# Patient Record
Sex: FEMALE | Race: WHITE | NOT HISPANIC OR LATINO
[De-identification: names, ages, dates, MRNs, and addresses within clinical notes are randomized per-mention and may not be internally consistent; named-entity substitution may affect disease eponyms.]

---

## 2017-01-17 ENCOUNTER — APPOINTMENT (OUTPATIENT)
Dept: HEMATOLOGY ONCOLOGY | Facility: CLINIC | Age: 77
End: 2017-01-17

## 2017-01-17 VITALS
WEIGHT: 146 LBS | DIASTOLIC BLOOD PRESSURE: 76 MMHG | RESPIRATION RATE: 12 BRPM | HEART RATE: 82 BPM | HEIGHT: 59 IN | TEMPERATURE: 98.9 F | SYSTOLIC BLOOD PRESSURE: 201 MMHG | BODY MASS INDEX: 29.43 KG/M2

## 2017-01-17 DIAGNOSIS — Z86.79 PERSONAL HISTORY OF OTHER DISEASES OF THE CIRCULATORY SYSTEM: ICD-10-CM

## 2017-01-17 DIAGNOSIS — Z87.891 PERSONAL HISTORY OF NICOTINE DEPENDENCE: ICD-10-CM

## 2017-01-17 DIAGNOSIS — Z80.0 FAMILY HISTORY OF MALIGNANT NEOPLASM OF DIGESTIVE ORGANS: ICD-10-CM

## 2017-01-17 DIAGNOSIS — I21.29 ST ELEVATION (STEMI) MYOCARDIAL INFARCTION INVOLVING OTHER SITES: ICD-10-CM

## 2017-01-17 DIAGNOSIS — J44.9 CHRONIC OBSTRUCTIVE PULMONARY DISEASE, UNSPECIFIED: ICD-10-CM

## 2017-01-17 DIAGNOSIS — Z82.49 FAMILY HISTORY OF ISCHEMIC HEART DISEASE AND OTHER DISEASES OF THE CIRCULATORY SYSTEM: ICD-10-CM

## 2017-01-17 DIAGNOSIS — Z82.5 FAMILY HISTORY OF ASTHMA AND OTHER CHRONIC LOWER RESPIRATORY DISEASES: ICD-10-CM

## 2017-01-17 DIAGNOSIS — Z87.19 PERSONAL HISTORY OF OTHER DISEASES OF THE DIGESTIVE SYSTEM: ICD-10-CM

## 2017-01-17 DIAGNOSIS — Z86.39 PERSONAL HISTORY OF OTHER ENDOCRINE, NUTRITIONAL AND METABOLIC DISEASE: ICD-10-CM

## 2017-01-17 RX ORDER — DONEPEZIL HYDROCHLORIDE 10 MG/1
10 TABLET, FILM COATED ORAL
Refills: 0 | Status: ACTIVE | COMMUNITY

## 2017-01-17 RX ORDER — ATORVASTATIN CALCIUM 20 MG/1
20 TABLET, FILM COATED ORAL
Refills: 0 | Status: ACTIVE | COMMUNITY

## 2017-01-17 RX ORDER — OMEPRAZOLE 40 MG/1
40 CAPSULE, DELAYED RELEASE ORAL
Refills: 0 | Status: ACTIVE | COMMUNITY

## 2017-01-17 RX ORDER — ISOSORBIDE MONONITRATE 30 MG/1
30 TABLET, EXTENDED RELEASE ORAL
Refills: 0 | Status: ACTIVE | COMMUNITY

## 2017-01-17 RX ORDER — CICLESONIDE 50 UG/1
50 SPRAY NASAL
Refills: 0 | Status: ACTIVE | COMMUNITY

## 2017-01-17 RX ORDER — TIOTROPIUM BROMIDE 18 UG/1
18 CAPSULE ORAL; RESPIRATORY (INHALATION)
Refills: 0 | Status: ACTIVE | COMMUNITY

## 2017-01-17 RX ORDER — LOSARTAN POTASSIUM 100 MG/1
100 TABLET, FILM COATED ORAL
Refills: 0 | Status: ACTIVE | COMMUNITY

## 2017-01-17 RX ORDER — MONTELUKAST SODIUM 10 MG/1
10 TABLET, FILM COATED ORAL
Refills: 0 | Status: ACTIVE | COMMUNITY

## 2017-01-19 LAB
ALBUMIN SERPL-MCNC: 3.8 G/DL
ALBUMIN/GLOB SERPL: 2
ALP SERPL-CCNC: 112 IU/L
ALT SERPL-CCNC: 20 IU/L
ANION GAP SERPL CALC-SCNC: 8 MEQ/L
AST SERPL-CCNC: 22 IU/L
BASOPHILS # BLD: 0.02 TH/MM3
BASOPHILS NFR BLD: 0.4 %
BILIRUB SERPL-MCNC: 0.9 MG/DL
BUN SERPL-MCNC: 22 MG/DL
BUN/CREAT SERPL: 18.6 %
CALCIUM SERPL-MCNC: 9.3 MG/DL
CHLORIDE SERPL-SCNC: 104 MEQ/L
CO2 SERPL-SCNC: 28 MEQ/L
CREAT SERPL-MCNC: 1.18 MG/DL
EOSINOPHIL # BLD: 0.19 TH/MM3
EOSINOPHIL NFR BLD: 3.5 %
ERYTHROCYTE [DISTWIDTH] IN BLOOD BY AUTOMATED COUNT: 13.6 %
GFR SERPL CREATININE-BSD FRML MDRD: 44
GLUCOSE SERPL-MCNC: 197 MG/DL
GRANULOCYTES # BLD: 2.86 TH/MM3
GRANULOCYTES NFR BLD: 53.1 %
HAPTOGLOB SERPL-MCNC: 143 MG/DL
HCT VFR BLD AUTO: 35.3 %
HGB BLD-MCNC: 11.9 G/DL
IGG FLD-MCNC: 571 MG/DL
IMM GRANULOCYTES # BLD: 0.01 TH/MM3
IMM GRANULOCYTES NFR BLD: 0.2 %
LACTATE DEHYDROGENASE (NORTH): 178 IU/L
LYMPHOCYTES # BLD: 1.78 TH/MM3
LYMPHOCYTES NFR BLD: 33.1 %
MCH RBC QN AUTO: 28.1 PG
MCHC RBC AUTO-ENTMCNC: 33.7 G/DL
MCV RBC AUTO: 83.5 FL
MONOCYTES # BLD: 0.52 TH/MM3
MONOCYTES NFR BLD: 9.7 %
PLATELET # BLD: 178 TH/MM3
PMV BLD AUTO: 8.7 FL
POTASSIUM SERPL-SCNC: 4.4 MMOL/L
PROT SERPL-MCNC: 5.7 G/DL
RBC # BLD AUTO: 4.23 MIL/MM3
SODIUM SERPL-SCNC: 140 MEQ/L
WBC # BLD: 5.38 TH/MM3

## 2017-04-24 ENCOUNTER — OUTPATIENT (OUTPATIENT)
Dept: OUTPATIENT SERVICES | Facility: HOSPITAL | Age: 77
LOS: 1 days | Discharge: HOME | End: 2017-04-24

## 2017-04-24 ENCOUNTER — APPOINTMENT (OUTPATIENT)
Dept: HEMATOLOGY ONCOLOGY | Facility: CLINIC | Age: 77
End: 2017-04-24

## 2017-04-24 VITALS
HEIGHT: 59 IN | WEIGHT: 148 LBS | DIASTOLIC BLOOD PRESSURE: 62 MMHG | TEMPERATURE: 97.4 F | HEART RATE: 76 BPM | BODY MASS INDEX: 29.84 KG/M2 | SYSTOLIC BLOOD PRESSURE: 166 MMHG | RESPIRATION RATE: 12 BRPM

## 2017-04-24 DIAGNOSIS — D64.9 ANEMIA, UNSPECIFIED: ICD-10-CM

## 2017-04-24 LAB
BASOPHILS # BLD: 0.02 TH/MM3
BASOPHILS NFR BLD: 0.4 %
EOSINOPHIL # BLD: 0.18 TH/MM3
EOSINOPHIL NFR BLD: 3.2 %
ERYTHROCYTE [DISTWIDTH] IN BLOOD BY AUTOMATED COUNT: 14.7 %
GRANULOCYTES # BLD: 3.07 TH/MM3
GRANULOCYTES NFR BLD: 54.5 %
HCT VFR BLD AUTO: 36.6 %
HGB BLD-MCNC: 12.2 G/DL
IMM GRANULOCYTES # BLD: 0.04 TH/MM3
IMM GRANULOCYTES NFR BLD: 0.7 %
LYMPHOCYTES # BLD: 1.79 TH/MM3
LYMPHOCYTES NFR BLD: 31.9 %
MCH RBC QN AUTO: 27.9 PG
MCHC RBC AUTO-ENTMCNC: 33.3 G/DL
MCV RBC AUTO: 83.8 FL
MONOCYTES # BLD: 0.52 TH/MM3
MONOCYTES NFR BLD: 9.3 %
PLATELET # BLD: 162 TH/MM3
PMV BLD AUTO: 9 FL
RBC # BLD AUTO: 4.37 MIL/MM3
WBC # BLD: 5.62 TH/MM3

## 2017-06-28 DIAGNOSIS — C85.90 NON-HODGKIN LYMPHOMA, UNSPECIFIED, UNSPECIFIED SITE: ICD-10-CM

## 2017-06-28 DIAGNOSIS — D59.1 OTHER AUTOIMMUNE HEMOLYTIC ANEMIAS: ICD-10-CM

## 2017-07-05 ENCOUNTER — OUTPATIENT (OUTPATIENT)
Dept: OUTPATIENT SERVICES | Facility: HOSPITAL | Age: 77
LOS: 1 days | Discharge: HOME | End: 2017-07-05

## 2017-07-05 DIAGNOSIS — R52 PAIN, UNSPECIFIED: ICD-10-CM

## 2017-07-05 DIAGNOSIS — D64.9 ANEMIA, UNSPECIFIED: ICD-10-CM

## 2017-07-26 ENCOUNTER — APPOINTMENT (OUTPATIENT)
Dept: HEMATOLOGY ONCOLOGY | Facility: CLINIC | Age: 77
End: 2017-07-26

## 2017-07-26 ENCOUNTER — OUTPATIENT (OUTPATIENT)
Dept: OUTPATIENT SERVICES | Facility: HOSPITAL | Age: 77
LOS: 1 days | Discharge: HOME | End: 2017-07-26

## 2017-07-26 VITALS
TEMPERATURE: 97.6 F | HEIGHT: 59 IN | WEIGHT: 147 LBS | DIASTOLIC BLOOD PRESSURE: 57 MMHG | HEART RATE: 83 BPM | BODY MASS INDEX: 29.64 KG/M2 | SYSTOLIC BLOOD PRESSURE: 156 MMHG | RESPIRATION RATE: 12 BRPM

## 2017-07-26 DIAGNOSIS — D64.9 ANEMIA, UNSPECIFIED: ICD-10-CM

## 2017-07-26 RX ORDER — INSULIN ASPART 100 [IU]/ML
100 INJECTION, SOLUTION INTRAVENOUS; SUBCUTANEOUS
Qty: 30 | Refills: 0 | Status: ACTIVE | COMMUNITY
Start: 2017-05-26

## 2017-07-26 RX ORDER — INSULIN DETEMIR 100 [IU]/ML
100 INJECTION, SOLUTION SUBCUTANEOUS
Qty: 30 | Refills: 0 | Status: COMPLETED | COMMUNITY
Start: 2017-03-17

## 2017-07-26 RX ORDER — GLIPIZIDE 2.5 MG/1
2.5 TABLET, FILM COATED, EXTENDED RELEASE ORAL
Refills: 0 | Status: COMPLETED | COMMUNITY
End: 2017-07-26

## 2017-07-26 RX ORDER — LEVOFLOXACIN 500 MG/1
500 TABLET, FILM COATED ORAL
Qty: 10 | Refills: 0 | Status: COMPLETED | COMMUNITY
Start: 2017-06-06

## 2017-07-26 RX ORDER — RANITIDINE 150 MG/1
150 TABLET ORAL
Qty: 90 | Refills: 0 | Status: COMPLETED | COMMUNITY
Start: 2017-04-21

## 2017-07-26 RX ORDER — BLOOD GLUCOSE CONTROL HIGH,LOW
EACH MISCELLANEOUS
Qty: 2 | Refills: 0 | Status: COMPLETED | COMMUNITY
Start: 2017-05-10

## 2017-07-26 RX ORDER — (INSULIN DEGLUDEC AND LIRAGLUTIDE) 100; 3.6 [IU]/ML; MG/ML
100-3.6 INJECTION, SOLUTION SUBCUTANEOUS
Refills: 0 | Status: ACTIVE | COMMUNITY

## 2017-07-26 RX ORDER — PEN NEEDLE, DIABETIC, SAFETY 32GX 5/32"
32G X 4 MM NEEDLE, DISPOSABLE MISCELLANEOUS
Qty: 400 | Refills: 0 | Status: COMPLETED | COMMUNITY
Start: 2017-06-06

## 2017-07-26 RX ORDER — LANCING DEVICE
EACH MISCELLANEOUS
Qty: 1 | Refills: 0 | Status: COMPLETED | COMMUNITY
Start: 2017-05-10

## 2017-07-26 RX ORDER — BLOOD-GLUCOSE METER
W/DEVICE EACH MISCELLANEOUS
Qty: 1 | Refills: 0 | Status: COMPLETED | COMMUNITY
Start: 2017-05-10

## 2017-07-26 RX ORDER — BLOOD SUGAR DIAGNOSTIC
STRIP MISCELLANEOUS
Qty: 250 | Refills: 0 | Status: COMPLETED | COMMUNITY
Start: 2017-06-22

## 2017-07-26 RX ORDER — FUROSEMIDE 20 MG/1
20 TABLET ORAL
Qty: 90 | Refills: 0 | Status: ACTIVE | COMMUNITY
Start: 2017-05-22

## 2017-07-26 RX ORDER — LANCETS 30 GAUGE
EACH MISCELLANEOUS
Qty: 400 | Refills: 0 | Status: COMPLETED | COMMUNITY
Start: 2017-05-10

## 2017-07-26 RX ORDER — SYRING-NEEDL,DISP,INSUL,0.3 ML 31 GX5/16"
31G X 5/16" SYRINGE, EMPTY DISPOSABLE MISCELLANEOUS
Qty: 400 | Refills: 0 | Status: COMPLETED | COMMUNITY
Start: 2017-05-10

## 2017-07-27 DIAGNOSIS — D59.1 OTHER AUTOIMMUNE HEMOLYTIC ANEMIAS: ICD-10-CM

## 2017-07-27 DIAGNOSIS — C85.90 NON-HODGKIN LYMPHOMA, UNSPECIFIED, UNSPECIFIED SITE: ICD-10-CM

## 2017-07-27 LAB
ALBUMIN SERPL-MCNC: 4.2 G/DL
ALBUMIN/GLOB SERPL: 1.75
ALP SERPL-CCNC: 75 IU/L
ALT SERPL-CCNC: 16 IU/L
ANION GAP SERPL CALC-SCNC: 7 MEQ/L
AST SERPL-CCNC: 22 IU/L
BASOPHILS # BLD: 0.02 TH/MM3
BASOPHILS NFR BLD: 0.3 %
BILIRUB SERPL-MCNC: 0.9 MG/DL
BUN SERPL-MCNC: 26 MG/DL
BUN/CREAT SERPL: 20.3 %
CALCIUM SERPL-MCNC: 9.7 MG/DL
CHLORIDE SERPL-SCNC: 105 MEQ/L
CO2 SERPL-SCNC: 29 MEQ/L
CREAT SERPL-MCNC: 1.28 MG/DL
EOSINOPHIL # BLD: 0.2 TH/MM3
EOSINOPHIL NFR BLD: 2.8 %
ERYTHROCYTE [DISTWIDTH] IN BLOOD BY AUTOMATED COUNT: 14.1 %
GFR SERPL CREATININE-BSD FRML MDRD: 40
GLUCOSE SERPL-MCNC: 85 MG/DL
GRANULOCYTES # BLD: 4.48 TH/MM3
GRANULOCYTES NFR BLD: 63.6 %
HCT VFR BLD AUTO: 37.7 %
HGB BLD-MCNC: 12.2 G/DL
IMM GRANULOCYTES # BLD: 0.02 TH/MM3
IMM GRANULOCYTES NFR BLD: 0.3 %
LACTATE DEHYDROGENASE (NORTH): 194 IU/L
LYMPHOCYTES # BLD: 1.75 TH/MM3
LYMPHOCYTES NFR BLD: 24.9 %
MCH RBC QN AUTO: 27.4 PG
MCHC RBC AUTO-ENTMCNC: 32.4 G/DL
MCV RBC AUTO: 84.5 FL
MONOCYTES # BLD: 0.57 TH/MM3
MONOCYTES NFR BLD: 8.1 %
PLATELET # BLD: 182 TH/MM3
PMV BLD AUTO: 8.6 FL
POTASSIUM SERPL-SCNC: 4.1 MMOL/L
PROT SERPL-MCNC: 6.6 G/DL
RBC # BLD AUTO: 4.46 MIL/MM3
SODIUM SERPL-SCNC: 141 MEQ/L
WBC # BLD: 7.04 TH/MM3

## 2017-08-01 ENCOUNTER — APPOINTMENT (OUTPATIENT)
Dept: VASCULAR SURGERY | Facility: CLINIC | Age: 77
End: 2017-08-01
Payer: MEDICARE

## 2017-08-01 VITALS
SYSTOLIC BLOOD PRESSURE: 146 MMHG | HEIGHT: 59 IN | WEIGHT: 147 LBS | BODY MASS INDEX: 29.64 KG/M2 | DIASTOLIC BLOOD PRESSURE: 60 MMHG

## 2017-08-01 PROCEDURE — 99214 OFFICE O/P EST MOD 30 MIN: CPT

## 2017-08-01 PROCEDURE — 93925 LOWER EXTREMITY STUDY: CPT

## 2017-08-01 PROCEDURE — 93978 VASCULAR STUDY: CPT

## 2017-08-01 PROCEDURE — 93880 EXTRACRANIAL BILAT STUDY: CPT

## 2017-09-10 ENCOUNTER — EMERGENCY (EMERGENCY)
Facility: HOSPITAL | Age: 77
LOS: 0 days | Discharge: HOME | End: 2017-09-10
Admitting: INTERNAL MEDICINE

## 2017-09-10 DIAGNOSIS — Z90.49 ACQUIRED ABSENCE OF OTHER SPECIFIED PARTS OF DIGESTIVE TRACT: ICD-10-CM

## 2017-09-10 DIAGNOSIS — Z88.2 ALLERGY STATUS TO SULFONAMIDES: ICD-10-CM

## 2017-09-10 DIAGNOSIS — Z88.8 ALLERGY STATUS TO OTHER DRUGS, MEDICAMENTS AND BIOLOGICAL SUBSTANCES STATUS: ICD-10-CM

## 2017-09-10 DIAGNOSIS — M25.572 PAIN IN LEFT ANKLE AND JOINTS OF LEFT FOOT: ICD-10-CM

## 2017-09-10 DIAGNOSIS — Z91.010 ALLERGY TO PEANUTS: ICD-10-CM

## 2017-09-10 DIAGNOSIS — M19.90 UNSPECIFIED OSTEOARTHRITIS, UNSPECIFIED SITE: ICD-10-CM

## 2017-09-10 DIAGNOSIS — Z91.013 ALLERGY TO SEAFOOD: ICD-10-CM

## 2017-09-10 DIAGNOSIS — D64.9 ANEMIA, UNSPECIFIED: ICD-10-CM

## 2017-09-10 DIAGNOSIS — Z88.0 ALLERGY STATUS TO PENICILLIN: ICD-10-CM

## 2017-09-10 DIAGNOSIS — I10 ESSENTIAL (PRIMARY) HYPERTENSION: ICD-10-CM

## 2017-09-10 DIAGNOSIS — E11.9 TYPE 2 DIABETES MELLITUS WITHOUT COMPLICATIONS: ICD-10-CM

## 2017-10-27 ENCOUNTER — APPOINTMENT (OUTPATIENT)
Dept: HEMATOLOGY ONCOLOGY | Facility: CLINIC | Age: 77
End: 2017-10-27

## 2017-10-27 VITALS
DIASTOLIC BLOOD PRESSURE: 75 MMHG | BODY MASS INDEX: 29.23 KG/M2 | SYSTOLIC BLOOD PRESSURE: 183 MMHG | WEIGHT: 145 LBS | HEIGHT: 59 IN | TEMPERATURE: 97.2 F | RESPIRATION RATE: 14 BRPM | HEART RATE: 80 BPM

## 2017-10-27 LAB
BASOPHILS # BLD: 0.02 TH/MM3
BASOPHILS NFR BLD: 0.4 %
EOSINOPHIL # BLD: 0.15 TH/MM3
EOSINOPHIL NFR BLD: 3.4 %
ERYTHROCYTE [DISTWIDTH] IN BLOOD BY AUTOMATED COUNT: 14.5 %
GRANULOCYTES # BLD: 2.67 TH/MM3
GRANULOCYTES NFR BLD: 60.1 %
HCT VFR BLD AUTO: 35.1 %
HGB BLD-MCNC: 11.7 G/DL
IMM GRANULOCYTES # BLD: 0.01 TH/MM3
IMM GRANULOCYTES NFR BLD: 0.2 %
LYMPHOCYTES # BLD: 1.19 TH/MM3
LYMPHOCYTES NFR BLD: 26.7 %
MCH RBC QN AUTO: 27.9 PG
MCHC RBC AUTO-ENTMCNC: 33.3 G/DL
MCV RBC AUTO: 83.8 FL
MONOCYTES # BLD: 0.41 TH/MM3
MONOCYTES NFR BLD: 9.2 %
PLATELET # BLD: 161 TH/MM3
PMV BLD AUTO: 8.4 FL
RBC # BLD AUTO: 4.19 MIL/MM3
WBC # BLD: 4.45 TH/MM3

## 2017-10-31 LAB
ALBUMIN SERPL-MCNC: 4.1 G/DL
ALBUMIN/GLOB SERPL: 2.16
ALP SERPL-CCNC: 69 IU/L
ALT SERPL-CCNC: 23 IU/L
ANION GAP SERPL CALC-SCNC: 5 MEQ/L
AST SERPL-CCNC: 29 IU/L
BILIRUB SERPL-MCNC: 0.7 MG/DL
BUN SERPL-MCNC: 26 MG/DL
BUN/CREAT SERPL: 20.8 %
CALCIUM SERPL-MCNC: 8.9 MG/DL
CHLORIDE SERPL-SCNC: 106 MEQ/L
CO2 SERPL-SCNC: 29 MEQ/L
CREAT SERPL-MCNC: 1.25 MG/DL
GFR SERPL CREATININE-BSD FRML MDRD: 41
GLUCOSE SERPL-MCNC: 116 MG/DL
LACTATE DEHYDROGENASE (NORTH): 180 IU/L
POTASSIUM SERPL-SCNC: 3.9 MMOL/L
PROT SERPL-MCNC: 6 G/DL
SODIUM SERPL-SCNC: 140 MEQ/L

## 2018-01-25 ENCOUNTER — APPOINTMENT (OUTPATIENT)
Dept: HEMATOLOGY ONCOLOGY | Facility: CLINIC | Age: 78
End: 2018-01-25

## 2018-01-25 ENCOUNTER — OUTPATIENT (OUTPATIENT)
Dept: OUTPATIENT SERVICES | Facility: HOSPITAL | Age: 78
LOS: 1 days | Discharge: HOME | End: 2018-01-25

## 2018-01-25 VITALS
RESPIRATION RATE: 14 BRPM | HEIGHT: 59 IN | BODY MASS INDEX: 29.03 KG/M2 | SYSTOLIC BLOOD PRESSURE: 140 MMHG | DIASTOLIC BLOOD PRESSURE: 62 MMHG | TEMPERATURE: 96.7 F | WEIGHT: 144 LBS | HEART RATE: 70 BPM

## 2018-01-25 DIAGNOSIS — C85.90 NON-HODGKIN LYMPHOMA, UNSPECIFIED, UNSPECIFIED SITE: ICD-10-CM

## 2018-01-25 DIAGNOSIS — D59.1 OTHER AUTOIMMUNE HEMOLYTIC ANEMIAS: ICD-10-CM

## 2018-01-25 LAB
BASOPHILS # BLD: 0.02 TH/MM3
BASOPHILS NFR BLD: 0.4 %
EOSINOPHIL # BLD: 0.13 TH/MM3
EOSINOPHIL NFR BLD: 2.5 %
ERYTHROCYTE [DISTWIDTH] IN BLOOD BY AUTOMATED COUNT: 14.1 %
GRANULOCYTES # BLD: 2.73 TH/MM3
GRANULOCYTES NFR BLD: 53.5 %
HCT VFR BLD AUTO: 36.2 %
HGB BLD-MCNC: 11.9 G/DL
IMM GRANULOCYTES # BLD: 0.01 TH/MM3
IMM GRANULOCYTES NFR BLD: 0.2 %
LYMPHOCYTES # BLD: 1.79 TH/MM3
LYMPHOCYTES NFR BLD: 35 %
MCH RBC QN AUTO: 27.6 PG
MCHC RBC AUTO-ENTMCNC: 32.9 G/DL
MCV RBC AUTO: 84 FL
MONOCYTES # BLD: 0.43 TH/MM3
MONOCYTES NFR BLD: 8.4 %
PLATELET # BLD: 144 TH/MM3
PMV BLD AUTO: 9.3 FL
RBC # BLD AUTO: 4.31 MIL/MM3
WBC # BLD: 5.11 TH/MM3

## 2018-01-25 NOTE — CONSULT LETTER
[Dear  ___] : Dear  [unfilled], [Consult Letter:] : I had the pleasure of evaluating your patient, [unfilled]. [Please see my note below.] : Please see my note below. [Consult Closing:] : Thank you very much for allowing me to participate in the care of this patient.  If you have any questions, please do not hesitate to contact me.

## 2018-01-25 NOTE — PHYSICAL EXAM
[Restricted in physically strenuous activity but ambulatory and able to carry out work of a light or sedentary nature] : Status 1- Restricted in physically strenuous activity but ambulatory and able to carry out work of a light or sedentary nature, e.g., light house work, office work [Normal] : grossly intact

## 2018-01-25 NOTE — REVIEW OF SYSTEMS
[Fatigue] : fatigue [Recent Change In Weight] : ~T no recent weight change [Cough] : no cough [Negative] : Heme/Lymph [de-identified] : diabetic neuropathy

## 2018-01-25 NOTE — ASSESSMENT
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  Elly has completed a course of steroids as well as rituximab. Rituxan  on 04/08/2016 and she  finished steroids approximately in early 06/2016.  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  She currently denies any bleeding.  S\par 3. Diabetes.  She is on insulin.\par 4. Hypogammaglobulinemia.  Her IgG that was previously checked  was decreased. She has been asymptomatic this was due to Rituxan. Repeat is showing near normal levels in past . \par 5.Previous history of hyperferritinemia, this was probably reactive.\par 6 She has a history of steroid use.  She is currently on calcium and vitamin D.   Her  DEXA scan in 8/2016 was normal. \par \par Plan: \par -will check doing well. Will see me in 4 months.\par -will check IgG levels again with next visit but no infections\par -will repeat DEXA sometime this year, Calcium, and Vit D was recommended.

## 2018-01-25 NOTE — HISTORY OF PRESENT ILLNESS
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  She finished it approximately in the beginning of 06/2016.  She also received 4  doses of rituximab therapy.\par \par HISTORY OF PRESENT ILLNESS:  Ms. Blackmon is a very pleasant 85yearold female with multiple medical problems.  She initially was being treated for secondary autoimmune hemolytic anemia in the setting of indolent Bcell nonHodgkin lymphoma, NOS.  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  Her initial PET scan only demonstrated mild splenomegaly.  She required steroids as well, but then when the tapering was attempted, her anemia worsened, and at that point in time, rituximab was initiated.\par  [de-identified] : January 17, 2017\yonathan Sanabria presents for followup today she hasn't seen you since July. She denies having recurrent illnesses. She denies having  fatigue she experienced before. She does have occasional headaches. Blood pressure today is elevated. Otherwise she has no complaints. \par \par 4/24/17\par She is doing well except  for trouble with sugars and BP. She is seeing appropriate specialists. No other complaints CBC from today is WNL.\par \par 7/26/17\par She is doing well. She has a cough with sputum production for now several months. CXR in July was clear. HAd a course of antibiotics that did not work. It may be her COPD. No bleedig, no B symptoms. Usual fatigue. \par \par 10/27/17\par She is doing well except for her diabetic neuropathy in her feet. States her A1C is 6. No bleeding. \par \par 1/25/18\par She is her for follow up for her NHL. She is doing well. No bleeding. No weight loss.  CBC from today is stable.

## 2018-02-06 ENCOUNTER — APPOINTMENT (OUTPATIENT)
Dept: VASCULAR SURGERY | Facility: CLINIC | Age: 78
End: 2018-02-06
Payer: MEDICARE

## 2018-02-06 PROCEDURE — 99213 OFFICE O/P EST LOW 20 MIN: CPT

## 2018-02-06 PROCEDURE — 93925 LOWER EXTREMITY STUDY: CPT

## 2018-05-24 ENCOUNTER — OUTPATIENT (OUTPATIENT)
Dept: OUTPATIENT SERVICES | Facility: HOSPITAL | Age: 78
LOS: 1 days | Discharge: HOME | End: 2018-05-24

## 2018-05-24 ENCOUNTER — APPOINTMENT (OUTPATIENT)
Dept: HEMATOLOGY ONCOLOGY | Facility: CLINIC | Age: 78
End: 2018-05-24

## 2018-05-24 ENCOUNTER — LABORATORY RESULT (OUTPATIENT)
Age: 78
End: 2018-05-24

## 2018-05-24 VITALS
WEIGHT: 148 LBS | TEMPERATURE: 97.6 F | BODY MASS INDEX: 29.84 KG/M2 | DIASTOLIC BLOOD PRESSURE: 67 MMHG | HEIGHT: 59 IN | RESPIRATION RATE: 14 BRPM | SYSTOLIC BLOOD PRESSURE: 153 MMHG | HEART RATE: 75 BPM

## 2018-05-24 LAB
HCT VFR BLD CALC: 38.7 %
HGB BLD-MCNC: 12.7 G/DL
MCHC RBC-ENTMCNC: 27.6 PG
MCHC RBC-ENTMCNC: 32.8 G/DL
MCV RBC AUTO: 84.1 FL
PLATELET # BLD AUTO: 172 K/UL
PMV BLD: 9.1 FL
RBC # BLD: 4.6 M/UL
RBC # FLD: 13.6 %
WBC # FLD AUTO: 5.55 K/UL

## 2018-05-27 LAB
ALBUMIN SERPL ELPH-MCNC: 4.2 G/DL
ALP BLD-CCNC: 65 U/L
ALT SERPL-CCNC: 15 U/L
ANION GAP SERPL CALC-SCNC: 15 MMOL/L
AST SERPL-CCNC: 20 U/L
BILIRUB SERPL-MCNC: 0.4 MG/DL
BUN SERPL-MCNC: 31 MG/DL
CALCIUM SERPL-MCNC: 9.2 MG/DL
CHLORIDE SERPL-SCNC: 98 MMOL/L
CO2 SERPL-SCNC: 29 MMOL/L
CREAT SERPL-MCNC: 1.3 MG/DL
GLUCOSE SERPL-MCNC: 168 MG/DL
HAPTOGLOB SERPL-MCNC: 131 MG/DL
LDH SERPL-CCNC: 241
POTASSIUM SERPL-SCNC: 4.3 MMOL/L
PROT SERPL-MCNC: 6.5 G/DL
SODIUM SERPL-SCNC: 142 MMOL/L

## 2018-06-02 NOTE — ASSESSMENT
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  Elly has completed a course of steroids as well as rituximab. Rituxan  on 04/08/2016 and she  finished steroids approximately in early 06/2016.  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  She currently denies any bleeding.  S\par 3. Diabetes.  She is on insulin.\par 4. Hypogammaglobulinemia.  Her IgG that was previously checked  was decreased. She has been asymptomatic this was due to Rituxan. Repeat is showing near normal levels in past . \par 5.Previous history of hyperferritinemia, this was probably reactive.\par 6 She has a history of steroid use.  She is currently on calcium and vitamin D.   Her  DEXA scan in 8/2016 was normal. \par \par Plan: \par -doing well with normal CBC  Will see me in 4 months.\par -will check IgG levels again in future\par -will repeat DEXA sometime this year, Calcium, and Vit D was recommended.

## 2018-06-02 NOTE — REVIEW OF SYSTEMS
[Fatigue] : fatigue [Recent Change In Weight] : ~T no recent weight change [Cough] : no cough [Negative] : Heme/Lymph [de-identified] : diabetic neuropathy

## 2018-06-02 NOTE — HISTORY OF PRESENT ILLNESS
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  She finished it approximately in the beginning of 06/2016.  She also received 4  doses of rituximab therapy.\par \par HISTORY OF PRESENT ILLNESS:  Ms. Blackmon is a very pleasant 85yearold female with multiple medical problems.  She initially was being treated for secondary autoimmune hemolytic anemia in the setting of indolent Bcell nonHodgkin lymphoma, NOS.  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  Her initial PET scan only demonstrated mild splenomegaly.  She required steroids as well, but then when the tapering was attempted, her anemia worsened, and at that point in time, rituximab was initiated.\par  [de-identified] : January 17, 2017\yonathan Sanabria presents for followup today she hasn't seen you since July. She denies having recurrent illnesses. She denies having  fatigue she experienced before. She does have occasional headaches. Blood pressure today is elevated. Otherwise she has no complaints. \par \par 4/24/17\par She is doing well except  for trouble with sugars and BP. She is seeing appropriate specialists. No other complaints CBC from today is WNL.\par \par 7/26/17\par She is doing well. She has a cough with sputum production for now several months. CXR in July was clear. HAd a course of antibiotics that did not work. It may be her COPD. No bleedig, no B symptoms. Usual fatigue. \par \par 10/27/17\par She is doing well except for her diabetic neuropathy in her feet. States her A1C is 6. No bleeding. \par \par 1/25/18\par She is her for follow up for her NHL. She is doing well. No bleeding. No weight loss.  CBC from today is stable.\par \par \par 5/24/18\par She is doing well. She has fatigue. CBC was fine from today s visit.

## 2018-06-07 DIAGNOSIS — D59.1 OTHER AUTOIMMUNE HEMOLYTIC ANEMIAS: ICD-10-CM

## 2018-06-07 DIAGNOSIS — C85.90 NON-HODGKIN LYMPHOMA, UNSPECIFIED, UNSPECIFIED SITE: ICD-10-CM

## 2018-06-08 ENCOUNTER — INPATIENT (INPATIENT)
Facility: HOSPITAL | Age: 78
LOS: 5 days | Discharge: HOME HEALTH | End: 2018-06-14
Attending: INTERNAL MEDICINE | Admitting: INTERNAL MEDICINE

## 2018-06-08 VITALS
HEART RATE: 93 BPM | TEMPERATURE: 100 F | DIASTOLIC BLOOD PRESSURE: 61 MMHG | SYSTOLIC BLOOD PRESSURE: 137 MMHG | OXYGEN SATURATION: 94 % | RESPIRATION RATE: 28 BRPM

## 2018-06-08 LAB
ALBUMIN SERPL ELPH-MCNC: 3.6 G/DL — SIGNIFICANT CHANGE UP (ref 3.5–5.2)
ALP SERPL-CCNC: 71 U/L — SIGNIFICANT CHANGE UP (ref 30–115)
ALT FLD-CCNC: 14 U/L — SIGNIFICANT CHANGE UP (ref 0–41)
ANION GAP SERPL CALC-SCNC: 15 MMOL/L — HIGH (ref 7–14)
APTT BLD: 34 SEC — SIGNIFICANT CHANGE UP (ref 27–39.2)
AST SERPL-CCNC: 17 U/L — SIGNIFICANT CHANGE UP (ref 0–41)
BASOPHILS # BLD AUTO: 0.04 K/UL — SIGNIFICANT CHANGE UP (ref 0–0.2)
BASOPHILS NFR BLD AUTO: 0.5 % — SIGNIFICANT CHANGE UP (ref 0–1)
BILIRUB SERPL-MCNC: 0.5 MG/DL — SIGNIFICANT CHANGE UP (ref 0.2–1.2)
BUN SERPL-MCNC: 23 MG/DL — HIGH (ref 10–20)
CALCIUM SERPL-MCNC: 8.5 MG/DL — SIGNIFICANT CHANGE UP (ref 8.5–10.1)
CHLORIDE SERPL-SCNC: 96 MMOL/L — LOW (ref 98–110)
CK MB CFR SERPL CALC: 2.3 NG/ML — SIGNIFICANT CHANGE UP (ref 0.6–6.3)
CK SERPL-CCNC: 120 U/L — SIGNIFICANT CHANGE UP (ref 0–225)
CO2 SERPL-SCNC: 27 MMOL/L — SIGNIFICANT CHANGE UP (ref 17–32)
CREAT SERPL-MCNC: 1.3 MG/DL — SIGNIFICANT CHANGE UP (ref 0.7–1.5)
EOSINOPHIL # BLD AUTO: 0.16 K/UL — SIGNIFICANT CHANGE UP (ref 0–0.7)
EOSINOPHIL NFR BLD AUTO: 2 % — SIGNIFICANT CHANGE UP (ref 0–8)
GLUCOSE SERPL-MCNC: 141 MG/DL — HIGH (ref 70–99)
HCT VFR BLD CALC: 31.3 % — LOW (ref 37–47)
HGB BLD-MCNC: 10.4 G/DL — LOW (ref 12–16)
IMM GRANULOCYTES NFR BLD AUTO: 0.6 % — HIGH (ref 0.1–0.3)
INR BLD: 1.18 RATIO — SIGNIFICANT CHANGE UP (ref 0.65–1.3)
LACTATE SERPL-SCNC: 1 MMOL/L — SIGNIFICANT CHANGE UP (ref 0.5–2.2)
LYMPHOCYTES # BLD AUTO: 3.58 K/UL — HIGH (ref 1.2–3.4)
LYMPHOCYTES # BLD AUTO: 44.3 % — SIGNIFICANT CHANGE UP (ref 20.5–51.1)
MCHC RBC-ENTMCNC: 27 PG — SIGNIFICANT CHANGE UP (ref 27–31)
MCHC RBC-ENTMCNC: 33.2 G/DL — SIGNIFICANT CHANGE UP (ref 32–37)
MCV RBC AUTO: 81.3 FL — SIGNIFICANT CHANGE UP (ref 81–99)
MONOCYTES # BLD AUTO: 0.91 K/UL — HIGH (ref 0.1–0.6)
MONOCYTES NFR BLD AUTO: 11.2 % — HIGH (ref 1.7–9.3)
NEUTROPHILS # BLD AUTO: 3.35 K/UL — SIGNIFICANT CHANGE UP (ref 1.4–6.5)
NEUTROPHILS NFR BLD AUTO: 41.4 % — LOW (ref 42.2–75.2)
PLATELET # BLD AUTO: 180 K/UL — SIGNIFICANT CHANGE UP (ref 130–400)
POTASSIUM SERPL-MCNC: 3.9 MMOL/L — SIGNIFICANT CHANGE UP (ref 3.5–5)
POTASSIUM SERPL-SCNC: 3.9 MMOL/L — SIGNIFICANT CHANGE UP (ref 3.5–5)
PROT SERPL-MCNC: 6.2 G/DL — SIGNIFICANT CHANGE UP (ref 6–8)
PROTHROM AB SERPL-ACNC: 12.8 SEC — SIGNIFICANT CHANGE UP (ref 9.95–12.87)
RBC # BLD: 3.85 M/UL — LOW (ref 4.2–5.4)
RBC # FLD: 13.3 % — SIGNIFICANT CHANGE UP (ref 11.5–14.5)
SODIUM SERPL-SCNC: 138 MMOL/L — SIGNIFICANT CHANGE UP (ref 135–146)
TROPONIN T SERPL-MCNC: <0.01 NG/ML — SIGNIFICANT CHANGE UP
WBC # BLD: 8.09 K/UL — SIGNIFICANT CHANGE UP (ref 4.8–10.8)
WBC # FLD AUTO: 8.09 K/UL — SIGNIFICANT CHANGE UP (ref 4.8–10.8)

## 2018-06-08 RX ORDER — SODIUM CHLORIDE 9 MG/ML
500 INJECTION, SOLUTION INTRAVENOUS ONCE
Qty: 0 | Refills: 0 | Status: COMPLETED | OUTPATIENT
Start: 2018-06-08 | End: 2018-06-08

## 2018-06-08 RX ORDER — ALBUTEROL 90 UG/1
2.5 AEROSOL, METERED ORAL
Qty: 0 | Refills: 0 | Status: COMPLETED | OUTPATIENT
Start: 2018-06-08 | End: 2018-06-08

## 2018-06-08 RX ADMIN — ALBUTEROL 2.5 MILLIGRAM(S): 90 AEROSOL, METERED ORAL at 23:00

## 2018-06-08 RX ADMIN — ALBUTEROL 2.5 MILLIGRAM(S): 90 AEROSOL, METERED ORAL at 22:40

## 2018-06-08 RX ADMIN — SODIUM CHLORIDE 500 MILLILITER(S): 9 INJECTION, SOLUTION INTRAVENOUS at 23:00

## 2018-06-08 RX ADMIN — ALBUTEROL 2.5 MILLIGRAM(S): 90 AEROSOL, METERED ORAL at 22:35

## 2018-06-08 NOTE — ED PROVIDER NOTE - NS ED ROS FT
Eyes:  No visual changes, eye pain or discharge.  ENMT: no sore throat or runny nose, + post nasal drip   Cardiac:  No chest pain, + SOB worsening over last week.   Respiratory:  + cough productive of green sputum x 1 week that is worsening.   GI:  No nausea, vomiting, diarrhea or abdominal pain.  :  No dysuria, frequency or burning.  MS:  No joint pain or back pain.  Neuro:  No headache or weakness.    Skin:  No skin rash.   Endocrine: No history of thyroid disease or diabetes.

## 2018-06-08 NOTE — ED PROVIDER NOTE - OBJECTIVE STATEMENT
77 yo F pmh of non hodkins lymphoma in remission, 77 yo F pmh of non hodkins lymphoma in remission,, CAD with 5 stents, DM, HTN, COPD presents with shortness of breath and cough x 1 week productive of green sputum. States that her  had similar symptoms which resolved but she her cough has worsened now having trouble breathing. + chills and subjective fever. no cp, no n/v/d, no abdominal pain. pmd is Dr. Fernandes.

## 2018-06-08 NOTE — ED PROVIDER NOTE - MEDICAL DECISION MAKING DETAILS
I personally evaluated the patient. I reviewed the Resident’s or Physician Assistant’s note (as assigned above), and agree with the findings and plan except as documented in my note. I personally evaluated the patient. I reviewed the Resident’s or Physician Assistant’s note (as assigned above), and agree with the findings and plan except as documented in my note.  wrap up note: pt with cough, fever, sob, admitted for pneumonia  Chart finished.

## 2018-06-08 NOTE — ED PROVIDER NOTE - PHYSICAL EXAMINATION
CONSTITUTIONAL: Well-developed; well-nourished; in no acute distress.   SKIN: warm, dry  HEAD: Normocephalic; atraumatic.  EYES: PERRL, no conjunctival erythema  ENT: No nasal discharge; airway clear.  NECK: Supple; non tender.  CARD: S1, S2 normal; Regular rate and rhythm.   RESP: + wheezing and crackles bilaterally. + tachypnea, speaking full sentences, no retractions   ABD: soft ntnd  EXT: Normal ROM.    LYMPH: No acute cervical adenopathy.

## 2018-06-09 DIAGNOSIS — Z98.890 OTHER SPECIFIED POSTPROCEDURAL STATES: Chronic | ICD-10-CM

## 2018-06-09 DIAGNOSIS — Z90.49 ACQUIRED ABSENCE OF OTHER SPECIFIED PARTS OF DIGESTIVE TRACT: Chronic | ICD-10-CM

## 2018-06-09 LAB
ALBUMIN SERPL ELPH-MCNC: 3.6 G/DL — SIGNIFICANT CHANGE UP (ref 3.5–5.2)
ALP SERPL-CCNC: 70 U/L — SIGNIFICANT CHANGE UP (ref 30–115)
ALT FLD-CCNC: 13 U/L — SIGNIFICANT CHANGE UP (ref 0–41)
ANION GAP SERPL CALC-SCNC: 17 MMOL/L — HIGH (ref 7–14)
AST SERPL-CCNC: 15 U/L — SIGNIFICANT CHANGE UP (ref 0–41)
BASOPHILS # BLD AUTO: 0.01 K/UL — SIGNIFICANT CHANGE UP (ref 0–0.2)
BASOPHILS NFR BLD AUTO: 0.3 % — SIGNIFICANT CHANGE UP (ref 0–1)
BILIRUB SERPL-MCNC: 0.3 MG/DL — SIGNIFICANT CHANGE UP (ref 0.2–1.2)
BUN SERPL-MCNC: 24 MG/DL — HIGH (ref 10–20)
CALCIUM SERPL-MCNC: 8.2 MG/DL — LOW (ref 8.5–10.1)
CHLORIDE SERPL-SCNC: 95 MMOL/L — LOW (ref 98–110)
CHOLEST SERPL-MCNC: 171 MG/DL — SIGNIFICANT CHANGE UP (ref 100–200)
CO2 SERPL-SCNC: 24 MMOL/L — SIGNIFICANT CHANGE UP (ref 17–32)
CREAT SERPL-MCNC: 1.3 MG/DL — SIGNIFICANT CHANGE UP (ref 0.7–1.5)
EOSINOPHIL # BLD AUTO: 0.01 K/UL — SIGNIFICANT CHANGE UP (ref 0–0.7)
EOSINOPHIL NFR BLD AUTO: 0.3 % — SIGNIFICANT CHANGE UP (ref 0–8)
ESTIMATED AVERAGE GLUCOSE: 146 MG/DL — HIGH (ref 68–114)
GLUCOSE SERPL-MCNC: 242 MG/DL — HIGH (ref 70–99)
HBA1C BLD-MCNC: 6.7 % — HIGH (ref 4–5.6)
HCT VFR BLD CALC: 30.1 % — LOW (ref 37–47)
HDLC SERPL-MCNC: 40 MG/DL — SIGNIFICANT CHANGE UP (ref 40–125)
HGB BLD-MCNC: 10 G/DL — LOW (ref 12–16)
IMM GRANULOCYTES NFR BLD AUTO: 0.8 % — HIGH (ref 0.1–0.3)
LIPID PNL WITH DIRECT LDL SERPL: 101 MG/DL — SIGNIFICANT CHANGE UP (ref 4–129)
LYMPHOCYTES # BLD AUTO: 0.24 K/UL — LOW (ref 1.2–3.4)
LYMPHOCYTES # BLD AUTO: 6.2 % — LOW (ref 20.5–51.1)
MCHC RBC-ENTMCNC: 27 PG — SIGNIFICANT CHANGE UP (ref 27–31)
MCHC RBC-ENTMCNC: 33.2 G/DL — SIGNIFICANT CHANGE UP (ref 32–37)
MCV RBC AUTO: 81.1 FL — SIGNIFICANT CHANGE UP (ref 81–99)
MONOCYTES # BLD AUTO: 0.14 K/UL — SIGNIFICANT CHANGE UP (ref 0.1–0.6)
MONOCYTES NFR BLD AUTO: 3.6 % — SIGNIFICANT CHANGE UP (ref 1.7–9.3)
NEUTROPHILS # BLD AUTO: 3.45 K/UL — SIGNIFICANT CHANGE UP (ref 1.4–6.5)
NEUTROPHILS NFR BLD AUTO: 88.8 % — HIGH (ref 42.2–75.2)
PLATELET # BLD AUTO: 139 K/UL — SIGNIFICANT CHANGE UP (ref 130–400)
POTASSIUM SERPL-MCNC: 4.4 MMOL/L — SIGNIFICANT CHANGE UP (ref 3.5–5)
POTASSIUM SERPL-SCNC: 4.4 MMOL/L — SIGNIFICANT CHANGE UP (ref 3.5–5)
PROT SERPL-MCNC: 6.2 G/DL — SIGNIFICANT CHANGE UP (ref 6–8)
RBC # BLD: 3.71 M/UL — LOW (ref 4.2–5.4)
RBC # FLD: 13.4 % — SIGNIFICANT CHANGE UP (ref 11.5–14.5)
SODIUM SERPL-SCNC: 136 MMOL/L — SIGNIFICANT CHANGE UP (ref 135–146)
TOTAL CHOLESTEROL/HDL RATIO MEASUREMENT: 4.3 RATIO — SIGNIFICANT CHANGE UP (ref 4–5.5)
TRIGL SERPL-MCNC: 143 MG/DL — SIGNIFICANT CHANGE UP (ref 10–149)
WBC # BLD: 3.88 K/UL — LOW (ref 4.8–10.8)
WBC # FLD AUTO: 3.88 K/UL — LOW (ref 4.8–10.8)

## 2018-06-09 RX ORDER — FENOFIBRATE,MICRONIZED 130 MG
48 CAPSULE ORAL DAILY
Qty: 0 | Refills: 0 | Status: DISCONTINUED | OUTPATIENT
Start: 2018-06-09 | End: 2018-06-14

## 2018-06-09 RX ORDER — ATORVASTATIN CALCIUM 80 MG/1
20 TABLET, FILM COATED ORAL AT BEDTIME
Qty: 0 | Refills: 0 | Status: DISCONTINUED | OUTPATIENT
Start: 2018-06-09 | End: 2018-06-14

## 2018-06-09 RX ORDER — DEXTROSE 50 % IN WATER 50 %
12.5 SYRINGE (ML) INTRAVENOUS ONCE
Qty: 0 | Refills: 0 | Status: DISCONTINUED | OUTPATIENT
Start: 2018-06-09 | End: 2018-06-14

## 2018-06-09 RX ORDER — MONTELUKAST 4 MG/1
10 TABLET, CHEWABLE ORAL DAILY
Qty: 0 | Refills: 0 | Status: DISCONTINUED | OUTPATIENT
Start: 2018-06-09 | End: 2018-06-14

## 2018-06-09 RX ORDER — TIOTROPIUM BROMIDE 18 UG/1
1 CAPSULE ORAL; RESPIRATORY (INHALATION) DAILY
Qty: 0 | Refills: 0 | Status: DISCONTINUED | OUTPATIENT
Start: 2018-06-09 | End: 2018-06-14

## 2018-06-09 RX ORDER — BUDESONIDE AND FORMOTEROL FUMARATE DIHYDRATE 160; 4.5 UG/1; UG/1
2 AEROSOL RESPIRATORY (INHALATION)
Qty: 0 | Refills: 0 | Status: DISCONTINUED | OUTPATIENT
Start: 2018-06-09 | End: 2018-06-14

## 2018-06-09 RX ORDER — DEXTROSE 50 % IN WATER 50 %
25 SYRINGE (ML) INTRAVENOUS ONCE
Qty: 0 | Refills: 0 | Status: DISCONTINUED | OUTPATIENT
Start: 2018-06-09 | End: 2018-06-14

## 2018-06-09 RX ORDER — CEFTRIAXONE 500 MG/1
1 INJECTION, POWDER, FOR SOLUTION INTRAMUSCULAR; INTRAVENOUS EVERY 24 HOURS
Qty: 0 | Refills: 0 | Status: DISCONTINUED | OUTPATIENT
Start: 2018-06-10 | End: 2018-06-12

## 2018-06-09 RX ORDER — IPRATROPIUM/ALBUTEROL SULFATE 18-103MCG
3 AEROSOL WITH ADAPTER (GRAM) INHALATION EVERY 6 HOURS
Qty: 0 | Refills: 0 | Status: DISCONTINUED | OUTPATIENT
Start: 2018-06-09 | End: 2018-06-14

## 2018-06-09 RX ORDER — DONEPEZIL HYDROCHLORIDE 10 MG/1
10 TABLET, FILM COATED ORAL AT BEDTIME
Qty: 0 | Refills: 0 | Status: DISCONTINUED | OUTPATIENT
Start: 2018-06-09 | End: 2018-06-14

## 2018-06-09 RX ORDER — CEFTRIAXONE 500 MG/1
1 INJECTION, POWDER, FOR SOLUTION INTRAMUSCULAR; INTRAVENOUS ONCE
Qty: 0 | Refills: 0 | Status: COMPLETED | OUTPATIENT
Start: 2018-06-09 | End: 2018-06-09

## 2018-06-09 RX ORDER — LOSARTAN POTASSIUM 100 MG/1
100 TABLET, FILM COATED ORAL DAILY
Qty: 0 | Refills: 0 | Status: DISCONTINUED | OUTPATIENT
Start: 2018-06-09 | End: 2018-06-14

## 2018-06-09 RX ORDER — SODIUM CHLORIDE 9 MG/ML
1000 INJECTION, SOLUTION INTRAVENOUS
Qty: 0 | Refills: 0 | Status: DISCONTINUED | OUTPATIENT
Start: 2018-06-09 | End: 2018-06-14

## 2018-06-09 RX ORDER — GLUCAGON INJECTION, SOLUTION 0.5 MG/.1ML
1 INJECTION, SOLUTION SUBCUTANEOUS ONCE
Qty: 0 | Refills: 0 | Status: DISCONTINUED | OUTPATIENT
Start: 2018-06-09 | End: 2018-06-14

## 2018-06-09 RX ORDER — PANTOPRAZOLE SODIUM 20 MG/1
40 TABLET, DELAYED RELEASE ORAL
Qty: 0 | Refills: 0 | Status: DISCONTINUED | OUTPATIENT
Start: 2018-06-09 | End: 2018-06-14

## 2018-06-09 RX ORDER — ONDANSETRON 8 MG/1
4 TABLET, FILM COATED ORAL EVERY 6 HOURS
Qty: 0 | Refills: 0 | Status: DISCONTINUED | OUTPATIENT
Start: 2018-06-09 | End: 2018-06-14

## 2018-06-09 RX ORDER — INSULIN LISPRO 100/ML
12 VIAL (ML) SUBCUTANEOUS ONCE
Qty: 0 | Refills: 0 | Status: COMPLETED | OUTPATIENT
Start: 2018-06-09 | End: 2018-06-09

## 2018-06-09 RX ORDER — INSULIN GLARGINE 100 [IU]/ML
12 INJECTION, SOLUTION SUBCUTANEOUS AT BEDTIME
Qty: 0 | Refills: 0 | Status: DISCONTINUED | OUTPATIENT
Start: 2018-06-09 | End: 2018-06-11

## 2018-06-09 RX ORDER — INSULIN LISPRO 100/ML
VIAL (ML) SUBCUTANEOUS
Qty: 0 | Refills: 0 | Status: DISCONTINUED | OUTPATIENT
Start: 2018-06-09 | End: 2018-06-11

## 2018-06-09 RX ORDER — INSULIN LISPRO 100/ML
4 VIAL (ML) SUBCUTANEOUS
Qty: 0 | Refills: 0 | Status: DISCONTINUED | OUTPATIENT
Start: 2018-06-09 | End: 2018-06-13

## 2018-06-09 RX ORDER — HEPARIN SODIUM 5000 [USP'U]/ML
5000 INJECTION INTRAVENOUS; SUBCUTANEOUS EVERY 8 HOURS
Qty: 0 | Refills: 0 | Status: DISCONTINUED | OUTPATIENT
Start: 2018-06-09 | End: 2018-06-14

## 2018-06-09 RX ORDER — ALBUTEROL 90 UG/1
1 AEROSOL, METERED ORAL EVERY 4 HOURS
Qty: 0 | Refills: 0 | Status: DISCONTINUED | OUTPATIENT
Start: 2018-06-09 | End: 2018-06-14

## 2018-06-09 RX ORDER — FUROSEMIDE 40 MG
20 TABLET ORAL DAILY
Qty: 0 | Refills: 0 | Status: DISCONTINUED | OUTPATIENT
Start: 2018-06-09 | End: 2018-06-14

## 2018-06-09 RX ORDER — INSULIN LISPRO 100/ML
14 VIAL (ML) SUBCUTANEOUS ONCE
Qty: 0 | Refills: 0 | Status: COMPLETED | OUTPATIENT
Start: 2018-06-09 | End: 2018-06-09

## 2018-06-09 RX ORDER — CEFTRIAXONE 500 MG/1
INJECTION, POWDER, FOR SOLUTION INTRAMUSCULAR; INTRAVENOUS
Qty: 0 | Refills: 0 | Status: DISCONTINUED | OUTPATIENT
Start: 2018-06-09 | End: 2018-06-12

## 2018-06-09 RX ORDER — INSULIN LISPRO 100/ML
10 VIAL (ML) SUBCUTANEOUS ONCE
Qty: 0 | Refills: 0 | Status: COMPLETED | OUTPATIENT
Start: 2018-06-09 | End: 2018-06-09

## 2018-06-09 RX ORDER — DEXTROSE 50 % IN WATER 50 %
15 SYRINGE (ML) INTRAVENOUS ONCE
Qty: 0 | Refills: 0 | Status: DISCONTINUED | OUTPATIENT
Start: 2018-06-09 | End: 2018-06-14

## 2018-06-09 RX ORDER — ISOSORBIDE MONONITRATE 60 MG/1
30 TABLET, EXTENDED RELEASE ORAL DAILY
Qty: 0 | Refills: 0 | Status: DISCONTINUED | OUTPATIENT
Start: 2018-06-09 | End: 2018-06-14

## 2018-06-09 RX ADMIN — LOSARTAN POTASSIUM 100 MILLIGRAM(S): 100 TABLET, FILM COATED ORAL at 05:26

## 2018-06-09 RX ADMIN — Medication 3 MILLILITER(S): at 21:27

## 2018-06-09 RX ADMIN — Medication 60 MILLIGRAM(S): at 18:54

## 2018-06-09 RX ADMIN — DONEPEZIL HYDROCHLORIDE 10 MILLIGRAM(S): 10 TABLET, FILM COATED ORAL at 21:32

## 2018-06-09 RX ADMIN — CEFTRIAXONE 100 GRAM(S): 500 INJECTION, POWDER, FOR SOLUTION INTRAMUSCULAR; INTRAVENOUS at 03:19

## 2018-06-09 RX ADMIN — PANTOPRAZOLE SODIUM 40 MILLIGRAM(S): 20 TABLET, DELAYED RELEASE ORAL at 07:56

## 2018-06-09 RX ADMIN — HEPARIN SODIUM 5000 UNIT(S): 5000 INJECTION INTRAVENOUS; SUBCUTANEOUS at 14:43

## 2018-06-09 RX ADMIN — Medication 2: at 07:57

## 2018-06-09 RX ADMIN — ISOSORBIDE MONONITRATE 30 MILLIGRAM(S): 60 TABLET, EXTENDED RELEASE ORAL at 11:46

## 2018-06-09 RX ADMIN — HEPARIN SODIUM 5000 UNIT(S): 5000 INJECTION INTRAVENOUS; SUBCUTANEOUS at 05:26

## 2018-06-09 RX ADMIN — Medication 60 MILLIGRAM(S): at 03:19

## 2018-06-09 RX ADMIN — Medication 600 MILLIGRAM(S): at 05:26

## 2018-06-09 RX ADMIN — HEPARIN SODIUM 5000 UNIT(S): 5000 INJECTION INTRAVENOUS; SUBCUTANEOUS at 21:33

## 2018-06-09 RX ADMIN — Medication 600 MILLIGRAM(S): at 18:53

## 2018-06-09 RX ADMIN — Medication 12 UNIT(S): at 13:33

## 2018-06-09 RX ADMIN — INSULIN GLARGINE 12 UNIT(S): 100 INJECTION, SOLUTION SUBCUTANEOUS at 21:32

## 2018-06-09 RX ADMIN — Medication 3 MILLILITER(S): at 16:55

## 2018-06-09 RX ADMIN — Medication 6: at 11:46

## 2018-06-09 RX ADMIN — Medication 3 MILLILITER(S): at 07:25

## 2018-06-09 RX ADMIN — BUDESONIDE AND FORMOTEROL FUMARATE DIHYDRATE 2 PUFF(S): 160; 4.5 AEROSOL RESPIRATORY (INHALATION) at 20:39

## 2018-06-09 RX ADMIN — Medication 14 UNIT(S): at 16:36

## 2018-06-09 RX ADMIN — Medication 10 UNIT(S): at 14:43

## 2018-06-09 RX ADMIN — MONTELUKAST 10 MILLIGRAM(S): 4 TABLET, CHEWABLE ORAL at 11:46

## 2018-06-09 RX ADMIN — ATORVASTATIN CALCIUM 20 MILLIGRAM(S): 80 TABLET, FILM COATED ORAL at 21:32

## 2018-06-09 RX ADMIN — BUDESONIDE AND FORMOTEROL FUMARATE DIHYDRATE 2 PUFF(S): 160; 4.5 AEROSOL RESPIRATORY (INHALATION) at 07:57

## 2018-06-09 RX ADMIN — Medication 4 UNIT(S): at 11:47

## 2018-06-09 RX ADMIN — Medication 20 MILLIGRAM(S): at 05:26

## 2018-06-09 NOTE — PROVIDER CONTACT NOTE (OTHER) - SITUATION
patient states she takes Xultrophy (insulin), family states that lispro does not work for pt as they have been through this before with he, and would like to know if they can bring in her med or havic

## 2018-06-09 NOTE — ED ADULT NURSE NOTE - PMH
Heart failure    High cholesterol    HTN (hypertension)    MI (myocardial infarction) CKD (chronic kidney disease)    Diabetes  Type !!  Heart failure    High cholesterol    HTN (hypertension)    MI (myocardial infarction)    Non Hodgkin's lymphoma    Osteoarthritis    PVD (peripheral vascular disease)    Thrombocytopenia

## 2018-06-09 NOTE — H&P ADULT - NSHPLABSRESULTS_GEN_ALL_CORE
10.4   8.09  )-----------( 180      ( 08 Jun 2018 22:55 )             31.3       06-08    138  |  96<L>  |  23<H>  ----------------------------<  141<H>  3.9   |  27  |  1.3    Ca    8.5      08 Jun 2018 22:55    TPro  6.2  /  Alb  3.6  /  TBili  0.5  /  DBili  x   /  AST  17  /  ALT  14  /  AlkPhos  71  06-08                  PT/INR - ( 08 Jun 2018 22:55 )   PT: 12.80 sec;   INR: 1.18 ratio         PTT - ( 08 Jun 2018 22:55 )  PTT:34.0 sec    Lactate Trend  06-08 @ 22:55 Lactate:1.0       CARDIAC MARKERS ( 08 Jun 2018 22:55 )  x     / <0.01 ng/mL / 120 U/L / x     / 2.3 ng/mL        CAPILLARY BLOOD GLUCOSE

## 2018-06-09 NOTE — PROGRESS NOTE ADULT - SUBJECTIVE AND OBJECTIVE BOX
06-09-18 @ 09:27  VENKATESH RAMACHANDRAN  78yFemale  Seen resting in bed, comfortable. Hx reviewed from pt.       Patient is a 78y old  Female who presents with a chief complaint of COUGH , SOB , FEVER AND CHILLS (09 Jun 2018 01:39)      HPI:  78 Y O f  with pmh of copd not on home o2, htn , dld, d.m , CKD , CAD s/p PCI with 5 stents, autoimmune hemolytic anemia and Low grade NHL in remission presented to ER with c/o 1 wk h/o cough , fever , SOB .  Pt reports that she was in her usual state of health 1 wk ago , when she started developing this cough and sob, Pt reports that the cough is dry sometimes she will see scant whitish sputum, she does not ambulate much but lately she noticed that she gets SOB even when she is sitting in her chair. Pt also reports subject fevers and chills but never took her temperature. Pt also reports feeling nauseous but no vomiting.  Pt denies chest pain , palpitations , abdominal pain , or diarrhea.     Pt reports + sick contact , as per pt her  was sick recently but has now improved.     Pt has h/o low grade NHL now in remission. (09 Jun 2018 01:39)      REVIEW OF SYSTEMS      General:	    Skin/Breast:  	  Ophthalmologic:  	  ENMT:	    Respiratory and Thorax:  	  Cardiovascular:	    Gastrointestinal:	    Genitourinary:	    Musculoskeletal:	    Neurological:	    Psychiatric:	    Hematology/Lymphatics:	    Endocrine:	    Allergic/Immunologic:	    ACE inhibitors (Unknown)  Ceclor (Unknown)  codeine (Unknown)  latex (Unknown)  penicillin (Unknown)  sulfonamides (Unknown)      Home Medications:  albuterol 0.63 mg/3 mL (0.021%) inhalation solution: 3 milliliter(s) inhaled 3 times a day (09 Jun 2018 01:55)  atorvastatin 20 mg oral tablet: 1 tab(s) orally once a day (09 Jun 2018 01:55)  donepezil 10 mg oral tablet: 1 tab(s) orally once a day (at bedtime) (09 Jun 2018 01:55)  Dulera 100 mcg-5 mcg/inh inhalation aerosol: 2 puff(s) inhaled 2 times a day (09 Jun 2018 01:55)  fenofibrate 48 mg oral tablet: 1 tab(s) orally once a day (09 Jun 2018 01:55)  Flovent 44 mcg/inh inhalation aerosol with adapter:  (09 Jun 2018 01:55)  inositol 650 mg oral tablet: 2 tab(s) orally once a day (09 Jun 2018 01:55)  isosorbide mononitrate 30 mg oral tablet, extended release: 1 tab(s) orally once a day (in the morning) (09 Jun 2018 01:55)  Lasix 20 mg oral tablet: 1 tab(s) orally once a day (09 Jun 2018 01:55)  losartan 100 mg oral tablet: 1 tab(s) orally once a day (09 Jun 2018 01:55)  montelukast 10 mg oral tablet: 1 tab(s) orally once a day (09 Jun 2018 01:55)  NovoLOG 100 units/mL subcutaneous solution: 10,7,7  subcutaneous (09 Jun 2018 01:55)  omeprazole 20 mg oral delayed release capsule: 1 cap(s) orally once a day (09 Jun 2018 01:55)  raNITIdine 150 mg oral capsule: 1 cap(s) orally 2 times a day (09 Jun 2018 01:55)  Spiriva 18 mcg inhalation capsule: 1 cap(s) inhaled once a day (09 Jun 2018 01:55)      MEDICATIONS  (STANDING):  ALBUTerol    90 MICROgram(s) HFA Inhaler 1 Puff(s) Inhalation every 4 hours  ALBUTerol/ipratropium for Nebulization 3 milliLiter(s) Nebulizer every 6 hours  atorvastatin 20 milliGRAM(s) Oral at bedtime  buDESOnide  80 MICROgram(s)/formoterol 4.5 MICROgram(s) Inhaler 2 Puff(s) Inhalation two times a day  cefTRIAXone   IVPB      dextrose 5%. 1000 milliLiter(s) (50 mL/Hr) IV Continuous <Continuous>  dextrose 50% Injectable 12.5 Gram(s) IV Push once  dextrose 50% Injectable 25 Gram(s) IV Push once  dextrose 50% Injectable 25 Gram(s) IV Push once  donepezil 10 milliGRAM(s) Oral at bedtime  fenofibrate Tablet 48 milliGRAM(s) Oral daily  furosemide    Tablet 20 milliGRAM(s) Oral daily  guaiFENesin  milliGRAM(s) Oral every 12 hours  heparin  Injectable 5000 Unit(s) SubCutaneous every 8 hours  insulin glargine Injectable (LANTUS) 12 Unit(s) SubCutaneous at bedtime  insulin lispro (HumaLOG) corrective regimen sliding scale   SubCutaneous three times a day before meals  insulin lispro Injectable (HumaLOG) 4 Unit(s) SubCutaneous three times a day before meals  isosorbide   mononitrate ER Tablet (IMDUR) 30 milliGRAM(s) Oral daily  levoFLOXacin IVPB 750 milliGRAM(s) IV Intermittent every 24 hours  losartan 100 milliGRAM(s) Oral daily  methylPREDNISolone sodium succinate Injectable 60 milliGRAM(s) IV Push two times a day  montelukast 10 milliGRAM(s) Oral daily  pantoprazole    Tablet 40 milliGRAM(s) Oral before breakfast  tiotropium 18 MICROgram(s) Capsule 1 Capsule(s) Inhalation daily    MEDICATIONS  (PRN):  aluminum hydroxide/magnesium hydroxide/simethicone Suspension 30 milliLiter(s) Oral every 4 hours PRN Dyspepsia  dextrose 40% Gel 15 Gram(s) Oral once PRN Blood Glucose LESS THAN 70 milliGRAM(s)/deciliter  glucagon  Injectable 1 milliGRAM(s) IntraMuscular once PRN Glucose LESS THAN 70 milligrams/deciliter  ondansetron Injectable 4 milliGRAM(s) IV Push every 6 hours PRN Nausea      PAST MEDICAL & SURGICAL HISTORY:  Coronary artery disease involving native heart without angina pectoris, unspecified vessel or lesion type  Diabetes: Type !!  Osteoarthritis  CKD (chronic kidney disease)  Thrombocytopenia  PVD (peripheral vascular disease)  Non Hodgkin's lymphoma  Heart failure  High cholesterol  HTN (hypertension)  MI (myocardial infarction)  H/O cardiac catheterization  History of cholecystectomy      T(C): 37.1 (06-09-18 @ 05:36), Max: 37.7 (06-08-18 @ 20:07)  HR: 77 (06-09-18 @ 05:36) (77 - 93)  BP: 120/56 (06-09-18 @ 05:36) (120/56 - 137/61)  RR: 20 (06-09-18 @ 05:36) (20 - 28)  SpO2: 100% (06-09-18 @ 03:57) (94% - 100%)    PHYSICAL EXAM:      Constitutional:    Eyes:    ENMT:    Neck:    Breasts:    Back:    Respiratory:    Cardiovascular:    Gastrointestinal:    Genitourinary:    Rectal:    Extremities:    Vascular:    Neurological:    Skin:    Lymph Nodes:    Musculoskeletal:    Psychiatric:                              10.0   3.88  )-----------( 139      ( 09 Jun 2018 06:34 )             30.1       06-09    136  |  95<L>  |  24<H>  ----------------------------<  242<H>  4.4   |  24  |  1.3    Ca    8.2<L>      09 Jun 2018 06:34    TPro  6.2  /  Alb  3.6  /  TBili  0.3  /  DBili  x   /  AST  15  /  ALT  13  /  AlkPhos  70  06-09          PT/INR - ( 08 Jun 2018 22:55 )   PT: 12.80 sec;   INR: 1.18 ratio         PTT - ( 08 Jun 2018 22:55 )  PTT:34.0 sec            PNEUMONIA OF BOTH LOWER LOBES DUE TO INFECTIOUS ORGANISM  ^PNEUMONIA OF BOTH LOWER LOBES DUE TO INFECTIOUS ORGANISM  No pertinent family history in first degree relatives  MEWS Score  Coronary artery disease involving native heart without angina pectoris, unspecified vessel or lesion type  Diabetes  Osteoarthritis  CKD (chronic kidney disease)  Thrombocytopenia  PVD (peripheral vascular disease)  Non Hodgkin's lymphoma  Heart failure  High cholesterol  HTN (hypertension)  MI (myocardial infarction)  Pneumonia of both lower lobes due to infectious organism  H/O cardiac catheterization  History of cholecystectomy  SOB  90+ 06-09-18 @ 09:27  VENKATESH RAMACHANDRAN  78yFemale  Seen resting in bed, comfortable. Hx reviewed from pt.       Patient is a 78y old  Female who presents with a chief complaint of COUGH , SOB , FEVER AND CHILLS (09 Jun 2018 01:39)  She was seen by me at the office > a wk ago, when she had no sx.      HPI:  78 Y O f  with pmh of copd not on home o2, htn , dld, d.m , CKD , CAD s/p PCI with 5 stents, autoimmune hemolytic anemia and Low grade NHL in remission presented to ER with c/o 1 wk h/o cough , fever , SOB .  Pt reports that she was in her usual state of health 1 wk ago , when she started developing this cough and sob, Pt reports that the cough is dry sometimes she will see scant whitish sputum, she does not ambulate much but lately she noticed that she gets SOB even when she is sitting in her chair. Pt also reports subject fevers and chills but never took her temperature. Pt also reports feeling nauseous but no vomiting.  Pt denies chest pain , palpitations , abdominal pain , or diarrhea.     Pt reports + sick contact , as per pt her  was sick recently but has now improved.     Pt has h/o low grade NHL now in remission. (09 Jun 2018 01:39)      REVIEW OF SYSTEMS      General:  Active lifestyle, lives w/ family. 	    Respiratory and Thorax: as HPI  	  Cardiovascular:	Been stable, hx CAD    Hematology/Lymphatics:	 No active bleeding. No sx.     Endocrine:	Known DM, been on insulin. Been stable but not well controlled.     Allergic/Immunologic:	    ACE inhibitors (Unknown)  Ceclor (Unknown)  codeine (Unknown)  latex (Unknown)  penicillin (Unknown)  sulfonamides (Unknown)      Home Medications:  albuterol 0.63 mg/3 mL (0.021%) inhalation solution: 3 milliliter(s) inhaled 3 times a day (09 Jun 2018 01:55)  atorvastatin 20 mg oral tablet: 1 tab(s) orally once a day (09 Jun 2018 01:55)  donepezil 10 mg oral tablet: 1 tab(s) orally once a day (at bedtime) (09 Jun 2018 01:55)  Dulera 100 mcg-5 mcg/inh inhalation aerosol: 2 puff(s) inhaled 2 times a day (09 Jun 2018 01:55)  fenofibrate 48 mg oral tablet: 1 tab(s) orally once a day (09 Jun 2018 01:55)  Flovent 44 mcg/inh inhalation aerosol with adapter:  (09 Jun 2018 01:55)  inositol 650 mg oral tablet: 2 tab(s) orally once a day (09 Jun 2018 01:55)  isosorbide mononitrate 30 mg oral tablet, extended release: 1 tab(s) orally once a day (in the morning) (09 Jun 2018 01:55)  Lasix 20 mg oral tablet: 1 tab(s) orally once a day (09 Jun 2018 01:55)  losartan 100 mg oral tablet: 1 tab(s) orally once a day (09 Jun 2018 01:55)  montelukast 10 mg oral tablet: 1 tab(s) orally once a day (09 Jun 2018 01:55)  NovoLOG 100 units/mL subcutaneous solution: 10,7,7  subcutaneous (09 Jun 2018 01:55)  omeprazole 20 mg oral delayed release capsule: 1 cap(s) orally once a day (09 Jun 2018 01:55)  raNITIdine 150 mg oral capsule: 1 cap(s) orally 2 times a day (09 Jun 2018 01:55)  Spiriva 18 mcg inhalation capsule: 1 cap(s) inhaled once a day (09 Jun 2018 01:55)      MEDICATIONS  (STANDING):  ALBUTerol    90 MICROgram(s) HFA Inhaler 1 Puff(s) Inhalation every 4 hours  ALBUTerol/ipratropium for Nebulization 3 milliLiter(s) Nebulizer every 6 hours  atorvastatin 20 milliGRAM(s) Oral at bedtime  buDESOnide  80 MICROgram(s)/formoterol 4.5 MICROgram(s) Inhaler 2 Puff(s) Inhalation two times a day  cefTRIAXone   IVPB      dextrose 5%. 1000 milliLiter(s) (50 mL/Hr) IV Continuous <Continuous>  dextrose 50% Injectable 12.5 Gram(s) IV Push once  dextrose 50% Injectable 25 Gram(s) IV Push once  dextrose 50% Injectable 25 Gram(s) IV Push once  donepezil 10 milliGRAM(s) Oral at bedtime  fenofibrate Tablet 48 milliGRAM(s) Oral daily  furosemide    Tablet 20 milliGRAM(s) Oral daily  guaiFENesin  milliGRAM(s) Oral every 12 hours  heparin  Injectable 5000 Unit(s) SubCutaneous every 8 hours  insulin glargine Injectable (LANTUS) 12 Unit(s) SubCutaneous at bedtime  insulin lispro (HumaLOG) corrective regimen sliding scale   SubCutaneous three times a day before meals  insulin lispro Injectable (HumaLOG) 4 Unit(s) SubCutaneous three times a day before meals  isosorbide   mononitrate ER Tablet (IMDUR) 30 milliGRAM(s) Oral daily  levoFLOXacin IVPB 750 milliGRAM(s) IV Intermittent every 24 hours  losartan 100 milliGRAM(s) Oral daily  methylPREDNISolone sodium succinate Injectable 60 milliGRAM(s) IV Push two times a day  montelukast 10 milliGRAM(s) Oral daily  pantoprazole    Tablet 40 milliGRAM(s) Oral before breakfast  tiotropium 18 MICROgram(s) Capsule 1 Capsule(s) Inhalation daily    MEDICATIONS  (PRN):  aluminum hydroxide/magnesium hydroxide/simethicone Suspension 30 milliLiter(s) Oral every 4 hours PRN Dyspepsia  dextrose 40% Gel 15 Gram(s) Oral once PRN Blood Glucose LESS THAN 70 milliGRAM(s)/deciliter  glucagon  Injectable 1 milliGRAM(s) IntraMuscular once PRN Glucose LESS THAN 70 milligrams/deciliter  ondansetron Injectable 4 milliGRAM(s) IV Push every 6 hours PRN Nausea      PAST MEDICAL & SURGICAL HISTORY:  Coronary artery disease involving native heart without angina pectoris, unspecified vessel or lesion type  Diabetes: Type !!  Osteoarthritis  CKD (chronic kidney disease)  Thrombocytopenia  PVD (peripheral vascular disease)  Non Hodgkin's lymphoma  Heart failure  High cholesterol  HTN (hypertension)  MI (myocardial infarction)  H/O cardiac catheterization  History of cholecystectomy      T(C): 37.1 (06-09-18 @ 05:36), Max: 37.7 (06-08-18 @ 20:07)  HR: 77 (06-09-18 @ 05:36) (77 - 93)  BP: 120/56 (06-09-18 @ 05:36) (120/56 - 137/61)  RR: 20 (06-09-18 @ 05:36) (20 - 28)  SpO2: 100% (06-09-18 @ 03:57) (94% - 100%)    PHYSICAL EXAM:      Constitutional: WBWN, NAD now. She feels better than yesterday    Neck: No bruit, no JVD, normal L/N    Respiratory: B/L AE OK. NO adv sound audible.     Cardiovascular: S1, S2 regular    Gastrointestinal: Soft, obese, benign ABd    Extremities: no CCE, no calf tenderness.     Neurological: NC    Skin: warm                              10.0   3.88  )-----------( 139      ( 09 Jun 2018 06:34 )             30.1       06-09    136  |  95<L>  |  24<H>  ----------------------------<  242<H>  4.4   |  24  |  1.3    Ca    8.2<L>      09 Jun 2018 06:34    TPro  6.2  /  Alb  3.6  /  TBili  0.3  /  DBili  x   /  AST  15  /  ALT  13  /  AlkPhos  70  06-09          PT/INR - ( 08 Jun 2018 22:55 )   PT: 12.80 sec;   INR: 1.18 ratio         PTT - ( 08 Jun 2018 22:55 )  PTT:34.0 sec    ECG :    Ventricular Rate 90 BPM    Atrial Rate 90 BPM    P-R Interval 144 ms    QRS Duration 76 ms    Q-T Interval 380 ms    QTC Calculation(Bezet) 464 ms    P Axis 45 degrees    R Axis -1 degrees    T Axis 66 degrees    Diagnosis Line Normal sinus rhythm  Normal ECG    Confirmed by QUITA LEMA MD ([id number]) on 6/9/2018 10:33:35 AM    CXR :    Findings:    Support devices: None.    Cardiac/mediastinum/hilum: Aortic calcifications.    Lung parenchyma/Pleura: Right basilar opacity.    Skeleton/soft tissues: Degenerative change in the shoulders and spine.   Surgical clips in the upper abdomen.    Impression:      Right basilar opacity consistent with pneumonia in the appropriate   clinical setting. Follow-up after treatment is recommended.            PNEUMONIA Comm. Acq. : CXR c/w RLL. Will Cont levaquin. Clinically she seems OK, will get ID, Pulm eval.   Coronary artery disease involving native heart without angina pectoris, unspecified vessel or lesion type : Risk Fx control. Cont current med tx.   Diabetes : On insulin. Diet reinforced  Thrombocytopenia  Anemia : Known hx of NHL. Will f/u.   PVD : stable.   Hx : Non Hodgkin's lymphoma, stable now.   HTN (hypertension) : Cont same.       Cont routine preventive.

## 2018-06-09 NOTE — H&P ADULT - HISTORY OF PRESENT ILLNESS
78 Y O f  with pmh of copd not on home o2, htn , dld, d.m , CKD , CAD s/p PCI with 5 stents, autoimmune hemolytic anemia and Low grade NHL in remission presented to ER with c/o 1 wk h/o cough , fever , SOB .  Pt reports that she was in her usual state of health 1 wk ago , when she started developing this cough and sob, Pt reports that the cough is dry sometimes she will see scant whitish sputum, she does not ambulate much but lately she noticed that she gets SOB even when she is sitting in her chair. Pt also reports subject fevers and chills but never took her temperature. Pt also reports feeling nauseous but no vomiting.  Pt denies chest pain , palpitations , abdominal pain , or diarrhea.     Pt reports + sick contact , as per pt her  was sick recently but has now improved.     Pt has h/o low grade NHL now in remission.

## 2018-06-09 NOTE — CONSULT NOTE ADULT - SUBJECTIVE AND OBJECTIVE BOX
VENKATESH RAMACHANDRAN 78yFemalePatient is a 78y old  Female who presents with a chief complaint of COUGH , SOB , FEVER AND CHILLS (09 Jun 2018 01:39)      Patient has history of:  ACE inhibitors (Unknown)  Ceclor (Unknown)  codeine (Unknown)  latex (Unknown)  penicillin (Unknown)  sulfonamides (Unknown)          PNEUMONIA OF BOTH LOWER LOBES DUE TO INFECTIOUS ORGANISM  ^PNEUMONIA OF BOTH LOWER LOBES DUE TO INFECTIOUS ORGANISM  No pertinent family history in first degree relatives  MEWS Score  Coronary artery disease involving native heart without angina pectoris, unspecified vessel or lesion type  Diabetes  Osteoarthritis  CKD (chronic kidney disease)  Thrombocytopenia  PVD (peripheral vascular disease)  Non Hodgkin's lymphoma  Heart failure  High cholesterol  HTN (hypertension)  MI (myocardial infarction)  Pneumonia of both lower lobes due to infectious organism  H/O cardiac catheterization  History of cholecystectomy  SOB  90+        Patient treated with:  cefTRIAXone   IVPB      levoFLOXacin IVPB 750 milliGRAM(s) IV Intermittent every 24 hours        PHYSICAL EXAM  T(F): 98.8 (06-09-18 @ 05:36), Max: 99.8 (06-08-18 @ 20:07)  HR: 77 (06-09-18 @ 05:36) (77 - 93)  BP: 120/56 (06-09-18 @ 05:36) (120/56 - 137/61)  RR: 20 (06-09-18 @ 05:36) (20 - 28)  SpO2: 100% (06-09-18 @ 03:57) (94% - 100%)  Daily Height in cm: 149.86 (09 Jun 2018 01:44)    Daily   HEENT: normal, no nuchal rigidity  Cor: RSR Nl S1 S2  Lungs: clear  Decreased breath sounds at bases    Abdomen: Nontender, Nl BS,     Ext: No clubbing,cyanosis or edema    LAB & RADIOLOGIC RESULTS:                        10.0   3.88  )-----------( 139      ( 09 Jun 2018 06:34 )             30.1         06-09    136  |  95<L>  |  24<H>  ----------------------------<  242<H>  4.4   |  24  |  1.3      TPro  6.2  /  Alb  3.6  /  TBili  0.3  /  DBili  x   /  AST  15  /  ALT  13  /  AlkPhos  70  06-09       Creatinine, Serum: 1.3 mg/dL (06-09-18 @ 06:34)  eGFR if Non African American: 39 mL/min/1.73M2 (06-09-18 @ 06:34)  eGFR if African American: 45 mL/min/1.73M2 (06-09-18 @ 06:34)  eGFR if African American: 45 mL/min/1.73M2 (06-08-18 @ 22:55)  eGFR if Non African American: 39 mL/min/1.73M2 (06-08-18 @ 22:55)  Creatinine, Serum: 1.3 mg/dL (06-08-18 @ 22:55)      PT/INR - ( 08 Jun 2018 22:55 )   PT: 12.80 sec;   INR: 1.18 ratio         PTT - ( 08 Jun 2018 22:55 )  PTT:34.0 sec

## 2018-06-09 NOTE — H&P ADULT - NSHPPHYSICALEXAM_GEN_ALL_CORE
T(F): 99.8  HR: 92  BP: 126/60  RR: 20  SpO2: 97%      PHYSICAL EXAM:  GENERAL: NAD, well-developed, lying on bed sob but able to talk in full sentences  HEAD:  Atraumatic, Normocephalic  NECK: Supple, No JVD  CHEST/LUNG: Clear to auscultation bilaterally; No wheeze  HEART: Regular rate and rhythm; No murmurs, rubs, or gallops  ABDOMEN: Soft, Nontender, Nondistended; Bowel sounds present  EXTREMITIES:  2+ Peripheral Pulses, No clubbing, cyanosis, or edema  PSYCH: AAOx3  NEUROLOGY: non-focal  SKIN: No rashes or lesions

## 2018-06-09 NOTE — PROVIDER CONTACT NOTE (MEDICATION) - SITUATION
glucose level and patient to receive only 10 units according to insulin scale, questioning whether doctor wants to add additional insulin

## 2018-06-09 NOTE — PROVIDER CONTACT NOTE (MEDICATION) - ACTION/TREATMENT ORDERED:
10 units of lispro ordered and given. will continue to monitor.
12 units of lispro ordered
as advised

## 2018-06-09 NOTE — H&P ADULT - PMH
CKD (chronic kidney disease)    Coronary artery disease involving native heart without angina pectoris, unspecified vessel or lesion type    Diabetes  Type !!  Heart failure    High cholesterol    HTN (hypertension)    MI (myocardial infarction)    Non Hodgkin's lymphoma    Osteoarthritis    PVD (peripheral vascular disease)    Thrombocytopenia

## 2018-06-09 NOTE — CONSULT NOTE ADULT - ASSESSMENT
IMPRESSION  Severe Sepsis (pulse>90 beats/min , resp rate>20/min, wbc<4) due to suspected Gram negative pneumonia (pt with non-Hodgkins lymphoma, CKD & DM)    wbc 3.88,     Hx allergy: Ceclor (Unknown)  penicillin (Unknown)  sulfonamides (Unknown)    On: cefTRIAXone   IVPB      levoFLOXacin 750 mg IV Intermittent every 24 hours    SUGGESTIONs  Await blood cultures & sputum C&S  Continue Rocephin & Levaquin  Urine Legionella antigen  Control blood sugar  Repeat white blood cell count

## 2018-06-09 NOTE — H&P ADULT - ASSESSMENT
78 Y O f  with pmh of copd not on home o2, htn , dld, d.m , CKD , CAD s/p PCI with 5 stents, autoimmune hemolytic anemia and Low grade NHL in remission presented to ER with c/o 1 wk h/o cough , fever , SOB .      # COUGH / SUBJECTIVE FEVERS / CHILLS / SOB / NO LEUKOCYTOSIS / NO INCREASED SPUTUM PRODUCTION    - symptoms could be sec to COPD EXACERBATION VRS PCN VRS VIRAL BRONCHITIS  -admit to medicine  - iv abx   - iv steroids .  - nebs q 4 hrs and prn.  - keep on nc to keep saturation >90%.  -sputum cultures , urine for legionella.  - pulm evaluation by Dr Graham as per primary team.    # HTN:    -stable will c/w home meds.    # D.M:   - monitor fs . insulin regimen    # CKD:    - stable     # DLD:    -C/W home meds    # POLYPHARMACY:    - pt is on a lot of medications , plz discuss in am with pmd regarding the need of all the medications.    # DVT :    -ppx with heparin.    # DISPO:    -full code ; from home.

## 2018-06-10 LAB
DIR ANTIGLOB POLYSPECIFIC INTERPRETATION: SIGNIFICANT CHANGE UP
HCT VFR BLD CALC: 30.1 % — LOW (ref 37–47)
HGB BLD-MCNC: 9.8 G/DL — LOW (ref 12–16)
LDH SERPL L TO P-CCNC: 211 — SIGNIFICANT CHANGE UP (ref 50–242)
MCHC RBC-ENTMCNC: 26.7 PG — LOW (ref 27–31)
MCHC RBC-ENTMCNC: 32.6 G/DL — SIGNIFICANT CHANGE UP (ref 32–37)
MCV RBC AUTO: 82 FL — SIGNIFICANT CHANGE UP (ref 81–99)
NRBC # BLD: 0 /100 WBCS — SIGNIFICANT CHANGE UP (ref 0–0)
PLATELET # BLD AUTO: 187 K/UL — SIGNIFICANT CHANGE UP (ref 130–400)
RBC # BLD: 3.67 M/UL — LOW (ref 4.2–5.4)
RBC # BLD: 3.76 M/UL — LOW (ref 4.2–5.4)
RBC # FLD: 13.3 % — SIGNIFICANT CHANGE UP (ref 11.5–14.5)
RETICS #: 59.4 K/UL — SIGNIFICANT CHANGE UP (ref 25–125)
RETICS/RBC NFR: 1.6 % — HIGH (ref 0.5–1.5)
WBC # BLD: 6.27 K/UL — SIGNIFICANT CHANGE UP (ref 4.8–10.8)
WBC # FLD AUTO: 6.27 K/UL — SIGNIFICANT CHANGE UP (ref 4.8–10.8)

## 2018-06-10 RX ORDER — INSULIN LISPRO 100/ML
12 VIAL (ML) SUBCUTANEOUS ONCE
Qty: 0 | Refills: 0 | Status: COMPLETED | OUTPATIENT
Start: 2018-06-10 | End: 2018-06-10

## 2018-06-10 RX ORDER — INSULIN LISPRO 100/ML
6 VIAL (ML) SUBCUTANEOUS ONCE
Qty: 0 | Refills: 0 | Status: COMPLETED | OUTPATIENT
Start: 2018-06-10 | End: 2018-06-10

## 2018-06-10 RX ORDER — DOCUSATE SODIUM 100 MG
100 CAPSULE ORAL
Qty: 0 | Refills: 0 | Status: DISCONTINUED | OUTPATIENT
Start: 2018-06-10 | End: 2018-06-14

## 2018-06-10 RX ORDER — SENNA PLUS 8.6 MG/1
1 TABLET ORAL DAILY
Qty: 0 | Refills: 0 | Status: DISCONTINUED | OUTPATIENT
Start: 2018-06-10 | End: 2018-06-14

## 2018-06-10 RX ADMIN — Medication 6: at 11:54

## 2018-06-10 RX ADMIN — CEFTRIAXONE 100 GRAM(S): 500 INJECTION, POWDER, FOR SOLUTION INTRAMUSCULAR; INTRAVENOUS at 01:32

## 2018-06-10 RX ADMIN — Medication 600 MILLIGRAM(S): at 05:43

## 2018-06-10 RX ADMIN — MONTELUKAST 10 MILLIGRAM(S): 4 TABLET, CHEWABLE ORAL at 11:54

## 2018-06-10 RX ADMIN — Medication 3 MILLILITER(S): at 20:19

## 2018-06-10 RX ADMIN — Medication 4 UNIT(S): at 08:06

## 2018-06-10 RX ADMIN — Medication 60 MILLIGRAM(S): at 18:17

## 2018-06-10 RX ADMIN — ISOSORBIDE MONONITRATE 30 MILLIGRAM(S): 60 TABLET, EXTENDED RELEASE ORAL at 11:55

## 2018-06-10 RX ADMIN — HEPARIN SODIUM 5000 UNIT(S): 5000 INJECTION INTRAVENOUS; SUBCUTANEOUS at 21:28

## 2018-06-10 RX ADMIN — HEPARIN SODIUM 5000 UNIT(S): 5000 INJECTION INTRAVENOUS; SUBCUTANEOUS at 05:43

## 2018-06-10 RX ADMIN — Medication 100 MILLIGRAM(S): at 18:17

## 2018-06-10 RX ADMIN — SENNA PLUS 1 TABLET(S): 8.6 TABLET ORAL at 11:55

## 2018-06-10 RX ADMIN — Medication 12 UNIT(S): at 15:34

## 2018-06-10 RX ADMIN — BUDESONIDE AND FORMOTEROL FUMARATE DIHYDRATE 2 PUFF(S): 160; 4.5 AEROSOL RESPIRATORY (INHALATION) at 20:49

## 2018-06-10 RX ADMIN — Medication 20 MILLIGRAM(S): at 05:43

## 2018-06-10 RX ADMIN — Medication 3 MILLILITER(S): at 14:11

## 2018-06-10 RX ADMIN — Medication 600 MILLIGRAM(S): at 18:17

## 2018-06-10 RX ADMIN — LOSARTAN POTASSIUM 100 MILLIGRAM(S): 100 TABLET, FILM COATED ORAL at 05:43

## 2018-06-10 RX ADMIN — DONEPEZIL HYDROCHLORIDE 10 MILLIGRAM(S): 10 TABLET, FILM COATED ORAL at 21:28

## 2018-06-10 RX ADMIN — BUDESONIDE AND FORMOTEROL FUMARATE DIHYDRATE 2 PUFF(S): 160; 4.5 AEROSOL RESPIRATORY (INHALATION) at 08:05

## 2018-06-10 RX ADMIN — Medication 4 UNIT(S): at 16:57

## 2018-06-10 RX ADMIN — INSULIN GLARGINE 12 UNIT(S): 100 INJECTION, SOLUTION SUBCUTANEOUS at 21:28

## 2018-06-10 RX ADMIN — Medication 4 UNIT(S): at 11:54

## 2018-06-10 RX ADMIN — HEPARIN SODIUM 5000 UNIT(S): 5000 INJECTION INTRAVENOUS; SUBCUTANEOUS at 15:35

## 2018-06-10 RX ADMIN — Medication 3 MILLILITER(S): at 07:37

## 2018-06-10 RX ADMIN — Medication 60 MILLIGRAM(S): at 05:45

## 2018-06-10 RX ADMIN — Medication 4: at 08:06

## 2018-06-10 RX ADMIN — Medication 6 UNIT(S): at 13:21

## 2018-06-10 RX ADMIN — Medication 3: at 16:57

## 2018-06-10 RX ADMIN — ATORVASTATIN CALCIUM 20 MILLIGRAM(S): 80 TABLET, FILM COATED ORAL at 21:28

## 2018-06-10 RX ADMIN — PANTOPRAZOLE SODIUM 40 MILLIGRAM(S): 20 TABLET, DELAYED RELEASE ORAL at 08:05

## 2018-06-10 NOTE — PROVIDER CONTACT NOTE (OTHER) - SITUATION
Fs improved after additional insulin given as ordered, and coverage for dinner given. Repeat [id number] resident made aware

## 2018-06-10 NOTE — PROVIDER CONTACT NOTE (OTHER) - ACTION/TREATMENT ORDERED:
as advised
as advised
4 units prior to meals added along with exisrting sliding scale
awaiting order

## 2018-06-10 NOTE — CONSULT NOTE ADULT - ASSESSMENT
78 Y O f  with pmh of copd not on home o2, htn , dld, d.m , CKD , CAD s/p PCI with 5 stents, autoimmune hemolytic anemia and Low grade NHL in remission presented to ER with c/o 1 wk h/o cough , fever , SOB .      # Right basilar Pneumonia  - c/w levaquin, ceftriaxone  - Iv solumedrol.  - nebs q 4 hrs and prn.  -sputum cultures , urine for legionella.  - pulm evaluation by Dr Graham as per primary team.    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    # D.M:   - monitor fs . insulin regimen    # CKD:    - stable 78 Y O f  with pmh of copd not on home o2, htn , dld, d.m , CKD , CAD s/p PCI with 5 stents, autoimmune hemolytic anemia and Low grade NHL in remission presented to ER with c/o 1 wk h/o cough , fever , SOB .      # Right basilar Pneumonia  - c/w levaquin, ceftriaxone  - Iv solumedrol.  - nebs q 4 hrs and prn.  -sputum cultures , urine for legionella.  - pulm evaluation by Dr Graham as per primary team.    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    # D.M:   - monitor fs . insulin regimen    # CKD:    - stable

## 2018-06-10 NOTE — PROGRESS NOTE ADULT - SUBJECTIVE AND OBJECTIVE BOX
06-10-18 @ 09:28    VENKATESH RAMACHANDRAN  78y  Female  Sen in room, sitting up, no acute distress.    accompanied.     INTERVAL EVENTS:  None, does walk ut wheezes. No fever, chills.     MEDICATIONS  (STANDING):  ALBUTerol    90 MICROgram(s) HFA Inhaler 1 Puff(s) Inhalation every 4 hours  ALBUTerol/ipratropium for Nebulization 3 milliLiter(s) Nebulizer every 6 hours  atorvastatin 20 milliGRAM(s) Oral at bedtime  buDESOnide  80 MICROgram(s)/formoterol 4.5 MICROgram(s) Inhaler 2 Puff(s) Inhalation two times a day  cefTRIAXone   IVPB      cefTRIAXone   IVPB 1 Gram(s) IV Intermittent every 24 hours  dextrose 5%. 1000 milliLiter(s) (50 mL/Hr) IV Continuous <Continuous>  dextrose 50% Injectable 12.5 Gram(s) IV Push once  dextrose 50% Injectable 25 Gram(s) IV Push once  dextrose 50% Injectable 25 Gram(s) IV Push once  donepezil 10 milliGRAM(s) Oral at bedtime  fenofibrate Tablet 48 milliGRAM(s) Oral daily  furosemide    Tablet 20 milliGRAM(s) Oral daily  guaiFENesin  milliGRAM(s) Oral every 12 hours  heparin  Injectable 5000 Unit(s) SubCutaneous every 8 hours  insulin glargine Injectable (LANTUS) 12 Unit(s) SubCutaneous at bedtime  insulin lispro (HumaLOG) corrective regimen sliding scale   SubCutaneous three times a day before meals  insulin lispro Injectable (HumaLOG) 4 Unit(s) SubCutaneous three times a day before meals  isosorbide   mononitrate ER Tablet (IMDUR) 30 milliGRAM(s) Oral daily  levoFLOXacin IVPB 750 milliGRAM(s) IV Intermittent every 24 hours  losartan 100 milliGRAM(s) Oral daily  methylPREDNISolone sodium succinate Injectable 60 milliGRAM(s) IV Push two times a day  montelukast 10 milliGRAM(s) Oral daily  pantoprazole    Tablet 40 milliGRAM(s) Oral before breakfast  tiotropium 18 MICROgram(s) Capsule 1 Capsule(s) Inhalation daily    MEDICATIONS  (PRN):  aluminum hydroxide/magnesium hydroxide/simethicone Suspension 30 milliLiter(s) Oral every 4 hours PRN Dyspepsia  dextrose 40% Gel 15 Gram(s) Oral once PRN Blood Glucose LESS THAN 70 milliGRAM(s)/deciliter  glucagon  Injectable 1 milliGRAM(s) IntraMuscular once PRN Glucose LESS THAN 70 milligrams/deciliter  ondansetron Injectable 4 milliGRAM(s) IV Push every 6 hours PRN Nausea      T(C): 36.1 (06-10-18 @ 05:43), Max: 36.8 (06-09-18 @ 21:31)  HR: 70 (06-10-18 @ 05:43) (70 - 83)  BP: 151/62 (06-10-18 @ 05:43) (113/53 - 151/62)  RR: 18 (06-10-18 @ 05:43) (18 - 18)  SpO2: --  Wt(kg): --Vital Signs Last 24 Hrs  T(C): 36.1 (10 Sarkis 2018 05:43), Max: 36.8 (09 Jun 2018 21:31)  T(F): 96.9 (10 Sarkis 2018 05:43), Max: 98.2 (09 Jun 2018 21:31)  HR: 70 (10 Sarkis 2018 05:43) (70 - 83)  BP: 151/62 (10 Sarkis 2018 05:43) (113/53 - 151/62)  BP(mean): --  RR: 18 (10 Sarkis 2018 05:43) (18 - 18)  SpO2: --    PHYSICAL EXAM:  GENERAL: Not in distress.   NECK: Supple, trach central, normal L/N  CHEST/LUNG: increased expiration, fine rhonchi b/l  HEART: S1S2, reg  ABDOMEN: benign  EXTREMITIES: no CCE                          10.0   3.88  )-----------( 139      ( 09 Jun 2018 06:34 )             30.1     06-09    136  |  95<L>  |  24<H>  ----------------------------<  242<H>  4.4   |  24  |  1.3    Ca    8.2<L>      09 Jun 2018 06:34    TPro  6.2  /  Alb  3.6  /  TBili  0.3  /  DBili  x   /  AST  15  /  ALT  13  /  AlkPhos  70  06-09      PT/INR - ( 08 Jun 2018 22:55 )   PT: 12.80 sec;   INR: 1.18 ratio         PTT - ( 08 Jun 2018 22:55 )  PTT:34.0 sec  Troponin T, Serum: <0.01 ng/mL (06.08.18 @ 22:55)      RADIOLOGY & ADDITIONAL TESTS:      ASSESSMENT / PLAN  :    1. Pneumonia : RLL in CXR : Appreciated ID note. No sepsis likely, clinically. Abx as advised. Cultures pending. Pulm eval Dr. Feldman  2. COPD exacerbation : Cont inhalers. IV steroid. Avoid exertion. F/U PO2 at rest & ambulation  3. Leucopenia : Hx of NHL. Will request her onco Dr. Johansen evaluation.   4. Anemia : stable in her USOH, H&O f/u  5. DM : Cont insulin, adjust as pr BGM  6. HTN : Stable.  7. CAD : been stable.

## 2018-06-10 NOTE — CONSULT NOTE ADULT - SUBJECTIVE AND OBJECTIVE BOX
Patient is a 78y old  Female who presents with a chief complaint of COUGH , SOB , FEVER AND CHILLS (09 Jun 2018 01:39)      HPI:  78yoF with PMHxof COPD not on home O2, HTN, HLD, CKD, CAD s/p PCI with 5 stents, autoimmune hemolytic anemia and Low grade NHL in remission presented to ER with c/o 1 wk h/o cough , fever, SOB.  Pt reports that she was in her usual state of health 1 wk ago , when she started developing this cough and sob, Pt reports that the cough is dry sometimes she will see scant whitish sputum, she does not ambulate much but lately she noticed that she gets SOB even when she is sitting in her chair. Pt also reports subject fevers and chills but never took her temperature. Pt also reports feeling nauseous but no vomiting.  Pt denies chest pain , palpitations , abdominal pain , or diarrhea.     Pt reports + sick contact , as per pt her  was sick recently but has now improved. 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 Course complicated by hemolytic anemia treated with steroids and rituximab in 2016.          PAST MEDICAL & SURGICAL HISTORY:  Coronary artery disease involving native heart without angina pectoris, unspecified vessel or lesion type  Diabetes: Type !!  Osteoarthritis  CKD (chronic kidney disease)  Thrombocytopenia  PVD (peripheral vascular disease)  Non Hodgkin's lymphoma  Heart failure  High cholesterol  HTN (hypertension)  MI (myocardial infarction)  H/O cardiac catheterization  History of cholecystectomy      SOCIAL HISTORY:  Former smoker quit 15yrs ago    FAMILY HISTORY:  No pertinent family history in first degree relatives      MEDICATIONS  (STANDING):  ALBUTerol    90 MICROgram(s) HFA Inhaler 1 Puff(s) Inhalation every 4 hours  ALBUTerol/ipratropium for Nebulization 3 milliLiter(s) Nebulizer every 6 hours  atorvastatin 20 milliGRAM(s) Oral at bedtime  buDESOnide  80 MICROgram(s)/formoterol 4.5 MICROgram(s) Inhaler 2 Puff(s) Inhalation two times a day  cefTRIAXone   IVPB      cefTRIAXone   IVPB 1 Gram(s) IV Intermittent every 24 hours  dextrose 5%. 1000 milliLiter(s) (50 mL/Hr) IV Continuous <Continuous>  dextrose 50% Injectable 12.5 Gram(s) IV Push once  dextrose 50% Injectable 25 Gram(s) IV Push once  dextrose 50% Injectable 25 Gram(s) IV Push once  docusate sodium 100 milliGRAM(s) Oral two times a day  donepezil 10 milliGRAM(s) Oral at bedtime  fenofibrate Tablet 48 milliGRAM(s) Oral daily  furosemide    Tablet 20 milliGRAM(s) Oral daily  guaiFENesin  milliGRAM(s) Oral every 12 hours  heparin  Injectable 5000 Unit(s) SubCutaneous every 8 hours  insulin glargine Injectable (LANTUS) 12 Unit(s) SubCutaneous at bedtime  insulin lispro (HumaLOG) corrective regimen sliding scale   SubCutaneous three times a day before meals  insulin lispro Injectable (HumaLOG) 4 Unit(s) SubCutaneous three times a day before meals  isosorbide   mononitrate ER Tablet (IMDUR) 30 milliGRAM(s) Oral daily  levoFLOXacin IVPB 750 milliGRAM(s) IV Intermittent every 24 hours  losartan 100 milliGRAM(s) Oral daily  methylPREDNISolone sodium succinate Injectable 60 milliGRAM(s) IV Push two times a day  montelukast 10 milliGRAM(s) Oral daily  pantoprazole    Tablet 40 milliGRAM(s) Oral before breakfast  senna 1 Tablet(s) Oral daily  tiotropium 18 MICROgram(s) Capsule 1 Capsule(s) Inhalation daily    MEDICATIONS  (PRN):  aluminum hydroxide/magnesium hydroxide/simethicone Suspension 30 milliLiter(s) Oral every 4 hours PRN Dyspepsia  dextrose 40% Gel 15 Gram(s) Oral once PRN Blood Glucose LESS THAN 70 milliGRAM(s)/deciliter  glucagon  Injectable 1 milliGRAM(s) IntraMuscular once PRN Glucose LESS THAN 70 milligrams/deciliter  ondansetron Injectable 4 milliGRAM(s) IV Push every 6 hours PRN Nausea      Weight (kg): 67.2 (06-10-18 @ 07:03)  Allergies    ACE inhibitors (Unknown)  Ceclor (Unknown)  codeine (Unknown)  latex (Unknown)  penicillin (Unknown)  sulfonamides (Unknown)    Intolerances        Vital Signs Last 24 Hrs  T(C): 36.1 (10 Sarkis 2018 05:43), Max: 36.8 (09 Jun 2018 21:31)  T(F): 96.9 (10 Sarkis 2018 05:43), Max: 98.2 (09 Jun 2018 21:31)  HR: 70 (10 Sarkis 2018 05:43) (70 - 83)  BP: 151/62 (10 Sarkis 2018 05:43) (113/53 - 151/62)  BP(mean): --  RR: 18 (10 Sarkis 2018 05:43) (18 - 18)  SpO2: --    PHYSICAL EXAM  General: adult in NAD  HEENT: clear oropharynx, anicteric sclera, pink conjunctiva  Neck: supple  CV: normal S1/S2 with no murmur rubs or gallops  Lungs: positive air movement b/l ant lungs,clear to auscultation, no wheezes, no rales  Abdomen: soft non-tender non-distended, no hepatosplenomegaly  Ext: no clubbing cyanosis or edema  Skin: no rashes and no petechiae  Neuro: alert and oriented X 4, no focal deficits      LABS:                          10.0   3.88  )-----------( 139      ( 09 Jun 2018 06:34 )             30.1         Mean Cell Volume : 81.1 fL  Mean Cell Hemoglobin : 27.0 pg  Mean Cell Hemoglobin Concentration : 33.2 g/dL  Auto Neutrophil # : 3.45 K/uL  Auto Lymphocyte # : 0.24 K/uL  Auto Monocyte # : 0.14 K/uL  Auto Eosinophil # : 0.01 K/uL  Auto Basophil # : 0.01 K/uL  Auto Neutrophil % : 88.8 %  Auto Lymphocyte % : 6.2 %  Auto Monocyte % : 3.6 %  Auto Eosinophil % : 0.3 %  Auto Basophil % : 0.3 %      Serial CBC's  06-09 @ 06:34  Hct-30.1 / Hgb-10.0 / Plat-139 / RBC-3.71 / WBC-3.88  Serial CBC's  06-08 @ 22:55  Hct-31.3 / Hgb-10.4 / Plat-180 / RBC-3.85 / WBC-8.09      06-09    136  |  95<L>  |  24<H>  ----------------------------<  242<H>  4.4   |  24  |  1.3    Ca    8.2<L>      09 Jun 2018 06:34    TPro  6.2  /  Alb  3.6  /  TBili  0.3  /  DBili  x   /  AST  15  /  ALT  13  /  AlkPhos  70  06-09      PT/INR - ( 08 Jun 2018 22:55 )   PT: 12.80 sec;   INR: 1.18 ratio         PTT - ( 08 Jun 2018 22:55 )  PTT:34.0 sec        RADIOLOGY & ADDITIONAL STUDIES:  < from: Xray Chest 2 Views PA/Lat (06.08.18 @ 21:05) >  EXAM:  XR CHEST PA LAT 2V            PROCEDURE DATE:  06/08/2018            INTERPRETATION:  Clinical History / Reason for exam: Cough.    Comparison : Chest radiograph of 7/5/2017.    Technique/Positioning: Frontal and lateral views of the chest.    Findings:    Support devices: None.    Cardiac/mediastinum/hilum: Aortic calcifications.    Lung parenchyma/Pleura: Right basilar opacity.    Skeleton/soft tissues: Degenerative change in the shoulders and spine.   Surgical clips in the upper abdomen.    Impression:      Right basilar opacity consistent with pneumonia in the appropriate   clinical setting. Follow-up after treatment is recommended.    < end of copied text > Patient is a 78y old  Female who presents with a chief complaint of COUGH , SOB , FEVER AND CHILLS (09 Jun 2018 01:39)      HPI:  78yoF with PMHxof COPD not on home O2, HTN, HLD, CKD, CAD s/p PCI with 5 stents, autoimmune hemolytic anemia and Low grade NHL in remission presented to ER with c/o 1 wk h/o cough , fever, SOB.  Pt reports that she was in her usual state of health 1 wk ago , when she started developing this cough and sob, Pt reports that the cough is dry sometimes she will see scant whitish sputum, she does not ambulate much but lately she noticed that she gets SOB even when she is sitting in her chair. Pt also reports subject fevers and chills but never took her temperature. Pt also reports feeling nauseous but no vomiting.  Pt denies chest pain , palpitations , abdominal pain , or diarrhea.     Pt reports + sick contact , as per pt her  was sick recently but has now improved. 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 Course complicated by hemolytic anemia treated with steroids and rituximab in 2016.          PAST MEDICAL & SURGICAL HISTORY:  Coronary artery disease involving native heart without angina pectoris, unspecified vessel or lesion type  Diabetes: Type !!  Osteoarthritis  CKD (chronic kidney disease)  Thrombocytopenia  PVD (peripheral vascular disease)  Non Hodgkin's lymphoma  Heart failure  High cholesterol  HTN (hypertension)  MI (myocardial infarction)  H/O cardiac catheterization  History of cholecystectomy      SOCIAL HISTORY:  Former smoker quit 15yrs ago    FAMILY HISTORY:  No pertinent family history in first degree relatives      MEDICATIONS  (STANDING):  ALBUTerol    90 MICROgram(s) HFA Inhaler 1 Puff(s) Inhalation every 4 hours  ALBUTerol/ipratropium for Nebulization 3 milliLiter(s) Nebulizer every 6 hours  atorvastatin 20 milliGRAM(s) Oral at bedtime  buDESOnide  80 MICROgram(s)/formoterol 4.5 MICROgram(s) Inhaler 2 Puff(s) Inhalation two times a day  cefTRIAXone   IVPB      cefTRIAXone   IVPB 1 Gram(s) IV Intermittent every 24 hours  dextrose 5%. 1000 milliLiter(s) (50 mL/Hr) IV Continuous <Continuous>  dextrose 50% Injectable 12.5 Gram(s) IV Push once  dextrose 50% Injectable 25 Gram(s) IV Push once  dextrose 50% Injectable 25 Gram(s) IV Push once  docusate sodium 100 milliGRAM(s) Oral two times a day  donepezil 10 milliGRAM(s) Oral at bedtime  fenofibrate Tablet 48 milliGRAM(s) Oral daily  furosemide    Tablet 20 milliGRAM(s) Oral daily  guaiFENesin  milliGRAM(s) Oral every 12 hours  heparin  Injectable 5000 Unit(s) SubCutaneous every 8 hours  insulin glargine Injectable (LANTUS) 12 Unit(s) SubCutaneous at bedtime  insulin lispro (HumaLOG) corrective regimen sliding scale   SubCutaneous three times a day before meals  insulin lispro Injectable (HumaLOG) 4 Unit(s) SubCutaneous three times a day before meals  isosorbide   mononitrate ER Tablet (IMDUR) 30 milliGRAM(s) Oral daily  levoFLOXacin IVPB 750 milliGRAM(s) IV Intermittent every 24 hours  losartan 100 milliGRAM(s) Oral daily  methylPREDNISolone sodium succinate Injectable 60 milliGRAM(s) IV Push two times a day  montelukast 10 milliGRAM(s) Oral daily  pantoprazole    Tablet 40 milliGRAM(s) Oral before breakfast  senna 1 Tablet(s) Oral daily  tiotropium 18 MICROgram(s) Capsule 1 Capsule(s) Inhalation daily    MEDICATIONS  (PRN):  aluminum hydroxide/magnesium hydroxide/simethicone Suspension 30 milliLiter(s) Oral every 4 hours PRN Dyspepsia  dextrose 40% Gel 15 Gram(s) Oral once PRN Blood Glucose LESS THAN 70 milliGRAM(s)/deciliter  glucagon  Injectable 1 milliGRAM(s) IntraMuscular once PRN Glucose LESS THAN 70 milligrams/deciliter  ondansetron Injectable 4 milliGRAM(s) IV Push every 6 hours PRN Nausea      Weight (kg): 67.2 (06-10-18 @ 07:03)  Allergies    ACE inhibitors (Unknown)  Ceclor (Unknown)  codeine (Unknown)  latex (Unknown)  penicillin (Unknown)  sulfonamides (Unknown)    Intolerances        Vital Signs Last 24 Hrs  T(C): 36.1 (10 Sarkis 2018 05:43), Max: 36.8 (09 Jun 2018 21:31)  T(F): 96.9 (10 Sarkis 2018 05:43), Max: 98.2 (09 Jun 2018 21:31)  HR: 70 (10 Sarkis 2018 05:43) (70 - 83)  BP: 151/62 (10 Sarkis 2018 05:43) (113/53 - 151/62)  BP(mean): --  RR: 18 (10 Sarkis 2018 05:43) (18 - 18)  SpO2: --    PHYSICAL EXAM  General: adult in mild distress from coughing  Neck: supple, + left submandibular lymph node   CV: normal S1/S2   Lungs: decreased breath sounds at right base, no wheezing  Abdomen: soft non-tender non-distended  Ext: no edema  Neuro: alert and oriented       LABS:                          10.0   3.88  )-----------( 139      ( 09 Jun 2018 06:34 )             30.1         Mean Cell Volume : 81.1 fL  Mean Cell Hemoglobin : 27.0 pg  Mean Cell Hemoglobin Concentration : 33.2 g/dL  Auto Neutrophil # : 3.45 K/uL  Auto Lymphocyte # : 0.24 K/uL  Auto Monocyte # : 0.14 K/uL  Auto Eosinophil # : 0.01 K/uL  Auto Basophil # : 0.01 K/uL  Auto Neutrophil % : 88.8 %  Auto Lymphocyte % : 6.2 %  Auto Monocyte % : 3.6 %  Auto Eosinophil % : 0.3 %  Auto Basophil % : 0.3 %      Serial CBC's  06-09 @ 06:34  Hct-30.1 / Hgb-10.0 / Plat-139 / RBC-3.71 / WBC-3.88  Serial CBC's  06-08 @ 22:55  Hct-31.3 / Hgb-10.4 / Plat-180 / RBC-3.85 / WBC-8.09      06-09    136  |  95<L>  |  24<H>  ----------------------------<  242<H>  4.4   |  24  |  1.3    Ca    8.2<L>      09 Jun 2018 06:34    TPro  6.2  /  Alb  3.6  /  TBili  0.3  /  DBili  x   /  AST  15  /  ALT  13  /  AlkPhos  70  06-09      PT/INR - ( 08 Jun 2018 22:55 )   PT: 12.80 sec;   INR: 1.18 ratio         PTT - ( 08 Jun 2018 22:55 )  PTT:34.0 sec        RADIOLOGY & ADDITIONAL STUDIES:  < from: Xray Chest 2 Views PA/Lat (06.08.18 @ 21:05) >  EXAM:  XR CHEST PA LAT 2V            PROCEDURE DATE:  06/08/2018            INTERPRETATION:  Clinical History / Reason for exam: Cough.    Comparison : Chest radiograph of 7/5/2017.    Technique/Positioning: Frontal and lateral views of the chest.    Findings:    Support devices: None.    Cardiac/mediastinum/hilum: Aortic calcifications.    Lung parenchyma/Pleura: Right basilar opacity.    Skeleton/soft tissues: Degenerative change in the shoulders and spine.   Surgical clips in the upper abdomen.    Impression:      Right basilar opacity consistent with pneumonia in the appropriate   clinical setting. Follow-up after treatment is recommended.    < end of copied text >

## 2018-06-10 NOTE — CONSULT NOTE ADULT - SUBJECTIVE AND OBJECTIVE BOX
VENKATESH RAMACHANDRAN  78y  Female  Patient seen and examined. Chart reviewed and events noted.  HPI:  78 y female with moderate COPD and low grade NHL now in remission comes to ER after 1 week of cough, sputum and low grade temp. Had temps at home 99.4 Patients  had a bad cold last week. Patient reports ROJAS and fatigue. Reports nasal congestion but No CP or palpitations No abdominal pain, vomiting or diarrhea. She reports some nausea.    PAST MEDICAL & SURGICAL HISTORY:  Coronary artery disease  Diabetes  Osteoarthritis  CKD (chronic kidney disease)  Thrombocytopenia  Allergic Rhinitis   PVD (peripheral vascular disease)  GERD  Non Hodgkin's lymphoma  Heart failure  High cholesterol  HTN (hypertension)  MI (myocardial infarction)  H/O cardiac catheterization  History of cholecystectomy    Allergies:  ACE inhibitors (Unknown)  Ceclor (Unknown)  codeine (Unknown)  latex (Unknown)  penicillin (Unknown)  sulfonamides (Unknown)    FAMILY HISTORY:  No pertinent family history in first degree relatives    SOCIAL HISTORY  Smoking History: Ex smoker  Alcohol: denied  Drugs: denied  Occupation: house wife &   MEDICATIONS  (STANDING):  ALBUTerol    90 MICROgram(s) HFA Inhaler 1 Puff(s) Inhalation every 4 hours  ALBUTerol/ipratropium for Nebulization 3 milliLiter(s) Nebulizer every 6 hours  atorvastatin 20 milliGRAM(s) Oral at bedtime  buDESOnide  80 MICROgram(s)/formoterol 4.5 MICROgram(s) Inhaler 2 Puff(s) Inhalation two times a day  cefTRIAXone   IVPB      cefTRIAXone   IVPB 1 Gram(s) IV Intermittent every 24 hours  dextrose 5%. 1000 milliLiter(s) (50 mL/Hr) IV Continuous <Continuous>  dextrose 50% Injectable 12.5 Gram(s) IV Push once  dextrose 50% Injectable 25 Gram(s) IV Push once  dextrose 50% Injectable 25 Gram(s) IV Push once  docusate sodium 100 milliGRAM(s) Oral two times a day  donepezil 10 milliGRAM(s) Oral at bedtime  fenofibrate Tablet 48 milliGRAM(s) Oral daily  furosemide    Tablet 20 milliGRAM(s) Oral daily  guaiFENesin  milliGRAM(s) Oral every 12 hours  heparin  Injectable 5000 Unit(s) SubCutaneous every 8 hours  insulin glargine Injectable (LANTUS) 12 Unit(s) SubCutaneous at bedtime  insulin lispro (HumaLOG) corrective regimen sliding scale   SubCutaneous three times a day before meals  insulin lispro Injectable (HumaLOG) 4 Unit(s) SubCutaneous three times a day before meals  isosorbide   mononitrate ER Tablet (IMDUR) 30 milliGRAM(s) Oral daily  levoFLOXacin IVPB 750 milliGRAM(s) IV Intermittent every 24 hours  losartan 100 milliGRAM(s) Oral daily  methylPREDNISolone sodium succinate Injectable 60 milliGRAM(s) IV Push two times a day  montelukast 10 milliGRAM(s) Oral daily  pantoprazole    Tablet 40 milliGRAM(s) Oral before breakfast  senna 1 Tablet(s) Oral daily  tiotropium 18 MICROgram(s) Capsule 1 Capsule(s) Inhalation daily  MEDICATIONS  (PRN):  aluminum hydroxide/magnesium hydroxide/simethicone Suspension 30 milliLiter(s) Oral every 4 hours PRN Dyspepsia  dextrose 40% Gel 15 Gram(s) Oral once PRN Blood Glucose LESS THAN 70 milliGRAM(s)/deciliter  glucagon  Injectable 1 milliGRAM(s) IntraMuscular once PRN Glucose LESS THAN 70 milligrams/deciliter  ondansetron Injectable 4 milliGRAM(s) IV Push every 6 hours PRN Nausea    REVIEW OF SYSTEMS:  CONSTITUTIONAL: No fevers or chills. + fatigue and weakness  HEENT: No visual disturbance or sore throat  NECK: No pain or stiffness  RESPIRATORY: SOB/ROJAS, cough and thick sputum  CARDIOVASCULAR: No chest pain or palpitations  GASTROINTESTINAL:  No vomiting, or diarrhea. No abdominal pain.  Mild nausea   GENITOURINARY: No dysuria, frequency or hematuria  NEUROLOGICAL: No numbness or headache  EXTREMITIES: No edema  No calf pain or tenderness    Vital Signs Last 24 Hrs     T(F): 96.4 (10 Sarkis 2018 21:20), Max: 97.9 (10 Sarkis 2018 15:05)  HR: 73 (10 Sarkis 2018 21:20) (70 - 97)  BP: 142/61 (10 Sarkis 2018 21:20) (142/61 - 151/62)  RR: 18 (10 Sarkis 2018 21:20) (18 - 18)  SpO2: 97% (10 Sarkis 2018 15:33) (92% - 97%)  PHYSICAL EXAM:  Constitutional: A A O x 3 NAD Freely speaking. O2 in use  HEAD: PERRLA, No JVD, Atraumatic, Normocephalic  Neck: No neck pain  Respiratory: Decreased BS bilaterally with a few rhonchi and wheezing  Cardiovascular: regular rate and rhythm  Gastrointestinal: Soft NT +BS  Extremities: No E/C/C No calf pain or tenderness. Movement in all four extremities  Skin: No lesions    LABS:                        9.8    6.27  )-----------( 187      ( 10 Sarkis 2018 11:07 )             30.1     06-09    136  |  95<L>  |  24<H>  ----------------------------<  242<H>  4.4   |  24  |  1.3    Ca    8.2<L>      09 Jun 2018 06:34    TPro  6.2  /  Alb  3.6  /  TBili  0.3  /  DBili  x   /  AST  15  /  ALT  13  /  AlkPhos  70  06-09  PT/INR - ( 08 Jun 2018 22:55 )   PT: 12.80 sec;   INR: 1.18 ratio    PTT - ( 08 Jun 2018 22:55 )  PTT:34.0 sec  CARDIAC MARKERS ( 08 Jun 2018 22:55 )  x     / <0.01 ng/mL / 120 U/L / x     / 2.3 ng/mL    RADIOLOGY:  Chest X-Ray: Both films reviewed  Findings more suggestive of right sided atelectasis from mucus plugging rather than pneumonia   Looking back over several years, patient has had various degrees of right sided atelectasis     ASSESSMENT:  SOB & cough  Moderate COPD Acute on Chronic  Unlikely pneumonia but acute COPD  H/O tobacco use  Intermittent right sided atelectasis due to mucus plugging   GERD  Allergic Rhinitis and PND    PLAN:  O2 at 2 nc - check on RA for any home O2 requirements   Bronchodilator Rx  Switch to PO antibiotics and PO prednisone with 10 day sayra   Anti tussives  Nasal saline for her nasal congestion   Incentive spirometer   GI / DVT prophylaxis   Out patient follow up  Case d/with staff   THANK YOU

## 2018-06-10 NOTE — CONSULT NOTE ADULT - ATTENDING COMMENTS
The patient was seen and examined. Agree with above.  Will continue current treatment for pneumonia.  Will order hemolysis blood work given she has a h/o AIHA.  Leukopenia could be due to ongoing infection. Will observe for now.

## 2018-06-10 NOTE — PROVIDER CONTACT NOTE (OTHER) - RECOMMENDATIONS
to give 14 units of lispro, do not give regular dose before dinner and recheck fs in 1 1/2 hour.
no further action at this time. will report to continue to monitor.
recheck and call back.
rn informed doctor, .
add insulin for meals
stated will order more insulin (additional 4 units)

## 2018-06-10 NOTE — PROGRESS NOTE ADULT - SUBJECTIVE AND OBJECTIVE BOX
infectious diseases progress note:  VENKATESH RAMACHANDRAN is a 78yFemale patient    PNEUMONIA OF BOTH LOWER LOBES DUE TO INFECTIOUS ORGANISM        ROS:  CONSTITUTIONAL:  Negative fever or chills, feels well, good appetite  EYES:  Negative  blurry vision or double vision  CARDIOVASCULAR:  Negative for chest pain or palpitations  RESPIRATORY:  Negative for cough, wheezing, or SOB   GASTROINTESTINAL:  Negative for nausea, vomiting, diarrhea, constipation, or abdominal pain  GENITOURINARY:  Negative frequency, urgency or dysuria  NEUROLOGIC:  No headache, confusion, dizziness, lightheadedness    Allergies    ACE inhibitors (Unknown)  Ceclor (Unknown)  codeine (Unknown)  latex (Unknown)  penicillin (Unknown)  sulfonamides (Unknown)    Intolerances        ANTIBIOTICS/RELEVANT:  antimicrobials  cefTRIAXone   IVPB      cefTRIAXone   IVPB 1 Gram(s) IV Intermittent every 24 hours  levoFLOXacin IVPB 750 milliGRAM(s) IV Intermittent every 24 hours    immunologic:    OTHER:  ALBUTerol    90 MICROgram(s) HFA Inhaler 1 Puff(s) Inhalation every 4 hours  ALBUTerol/ipratropium for Nebulization 3 milliLiter(s) Nebulizer every 6 hours  aluminum hydroxide/magnesium hydroxide/simethicone Suspension 30 milliLiter(s) Oral every 4 hours PRN  atorvastatin 20 milliGRAM(s) Oral at bedtime  buDESOnide  80 MICROgram(s)/formoterol 4.5 MICROgram(s) Inhaler 2 Puff(s) Inhalation two times a day  dextrose 40% Gel 15 Gram(s) Oral once PRN  dextrose 5%. 1000 milliLiter(s) IV Continuous <Continuous>  dextrose 50% Injectable 12.5 Gram(s) IV Push once  dextrose 50% Injectable 25 Gram(s) IV Push once  dextrose 50% Injectable 25 Gram(s) IV Push once  docusate sodium 100 milliGRAM(s) Oral two times a day  donepezil 10 milliGRAM(s) Oral at bedtime  fenofibrate Tablet 48 milliGRAM(s) Oral daily  furosemide    Tablet 20 milliGRAM(s) Oral daily  glucagon  Injectable 1 milliGRAM(s) IntraMuscular once PRN  guaiFENesin  milliGRAM(s) Oral every 12 hours  heparin  Injectable 5000 Unit(s) SubCutaneous every 8 hours  insulin glargine Injectable (LANTUS) 12 Unit(s) SubCutaneous at bedtime  insulin lispro (HumaLOG) corrective regimen sliding scale   SubCutaneous three times a day before meals  insulin lispro Injectable (HumaLOG) 4 Unit(s) SubCutaneous three times a day before meals  isosorbide   mononitrate ER Tablet (IMDUR) 30 milliGRAM(s) Oral daily  losartan 100 milliGRAM(s) Oral daily  methylPREDNISolone sodium succinate Injectable 60 milliGRAM(s) IV Push two times a day  montelukast 10 milliGRAM(s) Oral daily  ondansetron Injectable 4 milliGRAM(s) IV Push every 6 hours PRN  pantoprazole    Tablet 40 milliGRAM(s) Oral before breakfast  senna 1 Tablet(s) Oral daily  tiotropium 18 MICROgram(s) Capsule 1 Capsule(s) Inhalation daily      Objective:  T(F): 96.9 (06-10-18 @ 05:43), Max: 98.2 (06-09-18 @ 21:31)  HR: 70 (06-10-18 @ 05:43) (70 - 83)  BP: 151/62 (06-10-18 @ 05:43) (113/53 - 151/62)  RR: 18 (06-10-18 @ 05:43) (18 - 18)  SpO2: --    PHYSICAL EXAM  Constitutional:Well-developed, well nourished  Eyes:AQUILINO, EOMI  Ear/Nose/Throat: no oral lesion, no sinus tenderness on percussion	  Neck:no JVD, no lymphadenopathy, supple  Respiratory: CTA perry  Cardiovascular: S1S2 RRR, no murmurs  Gastrointestinal:soft, (+) BS, no HSM  Extremities:no e/e/c    06-09    136  |  95<L>  |  24<H>  ----------------------------<  242<H>  4.4   |  24  |  1.3      TPro  6.2  /  Alb  3.6  /  TBili  0.3  /  DBili  x   /  AST  15  /  ALT  13  /  AlkPhos  70  06-09                            10.0   3.88  )-----------( 139      ( 09 Jun 2018 06:34 )             30.1       PT/INR - ( 08 Jun 2018 22:55 )   PT: 12.80 sec;   INR: 1.18 ratio         PTT - ( 08 Jun 2018 22:55 )  PTT:34.0 sec

## 2018-06-11 LAB
HAPTOGLOB SERPL-MCNC: 403 MG/DL — HIGH (ref 34–200)
LEGIONELLA AG UR QL: NEGATIVE — SIGNIFICANT CHANGE UP

## 2018-06-11 RX ORDER — INSULIN LISPRO 100/ML
12 VIAL (ML) SUBCUTANEOUS ONCE
Qty: 0 | Refills: 0 | Status: COMPLETED | OUTPATIENT
Start: 2018-06-11 | End: 2018-06-11

## 2018-06-11 RX ORDER — INSULIN GLARGINE 100 [IU]/ML
18 INJECTION, SOLUTION SUBCUTANEOUS AT BEDTIME
Qty: 0 | Refills: 0 | Status: DISCONTINUED | OUTPATIENT
Start: 2018-06-11 | End: 2018-06-12

## 2018-06-11 RX ORDER — INSULIN LISPRO 100/ML
8 VIAL (ML) SUBCUTANEOUS ONCE
Qty: 0 | Refills: 0 | Status: COMPLETED | OUTPATIENT
Start: 2018-06-11 | End: 2018-06-11

## 2018-06-11 RX ADMIN — Medication 600 MILLIGRAM(S): at 05:13

## 2018-06-11 RX ADMIN — CEFTRIAXONE 100 GRAM(S): 500 INJECTION, POWDER, FOR SOLUTION INTRAMUSCULAR; INTRAVENOUS at 01:15

## 2018-06-11 RX ADMIN — MONTELUKAST 10 MILLIGRAM(S): 4 TABLET, CHEWABLE ORAL at 11:52

## 2018-06-11 RX ADMIN — HEPARIN SODIUM 5000 UNIT(S): 5000 INJECTION INTRAVENOUS; SUBCUTANEOUS at 13:58

## 2018-06-11 RX ADMIN — Medication 3 MILLILITER(S): at 19:57

## 2018-06-11 RX ADMIN — Medication 40 MILLIGRAM(S): at 13:58

## 2018-06-11 RX ADMIN — Medication 8 UNIT(S): at 14:48

## 2018-06-11 RX ADMIN — Medication 3 MILLILITER(S): at 13:50

## 2018-06-11 RX ADMIN — Medication 600 MILLIGRAM(S): at 17:47

## 2018-06-11 RX ADMIN — ATORVASTATIN CALCIUM 20 MILLIGRAM(S): 80 TABLET, FILM COATED ORAL at 21:17

## 2018-06-11 RX ADMIN — ISOSORBIDE MONONITRATE 30 MILLIGRAM(S): 60 TABLET, EXTENDED RELEASE ORAL at 11:52

## 2018-06-11 RX ADMIN — Medication 3: at 07:52

## 2018-06-11 RX ADMIN — BUDESONIDE AND FORMOTEROL FUMARATE DIHYDRATE 2 PUFF(S): 160; 4.5 AEROSOL RESPIRATORY (INHALATION) at 07:52

## 2018-06-11 RX ADMIN — DONEPEZIL HYDROCHLORIDE 10 MILLIGRAM(S): 10 TABLET, FILM COATED ORAL at 21:17

## 2018-06-11 RX ADMIN — SENNA PLUS 1 TABLET(S): 8.6 TABLET ORAL at 11:52

## 2018-06-11 RX ADMIN — HEPARIN SODIUM 5000 UNIT(S): 5000 INJECTION INTRAVENOUS; SUBCUTANEOUS at 05:14

## 2018-06-11 RX ADMIN — Medication 4 UNIT(S): at 16:35

## 2018-06-11 RX ADMIN — Medication 12 UNIT(S): at 11:52

## 2018-06-11 RX ADMIN — LOSARTAN POTASSIUM 100 MILLIGRAM(S): 100 TABLET, FILM COATED ORAL at 05:13

## 2018-06-11 RX ADMIN — Medication 3 MILLILITER(S): at 02:11

## 2018-06-11 RX ADMIN — Medication 3 MILLILITER(S): at 07:43

## 2018-06-11 RX ADMIN — Medication 100 MILLIGRAM(S): at 17:47

## 2018-06-11 RX ADMIN — Medication 100 MILLIGRAM(S): at 05:13

## 2018-06-11 RX ADMIN — Medication 4 UNIT(S): at 07:51

## 2018-06-11 RX ADMIN — Medication 60 MILLIGRAM(S): at 05:14

## 2018-06-11 RX ADMIN — INSULIN GLARGINE 18 UNIT(S): 100 INJECTION, SOLUTION SUBCUTANEOUS at 21:18

## 2018-06-11 RX ADMIN — HEPARIN SODIUM 5000 UNIT(S): 5000 INJECTION INTRAVENOUS; SUBCUTANEOUS at 21:18

## 2018-06-11 RX ADMIN — Medication 20 MILLIGRAM(S): at 05:13

## 2018-06-11 RX ADMIN — PANTOPRAZOLE SODIUM 40 MILLIGRAM(S): 20 TABLET, DELAYED RELEASE ORAL at 07:52

## 2018-06-11 NOTE — CONSULT NOTE ADULT - SUBJECTIVE AND OBJECTIVE BOX
HPI:  78 Y O f  with pmh of copd not on home o2, htn , dld, d.m , CKD , CAD s/p PCI with 5 stents, autoimmune hemolytic anemia and Low grade NHL in remission presented to ER with c/o 1 wk h/o cough , fever , SOB .  Pt reports that she was in her usual state of health 1 wk ago , when she started developing this cough and sob, Pt reports that the cough is dry sometimes she will see scant whitish sputum, she does not ambulate much but lately she noticed that she gets SOB even when she is sitting in her chair. Pt also reports subject fevers and chills but never took her temperature. Pt also reports feeling nauseous but no vomiting.  Pt denies chest pain , palpitations , abdominal pain , or diarrhea.     Pt reports + sick contact , as per pt her  was sick recently but has now improved.     Pt has h/o low grade NHL now in remission. (09 Jun 2018 01:39)      PAST MEDICAL & SURGICAL HISTORY:  Coronary artery disease involving native heart without angina pectoris, unspecified vessel or lesion type  Diabetes: Type !!  Osteoarthritis  CKD (chronic kidney disease)  Thrombocytopenia  PVD (peripheral vascular disease)  Non Hodgkin's lymphoma  Heart failure  High cholesterol  HTN (hypertension)  MI (myocardial infarction)  H/O cardiac catheterization  History of cholecystectomy      Hospital Course:    TODAY'S SUBJECTIVE & REVIEW OF SYMPTOMS:     Constitutional WNL   Cardio WNL   Resp sob   GI WNL  Heme WNL  Endo WNL  Skin WNL  MSK WNL  Neuro WNL  Cognitive WNL  Psych WNL      MEDICATIONS  (STANDING):  ALBUTerol    90 MICROgram(s) HFA Inhaler 1 Puff(s) Inhalation every 4 hours  ALBUTerol/ipratropium for Nebulization 3 milliLiter(s) Nebulizer every 6 hours  atorvastatin 20 milliGRAM(s) Oral at bedtime  buDESOnide  80 MICROgram(s)/formoterol 4.5 MICROgram(s) Inhaler 2 Puff(s) Inhalation two times a day  cefTRIAXone   IVPB      cefTRIAXone   IVPB 1 Gram(s) IV Intermittent every 24 hours  dextrose 5%. 1000 milliLiter(s) (50 mL/Hr) IV Continuous <Continuous>  dextrose 50% Injectable 12.5 Gram(s) IV Push once  dextrose 50% Injectable 25 Gram(s) IV Push once  dextrose 50% Injectable 25 Gram(s) IV Push once  docusate sodium 100 milliGRAM(s) Oral two times a day  donepezil 10 milliGRAM(s) Oral at bedtime  fenofibrate Tablet 48 milliGRAM(s) Oral daily  furosemide    Tablet 20 milliGRAM(s) Oral daily  guaiFENesin  milliGRAM(s) Oral every 12 hours  heparin  Injectable 5000 Unit(s) SubCutaneous every 8 hours  insulin glargine Injectable (LANTUS) 18 Unit(s) SubCutaneous at bedtime  insulin lispro Injectable (HumaLOG) 4 Unit(s) SubCutaneous three times a day before meals  isosorbide   mononitrate ER Tablet (IMDUR) 30 milliGRAM(s) Oral daily  levoFLOXacin IVPB 750 milliGRAM(s) IV Intermittent every 24 hours  losartan 100 milliGRAM(s) Oral daily  montelukast 10 milliGRAM(s) Oral daily  pantoprazole    Tablet 40 milliGRAM(s) Oral before breakfast  predniSONE   Tablet 40 milliGRAM(s) Oral daily  senna 1 Tablet(s) Oral daily  tiotropium 18 MICROgram(s) Capsule 1 Capsule(s) Inhalation daily    MEDICATIONS  (PRN):  aluminum hydroxide/magnesium hydroxide/simethicone Suspension 30 milliLiter(s) Oral every 4 hours PRN Dyspepsia  dextrose 40% Gel 15 Gram(s) Oral once PRN Blood Glucose LESS THAN 70 milliGRAM(s)/deciliter  glucagon  Injectable 1 milliGRAM(s) IntraMuscular once PRN Glucose LESS THAN 70 milligrams/deciliter  ondansetron Injectable 4 milliGRAM(s) IV Push every 6 hours PRN Nausea      FAMILY HISTORY:  No pertinent family history in first degree relatives      Allergies    ACE inhibitors (Unknown)  Ceclor (Unknown)  codeine (Unknown)  latex (Unknown)  penicillin (Unknown)  sulfonamides (Unknown)    Intolerances        SOCIAL HISTORY:    [  ] Etoh  [  ] Smoking  [  ] Substance abuse     Home Environment:  [  ] Home Alone  [ x ] Lives with Family  [  ] Home Health Aid    Dwelling:  [  ] Apartment  [ x ] Private House  [  ] Adult Home  [  ] Skilled Nursing Facility      [  ] Short Term  [  ] Long Term  [ x ] Stairs       Elevator [  ]    FUNCTIONAL STATUS PTA: (Check all that apply)  Ambulation: [   ]Independent    [  ] Dependent     [  ] Non-Ambulatory  Assistive Device: [  ] SA Cane  [  ]  Q Cane  [  ] Walker  [  ]  Wheelchair  ADL : [  ] Independent  [  ]  Dependent       Vital Signs Last 24 Hrs  T(C): 36.3 (11 Jun 2018 12:07), Max: 36.3 (11 Jun 2018 12:07)  T(F): 97.3 (11 Jun 2018 12:07), Max: 97.3 (11 Jun 2018 12:07)  HR: 78 (11 Jun 2018 12:07) (73 - 79)  BP: 155/67 (11 Jun 2018 12:07) (142/61 - 156/65)  BP(mean): --  RR: 18 (11 Jun 2018 12:07) (18 - 18)  SpO2: 95% (11 Jun 2018 09:57) (95% - 97%)      PHYSICAL EXAM: Alert & Oriented X3  GENERAL: NAD, well-groomed, well-developed  HEAD:  Atraumatic, Normocephalic  CHEST/LUNG: Clear   HEART: S1S2+  ABDOMEN: Soft, Nontender  EXTREMITIES:  no calf tenderness    NERVOUS SYSTEM:  Cranial Nerves 2-12 intact [  ] Abnormal  [  ]  ROM: WFL all extremities [ x ]  Abnormal [  ]  Motor Strength: WFL all extremities  [ x ]  Abnormal [  ]  Sensation: intact to light touch [x  ] Abnormal [  ]  Reflexes: Symmetric [  ]  Abnormal [  ]    FUNCTIONAL STATUS:  Bed Mobility: Independent [  ]  Supervision [  ]  Needs Assistance [x  ]  N/A [  ]  Transfers: Independent [  ]  Supervision [  ]  Needs Assistance [x  ]  N/A [  ]   Ambulation: Independent [  ]  Supervision [  ]  Needs Assistance [  ]  N/A [  ]  ADL: Independent [  ] Requires Assistance [  ] N/A [  ]      LABS:                        9.8    6.27  )-----------( 187      ( 10 Sarkis 2018 11:07 )             30.1                 RADIOLOGY & ADDITIONAL STUDIES:    Assesment:

## 2018-06-11 NOTE — PROGRESS NOTE ADULT - SUBJECTIVE AND OBJECTIVE BOX
Patient is a 78y old  Female who presents with a chief complaint of COUGH , SOB , FEVER AND CHILLS (09 Jun 2018 01:39)    Interval events:  Patient is sitting on chair, looks comfortable on NC at @ liters.      PAST MEDICAL & SURGICAL HISTORY:  Coronary artery disease involving native heart without angina pectoris, unspecified vessel or lesion type  Diabetes: Type !!  Osteoarthritis  CKD (chronic kidney disease)  Thrombocytopenia  PVD (peripheral vascular disease)  Non Hodgkin's lymphoma  Heart failure  High cholesterol  HTN (hypertension)  MI (myocardial infarction)  H/O cardiac catheterization  History of cholecystectomy      MEDICATIONS  (STANDING):  ALBUTerol    90 MICROgram(s) HFA Inhaler 1 Puff(s) Inhalation every 4 hours  ALBUTerol/ipratropium for Nebulization 3 milliLiter(s) Nebulizer every 6 hours  atorvastatin 20 milliGRAM(s) Oral at bedtime  buDESOnide  80 MICROgram(s)/formoterol 4.5 MICROgram(s) Inhaler 2 Puff(s) Inhalation two times a day  cefTRIAXone   IVPB      cefTRIAXone   IVPB 1 Gram(s) IV Intermittent every 24 hours  dextrose 5%. 1000 milliLiter(s) (50 mL/Hr) IV Continuous <Continuous>  dextrose 50% Injectable 12.5 Gram(s) IV Push once  dextrose 50% Injectable 25 Gram(s) IV Push once  dextrose 50% Injectable 25 Gram(s) IV Push once  docusate sodium 100 milliGRAM(s) Oral two times a day  donepezil 10 milliGRAM(s) Oral at bedtime  fenofibrate Tablet 48 milliGRAM(s) Oral daily  furosemide    Tablet 20 milliGRAM(s) Oral daily  guaiFENesin  milliGRAM(s) Oral every 12 hours  heparin  Injectable 5000 Unit(s) SubCutaneous every 8 hours  insulin glargine Injectable (LANTUS) 12 Unit(s) SubCutaneous at bedtime  insulin lispro (HumaLOG) corrective regimen sliding scale   SubCutaneous three times a day before meals  insulin lispro Injectable (HumaLOG) 4 Unit(s) SubCutaneous three times a day before meals  isosorbide   mononitrate ER Tablet (IMDUR) 30 milliGRAM(s) Oral daily  levoFLOXacin IVPB 750 milliGRAM(s) IV Intermittent every 24 hours  losartan 100 milliGRAM(s) Oral daily  montelukast 10 milliGRAM(s) Oral daily  pantoprazole    Tablet 40 milliGRAM(s) Oral before breakfast  predniSONE   Tablet 40 milliGRAM(s) Oral daily  senna 1 Tablet(s) Oral daily  tiotropium 18 MICROgram(s) Capsule 1 Capsule(s) Inhalation daily    MEDICATIONS  (PRN):  aluminum hydroxide/magnesium hydroxide/simethicone Suspension 30 milliLiter(s) Oral every 4 hours PRN Dyspepsia  dextrose 40% Gel 15 Gram(s) Oral once PRN Blood Glucose LESS THAN 70 milliGRAM(s)/deciliter  glucagon  Injectable 1 milliGRAM(s) IntraMuscular once PRN Glucose LESS THAN 70 milligrams/deciliter  ondansetron Injectable 4 milliGRAM(s) IV Push every 6 hours PRN Nausea          Vital Signs Last 24 Hrs  T(C): 36.2 (11 Jun 2018 05:00), Max: 36.6 (10 Sarkis 2018 15:05)  T(F): 97.2 (11 Jun 2018 05:00), Max: 97.9 (10 Sarkis 2018 15:05)  HR: 79 (11 Jun 2018 05:00) (73 - 97)  BP: 156/65 (11 Jun 2018 05:00) (142/61 - 156/65)  BP(mean): --  RR: 18 (11 Jun 2018 05:00) (18 - 18)  SpO2: 95% (11 Jun 2018 09:57) (95% - 97%)  CAPILLARY BLOOD GLUCOSE  409 (11 Jun 2018 11:38)  265 (11 Jun 2018 06:31)  301 (11 Jun 2018 02:00)  130 (10 Sarkis 2018 21:20)  134 (10 Sarkis 2018 18:25)  299 (10 Sarkis 2018 16:32)  402 (10 Sarkis 2018 14:46)  501 (10 Sarkis 2018 13:11)        I&O's Summary      Physical Exam:    -     General : sitting on chair, NAD    -      HEENT: PERLAA    -      Cardiac: Regular rate and rhythm    -      Pulm: bilateral clear    -      GI: soft non tender    -      Musculoskeletal: no edema    -      Neuro: OA x 3, non focal        Labs:                        9.8    6.27  )-----------( 187      ( 10 Sarkis 2018 11:07 )             30.1                                 Culture - Blood (collected 09 Jun 2018 06:34)  Source: .Blood None  Preliminary Report (10 Sarkis 2018 18:01):    No growth to date.    Culture - Blood (collected 08 Jun 2018 23:36)  Source: .Blood Blood  Preliminary Report (10 Sarkis 2018 18:01):    No growth to date.    Culture - Blood (collected 08 Jun 2018 23:35)  Source: .Blood Blood  Preliminary Report (10 Sarkis 2018 18:01):    No growth to date.        Imaging:  < from: Xray Chest 1 View AP/PA (06.10.18 @ 11:41) >    Right lower lobe opacity unchanged. Nopleural effusion or air leak    < end of copied text >      ECG:

## 2018-06-11 NOTE — CONSULT NOTE ADULT - ASSESSMENT

## 2018-06-11 NOTE — PROGRESS NOTE ADULT - SUBJECTIVE AND OBJECTIVE BOX
VENKATESH RAMACHANDRAN  78y, Female      OVERNIGHT EVENTS:    Feels well. No SOB/cough. Constipated.    VITALS:  T(F): 97.2, Max: 97.9 (06-10-18 @ 15:05)  HR: 79  BP: 156/65  RR: 18Vital Signs Last 24 Hrs  T(C): 36.2 (11 Jun 2018 05:00), Max: 36.6 (10 Sarkis 2018 15:05)  T(F): 97.2 (11 Jun 2018 05:00), Max: 97.9 (10 Sarkis 2018 15:05)  HR: 79 (11 Jun 2018 05:00) (73 - 97)  BP: 156/65 (11 Jun 2018 05:00) (142/61 - 156/65)  BP(mean): --  RR: 18 (11 Jun 2018 05:00) (18 - 18)  SpO2: 97% (10 Sarkis 2018 20:00) (92% - 97%)    TESTS & MEASUREMENTS:                        9.8    6.27  )-----------( 187      ( 10 Sarkis 2018 11:07 )             30.1               Culture - Blood (collected 06-09-18 @ 06:34)  Source: .Blood None  Preliminary Report (06-10-18 @ 18:01):    No growth to date.    Culture - Blood (collected 06-08-18 @ 23:36)  Source: .Blood Blood  Preliminary Report (06-10-18 @ 18:01):    No growth to date.    Culture - Blood (collected 06-08-18 @ 23:35)  Source: .Blood Blood  Preliminary Report (06-10-18 @ 18:01):    No growth to date.            RADIOLOGY & ADDITIONAL TESTS:    ANTIBIOTICS:  cefTRIAXone   IVPB      cefTRIAXone   IVPB 1 Gram(s) IV Intermittent every 24 hours  levoFLOXacin IVPB 750 milliGRAM(s) IV Intermittent every 24 hours

## 2018-06-11 NOTE — PROGRESS NOTE ADULT - SUBJECTIVE AND OBJECTIVE BOX
06-11-18 @ 10:54    VENKATESH RAMACHANDRAN  78y  Female      INTERVAL EVENTS: None    MEDICATIONS  (STANDING):  ALBUTerol    90 MICROgram(s) HFA Inhaler 1 Puff(s) Inhalation every 4 hours  ALBUTerol/ipratropium for Nebulization 3 milliLiter(s) Nebulizer every 6 hours  atorvastatin 20 milliGRAM(s) Oral at bedtime  buDESOnide  80 MICROgram(s)/formoterol 4.5 MICROgram(s) Inhaler 2 Puff(s) Inhalation two times a day  cefTRIAXone   IVPB      cefTRIAXone   IVPB 1 Gram(s) IV Intermittent every 24 hours  dextrose 5%. 1000 milliLiter(s) (50 mL/Hr) IV Continuous <Continuous>  dextrose 50% Injectable 12.5 Gram(s) IV Push once  dextrose 50% Injectable 25 Gram(s) IV Push once  dextrose 50% Injectable 25 Gram(s) IV Push once  docusate sodium 100 milliGRAM(s) Oral two times a day  donepezil 10 milliGRAM(s) Oral at bedtime  fenofibrate Tablet 48 milliGRAM(s) Oral daily  furosemide    Tablet 20 milliGRAM(s) Oral daily  guaiFENesin  milliGRAM(s) Oral every 12 hours  heparin  Injectable 5000 Unit(s) SubCutaneous every 8 hours  insulin glargine Injectable (LANTUS) 12 Unit(s) SubCutaneous at bedtime  insulin lispro (HumaLOG) corrective regimen sliding scale   SubCutaneous three times a day before meals  insulin lispro Injectable (HumaLOG) 4 Unit(s) SubCutaneous three times a day before meals  isosorbide   mononitrate ER Tablet (IMDUR) 30 milliGRAM(s) Oral daily  levoFLOXacin IVPB 750 milliGRAM(s) IV Intermittent every 24 hours  losartan 100 milliGRAM(s) Oral daily  montelukast 10 milliGRAM(s) Oral daily  pantoprazole    Tablet 40 milliGRAM(s) Oral before breakfast  predniSONE   Tablet 40 milliGRAM(s) Oral daily  senna 1 Tablet(s) Oral daily  tiotropium 18 MICROgram(s) Capsule 1 Capsule(s) Inhalation daily    MEDICATIONS  (PRN):  aluminum hydroxide/magnesium hydroxide/simethicone Suspension 30 milliLiter(s) Oral every 4 hours PRN Dyspepsia  dextrose 40% Gel 15 Gram(s) Oral once PRN Blood Glucose LESS THAN 70 milliGRAM(s)/deciliter  glucagon  Injectable 1 milliGRAM(s) IntraMuscular once PRN Glucose LESS THAN 70 milligrams/deciliter  ondansetron Injectable 4 milliGRAM(s) IV Push every 6 hours PRN Nausea      T(C): 36.2 (06-11-18 @ 05:00), Max: 36.6 (06-10-18 @ 15:05)  HR: 79 (06-11-18 @ 05:00) (73 - 97)  BP: 156/65 (06-11-18 @ 05:00) (142/61 - 156/65)  RR: 18 (06-11-18 @ 05:00) (18 - 18)  SpO2: 95% (06-11-18 @ 09:57) (95% - 97%)  Wt(kg): --Vital Signs Last 24 Hrs  T(C): 36.2 (11 Jun 2018 05:00), Max: 36.6 (10 Sarkis 2018 15:05)  T(F): 97.2 (11 Jun 2018 05:00), Max: 97.9 (10 Sarkis 2018 15:05)  HR: 79 (11 Jun 2018 05:00) (73 - 97)  BP: 156/65 (11 Jun 2018 05:00) (142/61 - 156/65)  BP(mean): --  RR: 18 (11 Jun 2018 05:00) (18 - 18)  SpO2: 95% (11 Jun 2018 09:57) (95% - 97%)    PHYSICAL EXAM:  GENERAL: NAD, ambulating  NECK: supple  CHEST/LUNG:  B/L AE, faint rhonchi  HEART: S1S2, reg  ABDOMEN: benign  EXTREMITIES: no CCE                          9.8    6.27  )-----------( 187      ( 10 Sarkis 2018 11:07 )             30.1                   RADIOLOGY & ADDITIONAL TESTS:      ASSESSMENT / PLAN  :    RLL pneumonia : Seen & f/u by ID, pulm. To change to PO abx as advised  COPD exacerbation : steroid, to be changed to oral, inhalation Tx.   Known CAD : stable.   DM : cont current  HX NHL : been in remission. Cont f/u  Stable anemia

## 2018-06-12 LAB
ANION GAP SERPL CALC-SCNC: 14 MMOL/L — SIGNIFICANT CHANGE UP (ref 7–14)
BUN SERPL-MCNC: 37 MG/DL — HIGH (ref 10–20)
CALCIUM SERPL-MCNC: 8.7 MG/DL — SIGNIFICANT CHANGE UP (ref 8.5–10.1)
CHLORIDE SERPL-SCNC: 102 MMOL/L — SIGNIFICANT CHANGE UP (ref 98–110)
CO2 SERPL-SCNC: 24 MMOL/L — SIGNIFICANT CHANGE UP (ref 17–32)
CREAT SERPL-MCNC: 1.4 MG/DL — SIGNIFICANT CHANGE UP (ref 0.7–1.5)
GLUCOSE SERPL-MCNC: 191 MG/DL — HIGH (ref 70–99)
HCT VFR BLD CALC: 30.1 % — LOW (ref 37–47)
HGB BLD-MCNC: 9.9 G/DL — LOW (ref 12–16)
MCHC RBC-ENTMCNC: 27.1 PG — SIGNIFICANT CHANGE UP (ref 27–31)
MCHC RBC-ENTMCNC: 32.9 G/DL — SIGNIFICANT CHANGE UP (ref 32–37)
MCV RBC AUTO: 82.5 FL — SIGNIFICANT CHANGE UP (ref 81–99)
NRBC # BLD: 0 /100 WBCS — SIGNIFICANT CHANGE UP (ref 0–0)
PLATELET # BLD AUTO: 193 K/UL — SIGNIFICANT CHANGE UP (ref 130–400)
POTASSIUM SERPL-MCNC: 4.2 MMOL/L — SIGNIFICANT CHANGE UP (ref 3.5–5)
POTASSIUM SERPL-SCNC: 4.2 MMOL/L — SIGNIFICANT CHANGE UP (ref 3.5–5)
RBC # BLD: 3.65 M/UL — LOW (ref 4.2–5.4)
RBC # FLD: 13.7 % — SIGNIFICANT CHANGE UP (ref 11.5–14.5)
SODIUM SERPL-SCNC: 140 MMOL/L — SIGNIFICANT CHANGE UP (ref 135–146)
WBC # BLD: 7.38 K/UL — SIGNIFICANT CHANGE UP (ref 4.8–10.8)
WBC # FLD AUTO: 7.38 K/UL — SIGNIFICANT CHANGE UP (ref 4.8–10.8)

## 2018-06-12 RX ORDER — INSULIN GLARGINE 100 [IU]/ML
15 INJECTION, SOLUTION SUBCUTANEOUS AT BEDTIME
Qty: 0 | Refills: 0 | Status: DISCONTINUED | OUTPATIENT
Start: 2018-06-12 | End: 2018-06-12

## 2018-06-12 RX ORDER — INSULIN LISPRO 100/ML
VIAL (ML) SUBCUTANEOUS
Qty: 0 | Refills: 0 | Status: DISCONTINUED | OUTPATIENT
Start: 2018-06-12 | End: 2018-06-13

## 2018-06-12 RX ORDER — INSULIN LISPRO 100/ML
9 VIAL (ML) SUBCUTANEOUS ONCE
Qty: 0 | Refills: 0 | Status: COMPLETED | OUTPATIENT
Start: 2018-06-12 | End: 2018-06-12

## 2018-06-12 RX ORDER — INSULIN LISPRO 100/ML
5 VIAL (ML) SUBCUTANEOUS
Qty: 0 | Refills: 0 | Status: DISCONTINUED | OUTPATIENT
Start: 2018-06-12 | End: 2018-06-12

## 2018-06-12 RX ORDER — INSULIN GLARGINE 100 [IU]/ML
21 INJECTION, SOLUTION SUBCUTANEOUS AT BEDTIME
Qty: 0 | Refills: 0 | Status: DISCONTINUED | OUTPATIENT
Start: 2018-06-12 | End: 2018-06-13

## 2018-06-12 RX ORDER — INSULIN LISPRO 100/ML
VIAL (ML) SUBCUTANEOUS
Qty: 0 | Refills: 0 | Status: DISCONTINUED | OUTPATIENT
Start: 2018-06-12 | End: 2018-06-12

## 2018-06-12 RX ADMIN — SENNA PLUS 1 TABLET(S): 8.6 TABLET ORAL at 11:50

## 2018-06-12 RX ADMIN — Medication 100 MILLIGRAM(S): at 05:20

## 2018-06-12 RX ADMIN — MONTELUKAST 10 MILLIGRAM(S): 4 TABLET, CHEWABLE ORAL at 11:50

## 2018-06-12 RX ADMIN — Medication 3 MILLILITER(S): at 14:45

## 2018-06-12 RX ADMIN — Medication 4 UNIT(S): at 08:16

## 2018-06-12 RX ADMIN — BUDESONIDE AND FORMOTEROL FUMARATE DIHYDRATE 2 PUFF(S): 160; 4.5 AEROSOL RESPIRATORY (INHALATION) at 08:17

## 2018-06-12 RX ADMIN — Medication 10: at 13:09

## 2018-06-12 RX ADMIN — HEPARIN SODIUM 5000 UNIT(S): 5000 INJECTION INTRAVENOUS; SUBCUTANEOUS at 05:21

## 2018-06-12 RX ADMIN — HEPARIN SODIUM 5000 UNIT(S): 5000 INJECTION INTRAVENOUS; SUBCUTANEOUS at 13:12

## 2018-06-12 RX ADMIN — Medication 600 MILLIGRAM(S): at 17:56

## 2018-06-12 RX ADMIN — LOSARTAN POTASSIUM 100 MILLIGRAM(S): 100 TABLET, FILM COATED ORAL at 05:20

## 2018-06-12 RX ADMIN — CEFTRIAXONE 100 GRAM(S): 500 INJECTION, POWDER, FOR SOLUTION INTRAMUSCULAR; INTRAVENOUS at 03:54

## 2018-06-12 RX ADMIN — ISOSORBIDE MONONITRATE 30 MILLIGRAM(S): 60 TABLET, EXTENDED RELEASE ORAL at 11:48

## 2018-06-12 RX ADMIN — Medication 10: at 17:54

## 2018-06-12 RX ADMIN — Medication 20 MILLIGRAM(S): at 05:20

## 2018-06-12 RX ADMIN — Medication 9 UNIT(S): at 00:14

## 2018-06-12 RX ADMIN — INSULIN GLARGINE 21 UNIT(S): 100 INJECTION, SOLUTION SUBCUTANEOUS at 22:36

## 2018-06-12 RX ADMIN — ATORVASTATIN CALCIUM 20 MILLIGRAM(S): 80 TABLET, FILM COATED ORAL at 21:17

## 2018-06-12 RX ADMIN — Medication 3 MILLILITER(S): at 20:14

## 2018-06-12 RX ADMIN — DONEPEZIL HYDROCHLORIDE 10 MILLIGRAM(S): 10 TABLET, FILM COATED ORAL at 21:17

## 2018-06-12 RX ADMIN — Medication 600 MILLIGRAM(S): at 05:20

## 2018-06-12 RX ADMIN — Medication 40 MILLIGRAM(S): at 05:20

## 2018-06-12 RX ADMIN — HEPARIN SODIUM 5000 UNIT(S): 5000 INJECTION INTRAVENOUS; SUBCUTANEOUS at 21:17

## 2018-06-12 RX ADMIN — PANTOPRAZOLE SODIUM 40 MILLIGRAM(S): 20 TABLET, DELAYED RELEASE ORAL at 06:33

## 2018-06-12 RX ADMIN — Medication 4 UNIT(S): at 17:54

## 2018-06-12 RX ADMIN — Medication 3 MILLILITER(S): at 07:40

## 2018-06-12 RX ADMIN — Medication 4 UNIT(S): at 13:08

## 2018-06-12 NOTE — PROGRESS NOTE ADULT - SUBJECTIVE AND OBJECTIVE BOX
VENKATESH RAMACHANDRAN  78y  Female      SUBJECTIVE:  c/o still has cough     Progress Note:      REVIEW OF SYSTEMS:    T(C): 36 (06-12-18 @ 05:38), Max: 36.4 (06-11-18 @ 21:05)  HR: 72 (06-12-18 @ 05:38) (72 - 78)  BP: 162/69 (06-12-18 @ 05:38) (155/67 - 170/74)  RR: 18 (06-12-18 @ 05:38) (18 - 18)  SpO2: 96% (06-12-18 @ 06:52) (95% - 96%)  Wt(kg): --Vital Signs Last 24 Hrs  T(C): 36 (12 Jun 2018 05:38), Max: 36.4 (11 Jun 2018 21:05)  T(F): 96.8 (12 Jun 2018 05:38), Max: 97.5 (11 Jun 2018 21:05)  HR: 72 (12 Jun 2018 05:38) (72 - 78)  BP: 162/69 (12 Jun 2018 05:38) (155/67 - 170/74)  BP(mean): --  RR: 18 (12 Jun 2018 05:38) (18 - 18)  SpO2: 96% (12 Jun 2018 06:52) (95% - 96%)    PHYSICAL EXAM:  nasal Oxygen  lungs- scattered rhonchi  cor reg nl S1 & S2  abd-soft  ext-no calf tenderness  neuro no focal    LABS:                          9.9    7.38  )-----------( 193      ( 12 Jun 2018 07:46 )             30.1     RADIOLOGY:  < from: Xray Chest 1 View AP/PA (06.10.18 @ 11:41) >    EXAM:  XR CHEST FRONTAL 1V            PROCEDURE DATE:  06/10/2018            INTERPRETATION:  Clinical History / Reason for exam: Dyspnea    Comparison : Chest radiograph 6/8/2018.    Technique/Positioning: Single image.    Findings:    Support devices: None.    Cardiac/mediastinum/hilum: Indeterminate    Lung parenchyma/Pleura: Right lower lobe opacity unchanged. No pleural   effusion or air leak    Skeleton/soft tissues: Unremarkable.    Impression:      Right lower lobe opacity unchanged. Nopleural effusion or air leak      < end of copied text >      IMPRESSION:  RLL pneumonia  copd  dm-sugars high due to steroids  htn  ckd-stable  non hodkins lymphoma  cad-stable  chronic anemia        PLAN:  antibiotics as per ID  oxygen  Measure pulse ox off oxygen   patient refuses in patient rehab  home phys. therapy  regular insulin coverage

## 2018-06-12 NOTE — PROGRESS NOTE ADULT - SUBJECTIVE AND OBJECTIVE BOX
Patient is a 78y old  Female who presents with a chief complaint of COUGH , SOB , FEVER AND CHILLS (09 Jun 2018 01:39)    Interval events:  Still has cough, has subjective feeling of dyspnea on exertion.       PAST MEDICAL & SURGICAL HISTORY:  Coronary artery disease involving native heart without angina pectoris, unspecified vessel or lesion type  Diabetes: Type !!  Osteoarthritis  CKD (chronic kidney disease)  Thrombocytopenia  PVD (peripheral vascular disease)  Non Hodgkin's lymphoma  Heart failure  High cholesterol  HTN (hypertension)  MI (myocardial infarction)  H/O cardiac catheterization  History of cholecystectomy      MEDICATIONS  (STANDING):  ALBUTerol    90 MICROgram(s) HFA Inhaler 1 Puff(s) Inhalation every 4 hours  ALBUTerol/ipratropium for Nebulization 3 milliLiter(s) Nebulizer every 6 hours  atorvastatin 20 milliGRAM(s) Oral at bedtime  buDESOnide  80 MICROgram(s)/formoterol 4.5 MICROgram(s) Inhaler 2 Puff(s) Inhalation two times a day  dextrose 5%. 1000 milliLiter(s) (50 mL/Hr) IV Continuous <Continuous>  dextrose 50% Injectable 12.5 Gram(s) IV Push once  dextrose 50% Injectable 25 Gram(s) IV Push once  dextrose 50% Injectable 25 Gram(s) IV Push once  docusate sodium 100 milliGRAM(s) Oral two times a day  donepezil 10 milliGRAM(s) Oral at bedtime  fenofibrate Tablet 48 milliGRAM(s) Oral daily  furosemide    Tablet 20 milliGRAM(s) Oral daily  guaiFENesin  milliGRAM(s) Oral every 12 hours  heparin  Injectable 5000 Unit(s) SubCutaneous every 8 hours  insulin glargine Injectable (LANTUS) 18 Unit(s) SubCutaneous at bedtime  insulin lispro (HumaLOG) corrective regimen sliding scale   SubCutaneous three times a day before meals  insulin lispro Injectable (HumaLOG) 4 Unit(s) SubCutaneous three times a day before meals  isosorbide   mononitrate ER Tablet (IMDUR) 30 milliGRAM(s) Oral daily  levoFLOXacin  Tablet 250 milliGRAM(s) Oral every 24 hours  losartan 100 milliGRAM(s) Oral daily  montelukast 10 milliGRAM(s) Oral daily  pantoprazole    Tablet 40 milliGRAM(s) Oral before breakfast  predniSONE   Tablet 40 milliGRAM(s) Oral daily  senna 1 Tablet(s) Oral daily  tiotropium 18 MICROgram(s) Capsule 1 Capsule(s) Inhalation daily    MEDICATIONS  (PRN):  aluminum hydroxide/magnesium hydroxide/simethicone Suspension 30 milliLiter(s) Oral every 4 hours PRN Dyspepsia  dextrose 40% Gel 15 Gram(s) Oral once PRN Blood Glucose LESS THAN 70 milliGRAM(s)/deciliter  glucagon  Injectable 1 milliGRAM(s) IntraMuscular once PRN Glucose LESS THAN 70 milligrams/deciliter  ondansetron Injectable 4 milliGRAM(s) IV Push every 6 hours PRN Nausea          Vital Signs Last 24 Hrs  T(C): 36.7 (12 Jun 2018 12:58), Max: 36.7 (12 Jun 2018 12:58)  T(F): 98 (12 Jun 2018 12:58), Max: 98 (12 Jun 2018 12:58)  HR: 75 (12 Jun 2018 12:58) (72 - 77)  BP: 161/66 (12 Jun 2018 12:58) (161/66 - 170/74)  BP(mean): --  RR: 95 (12 Jun 2018 14:00) (18 - 95)  SpO2: 94% (12 Jun 2018 08:20) (94% - 96%)  CAPILLARY BLOOD GLUCOSE  375 (12 Jun 2018 11:59)  146 (12 Jun 2018 06:04)  176 (12 Jun 2018 03:52)  354 (12 Jun 2018 01:12)  451 (11 Jun 2018 22:37)  378 (11 Jun 2018 21:21)  170 (11 Jun 2018 16:26)        I&O's Summary      Physical Exam:    -     General : sitting on chair,NAD    -      HEENT: PERLAA    -      Cardiac: Regular rate    -      Pulm: b/l clear    -      GI: soft non tender    -      Musculoskeletal: no edema    -      Neuro: ao x 3 non focal        Labs:                        9.9    7.38  )-----------( 193      ( 12 Jun 2018 07:46 )             30.1             06-12    140  |  102  |  37<H>  ----------------------------<  191<H>  4.2   |  24  |  1.4    Ca    8.7      12 Jun 2018 07:46                            Imaging:    ECG:

## 2018-06-12 NOTE — PHYSICAL THERAPY INITIAL EVALUATION ADULT - ADDITIONAL COMMENTS
Pt ambulates with straight cane as well as using furniture around the house to hold on to. Reports having  help her when needed with ADLs

## 2018-06-13 RX ORDER — INSULIN GLARGINE 100 [IU]/ML
21 INJECTION, SOLUTION SUBCUTANEOUS AT BEDTIME
Qty: 0 | Refills: 0 | Status: DISCONTINUED | OUTPATIENT
Start: 2018-06-13 | End: 2018-06-14

## 2018-06-13 RX ORDER — INSULIN LISPRO 100/ML
VIAL (ML) SUBCUTANEOUS
Qty: 0 | Refills: 0 | Status: DISCONTINUED | OUTPATIENT
Start: 2018-06-13 | End: 2018-06-13

## 2018-06-13 RX ORDER — INSULIN LISPRO 100/ML
7 VIAL (ML) SUBCUTANEOUS
Qty: 0 | Refills: 0 | Status: DISCONTINUED | OUTPATIENT
Start: 2018-06-13 | End: 2018-06-14

## 2018-06-13 RX ORDER — INSULIN GLARGINE 100 [IU]/ML
21 INJECTION, SOLUTION SUBCUTANEOUS AT BEDTIME
Qty: 0 | Refills: 0 | Status: DISCONTINUED | OUTPATIENT
Start: 2018-06-13 | End: 2018-06-13

## 2018-06-13 RX ADMIN — Medication 20 MILLIGRAM(S): at 05:14

## 2018-06-13 RX ADMIN — Medication 3 MILLILITER(S): at 08:44

## 2018-06-13 RX ADMIN — PANTOPRAZOLE SODIUM 40 MILLIGRAM(S): 20 TABLET, DELAYED RELEASE ORAL at 06:41

## 2018-06-13 RX ADMIN — ISOSORBIDE MONONITRATE 30 MILLIGRAM(S): 60 TABLET, EXTENDED RELEASE ORAL at 11:33

## 2018-06-13 RX ADMIN — Medication 3 MILLILITER(S): at 13:23

## 2018-06-13 RX ADMIN — LOSARTAN POTASSIUM 100 MILLIGRAM(S): 100 TABLET, FILM COATED ORAL at 05:14

## 2018-06-13 RX ADMIN — Medication 600 MILLIGRAM(S): at 17:47

## 2018-06-13 RX ADMIN — Medication 7 UNIT(S): at 12:37

## 2018-06-13 RX ADMIN — MONTELUKAST 10 MILLIGRAM(S): 4 TABLET, CHEWABLE ORAL at 11:32

## 2018-06-13 RX ADMIN — Medication 100 MILLIGRAM(S): at 05:14

## 2018-06-13 RX ADMIN — Medication 600 MILLIGRAM(S): at 05:14

## 2018-06-13 RX ADMIN — HEPARIN SODIUM 5000 UNIT(S): 5000 INJECTION INTRAVENOUS; SUBCUTANEOUS at 21:24

## 2018-06-13 RX ADMIN — Medication 4 UNIT(S): at 08:05

## 2018-06-13 RX ADMIN — DONEPEZIL HYDROCHLORIDE 10 MILLIGRAM(S): 10 TABLET, FILM COATED ORAL at 21:25

## 2018-06-13 RX ADMIN — HEPARIN SODIUM 5000 UNIT(S): 5000 INJECTION INTRAVENOUS; SUBCUTANEOUS at 14:15

## 2018-06-13 RX ADMIN — Medication 3 MILLILITER(S): at 20:50

## 2018-06-13 RX ADMIN — Medication 100 MILLIGRAM(S): at 17:47

## 2018-06-13 RX ADMIN — Medication 40 MILLIGRAM(S): at 06:39

## 2018-06-13 RX ADMIN — BUDESONIDE AND FORMOTEROL FUMARATE DIHYDRATE 2 PUFF(S): 160; 4.5 AEROSOL RESPIRATORY (INHALATION) at 08:07

## 2018-06-13 RX ADMIN — Medication 7 UNIT(S): at 17:51

## 2018-06-13 RX ADMIN — ATORVASTATIN CALCIUM 20 MILLIGRAM(S): 80 TABLET, FILM COATED ORAL at 21:25

## 2018-06-13 RX ADMIN — HEPARIN SODIUM 5000 UNIT(S): 5000 INJECTION INTRAVENOUS; SUBCUTANEOUS at 05:16

## 2018-06-13 NOTE — PROGRESS NOTE ADULT - ASSESSMENT
78 Y O f  with pmh of copd not on home o2, htn , dld, d.m , CKD , CAD s/p PCI with 5 stents, autoimmune hemolytic anemia and Low grade NHL in remission presented to ER with c/o 1 wk h/o cough , fever , SOB .      # COPD exacerbation with Right lower lobe Pneumonia:   c/w Levaquin renal dose x Day 2/7  Duo nebs  Will c/w 40 mg of Prednisone as she still has minimal wheeze      # HTN:  -stable will c/w home meds.    # D.M:  Labile BLood glucose, blood glucose dropped to 54 yesterday evening.  Today fingerstick well controlled, will c/w lantus 21, and lispro 7  Family requested for her endocrinologist consult, placed and is pending    # CKD:  - stable     # DLD:  -C/W home meds    # DVT :  -ppx with heparin.    # DISPO:  Wants to go home on discharge  Anticipate for tomorrow
78 Y O f  with pmh of copd not on home o2, htn , dld, d.m , CKD , CAD s/p PCI with 5 stents, autoimmune hemolytic anemia and Low grade NHL in remission presented to ER with c/o 1 wk h/o cough , fever , SOB .      # COPD exacerbation with Right lower lobe Pneumonia:   c/w Rocephin and levaquin for today, will start on Levaquin PO starting 6/12 for 7 more days  Duo nebs  Steroid switched to PO  Saturating 95 % on 2L NC, will wean off oxygen today, goal is around 92, will assess for need of home O2.    # HTN:  -stable will c/w home meds.    # D.M:   - Uncontrolled blood glucose today, will increase insulin to Lantus 18 and Lispro 6    # CKD:  - stable     # DLD:  -C/W home meds    # DVT :  -ppx with heparin.    # DISPO:    -full code ; from home.
78 Y O f  with pmh of copd not on home o2, htn , dld, d.m , CKD , CAD s/p PCI with 5 stents, autoimmune hemolytic anemia and Low grade NHL in remission presented to ER with c/o 1 wk h/o cough , fever , SOB .      # COPD exacerbation with Right lower lobe Pneumonia:   iv antibiotics switched to PO levaquin 250 mg q24 ( renal dose)  Duo nebs  short prednisone taper  Saturating 95 % on room air, today was 95 % on ambulation in room air    # HTN:  -stable will c/w home meds.    # D.M:   - Blood glucose was controlled overnight but was 385 this afternoon, will increase the dose of insulin    # CKD:  - stable     # DLD:  -C/W home meds    # DVT :  -ppx with heparin.    # DISPO:  Wants to go home, likely d/c home in 24-48 hr
IMPRESSION  Severe Sepsis (pulse>90 beats/min , resp rate>20/min, wbc<4) due to suspected Gram negative pneumonia (pt with non-Hodgkins lymphoma, CKD & DM)    wbc 3.88,     Hx allergy: Ceclor (Unknown)  penicillin (Unknown)  sulfonamides (Unknown)    On: cefTRIAXone   IVPB      levoFLOXacin 750 mg IV Intermittent every 24 hours    SUGGESTIONs  Await blood cultures & sputum C&S  Continue Rocephin & Levaquin  Repeat white blood cell count  Urine Legionella antigen  Control blood sugar
IMPRESSION:  RLL PNA.    RECOMMENDATIONS:  Give todays dosis of iv antibiotics.  From 6/12 on Po Levoquin 500 mg q24h for 7 more says.   D/C other antibiotics.  Recall prn please.

## 2018-06-13 NOTE — PROGRESS NOTE ADULT - SUBJECTIVE AND OBJECTIVE BOX
VENKATESH RAMACHANDRAN  78y  Female      SUBJECTIVE:   c/o not ready to go home  Progress Note:      REVIEW OF SYSTEMS:    T(C): 36.2 (06-13-18 @ 05:10), Max: 36.7 (06-12-18 @ 12:58)  HR: 68 (06-13-18 @ 05:10) (68 - 75)  BP: 163/70 (06-13-18 @ 05:10) (154/65 - 163/70)  RR: 94 (06-13-18 @ 08:13) (19 - 97)  SpO2: 96% (06-13-18 @ 03:41) (96% - 96%)  Wt(kg): --Vital Signs Last 24 Hrs  T(C): 36.2 (13 Jun 2018 05:10), Max: 36.7 (12 Jun 2018 12:58)  T(F): 97.1 (13 Jun 2018 05:10), Max: 98 (12 Jun 2018 12:58)  HR: 68 (13 Jun 2018 05:10) (68 - 75)  BP: 163/70 (13 Jun 2018 05:10) (154/65 - 163/70)  BP(mean): --  RR: 94 (13 Jun 2018 08:13) (19 - 97)  SpO2: 96% (13 Jun 2018 03:41) (96% - 96%)    PHYSICAL EXAM:    nasal Oxygen  lungs-few  scattered wheeze  cor reg nl S1 & S2  abd-soft,non tender  ext-no calf tendernessLABS:                        9.9    7.38  )-----------( 193      ( 12 Jun 2018 07:46 )             30.1   06-12    140  |  102  |  37<H>  ----------------------------<  191<H>  4.2   |  24  |  1.4    Ca    8.7      12 Jun 2018 07:46        RADIOLOGY:      IMPRESSION:    RLL pneumonia  copd  pulse ox 97% on room air after ambulating  dm-sugars high due to steroids  hypoglcemia last night  htn  ckd-stable  non hodkins lymphoma  cad-stable  chronic anemia      PLAN:  antibiotics as per ID  no need for home oxygen  patient refuses in- patient rehab  home phys. therapy   insulin  adjusted  Dr. Barry julian consult  d/c home tomorrow if stable

## 2018-06-13 NOTE — PROGRESS NOTE ADULT - SUBJECTIVE AND OBJECTIVE BOX
Patient is a 78y old  Female who presents with a chief complaint of COUGH , SOB , FEVER AND CHILLS (09 Jun 2018 01:39)    Interval events:  C/o having subjective dyspnea, no other complains      PAST MEDICAL & SURGICAL HISTORY:  Coronary artery disease involving native heart without angina pectoris, unspecified vessel or lesion type  Diabetes: Type !!  Osteoarthritis  CKD (chronic kidney disease)  Thrombocytopenia  PVD (peripheral vascular disease)  Non Hodgkin's lymphoma  Heart failure  High cholesterol  HTN (hypertension)  MI (myocardial infarction)  H/O cardiac catheterization  History of cholecystectomy      MEDICATIONS  (STANDING):  ALBUTerol    90 MICROgram(s) HFA Inhaler 1 Puff(s) Inhalation every 4 hours  ALBUTerol/ipratropium for Nebulization 3 milliLiter(s) Nebulizer every 6 hours  atorvastatin 20 milliGRAM(s) Oral at bedtime  buDESOnide  80 MICROgram(s)/formoterol 4.5 MICROgram(s) Inhaler 2 Puff(s) Inhalation two times a day  dextrose 5%. 1000 milliLiter(s) (50 mL/Hr) IV Continuous <Continuous>  dextrose 50% Injectable 12.5 Gram(s) IV Push once  dextrose 50% Injectable 25 Gram(s) IV Push once  dextrose 50% Injectable 25 Gram(s) IV Push once  docusate sodium 100 milliGRAM(s) Oral two times a day  donepezil 10 milliGRAM(s) Oral at bedtime  fenofibrate Tablet 48 milliGRAM(s) Oral daily  furosemide    Tablet 20 milliGRAM(s) Oral daily  guaiFENesin  milliGRAM(s) Oral every 12 hours  heparin  Injectable 5000 Unit(s) SubCutaneous every 8 hours  insulin glargine Injectable (LANTUS) 21 Unit(s) SubCutaneous at bedtime  insulin lispro Injectable (HumaLOG) 7 Unit(s) SubCutaneous three times a day before meals  isosorbide   mononitrate ER Tablet (IMDUR) 30 milliGRAM(s) Oral daily  levoFLOXacin  Tablet 250 milliGRAM(s) Oral every 24 hours  losartan 100 milliGRAM(s) Oral daily  montelukast 10 milliGRAM(s) Oral daily  pantoprazole    Tablet 40 milliGRAM(s) Oral before breakfast  predniSONE   Tablet 40 milliGRAM(s) Oral daily  senna 1 Tablet(s) Oral daily  tiotropium 18 MICROgram(s) Capsule 1 Capsule(s) Inhalation daily    MEDICATIONS  (PRN):  aluminum hydroxide/magnesium hydroxide/simethicone Suspension 30 milliLiter(s) Oral every 4 hours PRN Dyspepsia  dextrose 40% Gel 15 Gram(s) Oral once PRN Blood Glucose LESS THAN 70 milliGRAM(s)/deciliter  glucagon  Injectable 1 milliGRAM(s) IntraMuscular once PRN Glucose LESS THAN 70 milligrams/deciliter  ondansetron Injectable 4 milliGRAM(s) IV Push every 6 hours PRN Nausea          Vital Signs Last 24 Hrs  T(C): 36.4 (13 Jun 2018 11:58), Max: 36.4 (13 Jun 2018 11:58)  T(F): 97.6 (13 Jun 2018 11:58), Max: 97.6 (13 Jun 2018 11:58)  HR: 81 (13 Jun 2018 11:58) (68 - 81)  BP: 130/75 (13 Jun 2018 11:58) (130/75 - 163/70)  BP(mean): --  RR: 20 (13 Jun 2018 11:58) (19 - 97)  SpO2: 96% (13 Jun 2018 03:41) (96% - 96%)  CAPILLARY BLOOD GLUCOSE  194 (13 Jun 2018 11:58)  145 (13 Jun 2018 05:32)  107 (12 Jun 2018 20:58)  57 (12 Jun 2018 20:36)  364 (12 Jun 2018 16:25)        I&O's Summary      Physical Exam:    -     General : Sitting on chair, NAD    -      Cardiac: Regular rate    -      Pulm: bilateral minimal wheeze    -      GI: soft non tender    -      Musculoskeletal: no edema    -      Neuro: ao x 3, non focal        Labs:                        9.9    7.38  )-----------( 193      ( 12 Jun 2018 07:46 )             30.1             06-12    140  |  102  |  37<H>  ----------------------------<  191<H>  4.2   |  24  |  1.4    Ca    8.7      12 Jun 2018 07:46                            Imaging:    ECG:

## 2018-06-14 ENCOUNTER — TRANSCRIPTION ENCOUNTER (OUTPATIENT)
Age: 78
End: 2018-06-14

## 2018-06-14 VITALS — OXYGEN SATURATION: 93 % | WEIGHT: 150.8 LBS | HEIGHT: 59 IN

## 2018-06-14 LAB
CULTURE RESULTS: SIGNIFICANT CHANGE UP
SPECIMEN SOURCE: SIGNIFICANT CHANGE UP

## 2018-06-14 RX ORDER — INSULIN GLARGINE 100 [IU]/ML
21 INJECTION, SOLUTION SUBCUTANEOUS
Qty: 0 | Refills: 0 | COMMUNITY
Start: 2018-06-14

## 2018-06-14 RX ADMIN — Medication 7 UNIT(S): at 12:23

## 2018-06-14 RX ADMIN — LOSARTAN POTASSIUM 100 MILLIGRAM(S): 100 TABLET, FILM COATED ORAL at 05:20

## 2018-06-14 RX ADMIN — BUDESONIDE AND FORMOTEROL FUMARATE DIHYDRATE 2 PUFF(S): 160; 4.5 AEROSOL RESPIRATORY (INHALATION) at 08:18

## 2018-06-14 RX ADMIN — PANTOPRAZOLE SODIUM 40 MILLIGRAM(S): 20 TABLET, DELAYED RELEASE ORAL at 08:15

## 2018-06-14 RX ADMIN — HEPARIN SODIUM 5000 UNIT(S): 5000 INJECTION INTRAVENOUS; SUBCUTANEOUS at 13:59

## 2018-06-14 RX ADMIN — Medication 100 MILLIGRAM(S): at 05:20

## 2018-06-14 RX ADMIN — Medication 600 MILLIGRAM(S): at 05:20

## 2018-06-14 RX ADMIN — HEPARIN SODIUM 5000 UNIT(S): 5000 INJECTION INTRAVENOUS; SUBCUTANEOUS at 05:19

## 2018-06-14 RX ADMIN — Medication 7 UNIT(S): at 08:14

## 2018-06-14 RX ADMIN — ISOSORBIDE MONONITRATE 30 MILLIGRAM(S): 60 TABLET, EXTENDED RELEASE ORAL at 13:58

## 2018-06-14 RX ADMIN — Medication 40 MILLIGRAM(S): at 05:20

## 2018-06-14 RX ADMIN — Medication 20 MILLIGRAM(S): at 05:20

## 2018-06-14 RX ADMIN — MONTELUKAST 10 MILLIGRAM(S): 4 TABLET, CHEWABLE ORAL at 12:23

## 2018-06-14 NOTE — DIETITIAN INITIAL EVALUATION ADULT. - ORAL INTAKE PTA
Pt reports decreased appetite/po intake. Pt watches what she eats and follows a diabetic diet at home. Pt is allergic to wheat products, shellfish and peanuts./poor

## 2018-06-14 NOTE — DIETITIAN INITIAL EVALUATION ADULT. - OTHER INFO
Pt presented to ER with c/o 1 wk h/o cough, fever, and SOB. RLL pneumonia--essentially controlled. Awaiting arrangement for home PT. Pt expressed wanting to lose weight and discussed importance of portion control and choosing healthy foods. Stressed keeping consistency of carbohydrate intake at meals and avoiding empty calorie foods/drinks. Discussed importance of exercise to aid in weight loss, however currently it is hard for pt to walk as she gets out of breath fast. Pt seems motivated in eating right to lose weight as she says it helps her breathe better. Reason for Assessment: LOS

## 2018-06-14 NOTE — DIETITIAN INITIAL EVALUATION ADULT. - FACTORS AFF FOOD INTAKE
difficulty swallowing/Pt was having swallowing difficulty PTA, however has gotten much better. Pt cuts up food into smaller pieces to help with swallowing. Pt is not interested in seeing SLP as she thinks the difficulty has to do with her being sick and having so much sputum. no GI distress noted, LBM 6/13/other (specify)

## 2018-06-14 NOTE — DIETITIAN INITIAL EVALUATION ADULT. - ENERGY NEEDS
Estimated Calorie Needs: 0567-0543 kcal/day (MSJ x 1.2-1.3 AF)  Estimated Protein Needs: 60-73 gm/day (0.9-1.1 gm/kg ABW)  Estimated Fluid Needs: 1 ml/kcal

## 2018-06-14 NOTE — PROGRESS NOTE ADULT - PROVIDER SPECIALTY LIST ADULT
Infectious Disease
Infectious Disease
Internal Medicine

## 2018-06-14 NOTE — DIETITIAN INITIAL EVALUATION ADULT. - NS AS NUTRI INTERV MEALS SNACK
Other (specify)/Switch diet order to carb consistent with evening snack cut-up with food allergy restrictions

## 2018-06-14 NOTE — PROGRESS NOTE ADULT - NSHPATTENDINGPLANDISCUSS_GEN_ALL_CORE
Pt, ; RN in charge in details. Couldn't connect resident.
patient, resident Dr. Mccartney
resident, ID attending

## 2018-06-14 NOTE — DISCHARGE NOTE ADULT - MEDICATION SUMMARY - MEDICATIONS TO TAKE
I will START or STAY ON the medications listed below when I get home from the hospital:    predniSONE 20 mg oral tablet  -- 2 tab(s) by mouth once a day for 2 days, 1 tab for 3 days and stop  -- Indication: For Copd    losartan 100 mg oral tablet  -- 1 tab(s) by mouth once a day  -- Indication: For HTN (hypertension)    isosorbide mononitrate 30 mg oral tablet, extended release  -- 1 tab(s) by mouth once a day (in the morning)  -- Indication: For Heart failure    NovoLOG 100 units/mL subcutaneous solution  -- 10,7,7  subcutaneous  -- Indication: For Dm    insulin glargine  -- 21 unit(s) subcutaneous once a day (at bedtime)  -- Indication: For Dm    atorvastatin 20 mg oral tablet  -- 1 tab(s) by mouth once a day  -- Indication: For DLD    fenofibrate 48 mg oral tablet  -- 1 tab(s) by mouth once a day  -- Indication: For High cholesterol    Spiriva 18 mcg inhalation capsule  -- 1 cap(s) inhaled once a day  -- Indication: For CoPD    Dulera 100 mcg-5 mcg/inh inhalation aerosol  -- 2 puff(s) inhaled 2 times a day  -- Indication: For CoPD    albuterol 0.63 mg/3 mL (0.021%) inhalation solution  -- 3 milliliter(s) inhaled 3 times a day  -- Indication: For CoPD    donepezil 10 mg oral tablet  -- 1 tab(s) by mouth once a day (at bedtime)  -- Indication: For Dementia    Lasix 20 mg oral tablet  -- 1 tab(s) by mouth once a day  -- Indication: For CHF    raNITIdine 150 mg oral capsule  -- 1 cap(s) by mouth 2 times a day  -- Indication: For GERD    montelukast 10 mg oral tablet  -- 1 tab(s) by mouth once a day  -- Indication: For CoPD    inositol 650 mg oral tablet  -- 2 tab(s) by mouth once a day  -- Indication: For Home medication    omeprazole 20 mg oral delayed release capsule  -- 1 cap(s) by mouth once a day  -- Indication: For GERD    levoFLOXacin 250 mg oral tablet  -- 1 tab(s) by mouth every 24 hours for 5 more days  -- Indication: For PNEUMONIA OF BOTH LOWER LOBES DUE TO INFECTIOUS ORGANISM    Flovent 44 mcg/inh inhalation aerosol with adapter  -- Indication: For Copd

## 2018-06-14 NOTE — DISCHARGE NOTE ADULT - PATIENT PORTAL LINK FT
You can access the [facility] Portal, offered by [facility], by registering with the following website: [email or web address]

## 2018-06-14 NOTE — DISCHARGE NOTE ADULT - CARE PROVIDER_API CALL
Bebo Fernandes), Geriatric Medicine; Internal Medicine  [address] [place] [address]  Phone: [phone]  Fax: [phone]    David Feldman), Medicine  [address] [place] [address]  Phone: [phone]  Fax: [phone]

## 2018-06-14 NOTE — DISCHARGE NOTE ADULT - CARE PLAN
Principal Discharge DX:	Pneumonia of right lower lobe due to infectious organism  Goal:	Resolution of infection  Assessment and plan of treatment:	Please take medication as prescribed and follow up with your lung doctor

## 2018-06-14 NOTE — DISCHARGE NOTE ADULT - HOSPITAL COURSE
78 y/f with PMH of COPD presented with cough, chills and SOB. CXR showed Right lower lobe pneumonia, treated with iv Abx initially and changed to Doxycycline as per ID. Was initially requiring oxygen via NC, now off oxygen doing fine. Also had labile blood sugar, controlled with insulin regimen modification. will discharge to home today

## 2018-06-14 NOTE — DIETITIAN INITIAL EVALUATION ADULT. - FEEDING SKILL
independent/Pt reports consuming half of her meal trays and is not interested in trialing oral supplement at this time. She says she is eating a lot better than she did in the past 3-4 weeks.

## 2018-06-14 NOTE — PROGRESS NOTE ADULT - SUBJECTIVE AND OBJECTIVE BOX
06-14-18 @ 10:52    VENKATESH RAMACHANDRAN  78y  Female  Sitting OOB, comfortable. No wheeze    INTERVAL EVENTS: None    MEDICATIONS  (STANDING):  ALBUTerol    90 MICROgram(s) HFA Inhaler 1 Puff(s) Inhalation every 4 hours  ALBUTerol/ipratropium for Nebulization 3 milliLiter(s) Nebulizer every 6 hours  atorvastatin 20 milliGRAM(s) Oral at bedtime  buDESOnide  80 MICROgram(s)/formoterol 4.5 MICROgram(s) Inhaler 2 Puff(s) Inhalation two times a day  dextrose 5%. 1000 milliLiter(s) (50 mL/Hr) IV Continuous <Continuous>  dextrose 50% Injectable 12.5 Gram(s) IV Push once  dextrose 50% Injectable 25 Gram(s) IV Push once  dextrose 50% Injectable 25 Gram(s) IV Push once  docusate sodium 100 milliGRAM(s) Oral two times a day  donepezil 10 milliGRAM(s) Oral at bedtime  fenofibrate Tablet 48 milliGRAM(s) Oral daily  furosemide    Tablet 20 milliGRAM(s) Oral daily  guaiFENesin  milliGRAM(s) Oral every 12 hours  heparin  Injectable 5000 Unit(s) SubCutaneous every 8 hours  insulin glargine Injectable (LANTUS) 21 Unit(s) SubCutaneous at bedtime  insulin lispro Injectable (HumaLOG) 7 Unit(s) SubCutaneous three times a day before meals  isosorbide   mononitrate ER Tablet (IMDUR) 30 milliGRAM(s) Oral daily  levoFLOXacin  Tablet 250 milliGRAM(s) Oral every 24 hours  losartan 100 milliGRAM(s) Oral daily  montelukast 10 milliGRAM(s) Oral daily  pantoprazole    Tablet 40 milliGRAM(s) Oral before breakfast  predniSONE   Tablet 40 milliGRAM(s) Oral daily  senna 1 Tablet(s) Oral daily  tiotropium 18 MICROgram(s) Capsule 1 Capsule(s) Inhalation daily    MEDICATIONS  (PRN):  aluminum hydroxide/magnesium hydroxide/simethicone Suspension 30 milliLiter(s) Oral every 4 hours PRN Dyspepsia  dextrose 40% Gel 15 Gram(s) Oral once PRN Blood Glucose LESS THAN 70 milliGRAM(s)/deciliter  glucagon  Injectable 1 milliGRAM(s) IntraMuscular once PRN Glucose LESS THAN 70 milligrams/deciliter  ondansetron Injectable 4 milliGRAM(s) IV Push every 6 hours PRN Nausea      T(C): 35.9 (06-14-18 @ 04:49), Max: 36.7 (06-13-18 @ 20:47)  HR: 74 (06-14-18 @ 04:49) (74 - 81)  BP: 165/67 (06-14-18 @ 04:49) (130/75 - 165/67)  RR: 18 (06-14-18 @ 04:49) (18 - 20)  SpO2: --  Wt(kg): --Vital Signs Last 24 Hrs  T(C): 35.9 (14 Jun 2018 04:49), Max: 36.7 (13 Jun 2018 20:47)  T(F): 96.6 (14 Jun 2018 04:49), Max: 98 (13 Jun 2018 20:47)  HR: 74 (14 Jun 2018 04:49) (74 - 81)  BP: 165/67 (14 Jun 2018 04:49) (130/75 - 165/67)  BP(mean): --  RR: 18 (14 Jun 2018 04:49) (18 - 20)  SpO2: --    PHYSICAL EXAM:  GENERAL: NAD  NECK: supple, no JVD  CHEST/LUNG: Good AE, minimal rhochi L mid  HEART: S1S2, reg. normal  ABDOMEN: benign  EXTREMITIES: no CCE    Basic Metabolic Panel in AM (06.12.18 @ 07:46)    Sodium, Serum: 140 mmol/L    Potassium, Serum: 4.2 mmol/L    Chloride, Serum: 102 mmol/L    Carbon Dioxide, Serum: 24 mmol/L    Anion Gap, Serum: 14 mmol/L    Blood Urea Nitrogen, Serum: 37 mg/dL    Creatinine, Serum: 1.4 mg/dL    Glucose, Serum: 191 mg/dL    Calcium, Total Serum: 8.7 mg/dL           RADIOLOGY & ADDITIONAL TESTS:      ASSESSMENT / PLAN  :    RLL pneumonia : Essentially controlled. On oral ABx, as per ID.   COPD : exacerbated 2' to above. Much improved. Stable PO2   DM : cont current. stable.  HTN : stable on Rx.   CAD ; no active issue  Hx NHL : no active issue. She f/u w/ onco.   Gen weakness : She decided to have home PT. Guided to maintain gradual more ROM with fall prevention.     Routine preventive. Awaiting arrangement for home PT.

## 2018-06-14 NOTE — DIETITIAN INITIAL EVALUATION ADULT. - PERTINENT MEDS FT
heparin, insulin, prednisone, senna, colace, albuterol, lipitor, symbicort, fenofibrate, lasix, losartan, zofran, protonix, spiriva

## 2018-06-21 DIAGNOSIS — E11.22 TYPE 2 DIABETES MELLITUS WITH DIABETIC CHRONIC KIDNEY DISEASE: ICD-10-CM

## 2018-06-21 DIAGNOSIS — D69.6 THROMBOCYTOPENIA, UNSPECIFIED: ICD-10-CM

## 2018-06-21 DIAGNOSIS — J18.9 PNEUMONIA, UNSPECIFIED ORGANISM: ICD-10-CM

## 2018-06-21 DIAGNOSIS — N18.9 CHRONIC KIDNEY DISEASE, UNSPECIFIED: ICD-10-CM

## 2018-06-21 DIAGNOSIS — K21.9 GASTRO-ESOPHAGEAL REFLUX DISEASE WITHOUT ESOPHAGITIS: ICD-10-CM

## 2018-06-21 DIAGNOSIS — E11.51 TYPE 2 DIABETES MELLITUS WITH DIABETIC PERIPHERAL ANGIOPATHY WITHOUT GANGRENE: ICD-10-CM

## 2018-06-21 DIAGNOSIS — J44.0 CHRONIC OBSTRUCTIVE PULMONARY DISEASE WITH (ACUTE) LOWER RESPIRATORY INFECTION: ICD-10-CM

## 2018-06-21 DIAGNOSIS — D59.1 OTHER AUTOIMMUNE HEMOLYTIC ANEMIAS: ICD-10-CM

## 2018-06-21 DIAGNOSIS — Z88.2 ALLERGY STATUS TO SULFONAMIDES: ICD-10-CM

## 2018-06-21 DIAGNOSIS — E78.5 HYPERLIPIDEMIA, UNSPECIFIED: ICD-10-CM

## 2018-06-21 DIAGNOSIS — C85.90 NON-HODGKIN LYMPHOMA, UNSPECIFIED, UNSPECIFIED SITE: ICD-10-CM

## 2018-06-21 DIAGNOSIS — I12.9 HYPERTENSIVE CHRONIC KIDNEY DISEASE WITH STAGE 1 THROUGH STAGE 4 CHRONIC KIDNEY DISEASE, OR UNSPECIFIED CHRONIC KIDNEY DISEASE: ICD-10-CM

## 2018-06-21 DIAGNOSIS — Z88.0 ALLERGY STATUS TO PENICILLIN: ICD-10-CM

## 2018-06-21 DIAGNOSIS — Z91.040 LATEX ALLERGY STATUS: ICD-10-CM

## 2018-06-21 DIAGNOSIS — Z87.891 PERSONAL HISTORY OF NICOTINE DEPENDENCE: ICD-10-CM

## 2018-06-21 DIAGNOSIS — I25.10 ATHEROSCLEROTIC HEART DISEASE OF NATIVE CORONARY ARTERY WITHOUT ANGINA PECTORIS: ICD-10-CM

## 2018-06-21 DIAGNOSIS — I25.2 OLD MYOCARDIAL INFARCTION: ICD-10-CM

## 2018-06-21 DIAGNOSIS — E11.65 TYPE 2 DIABETES MELLITUS WITH HYPERGLYCEMIA: ICD-10-CM

## 2018-06-21 DIAGNOSIS — Z95.5 PRESENCE OF CORONARY ANGIOPLASTY IMPLANT AND GRAFT: ICD-10-CM

## 2018-06-21 DIAGNOSIS — J44.1 CHRONIC OBSTRUCTIVE PULMONARY DISEASE WITH (ACUTE) EXACERBATION: ICD-10-CM

## 2018-06-21 DIAGNOSIS — Z88.8 ALLERGY STATUS TO OTHER DRUGS, MEDICAMENTS AND BIOLOGICAL SUBSTANCES: ICD-10-CM

## 2018-08-07 ENCOUNTER — APPOINTMENT (OUTPATIENT)
Dept: VASCULAR SURGERY | Facility: CLINIC | Age: 78
End: 2018-08-07
Payer: MEDICARE

## 2018-08-07 VITALS
DIASTOLIC BLOOD PRESSURE: 40 MMHG | WEIGHT: 147 LBS | SYSTOLIC BLOOD PRESSURE: 130 MMHG | BODY MASS INDEX: 29.64 KG/M2 | HEIGHT: 59 IN

## 2018-08-07 DIAGNOSIS — I73.9 PERIPHERAL VASCULAR DISEASE, UNSPECIFIED: ICD-10-CM

## 2018-08-07 PROBLEM — I10 ESSENTIAL (PRIMARY) HYPERTENSION: Chronic | Status: ACTIVE | Noted: 2018-06-09

## 2018-08-07 PROBLEM — D69.6 THROMBOCYTOPENIA, UNSPECIFIED: Chronic | Status: ACTIVE | Noted: 2018-06-09

## 2018-08-07 PROBLEM — M19.90 UNSPECIFIED OSTEOARTHRITIS, UNSPECIFIED SITE: Chronic | Status: ACTIVE | Noted: 2018-06-09

## 2018-08-07 PROBLEM — N18.9 CHRONIC KIDNEY DISEASE, UNSPECIFIED: Chronic | Status: ACTIVE | Noted: 2018-06-09

## 2018-08-07 PROBLEM — I50.9 HEART FAILURE, UNSPECIFIED: Chronic | Status: ACTIVE | Noted: 2018-06-09

## 2018-08-07 PROBLEM — E78.00 PURE HYPERCHOLESTEROLEMIA, UNSPECIFIED: Chronic | Status: ACTIVE | Noted: 2018-06-09

## 2018-08-07 PROCEDURE — 93925 LOWER EXTREMITY STUDY: CPT

## 2018-08-07 PROCEDURE — 99213 OFFICE O/P EST LOW 20 MIN: CPT

## 2018-09-13 ENCOUNTER — APPOINTMENT (OUTPATIENT)
Dept: HEMATOLOGY ONCOLOGY | Facility: CLINIC | Age: 78
End: 2018-09-13

## 2018-09-17 ENCOUNTER — APPOINTMENT (OUTPATIENT)
Dept: HEMATOLOGY ONCOLOGY | Facility: CLINIC | Age: 78
End: 2018-09-17

## 2018-09-17 ENCOUNTER — LABORATORY RESULT (OUTPATIENT)
Age: 78
End: 2018-09-17

## 2018-09-17 VITALS
RESPIRATION RATE: 14 BRPM | HEART RATE: 80 BPM | HEIGHT: 59 IN | DIASTOLIC BLOOD PRESSURE: 59 MMHG | TEMPERATURE: 96.7 F | BODY MASS INDEX: 28.22 KG/M2 | WEIGHT: 140 LBS | SYSTOLIC BLOOD PRESSURE: 140 MMHG

## 2018-09-17 LAB
ALBUMIN SERPL ELPH-MCNC: 4.5 G/DL
ALP BLD-CCNC: 67 U/L
ALT SERPL-CCNC: 13 U/L
ANION GAP SERPL CALC-SCNC: 20 MMOL/L
AST SERPL-CCNC: 16 U/L
BILIRUB SERPL-MCNC: 0.3 MG/DL
BUN SERPL-MCNC: 45 MG/DL
CALCIUM SERPL-MCNC: 8.9 MG/DL
CHLORIDE SERPL-SCNC: 97 MMOL/L
CO2 SERPL-SCNC: 22 MMOL/L
CREAT SERPL-MCNC: 1.6 MG/DL
GLUCOSE SERPL-MCNC: 497 MG/DL
HCT VFR BLD CALC: 33.7 %
HGB BLD-MCNC: 11 G/DL
LDH SERPL-CCNC: 184
MCHC RBC-ENTMCNC: 26.8 PG
MCHC RBC-ENTMCNC: 32.6 G/DL
MCV RBC AUTO: 82.2 FL
PLATELET # BLD AUTO: 167 K/UL
PMV BLD: 8.8 FL
POTASSIUM SERPL-SCNC: 4.3 MMOL/L
PROT SERPL-MCNC: 6.7 G/DL
RBC # BLD: 4.1 M/UL
RBC # FLD: 14.5 %
SODIUM SERPL-SCNC: 139 MMOL/L
WBC # FLD AUTO: 6.46 K/UL

## 2018-09-17 RX ORDER — ALBUTEROL SULFATE 2 MG/1
2 TABLET ORAL
Refills: 0 | Status: ACTIVE | COMMUNITY

## 2018-09-17 RX ORDER — MOMETASONE FUROATE AND FORMOTEROL FUMARATE DIHYDRATE 100; 5 UG/1; UG/1
100-5 AEROSOL RESPIRATORY (INHALATION)
Refills: 0 | Status: ACTIVE | COMMUNITY

## 2018-09-17 NOTE — ASSESSMENT
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  Elly has completed a course of steroids as well as rituximab. Rituxan  on 04/08/2016 and she  finished steroids approximately in early 06/2016.  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  She currently denies any bleeding.  \par 3. Diabetes.  She is on insulin.\par 4. Hypogammaglobulinemia.  Her IgG that was previously checked  was decreased. She has been asymptomatic this was due to Rituxan. Repeat is showing near normal levels in past . \par 5.Previous history of hyperferritinemia, this was probably reactive.\par 6 She has a history of steroid use.  She is currently on calcium and vitamin D.   Her  DEXA scan in 8/2016 was normal. \par \par Plan: \par -Hgb dropped, now mild anemia, , sent  CMP, LDH and haptoglobin  if no hemolysis will repeat CBC in 3-4 weeks  with iron studies and she will see me in 3 months\par -if has hemolysis will call her back \par -will repeat DEXA sometime this year, Calcium, and Vit D was recommended.

## 2018-09-17 NOTE — HISTORY OF PRESENT ILLNESS
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  She finished it approximately in the beginning of 06/2016.  She also received 4  doses of rituximab therapy.\par \par HISTORY OF PRESENT ILLNESS:  Ms. Blackmon is a very pleasant 85yearold female with multiple medical problems.  She initially was being treated for secondary autoimmune hemolytic anemia in the setting of indolent Bcell nonHodgkin lymphoma, NOS.  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  Her initial PET scan only demonstrated mild splenomegaly.  She required steroids as well, but then when the tapering was attempted, her anemia worsened, and at that point in time, rituximab was initiated.\par  [de-identified] : January 17, 2017\yonathan Sanabria presents for followup today she hasn't seen you since July. She denies having recurrent illnesses. She denies having  fatigue she experienced before. She does have occasional headaches. Blood pressure today is elevated. Otherwise she has no complaints. \par \par 4/24/17\par She is doing well except  for trouble with sugars and BP. She is seeing appropriate specialists. No other complaints CBC from today is WNL.\par \par 7/26/17\par She is doing well. She has a cough with sputum production for now several months. CXR in July was clear. HAd a course of antibiotics that did not work. It may be her COPD. No bleedig, no B symptoms. Usual fatigue. \par \par 10/27/17\par She is doing well except for her diabetic neuropathy in her feet. States her A1C is 6. No bleeding. \par \par 1/25/18\par She is her for follow up for her NHL. She is doing well. No bleeding. No weight loss.  CBC from today is stable.\par \par \par 5/24/18\par She is doing well. She has fatigue. CBC was fine from today s visit.\par \par 9/17/18\par She is here for follow up. She may have a cold, fever 99, cough with green phlegm, not SOB. CBC showed a Hgb of 11 from today. She has no bleeding,normal stool and urine..

## 2018-09-17 NOTE — REVIEW OF SYSTEMS
[Fatigue] : fatigue [Recent Change In Weight] : ~T no recent weight change [Shortness Of Breath] : no shortness of breath [Wheezing] : no wheezing [Cough] : cough [SOB on Exertion] : no shortness of breath during exertion [Negative] : Heme/Lymph [de-identified] : diabetic neuropathy

## 2018-09-18 LAB — HAPTOGLOB SERPL-MCNC: 188 MG/DL

## 2018-12-17 ENCOUNTER — OUTPATIENT (OUTPATIENT)
Dept: OUTPATIENT SERVICES | Facility: HOSPITAL | Age: 78
LOS: 1 days | Discharge: HOME | End: 2018-12-17

## 2018-12-17 ENCOUNTER — APPOINTMENT (OUTPATIENT)
Dept: HEMATOLOGY ONCOLOGY | Facility: CLINIC | Age: 78
End: 2018-12-17

## 2018-12-17 ENCOUNTER — LABORATORY RESULT (OUTPATIENT)
Age: 78
End: 2018-12-17

## 2018-12-17 VITALS
SYSTOLIC BLOOD PRESSURE: 137 MMHG | RESPIRATION RATE: 14 BRPM | HEIGHT: 59 IN | TEMPERATURE: 98 F | DIASTOLIC BLOOD PRESSURE: 61 MMHG | BODY MASS INDEX: 29.43 KG/M2 | HEART RATE: 75 BPM | WEIGHT: 146 LBS

## 2018-12-17 DIAGNOSIS — Z98.890 OTHER SPECIFIED POSTPROCEDURAL STATES: Chronic | ICD-10-CM

## 2018-12-17 DIAGNOSIS — Z90.49 ACQUIRED ABSENCE OF OTHER SPECIFIED PARTS OF DIGESTIVE TRACT: Chronic | ICD-10-CM

## 2018-12-17 DIAGNOSIS — D59.1 OTHER AUTOIMMUNE HEMOLYTIC ANEMIAS: ICD-10-CM

## 2018-12-17 DIAGNOSIS — C85.90 NON-HODGKIN LYMPHOMA, UNSPECIFIED, UNSPECIFIED SITE: ICD-10-CM

## 2018-12-17 LAB
ALBUMIN SERPL ELPH-MCNC: 4 G/DL
ALP BLD-CCNC: 67 U/L
ALT SERPL-CCNC: 14 U/L
ANION GAP SERPL CALC-SCNC: 13 MMOL/L
AST SERPL-CCNC: 20 U/L
BILIRUB SERPL-MCNC: 0.4 MG/DL
BUN SERPL-MCNC: 33 MG/DL
CALCIUM SERPL-MCNC: 9.2 MG/DL
CHLORIDE SERPL-SCNC: 102 MMOL/L
CO2 SERPL-SCNC: 28 MMOL/L
CREAT SERPL-MCNC: 1.2 MG/DL
GLUCOSE SERPL-MCNC: 128 MG/DL
HCT VFR BLD CALC: 34.9 %
HGB BLD-MCNC: 11.4 G/DL
LDH SERPL-CCNC: 187
MCHC RBC-ENTMCNC: 27.2 PG
MCHC RBC-ENTMCNC: 32.7 G/DL
MCV RBC AUTO: 83.3 FL
PLATELET # BLD AUTO: 163 K/UL
PMV BLD: 9 FL
POTASSIUM SERPL-SCNC: 4.1 MMOL/L
PROT SERPL-MCNC: 6.3 G/DL
RBC # BLD: 4.19 M/UL
RBC # FLD: 14.2 %
RETICS # AUTO: 1.6 %
RETICS AGGREG/RBC NFR: 67.9 K/UL
SODIUM SERPL-SCNC: 143 MMOL/L
WBC # FLD AUTO: 5.18 K/UL

## 2018-12-21 NOTE — HISTORY OF PRESENT ILLNESS
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  She finished it approximately in the beginning of 06/2016.  She also received 4  doses of rituximab therapy.\par \par HISTORY OF PRESENT ILLNESS:  Ms. Blackmon is a very pleasant 85yearold female with multiple medical problems.  She initially was being treated for secondary autoimmune hemolytic anemia in the setting of indolent Bcell nonHodgkin lymphoma, NOS.  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  Her initial PET scan only demonstrated mild splenomegaly.  She required steroids as well, but then when the tapering was attempted, her anemia worsened, and at that point in time, rituximab was initiated.\par  [de-identified] : January 17, 2017\yonathan Sanabria presents for followup today she hasn't seen you since July. She denies having recurrent illnesses. She denies having  fatigue she experienced before. She does have occasional headaches. Blood pressure today is elevated. Otherwise she has no complaints. \par \par 4/24/17\par She is doing well except  for trouble with sugars and BP. She is seeing appropriate specialists. No other complaints CBC from today is WNL.\par \par 7/26/17\par She is doing well. She has a cough with sputum production for now several months. CXR in July was clear. HAd a course of antibiotics that did not work. It may be her COPD. No bleedig, no B symptoms. Usual fatigue. \par \par 10/27/17\par She is doing well except for her diabetic neuropathy in her feet. States her A1C is 6. No bleeding. \par \par 1/25/18\par She is her for follow up for her NHL. She is doing well. No bleeding. No weight loss.  CBC from today is stable.\par \par \par 5/24/18\par She is doing well. She has fatigue. CBC was fine from today s visit.\par \par 9/17/18\par She is here for follow up. She may have a cold, fever 99, cough with green phlegm, not SOB. CBC showed a Hgb of 11 from today. She has no bleeding,normal stool and urine.\par \par 12/17/18\par Patient is here for a follow-up visit.  She is feeling well with no complaints.  Most recent CBC is stable, Hgb up to 11.4.  Patient denies fever, chills, nausea, vomiting.  She has received her flu vaccine this season.  \par

## 2018-12-21 NOTE — ASSESSMENT
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  Elly has completed a course of steroids as well as rituximab. Rituxan  on 04/08/2016 and she  finished steroids approximately in early 06/2016.  She is in remission  \par \par 2.History of arteriovenous malformations on endoscopy. \par - She currently denies any bleeding.  \par \par 3. Diabetes.  \par -She is on insulin.\par \par 4. Hypogammaglobulinemia.  \par -Her IgG that was previously checked  was decreased. She has been asymptomatic this was due to Rituxan. Repeat is showing near normal levels in past . \par \par 5.Previous history of hyperferritinemia, this was probably reactive.\par \par 6 She has a history of steroid use.  She is currently on calcium and vitamin D.   Her  DEXA scan in 8/2016 was normal. \par \par 7. MIld now  Anemia, likely due to CKD\par \par Plan: \par -blood work today if in remission RTC in 3 months\par -will repeat DEXA r, Calcium, and Vit D  supplementation, there is some data now for Zometa in osteopenic patients, if present\par \par

## 2018-12-21 NOTE — REVIEW OF SYSTEMS
[Fatigue] : fatigue [Cough] : cough [Negative] : Heme/Lymph [Recent Change In Weight] : ~T no recent weight change [Shortness Of Breath] : no shortness of breath [Wheezing] : no wheezing [SOB on Exertion] : no shortness of breath during exertion [de-identified] : diabetic neuropathy

## 2019-02-12 ENCOUNTER — APPOINTMENT (OUTPATIENT)
Dept: VASCULAR SURGERY | Facility: CLINIC | Age: 79
End: 2019-02-12
Payer: MEDICARE

## 2019-02-12 PROCEDURE — 99212 OFFICE O/P EST SF 10 MIN: CPT

## 2019-02-12 NOTE — ASSESSMENT
[FreeTextEntry1] : Ms. Blackmon is a 78 year-old female with a history of a right carotid endarterectomy in 1997 and the right common femoral artery endarterectomy as well as right common iliac artery angioplasty and stent in 1991 at [facility]. I preformed a carotid duplex which showed patent CEA and left <50 %my office in 6 months time .. No vascular surgery intervention at this time. I have advised her to see me sooner if the lower extremity symptoms worsen.

## 2019-02-12 NOTE — HISTORY OF PRESENT ILLNESS
[FreeTextEntry1] : Ms. Blackmon is a 78 year-old female with a history of peripheral vascular disease. She had a prior revascularization in 1991 for an ischemic foot with right common femoral artery endarterectomy and right iliac stent. She has a history of a right carotid endarterectomy in 1997 in which she was asymptomatic from. \par \par Today she presents for a routine followup. She complains of lower extremity cramping at night.

## 2019-02-12 NOTE — REASON FOR VISIT
[Follow-Up: _____] : a [unfilled] follow-up visit [Other: _____] : [unfilled] [FreeTextEntry1] : for asymptomatic carotid stenosis and peripheral vascular disease.

## 2019-03-18 ENCOUNTER — FORM ENCOUNTER (OUTPATIENT)
Age: 79
End: 2019-03-18

## 2019-03-19 ENCOUNTER — OUTPATIENT (OUTPATIENT)
Dept: OUTPATIENT SERVICES | Facility: HOSPITAL | Age: 79
LOS: 1 days | Discharge: HOME | End: 2019-03-19

## 2019-03-19 DIAGNOSIS — Z90.49 ACQUIRED ABSENCE OF OTHER SPECIFIED PARTS OF DIGESTIVE TRACT: Chronic | ICD-10-CM

## 2019-03-19 DIAGNOSIS — J44.1 CHRONIC OBSTRUCTIVE PULMONARY DISEASE WITH (ACUTE) EXACERBATION: ICD-10-CM

## 2019-03-19 DIAGNOSIS — Z98.890 OTHER SPECIFIED POSTPROCEDURAL STATES: Chronic | ICD-10-CM

## 2019-03-20 DIAGNOSIS — Z78.0 ASYMPTOMATIC MENOPAUSAL STATE: ICD-10-CM

## 2019-03-20 DIAGNOSIS — M89.9 DISORDER OF BONE, UNSPECIFIED: ICD-10-CM

## 2019-03-20 DIAGNOSIS — Z13.820 ENCOUNTER FOR SCREENING FOR OSTEOPOROSIS: ICD-10-CM

## 2019-04-08 ENCOUNTER — APPOINTMENT (OUTPATIENT)
Dept: HEMATOLOGY ONCOLOGY | Facility: CLINIC | Age: 79
End: 2019-04-08

## 2019-04-08 ENCOUNTER — LABORATORY RESULT (OUTPATIENT)
Age: 79
End: 2019-04-08

## 2019-04-08 VITALS
RESPIRATION RATE: 14 BRPM | DIASTOLIC BLOOD PRESSURE: 60 MMHG | BODY MASS INDEX: 29.03 KG/M2 | WEIGHT: 144 LBS | TEMPERATURE: 98.4 F | HEIGHT: 59 IN | HEART RATE: 72 BPM | SYSTOLIC BLOOD PRESSURE: 140 MMHG

## 2019-04-08 DIAGNOSIS — R91.8 OTHER NONSPECIFIC ABNORMAL FINDING OF LUNG FIELD: ICD-10-CM

## 2019-04-08 LAB
HCT VFR BLD CALC: 36.3 %
HGB BLD-MCNC: 12 G/DL
MCHC RBC-ENTMCNC: 28 PG
MCHC RBC-ENTMCNC: 33.1 G/DL
MCV RBC AUTO: 84.6 FL
PLATELET # BLD AUTO: 166 K/UL
PMV BLD: 9.1 FL
RBC # BLD: 4.29 M/UL
RBC # FLD: 14.4 %
WBC # FLD AUTO: 5.67 K/UL

## 2019-04-08 NOTE — ASSESSMENT
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  Elly has completed a course of steroids as well as rituximab. Rituxan  on 04/08/2016 and she  finished steroids approximately in early 06/2016.  She is in remission  \par \par 2.History of arteriovenous malformations on endoscopy. \par - She currently denies any bleeding.  \par \par 3. Diabetes.  \par -She is on insulin.\par \par 4. Hypogammaglobulinemia.  \par -Her IgG that was previously checked  was decreased. She has been asymptomatic this was due to Rituxan. Repeat is showing near normal levels in past . \par \par 5.Previous history of hyperferritinemia, this was probably reactive.\par \par 6 She has a history of steroid use.  She is currently on calcium and vitamin D.   Her  DEXA scan in 8/2016 was normal.  DEXA in 3/20/19 showed osteopenia in femoral neck with FRAX score  risk of fractuer of 2.9% in 10 years\par \par 7. MIld now  Anemia, likely due to CKD\par \par 8. Pulmonary Nodule\par -being  worked up by Dr. Duran \par \par Plan: \par -blood work today \par -For possible biopsy of nodule and or PET/CT following with Dr. Feldman \par -Will see emory pickens one month  \par -DEXA notes \par \par

## 2019-04-08 NOTE — REVIEW OF SYSTEMS
[Fatigue] : fatigue [Recent Change In Weight] : ~T no recent weight change [Shortness Of Breath] : no shortness of breath [Wheezing] : no wheezing [Cough] : cough [SOB on Exertion] : no shortness of breath during exertion [Anxiety] : anxiety [Negative] : Heme/Lymph [de-identified] : diabetic neuropathy

## 2019-04-08 NOTE — HISTORY OF PRESENT ILLNESS
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  She finished it approximately in the beginning of 06/2016.  She also received 4  doses of rituximab therapy.\par \par HISTORY OF PRESENT ILLNESS:  Ms. Blackmon is a very pleasant 85yearold female with multiple medical problems.  She initially was being treated for secondary autoimmune hemolytic anemia in the setting of indolent Bcell nonHodgkin lymphoma, NOS.  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  Her initial PET scan only demonstrated mild splenomegaly.  She required steroids as well, but then when the tapering was attempted, her anemia worsened, and at that point in time, rituximab was initiated.\par  [de-identified] : January 17, 2017\yonathan Sanabria presents for followup today she hasn't seen you since July. She denies having recurrent illnesses. She denies having  fatigue she experienced before. She does have occasional headaches. Blood pressure today is elevated. Otherwise she has no complaints. \par \par 4/24/17\par She is doing well except  for trouble with sugars and BP. She is seeing appropriate specialists. No other complaints CBC from today is WNL.\par \par 7/26/17\par She is doing well. She has a cough with sputum production for now several months. CXR in July was clear. HAd a course of antibiotics that did not work. It may be her COPD. No bleedig, no B symptoms. Usual fatigue. \par \par 10/27/17\par She is doing well except for her diabetic neuropathy in her feet. States her A1C is 6. No bleeding. \par \par 1/25/18\par She is her for follow up for her NHL. She is doing well. No bleeding. No weight loss.  CBC from today is stable.\par \par \par 5/24/18\par She is doing well. She has fatigue. CBC was fine from today s visit.\par \par 9/17/18\par She is here for follow up. She may have a cold, fever 99, cough with green phlegm, not SOB. CBC showed a Hgb of 11 from today. She has no bleeding,normal stool and urine.\par \par 12/17/18\par Patient is here for a follow-up visit.  She is feeling well with no complaints.  Most recent CBC is stable, Hgb up to 11.4.  Patient denies fever, chills, nausea, vomiting.  She has received her flu vaccine this season.  \par \par \par 4/9/19\par She is here for follow up. She is doing well. 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 Duran.

## 2019-04-09 LAB
ALBUMIN SERPL ELPH-MCNC: 4.3 G/DL
ALP BLD-CCNC: 59 U/L
ALT SERPL-CCNC: 16 U/L
ANION GAP SERPL CALC-SCNC: 14 MMOL/L
AST SERPL-CCNC: 24 U/L
BILIRUB SERPL-MCNC: 0.4 MG/DL
BUN SERPL-MCNC: 25 MG/DL
CALCIUM SERPL-MCNC: 9.6 MG/DL
CHLORIDE SERPL-SCNC: 101 MMOL/L
CO2 SERPL-SCNC: 28 MMOL/L
CREAT SERPL-MCNC: 1.3 MG/DL
FERRITIN SERPL-MCNC: 169 NG/ML
GLUCOSE SERPL-MCNC: 147 MG/DL
IRON SATN MFR SERPL: 18 %
IRON SERPL-MCNC: 61 UG/DL
LDH SERPL-CCNC: 198
POTASSIUM SERPL-SCNC: 3.8 MMOL/L
PROT SERPL-MCNC: 6.4 G/DL
SODIUM SERPL-SCNC: 143 MMOL/L
TIBC SERPL-MCNC: 346 UG/DL
UIBC SERPL-MCNC: 285 UG/DL

## 2019-04-18 ENCOUNTER — OUTPATIENT (OUTPATIENT)
Dept: OUTPATIENT SERVICES | Facility: HOSPITAL | Age: 79
LOS: 1 days | Discharge: HOME | End: 2019-04-18
Payer: MEDICARE

## 2019-04-18 DIAGNOSIS — R07.9 CHEST PAIN, UNSPECIFIED: ICD-10-CM

## 2019-04-18 DIAGNOSIS — Z90.49 ACQUIRED ABSENCE OF OTHER SPECIFIED PARTS OF DIGESTIVE TRACT: Chronic | ICD-10-CM

## 2019-04-18 DIAGNOSIS — Z98.890 OTHER SPECIFIED POSTPROCEDURAL STATES: Chronic | ICD-10-CM

## 2019-04-18 LAB — GLUCOSE BLDC GLUCOMTR-MCNC: 178 MG/DL — HIGH (ref 70–99)

## 2019-04-18 PROCEDURE — 78815 PET IMAGE W/CT SKULL-THIGH: CPT | Mod: 26,PS

## 2019-05-06 ENCOUNTER — OUTPATIENT (OUTPATIENT)
Dept: OUTPATIENT SERVICES | Facility: HOSPITAL | Age: 79
LOS: 1 days | Discharge: HOME | End: 2019-05-06
Payer: MEDICARE

## 2019-05-06 VITALS
TEMPERATURE: 98 F | SYSTOLIC BLOOD PRESSURE: 140 MMHG | RESPIRATION RATE: 16 BRPM | HEART RATE: 76 BPM | HEIGHT: 59 IN | OXYGEN SATURATION: 98 % | DIASTOLIC BLOOD PRESSURE: 68 MMHG | WEIGHT: 136.69 LBS

## 2019-05-06 DIAGNOSIS — Z01.818 ENCOUNTER FOR OTHER PREPROCEDURAL EXAMINATION: ICD-10-CM

## 2019-05-06 DIAGNOSIS — Z98.890 OTHER SPECIFIED POSTPROCEDURAL STATES: Chronic | ICD-10-CM

## 2019-05-06 DIAGNOSIS — R91.1 SOLITARY PULMONARY NODULE: ICD-10-CM

## 2019-05-06 DIAGNOSIS — Z90.49 ACQUIRED ABSENCE OF OTHER SPECIFIED PARTS OF DIGESTIVE TRACT: Chronic | ICD-10-CM

## 2019-05-06 LAB
ALBUMIN SERPL ELPH-MCNC: 4.4 G/DL — SIGNIFICANT CHANGE UP (ref 3.5–5.2)
ALP SERPL-CCNC: 62 U/L — SIGNIFICANT CHANGE UP (ref 30–115)
ALT FLD-CCNC: 17 U/L — SIGNIFICANT CHANGE UP (ref 0–41)
ANION GAP SERPL CALC-SCNC: 14 MMOL/L — SIGNIFICANT CHANGE UP (ref 7–14)
APTT BLD: 34.6 SEC — SIGNIFICANT CHANGE UP (ref 27–39.2)
AST SERPL-CCNC: 25 U/L — SIGNIFICANT CHANGE UP (ref 0–41)
BASOPHILS # BLD AUTO: 0.03 K/UL — SIGNIFICANT CHANGE UP (ref 0–0.2)
BASOPHILS NFR BLD AUTO: 0.5 % — SIGNIFICANT CHANGE UP (ref 0–1)
BILIRUB SERPL-MCNC: 0.4 MG/DL — SIGNIFICANT CHANGE UP (ref 0.2–1.2)
BUN SERPL-MCNC: 27 MG/DL — HIGH (ref 10–20)
CALCIUM SERPL-MCNC: 9.3 MG/DL — SIGNIFICANT CHANGE UP (ref 8.5–10.1)
CHLORIDE SERPL-SCNC: 104 MMOL/L — SIGNIFICANT CHANGE UP (ref 98–110)
CO2 SERPL-SCNC: 25 MMOL/L — SIGNIFICANT CHANGE UP (ref 17–32)
CREAT SERPL-MCNC: 1.2 MG/DL — SIGNIFICANT CHANGE UP (ref 0.7–1.5)
EOSINOPHIL # BLD AUTO: 0.15 K/UL — SIGNIFICANT CHANGE UP (ref 0–0.7)
EOSINOPHIL NFR BLD AUTO: 2.6 % — SIGNIFICANT CHANGE UP (ref 0–8)
ESTIMATED AVERAGE GLUCOSE: 131 MG/DL — HIGH (ref 68–114)
GLUCOSE SERPL-MCNC: 143 MG/DL — HIGH (ref 70–99)
HBA1C BLD-MCNC: 6.2 % — HIGH (ref 4–5.6)
HCT VFR BLD CALC: 35.3 % — LOW (ref 37–47)
HGB BLD-MCNC: 11.6 G/DL — LOW (ref 12–16)
IMM GRANULOCYTES NFR BLD AUTO: 0.3 % — SIGNIFICANT CHANGE UP (ref 0.1–0.3)
INR BLD: 1.04 RATIO — SIGNIFICANT CHANGE UP (ref 0.65–1.3)
LYMPHOCYTES # BLD AUTO: 1.89 K/UL — SIGNIFICANT CHANGE UP (ref 1.2–3.4)
LYMPHOCYTES # BLD AUTO: 32.1 % — SIGNIFICANT CHANGE UP (ref 20.5–51.1)
MCHC RBC-ENTMCNC: 27.2 PG — SIGNIFICANT CHANGE UP (ref 27–31)
MCHC RBC-ENTMCNC: 32.9 G/DL — SIGNIFICANT CHANGE UP (ref 32–37)
MCV RBC AUTO: 82.9 FL — SIGNIFICANT CHANGE UP (ref 81–99)
MONOCYTES # BLD AUTO: 0.51 K/UL — SIGNIFICANT CHANGE UP (ref 0.1–0.6)
MONOCYTES NFR BLD AUTO: 8.7 % — SIGNIFICANT CHANGE UP (ref 1.7–9.3)
NEUTROPHILS # BLD AUTO: 3.28 K/UL — SIGNIFICANT CHANGE UP (ref 1.4–6.5)
NEUTROPHILS NFR BLD AUTO: 55.8 % — SIGNIFICANT CHANGE UP (ref 42.2–75.2)
NRBC # BLD: 0 /100 WBCS — SIGNIFICANT CHANGE UP (ref 0–0)
PLATELET # BLD AUTO: 176 K/UL — SIGNIFICANT CHANGE UP (ref 130–400)
POTASSIUM SERPL-MCNC: 4.1 MMOL/L — SIGNIFICANT CHANGE UP (ref 3.5–5)
POTASSIUM SERPL-SCNC: 4.1 MMOL/L — SIGNIFICANT CHANGE UP (ref 3.5–5)
PROT SERPL-MCNC: 6.7 G/DL — SIGNIFICANT CHANGE UP (ref 6–8)
PROTHROM AB SERPL-ACNC: 12 SEC — SIGNIFICANT CHANGE UP (ref 9.95–12.87)
RBC # BLD: 4.26 M/UL — SIGNIFICANT CHANGE UP (ref 4.2–5.4)
RBC # FLD: 14 % — SIGNIFICANT CHANGE UP (ref 11.5–14.5)
SODIUM SERPL-SCNC: 143 MMOL/L — SIGNIFICANT CHANGE UP (ref 135–146)
WBC # BLD: 5.88 K/UL — SIGNIFICANT CHANGE UP (ref 4.8–10.8)
WBC # FLD AUTO: 5.88 K/UL — SIGNIFICANT CHANGE UP (ref 4.8–10.8)

## 2019-05-06 PROCEDURE — 93010 ELECTROCARDIOGRAM REPORT: CPT

## 2019-05-06 NOTE — H&P PST ADULT - NSICDXPASTMEDICALHX_GEN_ALL_CORE_FT
PAST MEDICAL HISTORY:  CKD (chronic kidney disease)     Coronary artery disease involving native heart without angina pectoris, unspecified vessel or lesion type     Diabetes Type !!    Heart failure     High cholesterol     HTN (hypertension)     MI (myocardial infarction)     Non Hodgkin's lymphoma     Osteoarthritis     PVD (peripheral vascular disease)     Thrombocytopenia

## 2019-05-06 NOTE — H&P PST ADULT - HISTORY OF PRESENT ILLNESS
78 Y/O FEMALE PT TO PAST WITH HX  PULM NODULE  PT NOW FOR SCHEDULED PROCEDURE. PT DENIES ANY CP SOB PALP COUGH DYSURIA FEVER URI. PT ABLE TO FOSTER 1-2 FOS W/O SOB

## 2019-05-06 NOTE — H&P PST ADULT - NSICDXPASTSURGICALHX_GEN_ALL_CORE_FT
PAST SURGICAL HISTORY:  H/O cardiac catheterization 5 STENTS    H/O carotid endarterectomy RIGHT    History of cholecystectomy

## 2019-05-13 ENCOUNTER — RESULT REVIEW (OUTPATIENT)
Age: 79
End: 2019-05-13

## 2019-05-13 ENCOUNTER — OUTPATIENT (OUTPATIENT)
Dept: OUTPATIENT SERVICES | Facility: HOSPITAL | Age: 79
LOS: 1 days | Discharge: HOME | End: 2019-05-13
Payer: MEDICARE

## 2019-05-13 DIAGNOSIS — Z90.49 ACQUIRED ABSENCE OF OTHER SPECIFIED PARTS OF DIGESTIVE TRACT: Chronic | ICD-10-CM

## 2019-05-13 DIAGNOSIS — Z98.890 OTHER SPECIFIED POSTPROCEDURAL STATES: Chronic | ICD-10-CM

## 2019-05-13 PROCEDURE — 88305 TISSUE EXAM BY PATHOLOGIST: CPT | Mod: 26,59

## 2019-05-13 PROCEDURE — 88161 CYTOPATH SMEAR OTHER SOURCE: CPT | Mod: 26,59

## 2019-05-13 PROCEDURE — 88177 CYTP FNA EVAL EA ADDL: CPT | Mod: 26

## 2019-05-13 PROCEDURE — 88342 IMHCHEM/IMCYTCHM 1ST ANTB: CPT | Mod: 26,59

## 2019-05-13 PROCEDURE — 88344 IMHCHEM/IMCYTCHM EA MLT ANTB: CPT | Mod: 26

## 2019-05-13 PROCEDURE — 77012 CT SCAN FOR NEEDLE BIOPSY: CPT | Mod: 26

## 2019-05-13 PROCEDURE — 71045 X-RAY EXAM CHEST 1 VIEW: CPT | Mod: 26

## 2019-05-13 PROCEDURE — 32405: CPT | Mod: LT

## 2019-05-13 PROCEDURE — 88172 CYTP DX EVAL FNA 1ST EA SITE: CPT | Mod: 26

## 2019-05-13 PROCEDURE — 88173 CYTOPATH EVAL FNA REPORT: CPT | Mod: 26

## 2019-05-13 PROCEDURE — 99152 MOD SED SAME PHYS/QHP 5/>YRS: CPT

## 2019-05-13 NOTE — PROGRESS NOTE ADULT - SUBJECTIVE AND OBJECTIVE BOX
Interventional Radiology Outpatient Documentation    PREOPERATIVE DAY OF PROCEDURE EVALUATION:     I have personally seen and examined this patient. I agree with the history and physical which I have reviewed and noted any changes below:     Plan is for     ct guided left lung biopsy with conscious sedation    Procedure/ risks including but not limited to bleeding, infection, pneumothorax with possible chest tube placement/ benefits/ goals/ alternatives were explained. All questions answered. Informed content obtained from patient. Consent placed in chart.

## 2019-05-13 NOTE — PROGRESS NOTE ADULT - SUBJECTIVE AND OBJECTIVE BOX
INTERVENTIONAL RADIOLOGY BRIEF-OPERATIVE NOTE    Procedure: Image guided left lung lesion biopsy with conscious sedation. A 20G achieve needle was used and 5 core biopsies were taken.     Pre-Op Diagnosis: Left lung lesion. Hx of non Hodgkin lymphoma and hx of heavy smoking.     Post-Op Diagnosis: Same.     Attending: Catrachito  Resident: Geetha    Anesthesia (type):  [ ] General Anesthesia  [x ] Sedation  [ ] Spinal Anesthesia  [ x] Local/Regional    Contrast: None    Estimated Blood Loss: Minimal, < 5 cc    Condition:   [ ] Critical  [ ] Serious  [ ] Fair   [ c] Good    Findings/Follow up Plan of Care:    5 core biopsies were taken and sent for pathology/analysis using 20g achieve needle.   Send back to the floor, monitor and obtain chest xray at 11:30am. Plan for discharge later today.     Specimens Removed: 5 core biopsies were taken and sent for pathology/analysis using 20g achieve needle.     Implants: None.     Complications:    Trace pneumothorax. Mild left lung perilesional hematoma.       Disposition:    Send back to the floor and monitor as above. Plan for discharge later today.       Please call Interventional Radiology [phone] with any questions, concerns, or issues.

## 2019-05-15 LAB — NON-GYNECOLOGICAL CYTOLOGY STUDY: SIGNIFICANT CHANGE UP

## 2019-05-17 DIAGNOSIS — Z91.040 LATEX ALLERGY STATUS: ICD-10-CM

## 2019-05-17 DIAGNOSIS — I12.9 HYPERTENSIVE CHRONIC KIDNEY DISEASE WITH STAGE 1 THROUGH STAGE 4 CHRONIC KIDNEY DISEASE, OR UNSPECIFIED CHRONIC KIDNEY DISEASE: ICD-10-CM

## 2019-05-17 DIAGNOSIS — Z88.5 ALLERGY STATUS TO NARCOTIC AGENT: ICD-10-CM

## 2019-05-17 DIAGNOSIS — C34.32 MALIGNANT NEOPLASM OF LOWER LOBE, LEFT BRONCHUS OR LUNG: ICD-10-CM

## 2019-05-17 DIAGNOSIS — Z85.72 PERSONAL HISTORY OF NON-HODGKIN LYMPHOMAS: ICD-10-CM

## 2019-05-17 DIAGNOSIS — Z87.891 PERSONAL HISTORY OF NICOTINE DEPENDENCE: ICD-10-CM

## 2019-05-17 DIAGNOSIS — Z88.0 ALLERGY STATUS TO PENICILLIN: ICD-10-CM

## 2019-05-17 DIAGNOSIS — J44.9 CHRONIC OBSTRUCTIVE PULMONARY DISEASE, UNSPECIFIED: ICD-10-CM

## 2019-05-17 DIAGNOSIS — N18.9 CHRONIC KIDNEY DISEASE, UNSPECIFIED: ICD-10-CM

## 2019-05-17 DIAGNOSIS — E78.00 PURE HYPERCHOLESTEROLEMIA, UNSPECIFIED: ICD-10-CM

## 2019-05-17 DIAGNOSIS — E11.9 TYPE 2 DIABETES MELLITUS WITHOUT COMPLICATIONS: ICD-10-CM

## 2019-05-17 DIAGNOSIS — R91.8 OTHER NONSPECIFIC ABNORMAL FINDING OF LUNG FIELD: ICD-10-CM

## 2019-05-20 ENCOUNTER — OUTPATIENT (OUTPATIENT)
Dept: OUTPATIENT SERVICES | Facility: HOSPITAL | Age: 79
LOS: 1 days | Discharge: HOME | End: 2019-05-20

## 2019-05-20 ENCOUNTER — APPOINTMENT (OUTPATIENT)
Dept: HEMATOLOGY ONCOLOGY | Facility: CLINIC | Age: 79
End: 2019-05-20

## 2019-05-20 VITALS
TEMPERATURE: 98.7 F | WEIGHT: 144 LBS | DIASTOLIC BLOOD PRESSURE: 68 MMHG | HEIGHT: 59 IN | RESPIRATION RATE: 14 BRPM | BODY MASS INDEX: 29.03 KG/M2 | SYSTOLIC BLOOD PRESSURE: 125 MMHG

## 2019-05-20 DIAGNOSIS — Z90.49 ACQUIRED ABSENCE OF OTHER SPECIFIED PARTS OF DIGESTIVE TRACT: Chronic | ICD-10-CM

## 2019-05-20 DIAGNOSIS — Z98.890 OTHER SPECIFIED POSTPROCEDURAL STATES: Chronic | ICD-10-CM

## 2019-05-20 DIAGNOSIS — C85.90 NON-HODGKIN LYMPHOMA, UNSPECIFIED, UNSPECIFIED SITE: ICD-10-CM

## 2019-05-20 DIAGNOSIS — D59.1 OTHER AUTOIMMUNE HEMOLYTIC ANEMIAS: ICD-10-CM

## 2019-05-23 NOTE — CONSULT LETTER
[Dear  ___] : Dear  [unfilled], [Consult Letter:] : I had the pleasure of evaluating your patient, [unfilled]. [Please see my note below.] : Please see my note below. [Consult Closing:] : Thank you very much for allowing me to participate in the care of this patient.  If you have any questions, please do not hesitate to contact me. [Sincerely,] : Sincerely, [FreeTextEntry3] : Rylan [DrDede  ___] : Dr. PADILLA [DrDede ___] : Dr. PADILLA

## 2019-05-23 NOTE — ASSESSMENT
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  Elly has completed a course of steroids as well as rituximab. Rituxan  on 04/08/2016 and she  finished steroids approximately in early 06/2016.  She is in remission  \par \par 2.History of arteriovenous malformations on endoscopy. \par - She currently denies any bleeding.  \par \par 3. Diabetes.  \par -She is on insulin.\par \par 4. Hypogammaglobulinemia.  \par -Her IgG that was previously checked  was decreased. She has been asymptomatic this was due to Rituxan. Repeat is showing near normal levels in past . \par \par 5.Previous history of hyperferritinemia, this was probably reactive.\par \par 6 She has a history of steroid use.  She is currently on calcium and vitamin D.   Her  DEXA scan in 8/2016 was normal.  DEXA in 3/20/19 showed osteopenia in femoral neck with FRAX score  risk of fractuer of 2.9% in 10 years\par \par 7. MIld now  Anemia, likely due to CKD\par \par 8. [id number] stage IA2 SCC of lung\par - I spoke with Dr. Feldman, she is not a candidate for surgery due to her pulmonary status. We will proceed with SRS and I reviewed  the case with Dr. Winston who will bring her in for radiation\par \par 9. right upper lobe 1.3 x 0.6 cm  groundglass nodule (SUV 2.1)\par -inflammatory vs synchronous primary\par -we will obtain a CT chest in 3 months if growing will offer SRS  since we would like to avoid another biopsy\par -discussed with Dr. Feldman and Dr. Frederick \par \par Plan: \par - SRS to left SCC\par -CT chest in 3 month\par -RTC to see me in 2-3 months\par \par

## 2019-05-23 NOTE — REVIEW OF SYSTEMS
[Fatigue] : fatigue [Recent Change In Weight] : ~T no recent weight change [Shortness Of Breath] : no shortness of breath [Wheezing] : no wheezing [Cough] : cough [SOB on Exertion] : no shortness of breath during exertion [Anxiety] : anxiety [Negative] : Heme/Lymph [de-identified] : diabetic neuropathy

## 2019-05-23 NOTE — HISTORY OF PRESENT ILLNESS
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  She finished it approximately in the beginning of 06/2016.  She also received 4  doses of rituximab therapy.\par \par HISTORY OF PRESENT ILLNESS:  Ms. Blackmon is a very pleasant 85yearold female with multiple medical problems.  She initially was being treated for secondary autoimmune hemolytic anemia in the setting of indolent Bcell nonHodgkin lymphoma, NOS.  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  Her initial PET scan only demonstrated mild splenomegaly.  She required steroids as well, but then when the tapering was attempted, her anemia worsened, and at that point in time, rituximab was initiated.\par  [de-identified] : January 17, 2017\yonathan Sanabria presents for followup today she hasn't seen you since July. She denies having recurrent illnesses. She denies having  fatigue she experienced before. She does have occasional headaches. Blood pressure today is elevated. Otherwise she has no complaints. \par \par 4/24/17\par She is doing well except  for trouble with sugars and BP. She is seeing appropriate specialists. No other complaints CBC from today is WNL.\par \par 7/26/17\par She is doing well. She has a cough with sputum production for now several months. CXR in July was clear. HAd a course of antibiotics that did not work. It may be her COPD. No bleedig, no B symptoms. Usual fatigue. \par \par 10/27/17\par She is doing well except for her diabetic neuropathy in her feet. States her A1C is 6. No bleeding. \par \par 1/25/18\par She is her for follow up for her NHL. She is doing well. No bleeding. No weight loss.  CBC from today is stable.\par \par \par 5/24/18\par She is doing well. She has fatigue. CBC was fine from today s visit.\par \par 9/17/18\par She is here for follow up. She may have a cold, fever 99, cough with green phlegm, not SOB. CBC showed a Hgb of 11 from today. She has no bleeding,normal stool and urine.\par \par 12/17/18\par Patient is here for a follow-up visit.  She is feeling well with no complaints.  Most recent CBC is stable, Hgb up to 11.4.  Patient denies fever, chills, nausea, vomiting.  She has received her flu vaccine this season.  \par \par \par 4/9/19\par She is here for follow up. She is doing well. 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 Duran.\par \par 5/20/19\par She is here for follow up. She had a PET/CT 4/18/19: Compared to 2/24/2016,focal FDG uptake (SUV 4.1; intensely avid on \par nonattenuation corrected images) coregistering with 1.8 x 1.6 cm left  lower lobe pulmonary nodule suspicious for biologic tumor activity\par In addition another new FDG avid right upper lobe 1.3 x 0.6 cm  groundglass nodule (SUV 2.1-FDG avid on attenuation corrected images) \par suspicious for biologic tumor activity. A 1 cm pretracheal lymph node is not FDG avid\par \par No other sites of abnormal FDG uptake\par \par She underwent a CT FNA biopsy of Left  lobe nodule that showed 5/13/19:   POSITIVE FOR MALIGNANT CELLS.\par Non-small cell carcinoma, favor squamous cell carcinoma.\par Immunohistochemistry studies were performed at [facility] on block 1-C\par and the results support the diagnosis (positive P-40; negative-\par TTF1/Napsin).\par \par \par She feels well otherwise

## 2019-05-28 DIAGNOSIS — C34.92 MALIGNANT NEOPLASM OF UNSPECIFIED PART OF LEFT BRONCHUS OR LUNG: ICD-10-CM

## 2019-06-07 PROCEDURE — 77334 RADIATION TREATMENT AID(S): CPT | Mod: 26

## 2019-06-19 PROCEDURE — 77300 RADIATION THERAPY DOSE PLAN: CPT | Mod: 26

## 2019-06-19 PROCEDURE — 77295 3-D RADIOTHERAPY PLAN: CPT | Mod: 26

## 2019-06-19 PROCEDURE — 77435 SBRT MANAGEMENT: CPT

## 2019-06-19 PROCEDURE — 77334 RADIATION TREATMENT AID(S): CPT | Mod: 26

## 2019-07-12 ENCOUNTER — OUTPATIENT (OUTPATIENT)
Dept: OUTPATIENT SERVICES | Facility: HOSPITAL | Age: 79
LOS: 1 days | Discharge: HOME | End: 2019-07-12
Payer: MEDICARE

## 2019-07-12 DIAGNOSIS — Z90.49 ACQUIRED ABSENCE OF OTHER SPECIFIED PARTS OF DIGESTIVE TRACT: Chronic | ICD-10-CM

## 2019-07-12 DIAGNOSIS — C34.32 MALIGNANT NEOPLASM OF LOWER LOBE, LEFT BRONCHUS OR LUNG: ICD-10-CM

## 2019-07-12 DIAGNOSIS — Z98.890 OTHER SPECIFIED POSTPROCEDURAL STATES: Chronic | ICD-10-CM

## 2019-07-22 ENCOUNTER — APPOINTMENT (OUTPATIENT)
Dept: HEMATOLOGY ONCOLOGY | Facility: CLINIC | Age: 79
End: 2019-07-22
Payer: MEDICARE

## 2019-07-22 ENCOUNTER — LABORATORY RESULT (OUTPATIENT)
Age: 79
End: 2019-07-22

## 2019-07-22 VITALS
TEMPERATURE: 97.7 F | HEIGHT: 59 IN | DIASTOLIC BLOOD PRESSURE: 74 MMHG | SYSTOLIC BLOOD PRESSURE: 171 MMHG | RESPIRATION RATE: 14 BRPM | WEIGHT: 144 LBS | HEART RATE: 80 BPM | BODY MASS INDEX: 29.03 KG/M2

## 2019-07-22 LAB
ALBUMIN SERPL ELPH-MCNC: 4.3 G/DL
ALP BLD-CCNC: 70 U/L
ALT SERPL-CCNC: 18 U/L
ANION GAP SERPL CALC-SCNC: 13 MMOL/L
AST SERPL-CCNC: 26 U/L
BILIRUB SERPL-MCNC: 0.5 MG/DL
BUN SERPL-MCNC: 41 MG/DL
CALCIUM SERPL-MCNC: 9.8 MG/DL
CHLORIDE SERPL-SCNC: 99 MMOL/L
CO2 SERPL-SCNC: 29 MMOL/L
CREAT SERPL-MCNC: 1.5 MG/DL
GLUCOSE SERPL-MCNC: 130 MG/DL
HCT VFR BLD CALC: 36.6 %
HGB BLD-MCNC: 12 G/DL
LDH SERPL-CCNC: 245
MCHC RBC-ENTMCNC: 27.8 PG
MCHC RBC-ENTMCNC: 32.8 G/DL
MCV RBC AUTO: 84.7 FL
PLATELET # BLD AUTO: 174 K/UL
PMV BLD: 9 FL
POTASSIUM SERPL-SCNC: 3.9 MMOL/L
PROT SERPL-MCNC: 7.3 G/DL
RBC # BLD: 4.32 M/UL
RBC # FLD: 14.5 %
SODIUM SERPL-SCNC: 141 MMOL/L
WBC # FLD AUTO: 5.09 K/UL

## 2019-07-22 PROCEDURE — 99214 OFFICE O/P EST MOD 30 MIN: CPT

## 2019-07-22 NOTE — CONSULT LETTER
[Dear  ___] : Dear  [unfilled], [Please see my note below.] : Please see my note below. [Consult Letter:] : I had the pleasure of evaluating your patient, [unfilled]. [Consult Closing:] : Thank you very much for allowing me to participate in the care of this patient.  If you have any questions, please do not hesitate to contact me. [FreeTextEntry3] : Rylan [Sincerely,] : Sincerely, [DrDede  ___] : Dr. PADILLA [DrDede ___] : Dr. PADILLA

## 2019-07-22 NOTE — ASSESSMENT
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  Elly has completed a course of steroids as well as rituximab. Rituxan  on 04/08/2016 and she  finished steroids approximately in early 06/2016.  She is in remission  \par \par 2.History of arteriovenous malformations on endoscopy. \par - She currently denies any bleeding.  \par \par 3. Diabetes.  \par -She is on insulin.\par \par 4. Hypogammaglobulinemia.  \par -Her IgG that was previously checked  was decreased. She has been asymptomatic this was due to Rituxan. Repeat is showing near normal levels in past . \par \par 5.Previous history of hyperferritinemia, this was probably reactive.\par \par 6 She has a history of steroid use.  She is currently on calcium and vitamin D.   Her  DEXA scan in 8/2016 was normal.  DEXA in 3/20/19 showed osteopenia in femoral neck with FRAX score  risk of fractuer of 2.9% in 10 years\par \par 7. MIld now  Anemia, likely due to CKD\par \par 8. [id number] stage IA2 SCC of lung left Lower Lobe\par -s/p Radiation 5/5 Fraction competed 7/12/19 \par \par 9. right upper lobe 1.3 x 0.6 cm  ground-glass nodule (SUV 2.1)\par -inflammatory vs synchronous primary\par -we will obtain a CT chest in 3 months\par -discussed with Dr. Feldman and Dr. Frederick \par \par Plan: \par -CT chest in 3 month  October 2019\par -RTC to see me in 3 months\par -CBC, CMP LDH\par

## 2019-07-22 NOTE — HISTORY OF PRESENT ILLNESS
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  She finished it approximately in the beginning of 06/2016.  She also received 4  doses of rituximab therapy.\par \par HISTORY OF PRESENT ILLNESS:  Ms. Blackmno is a very pleasant 85yearold female with multiple medical problems.  She initially was being treated for secondary autoimmune hemolytic anemia in the setting of indolent Bcell nonHodgkin lymphoma, NOS.  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  Her initial PET scan only demonstrated mild splenomegaly.  She required steroids as well, but then when the tapering was attempted, her anemia worsened, and at that point in time, rituximab was initiated.\par  [de-identified] : January 17, 2017\yonathan Sanabria presents for followup today she hasn't seen you since July. She denies having recurrent illnesses. She denies having  fatigue she experienced before. She does have occasional headaches. Blood pressure today is elevated. Otherwise she has no complaints. \par \par 4/24/17\par She is doing well except  for trouble with sugars and BP. She is seeing appropriate specialists. No other complaints CBC from today is WNL.\par \par 7/26/17\par She is doing well. She has a cough with sputum production for now several months. CXR in July was clear. HAd a course of antibiotics that did not work. It may be her COPD. No bleedig, no B symptoms. Usual fatigue. \par \par 10/27/17\par She is doing well except for her diabetic neuropathy in her feet. States her A1C is 6. No bleeding. \par \par 1/25/18\par She is her for follow up for her NHL. She is doing well. No bleeding. No weight loss.  CBC from today is stable.\par \par \par 5/24/18\par She is doing well. She has fatigue. CBC was fine from today s visit.\par \par 9/17/18\par She is here for follow up. She may have a cold, fever 99, cough with green phlegm, not SOB. CBC showed a Hgb of 11 from today. She has no bleeding,normal stool and urine.\par \par 12/17/18\par Patient is here for a follow-up visit.  She is feeling well with no complaints.  Most recent CBC is stable, Hgb up to 11.4.  Patient denies fever, chills, nausea, vomiting.  She has received her flu vaccine this season.  \par \par \par 4/9/19\par She is here for follow up. She is doing well. 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 Duran.\par \par 5/20/19\par She is here for follow up. She had a PET/CT 4/18/19: Compared to 2/24/2016,focal FDG uptake (SUV 4.1; intensely avid on \par nonattenuation corrected images) coregistering with 1.8 x 1.6 cm left  lower lobe pulmonary nodule suspicious for biologic tumor activity\par In addition another new FDG avid right upper lobe 1.3 x 0.6 cm  groundglass nodule (SUV 2.1-FDG avid on attenuation corrected images) \par suspicious for biologic tumor activity. A 1 cm pretracheal lymph node is not FDG avid\par \par No other sites of abnormal FDG uptake\par \par She underwent a CT FNA biopsy of Left  lobe nodule that showed 5/13/19:   POSITIVE FOR MALIGNANT CELLS.\par Non-small cell carcinoma, favor squamous cell carcinoma.\par Immunohistochemistry studies were performed at [facility] on block 1-C\par and the results support the diagnosis (positive P-40; negative-\par TTF1/Napsin).\par \par \par She feels well otherwise\par \par 7/22/19\par She is here for follow up. She states she completed 5 fractions of radiation on July 12. She feels well except some fatigue.

## 2019-07-22 NOTE — REVIEW OF SYSTEMS
[Fatigue] : fatigue [Recent Change In Weight] : ~T no recent weight change [Shortness Of Breath] : no shortness of breath [Wheezing] : no wheezing [Cough] : cough [SOB on Exertion] : no shortness of breath during exertion [Anxiety] : anxiety [Negative] : Heme/Lymph [de-identified] : diabetic neuropathy

## 2019-07-26 PROCEDURE — 99024 POSTOP FOLLOW-UP VISIT: CPT

## 2019-08-13 ENCOUNTER — APPOINTMENT (OUTPATIENT)
Dept: VASCULAR SURGERY | Facility: CLINIC | Age: 79
End: 2019-08-13
Payer: MEDICARE

## 2019-08-13 VITALS
SYSTOLIC BLOOD PRESSURE: 158 MMHG | HEIGHT: 59 IN | WEIGHT: 144 LBS | BODY MASS INDEX: 29.03 KG/M2 | DIASTOLIC BLOOD PRESSURE: 78 MMHG

## 2019-08-13 DIAGNOSIS — I65.23 OCCLUSION AND STENOSIS OF BILATERAL CAROTID ARTERIES: ICD-10-CM

## 2019-08-13 DIAGNOSIS — I70.8 ATHEROSCLEROSIS OF AORTA: ICD-10-CM

## 2019-08-13 DIAGNOSIS — I70.0 ATHEROSCLEROSIS OF AORTA: ICD-10-CM

## 2019-08-13 PROCEDURE — 99213 OFFICE O/P EST LOW 20 MIN: CPT

## 2019-08-13 PROCEDURE — 93880 EXTRACRANIAL BILAT STUDY: CPT

## 2019-08-13 NOTE — DATA REVIEWED
[FreeTextEntry1] : I performed a carotid duplex which was medically necessary to evaluate for patency of the endarterectomy site. It showed patent right CEA with some flow disturbance, and left ICA with 50-69 % stenosis.

## 2019-08-13 NOTE — ASSESSMENT
[FreeTextEntry1] : Ms. Blackmon is a 79 year-old female with a history of peripheral vascular disease. She had a prior revascularization in 1991 for an ischemic foot with right common femoral artery endarterectomy and right iliac stent. She has a history of a right carotid endarterectomy in 1997 in which she was asymptomatic from. \par Today she presents for a routine followup. She complains of lower extremity cramping at night, ambulation limited due to dyspnea. I performed a carotid duplex which was medically necessary to evaluate for patency of the endarterectomy site. It showed patent right CEA with some flow disturbance, and left ICA with 50-69 % stenosis.\par No vascular surgery intervention at this time. I have advised her to see me sooner if the lower extremity symptoms worsen.

## 2019-08-13 NOTE — HISTORY OF PRESENT ILLNESS
[FreeTextEntry1] : Ms. Blackmon is a 79 year-old female with a history of peripheral vascular disease. She had a prior revascularization in 1991 for an ischemic foot with right common femoral artery endarterectomy and right iliac stent. She has a history of a right carotid endarterectomy in 1997 in which she was asymptomatic from. \par Today she presents for a routine followup. She complains of lower extremity cramping at night, ambulation limited due to dyspnea.

## 2019-08-26 ENCOUNTER — RECORD ABSTRACTING (OUTPATIENT)
Age: 79
End: 2019-08-26

## 2019-08-26 DIAGNOSIS — Q27.30 ARTERIOVENOUS MALFORMATION, SITE UNSPECIFIED: ICD-10-CM

## 2019-08-26 RX ORDER — CHOLINE 650 MG
TABLET ORAL
Refills: 0 | Status: ACTIVE | COMMUNITY

## 2019-08-26 RX ORDER — RANITIDINE HYDROCHLORIDE 300 MG/1
CAPSULE ORAL
Refills: 0 | Status: ACTIVE | COMMUNITY

## 2019-08-26 RX ORDER — DONEPEZIL HYDROCHLORIDE 23 MG/1
TABLET, FILM COATED ORAL
Refills: 0 | Status: ACTIVE | COMMUNITY

## 2019-08-26 RX ORDER — INSULIN GLARGINE AND LIXISENATIDE 100; 33 U/ML; UG/ML
INJECTION, SOLUTION SUBCUTANEOUS
Refills: 0 | Status: ACTIVE | COMMUNITY

## 2019-09-29 ENCOUNTER — FORM ENCOUNTER (OUTPATIENT)
Age: 79
End: 2019-09-29

## 2019-09-30 ENCOUNTER — OUTPATIENT (OUTPATIENT)
Dept: OUTPATIENT SERVICES | Facility: HOSPITAL | Age: 79
LOS: 1 days | Discharge: HOME | End: 2019-09-30
Payer: MEDICARE

## 2019-09-30 DIAGNOSIS — Z90.49 ACQUIRED ABSENCE OF OTHER SPECIFIED PARTS OF DIGESTIVE TRACT: Chronic | ICD-10-CM

## 2019-09-30 DIAGNOSIS — Z98.890 OTHER SPECIFIED POSTPROCEDURAL STATES: Chronic | ICD-10-CM

## 2019-09-30 DIAGNOSIS — C34.90 MALIGNANT NEOPLASM OF UNSPECIFIED PART OF UNSPECIFIED BRONCHUS OR LUNG: ICD-10-CM

## 2019-09-30 PROCEDURE — 71250 CT THORAX DX C-: CPT | Mod: 26

## 2019-10-02 ENCOUNTER — LABORATORY RESULT (OUTPATIENT)
Age: 79
End: 2019-10-02

## 2019-10-02 ENCOUNTER — OUTPATIENT (OUTPATIENT)
Dept: OUTPATIENT SERVICES | Facility: HOSPITAL | Age: 79
LOS: 1 days | Discharge: HOME | End: 2019-10-02

## 2019-10-02 ENCOUNTER — APPOINTMENT (OUTPATIENT)
Dept: HEMATOLOGY ONCOLOGY | Facility: CLINIC | Age: 79
End: 2019-10-02
Payer: MEDICARE

## 2019-10-02 VITALS
BODY MASS INDEX: 29.84 KG/M2 | HEIGHT: 59 IN | TEMPERATURE: 96.9 F | WEIGHT: 148 LBS | RESPIRATION RATE: 14 BRPM | SYSTOLIC BLOOD PRESSURE: 159 MMHG | DIASTOLIC BLOOD PRESSURE: 64 MMHG | HEART RATE: 82 BPM

## 2019-10-02 DIAGNOSIS — Z90.49 ACQUIRED ABSENCE OF OTHER SPECIFIED PARTS OF DIGESTIVE TRACT: Chronic | ICD-10-CM

## 2019-10-02 DIAGNOSIS — C34.90 MALIGNANT NEOPLASM OF UNSPECIFIED PART OF UNSPECIFIED BRONCHUS OR LUNG: ICD-10-CM

## 2019-10-02 DIAGNOSIS — Z86.19 PERSONAL HISTORY OF OTHER INFECTIOUS AND PARASITIC DISEASES: ICD-10-CM

## 2019-10-02 DIAGNOSIS — Z98.890 OTHER SPECIFIED POSTPROCEDURAL STATES: Chronic | ICD-10-CM

## 2019-10-02 DIAGNOSIS — C85.90 NON-HODGKIN LYMPHOMA, UNSPECIFIED, UNSPECIFIED SITE: ICD-10-CM

## 2019-10-02 PROCEDURE — 99213 OFFICE O/P EST LOW 20 MIN: CPT

## 2019-10-02 NOTE — CONSULT LETTER
[Dear  ___] : Dear  [unfilled], [Consult Letter:] : I had the pleasure of evaluating your patient, [unfilled]. [Please see my note below.] : Please see my note below. [Consult Closing:] : Thank you very much for allowing me to participate in the care of this patient.  If you have any questions, please do not hesitate to contact me. [Sincerely,] : Sincerely, [DrDede  ___] : Dr. PADILLA [DrDede ___] : Dr. PADILLA [FreeTextEntry3] : Rylan

## 2019-10-02 NOTE — HISTORY OF PRESENT ILLNESS
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  She finished it approximately in the beginning of 06/2016.  She also received 4  doses of rituximab therapy.\par \par HISTORY OF PRESENT ILLNESS:  Ms. Blackmon is a very pleasant 85yearold female with multiple medical problems.  She initially was being treated for secondary autoimmune hemolytic anemia in the setting of indolent Bcell nonHodgkin lymphoma, NOS.  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  Her initial PET scan only demonstrated mild splenomegaly.  She required steroids as well, but then when the tapering was attempted, her anemia worsened, and at that point in time, rituximab was initiated.\par  [de-identified] : January 17, 2017\yonathan Sanabria presents for followup today she hasn't seen you since July. She denies having recurrent illnesses. She denies having  fatigue she experienced before. She does have occasional headaches. Blood pressure today is elevated. Otherwise she has no complaints. \par \par 4/24/17\par She is doing well except  for trouble with sugars and BP. She is seeing appropriate specialists. No other complaints CBC from today is WNL.\par \par 7/26/17\par She is doing well. She has a cough with sputum production for now several months. CXR in July was clear. HAd a course of antibiotics that did not work. It may be her COPD. No bleedig, no B symptoms. Usual fatigue. \par \par 10/27/17\par She is doing well except for her diabetic neuropathy in her feet. States her A1C is 6. No bleeding. \par \par 1/25/18\par She is her for follow up for her NHL. She is doing well. No bleeding. No weight loss.  CBC from today is stable.\par \par \par 5/24/18\par She is doing well. She has fatigue. CBC was fine from today s visit.\par \par 9/17/18\par She is here for follow up. She may have a cold, fever 99, cough with green phlegm, not SOB. CBC showed a Hgb of 11 from today. She has no bleeding,normal stool and urine.\par \par 12/17/18\par Patient is here for a follow-up visit.  She is feeling well with no complaints.  Most recent CBC is stable, Hgb up to 11.4.  Patient denies fever, chills, nausea, vomiting.  She has received her flu vaccine this season.  \par \par \par 4/9/19\par She is here for follow up. She is doing well. 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 Duran.\par \par 5/20/19\par She is here for follow up. She had a PET/CT 4/18/19: Compared to 2/24/2016,focal FDG uptake (SUV 4.1; intensely avid on \par nonattenuation corrected images) coregistering with 1.8 x 1.6 cm left  lower lobe pulmonary nodule suspicious for biologic tumor activity\par In addition another new FDG avid right upper lobe 1.3 x 0.6 cm  groundglass nodule (SUV 2.1-FDG avid on attenuation corrected images) \par suspicious for biologic tumor activity. A 1 cm pretracheal lymph node is not FDG avid\par \par No other sites of abnormal FDG uptake\par \par She underwent a CT FNA biopsy of Left  lobe nodule that showed 5/13/19:   POSITIVE FOR MALIGNANT CELLS.\par Non-small cell carcinoma, favor squamous cell carcinoma.\par Immunohistochemistry studies were performed at [facility] on block 1-C\par and the results support the diagnosis (positive P-40; negative-\par TTF1/Napsin).\par \par \par She feels well otherwise\par \par 7/22/19\par She is here for follow up. She states she completed 5 fractions of radiation on July 12. She feels well except some fatigue. \par \par 10/2/19\par Patient is here for a follow-up visit with spouse.  She is feeling well with no new complaints.  Discussed findings from CT imaging reflecting slight improvement in LLL nodule post radiation and RUL nodule was stable.  Encouraged flu and pna vaccination with PCP.  \par CT Chest (9.30.19) IMPRESSION:  Decrease in size size of left lower lobe spiculated nodule measuring 1.5 x 0.7 cm previously measured 1.8 x 1.6 cm. Stable right upper lobe 1.3 x 0.6 cm groundglass nodule.

## 2019-10-02 NOTE — REVIEW OF SYSTEMS
[Fatigue] : fatigue [Negative] : Heme/Lymph [Recent Change In Weight] : ~T no recent weight change [Shortness Of Breath] : no shortness of breath [Wheezing] : no wheezing [SOB on Exertion] : shortness of breath during exertion [Cough] : no cough [Depression] : no depression [Anxiety] : no anxiety [FreeTextEntry6] : SOB is stable and chronic, on exertion only  [de-identified] : diabetic neuropathy

## 2019-10-02 NOTE — ASSESSMENT
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  Elly has completed a course of steroids as well as rituximab. Rituxan  on 04/08/2016 and she  finished steroids approximately in early 06/2016.  She is in remission  \par \par 2.History of arteriovenous malformations on endoscopy. \par - She currently denies any bleeding.  \par \par 3. Diabetes.  \par - She is on insulin.\par \par 4. Hypogammaglobulinemia.  \par - Her IgG that was previously checked  was decreased. She has been asymptomatic this was due to Rituxan. Repeat is showing near normal levels in past . \par \par 5.Previous history of hyperferritinemia, this was probably reactive.\par - will continue to monitor\par \par 6 She has a history of steroid use.  Her  DEXA scan in 8/2016 was normal.  DEXA in 3/20/19 showed osteopenia in femoral neck with FRAX score  risk of fractuer of 2.9% in 10 years\par - c/w calcium and vitamin D. \par \par 7. MIld  Anemia, likely due to CKD at this point \par - CBC, CMP LDH, iron studies, ferritin today\par \par 8. 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 right upper lobe 1.3 x 0.6 cm  ground-glass nodule (SUV 2.1)\par - we will obtain a repeat CT chest in 6 months to continue to monitor since it has not grown on imaging in 9.2019\par \par RTC to see me in 3 months, will recheck CT chest in 6 months

## 2019-10-03 LAB
ALBUMIN SERPL ELPH-MCNC: 4.3 G/DL
ALP BLD-CCNC: 59 U/L
ALT SERPL-CCNC: 15 U/L
ANION GAP SERPL CALC-SCNC: 12 MMOL/L
AST SERPL-CCNC: 20 U/L
BILIRUB SERPL-MCNC: 0.4 MG/DL
BUN SERPL-MCNC: 35 MG/DL
CALCIUM SERPL-MCNC: 9.9 MG/DL
CHLORIDE SERPL-SCNC: 102 MMOL/L
CO2 SERPL-SCNC: 28 MMOL/L
CREAT SERPL-MCNC: 1.4 MG/DL
FERRITIN SERPL-MCNC: 134 NG/ML
GLUCOSE SERPL-MCNC: 130 MG/DL
HCT VFR BLD CALC: 34.4 %
HGB BLD-MCNC: 11.2 G/DL
IRON SATN MFR SERPL: 15 %
IRON SERPL-MCNC: 55 UG/DL
LDH SERPL-CCNC: 204
MCHC RBC-ENTMCNC: 27.2 PG
MCHC RBC-ENTMCNC: 32.6 G/DL
MCV RBC AUTO: 83.5 FL
PLATELET # BLD AUTO: 149 K/UL
PMV BLD: 8.6 FL
POTASSIUM SERPL-SCNC: 4.3 MMOL/L
PROT SERPL-MCNC: 6.5 G/DL
RBC # BLD: 4.12 M/UL
RBC # FLD: 14.6 %
SODIUM SERPL-SCNC: 142 MMOL/L
TIBC SERPL-MCNC: 373 UG/DL
UIBC SERPL-MCNC: 318 UG/DL
WBC # FLD AUTO: 4.64 K/UL

## 2019-10-17 DIAGNOSIS — D59.9 ACQUIRED HEMOLYTIC ANEMIA, UNSPECIFIED: ICD-10-CM

## 2019-12-04 ENCOUNTER — OUTPATIENT (OUTPATIENT)
Dept: OUTPATIENT SERVICES | Facility: HOSPITAL | Age: 79
LOS: 1 days | Discharge: HOME | End: 2019-12-04
Payer: MEDICARE

## 2019-12-04 DIAGNOSIS — Z98.890 OTHER SPECIFIED POSTPROCEDURAL STATES: Chronic | ICD-10-CM

## 2019-12-04 DIAGNOSIS — R10.11 RIGHT UPPER QUADRANT PAIN: ICD-10-CM

## 2019-12-04 DIAGNOSIS — Z90.49 ACQUIRED ABSENCE OF OTHER SPECIFIED PARTS OF DIGESTIVE TRACT: Chronic | ICD-10-CM

## 2019-12-04 PROCEDURE — 76700 US EXAM ABDOM COMPLETE: CPT | Mod: 26

## 2020-01-06 ENCOUNTER — OUTPATIENT (OUTPATIENT)
Dept: OUTPATIENT SERVICES | Facility: HOSPITAL | Age: 80
LOS: 1 days | Discharge: HOME | End: 2020-01-06

## 2020-01-06 ENCOUNTER — APPOINTMENT (OUTPATIENT)
Dept: HEMATOLOGY ONCOLOGY | Facility: CLINIC | Age: 80
End: 2020-01-06
Payer: MEDICARE

## 2020-01-06 ENCOUNTER — LABORATORY RESULT (OUTPATIENT)
Age: 80
End: 2020-01-06

## 2020-01-06 VITALS
DIASTOLIC BLOOD PRESSURE: 60 MMHG | RESPIRATION RATE: 14 BRPM | HEIGHT: 59 IN | SYSTOLIC BLOOD PRESSURE: 135 MMHG | HEART RATE: 85 BPM

## 2020-01-06 DIAGNOSIS — Z90.49 ACQUIRED ABSENCE OF OTHER SPECIFIED PARTS OF DIGESTIVE TRACT: Chronic | ICD-10-CM

## 2020-01-06 DIAGNOSIS — Z00.00 ENCOUNTER FOR GENERAL ADULT MEDICAL EXAMINATION W/OUT ABNORMAL FINDINGS: ICD-10-CM

## 2020-01-06 DIAGNOSIS — Z98.890 OTHER SPECIFIED POSTPROCEDURAL STATES: Chronic | ICD-10-CM

## 2020-01-06 LAB
ALBUMIN SERPL ELPH-MCNC: 4.2 G/DL
ALP BLD-CCNC: 81 U/L
ALT SERPL-CCNC: 14 U/L
ANION GAP SERPL CALC-SCNC: 16 MMOL/L
AST SERPL-CCNC: 20 U/L
BILIRUB SERPL-MCNC: 0.4 MG/DL
BUN SERPL-MCNC: 33 MG/DL
CALCIUM SERPL-MCNC: 9.3 MG/DL
CHLORIDE SERPL-SCNC: 105 MMOL/L
CO2 SERPL-SCNC: 24 MMOL/L
CREAT SERPL-MCNC: 1.1 MG/DL
GLUCOSE SERPL-MCNC: 151 MG/DL
HCT VFR BLD CALC: 35.3 %
HGB BLD-MCNC: 11.2 G/DL
IRON SATN MFR SERPL: 19 %
IRON SERPL-MCNC: 61 UG/DL
LDH SERPL-CCNC: 212
MCHC RBC-ENTMCNC: 26.4 PG
MCHC RBC-ENTMCNC: 31.7 G/DL
MCV RBC AUTO: 83.3 FL
PLATELET # BLD AUTO: 153 K/UL
PMV BLD: 8.5 FL
POTASSIUM SERPL-SCNC: 4.1 MMOL/L
PROT SERPL-MCNC: 6.4 G/DL
RBC # BLD: 4.24 M/UL
RBC # FLD: 14.4 %
SODIUM SERPL-SCNC: 145 MMOL/L
TIBC SERPL-MCNC: 316 UG/DL
UIBC SERPL-MCNC: 255 UG/DL
WBC # FLD AUTO: 4.91 K/UL

## 2020-01-06 PROCEDURE — 99214 OFFICE O/P EST MOD 30 MIN: CPT

## 2020-01-07 LAB — FERRITIN SERPL-MCNC: 72 NG/ML

## 2020-01-09 NOTE — HISTORY OF PRESENT ILLNESS
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  She finished it approximately in the beginning of 06/2016.  She also received 4  doses of rituximab therapy.\par \par HISTORY OF PRESENT ILLNESS:  Ms. Blackmon is a very pleasant 85yearold female with multiple medical problems.  She initially was being treated for secondary autoimmune hemolytic anemia in the setting of indolent Bcell nonHodgkin lymphoma, NOS.  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  Her initial PET scan only demonstrated mild splenomegaly.  She required steroids as well, but then when the tapering was attempted, her anemia worsened, and at that point in time, rituximab was initiated.\par  [de-identified] : January 17, 2017\yonathan Sanabria presents for followup today she hasn't seen you since July. She denies having recurrent illnesses. She denies having  fatigue she experienced before. She does have occasional headaches. Blood pressure today is elevated. Otherwise she has no complaints. \par \par 4/24/17\par She is doing well except  for trouble with sugars and BP. She is seeing appropriate specialists. No other complaints CBC from today is WNL.\par \par 7/26/17\par She is doing well. She has a cough with sputum production for now several months. CXR in July was clear. HAd a course of antibiotics that did not work. It may be her COPD. No bleedig, no B symptoms. Usual fatigue. \par \par 10/27/17\par She is doing well except for her diabetic neuropathy in her feet. States her A1C is 6. No bleeding. \par \par 1/25/18\par She is her for follow up for her NHL. She is doing well. No bleeding. No weight loss.  CBC from today is stable.\par \par \par 5/24/18\par She is doing well. She has fatigue. CBC was fine from today s visit.\par \par 9/17/18\par She is here for follow up. She may have a cold, fever 99, cough with green phlegm, not SOB. CBC showed a Hgb of 11 from today. She has no bleeding,normal stool and urine.\par \par 12/17/18\par Patient is here for a follow-up visit.  She is feeling well with no complaints.  Most recent CBC is stable, Hgb up to 11.4.  Patient denies fever, chills, nausea, vomiting.  She has received her flu vaccine this season.  \par \par \par 4/9/19\par She is here for follow up. She is doing well. 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 Duran.\par \par 5/20/19\par She is here for follow up. She had a PET/CT 4/18/19: Compared to 2/24/2016,focal FDG uptake (SUV 4.1; intensely avid on \par nonattenuation corrected images) coregistering with 1.8 x 1.6 cm left  lower lobe pulmonary nodule suspicious for biologic tumor activity\par In addition another new FDG avid right upper lobe 1.3 x 0.6 cm  groundglass nodule (SUV 2.1-FDG avid on attenuation corrected images) \par suspicious for biologic tumor activity. A 1 cm pretracheal lymph node is not FDG avid\par \par No other sites of abnormal FDG uptake\par \par She underwent a CT FNA biopsy of Left  lobe nodule that showed 5/13/19:   POSITIVE FOR MALIGNANT CELLS.\par Non-small cell carcinoma, favor squamous cell carcinoma.\par Immunohistochemistry studies were performed at [facility] on block 1-C\par and the results support the diagnosis (positive P-40; negative-\par TTF1/Napsin).\par \par \par She feels well otherwise\par \par 7/22/19\par She is here for follow up. She states she completed 5 fractions of radiation on July 12. She feels well except some fatigue. \par \par 10/2/19\par Patient is here for a follow-up visit with spouse.  She is feeling well with no new complaints.  Discussed findings from CT imaging reflecting slight improvement in LLL nodule post radiation and RUL nodule was stable.  Encouraged flu and pna vaccination with PCP.  \par CT Chest (9.30.19) IMPRESSION:  Decrease in size size of left lower lobe spiculated nodule measuring 1.5 x 0.7 cm previously measured 1.8 x 1.6 cm. Stable right upper lobe 1.3 x 0.6 cm groundglass nodule.\par \par 1/6/20\par Patient is here for a follow-up visit for autoimmune hemolytic anemia and hx of lymphoma with her .  She has had nausea and constipation/diarrhea over the last few weeks which has resolved.  She denies unintentional weightloss or b symptoms, although she reports slightly worsening of SOB with exertion.  Most recent CBC reviewed and is stable.

## 2020-01-09 NOTE — ASSESSMENT
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  Elly has completed a course of steroids as well as rituximab. Rituxan  on 04/08/2016 and she  finished steroids approximately in early 06/2016.  She is in remission.\par \par 2.History of arteriovenous malformations on endoscopy. \par - She currently denies any bleeding.  \par \par 3. Diabetes.  \par - She is on insulin.\par \par 4. Hypogammaglobulinemia.  \par - Her IgG that was previously checked was decreased. She has been asymptomatic this was due to Rituxan. Repeat is showing near normal levels in past . \par \par 5.Previous history of hyperferritinemia, this was probably reactive.\par - will continue to monitor\par \par 6 She has a history of steroid use.  Her  DEXA scan in 8/2016 was normal.  DEXA in 3/20/19 showed osteopenia in femoral neck with FRAX score  risk of fractuer of 2.9% in 10 years\par - c/w calcium and vitamin D\par \par 7. MIld  Anemia, likely due to CKD at this point \par - will continue to monitor\par \par 8. 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 right upper lobe 1.3 x 0.6 cm  ground-glass nodule (SUV 2.1)\par - repeat CT chest in ~3 months to continue to monitor since it has not grown on imaging in 9.2019\par \par RTC to see me in 3 months and will order CT chest than. Needs CT every 6 months for 2 years than yearly as long as she is willing to undergo future treatments if needed.

## 2020-01-09 NOTE — REVIEW OF SYSTEMS
[Fatigue] : fatigue [SOB on Exertion] : shortness of breath during exertion [Negative] : Heme/Lymph [Recent Change In Weight] : ~T no recent weight change [Shortness Of Breath] : no shortness of breath [Wheezing] : no wheezing [Cough] : no cough [Anxiety] : no anxiety [Depression] : no depression [FreeTextEntry6] : SOB is chronic from COPD, on exertion only [de-identified] : diabetic neuropathy

## 2020-01-09 NOTE — CONSULT LETTER
[Consult Letter:] : I had the pleasure of evaluating your patient, [unfilled]. [Dear  ___] : Dear  [unfilled], [Please see my note below.] : Please see my note below. [Consult Closing:] : Thank you very much for allowing me to participate in the care of this patient.  If you have any questions, please do not hesitate to contact me. [Sincerely,] : Sincerely, [DrDede  ___] : Dr. PADILLA [DrDede ___] : Dr. PADILLA [FreeTextEntry3] : Rylan

## 2020-01-10 DIAGNOSIS — C34.90 MALIGNANT NEOPLASM OF UNSPECIFIED PART OF UNSPECIFIED BRONCHUS OR LUNG: ICD-10-CM

## 2020-01-10 DIAGNOSIS — D59.9 ACQUIRED HEMOLYTIC ANEMIA, UNSPECIFIED: ICD-10-CM

## 2020-01-10 DIAGNOSIS — C85.90 NON-HODGKIN LYMPHOMA, UNSPECIFIED, UNSPECIFIED SITE: ICD-10-CM

## 2020-02-17 ENCOUNTER — INPATIENT (INPATIENT)
Facility: HOSPITAL | Age: 80
LOS: 4 days | Discharge: HOME HEALTH | End: 2020-02-22
Attending: INTERNAL MEDICINE | Admitting: INTERNAL MEDICINE
Payer: MEDICARE

## 2020-02-17 VITALS
SYSTOLIC BLOOD PRESSURE: 227 MMHG | DIASTOLIC BLOOD PRESSURE: 95 MMHG | HEART RATE: 79 BPM | OXYGEN SATURATION: 100 % | RESPIRATION RATE: 19 BRPM | TEMPERATURE: 98 F

## 2020-02-17 DIAGNOSIS — Z90.49 ACQUIRED ABSENCE OF OTHER SPECIFIED PARTS OF DIGESTIVE TRACT: Chronic | ICD-10-CM

## 2020-02-17 DIAGNOSIS — Z98.890 OTHER SPECIFIED POSTPROCEDURAL STATES: Chronic | ICD-10-CM
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
## 2020-02-17 LAB
ALBUMIN SERPL ELPH-MCNC: 4.4 G/DL — SIGNIFICANT CHANGE UP (ref 3.5–5.2)
ALP SERPL-CCNC: 78 U/L — SIGNIFICANT CHANGE UP (ref 30–115)
ALT FLD-CCNC: 15 U/L — SIGNIFICANT CHANGE UP (ref 0–41)
ANION GAP SERPL CALC-SCNC: 10 MMOL/L — SIGNIFICANT CHANGE UP (ref 7–14)
APPEARANCE UR: CLEAR — SIGNIFICANT CHANGE UP
APTT BLD: 26.3 SEC — LOW (ref 27–39.2)
AST SERPL-CCNC: 21 U/L — SIGNIFICANT CHANGE UP (ref 0–41)
BACTERIA # UR AUTO: NEGATIVE — SIGNIFICANT CHANGE UP
BASE EXCESS BLDV CALC-SCNC: 4.1 MMOL/L — HIGH (ref -2–2)
BASOPHILS # BLD AUTO: 0.02 K/UL — SIGNIFICANT CHANGE UP (ref 0–0.2)
BASOPHILS NFR BLD AUTO: 0.5 % — SIGNIFICANT CHANGE UP (ref 0–1)
BILIRUB SERPL-MCNC: 0.5 MG/DL — SIGNIFICANT CHANGE UP (ref 0.2–1.2)
BILIRUB UR-MCNC: NEGATIVE — SIGNIFICANT CHANGE UP
BLD GP AB SCN SERPL QL: SIGNIFICANT CHANGE UP
BUN SERPL-MCNC: 20 MG/DL — SIGNIFICANT CHANGE UP (ref 10–20)
CA-I SERPL-SCNC: 1.2 MMOL/L — SIGNIFICANT CHANGE UP (ref 1.12–1.3)
CALCIUM SERPL-MCNC: 9.3 MG/DL — SIGNIFICANT CHANGE UP (ref 8.5–10.1)
CHLORIDE SERPL-SCNC: 103 MMOL/L — SIGNIFICANT CHANGE UP (ref 98–110)
CO2 SERPL-SCNC: 28 MMOL/L — SIGNIFICANT CHANGE UP (ref 17–32)
COLOR SPEC: SIGNIFICANT CHANGE UP
CREAT SERPL-MCNC: 1.1 MG/DL — SIGNIFICANT CHANGE UP (ref 0.7–1.5)
DIFF PNL FLD: NEGATIVE — SIGNIFICANT CHANGE UP
EOSINOPHIL # BLD AUTO: 0.13 K/UL — SIGNIFICANT CHANGE UP (ref 0–0.7)
EOSINOPHIL NFR BLD AUTO: 3 % — SIGNIFICANT CHANGE UP (ref 0–8)
EPI CELLS # UR: 0 /HPF — SIGNIFICANT CHANGE UP (ref 0–5)
GAS PNL BLDV: 141 MMOL/L — SIGNIFICANT CHANGE UP (ref 136–145)
GAS PNL BLDV: SIGNIFICANT CHANGE UP
GLUCOSE SERPL-MCNC: 208 MG/DL — HIGH (ref 70–99)
GLUCOSE UR QL: SIGNIFICANT CHANGE UP
HCO3 BLDV-SCNC: 30 MMOL/L — HIGH (ref 22–29)
HCT VFR BLD CALC: 35.7 % — LOW (ref 37–47)
HCT VFR BLDA CALC: 37.2 % — SIGNIFICANT CHANGE UP (ref 34–44)
HGB BLD CALC-MCNC: 12.2 G/DL — LOW (ref 14–18)
HGB BLD-MCNC: 11.9 G/DL — LOW (ref 12–16)
HYALINE CASTS # UR AUTO: 0 /LPF — SIGNIFICANT CHANGE UP (ref 0–7)
IMM GRANULOCYTES NFR BLD AUTO: 1.2 % — HIGH (ref 0.1–0.3)
INR BLD: 0.97 RATIO — SIGNIFICANT CHANGE UP (ref 0.65–1.3)
KETONES UR-MCNC: NEGATIVE — SIGNIFICANT CHANGE UP
LACTATE BLDV-MCNC: 1.6 MMOL/L — SIGNIFICANT CHANGE UP (ref 0.5–1.6)
LEUKOCYTE ESTERASE UR-ACNC: NEGATIVE — SIGNIFICANT CHANGE UP
LIDOCAIN IGE QN: 11 U/L — SIGNIFICANT CHANGE UP (ref 7–60)
LYMPHOCYTES # BLD AUTO: 0.98 K/UL — LOW (ref 1.2–3.4)
LYMPHOCYTES # BLD AUTO: 22.8 % — SIGNIFICANT CHANGE UP (ref 20.5–51.1)
MCHC RBC-ENTMCNC: 27.5 PG — SIGNIFICANT CHANGE UP (ref 27–31)
MCHC RBC-ENTMCNC: 33.3 G/DL — SIGNIFICANT CHANGE UP (ref 32–37)
MCV RBC AUTO: 82.6 FL — SIGNIFICANT CHANGE UP (ref 81–99)
MONOCYTES # BLD AUTO: 0.37 K/UL — SIGNIFICANT CHANGE UP (ref 0.1–0.6)
MONOCYTES NFR BLD AUTO: 8.6 % — SIGNIFICANT CHANGE UP (ref 1.7–9.3)
NEUTROPHILS # BLD AUTO: 2.75 K/UL — SIGNIFICANT CHANGE UP (ref 1.4–6.5)
NEUTROPHILS NFR BLD AUTO: 63.9 % — SIGNIFICANT CHANGE UP (ref 42.2–75.2)
NITRITE UR-MCNC: NEGATIVE — SIGNIFICANT CHANGE UP
NRBC # BLD: 0 /100 WBCS — SIGNIFICANT CHANGE UP (ref 0–0)
NT-PROBNP SERPL-SCNC: 549 PG/ML — HIGH (ref 0–300)
PCO2 BLDV: 48 MMHG — SIGNIFICANT CHANGE UP (ref 41–51)
PH BLDV: 7.4 — SIGNIFICANT CHANGE UP (ref 7.26–7.43)
PH UR: 6.5 — SIGNIFICANT CHANGE UP (ref 5–8)
PLATELET # BLD AUTO: 139 K/UL — SIGNIFICANT CHANGE UP (ref 130–400)
PO2 BLDV: 38 MMHG — SIGNIFICANT CHANGE UP (ref 20–40)
POTASSIUM BLDV-SCNC: 3.9 MMOL/L — SIGNIFICANT CHANGE UP (ref 3.3–5.6)
POTASSIUM SERPL-MCNC: 4.2 MMOL/L — SIGNIFICANT CHANGE UP (ref 3.5–5)
POTASSIUM SERPL-SCNC: 4.2 MMOL/L — SIGNIFICANT CHANGE UP (ref 3.5–5)
PROT SERPL-MCNC: 6.6 G/DL — SIGNIFICANT CHANGE UP (ref 6–8)
PROT UR-MCNC: ABNORMAL
PROTHROM AB SERPL-ACNC: 11.1 SEC — SIGNIFICANT CHANGE UP (ref 9.95–12.87)
RBC # BLD: 4.32 M/UL — SIGNIFICANT CHANGE UP (ref 4.2–5.4)
RBC # FLD: 15 % — HIGH (ref 11.5–14.5)
RBC CASTS # UR COMP ASSIST: 1 /HPF — SIGNIFICANT CHANGE UP (ref 0–4)
SAO2 % BLDV: 72 % — SIGNIFICANT CHANGE UP
SODIUM SERPL-SCNC: 141 MMOL/L — SIGNIFICANT CHANGE UP (ref 135–146)
SP GR SPEC: >1.05 (ref 1.01–1.02)
TROPONIN T SERPL-MCNC: <0.01 NG/ML — SIGNIFICANT CHANGE UP
TROPONIN T SERPL-MCNC: <0.01 NG/ML — SIGNIFICANT CHANGE UP
UROBILINOGEN FLD QL: SIGNIFICANT CHANGE UP
WBC # BLD: 4.3 K/UL — LOW (ref 4.8–10.8)
WBC # FLD AUTO: 4.3 K/UL — LOW (ref 4.8–10.8)
WBC UR QL: 0 /HPF — SIGNIFICANT CHANGE UP (ref 0–5)

## 2020-02-17 PROCEDURE — 71045 X-RAY EXAM CHEST 1 VIEW: CPT | Mod: 26

## 2020-02-17 PROCEDURE — 99285 EMERGENCY DEPT VISIT HI MDM: CPT

## 2020-02-17 PROCEDURE — 71270 CT THORAX DX C-/C+: CPT | Mod: 26

## 2020-02-17 PROCEDURE — 93010 ELECTROCARDIOGRAM REPORT: CPT

## 2020-02-17 RX ORDER — ACETAMINOPHEN 500 MG
650 TABLET ORAL ONCE
Refills: 0 | Status: COMPLETED | OUTPATIENT
Start: 2020-02-17 | End: 2020-02-17

## 2020-02-17 RX ORDER — MONTELUKAST 4 MG/1
10 TABLET, CHEWABLE ORAL DAILY
Refills: 0 | Status: DISCONTINUED | OUTPATIENT
Start: 2020-02-17 | End: 2020-02-22

## 2020-02-17 RX ORDER — ENOXAPARIN SODIUM 100 MG/ML
40 INJECTION SUBCUTANEOUS DAILY
Refills: 0 | Status: DISCONTINUED | OUTPATIENT
Start: 2020-02-17 | End: 2020-02-22

## 2020-02-17 RX ORDER — IPRATROPIUM/ALBUTEROL SULFATE 18-103MCG
3 AEROSOL WITH ADAPTER (GRAM) INHALATION ONCE
Refills: 0 | Status: COMPLETED | OUTPATIENT
Start: 2020-02-17 | End: 2020-02-17

## 2020-02-17 RX ORDER — FUROSEMIDE 40 MG
20 TABLET ORAL DAILY
Refills: 0 | Status: DISCONTINUED | OUTPATIENT
Start: 2020-02-17 | End: 2020-02-22

## 2020-02-17 RX ORDER — SODIUM CHLORIDE 9 MG/ML
1000 INJECTION INTRAMUSCULAR; INTRAVENOUS; SUBCUTANEOUS ONCE
Refills: 0 | Status: COMPLETED | OUTPATIENT
Start: 2020-02-17 | End: 2020-02-17

## 2020-02-17 RX ORDER — OMEPRAZOLE 10 MG/1
1 CAPSULE, DELAYED RELEASE ORAL
Qty: 0 | Refills: 0 | DISCHARGE

## 2020-02-17 RX ORDER — LOSARTAN POTASSIUM 100 MG/1
100 TABLET, FILM COATED ORAL DAILY
Refills: 0 | Status: DISCONTINUED | OUTPATIENT
Start: 2020-02-17 | End: 2020-02-22

## 2020-02-17 RX ORDER — LIDOCAINE 4 G/100G
1 CREAM TOPICAL ONCE
Refills: 0 | Status: COMPLETED | OUTPATIENT
Start: 2020-02-17 | End: 2020-02-17

## 2020-02-17 RX ORDER — PANTOPRAZOLE SODIUM 20 MG/1
40 TABLET, DELAYED RELEASE ORAL
Refills: 0 | Status: DISCONTINUED | OUTPATIENT
Start: 2020-02-17 | End: 2020-02-22

## 2020-02-17 RX ORDER — IPRATROPIUM/ALBUTEROL SULFATE 18-103MCG
3 AEROSOL WITH ADAPTER (GRAM) INHALATION
Refills: 0 | Status: COMPLETED | OUTPATIENT
Start: 2020-02-17 | End: 2020-02-17

## 2020-02-17 RX ORDER — KETOROLAC TROMETHAMINE 30 MG/ML
15 SYRINGE (ML) INJECTION ONCE
Refills: 0 | Status: DISCONTINUED | OUTPATIENT
Start: 2020-02-17 | End: 2020-02-17

## 2020-02-17 RX ORDER — METHOCARBAMOL 500 MG/1
500 TABLET, FILM COATED ORAL ONCE
Refills: 0 | Status: COMPLETED | OUTPATIENT
Start: 2020-02-17 | End: 2020-02-17

## 2020-02-17 RX ORDER — IPRATROPIUM/ALBUTEROL SULFATE 18-103MCG
3 AEROSOL WITH ADAPTER (GRAM) INHALATION EVERY 6 HOURS
Refills: 0 | Status: DISCONTINUED | OUTPATIENT
Start: 2020-02-17 | End: 2020-02-20

## 2020-02-17 RX ORDER — DONEPEZIL HYDROCHLORIDE 10 MG/1
10 TABLET, FILM COATED ORAL AT BEDTIME
Refills: 0 | Status: DISCONTINUED | OUTPATIENT
Start: 2020-02-17 | End: 2020-02-22

## 2020-02-17 RX ORDER — ISOSORBIDE MONONITRATE 60 MG/1
30 TABLET, EXTENDED RELEASE ORAL DAILY
Refills: 0 | Status: DISCONTINUED | OUTPATIENT
Start: 2020-02-17 | End: 2020-02-22

## 2020-02-17 RX ORDER — BUDESONIDE AND FORMOTEROL FUMARATE DIHYDRATE 160; 4.5 UG/1; UG/1
2 AEROSOL RESPIRATORY (INHALATION)
Refills: 0 | Status: DISCONTINUED | OUTPATIENT
Start: 2020-02-17 | End: 2020-02-22

## 2020-02-17 RX ORDER — FENOFIBRATE,MICRONIZED 130 MG
48 CAPSULE ORAL DAILY
Refills: 0 | Status: DISCONTINUED | OUTPATIENT
Start: 2020-02-17 | End: 2020-02-22

## 2020-02-17 RX ORDER — CHLORHEXIDINE GLUCONATE 213 G/1000ML
1 SOLUTION TOPICAL
Refills: 0 | Status: DISCONTINUED | OUTPATIENT
Start: 2020-02-17 | End: 2020-02-22

## 2020-02-17 RX ORDER — ATORVASTATIN CALCIUM 80 MG/1
20 TABLET, FILM COATED ORAL AT BEDTIME
Refills: 0 | Status: DISCONTINUED | OUTPATIENT
Start: 2020-02-17 | End: 2020-02-22

## 2020-02-17 RX ADMIN — SODIUM CHLORIDE 1000 MILLILITER(S): 9 INJECTION INTRAMUSCULAR; INTRAVENOUS; SUBCUTANEOUS at 16:06

## 2020-02-17 RX ADMIN — Medication 650 MILLIGRAM(S): at 09:17

## 2020-02-17 RX ADMIN — Medication 125 MILLIGRAM(S): at 09:17

## 2020-02-17 RX ADMIN — Medication 3 MILLILITER(S): at 20:50

## 2020-02-17 RX ADMIN — Medication 100 MILLIGRAM(S): at 18:30

## 2020-02-17 RX ADMIN — Medication 15 MILLIGRAM(S): at 12:39

## 2020-02-17 RX ADMIN — Medication 3 MILLILITER(S): at 09:17

## 2020-02-17 RX ADMIN — DONEPEZIL HYDROCHLORIDE 10 MILLIGRAM(S): 10 TABLET, FILM COATED ORAL at 21:43

## 2020-02-17 RX ADMIN — Medication 3 MILLILITER(S): at 11:31

## 2020-02-17 RX ADMIN — METHOCARBAMOL 500 MILLIGRAM(S): 500 TABLET, FILM COATED ORAL at 12:40

## 2020-02-17 RX ADMIN — BUDESONIDE AND FORMOTEROL FUMARATE DIHYDRATE 2 PUFF(S): 160; 4.5 AEROSOL RESPIRATORY (INHALATION) at 21:44

## 2020-02-17 RX ADMIN — ATORVASTATIN CALCIUM 20 MILLIGRAM(S): 80 TABLET, FILM COATED ORAL at 21:43

## 2020-02-17 RX ADMIN — Medication 3 MILLILITER(S): at 11:11

## 2020-02-17 RX ADMIN — Medication 650 MILLIGRAM(S): at 11:57

## 2020-02-17 NOTE — ED PROVIDER NOTE - CARE PLAN
Assessment and plan of treatment:	a/p: L flank pain, COPD exac possibly 2/2 pain. POCUS no pericardial effusion, no free fluid in abdomen, no R hydronephrosis, difficult to visualize L kidney but no gross hydro noted, no AAA visualized, will do labs, ekg/trop, CXR, VBG, CTA r/o dissection vs renal colic, nebs, steroids, tylenol for pain, tele monitor, re-eval. Principal Discharge DX:	COPD exacerbation  Assessment and plan of treatment:	a/p: L flank pain, COPD exac possibly 2/2 pain. POCUS no pericardial effusion, no free fluid in abdomen, no R hydronephrosis, difficult to visualize L kidney but no gross hydro noted, no AAA visualized, will do labs, ekg/trop, CXR, VBG, CTA r/o dissection vs renal colic, nebs, steroids, tylenol for pain, tele monitor, re-eval.  Secondary Diagnosis:	Flank pain  Secondary Diagnosis:	EKG abnormalities

## 2020-02-17 NOTE — H&P ADULT - NSICDXPASTMEDICALHX_GEN_ALL_CORE_FT
PAST MEDICAL HISTORY:  CKD (chronic kidney disease)     Coronary artery disease involving native heart without angina pectoris, unspecified vessel or lesion type s/p 5 stents    Diabetes on insulin    Heart failure     High cholesterol     HTN (hypertension)     MI (myocardial infarction) s/p 5 stents    Non Hodgkin's lymphoma in remission. Follows with Dr Hernandez    Osteoarthritis     PVD (peripheral vascular disease)     Thrombocytopenia

## 2020-02-17 NOTE — ED PROVIDER NOTE - NS ED ROS FT
Review of Systems:  •	CONSTITUTIONAL - No fever, No diaphoresis, No weight change  •	SKIN - No rash  •	HEMATOLOGIC - No abnormal bleeding or bruising  •	EYES - No eye pain, No blurred vision  •	ENT - No change in hearing, No sore throat, No neck pain, No rhinorrhea, No ear pain  •	RESPIRATORY - +shortness of breath, No cough  •	CARDIAC -No chest pain, No palpitations  •	GI - No abdominal pain, No nausea, No vomiting, No diarrhea, No constipation, No bright red blood per rectum or melena. + flank pain  •                 - No dysuria, frequency, hematuria.   •	ENDO - No polydypsia, No polyuria, No heat/cold intolerance  •	MUSCULOSKELETAL - No joint paint, No swelling, No back pain  •	NEUROLOGIC - No numbness, No focal weakness, No headache, No dizziness  All other systems negative, unless specified in HPI

## 2020-02-17 NOTE — H&P ADULT - NSHPSOCIALHISTORY_GEN_ALL_CORE
Former smoker. Quit in 1991  Denies EtOH or illicit drug use.     Ambulates with walker while outside. Independently while at her own home.

## 2020-02-17 NOTE — ED ADULT NURSE NOTE - NSIMPLEMENTINTERV_GEN_ALL_ED
Implemented All Fall with Harm Risk Interventions:  Hunt to call system. Call bell, personal items and telephone within reach. Instruct patient to call for assistance. Room bathroom lighting operational. Non-slip footwear when patient is off stretcher. Physically safe environment: no spills, clutter or unnecessary equipment. Stretcher in lowest position, wheels locked, appropriate side rails in place. Provide visual cue, wrist band, yellow gown, etc. Monitor gait and stability. Monitor for mental status changes and reorient to person, place, and time. Review medications for side effects contributing to fall risk. Reinforce activity limits and safety measures with patient and family. Provide visual clues: red socks.

## 2020-02-17 NOTE — ED PROVIDER NOTE - OBJECTIVE STATEMENT
79F PMH nonhodgkins lymphoma in remission, CAD 5 stents, DM, HTN, COPD exsmoker no home o2, PVD/LE stents, s/p cholecystectomy, p/w acute onset sharp L flank pain since 1230am, woke her up from sleep, constant nonradiating, states pain is causing SOB/wheezing. No cp. No fever, cough, uri. No trauma. No rash. No numbness, weakness, tingling. No blurry vision, slurred speech, trouble walking. No nvdc. No dysuria, frequency, hematuria. No hx kidney stones, aortic aneurysm, chronic back pain. didn't take anything for pain. never had this pain before. pain is worse with movement. pmd Dr Fernandes. cards- alexys, pulm- jamee, endo-perez, vascular- misty, onc- humphrey. doesn't know what reaction she has to codeine. takes tylenol for pain prn at home.

## 2020-02-17 NOTE — ED PROVIDER NOTE - PHYSICAL EXAMINATION
VITAL SIGNS: AFebrile, vital signs stable. Elevated BP  CONSTITUTIONAL: Well-developed; well-nourished; appears to be in pain, distress, with increased work of breathing  SKIN: Skin exam is warm and dry, no acute rash.  HEAD: Normocephalic; atraumatic.  EYES: Pupils equal round reactive to light, Extraocular movements intact; conjunctiva and sclera clear.  ENT: No nasal discharge; airway clear. Moist mucus membranes.  NECK: Supple; non tender. No rigidity  CARD: Regular rate and rhythm. Normal S1, S2; no murmurs, gallops, or rubs.  RESP: diffuse wheezing bilat w poor air entry, tachypnea   ABD: Abdomen soft; non-tender; non-distended;  no hepatosplenomegaly. +Left costovertebral angle tenderness. No rash.  EXT: Normal ROM. No clubbing, cyanosis or edema. No calf tenderness to palpation.  NEURO: Alert and oriented x 3. No focal deficits.  PSYCH: Cooperative, appropriate.

## 2020-02-17 NOTE — H&P ADULT - HISTORY OF PRESENT ILLNESS
78 y/o F with PMH of COPD not on home oxygen, NHL - in remission (follows with Dr Hernandez), HTN , DLD, DM on insulin, CKD, CAD s/p PCI with 5 stents, autoimmune hemolytic anemia, who presents to the ED for worsening SOB/wheezing and back pain. She says that she is very SOB at baseline and is unable to walk around her house without feeling SOB. However, over the past week her SOB has been worsening to the point where she is even SOB at rest. She endorses productive cough at baseline but over the last few days her sputum has turned from clear to dark green. She denies fever/chills. She also has experienced acute onset back pain that awoke her from sleep. She describes it as sharp and 10/10 in severity at it's worse. It was constant and non-radiating however, since arrival in the ED the pain has improve significantly. Pain is made worse by twisting to the side. Patient denies sick contacts or recent travel. She denies fever/chills, headaches, sore throat, chest pain, palpitations, abdominal pain, N/V/D, urinary symptoms, or lower extremity edema.     In ED, /95, HR 79. Temp 98.4. Sating 100% on RA. CXR negative for acute cardiopulmonary disease. CT a negative for acute PE or acute intra-abdominal pathology. EKG with ST depression in lateral leads. . s/p 1L IVF bolus, solumedrol, duonebs, ketoralac, and lidocaine patch in the ED. To be admitted for further workup and management.

## 2020-02-17 NOTE — H&P ADULT - NSHPLABSRESULTS_GEN_ALL_CORE
11.9   4.30  )-----------( 139      ( 17 Feb 2020 09:00 )             35.7       02-17    141  |  103  |  20  ----------------------------<  208<H>  4.2   |  28  |  1.1    Ca    9.3      17 Feb 2020 09:00    TPro  6.6  /  Alb  4.4  /  TBili  0.5  /  DBili  x   /  AST  21  /  ALT  15  /  AlkPhos  78  02-17      PT/INR - ( 17 Feb 2020 09:00 )   PT: 11.10 sec;   INR: 0.97 ratio         PTT - ( 17 Feb 2020 09:00 )  PTT:26.3 sec    RADIOLOGY:  < from: CT Angio Chest Dissection Protocol (02.17.20 @ 09:44) >      IMPRESSION:     1.  No CTA evidence of aortic dissection.     2.  No CT evidence of acute intrathoracic or abdominopelvic pathology. Symmetric perfusion to the kidneys.    3.  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     4.  Decreased size of the left lower lobe spiculated nodule measuring 1.1 x 0.7 cm (previously 1.5 x 0.7 cm).     5.  Unchanged right upper lobe 1.3 x 0.6 cm groundglass nodule/opacity.    Additional Findings/Recommendations After Attending Radiologist Review:    Agree with above, please note ER examination setting is not tailored for detailed evaluation of pulmonary nodules.          < end of copied text >    < from: Xray Chest 1 View AP/PA (02.17.20 @ 09:06) >      Impression:      Persistent left basilar opacity. No new focal consolidation, effusion, or pneumothorax.      < end of copied text >

## 2020-02-17 NOTE — H&P ADULT - NSHPPHYSICALEXAM_GEN_ALL_CORE
GENERAL: Obese, elderly F in NAD, speaks in full sentences, no signs of respiratory distress, on room air  HEAD: Atraumatic  NECK: Supple  CHEST/LUNG: Air entry bilaterally; Decreased breath sounds at bases, No wheeze or crackles  HEART: S1, S2; RRR; No murmurs, rubs, or gallops  ABDOMEN: BS+; Soft, Non-tender, Non-distended  EXTREMITIES:  2+ Peripheral Pulses, No clubbing, cyanosis, or edema  PSYCH: AAOx3  NEUROLOGY: non-focal  SKIN: No rashes or lesions

## 2020-02-17 NOTE — ED PROVIDER NOTE - PLAN OF CARE
a/p: L flank pain, COPD exac possibly 2/2 pain. POCUS no pericardial effusion, no free fluid in abdomen, no R hydronephrosis, difficult to visualize L kidney but no gross hydro noted, no AAA visualized, will do labs, ekg/trop, CXR, VBG, CTA r/o dissection vs renal colic, nebs, steroids, tylenol for pain, tele monitor, re-eval.

## 2020-02-17 NOTE — ED PROVIDER NOTE - CLINICAL SUMMARY MEDICAL DECISION MAKING FREE TEXT BOX
pt still wheezing and with uncontrolled L flank pain. EKG w new STD. discussed w Dr Fernandes, will admit to his service

## 2020-02-17 NOTE — H&P ADULT - NSHPOUTPATIENTPROVIDERS_GEN_ALL_CORE
PCP: Dr Fernandes  Pulmonologist: Dr Feldman  Cardiologist: Dr Ji  Oncologist: Dr Hernandez  Endocrinologist: Dr Reddy  Vascular: Dr Paz

## 2020-02-17 NOTE — H&P ADULT - ASSESSMENT
78 y/o F with PMH of COPD not on home oxygen, NHL - in remission (follows with Dr Hernandez), HTN , DLD, DM on insulin, CKD, CAD s/p PCI with 5 stents, autoimmune hemolytic anemia, who presents to the ED for worsening SOB/wheezing and back pain.    # SOB/wheezing - likely secondary to COPD exacerbation vs. bronchitis   - Continue duonebs, and solumedrol 60 mg BID   - Start doxycycline 100mg q12 x5d   - f/u pulm consult, Dr Feldman (requested by Dr Fernandes)     # Left-sided back pain worse with twisting movement. - likely musculoskeletal in nature. differential includes pleuritic chest pain for COPD exacerbation   - CT negative for acute intra-abdominal pathology  - pain improved with lidocaine patch, toradol and robaxin.   - continue to monitor clinically     # ST depressions in lateral leads on EKG - hx of CAD s/p 5 stents   - patient denies chest pain.   - 1st set of trops negative  - Dr Fernandes requesting cardio consult, Dr Leong.     # HTN - uncontrolled on admission (227/95)  - continue home meds. Likely elevated secondary to SOB on admission. Continue to monitor.     # DM  - f/u hemoglobin A1C  - fingersticks ACHS  - Start on basal/bolus and SSI insulin if fs > 180  - OP follow up with Dr Reddy     # CKD - stable     # NHL - in remission  - patient follows with Dr Hernandez. OP follow up upon discharge    # DLD  - f/u lipid profile in AM  -C/W home meds    DVT ppx: lovenox  GI ppx: PPI  ambulate as tolerated  Dispo: from home  FULL CODE

## 2020-02-18 ENCOUNTER — APPOINTMENT (OUTPATIENT)
Dept: VASCULAR SURGERY | Facility: CLINIC | Age: 80
End: 2020-02-18

## 2020-02-18 LAB
ALBUMIN SERPL ELPH-MCNC: 3.7 G/DL — SIGNIFICANT CHANGE UP (ref 3.5–5.2)
ALP SERPL-CCNC: 66 U/L — SIGNIFICANT CHANGE UP (ref 30–115)
ALT FLD-CCNC: 18 U/L — SIGNIFICANT CHANGE UP (ref 0–41)
ANION GAP SERPL CALC-SCNC: 14 MMOL/L — SIGNIFICANT CHANGE UP (ref 7–14)
AST SERPL-CCNC: 22 U/L — SIGNIFICANT CHANGE UP (ref 0–41)
BASOPHILS # BLD AUTO: 0 K/UL — SIGNIFICANT CHANGE UP (ref 0–0.2)
BASOPHILS NFR BLD AUTO: 0 % — SIGNIFICANT CHANGE UP (ref 0–1)
BILIRUB SERPL-MCNC: 0.3 MG/DL — SIGNIFICANT CHANGE UP (ref 0.2–1.2)
BUN SERPL-MCNC: 26 MG/DL — HIGH (ref 10–20)
CALCIUM SERPL-MCNC: 8.9 MG/DL — SIGNIFICANT CHANGE UP (ref 8.5–10.1)
CHLORIDE SERPL-SCNC: 103 MMOL/L — SIGNIFICANT CHANGE UP (ref 98–110)
CHOLEST SERPL-MCNC: 168 MG/DL — SIGNIFICANT CHANGE UP (ref 100–200)
CO2 SERPL-SCNC: 23 MMOL/L — SIGNIFICANT CHANGE UP (ref 17–32)
CREAT SERPL-MCNC: 1.2 MG/DL — SIGNIFICANT CHANGE UP (ref 0.7–1.5)
CULTURE RESULTS: SIGNIFICANT CHANGE UP
EOSINOPHIL # BLD AUTO: 0.01 K/UL — SIGNIFICANT CHANGE UP (ref 0–0.7)
EOSINOPHIL NFR BLD AUTO: 0.2 % — SIGNIFICANT CHANGE UP (ref 0–8)
ESTIMATED AVERAGE GLUCOSE: 151 MG/DL — HIGH (ref 68–114)
GLUCOSE BLDC GLUCOMTR-MCNC: 195 MG/DL — HIGH (ref 70–99)
GLUCOSE BLDC GLUCOMTR-MCNC: 214 MG/DL — HIGH (ref 70–99)
GLUCOSE BLDC GLUCOMTR-MCNC: 266 MG/DL — HIGH (ref 70–99)
GLUCOSE BLDC GLUCOMTR-MCNC: 333 MG/DL — HIGH (ref 70–99)
GLUCOSE SERPL-MCNC: 262 MG/DL — HIGH (ref 70–99)
HBA1C BLD-MCNC: 6.9 % — HIGH (ref 4–5.6)
HCT VFR BLD CALC: 31.1 % — LOW (ref 37–47)
HDLC SERPL-MCNC: 44 MG/DL — LOW
HGB BLD-MCNC: 10.2 G/DL — LOW (ref 12–16)
IMM GRANULOCYTES NFR BLD AUTO: 0.8 % — HIGH (ref 0.1–0.3)
LIPID PNL WITH DIRECT LDL SERPL: 105 MG/DL — SIGNIFICANT CHANGE UP (ref 4–129)
LYMPHOCYTES # BLD AUTO: 0.51 K/UL — LOW (ref 1.2–3.4)
LYMPHOCYTES # BLD AUTO: 8.7 % — LOW (ref 20.5–51.1)
MAGNESIUM SERPL-MCNC: 1.8 MG/DL — SIGNIFICANT CHANGE UP (ref 1.8–2.4)
MCHC RBC-ENTMCNC: 27.2 PG — SIGNIFICANT CHANGE UP (ref 27–31)
MCHC RBC-ENTMCNC: 32.8 G/DL — SIGNIFICANT CHANGE UP (ref 32–37)
MCV RBC AUTO: 82.9 FL — SIGNIFICANT CHANGE UP (ref 81–99)
MONOCYTES # BLD AUTO: 0.45 K/UL — SIGNIFICANT CHANGE UP (ref 0.1–0.6)
MONOCYTES NFR BLD AUTO: 7.6 % — SIGNIFICANT CHANGE UP (ref 1.7–9.3)
NEUTROPHILS # BLD AUTO: 4.87 K/UL — SIGNIFICANT CHANGE UP (ref 1.4–6.5)
NEUTROPHILS NFR BLD AUTO: 82.7 % — HIGH (ref 42.2–75.2)
NRBC # BLD: 0 /100 WBCS — SIGNIFICANT CHANGE UP (ref 0–0)
PLATELET # BLD AUTO: 134 K/UL — SIGNIFICANT CHANGE UP (ref 130–400)
POTASSIUM SERPL-MCNC: 4.4 MMOL/L — SIGNIFICANT CHANGE UP (ref 3.5–5)
POTASSIUM SERPL-SCNC: 4.4 MMOL/L — SIGNIFICANT CHANGE UP (ref 3.5–5)
PROT SERPL-MCNC: 5.5 G/DL — LOW (ref 6–8)
RBC # BLD: 3.75 M/UL — LOW (ref 4.2–5.4)
RBC # FLD: 15.2 % — HIGH (ref 11.5–14.5)
SODIUM SERPL-SCNC: 140 MMOL/L — SIGNIFICANT CHANGE UP (ref 135–146)
SPECIMEN SOURCE: SIGNIFICANT CHANGE UP
TOTAL CHOLESTEROL/HDL RATIO MEASUREMENT: 3.8 RATIO — LOW (ref 4–5.5)
TRIGL SERPL-MCNC: 170 MG/DL — HIGH (ref 10–149)
TROPONIN T SERPL-MCNC: 0.02 NG/ML — HIGH
WBC # BLD: 5.89 K/UL — SIGNIFICANT CHANGE UP (ref 4.8–10.8)
WBC # FLD AUTO: 5.89 K/UL — SIGNIFICANT CHANGE UP (ref 4.8–10.8)

## 2020-02-18 PROCEDURE — 71110 X-RAY EXAM RIBS BIL 3 VIEWS: CPT | Mod: 26

## 2020-02-18 RX ORDER — DEXTROSE 50 % IN WATER 50 %
25 SYRINGE (ML) INTRAVENOUS ONCE
Refills: 0 | Status: DISCONTINUED | OUTPATIENT
Start: 2020-02-18 | End: 2020-02-22

## 2020-02-18 RX ORDER — LIDOCAINE 4 G/100G
1 CREAM TOPICAL ONCE
Refills: 0 | Status: COMPLETED | OUTPATIENT
Start: 2020-02-18 | End: 2020-02-18

## 2020-02-18 RX ORDER — NIFEDIPINE 30 MG
30 TABLET, EXTENDED RELEASE 24 HR ORAL DAILY
Refills: 0 | Status: DISCONTINUED | OUTPATIENT
Start: 2020-02-18 | End: 2020-02-18

## 2020-02-18 RX ORDER — IBUPROFEN 200 MG
600 TABLET ORAL EVERY 8 HOURS
Refills: 0 | Status: DISCONTINUED | OUTPATIENT
Start: 2020-02-18 | End: 2020-02-22

## 2020-02-18 RX ORDER — DEXTROSE 50 % IN WATER 50 %
12.5 SYRINGE (ML) INTRAVENOUS ONCE
Refills: 0 | Status: DISCONTINUED | OUTPATIENT
Start: 2020-02-18 | End: 2020-02-22

## 2020-02-18 RX ORDER — INSULIN LISPRO 100/ML
VIAL (ML) SUBCUTANEOUS
Refills: 0 | Status: DISCONTINUED | OUTPATIENT
Start: 2020-02-18 | End: 2020-02-22

## 2020-02-18 RX ORDER — DEXTROSE 50 % IN WATER 50 %
15 SYRINGE (ML) INTRAVENOUS ONCE
Refills: 0 | Status: DISCONTINUED | OUTPATIENT
Start: 2020-02-18 | End: 2020-02-22

## 2020-02-18 RX ORDER — DILTIAZEM HCL 120 MG
30 CAPSULE, EXT RELEASE 24 HR ORAL ONCE
Refills: 0 | Status: COMPLETED | OUTPATIENT
Start: 2020-02-18 | End: 2020-02-18

## 2020-02-18 RX ORDER — NIFEDIPINE 30 MG
60 TABLET, EXTENDED RELEASE 24 HR ORAL DAILY
Refills: 0 | Status: DISCONTINUED | OUTPATIENT
Start: 2020-02-19 | End: 2020-02-19

## 2020-02-18 RX ORDER — GLUCAGON INJECTION, SOLUTION 0.5 MG/.1ML
1 INJECTION, SOLUTION SUBCUTANEOUS ONCE
Refills: 0 | Status: DISCONTINUED | OUTPATIENT
Start: 2020-02-18 | End: 2020-02-22

## 2020-02-18 RX ORDER — SODIUM CHLORIDE 9 MG/ML
1000 INJECTION, SOLUTION INTRAVENOUS
Refills: 0 | Status: DISCONTINUED | OUTPATIENT
Start: 2020-02-18 | End: 2020-02-22

## 2020-02-18 RX ADMIN — LIDOCAINE 1 PATCH: 4 CREAM TOPICAL at 09:58

## 2020-02-18 RX ADMIN — Medication 20 MILLIGRAM(S): at 06:17

## 2020-02-18 RX ADMIN — Medication 8: at 17:28

## 2020-02-18 RX ADMIN — ENOXAPARIN SODIUM 40 MILLIGRAM(S): 100 INJECTION SUBCUTANEOUS at 12:58

## 2020-02-18 RX ADMIN — Medication 48 MILLIGRAM(S): at 12:59

## 2020-02-18 RX ADMIN — ISOSORBIDE MONONITRATE 30 MILLIGRAM(S): 60 TABLET, EXTENDED RELEASE ORAL at 12:58

## 2020-02-18 RX ADMIN — Medication 6: at 12:58

## 2020-02-18 RX ADMIN — LOSARTAN POTASSIUM 100 MILLIGRAM(S): 100 TABLET, FILM COATED ORAL at 06:17

## 2020-02-18 RX ADMIN — Medication 3 MILLILITER(S): at 07:51

## 2020-02-18 RX ADMIN — Medication 3 MILLILITER(S): at 19:52

## 2020-02-18 RX ADMIN — Medication 100 MILLIGRAM(S): at 17:28

## 2020-02-18 RX ADMIN — ATORVASTATIN CALCIUM 20 MILLIGRAM(S): 80 TABLET, FILM COATED ORAL at 21:58

## 2020-02-18 RX ADMIN — Medication 100 MILLIGRAM(S): at 06:17

## 2020-02-18 RX ADMIN — Medication 600 MILLIGRAM(S): at 09:58

## 2020-02-18 RX ADMIN — DONEPEZIL HYDROCHLORIDE 10 MILLIGRAM(S): 10 TABLET, FILM COATED ORAL at 21:59

## 2020-02-18 RX ADMIN — MONTELUKAST 10 MILLIGRAM(S): 4 TABLET, CHEWABLE ORAL at 12:58

## 2020-02-18 RX ADMIN — BUDESONIDE AND FORMOTEROL FUMARATE DIHYDRATE 2 PUFF(S): 160; 4.5 AEROSOL RESPIRATORY (INHALATION) at 21:59

## 2020-02-18 RX ADMIN — Medication 600 MILLIGRAM(S): at 10:28

## 2020-02-18 RX ADMIN — PANTOPRAZOLE SODIUM 40 MILLIGRAM(S): 20 TABLET, DELAYED RELEASE ORAL at 06:17

## 2020-02-18 RX ADMIN — Medication 30 MILLIGRAM(S): at 14:09

## 2020-02-18 RX ADMIN — Medication 30 MILLIGRAM(S): at 10:00

## 2020-02-18 NOTE — PROGRESS NOTE ADULT - SUBJECTIVE AND OBJECTIVE BOX
SUBJECTIVE:    Patient is a 79y old Female who presents with a chief complaint of SOB/wheezing + back pain (18 Feb 2020 08:37)    Currently admitted to medicine with the primary diagnosis of COPD exacerbation     Today is hospital day 1d. This morning she is resting uncomfortably in bed and reports no new issues or overnight events. Patient reports that the breathing is improved but she still has excruciating pain in back, x-ray ribs is ordered and will be followed up, lidocaine patch and pain control is ordered.    PAST MEDICAL & SURGICAL HISTORY  Coronary artery disease involving native heart without angina pectoris, unspecified vessel or lesion type: s/p 5 stents  Diabetes: on insulin  Osteoarthritis  CKD (chronic kidney disease)  Thrombocytopenia  PVD (peripheral vascular disease)  Non Hodgkin's lymphoma: in remission. Follows with Dr Hernandez  Heart failure  High cholesterol  HTN (hypertension)  MI (myocardial infarction): s/p 5 stents  H/O carotid endarterectomy: RIGHT  H/O cardiac catheterization: 5 STENTS  History of cholecystectomy    SOCIAL HISTORY:    ALLERGIES:  ACE inhibitors (Unknown)  BENZALKONIUM (Rash)  BETADINE (Rash)  Ceclor (Unknown)  codeine (Unknown)  latex (Unknown)  penicillin (Unknown)  RUBBER GLOVES (Rash)  sulfonamides (Unknown)    MEDICATIONS:  STANDING MEDICATIONS  albuterol/ipratropium for Nebulization 3 milliLiter(s) Nebulizer every 6 hours  atorvastatin 20 milliGRAM(s) Oral at bedtime  budesonide 160 MICROgram(s)/formoterol 4.5 MICROgram(s) Inhaler 2 Puff(s) Inhalation two times a day  chlorhexidine 4% Liquid 1 Application(s) Topical <User Schedule>  donepezil 10 milliGRAM(s) Oral at bedtime  doxycycline hyclate Capsule 100 milliGRAM(s) Oral every 12 hours  enoxaparin Injectable 40 milliGRAM(s) SubCutaneous daily  fenofibrate Tablet 48 milliGRAM(s) Oral daily  furosemide    Tablet 20 milliGRAM(s) Oral daily  isosorbide   mononitrate ER Tablet (IMDUR) 30 milliGRAM(s) Oral daily  lidocaine   Patch 1 Patch Transdermal once  losartan 100 milliGRAM(s) Oral daily  methylPREDNISolone sodium succinate Injectable 60 milliGRAM(s) IV Push every 12 hours  montelukast 10 milliGRAM(s) Oral daily  NIFEdipine XL 30 milliGRAM(s) Oral daily  pantoprazole    Tablet 40 milliGRAM(s) Oral before breakfast    PRN MEDICATIONS  ibuprofen  Tablet. 600 milliGRAM(s) Oral every 8 hours PRN    VITALS:   T(F): 97.3  HR: 75  BP: 173/72  RR: 18  SpO2: 69%    LABS:                        10.2   5.89  )-----------( 134      ( 18 Feb 2020 05:30 )             31.1     02-18    140  |  103  |  26<H>  ----------------------------<  262<H>  4.4   |  23  |  1.2    Ca    8.9      18 Feb 2020 05:30  Mg     1.8     02-18    TPro  5.5<L>  /  Alb  3.7  /  TBili  0.3  /  DBili  x   /  AST  22  /  ALT  18  /  AlkPhos  66  02-18    PT/INR - ( 17 Feb 2020 09:00 )   PT: 11.10 sec;   INR: 0.97 ratio         PTT - ( 17 Feb 2020 09:00 )  PTT:26.3 sec  Urinalysis Basic - ( 17 Feb 2020 11:20 )    Color: Light Yellow / Appearance: Clear / SG: >1.050 / pH: x  Gluc: x / Ketone: Negative  / Bili: Negative / Urobili: <2 mg/dL   Blood: x / Protein: 30 mg/dL / Nitrite: Negative   Leuk Esterase: Negative / RBC: 1 /HPF / WBC 0 /HPF   Sq Epi: x / Non Sq Epi: 0 /HPF / Bacteria: Negative        Troponin T, Serum: 0.02 ng/mL <H> (02-18-20 @ 05:30)  Troponin T, Serum: <0.01 ng/mL (02-17-20 @ 17:18)      CARDIAC MARKERS ( 18 Feb 2020 05:30 )  x     / 0.02 ng/mL / x     / x     / x      CARDIAC MARKERS ( 17 Feb 2020 17:18 )  x     / <0.01 ng/mL / x     / x     / x      CARDIAC MARKERS ( 17 Feb 2020 09:00 )  x     / <0.01 ng/mL / x     / x     / x          Troponin T, Serum: 0.02 ng/mL (02-18-20 @ 05:30)  Troponin T, Serum: <0.01 ng/mL (02-17-20 @ 17:18)  Troponin T, Serum: <0.01 ng/mL (02-17-20 @ 09:00)  Serum Pro-Brain Natriuretic Peptide: 549 pg/mL (02-17-20 @ 09:00)      RADIOLOGY:    PHYSICAL EXAM:  GENERAL: NAD, well-groomed, well-developed  HEAD: Atraumatic  NECK: Supple  CHEST/LUNG: Air entry bilaterally; Decreased breath sounds at bases, No wheeze or crackles  HEART: S1, S2; RRR; No murmurs, rubs, or gallops  ABDOMEN: BS+; Soft, Non-tender, Non-distended, complaining of severe pain in back  EXTREMITIES:  2+ Peripheral Pulses, No clubbing, cyanosis, or edema  PSYCH: AAOx3  NEUROLOGY: non-focal  SKIN: No rashes or lesions

## 2020-02-18 NOTE — CONSULT NOTE ADULT - SUBJECTIVE AND OBJECTIVE BOX
Patient is a 79y old  Female who presents with a chief complaint of SOB/wheezing + back pain (18 Feb 2020 09:28)    HPI:  78 y/o F with PMH of COPD not on home oxygen, NHL - in remission (follows with Dr Hernandez), HTN , DLD, DM on insulin, CKD, CAD s/p PCI with 5 stents, autoimmune hemolytic anemia, who presents to the ED for worsening SOB/wheezing and back pain. She says that she is very SOB at baseline and is unable to walk around her house without feeling SOB. However, over the past week her SOB has been worsening to the point where she is even SOB at rest. She endorses productive cough at baseline but over the last few days her sputum has turned from clear to dark green. She denies fever/chills. She also has experienced acute onset back pain that awoke her from sleep. She describes it as sharp and 10/10 in severity at it's worse. It was constant and non-radiating however, since arrival in the ED the pain has improve significantly. Pain is made worse by twisting to the side. Patient denies sick contacts or recent travel. She denies fever/chills, headaches, sore throat, chest pain, palpitations, abdominal pain, N/V/D, urinary symptoms, or lower extremity edema.     In ED, /95, HR 79. Temp 98.4. Sating 100% on RA. CXR negative for acute cardiopulmonary disease. CT a negative for acute PE or acute intra-abdominal pathology. EKG with ST depression in lateral leads. . s/p 1L IVF bolus, solumedrol, duonebs, ketoralac, and lidocaine patch in the ED. To be admitted for further workup and management. (17 Feb 2020 15:14)      PAST MEDICAL & SURGICAL HISTORY:  Coronary artery disease involving native heart without angina pectoris, unspecified vessel or lesion type: s/p 5 stents  Diabetes: on insulin  Osteoarthritis  CKD (chronic kidney disease)  Thrombocytopenia  PVD (peripheral vascular disease)  Non Hodgkin's lymphoma: in remission. Follows with Dr Hernandez  Heart failure  High cholesterol  HTN (hypertension)  MI (myocardial infarction): s/p 5 stents  H/O carotid endarterectomy: RIGHT  H/O cardiac catheterization: 5 STENTS  History of cholecystectomy      SOCIAL HX:   Former smoker quit 30 years ago    FAMILY HISTORY:  FH: CAD (coronary artery disease): FATHER  .  No cardiovascular or pulmonary family history     REVIEW OF SYSTEMS:    CONSTITUTIONAL:   no fever   no chills.  no weight gain   no weight loss    EYES:   no discharge,   no visual changes.    ENT:   Ears: no ear pain and no hearing problems.  Nose: no nasal congestion and no nasal drainage.  Mouth/Throat: no dysphagia,  no hoarseness and no throat pain.  Neck: no lumps, no pain, no stiffness and no swollen glands.    CARDIOVASCULAR:   Left chest pain  no swelling  no palpitaions  no syncope    RESPIRATORY:  SOB worse on exertion   wheezing  green sputum production    GASTROINTESTINAL:   no abdominal pain,   no constipation,   no diarrhea,   no vomiting.    GENITOURINARY:  no dysuria,   no hematuria.    MUSCULOSKELETAL:   no back pain,   no musculoskeletal pain,  no weakness.    SKIN:   no jaundice,    no rashes.    NEURO:   no loss of consciousness,   no headache,   no weakness.    PSYCHIATRIC:   no known mental health issues  no anxiety  no depression    ALLERGIC/IMMUNOLOGIC:   No active allergic or immunologic issues      Allergies    ACE inhibitors (Unknown)  BENZALKONIUM (Rash)  BETADINE (Rash)  Ceclor (Unknown)  codeine (Unknown)  latex (Unknown)  penicillin (Unknown)  RUBBER GLOVES (Rash)  sulfonamides (Unknown)        PHYSICAL EXAM  Vital Signs Last 24 Hrs  T(C): 36.3 (18 Feb 2020 04:03), Max: 37.2 (17 Feb 2020 15:38)  T(F): 97.3 (18 Feb 2020 04:03), Max: 99 (17 Feb 2020 15:38)  HR: 75 (18 Feb 2020 04:03) (75 - 105)  BP: 173/72 (18 Feb 2020 04:03) (145/78 - 190/81)  RR: 18 (17 Feb 2020 20:30) (18 - 20)  SpO2: 97% on RA (17 Feb 2020 20:30) (69% - 96%)    CONSTITUTIONAL:  Well nourished.  NAD    ENT:   Airway patent,   No thrush    EYES:   Clear bilaterally,   pupils equal,   round and reactive to light.    CARDIAC:   Normal rate,   regular rhythm.    no edema    CAROTID:   normal systolic impulse  no bruits    RESPIRATORY:   Minimal expiratory wheezing  Normal chest expansion  Not tachypneic  No use of accessory muscles    GASTROINTESTINAL:  Abdomen soft,   non-tender,   no guarding,   + BS    MUSCULOSKELETAL:   range of motion is not limited,  no clubbing, cyanosis  Left chest pain on palpation    NEUROLOGICAL:   Alert and oriented   no motor deficits  pertinent DTRs normal    SKIN:  Skin normal color for race   No evidence of rash    PSYCHIATRIC:   normal mood and affect.   no apparent risk to self or others.    HEME LYMPH:   No cervical  lymphadenopathy  no inguinal lymphadenopathy      LABS:             10.2   5.89  )-----------( 134      ( 18 Feb 2020 05:30 )             31.1                                               02-18    140  |  103  |  26<H>  ----------------------------<  262<H>  4.4   |  23  |  1.2    Ca    8.9      18 Feb 2020 05:30  Mg     1.8     02-18    TPro  5.5<L>  /  Alb  3.7  /  TBili  0.3  /  DBili  x   /  AST  22  /  ALT  18  /  AlkPhos  66  02-18      PT/INR - ( 17 Feb 2020 09:00 )   PT: 11.10 sec;   INR: 0.97 ratio         PTT - ( 17 Feb 2020 09:00 )  PTT:26.3 sec                                       Urinalysis Basic - ( 17 Feb 2020 11:20 )    Color: Light Yellow / Appearance: Clear / SG: >1.050 / pH: x  Gluc: x / Ketone: Negative  / Bili: Negative / Urobili: <2 mg/dL   Blood: x / Protein: 30 mg/dL / Nitrite: Negative   Leuk Esterase: Negative / RBC: 1 /HPF / WBC 0 /HPF   Sq Epi: x / Non Sq Epi: 0 /HPF / Bacteria: Negative        CARDIAC MARKERS ( 18 Feb 2020 05:30 )  x     / 0.02 ng/mL / x     / x     / x      CARDIAC MARKERS ( 17 Feb 2020 17:18 )  x     / <0.01 ng/mL / x     / x     / x      CARDIAC MARKERS ( 17 Feb 2020 09:00 )  x     / <0.01 ng/mL / x     / x     / x                                                LIVER FUNCTIONS - ( 18 Feb 2020 05:30 )  Alb: 3.7 g/dL / Pro: 5.5 g/dL / ALK PHOS: 66 U/L / ALT: 18 U/L / AST: 22 U/L / GGT: x           MEDICATIONS  (STANDING):  albuterol/ipratropium for Nebulization 3 milliLiter(s) Nebulizer every 6 hours  atorvastatin 20 milliGRAM(s) Oral at bedtime  budesonide 160 MICROgram(s)/formoterol 4.5 MICROgram(s) Inhaler 2 Puff(s) Inhalation two times a day  chlorhexidine 4% Liquid 1 Application(s) Topical <User Schedule>  dextrose 5%. 1000 milliLiter(s) (50 mL/Hr) IV Continuous <Continuous>  dextrose 50% Injectable 12.5 Gram(s) IV Push once  dextrose 50% Injectable 25 Gram(s) IV Push once  dextrose 50% Injectable 25 Gram(s) IV Push once  donepezil 10 milliGRAM(s) Oral at bedtime  doxycycline hyclate Capsule 100 milliGRAM(s) Oral every 12 hours  enoxaparin Injectable 40 milliGRAM(s) SubCutaneous daily  fenofibrate Tablet 48 milliGRAM(s) Oral daily  furosemide    Tablet 20 milliGRAM(s) Oral daily  insulin lispro (HumaLOG) corrective regimen sliding scale   SubCutaneous three times a day before meals  isosorbide   mononitrate ER Tablet (IMDUR) 30 milliGRAM(s) Oral daily  losartan 100 milliGRAM(s) Oral daily  methylPREDNISolone sodium succinate Injectable 60 milliGRAM(s) IV Push every 12 hours  montelukast 10 milliGRAM(s) Oral daily  NIFEdipine XL 30 milliGRAM(s) Oral daily  pantoprazole    Tablet 40 milliGRAM(s) Oral before breakfast    MEDICATIONS  (PRN):  dextrose 40% Gel 15 Gram(s) Oral once PRN Blood Glucose LESS THAN 70 milliGRAM(s)/deciliter  glucagon  Injectable 1 milliGRAM(s) IntraMuscular once PRN Glucose LESS THAN 70 milligrams/deciliter  ibuprofen  Tablet. 600 milliGRAM(s) Oral every 8 hours PRN Severe Pain (7 - 10)    < from: Xray Chest 1 View AP/PA (02.17.20 @ 09:06) >  Impression:      Persistent left basilar opacity. No new focal consolidation, effusion, or pneumothorax.    < end of copied text >      < from: CT Angio Chest Dissection Protocol (02.17.20 @ 09:44) >  IMPRESSION:     1.  No CTA evidence of aortic dissection.     2.  No CT evidence of acute intrathoracic or abdominopelvic pathology. Symmetric perfusion to the kidneys.    3.  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     4.  Decreased size of the left lower lobe spiculated nodule measuring 1.1 x 0.7 cm (previously 1.5 x 0.7 cm).     5.  Unchanged right upper lobe 1.3 x 0.6 cm groundglass nodule/opacity.    < end of copied text >

## 2020-02-18 NOTE — CONSULT NOTE ADULT - ASSESSMENT
IMPRESSION:  COPD exacerbation - improving  LLL spiculated nodule, decreasing in size  RUL GG nodule unchanged    RECOMMENDATIONS:  ICS/LAMA/LABA  Nebs q4 PRN  Switch solumedrol to prednisone   Keep SpO2>92%  DVT ppx  OOB to chair  Follow up with Dr Feldman as outpatient IMPRESSION:  COPD exacerbation - improving  LLL spiculated nodule, decreasing in size followed by Dr. Gaston GASPAR GG nodule unchanged    RECOMMENDATIONS:  Home inhalers  Nebs q4 PRN  Switch solumedrol to prednisone taper  Finish ABX course   Keep SpO2>92%  DVT ppx  OOB to chair  Follow up with Dr Feldman as outpatient

## 2020-02-18 NOTE — PROGRESS NOTE ADULT - ASSESSMENT
79y old Female who presents with a chief complaint of SOB/wheezing + back pain (18 Feb 2020 08:37)    Currently admitted to medicine with the primary diagnosis of COPD exacerbation     Today is hospital day 1d. This morning she is resting uncomfortably in bed and reports no new issues or overnight events. Patient reports that the breathing is improved but she still has excruciating pain in back, x-ray ribs is ordered and will be followed up, lidocaine patch and pain control is ordered.        # SOB/wheezing - likely secondary to COPD exacerbation vs. bronchitis   - Continue duonebs, and solumedrol 60 mg BID   - c/w doxycycline 100mg q12 x5d   - f/u pulm consult, Dr Feldman (requested by Dr Fernandes)     # Left-sided back pain worse with twisting movement.   - likely musculoskeletal in nature,differential includes pleuritic chest pain for COPD exacerbation   - CT negative for acute intra-abdominal pathology  - pain improved with lidocaine patch, toradol and robaxin.   - continue to monitor clinically   -f/u xray    # ST depressions in lateral leads on EKG - hx of CAD s/p 5 stents   - patient denies chest pain.   - 1st set of trops negative  - Dr Fernandes requesting cardio consult, Dr Leong.     # HTN - uncontrolled on admission (227/95)  - continue home meds. Likely elevated secondary to SOB on admission. Continue to monitor.     # DM  - f/u hemoglobin A1C  - fingersticks ACHS  - Start on basal/bolus and SSI insulin if fs > 180  - OP follow up with Dr Reddy     # CKD - stable     # NHL   - in remission  - patient follows with Dr Hernandez. OP follow up upon discharge    # DLD  - f/u lipid profile in AM,   -C/W home meds    DVT ppx: lovenox  GI ppx: PPI  ambulate as tolerated  Dispo: from home  FULL CODE

## 2020-02-18 NOTE — PROGRESS NOTE ADULT - SUBJECTIVE AND OBJECTIVE BOX
ALETHACHUNGVENKATESH  79y  Female      SUBJECTIVE:    c/o sob with walking to [facility]    Attending Admission Note:  History of Present Illness:  Reason for Admission: SOB/wheezing + back pain	  History of Present Illness: 	  80 y/o F with PMH of COPD not on home oxygen, NHL - in remission (follows with Dr Hernandez), HTN , DLD, DM on insulin, CKD, CAD s/p PCI with 5 stents, autoimmune hemolytic anemia, who presents to the ED for worsening SOB/wheezing and back pain. She says that she is very SOB at baseline and is unable to walk around her house without feeling SOB. However, over the past week her SOB has been worsening to the point where she is even SOB at rest. She endorses productive cough at baseline but over the last few days her sputum has turned from clear to dark green. She denies fever/chills. She also has experienced acute onset left back pain . She describes it as sharp  It was constant and non-radiating however, since arrival in the ED the pain has improve significantly. Pain is made worse by twisting to the side or deep breathing. Patient denies sick contacts or recent travel. She denies fever/chills, headaches, sore throat, chest pain, palpitations, abdominal pain, N/V/D, urinary symptoms, or lower extremity edema.     REVIEW OF SYSTEMS:  negative except as above    T(C): 36.3 (02-18-20 @ 04:03), Max: 37.2 (02-17-20 @ 15:38)  HR: 75 (02-18-20 @ 04:03) (75 - 105)  BP: 173/72 (02-18-20 @ 04:03) (145/78 - 190/81)  RR: 18 (02-17-20 @ 20:30) (18 - 20)  SpO2: 69% (02-17-20 @ 20:30) (69% - 96%)  Wt(kg): --Vital Signs Last 24 Hrs  T(C): 36.3 (18 Feb 2020 04:03), Max: 37.2 (17 Feb 2020 15:38)  T(F): 97.3 (18 Feb 2020 04:03), Max: 99 (17 Feb 2020 15:38)  HR: 75 (18 Feb 2020 04:03) (75 - 105)  BP: 173/72 (18 Feb 2020 04:03) (145/78 - 190/81)  BP(mean): --  RR: 18 (17 Feb 2020 20:30) (18 - 20)    SpO2: 69% (17 Feb 2020 20:30) (69% - 96%)    PHYSICAL EXAM:  lungs-clear  cor-reg,nl s1 &s2  left leteral mid back ribs tender to palpation  abd-soft,non tender  ext-no calf tenderness  neuro-no focal  LABS:                        10.2   5.89  )-----------( 134      ( 18 Feb 2020 05:30 )             31.1     02-18    140  |  103  |  26<H>  ----------------------------<  262<H>  4.4   |  23  |  1.2    Ca    8.9      18 Feb 2020 05:30  Mg     1.8     02-18    TPro  5.5<L>  /  Alb  3.7  /  TBili  0.3  /  DBili  x   /  AST  22  /  ALT  18  /  AlkPhos  66  02-18  Urinalysis Basic - ( 17 Feb 2020 11:20 )    Color: Light Yellow / Appearance: Clear / SG: >1.050 / pH: x  Gluc: x / Ketone: Negative  / Bili: Negative / Urobili: <2 mg/dL   Blood: x / Protein: 30 mg/dL / Nitrite: Negative   Leuk Esterase: Negative / RBC: 1 /HPF / WBC 0 /HPF   Sq Epi: x / Non Sq Epi: 0 /HPF / Bacteria: Negative    < from: 12 Lead ECG (02.17.20 @ 09:42) >    Ventricular Rate 88 BPM    Atrial Rate 88 BPM    P-R Interval 242 ms    QRS Duration 86 ms    Q-T Interval 378 ms    QTC Calculation(Bezet) 457 ms    R Axis 41 degrees    T Axis 142 degrees    Diagnosis Line Sinus rhythm with 1st degree A-V block  ST & T wave abnormality, consider lateral ischemia  Abnormal ECG    Confirmed by QUITA LEMA MD ([id number]) on 2/17/2020 1:50:58 PM    < end of copied text >    RADIOLOGY:    < from: CT Angio Chest Dissection Protocol (02.17.20 @ 09:44) >    EXAM:  CTA DISSECTION             PROCEDURE DATE:  02/17/2020            INTERPRETATION:  CLINICAL HISTORY / REASON FOR EXAM: Left renal colic, clinical suspicion for dissection.    TECHNIQUE: Multislice helical CT images were obtained from the arch of the thoracic inlet through the pubic symphysis prior to and following intravenous administration of Optiray 320 contrast, utilizing CT angiogram protocol. Coronal and sagittal reformatted images were also submitted for review.    COMPARISON: CT chest 9/30/2019 and CTA abdomen with runoff 10/16/2011.      FINDINGS:    VASCULATURE: No high density mural hematoma. No intimal flap or double lumen. No dilatation of the aorta. No displacement of atherosclerotic calcifications into the lumen. Celiac, superior mesenteric and inferior mesenteric arteries are patent. Bilateral renal arteries are single and patent. 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 Incidental note is made of a common hepatic artery originating directly offof the aorta immediately right of the celiac axis takeoff.    LUNGS, PLEURA AND AIRWAYS: No obstructing endobronchial lesions. No pleural effusions or pneumothorax. Moderate to severe centrilobular and sagittal changes. Unchanged right upper lobe 1.3x 0.6 cm groundglass nodule/opacity (series 8, image 64). Decreased size of the left lower lobe spiculated nodule measuring 1.1 x 0.7 cm (previously 1.5 x 0.7 cm). Bibasilar subsegmental atelectasis.    MEDIASTINUM/LYMPH NODES: No supraclavicular, mediastinal, hilar or axillary adenopathy.     HEART/GREAT VESSELS: No pericardial effusion. Thoracic aorta and main pulmonary arteries are normal in caliber. Coronary artery calcifications and stents.    HEPATOBILIARY: 6 mm hyperdensity at the hepatic dome, indeterminate. Post cholecystectomy. No biliary ductal dilatation.     SPLEEN: Unremarkable.    PANCREAS: Unremarkable.    ADRENAL GLANDS: Unremarkable.    KIDNEYS: Symmetric renal enhancement. No hydroureteronephrosis.    ABDOMINOPELVIC NODES: No adenopathy.    PELVIC ORGANS: Unremarkable.    PERITONEUM/MESENTERY/BOWEL: No bowel obstruction, pneumoperitoneum or ascites. Normal appendix. Colonic diverticulosis without evidence of diverticulitis.    BONES/SOFT TISSUES: Multilevel degenerativechanges of the thoracolumbar spine.      IMPRESSION:     1.  No CTA evidence of aortic dissection.     2.  No CT evidence of acute intrathoracic or abdominopelvic pathology. Symmetric perfusion to the kidneys.    3.  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     4.  Decreased size of the left lower lobe spiculated nodule measuring 1.1 x 0.7 cm (previously 1.5 x 0.7 cm).     5.  Unchanged right upper lobe 1.3 x 0.6 cm groundglass nodule/opacity.    Additional Findings/Recommendations After Attending Radiologist Review:    Agree with above, please note ER examination setting is not tailored for detailed evaluation of pulmonary nodules.              SASCHA BLISS M.D., RESIDENT RADIOLOGIST  This document has been electronically signed.  ROEL MAX M.D., ATTENDING RADIOLOGIST  This document has been electronically signed. Feb 17 2020 10:26AM        < end of copied text >  < from: Xray Chest 1 View AP/PA (02.17.20 @ 09:06) >  EXAM:  XR CHEST FRONTAL 1V            PROCEDURE DATE:  02/17/2020            INTERPRETATION:  Clinical History/Reason For Exam: sob    Comparison : Chest radiograph 5/13/2019.    Technique: XR CHEST    Findings:    Support devices: Telemetry leads.    Cardiac/mediastinum/hilum: Aortic calcifications.    Lung parenchyma/Pleura: Persistent left basilar opacity. No new focal consolidation, effusion, or pneumothorax.    Skeleton/soft tissues: Unremarkable.    Impression:      Persistent left basilar opacity. No new focal consolidation, effusion, or pneumothorax.                  ROEL MAX M.D., ATTENDING RADIOLOGIST  This document has been electronically signed. Feb 17 2020  2:27PM              < end of copied text >    IMPRESSION:  copd exacerbation  lung nodules-same or smaller followed by Dr. Feldman  left back/rib pain  cad-stents  htn-needs better contol  pad  dm  non hodkins lymphoma  ckd  auto immune hemolytic anemia        PLAN:  iv steroids  po antibiotic  rib xrays  measure pulse ox with exertion and at rest  may need home oxygen  add procardia for htn  dr. Feldman pulmonary consult  DR. Alvarado cardiology consult    35 minutes in total spent on encounter;more than 50% counseling and coordinating care by attending physician

## 2020-02-19 LAB
ALBUMIN SERPL ELPH-MCNC: 3.9 G/DL — SIGNIFICANT CHANGE UP (ref 3.5–5.2)
ALP SERPL-CCNC: 70 U/L — SIGNIFICANT CHANGE UP (ref 30–115)
ALT FLD-CCNC: 18 U/L — SIGNIFICANT CHANGE UP (ref 0–41)
ANION GAP SERPL CALC-SCNC: 14 MMOL/L — SIGNIFICANT CHANGE UP (ref 7–14)
AST SERPL-CCNC: 22 U/L — SIGNIFICANT CHANGE UP (ref 0–41)
BASOPHILS # BLD AUTO: 0.02 K/UL — SIGNIFICANT CHANGE UP (ref 0–0.2)
BASOPHILS NFR BLD AUTO: 0.3 % — SIGNIFICANT CHANGE UP (ref 0–1)
BILIRUB SERPL-MCNC: 0.5 MG/DL — SIGNIFICANT CHANGE UP (ref 0.2–1.2)
BUN SERPL-MCNC: 31 MG/DL — HIGH (ref 10–20)
CALCIUM SERPL-MCNC: 8.9 MG/DL — SIGNIFICANT CHANGE UP (ref 8.5–10.1)
CHLORIDE SERPL-SCNC: 105 MMOL/L — SIGNIFICANT CHANGE UP (ref 98–110)
CO2 SERPL-SCNC: 25 MMOL/L — SIGNIFICANT CHANGE UP (ref 17–32)
CREAT SERPL-MCNC: 1.2 MG/DL — SIGNIFICANT CHANGE UP (ref 0.7–1.5)
EOSINOPHIL # BLD AUTO: 0.15 K/UL — SIGNIFICANT CHANGE UP (ref 0–0.7)
EOSINOPHIL NFR BLD AUTO: 2.4 % — SIGNIFICANT CHANGE UP (ref 0–8)
ESTIMATED AVERAGE GLUCOSE: 154 MG/DL — HIGH (ref 68–114)
GLUCOSE BLDC GLUCOMTR-MCNC: 258 MG/DL — HIGH (ref 70–99)
GLUCOSE BLDC GLUCOMTR-MCNC: 317 MG/DL — HIGH (ref 70–99)
GLUCOSE BLDC GLUCOMTR-MCNC: 423 MG/DL — HIGH (ref 70–99)
GLUCOSE BLDC GLUCOMTR-MCNC: 461 MG/DL — CRITICAL HIGH (ref 70–99)
GLUCOSE BLDC GLUCOMTR-MCNC: 466 MG/DL — CRITICAL HIGH (ref 70–99)
GLUCOSE SERPL-MCNC: 226 MG/DL — HIGH (ref 70–99)
HBA1C BLD-MCNC: 7 % — HIGH (ref 4–5.6)
HCT VFR BLD CALC: 30 % — LOW (ref 37–47)
HGB BLD-MCNC: 9.5 G/DL — LOW (ref 12–16)
IMM GRANULOCYTES NFR BLD AUTO: 0.6 % — HIGH (ref 0.1–0.3)
LYMPHOCYTES # BLD AUTO: 1.08 K/UL — LOW (ref 1.2–3.4)
LYMPHOCYTES # BLD AUTO: 17.1 % — LOW (ref 20.5–51.1)
MCHC RBC-ENTMCNC: 26.7 PG — LOW (ref 27–31)
MCHC RBC-ENTMCNC: 31.7 G/DL — LOW (ref 32–37)
MCV RBC AUTO: 84.3 FL — SIGNIFICANT CHANGE UP (ref 81–99)
MONOCYTES # BLD AUTO: 0.54 K/UL — SIGNIFICANT CHANGE UP (ref 0.1–0.6)
MONOCYTES NFR BLD AUTO: 8.6 % — SIGNIFICANT CHANGE UP (ref 1.7–9.3)
NEUTROPHILS # BLD AUTO: 4.47 K/UL — SIGNIFICANT CHANGE UP (ref 1.4–6.5)
NEUTROPHILS NFR BLD AUTO: 71 % — SIGNIFICANT CHANGE UP (ref 42.2–75.2)
NRBC # BLD: 0 /100 WBCS — SIGNIFICANT CHANGE UP (ref 0–0)
NT-PROBNP SERPL-SCNC: 1396 PG/ML — HIGH (ref 0–300)
PLATELET # BLD AUTO: 140 K/UL — SIGNIFICANT CHANGE UP (ref 130–400)
POTASSIUM SERPL-MCNC: 4.3 MMOL/L — SIGNIFICANT CHANGE UP (ref 3.5–5)
POTASSIUM SERPL-SCNC: 4.3 MMOL/L — SIGNIFICANT CHANGE UP (ref 3.5–5)
PROT SERPL-MCNC: 5.5 G/DL — LOW (ref 6–8)
RBC # BLD: 3.56 M/UL — LOW (ref 4.2–5.4)
RBC # FLD: 15.9 % — HIGH (ref 11.5–14.5)
SODIUM SERPL-SCNC: 144 MMOL/L — SIGNIFICANT CHANGE UP (ref 135–146)
WBC # BLD: 6.3 K/UL — SIGNIFICANT CHANGE UP (ref 4.8–10.8)
WBC # FLD AUTO: 6.3 K/UL — SIGNIFICANT CHANGE UP (ref 4.8–10.8)

## 2020-02-19 PROCEDURE — 71045 X-RAY EXAM CHEST 1 VIEW: CPT | Mod: 26

## 2020-02-19 RX ORDER — NIFEDIPINE 30 MG
30 TABLET, EXTENDED RELEASE 24 HR ORAL DAILY
Refills: 0 | Status: DISCONTINUED | OUTPATIENT
Start: 2020-02-20 | End: 2020-02-22

## 2020-02-19 RX ORDER — INSULIN LISPRO 100/ML
6 VIAL (ML) SUBCUTANEOUS ONCE
Refills: 0 | Status: COMPLETED | OUTPATIENT
Start: 2020-02-19 | End: 2020-02-19

## 2020-02-19 RX ADMIN — Medication 600 MILLIGRAM(S): at 11:53

## 2020-02-19 RX ADMIN — MONTELUKAST 10 MILLIGRAM(S): 4 TABLET, CHEWABLE ORAL at 11:52

## 2020-02-19 RX ADMIN — Medication 40 MILLIGRAM(S): at 06:03

## 2020-02-19 RX ADMIN — Medication 60 MILLIGRAM(S): at 06:03

## 2020-02-19 RX ADMIN — Medication 100 MILLIGRAM(S): at 17:16

## 2020-02-19 RX ADMIN — Medication 600 MILLIGRAM(S): at 10:31

## 2020-02-19 RX ADMIN — Medication 12: at 11:52

## 2020-02-19 RX ADMIN — Medication 3 MILLILITER(S): at 07:53

## 2020-02-19 RX ADMIN — CHLORHEXIDINE GLUCONATE 1 APPLICATION(S): 213 SOLUTION TOPICAL at 06:03

## 2020-02-19 RX ADMIN — ENOXAPARIN SODIUM 40 MILLIGRAM(S): 100 INJECTION SUBCUTANEOUS at 11:50

## 2020-02-19 RX ADMIN — Medication 3 MILLILITER(S): at 13:16

## 2020-02-19 RX ADMIN — Medication 48 MILLIGRAM(S): at 11:50

## 2020-02-19 RX ADMIN — ISOSORBIDE MONONITRATE 30 MILLIGRAM(S): 60 TABLET, EXTENDED RELEASE ORAL at 11:49

## 2020-02-19 RX ADMIN — Medication 100 MILLIGRAM(S): at 06:03

## 2020-02-19 RX ADMIN — Medication 6: at 07:53

## 2020-02-19 RX ADMIN — PANTOPRAZOLE SODIUM 40 MILLIGRAM(S): 20 TABLET, DELAYED RELEASE ORAL at 06:03

## 2020-02-19 RX ADMIN — ATORVASTATIN CALCIUM 20 MILLIGRAM(S): 80 TABLET, FILM COATED ORAL at 21:30

## 2020-02-19 RX ADMIN — Medication 3 MILLILITER(S): at 20:35

## 2020-02-19 RX ADMIN — BUDESONIDE AND FORMOTEROL FUMARATE DIHYDRATE 2 PUFF(S): 160; 4.5 AEROSOL RESPIRATORY (INHALATION) at 08:53

## 2020-02-19 RX ADMIN — BUDESONIDE AND FORMOTEROL FUMARATE DIHYDRATE 2 PUFF(S): 160; 4.5 AEROSOL RESPIRATORY (INHALATION) at 21:30

## 2020-02-19 RX ADMIN — LOSARTAN POTASSIUM 100 MILLIGRAM(S): 100 TABLET, FILM COATED ORAL at 06:03

## 2020-02-19 RX ADMIN — DONEPEZIL HYDROCHLORIDE 10 MILLIGRAM(S): 10 TABLET, FILM COATED ORAL at 21:30

## 2020-02-19 RX ADMIN — Medication 20 MILLIGRAM(S): at 06:03

## 2020-02-19 RX ADMIN — Medication 12: at 17:16

## 2020-02-19 NOTE — PROGRESS NOTE ADULT - ASSESSMENT
IMPRESSION:  COPD exacerbation  LLL spiculated nodule, decreasing in size followed by Dr. Feldman   RURADHA GG nodule unchanged    RECOMMENDATIONS:  Home inhalers  Nebs q4 and PRN  Solumedrol 60mg IV q8  Finish ABX course  Keep SpO2>92%  DVT ppx  OOB to chair  Follow up with Dr Feldman as outpatient IMPRESSION:  COPD exacerbation  LLL spiculated nodule, decreasing in size followed by Dr. Feldman   RUL GG nodule unchanged    RECOMMENDATIONS:  Home inhalers  Nebs q4 and PRN  Increase Solumedrol to 60mg IV q8  Finish ABX course  Keep SpO2>92%  DVT ppx  OOB to chair  Follow up with Dr Feldman as outpatient

## 2020-02-19 NOTE — PROGRESS NOTE ADULT - SUBJECTIVE AND OBJECTIVE BOX
SUBJECTIVE:    Patient is a 79y old Female who presents with a chief complaint of SOB/wheezing + back pain (19 Feb 2020 10:59)    Currently admitted to medicine with the primary diagnosis of COPD exacerbation     Today is hospital day 2d. This morning she is resting comfortably in bed and reports no new issues or overnight events. Patient still complains of shortness of breath, resting oxygen saturation is 93 % and 89% on ambulation. Home oxygen will be pursued as per medical attending.     PAST MEDICAL & SURGICAL HISTORY  Coronary artery disease involving native heart without angina pectoris, unspecified vessel or lesion type: s/p 5 stents  Diabetes: on insulin  Osteoarthritis  CKD (chronic kidney disease)  Thrombocytopenia  PVD (peripheral vascular disease)  Non Hodgkin's lymphoma: in remission. Follows with Dr Hernandez  Heart failure  High cholesterol  HTN (hypertension)  MI (myocardial infarction): s/p 5 stents  H/O carotid endarterectomy: RIGHT  H/O cardiac catheterization: 5 STENTS  History of cholecystectomy    SOCIAL HISTORY:    ALLERGIES:  ACE inhibitors (Unknown)  BENZALKONIUM (Rash)  BETADINE (Rash)  Ceclor (Unknown)  codeine (Unknown)  latex (Unknown)  penicillin (Unknown)  RUBBER GLOVES (Rash)  sulfonamides (Unknown)    MEDICATIONS:  STANDING MEDICATIONS  albuterol/ipratropium for Nebulization 3 milliLiter(s) Nebulizer every 6 hours  atorvastatin 20 milliGRAM(s) Oral at bedtime  budesonide 160 MICROgram(s)/formoterol 4.5 MICROgram(s) Inhaler 2 Puff(s) Inhalation two times a day  chlorhexidine 4% Liquid 1 Application(s) Topical <User Schedule>  dextrose 5%. 1000 milliLiter(s) IV Continuous <Continuous>  dextrose 50% Injectable 12.5 Gram(s) IV Push once  dextrose 50% Injectable 25 Gram(s) IV Push once  dextrose 50% Injectable 25 Gram(s) IV Push once  donepezil 10 milliGRAM(s) Oral at bedtime  doxycycline hyclate Capsule 100 milliGRAM(s) Oral every 12 hours  enoxaparin Injectable 40 milliGRAM(s) SubCutaneous daily  fenofibrate Tablet 48 milliGRAM(s) Oral daily  furosemide    Tablet 20 milliGRAM(s) Oral daily  insulin lispro (HumaLOG) corrective regimen sliding scale   SubCutaneous three times a day before meals  isosorbide   mononitrate ER Tablet (IMDUR) 30 milliGRAM(s) Oral daily  losartan 100 milliGRAM(s) Oral daily  montelukast 10 milliGRAM(s) Oral daily  NIFEdipine XL 60 milliGRAM(s) Oral daily  pantoprazole    Tablet 40 milliGRAM(s) Oral before breakfast  predniSONE   Tablet 40 milliGRAM(s) Oral daily    PRN MEDICATIONS  dextrose 40% Gel 15 Gram(s) Oral once PRN  glucagon  Injectable 1 milliGRAM(s) IntraMuscular once PRN  ibuprofen  Tablet. 600 milliGRAM(s) Oral every 8 hours PRN    VITALS:   T(F): 97.3  HR: 94  BP: 139/63  RR: 18  SpO2: 89%    LABS:                        9.5    6.30  )-----------( 140      ( 19 Feb 2020 05:26 )             30.0     02-19    144  |  105  |  31<H>  ----------------------------<  226<H>  4.3   |  25  |  1.2    Ca    8.9      19 Feb 2020 05:26  Mg     1.8     02-18    TPro  5.5<L>  /  Alb  3.9  /  TBili  0.5  /  DBili  x   /  AST  22  /  ALT  18  /  AlkPhos  70  02-19              Culture - Urine (collected 17 Feb 2020 11:20)  Source: .Urine Clean Catch (Midstream)  Final Report (18 Feb 2020 18:07):    <10,000 CFU/mL Normal Urogenital Cici      CARDIAC MARKERS ( 18 Feb 2020 05:30 )  x     / 0.02 ng/mL / x     / x     / x      CARDIAC MARKERS ( 17 Feb 2020 17:18 )  x     / <0.01 ng/mL / x     / x     / x          Serum Pro-Brain Natriuretic Peptide: 1396 pg/mL (02-19-20 @ 05:26)  Troponin T, Serum: 0.02 ng/mL (02-18-20 @ 05:30)  Troponin T, Serum: <0.01 ng/mL (02-17-20 @ 17:18)  Troponin T, Serum: <0.01 ng/mL (02-17-20 @ 09:00)  Serum Pro-Brain Natriuretic Peptide: 549 pg/mL (02-17-20 @ 09:00)      RADIOLOGY:    PHYSICAL EXAM:  GENERAL: NAD, well-groomed, well-developed  HEAD: Atraumatic  NECK: Supple  CHEST/LUNG: Air entry bilaterally; Decreased breath sounds at bases, No wheeze or crackles  HEART: S1, S2; RRR; No murmurs, rubs, or gallops  ABDOMEN: BS+; Soft, Non-tender, Non-distended, complaining of severe pain in back, tenderness on palpation  EXTREMITIES:  2+ Peripheral Pulses, No clubbing, cyanosis, or edema  PSYCH: AAOx3  NEUROLOGY: non-focal  SKIN: No rashes or lesions

## 2020-02-19 NOTE — PROGRESS NOTE ADULT - SUBJECTIVE AND OBJECTIVE BOX
VENKATESH RAMACHANDRAN  79y  Female      SUBJECTIVE:  c/o sob with walking to bathroom    Progress Note:      REVIEW OF SYSTEMS:    T(C): 35.7 (02-19-20 @ 05:00), Max: 36.8 (02-18-20 @ 20:00)  HR: 81 (02-19-20 @ 05:00) (78 - 84)  BP: 111/53 (02-19-20 @ 05:00) (111/53 - 188/73)  RR: 18 (02-19-20 @ 05:00) (18 - 18)  SpO2: 89% (02-19-20 @ 10:20) (89% - 93%)  Wt(kg): --Vital Signs Last 24 Hrs  T(C): 35.7 (19 Feb 2020 05:00), Max: 36.8 (18 Feb 2020 20:00)  T(F): 96.3 (19 Feb 2020 05:00), Max: 98.2 (18 Feb 2020 20:00)  HR: 81 (19 Feb 2020 05:00) (78 - 84)  BP: 111/53 (19 Feb 2020 05:00) (111/53 - 188/73)  BP(mean): --  RR: 18 (19 Feb 2020 05:00) (18 - 18)  SpO2: 89% (19 Feb 2020 10:20) (89% - 93%)    PHYSICAL EXAM:  lungs-clear  cor-reg,nl s1 &s2    LABS:                        9.5    6.30  )-----------( 140      ( 19 Feb 2020 05:26 )             30.0   02-19    144  |  105  |  31<H>  ----------------------------<  226<H>  4.3   |  25  |  1.2    Ca    8.9      19 Feb 2020 05:26  Mg     1.8     02-18    TPro  5.5<L>  /  Alb  3.9  /  TBili  0.5  /  DBili  x   /  AST  22  /  ALT  18  /  AlkPhos  70  02-19    pulse ox 93% on RA; 89% after ambulating to hallway and back  RADIOLOGY:    < from: Xray Ribs 3 Views, Bilateral (02.18.20 @ 12:06) >    EXAM:  XR RIBS 3 VIEWS BI            PROCEDURE DATE:  02/18/2020            INTERPRETATION:  Clinical History / Reason for exam: Pain.    Frontal and obliques of the bony thorax.    Evaluation reveals cardiomegaly with basilar opacities.    Degenerative changes of the shoulders.    There are no acute fractures of the bony thorax.    Impression:    No acute fracture of the bony thorax.                  ARMINDA PARTIDA M.D., ATTENDING RADIOLOGIST    < end of copied text                   ROEL MAX M.D., ATTENDING RADIOLOGIST  This document has been electronically signed. Feb 17 2020  2:27PM              < end of copied text >    IMPRESSION:  copd exacerbation  lung nodules-same or smaller followed by Dr. Feldman  hypoxia with ambulating short distance  left back/rib pain  cad-stents  htn-needs better contol  pad  dm-increased sugar from steroids  non hodkins lymphoma  ckd  auto immune hemolytic anemia        PLAN:  po prednisone  po antibiotic  nasal oxygen  arrange for  home oxygen  Dr. Medellin consult appreciated(covering for Dr. Feldman)      35 minutes in total spent on encounter;more than 50% counseling and coordinating care by attending physician

## 2020-02-19 NOTE — PROGRESS NOTE ADULT - SUBJECTIVE AND OBJECTIVE BOX
Patient is a 79y old  Female who presents with a chief complaint of SOB/wheezing + back pain (18 Feb 2020 10:16)    SUBJECTIVE: continues to complain of worsening shortness of breath mainly on ambulation.    REVIEW OF SYSTEMS:    CONSTITUTIONAL:   no fever  no chills  no weight gain   no weight loss    EYES:   no discharge,   no pain    ENT:   Ears: no ear pain and no hearing problems.  Nose: no nasal congestion and no nasal drainage.    CARDIOVASCULAR:   no chest pain,   no palpitaions    RESPIRATORY:  +SOB and wheezing  Respiratory difficulty on exertion  + Sputum production    GASTROINTESTINAL:   no abdominal pain,   no diarrhea,   no vomiting.    GENITOURINARY:  no dysuria,   no hematuria.    MUSCULOSKELETAL:   no back pain,   no musculoskeletal pain,  no weakness.    SKIN:   no jaundice,    no rashes.    NEURO:   no loss of consciousness,   no headache,   no weakness.    PSYCHIATRIC:   no known mental health issues  no anxiety  no depression    ALLERGIC/IMMUNOLOGIC:   No active allergic or immunologic issues      PHYSICAL EXAM  Vital Signs Last 24 Hrs  T(C): 35.7 (19 Feb 2020 05:00), Max: 36.8 (18 Feb 2020 20:00)  T(F): 96.3 (19 Feb 2020 05:00), Max: 98.2 (18 Feb 2020 20:00)  HR: 81 (19 Feb 2020 05:00) (78 - 84)  BP: 111/53 (19 Feb 2020 05:00) (111/53 - 188/73)  RR: 18 (19 Feb 2020 05:00) (18 - 18)  SpO2: 96% on RA (18 Feb 2020 10:31) (93% - 93%)    CONSTITUTIONAL:   Well nourished.  NAD    ENT:   Airway patent,   No thrush    EYES:   Clear bilaterally,   pupils equal, round and reactive to light.    CARDIAC:   Normal rate,   regular rhythm.    no edema      CAROTID:   normal systolic impulse  no bruits    RESPIRATORY:   Expiratory wheezing  Normal chest expansion  Not tachypneic  No use of accessory muscles    GASTROINTESTINAL:  Abdomen soft,   non-tender,   no guarding,   + BS    GENITOURINARY  normal genitalia for sex  no edema    MUSCULOSKELETAL:   range of motion is not limited,  no clubbing, cyanosis  Left chest tenderness    NEUROLOGICAL:   Alert and oriented   no motor  deficits.    SKIN:   Skin normal color for race,   No evidence of rash.    PSYCHIATRIC:   normal mood and affect.   no apparent risk to self or others.    HEME LYMPH:   No cervical  lymphadenopathy.  no inguinal lymphadenopathy      02-18-20 @ 07:01  -  02-19-20 @ 07:00  --------------------------------------------------------  IN:    Oral Fluid: 240 mL  Total IN: 240 mL    OUT:  Total OUT: 0 mL    Total NET: 240 mL    LABS:                          9.5    6.30  )-----------( 140      ( 19 Feb 2020 05:26 )             30.0                                               02-19    144  |  105  |  31<H>  ----------------------------<  226<H>  4.3   |  25  |  1.2    Ca    8.9      19 Feb 2020 05:26  Mg     1.8     02-18    TPro  5.5<L>  /  Alb  3.9  /  TBili  0.5  /  DBili  x   /  AST  22  /  ALT  18  /  AlkPhos  70  02-19                                             Urinalysis Basic - ( 17 Feb 2020 11:20 )    Color: Light Yellow / Appearance: Clear / SG: >1.050 / pH: x  Gluc: x / Ketone: Negative  / Bili: Negative / Urobili: <2 mg/dL   Blood: x / Protein: 30 mg/dL / Nitrite: Negative   Leuk Esterase: Negative / RBC: 1 /HPF / WBC 0 /HPF   Sq Epi: x / Non Sq Epi: 0 /HPF / Bacteria: Negative        CARDIAC MARKERS ( 18 Feb 2020 05:30 )  x     / 0.02 ng/mL / x     / x     / x      CARDIAC MARKERS ( 17 Feb 2020 17:18 )  x     / <0.01 ng/mL / x     / x     / x                                                LIVER FUNCTIONS - ( 19 Feb 2020 05:26 )  Alb: 3.9 g/dL / Pro: 5.5 g/dL / ALK PHOS: 70 U/L / ALT: 18 U/L / AST: 22 U/L / GGT: x                                                  Culture - Urine (collected 17 Feb 2020 11:20)  Source: .Urine Clean Catch (Midstream)  Final Report (18 Feb 2020 18:07):    <10,000 CFU/mL Normal Urogenital Cici                                                MEDICATIONS  (STANDING):  albuterol/ipratropium for Nebulization 3 milliLiter(s) Nebulizer every 6 hours  atorvastatin 20 milliGRAM(s) Oral at bedtime  budesonide 160 MICROgram(s)/formoterol 4.5 MICROgram(s) Inhaler 2 Puff(s) Inhalation two times a day  chlorhexidine 4% Liquid 1 Application(s) Topical <User Schedule>  dextrose 5%. 1000 milliLiter(s) (50 mL/Hr) IV Continuous <Continuous>  dextrose 50% Injectable 12.5 Gram(s) IV Push once  dextrose 50% Injectable 25 Gram(s) IV Push once  dextrose 50% Injectable 25 Gram(s) IV Push once  donepezil 10 milliGRAM(s) Oral at bedtime  doxycycline hyclate Capsule 100 milliGRAM(s) Oral every 12 hours  enoxaparin Injectable 40 milliGRAM(s) SubCutaneous daily  fenofibrate Tablet 48 milliGRAM(s) Oral daily  furosemide    Tablet 20 milliGRAM(s) Oral daily  insulin lispro (HumaLOG) corrective regimen sliding scale   SubCutaneous three times a day before meals  isosorbide   mononitrate ER Tablet (IMDUR) 30 milliGRAM(s) Oral daily  losartan 100 milliGRAM(s) Oral daily  montelukast 10 milliGRAM(s) Oral daily  NIFEdipine XL 60 milliGRAM(s) Oral daily  pantoprazole    Tablet 40 milliGRAM(s) Oral before breakfast  predniSONE   Tablet 40 milliGRAM(s) Oral daily    MEDICATIONS  (PRN):  dextrose 40% Gel 15 Gram(s) Oral once PRN Blood Glucose LESS THAN 70 milliGRAM(s)/deciliter  glucagon  Injectable 1 milliGRAM(s) IntraMuscular once PRN Glucose LESS THAN 70 milligrams/deciliter  ibuprofen  Tablet. 600 milliGRAM(s) Oral every 8 hours PRN Severe Pain (7 - 10)

## 2020-02-19 NOTE — PROGRESS NOTE ADULT - ASSESSMENT
79y old Female who presents with a chief complaint of SOB/wheezing + back pain (19 Feb 2020 10:59)    Currently admitted to medicine with the primary diagnosis of COPD exacerbation     Today is hospital day 2d. This morning she is resting comfortably in bed and reports no new issues or overnight events. Patient still complains of shortness of breath, resting oxygen saturation is 93 % and 89% on ambulation. Home oxygen will be pursued as per medical attending.           # SOB/wheezing - likely secondary to COPD exacerbation vs. bronchitis   - Continue duonebs, and Prednisone  - pulm recommendations appreciated    # Left-sided back pain worse with twisting movement.   - likely musculoskeletal in nature,differential includes pleuritic chest pain for COPD exacerbation   - CT negative for acute intra-abdominal pathology  - pain improved with lidocaine patch, toradol and robaxin.   - continue to monitor clinically   -f/u xray did not show any acute pathology    # ST depressions in lateral leads on EKG - hx of CAD s/p 5 stents   - patient denies chest pain.   - trops negative  - Dr Fernandes requesting cardio consult, Dr Leong, would follow, no comment from cardiology yet.     # HTN - uncontrolled on admission (227/95)  - continue home meds. Likely elevated secondary to SOB on admission. Continue to monitor.   -control improving, cardizem 30 mg daily    # DM  - f/u hemoglobin A1C 7  - fingersticks ACHS  - Start on basal/bolus and SSI insulin if fs > 180  - OP follow up with Dr Reddy     # CKD - stable     # NHL   - in remission  - patient follows with Dr Hernandez. OP follow up upon discharge    # DLD  -    -C/W home meds    DVT ppx: lovenox  GI ppx: PPI  ambulate as tolerated  Dispo: from home  FULL CODE

## 2020-02-20 ENCOUNTER — APPOINTMENT (OUTPATIENT)
Dept: RADIATION ONCOLOGY | Facility: HOSPITAL | Age: 80
End: 2020-02-20

## 2020-02-20 ENCOUNTER — TRANSCRIPTION ENCOUNTER (OUTPATIENT)
Age: 80
End: 2020-02-20

## 2020-02-20 LAB
ANION GAP SERPL CALC-SCNC: 16 MMOL/L — HIGH (ref 7–14)
BASOPHILS # BLD AUTO: 0.01 K/UL — SIGNIFICANT CHANGE UP (ref 0–0.2)
BASOPHILS NFR BLD AUTO: 0.2 % — SIGNIFICANT CHANGE UP (ref 0–1)
BUN SERPL-MCNC: 36 MG/DL — HIGH (ref 10–20)
CALCIUM SERPL-MCNC: 9 MG/DL — SIGNIFICANT CHANGE UP (ref 8.5–10.1)
CHLORIDE SERPL-SCNC: 104 MMOL/L — SIGNIFICANT CHANGE UP (ref 98–110)
CO2 SERPL-SCNC: 24 MMOL/L — SIGNIFICANT CHANGE UP (ref 17–32)
CREAT SERPL-MCNC: 1.5 MG/DL — SIGNIFICANT CHANGE UP (ref 0.7–1.5)
EOSINOPHIL # BLD AUTO: 0.07 K/UL — SIGNIFICANT CHANGE UP (ref 0–0.7)
EOSINOPHIL NFR BLD AUTO: 1.5 % — SIGNIFICANT CHANGE UP (ref 0–8)
GLUCOSE BLDC GLUCOMTR-MCNC: 154 MG/DL — HIGH (ref 70–99)
GLUCOSE BLDC GLUCOMTR-MCNC: 240 MG/DL — HIGH (ref 70–99)
GLUCOSE BLDC GLUCOMTR-MCNC: 353 MG/DL — HIGH (ref 70–99)
GLUCOSE BLDC GLUCOMTR-MCNC: 413 MG/DL — HIGH (ref 70–99)
GLUCOSE BLDC GLUCOMTR-MCNC: 473 MG/DL — CRITICAL HIGH (ref 70–99)
GLUCOSE BLDC GLUCOMTR-MCNC: 587 MG/DL — CRITICAL HIGH (ref 70–99)
GLUCOSE SERPL-MCNC: 185 MG/DL — HIGH (ref 70–99)
HCT VFR BLD CALC: 30.2 % — LOW (ref 37–47)
HGB BLD-MCNC: 9.7 G/DL — LOW (ref 12–16)
IMM GRANULOCYTES NFR BLD AUTO: 0.6 % — HIGH (ref 0.1–0.3)
LYMPHOCYTES # BLD AUTO: 0.91 K/UL — LOW (ref 1.2–3.4)
LYMPHOCYTES # BLD AUTO: 19.4 % — LOW (ref 20.5–51.1)
MCHC RBC-ENTMCNC: 26.7 PG — LOW (ref 27–31)
MCHC RBC-ENTMCNC: 32.1 G/DL — SIGNIFICANT CHANGE UP (ref 32–37)
MCV RBC AUTO: 83.2 FL — SIGNIFICANT CHANGE UP (ref 81–99)
MONOCYTES # BLD AUTO: 0.45 K/UL — SIGNIFICANT CHANGE UP (ref 0.1–0.6)
MONOCYTES NFR BLD AUTO: 9.6 % — HIGH (ref 1.7–9.3)
NEUTROPHILS # BLD AUTO: 3.23 K/UL — SIGNIFICANT CHANGE UP (ref 1.4–6.5)
NEUTROPHILS NFR BLD AUTO: 68.7 % — SIGNIFICANT CHANGE UP (ref 42.2–75.2)
NRBC # BLD: 0 /100 WBCS — SIGNIFICANT CHANGE UP (ref 0–0)
PLATELET # BLD AUTO: 139 K/UL — SIGNIFICANT CHANGE UP (ref 130–400)
POTASSIUM SERPL-MCNC: 3.7 MMOL/L — SIGNIFICANT CHANGE UP (ref 3.5–5)
POTASSIUM SERPL-SCNC: 3.7 MMOL/L — SIGNIFICANT CHANGE UP (ref 3.5–5)
RBC # BLD: 3.63 M/UL — LOW (ref 4.2–5.4)
RBC # FLD: 15.6 % — HIGH (ref 11.5–14.5)
SODIUM SERPL-SCNC: 144 MMOL/L — SIGNIFICANT CHANGE UP (ref 135–146)
WBC # BLD: 4.7 K/UL — LOW (ref 4.8–10.8)
WBC # FLD AUTO: 4.7 K/UL — LOW (ref 4.8–10.8)

## 2020-02-20 PROCEDURE — 93010 ELECTROCARDIOGRAM REPORT: CPT

## 2020-02-20 PROCEDURE — 93306 TTE W/DOPPLER COMPLETE: CPT | Mod: 26

## 2020-02-20 RX ORDER — INSULIN GLARGINE 100 [IU]/ML
10 INJECTION, SOLUTION SUBCUTANEOUS AT BEDTIME
Refills: 0 | Status: DISCONTINUED | OUTPATIENT
Start: 2020-02-20 | End: 2020-02-21

## 2020-02-20 RX ORDER — LIDOCAINE 4 G/100G
1 CREAM TOPICAL ONCE
Refills: 0 | Status: COMPLETED | OUTPATIENT
Start: 2020-02-20 | End: 2020-02-20

## 2020-02-20 RX ORDER — IPRATROPIUM/ALBUTEROL SULFATE 18-103MCG
3 AEROSOL WITH ADAPTER (GRAM) INHALATION EVERY 4 HOURS
Refills: 0 | Status: DISCONTINUED | OUTPATIENT
Start: 2020-02-20 | End: 2020-02-22

## 2020-02-20 RX ADMIN — Medication 100 MILLIGRAM(S): at 16:55

## 2020-02-20 RX ADMIN — DONEPEZIL HYDROCHLORIDE 10 MILLIGRAM(S): 10 TABLET, FILM COATED ORAL at 21:49

## 2020-02-20 RX ADMIN — LIDOCAINE 1 PATCH: 4 CREAM TOPICAL at 21:49

## 2020-02-20 RX ADMIN — ATORVASTATIN CALCIUM 20 MILLIGRAM(S): 80 TABLET, FILM COATED ORAL at 21:49

## 2020-02-20 RX ADMIN — PANTOPRAZOLE SODIUM 40 MILLIGRAM(S): 20 TABLET, DELAYED RELEASE ORAL at 06:12

## 2020-02-20 RX ADMIN — Medication 3 MILLILITER(S): at 14:07

## 2020-02-20 RX ADMIN — Medication 48 MILLIGRAM(S): at 08:22

## 2020-02-20 RX ADMIN — Medication 60 MILLIGRAM(S): at 17:00

## 2020-02-20 RX ADMIN — MONTELUKAST 10 MILLIGRAM(S): 4 TABLET, CHEWABLE ORAL at 08:22

## 2020-02-20 RX ADMIN — BUDESONIDE AND FORMOTEROL FUMARATE DIHYDRATE 2 PUFF(S): 160; 4.5 AEROSOL RESPIRATORY (INHALATION) at 08:52

## 2020-02-20 RX ADMIN — ENOXAPARIN SODIUM 40 MILLIGRAM(S): 100 INJECTION SUBCUTANEOUS at 12:44

## 2020-02-20 RX ADMIN — LOSARTAN POTASSIUM 100 MILLIGRAM(S): 100 TABLET, FILM COATED ORAL at 05:27

## 2020-02-20 RX ADMIN — Medication 20 MILLIGRAM(S): at 05:27

## 2020-02-20 RX ADMIN — Medication 6 UNIT(S): at 00:11

## 2020-02-20 RX ADMIN — Medication 12: at 16:56

## 2020-02-20 RX ADMIN — Medication 4: at 08:21

## 2020-02-20 RX ADMIN — Medication 100 MILLIGRAM(S): at 05:27

## 2020-02-20 RX ADMIN — ISOSORBIDE MONONITRATE 30 MILLIGRAM(S): 60 TABLET, EXTENDED RELEASE ORAL at 12:44

## 2020-02-20 RX ADMIN — Medication 12: at 11:36

## 2020-02-20 RX ADMIN — BUDESONIDE AND FORMOTEROL FUMARATE DIHYDRATE 2 PUFF(S): 160; 4.5 AEROSOL RESPIRATORY (INHALATION) at 21:50

## 2020-02-20 RX ADMIN — Medication 30 MILLIGRAM(S): at 05:27

## 2020-02-20 RX ADMIN — Medication 3 MILLILITER(S): at 08:35

## 2020-02-20 RX ADMIN — Medication 40 MILLIGRAM(S): at 05:27

## 2020-02-20 NOTE — DISCHARGE NOTE NURSING/CASE MANAGEMENT/SOCIAL WORK - PATIENT PORTAL LINK FT
You can access the FollowMyHealth Patient Portal offered by [facility] by registering at the following website: [email or web address]. By joining [employer]’s FollowMyHealth portal, you will also be able to view your health information using other applications (apps) compatible with our system.

## 2020-02-20 NOTE — CONSULT NOTE ADULT - ATTENDING COMMENTS
Seen and examined with the pulmonary fellow at the bed side.  Impression and plan as outlined above.
agree

## 2020-02-20 NOTE — PROGRESS NOTE ADULT - ASSESSMENT
79y old Female who presents with a chief complaint of SOB/wheezing + back pain (20 Feb 2020 09:47)    Currently admitted to medicine with the primary diagnosis of COPD exacerbation     Today is hospital day 3d. This morning she is resting comfortably in bed and reports no new issues or overnight events. Patient still complains of shortness of breath, resting oxygen saturation is 93 % and 87% on ambulation. Home oxygen will be pursued as per medical attending.         # SOB/wheezing - likely secondary to COPD exacerbation vs. bronchitis   - Continue duonebs, and solumedrol  - pulm recommendations appreciated, changed prednisone to solu-medrol    # Left-sided back pain worse with twisting movement.   - likely musculoskeletal in nature, differential includes pleuritic chest pain for COPD exacerbation   - CT negative for acute intra-abdominal pathology  - pain improved with lidocaine patch, Toradol and Robaxin   - continue to monitor clinically   - f/u xray did not show any acute pathology    # ST depressions in lateral leads on EKG - hx of CAD s/p 5 stents   - patient denies chest pain.   - trops negative  - Dr Fernandes requesting cardio consult, Dr Leong, would follow, no comment from cardiology yet.     # HTN - uncontrolled on admission (227/95)  - continue home meds. Likely elevated secondary to SOB on admission. Continue to monitor.   -control improving, Cardizem 30 mg daily    # DM  - f/u hemoglobin A1C 7  - fingersticks ACHS  - Start on basal/bolus and SSI insulin if fs > 180  - OP follow up with Dr Reddy     # CKD   - stable     # NHL   - in remission  - patient follows with Dr Hernandez. OP follow up upon discharge    # DLD  -    -C/W home meds    DVT ppx: lovenox  GI ppx: PPI  ambulate as tolerated  Dispo: from home  FULL CODE 79y old Female who presents with a chief complaint of SOB/wheezing + back pain (20 Feb 2020 09:47)    Currently admitted to medicine with the primary diagnosis of COPD exacerbation     Today is hospital day 3d. This morning she is resting comfortably in bed and reports no new issues or overnight events. Patient still complains of shortness of breath, resting oxygen saturation is 93 % and 87% on ambulation. Home oxygen will be pursued as per medical attending.         # SOB/wheezing - likely secondary to COPD exacerbation vs. bronchitis   - Continue duonebs, and solumedrol  - pulm recommendations appreciated, changed prednisone to solu-medrol    # Left-sided back pain worse with twisting movement.   - likely musculoskeletal in nature, differential includes pleuritic chest pain for COPD exacerbation   - CT negative for acute intra-abdominal pathology  - pain improved with lidocaine patch, Toradol and Robaxin   - continue to monitor clinically   - f/u xray did not show any acute pathology    # ST depressions in lateral leads on EKG - hx of CAD s/p 5 stents   - patient denies chest pain.   - trops negative  - Dr Fernandes requesting cardio consult, Dr Leong, would follow, no comment from cardiology yet.     # HTN - uncontrolled on admission (227/95)  - continue home meds. Likely elevated secondary to SOB on admission. Continue to monitor.   -control improving, Cardizem 30 mg daily    # DM  - f/u hemoglobin A1C 7  - fingersticks ACHS  - Start on basal/bolus and SSI insulin if fs > 180  - OP follow up with Dr Reddy     # CKD   - stable     # NHL   - in remission  - patient follows with Dr Hernandez. OP follow up upon discharge    # DLD  -    -C/W home meds    DVT ppx: lovenox  GI ppx: PPI  ambulate as tolerated  Dispo: from home  FULL CODE     Patient needs home oxygen.  Patient still complains of shortness of breath despite IV steroids, patient is still hypoxic, resting oxygen saturation is 93 % and 87% on ambulation at room air and 94% on 2L oxygen. Patient is tested in a chronic stable state. Patient is aware that she will be going home on home oxygen and is agreeable.

## 2020-02-20 NOTE — PROVIDER CONTACT NOTE (OTHER) - ACTION/TREATMENT ORDERED:
As per Dr. Dumont, will give currently ordered dose of 12 units of Lispro, and will recheck FS in 1 hour.

## 2020-02-20 NOTE — PROGRESS NOTE ADULT - SUBJECTIVE AND OBJECTIVE BOX
SUBJECTIVE:    Patient is a 79y old Female who presents with a chief complaint of SOB/wheezing + back pain (20 Feb 2020 09:47)    Currently admitted to medicine with the primary diagnosis of COPD exacerbation     Today is hospital day 3d. This morning she is resting comfortably in bed and reports no new issues or overnight events. Patient still complains of shortness of breath, resting oxygen saturation is 93 % and 89% on ambulation. Home oxygen will be pursued as per medical attending.       PAST MEDICAL & SURGICAL HISTORY  Coronary artery disease involving native heart without angina pectoris, unspecified vessel or lesion type: s/p 5 stents  Diabetes: on insulin  Osteoarthritis  CKD (chronic kidney disease)  Thrombocytopenia  PVD (peripheral vascular disease)  Non Hodgkin's lymphoma: in remission. Follows with Dr Hernandez  Heart failure  High cholesterol  HTN (hypertension)  MI (myocardial infarction): s/p 5 stents  H/O carotid endarterectomy: RIGHT  H/O cardiac catheterization: 5 STENTS  History of cholecystectomy    SOCIAL HISTORY:    ALLERGIES:  ACE inhibitors (Unknown)  BENZALKONIUM (Rash)  BETADINE (Rash)  Ceclor (Unknown)  codeine (Unknown)  latex (Unknown)  penicillin (Unknown)  RUBBER GLOVES (Rash)  sulfonamides (Unknown)    MEDICATIONS:  STANDING MEDICATIONS  albuterol/ipratropium for Nebulization. 3 milliLiter(s) Nebulizer every 4 hours  atorvastatin 20 milliGRAM(s) Oral at bedtime  budesonide 160 MICROgram(s)/formoterol 4.5 MICROgram(s) Inhaler 2 Puff(s) Inhalation two times a day  chlorhexidine 4% Liquid 1 Application(s) Topical <User Schedule>  dextrose 5%. 1000 milliLiter(s) IV Continuous <Continuous>  dextrose 50% Injectable 12.5 Gram(s) IV Push once  dextrose 50% Injectable 25 Gram(s) IV Push once  dextrose 50% Injectable 25 Gram(s) IV Push once  donepezil 10 milliGRAM(s) Oral at bedtime  doxycycline hyclate Capsule 100 milliGRAM(s) Oral every 12 hours  enoxaparin Injectable 40 milliGRAM(s) SubCutaneous daily  fenofibrate Tablet 48 milliGRAM(s) Oral daily  furosemide    Tablet 20 milliGRAM(s) Oral daily  insulin lispro (HumaLOG) corrective regimen sliding scale   SubCutaneous three times a day before meals  isosorbide   mononitrate ER Tablet (IMDUR) 30 milliGRAM(s) Oral daily  losartan 100 milliGRAM(s) Oral daily  methylPREDNISolone sodium succinate Injectable 60 milliGRAM(s) IV Push every 12 hours  montelukast 10 milliGRAM(s) Oral daily  NIFEdipine XL 30 milliGRAM(s) Oral daily  pantoprazole    Tablet 40 milliGRAM(s) Oral before breakfast  predniSONE   Tablet 40 milliGRAM(s) Oral daily    PRN MEDICATIONS  dextrose 40% Gel 15 Gram(s) Oral once PRN  glucagon  Injectable 1 milliGRAM(s) IntraMuscular once PRN  ibuprofen  Tablet. 600 milliGRAM(s) Oral every 8 hours PRN    VITALS:   T(F): 96.5  HR: 82  BP: 130/56  RR: 18  SpO2: 87%    LABS:                        9.7    4.70  )-----------( 139      ( 20 Feb 2020 05:22 )             30.2     02-20    144  |  104  |  36<H>  ----------------------------<  185<H>  3.7   |  24  |  1.5    Ca    9.0      20 Feb 2020 05:22    TPro  5.5<L>  /  Alb  3.9  /  TBili  0.5  /  DBili  x   /  AST  22  /  ALT  18  /  AlkPhos  70  02-19              Culture - Urine (collected 17 Feb 2020 11:20)  Source: .Urine Clean Catch (Midstream)  Final Report (18 Feb 2020 18:07):    <10,000 CFU/mL Normal Urogenital Cici          Serum Pro-Brain Natriuretic Peptide: 1396 pg/mL (02-19-20 @ 05:26)  Troponin T, Serum: 0.02 ng/mL (02-18-20 @ 05:30)  Troponin T, Serum: <0.01 ng/mL (02-17-20 @ 17:18)      RADIOLOGY:    PHYSICAL EXAM:  GENERAL: NAD, well-groomed, well-developed  HEAD:  NCAT  EYES: EOMI, PERRLA, conjunctiva clear  ENMT: No tonsillar erythema, exudates, or enlargement; Moist mucous membranes, Good dentition, No lesions  NECK: Supple, No JVD, Normal thyroid  NERVOUS SYSTEM: AAOX4, Good concentration; Motor Strength 5/5 B/L upper and lower extremities; DTRs 2+ intact and symmetric  CHEST/LUNG: CTA b/l no w/r/r  HEART: +s1s2 RRR no m/g/r  ABDOMEN: soft, NT/ND (+) bs, no HSM  EXTREMITIES:  2+ Peripheral Pulses, No c/c/e  LYMPH: No lymphadenopathy noted  SKIN: No rashes or lesions SUBJECTIVE:    Patient is a 79y old Female who presents with a chief complaint of SOB/wheezing + back pain (20 Feb 2020 09:47)    Currently admitted to medicine with the primary diagnosis of COPD exacerbation     Today is hospital day 3d. This morning she is resting comfortably in bed and reports no new issues or overnight events. Patient still complains of shortness of breath, resting oxygen saturation is 93 % and 87% on ambulation. Home oxygen will be pursued as per medical attending.       PAST MEDICAL & SURGICAL HISTORY  Coronary artery disease involving native heart without angina pectoris, unspecified vessel or lesion type: s/p 5 stents  Diabetes: on insulin  Osteoarthritis  CKD (chronic kidney disease)  Thrombocytopenia  PVD (peripheral vascular disease)  Non Hodgkin's lymphoma: in remission. Follows with Dr Hernandez  Heart failure  High cholesterol  HTN (hypertension)  MI (myocardial infarction): s/p 5 stents  H/O carotid endarterectomy: RIGHT  H/O cardiac catheterization: 5 STENTS  History of cholecystectomy    SOCIAL HISTORY:    ALLERGIES:  ACE inhibitors (Unknown)  BENZALKONIUM (Rash)  BETADINE (Rash)  Ceclor (Unknown)  codeine (Unknown)  latex (Unknown)  penicillin (Unknown)  RUBBER GLOVES (Rash)  sulfonamides (Unknown)    MEDICATIONS:  STANDING MEDICATIONS  albuterol/ipratropium for Nebulization. 3 milliLiter(s) Nebulizer every 4 hours  atorvastatin 20 milliGRAM(s) Oral at bedtime  budesonide 160 MICROgram(s)/formoterol 4.5 MICROgram(s) Inhaler 2 Puff(s) Inhalation two times a day  chlorhexidine 4% Liquid 1 Application(s) Topical <User Schedule>  dextrose 5%. 1000 milliLiter(s) IV Continuous <Continuous>  dextrose 50% Injectable 12.5 Gram(s) IV Push once  dextrose 50% Injectable 25 Gram(s) IV Push once  dextrose 50% Injectable 25 Gram(s) IV Push once  donepezil 10 milliGRAM(s) Oral at bedtime  doxycycline hyclate Capsule 100 milliGRAM(s) Oral every 12 hours  enoxaparin Injectable 40 milliGRAM(s) SubCutaneous daily  fenofibrate Tablet 48 milliGRAM(s) Oral daily  furosemide    Tablet 20 milliGRAM(s) Oral daily  insulin lispro (HumaLOG) corrective regimen sliding scale   SubCutaneous three times a day before meals  isosorbide   mononitrate ER Tablet (IMDUR) 30 milliGRAM(s) Oral daily  losartan 100 milliGRAM(s) Oral daily  methylPREDNISolone sodium succinate Injectable 60 milliGRAM(s) IV Push every 12 hours  montelukast 10 milliGRAM(s) Oral daily  NIFEdipine XL 30 milliGRAM(s) Oral daily  pantoprazole    Tablet 40 milliGRAM(s) Oral before breakfast  predniSONE   Tablet 40 milliGRAM(s) Oral daily    PRN MEDICATIONS  dextrose 40% Gel 15 Gram(s) Oral once PRN  glucagon  Injectable 1 milliGRAM(s) IntraMuscular once PRN  ibuprofen  Tablet. 600 milliGRAM(s) Oral every 8 hours PRN    VITALS:   T(F): 96.5  HR: 82  BP: 130/56  RR: 18  SpO2: 87%    LABS:                        9.7    4.70  )-----------( 139      ( 20 Feb 2020 05:22 )             30.2     02-20    144  |  104  |  36<H>  ----------------------------<  185<H>  3.7   |  24  |  1.5    Ca    9.0      20 Feb 2020 05:22    TPro  5.5<L>  /  Alb  3.9  /  TBili  0.5  /  DBili  x   /  AST  22  /  ALT  18  /  AlkPhos  70  02-19              Culture - Urine (collected 17 Feb 2020 11:20)  Source: .Urine Clean Catch (Midstream)  Final Report (18 Feb 2020 18:07):    <10,000 CFU/mL Normal Urogenital Cici          Serum Pro-Brain Natriuretic Peptide: 1396 pg/mL (02-19-20 @ 05:26)  Troponin T, Serum: 0.02 ng/mL (02-18-20 @ 05:30)  Troponin T, Serum: <0.01 ng/mL (02-17-20 @ 17:18)      RADIOLOGY:    PHYSICAL EXAM:  GENERAL: NAD, well-groomed, well-developed  HEAD: Atraumatic  NECK: Supple  CHEST/LUNG: Air entry bilaterally; Decreased breath sounds at bases, No wheeze or crackles  HEART: S1, S2; RRR; No murmurs, rubs, or gallops  ABDOMEN: BS+; Soft, Non-tender, Non-distended, complaining of severe pain in back, tenderness on palpation  EXTREMITIES:  2+ Peripheral Pulses, No clubbing, cyanosis, or edema  PSYCH: AAOx3  NEUROLOGY: non-focal  SKIN: No rashes or lesions

## 2020-02-20 NOTE — DISCHARGE NOTE NURSING/CASE MANAGEMENT/SOCIAL WORK - NSDCPEEMAIL_GEN_ALL_CORE
[facility] email [email or web address]

## 2020-02-20 NOTE — PHYSICAL THERAPY INITIAL EVALUATION ADULT - PERTINENT HX OF CURRENT PROBLEM, REHAB EVAL
1 y/o F with PMH of COPD not on home oxygen, NHL - in remission (follows with Dr Hernandez), HTN , DLD, DM on insulin, CKD, CAD s/p PCI with 5 stents, autoimmune hemolytic anemia, who presents to the ED for worsening SOB/wheezing and back pain

## 2020-02-20 NOTE — PROGRESS NOTE ADULT - SUBJECTIVE AND OBJECTIVE BOX
02-20-20 @ 11:41    VENKATESH RAMACHANDRAN  79y  Female  Seen OOB to chair. Family at bedside.   No ac c/o. Has SOB as before, specially w/ min activities. Using O2.    Admitting c/o : SOB, wheeze    HPI on admission 2/17 :    78 y/o F with PMH of COPD not on home oxygen, NHL - in remission (follows with Dr Hernandez), HTN , DLD, DM on insulin, CKD, CAD s/p PCI with 5 stents, autoimmune hemolytic anemia, who presents to the ED for worsening SOB/wheezing and back pain. She says that she is very SOB at baseline and is unable to walk around her house without feeling SOB. However, over the past week her SOB has been worsening to the point where she is even SOB at rest. She endorses productive cough at baseline but over the last few days her sputum has turned from clear to dark green. She denies fever/chills. She also has experienced acute onset back pain that awoke her from sleep. She describes it as sharp and 10/10 in severity at it's worse. It was constant and non-radiating however, since arrival in the ED the pain has improve significantly. Pain is made worse by twisting to the side. Patient denies sick contacts or recent travel. She denies fever/chills, headaches, sore throat, chest pain, palpitations, abdominal pain, N/V/D, urinary symptoms, or lower extremity edema.     In ED, /95, HR 79. Temp 98.4. Sating 100% on RA. CXR negative for acute cardiopulmonary disease. CT a negative for acute PE or acute intra-abdominal pathology. EKG with ST depression in lateral leads. . s/p 1L IVF bolus, solumedrol, duonebs, ketoralac, and lidocaine patch in the ED. To be admitted for further workup and management.       ROS : Discussed w/ pt & family. She gets SOB very easily, as such can't much activities. Known COPD, Non smoker long time.     INTERVAL EVENTS: None    MEDICATIONS  (STANDING):  albuterol/ipratropium for Nebulization. 3 milliLiter(s) Nebulizer every 4 hours  atorvastatin 20 milliGRAM(s) Oral at bedtime  budesonide 160 MICROgram(s)/formoterol 4.5 MICROgram(s) Inhaler 2 Puff(s) Inhalation two times a day  chlorhexidine 4% Liquid 1 Application(s) Topical <User Schedule>  dextrose 5%. 1000 milliLiter(s) (50 mL/Hr) IV Continuous <Continuous>  dextrose 50% Injectable 12.5 Gram(s) IV Push once  dextrose 50% Injectable 25 Gram(s) IV Push once  dextrose 50% Injectable 25 Gram(s) IV Push once  donepezil 10 milliGRAM(s) Oral at bedtime  doxycycline hyclate Capsule 100 milliGRAM(s) Oral every 12 hours  enoxaparin Injectable 40 milliGRAM(s) SubCutaneous daily  fenofibrate Tablet 48 milliGRAM(s) Oral daily  furosemide    Tablet 20 milliGRAM(s) Oral daily  insulin lispro (HumaLOG) corrective regimen sliding scale   SubCutaneous three times a day before meals  isosorbide   mononitrate ER Tablet (IMDUR) 30 milliGRAM(s) Oral daily  losartan 100 milliGRAM(s) Oral daily  methylPREDNISolone sodium succinate Injectable 60 milliGRAM(s) IV Push every 12 hours  montelukast 10 milliGRAM(s) Oral daily  NIFEdipine XL 30 milliGRAM(s) Oral daily  pantoprazole    Tablet 40 milliGRAM(s) Oral before breakfast    MEDICATIONS  (PRN):  dextrose 40% Gel 15 Gram(s) Oral once PRN Blood Glucose LESS THAN 70 milliGRAM(s)/deciliter  glucagon  Injectable 1 milliGRAM(s) IntraMuscular once PRN Glucose LESS THAN 70 milligrams/deciliter  ibuprofen  Tablet. 600 milliGRAM(s) Oral every 8 hours PRN Severe Pain (7 - 10)      T(C): 35.7 (02-20-20 @ 20:45), Max: 36 (02-20-20 @ 13:25)  HR: 87 (02-20-20 @ 20:45) (80 - 99)  BP: 135/63 (02-20-20 @ 20:45) (130/56 - 144/63)  RR: 18 (02-20-20 @ 20:45) (18 - 18)  SpO2: 97% (02-20-20 @ 19:44) (96% - 97%)  Wt(kg): --Vital Signs Last 24 Hrs  T(C): 35.7 (20 Feb 2020 20:45), Max: 36 (20 Feb 2020 13:25)  T(F): 96.3 (20 Feb 2020 20:45), Max: 96.8 (20 Feb 2020 13:25)  HR: 87 (20 Feb 2020 20:45) (80 - 99)  BP: 135/63 (20 Feb 2020 20:45) (130/56 - 144/63)  BP(mean): --  RR: 18 (20 Feb 2020 20:45) (18 - 18)  SpO2: 97% (20 Feb 2020 19:44) (96% - 97%)    PHYSICAL EXAM:  GENERAL: Obese No acute distress at sitting posture.   Moist mucosa  NECK: no visible JVD. No palp lesion  CHEST/LUNG: BL AE, shallow breath. No adv sounds.   HEART: S1 S2, reg.   ABDOMEN: obese, benign  EXTREMITIES: No pitting edema noted.   Skin warm, pink.   No focal neuro deficit.                           9.7    4.70  )-----------( 139      ( 20 Feb 2020 05:22 )             30.2     02-20    144  |  104  |  36<H>  ----------------------------<  185<H>  3.7   |  24  |  1.5    Ca    9.0      20 Feb 2020 05:22    TPro  5.5<L>  /  Alb  3.9  /  TBili  0.5  /  DBili  x   /  AST  22  /  ALT  18  /  AlkPhos  70  02-19            RADIOLOGY & ADDITIONAL TESTS:    ECHO :    Summary:   1. Left ventricular ejection fraction, by visual estimation, is 60 to 65%.   2. Spectral Doppler shows impaired relaxation pattern of left ventricular myocardial filling (Grade I diastolic dysfunction).   3. Mild mitral valve regurgitation.   4. Mild thickening and calcification of the anterior and posterior mitral valve leaflets.   5. Mitral annular calcification.   6. Sclerotic aortic valve with normal opening.   7. Estimated pulmonary artery systolic pressure is 61.6 mmHg assuming a right atrial pressure of 5 mmHg, which is consistent with severe pulmonary hypertension.    [id number] Slim Agosto M.D., Electronically signed on 2/20/2020 at 12:22:26 PM         ASSESSMENT / PLAN  :    COPD : Being f/u by Pulm. On IV steroid, doses being adjusted. Inhalers.   Hypoxemia : 2' to above, with added Pulm HTN evident in Echo. She, as well as family understands need of home O2.  Pulm HTN : Clinically not in CHF. Await Cardio eval.   DM uncontrolled. : Glucose uncontrolled. Also worse glycemia 2' steroid. Insuline coverage. Diet.   CKD : STable. Avoid intravascular dehydration.   Hx NHL : stable. Routine OP f./u.     Cont routine preventives.

## 2020-02-20 NOTE — PROGRESS NOTE ADULT - SUBJECTIVE AND OBJECTIVE BOX
Patient is a 79y old  Female who presents with a chief complaint of SOB/wheezing + back pain (19 Feb 2020 13:32)    SUBJECTIVE: Remains short of breath - slight improvement      REVIEW OF SYSTEMS:    CONSTITUTIONAL:   no fever  no chills  no weight gain   no weight loss    EYES:   no discharge,   no pain    ENT:   Ears: no ear pain and no hearing problems.  Nose: no nasal congestion and no nasal drainage.    CARDIOVASCULAR:   no chest pain,   no palpitaions    RESPIRATORY:  +SOB and wheezing  Respiratory difficulty on exertion  + Sputum production    GASTROINTESTINAL:   no abdominal pain,   no diarrhea,   no vomiting.    GENITOURINARY:  no dysuria,   no hematuria.    MUSCULOSKELETAL:   no back pain,   no musculoskeletal pain,  no weakness.    SKIN:   no jaundice,    no rashes.    NEURO:   no loss of consciousness,   no headache,   no weakness.    PSYCHIATRIC:   no known mental health issues  no anxiety  no depression    ALLERGIC/IMMUNOLOGIC:   No active allergic or immunologic issues      PHYSICAL EXAM  Vital Signs Last 24 Hrs  T(C): 35.8 (20 Feb 2020 05:03), Max: 36.3 (19 Feb 2020 13:29)  T(F): 96.5 (20 Feb 2020 05:03), Max: 97.3 (19 Feb 2020 13:29)  HR: 82 (20 Feb 2020 05:03) (82 - 94)  BP: 130/56 (20 Feb 2020 05:03) (120/65 - 139/63)  RR: 18 (20 Feb 2020 05:03) (18 - 18)  SpO2: 93% on RA (19 Feb 2020 15:36) (87% - 93%)    CONSTITUTIONAL:  Well nourished.  NAD    ENT:  Airway patent,   No thrush    EYES:   Clear bilaterally,   pupils equal, round and reactive to light.    CARDIAC:  Normal rate,   regular rhythm.    no edema      CAROTID:  normal systolic impulse  no bruits    RESPIRATORY:   Expiratory wheezing improving  Normal chest expansion  Not tachypneic  No use of accessory muscles    GASTROINTESTINAL:  Abdomen soft,   non-tender,   no guarding,   + BS    GENITOURINARY  normal genitalia for sex  no edema    MUSCULOSKELETAL:   range of motion is not limited,  no clubbing, cyanosis  Left chest tenderness    NEUROLOGICAL:   Alert and oriented   no motor  deficits.    SKIN:   Skin normal color for race,   No evidence of rash.    PSYCHIATRIC:   normal mood and affect.   no apparent risk to self or others.    HEME LYMPH:   No cervical  lymphadenopathy.  no inguinal lymphadenopathy      02-19-20 @ 07:01  -  02-20-20 @ 07:00  --------------------------------------------------------  IN:    Oral Fluid: 1135 mL  Total IN: 1135 mL    OUT:    Voided: 600 mL  Total OUT: 600 mL    Total NET: 535 mL      02-20-20 @ 07:01  -  02-20-20 @ 09:47  --------------------------------------------------------  IN:    Oral Fluid: 350 mL  Total IN: 350 mL    OUT:    Voided: 350 mL  Total OUT: 350 mL    Total NET: 0 mL          LABS:                          9.7    4.70  )-----------( 139      ( 20 Feb 2020 05:22 )             30.2                                               02-20    144  |  104  |  36<H>  ----------------------------<  185<H>  3.7   |  24  |  1.5    Ca    9.0      20 Feb 2020 05:22    TPro  5.5<L>  /  Alb  3.9  /  TBili  0.5  /  DBili  x   /  AST  22  /  ALT  18  /  AlkPhos  70  02-19                                            LIVER FUNCTIONS - ( 19 Feb 2020 05:26 )  Alb: 3.9 g/dL / Pro: 5.5 g/dL / ALK PHOS: 70 U/L / ALT: 18 U/L / AST: 22 U/L / GGT: x                                              Culture - Urine (collected 17 Feb 2020 11:20)  Source: .Urine Clean Catch (Midstream)  Final Report (18 Feb 2020 18:07):    <10,000 CFU/mL Normal Urogenital Cici                                                    MEDICATIONS  (STANDING):  albuterol/ipratropium for Nebulization 3 milliLiter(s) Nebulizer every 6 hours  atorvastatin 20 milliGRAM(s) Oral at bedtime  budesonide 160 MICROgram(s)/formoterol 4.5 MICROgram(s) Inhaler 2 Puff(s) Inhalation two times a day  chlorhexidine 4% Liquid 1 Application(s) Topical <User Schedule>  dextrose 5%. 1000 milliLiter(s) (50 mL/Hr) IV Continuous <Continuous>  dextrose 50% Injectable 12.5 Gram(s) IV Push once  dextrose 50% Injectable 25 Gram(s) IV Push once  dextrose 50% Injectable 25 Gram(s) IV Push once  donepezil 10 milliGRAM(s) Oral at bedtime  doxycycline hyclate Capsule 100 milliGRAM(s) Oral every 12 hours  enoxaparin Injectable 40 milliGRAM(s) SubCutaneous daily  fenofibrate Tablet 48 milliGRAM(s) Oral daily  furosemide    Tablet 20 milliGRAM(s) Oral daily  insulin lispro (HumaLOG) corrective regimen sliding scale   SubCutaneous three times a day before meals  isosorbide   mononitrate ER Tablet (IMDUR) 30 milliGRAM(s) Oral daily  losartan 100 milliGRAM(s) Oral daily  methylPREDNISolone sodium succinate Injectable 60 milliGRAM(s) IV Push every 12 hours  montelukast 10 milliGRAM(s) Oral daily  NIFEdipine XL 30 milliGRAM(s) Oral daily  pantoprazole    Tablet 40 milliGRAM(s) Oral before breakfast  predniSONE   Tablet 40 milliGRAM(s) Oral daily    MEDICATIONS  (PRN):  dextrose 40% Gel 15 Gram(s) Oral once PRN Blood Glucose LESS THAN 70 milliGRAM(s)/deciliter  glucagon  Injectable 1 milliGRAM(s) IntraMuscular once PRN Glucose LESS THAN 70 milligrams/deciliter  ibuprofen  Tablet. 600 milliGRAM(s) Oral every 8 hours PRN Severe Pain (7 - 10)

## 2020-02-20 NOTE — CONSULT NOTE ADULT - SUBJECTIVE AND OBJECTIVE BOX
Patient is a 79y old  Female who presents with a chief complaint of SOB/wheezing + back pain (20 Feb 2020 10:06)      HPI:  78 y/o F with PMH of COPD not on home oxygen, NHL - in remission (follows with Dr Hernandez), HTN , DLD, DM on insulin, CKD, CAD s/p PCI with 5 stents, autoimmune hemolytic anemia, who presents to the ED for worsening SOB/wheezing and back pain. She says that she is very SOB at baseline and is unable to walk around her house without feeling SOB. However, over the past week her SOB has been worsening to the point where she is even SOB at rest. She endorses productive cough at baseline but over the last few days her sputum has turned from clear to dark green. She denies fever/chills. She also has experienced acute onset back pain that awoke her from sleep. She describes it as sharp and 10/10 in severity at it's worse. It was constant and non-radiating however, since arrival in the ED the pain has improve significantly. Pain is made worse by twisting to the side. Patient denies sick contacts or recent travel. She denies fever/chills, headaches, sore throat, chest pain, palpitations, abdominal pain, N/V/D, urinary symptoms, or lower extremity edema.     In ED, /95, HR 79. Temp 98.4. Sating 100% on RA. CXR negative for acute cardiopulmonary disease. CT a negative for acute PE or acute intra-abdominal pathology. EKG with ST depression in lateral leads. . s/p 1L IVF bolus, solumedrol, duonebs, ketoralac, and lidocaine patch in the ED. To be admitted for further workup and management. (17 Feb 2020 15:14)      PAST MEDICAL & SURGICAL HISTORY:  Coronary artery disease involving native heart without angina pectoris, unspecified vessel or lesion type: s/p 5 stents  Diabetes: on insulin  Osteoarthritis  CKD (chronic kidney disease)  Thrombocytopenia  PVD (peripheral vascular disease)  Non Hodgkin's lymphoma: in remission. Follows with Dr Hernandez  Heart failure  High cholesterol  HTN (hypertension)  MI (myocardial infarction): s/p 5 stents  H/O carotid endarterectomy: RIGHT  H/O cardiac catheterization: 5 STENTS  History of cholecystectomy      MEDICATIONS  (STANDING):  albuterol/ipratropium for Nebulization. 3 milliLiter(s) Nebulizer every 4 hours  atorvastatin 20 milliGRAM(s) Oral at bedtime  budesonide 160 MICROgram(s)/formoterol 4.5 MICROgram(s) Inhaler 2 Puff(s) Inhalation two times a day  chlorhexidine 4% Liquid 1 Application(s) Topical <User Schedule>  dextrose 5%. 1000 milliLiter(s) (50 mL/Hr) IV Continuous <Continuous>  dextrose 50% Injectable 12.5 Gram(s) IV Push once  dextrose 50% Injectable 25 Gram(s) IV Push once  dextrose 50% Injectable 25 Gram(s) IV Push once  donepezil 10 milliGRAM(s) Oral at bedtime  doxycycline hyclate Capsule 100 milliGRAM(s) Oral every 12 hours  enoxaparin Injectable 40 milliGRAM(s) SubCutaneous daily  fenofibrate Tablet 48 milliGRAM(s) Oral daily  furosemide    Tablet 20 milliGRAM(s) Oral daily  insulin lispro (HumaLOG) corrective regimen sliding scale   SubCutaneous three times a day before meals  isosorbide   mononitrate ER Tablet (IMDUR) 30 milliGRAM(s) Oral daily  losartan 100 milliGRAM(s) Oral daily  methylPREDNISolone sodium succinate Injectable 60 milliGRAM(s) IV Push every 12 hours  montelukast 10 milliGRAM(s) Oral daily  NIFEdipine XL 30 milliGRAM(s) Oral daily  pantoprazole    Tablet 40 milliGRAM(s) Oral before breakfast    MEDICATIONS  (PRN):  dextrose 40% Gel 15 Gram(s) Oral once PRN Blood Glucose LESS THAN 70 milliGRAM(s)/deciliter  glucagon  Injectable 1 milliGRAM(s) IntraMuscular once PRN Glucose LESS THAN 70 milligrams/deciliter  ibuprofen  Tablet. 600 milliGRAM(s) Oral every 8 hours PRN Severe Pain (7 - 10)      FAMILY HISTORY:  FH: CAD (coronary artery disease): FATHER      Review of Systems:  CONSTITUTIONAL: see HPI  EYES: No eye pain, visual disturbances, or discharge  ENMT:  No difficulty hearing, tinnitus, vertigo; No sinus or throat pain  NECK: No pain or stiffness  BREASTS: see HPI  RESPIRATORY: see HPI  CARDIOVASCULAR: see HPI  GASTROINTESTINAL: No abdominal or epigastric pain. No nausea, vomiting, or hematemesis; No diarrhea or constipation. No melena or hematochezia.  GENITOURINARY: No dysuria, frequency, hematuria, or incontinence  NEUROLOGICAL: No headaches, memory loss, loss of strength, numbness, or tremors  SKIN: No itching, burning, rashes, or lesions   LYMPH NODES: No enlarged glands  ENDOCRINE: No heat or cold intolerance; No hair loss  MUSCULOSKELETAL: No joint pain or swelling; No muscle, back, or extremity pain  PSYCHIATRIC: No depression, anxiety, mood swings, or difficulty sleeping  HEME/LYMPH: No easy bruising, or bleeding gums  ALLERY AND IMMUNOLOGIC: No hives or eczema    SOCIAL HISTORY:    Vital Signs Last 24 Hrs  T(C): 36 (20 Feb 2020 13:25), Max: 36.2 (19 Feb 2020 20:00)  T(F): 96.8 (20 Feb 2020 13:25), Max: 97.1 (19 Feb 2020 20:00)  HR: 80 (20 Feb 2020 13:25) (80 - 99)  BP: 140/60 (20 Feb 2020 13:25) (120/65 - 144/63)  BP(mean): --  RR: 18 (20 Feb 2020 13:25) (18 - 18)  SpO2: 96% (20 Feb 2020 13:05) (87% - 97%)    Physical Exam:  GENERAL: NAD, well-groomed, well-developed  HEAD:  NCAT  EYES: EOMI, PERRLA, conjunctiva clear  ENMT: No tonsillar erythema, exudates, or enlargement; Moist mucous membranes, Good dentition, No lesions  NECK: Supple, No JVD, Normal thyroid  NERVOUS SYSTEM: AAOX4, Good concentration; Motor Strength 5/5 B/L upper and lower extremities; DTRs 2+ intact and symmetric  CHEST/LUNG: CTA b/l no w/r/r  HEART: +s1s2 RRR no m/g/r  ABDOMEN: soft, NT/ND (+) bs, no HSM  EXTREMITIES:  2+ Peripheral Pulses, No c/c/e  LYMPH: No lymphadenopathy noted  SKIN: No rashes or lesions    ECG:    ECHO:    I&O's Detail    19 Feb 2020 07:01  -  20 Feb 2020 07:00  --------------------------------------------------------  IN:    Oral Fluid: 1135 mL  Total IN: 1135 mL    OUT:    Voided: 600 mL  Total OUT: 600 mL    Total NET: 535 mL      20 Feb 2020 07:01  -  20 Feb 2020 14:46  --------------------------------------------------------  IN:    Oral Fluid: 750 mL  Total IN: 750 mL    OUT:    Voided: 700 mL  Total OUT: 700 mL    Total NET: 50 mL          LABS:                        9.7    4.70  )-----------( 139      ( 20 Feb 2020 05:22 )             30.2     02-20    144  |  104  |  36<H>  ----------------------------<  185<H>  3.7   |  24  |  1.5    Ca    9.0      20 Feb 2020 05:22    TPro  5.5<L>  /  Alb  3.9  /  TBili  0.5  /  DBili  x   /  AST  22  /  ALT  18  /  AlkPhos  70  02-19            I&O's Summary    19 Feb 2020 07:01  -  20 Feb 2020 07:00  --------------------------------------------------------  IN: 1135 mL / OUT: 600 mL / NET: 535 mL    20 Feb 2020 07:01  -  20 Feb 2020 14:46  --------------------------------------------------------  IN: 750 mL / OUT: 700 mL / NET: 50 mL      BNP    RADIOLOGY & ADDITIONAL STUDIES: Patient is a 79y old  Female who presents with a chief complaint of SOB/wheezing + back pain (20 Feb 2020 10:06)      HPI/Hospital course.  78 y/o F with PMH of COPD not on home oxygen, NHL - in remission (follows with Dr Hernandez), HTN , DLD, DM on insulin, CKD, CAD s/p PCI with 5 stents, autoimmune hemolytic anemia, who presents to the ED for worsening SOB/wheezing and back pain. She says that she is very SOB at baseline and is unable to walk around her house without feeling SOB. However, over the past week her SOB has been worsening to the point where she is even SOB at rest. She endorses productive cough at baseline but over the last few days her sputum has turned from clear to dark green. She denies fever/chills. She also has experienced acute onset back pain that awoke her from sleep. She describes it as sharp and 10/10 in severity at it's worse. It was constant and non-radiating however, since arrival in the ED the pain has improve significantly. Pain is made worse by twisting to the side. Patient denies sick contacts or recent travel. She denies fever/chills, headaches, sore throat, chest pain, palpitations, abdominal pain, N/V/D, urinary symptoms, or lower extremity edema.     In ED, /95, HR 79. Temp 98.4. Sating 100% on RA. CXR negative for acute cardiopulmonary disease. CT a negative for acute PE or acute intra-abdominal pathology. EKG with ST depression in lateral leads. . s/p 1L IVF bolus, solumedrol, duonebs, ketoralac, and lidocaine patch in the ED. To be admitted for further workup and management. (17 Feb 2020 15:14)      Pt is being treated for acute on chronic COPD exacerbation, she is on I/V steroids. Cardio were consulted for EKG changes. Pt denied any chest pain. Her SOB is chronic she denied any acute changes in her symptoms.     PAST MEDICAL & SURGICAL HISTORY:  Coronary artery disease involving native heart without angina pectoris, unspecified vessel or lesion type: s/p 5 stents  Diabetes: on insulin  Osteoarthritis  CKD (chronic kidney disease)  Thrombocytopenia  PVD (peripheral vascular disease)  Non Hodgkin's lymphoma: in remission. Follows with Dr Bershadskiy  Heart failure  High cholesterol  HTN (hypertension)  MI (myocardial infarction): s/p 5 stents  H/O carotid endarterectomy: RIGHT  H/O cardiac catheterization: 5 STENTS  History of cholecystectomy      MEDICATIONS  (STANDING):  albuterol/ipratropium for Nebulization. 3 milliLiter(s) Nebulizer every 4 hours  atorvastatin 20 milliGRAM(s) Oral at bedtime  budesonide 160 MICROgram(s)/formoterol 4.5 MICROgram(s) Inhaler 2 Puff(s) Inhalation two times a day  chlorhexidine 4% Liquid 1 Application(s) Topical <User Schedule>  dextrose 5%. 1000 milliLiter(s) (50 mL/Hr) IV Continuous <Continuous>  dextrose 50% Injectable 12.5 Gram(s) IV Push once  dextrose 50% Injectable 25 Gram(s) IV Push once  dextrose 50% Injectable 25 Gram(s) IV Push once  donepezil 10 milliGRAM(s) Oral at bedtime  doxycycline hyclate Capsule 100 milliGRAM(s) Oral every 12 hours  enoxaparin Injectable 40 milliGRAM(s) SubCutaneous daily  fenofibrate Tablet 48 milliGRAM(s) Oral daily  furosemide    Tablet 20 milliGRAM(s) Oral daily  insulin lispro (HumaLOG) corrective regimen sliding scale   SubCutaneous three times a day before meals  isosorbide   mononitrate ER Tablet (IMDUR) 30 milliGRAM(s) Oral daily  losartan 100 milliGRAM(s) Oral daily  methylPREDNISolone sodium succinate Injectable 60 milliGRAM(s) IV Push every 12 hours  montelukast 10 milliGRAM(s) Oral daily  NIFEdipine XL 30 milliGRAM(s) Oral daily  pantoprazole    Tablet 40 milliGRAM(s) Oral before breakfast    MEDICATIONS  (PRN):  dextrose 40% Gel 15 Gram(s) Oral once PRN Blood Glucose LESS THAN 70 milliGRAM(s)/deciliter  glucagon  Injectable 1 milliGRAM(s) IntraMuscular once PRN Glucose LESS THAN 70 milligrams/deciliter  ibuprofen  Tablet. 600 milliGRAM(s) Oral every 8 hours PRN Severe Pain (7 - 10)      FAMILY HISTORY:  FH: CAD (coronary artery disease): FATHER      Review of Systems:  CONSTITUTIONAL: see HPI  EYES: No eye pain, visual disturbances, or discharge  ENMT:  No difficulty hearing, tinnitus, vertigo; No sinus or throat pain  NECK: No pain or stiffness  BREASTS: see HPI  RESPIRATORY: see HPI  CARDIOVASCULAR: see HPI  GASTROINTESTINAL: No abdominal or epigastric pain. No nausea, vomiting, or hematemesis; No diarrhea or constipation. No melena or hematochezia.  GENITOURINARY: No dysuria, frequency, hematuria, or incontinence  NEUROLOGICAL: No headaches, memory loss, loss of strength, numbness, or tremors  SKIN: No itching, burning, rashes, or lesions   LYMPH NODES: No enlarged glands  ENDOCRINE: No heat or cold intolerance; No hair loss  MUSCULOSKELETAL: No joint pain or swelling; No muscle, back, or extremity pain  PSYCHIATRIC: No depression, anxiety, mood swings, or difficulty sleeping  HEME/LYMPH: No easy bruising, or bleeding gums  ALLERY AND IMMUNOLOGIC: No hives or eczema    SOCIAL HISTORY:    Vital Signs Last 24 Hrs  T(C): 36 (20 Feb 2020 13:25), Max: 36.2 (19 Feb 2020 20:00)  T(F): 96.8 (20 Feb 2020 13:25), Max: 97.1 (19 Feb 2020 20:00)  HR: 80 (20 Feb 2020 13:25) (80 - 99)  BP: 140/60 (20 Feb 2020 13:25) (120/65 - 144/63)  BP(mean): --  RR: 18 (20 Feb 2020 13:25) (18 - 18)  SpO2: 96% (20 Feb 2020 13:05) (87% - 97%)    Physical Exam:  GENERAL: NAD, on supplemental oxygen.  HEAD:  NCAT  EYES: EOMI, PERRLA, conjunctiva clear  ENMT: No tonsillar erythema, exudates, or enlargement; Moist mucous membranes, Good dentition, No lesions  NECK: Supple, raised JVD, Normal thyroid  NERVOUS SYSTEM: AAOX4, Good concentration; Motor Strength 5/5 B/L upper and lower extremities; DTRs 2+ intact and symmetric  CHEST/LUNG: b/l diffuse expiratory wheezes and rhonchi, decreased air entry b/l.  HEART: +s1s2 RRR no m/g/r  ABDOMEN: soft, NT/ND (+) bs, no HSM  EXTREMITIES:  2+ Peripheral Pulses, No c/c. Mild LE edema present b/l.  LYMPH: No lymphadenopathy noted  SKIN: No rashes or lesions    ECG: < from: 12 Lead ECG (02.17.20 @ 09:42) >  Diagnosis Line Sinus rhythm with 1st degree A-V block  ST & T wave abnormality, consider lateral ischemia  Abnormal ECG    < end of copied text >      ECHO: < from: Transthoracic Echocardiogram (02.20.20 @ 11:44) >  Summary:   1. Left ventricular ejection fraction, by visual estimation, is 60 to 65%.   2. Spectral Doppler shows impaired relaxation pattern of left ventricular myocardial filling (Grade I diastolic dysfunction).   3. Mild mitral valve regurgitation.   4. Mild thickening and calcification of the anterior and posterior mitral valve leaflets.   5. Mitral annular calcification.   6. Sclerotic aortic valve with normal opening.   7. Estimated pulmonary artery systolic pressure is 61.6 mmHg assuming a right atrial pressure of 5 mmHg, which is consistent with severe pulmonary hypertension.    PHYSICIAN INTERPRETATION:  Left Ventricle: Normal left ventricular size and wall thicknesses, with normal systolic and diastolic function. Left ventricular ejection fraction, by visual estimation, is 60 to 65%. Spectral Doppler shows impaired relaxation pattern of left ventricular myocardial filling (Grade I diastolic dysfunction).  Right Ventricle: Normal right ventricular size and function.  Left Atrium: Mild to moderately enlarged left atrium.  Right Atrium: Mildly enlarged right atrium.  Pericardium: A small pericardial effusion is present.  Mitral Valve: Structurally normal mitral valve, with normal leaflet excursion. The mitral valve is normal in structure. Mild thickening and calcification of the anterior and posterior mitral valve leaflets. There is mitral annular calcification. Mild mitral valve regurgitation is seen.  Tricuspid Valve: Structurally normal tricuspid valve, with normal leaflet excursion. The tricuspid valve is normal in structure. Mild tricuspid regurgitation is visualized. Estimated pulmonary artery systolic pressure is 61.6 mmHg assuming a right atrial pressure of 5 mmHg, which is consistent with severe pulmonary hypertension.  Aortic Valve: The aortic valve is trileaflet. No evidence of aortic stenosis. Sclerotic aortic valve with normal opening. No evidence of aortic valve regurgitation is seen.  Pulmonic Valve: Structurally normal pulmonic valve, with normal leaflet excursion. The pulmonic valve is normal. Mild pulmonic valve regurgitation.  Aorta: The aortic root and ascending aorta are structurally normal, with no evidence of dilitation.  Pulmonary Artery: The main pulmonary artery is normal in size.       < end of copied text >      I&O's Detail    19 Feb 2020 07:01  -  20 Feb 2020 07:00  --------------------------------------------------------  IN:    Oral Fluid: 1135 mL  Total IN: 1135 mL    OUT:    Voided: 600 mL  Total OUT: 600 mL    Total NET: 535 mL      20 Feb 2020 07:01  -  20 Feb 2020 14:46  --------------------------------------------------------  IN:    Oral Fluid: 750 mL  Total IN: 750 mL    OUT:    Voided: 700 mL  Total OUT: 700 mL    Total NET: 50 mL          LABS:                        9.7    4.70  )-----------( 139      ( 20 Feb 2020 05:22 )             30.2     02-20    144  |  104  |  36<H>  ----------------------------<  185<H>  3.7   |  24  |  1.5    Ca    9.0      20 Feb 2020 05:22    TPro  5.5<L>  /  Alb  3.9  /  TBili  0.5  /  DBili  x   /  AST  22  /  ALT  18  /  AlkPhos  70  02-19            I&O's Summary    19 Feb 2020 07:01  -  20 Feb 2020 07:00  --------------------------------------------------------  IN: 1135 mL / OUT: 600 mL / NET: 535 mL    20 Feb 2020 07:01  -  20 Feb 2020 14:46  --------------------------------------------------------  IN: 750 mL / OUT: 700 mL / NET: 50 mL      BNP Serum Pro-Brain Natriuretic Peptide: 1396 pg/mL (02.19.20 @ 05:26)        RADIOLOGY & ADDITIONAL STUDIES:

## 2020-02-20 NOTE — DISCHARGE NOTE NURSING/CASE MANAGEMENT/SOCIAL WORK - NSDCPEWEB_GEN_ALL_CORE
[facility] for Tobacco Control website --- [email or web address] website --- [email or web address]

## 2020-02-20 NOTE — PROGRESS NOTE ADULT - ASSESSMENT
IMPRESSION:  COPD exacerbation - improving  LLL spiculated nodule, decreasing in size followed by Dr. Gaston GASPAR GG nodule unchanged    RECOMMENDATIONS:  Home inhalers  Nebs q4 and PRN  Change steroids to solumedrol to 60mg IV q12  Finish ABX course  Keep SpO2>92%  DVT ppx  OOB to chair  Follow up with Dr Feldman as outpatient IMPRESSION:  COPD exacerbation - improving  LLL spiculated nodule, decreasing in size followed by Dr. Gaston GASPAR GG nodule unchanged    RECOMMENDATIONS:  Home inhalers  Nebs q4 and PRN  Change steroids to solumedrol to 60mg IV q12  Prednisone taper in am   Finish ABX course  Keep SpO2>92%  DVT ppx  OOB to chair  Follow up with Dr Feldman as outpatient

## 2020-02-20 NOTE — CONSULT NOTE ADULT - ASSESSMENT
78 y/o F with PMH of COPD not on home oxygen, NHL - in remission (follows with Dr Hernandez), HTN , DLD, DM on insulin, CKD, CAD s/p PCI with 5 stents, autoimmune hemolytic anemia, who presents to the ED for worsening SOB/wheezing and back pain. Cardio consulted for EKG changes.    # IMPRESSION  CAD S/P PCI X 5 (2011)  HTN  IDDM (Poor glycemic control)  CKD III  AIHA  COPD with acute exacerbation  Severe Pulm HTN  Lung Nodule  Hx of non Hodgkin Lymphoma  EKG showing 1st degree A/V block TWI in leads I, avl, V6 (likely non specific)  Troponins 0.01-->0.01-->0.02  No chest pain  Tele reviewed some PACs    # PLAN  Likely non-specific EKG changes, no hx of chest pain, Please get a repeat EKG  c/w statins and IMDUR, start Aspirin 81 mg, please d/c Ibuprofen and avoid NSAIDs.  Not on BB due to COPD exacerbation and active wheezing  SOB is chronic likely from COPD and Severe Pulm HTN, Pulm following, plan for Home Oxygen  Needs better DM control, check lipid profile  c/w lasix for mild LE edema from right sided HF, avoid nifedipine if possible for LE edema  No further Inpt cardiac w/up needed. Outpt f/u with Dr Leong.    Will discuss with Attending. 80 y/o F with PMH of COPD not on home oxygen, NHL - in remission (follows with Dr Hernandez), HTN , DLD, DM on insulin, CKD, CAD s/p PCI with 5 stents, autoimmune hemolytic anemia, who presents to the ED for worsening SOB/wheezing and back pain. Cardio consulted for EKG changes.    # IMPRESSION  CAD S/P PCI X 5 (2011)  HTN  IDDM (Poor glycemic control)  CKD III  AIHA  COPD with acute exacerbation  Severe Pulm HTN  Lung Nodule  Hx of non Hodgkin Lymphoma  EKG showing 1st degree A/V block TWI in leads I, avl, V6 (likely non specific)  Troponins 0.01-->0.01-->0.02  No chest pain  Tele reviewed 2 episodes of NSVT and some PACs    # PLAN  Likely non-specific EKG changes, no hx of chest pain, Please get a repeat EKG  c/w statins and IMDUR, start Aspirin 81 mg, please d/c Ibuprofen and avoid NSAIDs.  start low dose BB, metoprolol 12.5mg BID  SOB is chronic likely from COPD and Severe Pulm HTN, Pulm following, plan for Home Oxygen  Needs better DM control, check lipid profile  c/w lasix for mild LE edema from right sided HF, avoid nifedipine if possible for LE edema  No further Inpt cardiac w/up needed. Outpt f/u with Dr Leong for possible stress test.    Will discuss with Attending.

## 2020-02-21 ENCOUNTER — TRANSCRIPTION ENCOUNTER (OUTPATIENT)
Age: 80
End: 2020-02-21

## 2020-02-21 LAB
ANION GAP SERPL CALC-SCNC: 13 MMOL/L — SIGNIFICANT CHANGE UP (ref 7–14)
BASOPHILS # BLD AUTO: 0.01 K/UL — SIGNIFICANT CHANGE UP (ref 0–0.2)
BASOPHILS NFR BLD AUTO: 0.2 % — SIGNIFICANT CHANGE UP (ref 0–1)
BUN SERPL-MCNC: 40 MG/DL — HIGH (ref 10–20)
CALCIUM SERPL-MCNC: 9.2 MG/DL — SIGNIFICANT CHANGE UP (ref 8.5–10.1)
CHLORIDE SERPL-SCNC: 103 MMOL/L — SIGNIFICANT CHANGE UP (ref 98–110)
CO2 SERPL-SCNC: 24 MMOL/L — SIGNIFICANT CHANGE UP (ref 17–32)
CREAT SERPL-MCNC: 1.3 MG/DL — SIGNIFICANT CHANGE UP (ref 0.7–1.5)
EOSINOPHIL # BLD AUTO: 0 K/UL — SIGNIFICANT CHANGE UP (ref 0–0.7)
EOSINOPHIL NFR BLD AUTO: 0 % — SIGNIFICANT CHANGE UP (ref 0–8)
GLUCOSE BLDC GLUCOMTR-MCNC: 291 MG/DL — HIGH (ref 70–99)
GLUCOSE BLDC GLUCOMTR-MCNC: 346 MG/DL — HIGH (ref 70–99)
GLUCOSE BLDC GLUCOMTR-MCNC: 375 MG/DL — HIGH (ref 70–99)
GLUCOSE BLDC GLUCOMTR-MCNC: 404 MG/DL — HIGH (ref 70–99)
GLUCOSE BLDC GLUCOMTR-MCNC: 420 MG/DL — HIGH (ref 70–99)
GLUCOSE BLDC GLUCOMTR-MCNC: 454 MG/DL — CRITICAL HIGH (ref 70–99)
GLUCOSE BLDC GLUCOMTR-MCNC: 474 MG/DL — CRITICAL HIGH (ref 70–99)
GLUCOSE SERPL-MCNC: 340 MG/DL — HIGH (ref 70–99)
HCT VFR BLD CALC: 29.8 % — LOW (ref 37–47)
HGB BLD-MCNC: 9.5 G/DL — LOW (ref 12–16)
IMM GRANULOCYTES NFR BLD AUTO: 1.3 % — HIGH (ref 0.1–0.3)
LYMPHOCYTES # BLD AUTO: 0.66 K/UL — LOW (ref 1.2–3.4)
LYMPHOCYTES # BLD AUTO: 12.4 % — LOW (ref 20.5–51.1)
MCHC RBC-ENTMCNC: 26.2 PG — LOW (ref 27–31)
MCHC RBC-ENTMCNC: 31.9 G/DL — LOW (ref 32–37)
MCV RBC AUTO: 82.3 FL — SIGNIFICANT CHANGE UP (ref 81–99)
MONOCYTES # BLD AUTO: 0.28 K/UL — SIGNIFICANT CHANGE UP (ref 0.1–0.6)
MONOCYTES NFR BLD AUTO: 5.3 % — SIGNIFICANT CHANGE UP (ref 1.7–9.3)
NEUTROPHILS # BLD AUTO: 4.31 K/UL — SIGNIFICANT CHANGE UP (ref 1.4–6.5)
NEUTROPHILS NFR BLD AUTO: 80.8 % — HIGH (ref 42.2–75.2)
NRBC # BLD: 0 /100 WBCS — SIGNIFICANT CHANGE UP (ref 0–0)
PLATELET # BLD AUTO: 137 K/UL — SIGNIFICANT CHANGE UP (ref 130–400)
POTASSIUM SERPL-MCNC: 4.5 MMOL/L — SIGNIFICANT CHANGE UP (ref 3.5–5)
POTASSIUM SERPL-SCNC: 4.5 MMOL/L — SIGNIFICANT CHANGE UP (ref 3.5–5)
RBC # BLD: 3.62 M/UL — LOW (ref 4.2–5.4)
RBC # FLD: 15.1 % — HIGH (ref 11.5–14.5)
SODIUM SERPL-SCNC: 140 MMOL/L — SIGNIFICANT CHANGE UP (ref 135–146)
WBC # BLD: 5.33 K/UL — SIGNIFICANT CHANGE UP (ref 4.8–10.8)
WBC # FLD AUTO: 5.33 K/UL — SIGNIFICANT CHANGE UP (ref 4.8–10.8)

## 2020-02-21 RX ORDER — INSULIN LISPRO 100/ML
8 VIAL (ML) SUBCUTANEOUS ONCE
Refills: 0 | Status: COMPLETED | OUTPATIENT
Start: 2020-02-21 | End: 2020-02-21

## 2020-02-21 RX ORDER — INSULIN LISPRO 100/ML
5 VIAL (ML) SUBCUTANEOUS
Refills: 0 | Status: DISCONTINUED | OUTPATIENT
Start: 2020-02-21 | End: 2020-02-22

## 2020-02-21 RX ORDER — INSULIN GLARGINE 100 [IU]/ML
15 INJECTION, SOLUTION SUBCUTANEOUS AT BEDTIME
Refills: 0 | Status: DISCONTINUED | OUTPATIENT
Start: 2020-02-21 | End: 2020-02-21

## 2020-02-21 RX ORDER — INSULIN GLARGINE 100 [IU]/ML
20 INJECTION, SOLUTION SUBCUTANEOUS AT BEDTIME
Refills: 0 | Status: DISCONTINUED | OUTPATIENT
Start: 2020-02-21 | End: 2020-02-22

## 2020-02-21 RX ADMIN — Medication 5 UNIT(S): at 17:01

## 2020-02-21 RX ADMIN — ENOXAPARIN SODIUM 40 MILLIGRAM(S): 100 INJECTION SUBCUTANEOUS at 11:54

## 2020-02-21 RX ADMIN — ATORVASTATIN CALCIUM 20 MILLIGRAM(S): 80 TABLET, FILM COATED ORAL at 22:09

## 2020-02-21 RX ADMIN — Medication 3 MILLILITER(S): at 08:29

## 2020-02-21 RX ADMIN — PANTOPRAZOLE SODIUM 40 MILLIGRAM(S): 20 TABLET, DELAYED RELEASE ORAL at 06:24

## 2020-02-21 RX ADMIN — Medication 100 MILLIGRAM(S): at 17:03

## 2020-02-21 RX ADMIN — Medication 6: at 08:12

## 2020-02-21 RX ADMIN — MONTELUKAST 10 MILLIGRAM(S): 4 TABLET, CHEWABLE ORAL at 11:54

## 2020-02-21 RX ADMIN — Medication 60 MILLIGRAM(S): at 05:46

## 2020-02-21 RX ADMIN — Medication 12: at 17:02

## 2020-02-21 RX ADMIN — Medication 12: at 11:54

## 2020-02-21 RX ADMIN — Medication 40 MILLIGRAM(S): at 11:57

## 2020-02-21 RX ADMIN — Medication 8 UNIT(S): at 23:29

## 2020-02-21 RX ADMIN — Medication 3 MILLILITER(S): at 20:12

## 2020-02-21 RX ADMIN — Medication 100 MILLIGRAM(S): at 05:46

## 2020-02-21 RX ADMIN — Medication 30 MILLIGRAM(S): at 05:46

## 2020-02-21 RX ADMIN — Medication 48 MILLIGRAM(S): at 11:54

## 2020-02-21 RX ADMIN — Medication 3 MILLILITER(S): at 16:09

## 2020-02-21 RX ADMIN — Medication 3 MILLILITER(S): at 13:19

## 2020-02-21 RX ADMIN — ISOSORBIDE MONONITRATE 30 MILLIGRAM(S): 60 TABLET, EXTENDED RELEASE ORAL at 11:54

## 2020-02-21 RX ADMIN — INSULIN GLARGINE 20 UNIT(S): 100 INJECTION, SOLUTION SUBCUTANEOUS at 22:48

## 2020-02-21 RX ADMIN — LIDOCAINE 1 PATCH: 4 CREAM TOPICAL at 06:24

## 2020-02-21 RX ADMIN — LOSARTAN POTASSIUM 100 MILLIGRAM(S): 100 TABLET, FILM COATED ORAL at 05:46

## 2020-02-21 RX ADMIN — DONEPEZIL HYDROCHLORIDE 10 MILLIGRAM(S): 10 TABLET, FILM COATED ORAL at 22:10

## 2020-02-21 RX ADMIN — Medication 20 MILLIGRAM(S): at 05:46

## 2020-02-21 RX ADMIN — BUDESONIDE AND FORMOTEROL FUMARATE DIHYDRATE 2 PUFF(S): 160; 4.5 AEROSOL RESPIRATORY (INHALATION) at 22:52

## 2020-02-21 RX ADMIN — CHLORHEXIDINE GLUCONATE 1 APPLICATION(S): 213 SOLUTION TOPICAL at 05:46

## 2020-02-21 NOTE — PROGRESS NOTE ADULT - SUBJECTIVE AND OBJECTIVE BOX
SUBJECTIVE:    Patient is a 79y old Female who presents with a chief complaint of SOB/wheezing + back pain (21 Feb 2020 11:14)    Currently admitted to medicine with the primary diagnosis of COPD exacerbation     Today is hospital day 4d. This morning she is resting comfortably in bed and reports no new issues or overnight events. Patient is agreeable for hoem oxygen, script is sent, needs attending' s signature, will be anticipated for tomorrow.     PAST MEDICAL & SURGICAL HISTORY  Coronary artery disease involving native heart without angina pectoris, unspecified vessel or lesion type: s/p 5 stents  Diabetes: on insulin  Osteoarthritis  CKD (chronic kidney disease)  Thrombocytopenia  PVD (peripheral vascular disease)  Non Hodgkin's lymphoma: in remission. Follows with Dr Hernandez  Heart failure  High cholesterol  HTN (hypertension)  MI (myocardial infarction): s/p 5 stents  H/O carotid endarterectomy: RIGHT  H/O cardiac catheterization: 5 STENTS  History of cholecystectomy    SOCIAL HISTORY:    ALLERGIES:  ACE inhibitors (Unknown)  BENZALKONIUM (Rash)  BETADINE (Rash)  Ceclor (Unknown)  codeine (Unknown)  latex (Unknown)  penicillin (Unknown)  RUBBER GLOVES (Rash)  sulfonamides (Unknown)    MEDICATIONS:  STANDING MEDICATIONS  albuterol/ipratropium for Nebulization. 3 milliLiter(s) Nebulizer every 4 hours  atorvastatin 20 milliGRAM(s) Oral at bedtime  budesonide 160 MICROgram(s)/formoterol 4.5 MICROgram(s) Inhaler 2 Puff(s) Inhalation two times a day  chlorhexidine 4% Liquid 1 Application(s) Topical <User Schedule>  dextrose 5%. 1000 milliLiter(s) IV Continuous <Continuous>  dextrose 50% Injectable 12.5 Gram(s) IV Push once  dextrose 50% Injectable 25 Gram(s) IV Push once  dextrose 50% Injectable 25 Gram(s) IV Push once  donepezil 10 milliGRAM(s) Oral at bedtime  doxycycline hyclate Capsule 100 milliGRAM(s) Oral every 12 hours  enoxaparin Injectable 40 milliGRAM(s) SubCutaneous daily  fenofibrate Tablet 48 milliGRAM(s) Oral daily  furosemide    Tablet 20 milliGRAM(s) Oral daily  insulin glargine Injectable (LANTUS) 10 Unit(s) SubCutaneous at bedtime  insulin lispro (HumaLOG) corrective regimen sliding scale   SubCutaneous three times a day before meals  isosorbide   mononitrate ER Tablet (IMDUR) 30 milliGRAM(s) Oral daily  losartan 100 milliGRAM(s) Oral daily  montelukast 10 milliGRAM(s) Oral daily  NIFEdipine XL 30 milliGRAM(s) Oral daily  pantoprazole    Tablet 40 milliGRAM(s) Oral before breakfast  predniSONE   Tablet 40 milliGRAM(s) Oral daily    PRN MEDICATIONS  dextrose 40% Gel 15 Gram(s) Oral once PRN  glucagon  Injectable 1 milliGRAM(s) IntraMuscular once PRN  ibuprofen  Tablet. 600 milliGRAM(s) Oral every 8 hours PRN    VITALS:   T(F): 97.1  HR: 87  BP: 138/63  RR: 18  SpO2: 97%    LABS:                        9.5    5.33  )-----------( 137      ( 21 Feb 2020 05:10 )             29.8     02-21    140  |  103  |  40<H>  ----------------------------<  340<H>  4.5   |  24  |  1.3    Ca    9.2      21 Feb 2020 05:10                    Serum Pro-Brain Natriuretic Peptide: 1396 pg/mL (02-19-20 @ 05:26)      RADIOLOGY:    PHYSICAL EXAM:  GENERAL: NAD, well-groomed, well-developed  HEAD: Atraumatic  NECK: Supple  CHEST/LUNG: Air entry bilaterally; Decreased breath sounds at bases, No wheeze or crackles  HEART: S1, S2; RRR; No murmurs, rubs, or gallops  ABDOMEN: BS+; Soft, Non-tender, Non-distended, complaining of severe pain in back, tenderness on palpation  EXTREMITIES:  2+ Peripheral Pulses, No clubbing, cyanosis, or edema  PSYCH: AAOx3  NEUROLOGY: non-focal  SKIN: No rashes or lesions

## 2020-02-21 NOTE — DISCHARGE NOTE PROVIDER - NSDCMRMEDTOKEN_GEN_ALL_CORE_FT
albuterol 0.63 mg/3 mL (0.021%) inhalation solution: 3 milliliter(s) inhaled 3 times a day  atorvastatin 20 mg oral tablet: 1 tab(s) orally once a day  donepezil 10 mg oral tablet: 1 tab(s) orally once a day (at bedtime)  Dulera 100 mcg-5 mcg/inh inhalation aerosol: 2 puff(s) inhaled 2 times a day  fenofibrate 48 mg oral tablet: 1 tab(s) orally once a day  Flovent 44 mcg/inh inhalation aerosol with adapter:   inositol 650 mg oral tablet: 2 tab(s) orally once a day  isosorbide mononitrate 30 mg oral tablet, extended release: 1 tab(s) orally once a day (in the morning)  Lasix 20 mg oral tablet: 1 tab(s) orally once a day  losartan 100 mg oral tablet: 1 tab(s) orally once a day  montelukast 10 mg oral tablet: 1 tab(s) orally once a day  NovoLOG 100 units/mL subcutaneous solution: 10,7,7  subcutaneous  omeprazole 40 mg oral delayed release capsule: 1 cap(s) orally once a day  raNITIdine 150 mg oral capsule: 1 cap(s) orally 2 times a day  Soliqua 100/33 subcutaneous solution: subcutaneous once a day  Spiriva 18 mcg inhalation capsule: 1 cap(s) inhaled once a day atorvastatin 20 mg oral tablet: 1 tab(s) orally once a day  budesonide-formoterol 160 mcg-4.5 mcg/inh inhalation aerosol: 160 microgram(s) inhaled 2 times a day -for bronchospasm   donepezil 10 mg oral tablet: 1 tab(s) orally once a day (at bedtime)  doxycycline monohydrate 100 mg oral capsule: 1 cap(s) orally every 12 hours  fenofibrate 48 mg oral tablet: 1 tab(s) orally once a day  Flovent 44 mcg/inh inhalation aerosol with adapter:   inositol 650 mg oral tablet: 2 tab(s) orally once a day  ipratropium-albuterol 0.5 mg-2.5 mg/3 mLinhalation solution: 3 milliliter(s) inhaled every 4 hours -for bronchospasm  as needed   isosorbide mononitrate 30 mg oral tablet, extended release: 1 tab(s) orally once a day (in the morning)  Lasix 20 mg oral tablet: 1 tab(s) orally once a day  losartan 100 mg oral tablet: 1 tab(s) orally once a day  montelukast 10 mg oral tablet: 1 tab(s) orally once a day  NIFEdipine 30 mg oral tablet, extended release: 1 tab(s) orally once a day  NovoLOG 100 units/mL subcutaneous solution: 10,7,7  subcutaneous  omeprazole 40 mg oral delayed release capsule: 1 cap(s) orally once a day  predniSONE 20 mg oral tablet: 2 tab(s) orally once a day  raNITIdine 150 mg oral capsule: 1 cap(s) orally 2 times a day  Soliqua 100/33 subcutaneous solution: subcutaneous once a day

## 2020-02-21 NOTE — PROGRESS NOTE ADULT - ASSESSMENT
IMPRESSION:  COPD exacerbation - improving  LLL spiculated nodule, decreasing in size followed by Dr. Gaston GASPAR GG nodule unchanged    RECOMMENDATIONS:  Home inhalers  Nebs q4 PRN  Can DC solumedrol  Prednisone taper   Finish ABX course  Keep SpO2>92%  DVT ppx  OOB to chair  Follow up with Dr Feldman as outpatient  Can DC home from pulmonary stand point  Check RA pulse OX

## 2020-02-21 NOTE — DISCHARGE NOTE PROVIDER - PROVIDER TOKENS
PROVIDER:[TOKEN:[[id number]],FOLLOWUP:[2 weeks],ESTABLISHEDPATIENT:[T]],PROVIDER:[TOKEN:[[id number]],FOLLOWUP:[1 week],ESTABLISHEDPATIENT:[T]]

## 2020-02-21 NOTE — DISCHARGE NOTE PROVIDER - CARE PROVIDER_API CALL
eBbo Fernandes)  Geriatric Medicine; Internal Medicine  [address] [place] [address]  Phone: [phone]  Fax: [phone]  Established Patient  Follow Up Time: 2 weeks    David Feldman)  Medicine  [address] [place] [address]  Phone: [phone]  Fax: [phone]  Established Patient  Follow Up Time: 1 week

## 2020-02-21 NOTE — DISCHARGE NOTE PROVIDER - HOSPITAL COURSE
78 y/o F with PMH of COPD not on home oxygen, NHL - in remission (follows with Dr Hernandez), HTN , DLD, DM on insulin, CKD, CAD s/p PCI with 5 stents, autoimmune hemolytic anemia, who presents to the ED for worsening SOB/wheezing and back pain. She says that she is very SOB at baseline and is unable to walk around her house without feeling SOB. However, over the past week her SOB has been worsening to the point where she is even SOB at rest. She was admitted for CPD exacerbation workup and given iv steroids and antibiotics, apteint improved on duonebs and steroid, patient improved symptomatically, no cause of backpain was found on imaging, most likely a muscle sparin, home oxygen was recommended due to falling oxygen on ambulation. Patient will be discharged to follow up out patient with Dr. Feldman.

## 2020-02-21 NOTE — DISCHARGE NOTE PROVIDER - NSDCFUADDINST_GEN_ALL_CORE_FT
You have been diagnosed with chronic obstructive pulmonary disease (COPD). This is a name given to a group of diseases that limit the flow of air in and out of your lungs. This makes it harder to breathe. With COPD, you are also more likely to get lung infections. COPD includes chronic bronchitis and emphysema. COPD is most often caused by heavy, long-term cigarette smoking.    Home care  Quit smoking  If you smoke, quit. It is the best thing you can do for your COPD and your overall health.    Join a stop-smoking program. There are even telephone, text message, and online programs to help you quit.    Ask your healthcare provider about medicines or other methods to help you quit.    Ask family members to quit smoking as well.    Don't allow people to smoke in your home, in your car, or when they are around you.    Protect yourself from infection  Wash your hands often. Do your best to keep your hands away from your face. Most germs are spread from your hands to your mouth.    Get a flu shot every year. Also ask your provider about pneumonia vaccines.    Stay away from crowds. It's especially important to do this in the winter when more people have colds and flu.    To stay healthy, get enough sleep, exercise regularly, and eat a balanced diet. You should:    Get about 8 hours of sleep every night.    Try to exercise for at least 30 minutes on most days.    Have healthy foods including fruits and vegetables, 100% whole grains, lean meats and fish, and low-fat dairy products. Try to stay away from foods high in fats and sugar.    Take your medicines  Take your medicines exactly as directed. Don't skip doses.    Manage your stress  Stress can make COPD worse. Use this stress management method:    Find a quiet place and sit or lie in a comfortable position.    Close your eyes and do breathing exercises for several minutes. Ask your provider about the best way to breathe.        When to call your healthcare provider  Call your provider right away if you have any of the following:    Shortness of breath, wheezing, or coughing    More mucus    Yellow, green, bloody, or smelly mucus    Fever or chills    Tightness in your chest that does not go away with rest or medicine    An irregular heartbeat or a feeling that your heart is beating very fast    Swollen ankles

## 2020-02-21 NOTE — DISCHARGE NOTE PROVIDER - NSDCCPCAREPLAN_GEN_ALL_CORE_FT
PRINCIPAL DISCHARGE DIAGNOSIS  Diagnosis: COPD exacerbation  Assessment and Plan of Treatment: She presented to the ED for worsening SOB/wheezing and back pain. She says that she is very SOB at baseline and is unable to walk around her house without feeling SOB. However, over the past week her SOB has been worsening to the point where she is even SOB at rest. She was admitted for CPD exacerbation workup and given iv steroids and antibiotics, pateint improved on duonebs and steroid, patient improved symptomatically, home oxygen was recommended due to falling oxygen on ambulation. Patient will be discharged to follow up out patient with Dr. Feldman.         SECONDARY DISCHARGE DIAGNOSES  Diagnosis: Flank pain  Assessment and Plan of Treatment: no cause of backpain was found on imaging, most likely a muscle sprain, follow up with PMD.

## 2020-02-21 NOTE — PROGRESS NOTE ADULT - ASSESSMENT
79y old Female who presents with a chief complaint of SOB/wheezing + back pain (21 Feb 2020 11:14)    Currently admitted to medicine with the primary diagnosis of COPD exacerbation     Today is hospital day 4d. This morning she is resting comfortably in bed and reports no new issues or overnight events. Patient is agreeable for hoem oxygen, script is sent, needs attending' s signature, will be anticipated for tomorrow.       # SOB/wheezing - likely secondary to COPD exacerbation vs. bronchitis   - Continue duonebs, and solumedrol  - pulm recommendations appreciated, changed to prednisone    # Left-sided back pain worse with twisting movement.   - likely musculoskeletal in nature, differential includes pleuritic chest pain for COPD exacerbation   - CT negative for acute intra-abdominal pathology  - pain improved with lidocaine patch, Toradol and Robaxin   - continue to monitor clinically   - f/u xray did not show any acute pathology    # ST depressions in lateral leads on EKG - hx of CAD s/p 5 stents   - patient denies chest pain.   - trops negative  - Dr Fernandes requesting cardio consult, Dr Leong, would follow, no intervention    # HTN - uncontrolled on admission (227/95)  - continue home meds. Likely elevated secondary to SOB on admission. Continue to monitor.   -control improving, Cardizem 30 mg daily    # DM  - f/u hemoglobin A1C 7  - fingersticks ACHS  - Started on basal/bolus and SSI insulin if fs > 180  - OP follow up with Dr Reddy     # CKD   - stable     # NHL   - in remission  - patient follows with Dr Hernandez. OP follow up upon discharge    # DLD  -    -C/W home meds    DVT ppx: lovenox  GI ppx: PPI  ambulate as tolerated  Dispo: from home  FULL CODE     Patient needs home oxygen.  Patient still complains of shortness of breath despite IV steroids, patient is still hypoxic, resting oxygen saturation is 93 % on room air while sitting and 87% on ambulation at room air. Patient's saturation is 94% on 2L oxygen while ambulating. Patient is tested in a chronic stable state. Patient is aware that she will be going home on home oxygen and is agreeable.

## 2020-02-21 NOTE — DISCHARGE NOTE PROVIDER - NSDCFUADDAPPT_GEN_ALL_CORE_FT
Please call and make an appointment with your PMD and pulmonologist and follow up for further workup and routine health care assessments and monitoring.

## 2020-02-21 NOTE — PROGRESS NOTE ADULT - SUBJECTIVE AND OBJECTIVE BOX
VENKATESH RAMACHANDRAN  79y  Female      SUBJECTIVE:  no new complaints     PAST MEDICAL & SURGICAL HISTORY:  Coronary artery disease involving native heart without angina pectoris, unspecified vessel or lesion type: s/p 5 stents  Diabetes: on insulin  Osteoarthritis  CKD (chronic kidney disease)  Thrombocytopenia  PVD (peripheral vascular disease)  Non Hodgkin's lymphoma: in remission. Follows with Dr Hernandez  Heart failure  High cholesterol  HTN (hypertension)  MI (myocardial infarction): s/p 5 stents  H/O carotid endarterectomy: RIGHT  H/O cardiac catheterization: 5 STENTS  History of cholecystectomy    79y    REVIEW OF SYSTEMS:    T(C): 36.2 (02-21-20 @ 13:20), Max: 36.2 (02-21-20 @ 13:20)  HR: 87 (02-21-20 @ 13:20) (75 - 87)  BP: 138/63 (02-21-20 @ 13:20) (121/62 - 138/63)  RR: 18 (02-21-20 @ 13:20) (18 - 18)  SpO2: 97% (02-20-20 @ 19:44) (97% - 97%)  Wt(kg): --Vital Signs Last 24 Hrs  T(C): 36.2 (21 Feb 2020 13:20), Max: 36.2 (21 Feb 2020 13:20)  T(F): 97.1 (21 Feb 2020 13:20), Max: 97.1 (21 Feb 2020 13:20)  HR: 87 (21 Feb 2020 13:20) (75 - 87)  BP: 138/63 (21 Feb 2020 13:20) (121/62 - 138/63)  BP(mean): --  RR: 18 (21 Feb 2020 13:20) (18 - 18)  SpO2: 97% (20 Feb 2020 19:44) (97% - 97%)    MEDICATION:  albuterol/ipratropium for Nebulization. 3 milliLiter(s) Nebulizer every 4 hours  atorvastatin 20 milliGRAM(s) Oral at bedtime  budesonide 160 MICROgram(s)/formoterol 4.5 MICROgram(s) Inhaler 2 Puff(s) Inhalation two times a day  chlorhexidine 4% Liquid 1 Application(s) Topical <User Schedule>  dextrose 40% Gel 15 Gram(s) Oral once PRN  dextrose 5%. 1000 milliLiter(s) IV Continuous <Continuous>  dextrose 50% Injectable 12.5 Gram(s) IV Push once  dextrose 50% Injectable 25 Gram(s) IV Push once  dextrose 50% Injectable 25 Gram(s) IV Push once  donepezil 10 milliGRAM(s) Oral at bedtime  doxycycline hyclate Capsule 100 milliGRAM(s) Oral every 12 hours  enoxaparin Injectable 40 milliGRAM(s) SubCutaneous daily  fenofibrate Tablet 48 milliGRAM(s) Oral daily  furosemide    Tablet 20 milliGRAM(s) Oral daily  glucagon  Injectable 1 milliGRAM(s) IntraMuscular once PRN  ibuprofen  Tablet. 600 milliGRAM(s) Oral every 8 hours PRN  insulin glargine Injectable (LANTUS) 15 Unit(s) SubCutaneous at bedtime  insulin lispro (HumaLOG) corrective regimen sliding scale   SubCutaneous three times a day before meals  insulin lispro Injectable (HumaLOG) 5 Unit(s) SubCutaneous three times a day before meals  isosorbide   mononitrate ER Tablet (IMDUR) 30 milliGRAM(s) Oral daily  losartan 100 milliGRAM(s) Oral daily  montelukast 10 milliGRAM(s) Oral daily  NIFEdipine XL 30 milliGRAM(s) Oral daily  pantoprazole    Tablet 40 milliGRAM(s) Oral before breakfast  predniSONE   Tablet 40 milliGRAM(s) Oral daily      LABS:                       9.5      5.33  )-----------( 137      ( 21 Feb 2020 05:10 )             29.8     02-21    140  |  103  |  40<H>  ----------------------------<  340<H>  4.5   |  24  |  1.3    Ca    9.2      21 Feb 2020 05:10    Culture - Urine (02.17.20 @ 11:20)    Specimen Source: .Urine Clean Catch (Midstream)    Culture Results:   <10,000 CFU/mL Normal Urogenital Cici    RADIOLOGY:  < from: Xray Chest 1 View- PORTABLE-Routine (02.19.20 @ 05:41) >  IMPRESSION:      Stable bilateral basilar opacities.     < end of copied text >    < from: Xray Ribs 3 Views, Bilateral (02.18.20 @ 12:06) >  Impression:    No acute fracture of the bony thorax.    < end of copied text >    < from: CT Angio Chest Dissection Protocol (02.17.20 @ 09:44) >  IMPRESSION:     1.  No CTA evidence of aortic dissection.     2.  No CT evidence of acute intrathoracic or abdominopelvic pathology. Symmetric perfusion to the kidneys.    3.  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     4.  Decreased size of the left lower lobe spiculated nodule measuring 1.1 x 0.7 cm (previously 1.5 x 0.7 cm).     5.  Unchanged right upper lobe 1.3 x 0.6 cm groundglass nodule/opacity.    Additional Findings/Recommendations After Attending Radiologist Review:    Agree with above, please note ER examination setting is not tailored for detailed evaluation of pulmonary nodules.    < end of copied text >    < from: Transthoracic Echocardiogram (02.20.20 @ 11:44) >  Summary:   1. Left ventricular ejection fraction, by visual estimation, is 60 to 65%.   2. Spectral Doppler shows impaired relaxation pattern of left ventricular myocardial filling (Grade I diastolic dysfunction).   3. Mild mitral valve regurgitation.   4. Mild thickening and calcification of the anterior and posterior mitral valve leaflets.   5. Mitral annular calcification.   6. Sclerotic aortic valve with normal opening.   7. Estimated pulmonary artery systolic pressure is 61.6 mmHg assuming a right atrial pressure of 5 mmHg, which is consistent with severe pulmonary hypertension.    < end of copied text >    PHYSICAL EXAM:  alert in 	nad  heart rsr s1s2+  lungs scattered wheezing + bilaterally  abdomen soft obese non tender bs+  extremities trace edema    IMPRESSION:  COPD exacerbation   chronic hypoxia on ambulation oxygen dependent   severe PULMONARY HTN  LLL spiculated nodule, - decreasing in size followed by Dr. 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

## 2020-02-22 VITALS
OXYGEN SATURATION: 97 % | TEMPERATURE: 97 F | SYSTOLIC BLOOD PRESSURE: 155 MMHG | HEART RATE: 94 BPM | DIASTOLIC BLOOD PRESSURE: 58 MMHG | RESPIRATION RATE: 18 BRPM

## 2020-02-22 LAB
ANION GAP SERPL CALC-SCNC: 15 MMOL/L — HIGH (ref 7–14)
BASOPHILS # BLD AUTO: 0.01 K/UL — SIGNIFICANT CHANGE UP (ref 0–0.2)
BASOPHILS NFR BLD AUTO: 0.2 % — SIGNIFICANT CHANGE UP (ref 0–1)
BUN SERPL-MCNC: 48 MG/DL — HIGH (ref 10–20)
CALCIUM SERPL-MCNC: 9.3 MG/DL — SIGNIFICANT CHANGE UP (ref 8.5–10.1)
CHLORIDE SERPL-SCNC: 104 MMOL/L — SIGNIFICANT CHANGE UP (ref 98–110)
CO2 SERPL-SCNC: 23 MMOL/L — SIGNIFICANT CHANGE UP (ref 17–32)
CREAT SERPL-MCNC: 1.4 MG/DL — SIGNIFICANT CHANGE UP (ref 0.7–1.5)
EOSINOPHIL # BLD AUTO: 0.01 K/UL — SIGNIFICANT CHANGE UP (ref 0–0.7)
EOSINOPHIL NFR BLD AUTO: 0.2 % — SIGNIFICANT CHANGE UP (ref 0–8)
GLUCOSE BLDC GLUCOMTR-MCNC: 374 MG/DL — HIGH (ref 70–99)
GLUCOSE BLDC GLUCOMTR-MCNC: 386 MG/DL — HIGH (ref 70–99)
GLUCOSE SERPL-MCNC: 327 MG/DL — HIGH (ref 70–99)
HCT VFR BLD CALC: 30.1 % — LOW (ref 37–47)
HGB BLD-MCNC: 9.7 G/DL — LOW (ref 12–16)
IMM GRANULOCYTES NFR BLD AUTO: 2.1 % — HIGH (ref 0.1–0.3)
LYMPHOCYTES # BLD AUTO: 0.75 K/UL — LOW (ref 1.2–3.4)
LYMPHOCYTES # BLD AUTO: 14.5 % — LOW (ref 20.5–51.1)
MCHC RBC-ENTMCNC: 26.9 PG — LOW (ref 27–31)
MCHC RBC-ENTMCNC: 32.2 G/DL — SIGNIFICANT CHANGE UP (ref 32–37)
MCV RBC AUTO: 83.4 FL — SIGNIFICANT CHANGE UP (ref 81–99)
MONOCYTES # BLD AUTO: 0.42 K/UL — SIGNIFICANT CHANGE UP (ref 0.1–0.6)
MONOCYTES NFR BLD AUTO: 8.1 % — SIGNIFICANT CHANGE UP (ref 1.7–9.3)
NEUTROPHILS # BLD AUTO: 3.88 K/UL — SIGNIFICANT CHANGE UP (ref 1.4–6.5)
NEUTROPHILS NFR BLD AUTO: 74.9 % — SIGNIFICANT CHANGE UP (ref 42.2–75.2)
NRBC # BLD: 0 /100 WBCS — SIGNIFICANT CHANGE UP (ref 0–0)
PLATELET # BLD AUTO: 139 K/UL — SIGNIFICANT CHANGE UP (ref 130–400)
POTASSIUM SERPL-MCNC: 4 MMOL/L — SIGNIFICANT CHANGE UP (ref 3.5–5)
POTASSIUM SERPL-SCNC: 4 MMOL/L — SIGNIFICANT CHANGE UP (ref 3.5–5)
RBC # BLD: 3.61 M/UL — LOW (ref 4.2–5.4)
RBC # FLD: 15.1 % — HIGH (ref 11.5–14.5)
SODIUM SERPL-SCNC: 142 MMOL/L — SIGNIFICANT CHANGE UP (ref 135–146)
WBC # BLD: 5.18 K/UL — SIGNIFICANT CHANGE UP (ref 4.8–10.8)
WBC # FLD AUTO: 5.18 K/UL — SIGNIFICANT CHANGE UP (ref 4.8–10.8)

## 2020-02-22 RX ORDER — MOMETASONE FUROATE AND FORMOTEROL FUMARATE DIHYDRATE 200; 5 UG/1; UG/1
2 AEROSOL RESPIRATORY (INHALATION)
Qty: 0 | Refills: 0 | DISCHARGE

## 2020-02-22 RX ORDER — BUDESONIDE AND FORMOTEROL FUMARATE DIHYDRATE 160; 4.5 UG/1; UG/1
160 AEROSOL RESPIRATORY (INHALATION)
Qty: 30 | Refills: 0
Start: 2020-02-22 | End: 2020-03-22

## 2020-02-22 RX ORDER — NIFEDIPINE 30 MG
1 TABLET, EXTENDED RELEASE 24 HR ORAL
Qty: 30 | Refills: 0
Start: 2020-02-22 | End: 2020-03-22

## 2020-02-22 RX ORDER — ALBUTEROL 90 UG/1
3 AEROSOL, METERED ORAL
Qty: 0 | Refills: 0 | DISCHARGE

## 2020-02-22 RX ORDER — TIOTROPIUM BROMIDE 18 UG/1
1 CAPSULE ORAL; RESPIRATORY (INHALATION)
Qty: 0 | Refills: 0 | DISCHARGE

## 2020-02-22 RX ORDER — IPRATROPIUM/ALBUTEROL SULFATE 18-103MCG
3 AEROSOL WITH ADAPTER (GRAM) INHALATION
Qty: 540 | Refills: 0
Start: 2020-02-22 | End: 2020-03-22

## 2020-02-22 RX ADMIN — MONTELUKAST 10 MILLIGRAM(S): 4 TABLET, CHEWABLE ORAL at 11:17

## 2020-02-22 RX ADMIN — Medication 3 MILLILITER(S): at 12:56

## 2020-02-22 RX ADMIN — Medication 20 MILLIGRAM(S): at 06:37

## 2020-02-22 RX ADMIN — ISOSORBIDE MONONITRATE 30 MILLIGRAM(S): 60 TABLET, EXTENDED RELEASE ORAL at 11:17

## 2020-02-22 RX ADMIN — Medication 5 UNIT(S): at 12:17

## 2020-02-22 RX ADMIN — BUDESONIDE AND FORMOTEROL FUMARATE DIHYDRATE 2 PUFF(S): 160; 4.5 AEROSOL RESPIRATORY (INHALATION) at 08:02

## 2020-02-22 RX ADMIN — Medication 10: at 07:59

## 2020-02-22 RX ADMIN — Medication 10: at 12:17

## 2020-02-22 RX ADMIN — Medication 5 UNIT(S): at 07:59

## 2020-02-22 RX ADMIN — PANTOPRAZOLE SODIUM 40 MILLIGRAM(S): 20 TABLET, DELAYED RELEASE ORAL at 06:38

## 2020-02-22 RX ADMIN — Medication 100 MILLIGRAM(S): at 06:37

## 2020-02-22 RX ADMIN — Medication 40 MILLIGRAM(S): at 06:38

## 2020-02-22 RX ADMIN — CHLORHEXIDINE GLUCONATE 1 APPLICATION(S): 213 SOLUTION TOPICAL at 06:37

## 2020-02-22 RX ADMIN — Medication 48 MILLIGRAM(S): at 11:17

## 2020-02-22 RX ADMIN — Medication 30 MILLIGRAM(S): at 06:37

## 2020-02-22 RX ADMIN — LOSARTAN POTASSIUM 100 MILLIGRAM(S): 100 TABLET, FILM COATED ORAL at 06:37

## 2020-02-22 RX ADMIN — Medication 3 MILLILITER(S): at 09:03

## 2020-02-22 NOTE — CHART NOTE - NSCHARTNOTEFT_GEN_A_CORE
<<<RESIDENT DISCHARGE NOTE>>>     VENKATESH RAMACHANDRAN  MRN-[id number]    VITAL SIGNS:  T(F): 97.2 (02-22-20 @ 05:07), Max: 97.7 (02-21-20 @ 21:00)  HR: 85 (02-22-20 @ 11:15)  BP: 170/75 (02-22-20 @ 11:15)  SpO2: 95% (02-22-20 @ 08:00)      PHYSICAL EXAMINATION:  General: NAD  Head & Neck: NCAT  Pulmonary: CTA b/l, wheeze and fines crackles b/l, would be going home on home oxygen  Cardiovascular: +s1s2 RRR  Gastrointestinal/Abdomen & Pelvis: soft, NT/ND (+) bs  Neurologic/Motor: FROM x 4 5/5    TEST RESULTS:                        9.7    5.18  )-----------( 139      ( 22 Feb 2020 06:59 )             30.1       02-22    142  |  104  |  48<H>  ----------------------------<  327<H>  4.0   |  23  |  1.4    Ca    9.3      22 Feb 2020 06:59        FINAL DISCHARGE INTERVIEW:  Resident(s) Present: (Name: Ester, RN Present: (Name:Cici)    DISCHARGE MEDICATION RECONCILIATION  reviewed with Attending (Name: Dom)    DISPOSITION:   [ x ] Home,    [  ] Home with Visiting Nursing Services,   [    ]  SNF/ NH,    [   ] Acute Rehab (4A),   [   ] Other (Specify:)

## 2020-02-22 NOTE — PROGRESS NOTE ADULT - REASON FOR ADMISSION
SOB/wheezing + back pain

## 2020-02-27 DIAGNOSIS — I27.20 PULMONARY HYPERTENSION, UNSPECIFIED: ICD-10-CM

## 2020-02-27 DIAGNOSIS — I50.9 HEART FAILURE, UNSPECIFIED: ICD-10-CM

## 2020-02-27 DIAGNOSIS — N18.3 CHRONIC KIDNEY DISEASE, STAGE 3 (MODERATE): ICD-10-CM

## 2020-02-27 DIAGNOSIS — Z95.5 PRESENCE OF CORONARY ANGIOPLASTY IMPLANT AND GRAFT: ICD-10-CM

## 2020-02-27 DIAGNOSIS — J44.0 CHRONIC OBSTRUCTIVE PULMONARY DISEASE WITH (ACUTE) LOWER RESPIRATORY INFECTION: ICD-10-CM

## 2020-02-27 DIAGNOSIS — Z88.2 ALLERGY STATUS TO SULFONAMIDES: ICD-10-CM

## 2020-02-27 DIAGNOSIS — M19.90 UNSPECIFIED OSTEOARTHRITIS, UNSPECIFIED SITE: ICD-10-CM

## 2020-02-27 DIAGNOSIS — E11.51 TYPE 2 DIABETES MELLITUS WITH DIABETIC PERIPHERAL ANGIOPATHY WITHOUT GANGRENE: ICD-10-CM

## 2020-02-27 DIAGNOSIS — E11.22 TYPE 2 DIABETES MELLITUS WITH DIABETIC CHRONIC KIDNEY DISEASE: ICD-10-CM

## 2020-02-27 DIAGNOSIS — R09.02 HYPOXEMIA: ICD-10-CM

## 2020-02-27 DIAGNOSIS — K21.9 GASTRO-ESOPHAGEAL REFLUX DISEASE WITHOUT ESOPHAGITIS: ICD-10-CM

## 2020-02-27 DIAGNOSIS — I25.10 ATHEROSCLEROTIC HEART DISEASE OF NATIVE CORONARY ARTERY WITHOUT ANGINA PECTORIS: ICD-10-CM

## 2020-02-27 DIAGNOSIS — Z82.49 FAMILY HISTORY OF ISCHEMIC HEART DISEASE AND OTHER DISEASES OF THE CIRCULATORY SYSTEM: ICD-10-CM

## 2020-02-27 DIAGNOSIS — Z99.81 DEPENDENCE ON SUPPLEMENTAL OXYGEN: ICD-10-CM

## 2020-02-27 DIAGNOSIS — E78.00 PURE HYPERCHOLESTEROLEMIA, UNSPECIFIED: ICD-10-CM

## 2020-02-27 DIAGNOSIS — Z91.040 LATEX ALLERGY STATUS: ICD-10-CM

## 2020-02-27 DIAGNOSIS — D59.1 OTHER AUTOIMMUNE HEMOLYTIC ANEMIAS: ICD-10-CM

## 2020-02-27 DIAGNOSIS — S39.012A STRAIN OF MUSCLE, FASCIA AND TENDON OF LOWER BACK, INITIAL ENCOUNTER: ICD-10-CM

## 2020-02-27 DIAGNOSIS — J44.1 CHRONIC OBSTRUCTIVE PULMONARY DISEASE WITH (ACUTE) EXACERBATION: ICD-10-CM

## 2020-02-27 DIAGNOSIS — Z88.0 ALLERGY STATUS TO PENICILLIN: ICD-10-CM

## 2020-02-27 DIAGNOSIS — M54.9 DORSALGIA, UNSPECIFIED: ICD-10-CM

## 2020-02-27 DIAGNOSIS — Z87.891 PERSONAL HISTORY OF NICOTINE DEPENDENCE: ICD-10-CM

## 2020-02-27 DIAGNOSIS — R91.1 SOLITARY PULMONARY NODULE: ICD-10-CM

## 2020-02-27 DIAGNOSIS — I13.0 HYPERTENSIVE HEART AND CHRONIC KIDNEY DISEASE WITH HEART FAILURE AND STAGE 1 THROUGH STAGE 4 CHRONIC KIDNEY DISEASE, OR UNSPECIFIED CHRONIC KIDNEY DISEASE: ICD-10-CM

## 2020-02-27 DIAGNOSIS — R06.02 SHORTNESS OF BREATH: ICD-10-CM

## 2020-02-27 DIAGNOSIS — I25.2 OLD MYOCARDIAL INFARCTION: ICD-10-CM

## 2020-02-27 DIAGNOSIS — C85.90 NON-HODGKIN LYMPHOMA, UNSPECIFIED, UNSPECIFIED SITE: ICD-10-CM

## 2020-02-27 DIAGNOSIS — R91.8 OTHER NONSPECIFIC ABNORMAL FINDING OF LUNG FIELD: ICD-10-CM
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
## 2020-02-27 DIAGNOSIS — Z79.4 LONG TERM (CURRENT) USE OF INSULIN: ICD-10-CM

## 2020-02-27 DIAGNOSIS — Z88.8 ALLERGY STATUS TO OTHER DRUGS, MEDICAMENTS AND BIOLOGICAL SUBSTANCES STATUS: ICD-10-CM

## 2020-05-11 ENCOUNTER — APPOINTMENT (OUTPATIENT)
Dept: HEMATOLOGY ONCOLOGY | Facility: CLINIC | Age: 80
End: 2020-05-11

## 2020-06-04 PROBLEM — I21.9 ACUTE MYOCARDIAL INFARCTION, UNSPECIFIED: Chronic | Status: ACTIVE | Noted: 2018-06-09

## 2020-06-04 PROBLEM — E11.9 TYPE 2 DIABETES MELLITUS WITHOUT COMPLICATIONS: Chronic | Status: ACTIVE | Noted: 2018-06-09

## 2020-06-04 PROBLEM — I25.10 ATHEROSCLEROTIC HEART DISEASE OF NATIVE CORONARY ARTERY WITHOUT ANGINA PECTORIS: Chronic | Status: ACTIVE | Noted: 2018-06-09

## 2020-06-04 PROBLEM — C85.90 NON-HODGKIN LYMPHOMA, UNSPECIFIED, UNSPECIFIED SITE: Chronic | Status: ACTIVE | Noted: 2018-06-09

## 2020-06-15 ENCOUNTER — APPOINTMENT (OUTPATIENT)
Dept: HEMATOLOGY ONCOLOGY | Facility: CLINIC | Age: 80
End: 2020-06-15
Payer: MEDICARE

## 2020-06-15 ENCOUNTER — LABORATORY RESULT (OUTPATIENT)
Age: 80
End: 2020-06-15

## 2020-06-15 VITALS
BODY MASS INDEX: 28.63 KG/M2 | TEMPERATURE: 97.1 F | HEIGHT: 59 IN | HEART RATE: 86 BPM | DIASTOLIC BLOOD PRESSURE: 86 MMHG | RESPIRATION RATE: 14 BRPM | WEIGHT: 142 LBS | SYSTOLIC BLOOD PRESSURE: 190 MMHG

## 2020-06-15 LAB
ALBUMIN SERPL ELPH-MCNC: 4.1 G/DL
ALP BLD-CCNC: 82 U/L
ALT SERPL-CCNC: 9 U/L
ANION GAP SERPL CALC-SCNC: 15 MMOL/L
AST SERPL-CCNC: 17 U/L
BILIRUB SERPL-MCNC: 0.4 MG/DL
BUN SERPL-MCNC: 15 MG/DL
CALCIUM SERPL-MCNC: 9.4 MG/DL
CHLORIDE SERPL-SCNC: 100 MMOL/L
CO2 SERPL-SCNC: 29 MMOL/L
CREAT SERPL-MCNC: 0.9 MG/DL
GLUCOSE SERPL-MCNC: 203 MG/DL
HCT VFR BLD CALC: 34.5 %
HGB BLD-MCNC: 10.7 G/DL
IRON SATN MFR SERPL: 10 %
IRON SERPL-MCNC: 34 UG/DL
LDH SERPL-CCNC: 226
MCHC RBC-ENTMCNC: 25.2 PG
MCHC RBC-ENTMCNC: 31 G/DL
MCV RBC AUTO: 81.2 FL
PLATELET # BLD AUTO: 185 K/UL
PMV BLD: 8.4 FL
POTASSIUM SERPL-SCNC: 4.3 MMOL/L
PROT SERPL-MCNC: 6.5 G/DL
RBC # BLD: 4.25 M/UL
RBC # FLD: 13.8 %
SODIUM SERPL-SCNC: 144 MMOL/L
TIBC SERPL-MCNC: 333 UG/DL
UIBC SERPL-MCNC: 299 UG/DL
WBC # FLD AUTO: 4.29 K/UL

## 2020-06-15 PROCEDURE — 99214 OFFICE O/P EST MOD 30 MIN: CPT

## 2020-06-16 LAB
FERRITIN SERPL-MCNC: 139 NG/ML
HAPTOGLOB SERPL-MCNC: 247 MG/DL

## 2020-06-16 NOTE — CONSULT LETTER
[Dear  ___] : Dear  [unfilled], [Please see my note below.] : Please see my note below. [Consult Letter:] : I had the pleasure of evaluating your patient, [unfilled]. [Consult Closing:] : Thank you very much for allowing me to participate in the care of this patient.  If you have any questions, please do not hesitate to contact me. [Sincerely,] : Sincerely, [DrDede  ___] : Dr. PADILLA [DrDede ___] : Dr. PADILLA [FreeTextEntry3] : Rylan

## 2020-06-16 NOTE — PHYSICAL EXAM
[Normal] : grossly intact [Ambulatory and capable of all self care but unable to carry out any work activities] : Status 2- Ambulatory and capable of all self care but unable to carry out any work activities. Up and about more than 50% of waking hours [de-identified] : On oxygen [de-identified] : She had expiratory wheezing.

## 2020-06-16 NOTE — REVIEW OF SYSTEMS
[Fatigue] : fatigue [SOB on Exertion] : shortness of breath during exertion [Negative] : Integumentary [Shortness Of Breath] : no shortness of breath [Recent Change In Weight] : ~T no recent weight change [Cough] : cough [Wheezing] : no wheezing [Anxiety] : no anxiety [FreeTextEntry6] : SOB is chronic from COPD [Depression] : no depression [de-identified] : diabetic neuropathy

## 2020-06-16 NOTE — ASSESSMENT
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  Elly has completed a course of steroids as well as rituximab. Rituxan  on 04/08/2016 and she  finished steroids approximately in early 06/2016.  She is in remission.\par She has mild anemia with stable Hgb and there is no evidence of hemolysis or NESHA on todays lab work. I suspect her anemia is due to  resulting in anemia of chronic inflammation  No further actions are needed since anemia is mild\par \par 2.History of arteriovenous malformations on endoscopy. \par - She currently denies any bleeding.  \par -ferritin was over 100\par \par 3. Diabetes.  \par - She is on insulin.\par \par 4. Hypogammaglobulinemia.  \par - Her IgG that was previously checked was decreased. She has been asymptomatic this was due to Rituxan. Repeat is showing near normal levels in past . \par \par 5.Previous history of hyperferritinemia, this was probably reactive.\par - will continue to monitor\par \par 6 She has a history of steroid use.  Her  DEXA scan in 8/2016 was normal.  DEXA in 3/20/19 showed osteopenia in femoral neck with FRAX score  risk of fractuer of 2.9% in 10 years\par - c/w calcium and vitamin D\par \par 7. MIld  Anemia, likely due  COPD\par - will continue to monitor\par \par 8. 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 right upper lobe 1.3 x 0.6 cm  ground-glass nodule (SUV 2.1)\par - repeat CT chest  in 2/2020 was not changed \par \par RTC to see me in 3 months. Needs CT every 6 months for 2 years than yearly as long as she is willing to undergo future treatments if needed. Will check CT chest in August 2020.

## 2020-06-16 NOTE — HISTORY OF PRESENT ILLNESS
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  She finished it approximately in the beginning of 06/2016.  She also received 4  doses of rituximab therapy.\par \par HISTORY OF PRESENT ILLNESS:  Ms. Blackmon is a very pleasant 85yearold female with multiple medical problems.  She initially was being treated for secondary autoimmune hemolytic anemia in the setting of indolent Bcell nonHodgkin lymphoma, NOS.  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  Her initial PET scan only demonstrated mild splenomegaly.  She required steroids as well, but then when the tapering was attempted, her anemia worsened, and at that point in time, rituximab was initiated.\par  [de-identified] : January 17, 2017\yonathan Sanabria presents for followup today she hasn't seen you since July. She denies having recurrent illnesses. She denies having  fatigue she experienced before. She does have occasional headaches. Blood pressure today is elevated. Otherwise she has no complaints. \par \par 4/24/17\par She is doing well except  for trouble with sugars and BP. She is seeing appropriate specialists. No other complaints CBC from today is WNL.\par \par 7/26/17\par She is doing well. She has a cough with sputum production for now several months. CXR in July was clear. HAd a course of antibiotics that did not work. It may be her COPD. No bleedig, no B symptoms. Usual fatigue. \par \par 10/27/17\par She is doing well except for her diabetic neuropathy in her feet. States her A1C is 6. No bleeding. \par \par 1/25/18\par She is her for follow up for her NHL. She is doing well. No bleeding. No weight loss.  CBC from today is stable.\par \par \par 5/24/18\par She is doing well. She has fatigue. CBC was fine from today s visit.\par \par 9/17/18\par She is here for follow up. She may have a cold, fever 99, cough with green phlegm, not SOB. CBC showed a Hgb of 11 from today. She has no bleeding,normal stool and urine.\par \par 12/17/18\par Patient is here for a follow-up visit.  She is feeling well with no complaints.  Most recent CBC is stable, Hgb up to 11.4.  Patient denies fever, chills, nausea, vomiting.  She has received her flu vaccine this season.  \par \par \par 4/9/19\par She is here for follow up. She is doing well. 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 Duran.\par \par 5/20/19\par She is here for follow up. She had a PET/CT 4/18/19: Compared to 2/24/2016,focal FDG uptake (SUV 4.1; intensely avid on \par nonattenuation corrected images) coregistering with 1.8 x 1.6 cm left  lower lobe pulmonary nodule suspicious for biologic tumor activity\par In addition another new FDG avid right upper lobe 1.3 x 0.6 cm  groundglass nodule (SUV 2.1-FDG avid on attenuation corrected images) \par suspicious for biologic tumor activity. A 1 cm pretracheal lymph node is not FDG avid\par \par No other sites of abnormal FDG uptake\par \par She underwent a CT FNA biopsy of Left  lobe nodule that showed 5/13/19:   POSITIVE FOR MALIGNANT CELLS.\par Non-small cell carcinoma, favor squamous cell carcinoma.\par Immunohistochemistry studies were performed at [facility] on block 1-C\par and the results support the diagnosis (positive P-40; negative-\par TTF1/Napsin).\par \par \par She feels well otherwise\par \par 7/22/19\par She is here for follow up. She states she completed 5 fractions of radiation on July 12. She feels well except some fatigue. \par \par 10/2/19\par Patient is here for a follow-up visit with spouse.  She is feeling well with no new complaints.  Discussed findings from CT imaging reflecting slight improvement in LLL nodule post radiation and RUL nodule was stable.  Encouraged flu and pna vaccination with PCP.  \par CT Chest (9.30.19) IMPRESSION:  Decrease in size size of left lower lobe spiculated nodule measuring 1.5 x 0.7 cm previously measured 1.8 x 1.6 cm. Stable right upper lobe 1.3 x 0.6 cm groundglass nodule.\par \par 1/6/20\par Patient is here for a follow-up visit for autoimmune hemolytic anemia and hx of lymphoma with her .  She has had nausea and constipation/diarrhea over the last few weeks which has resolved.  She denies unintentional weightloss or b symptoms, although she reports slightly worsening of SOB with exertion.  Most recent CBC reviewed and is stable.  \par \par 6/15/2020\par She is here for follow up. She had a CTA in 2/2020 IMPRESSION: \par \par 1. No CTA evidence of aortic dissection. \par \par 2. No CT evidence of acute intrathoracic or abdominopelvic pathology. \par Symmetric perfusion to the kidneys. \par \par 3. 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 \par \par 4. Decreased size of the left lower lobe spiculated nodule measuring 1.1 x \par 0.7 cm (previously 1.5 x 0.7 cm). \par \par 5. Unchanged right upper lobe 1.3 x 0.6 cm groundglass nodule/opacity.\par \par She is on oxygen now due to SOB. She has a cough as well that is chornic.  \par

## 2020-08-14 ENCOUNTER — OUTPATIENT (OUTPATIENT)
Dept: OUTPATIENT SERVICES | Facility: HOSPITAL | Age: 80
LOS: 1 days | Discharge: HOME | End: 2020-08-14
Payer: MEDICARE

## 2020-08-14 ENCOUNTER — RESULT REVIEW (OUTPATIENT)
Age: 80
End: 2020-08-14

## 2020-08-14 DIAGNOSIS — Z90.49 ACQUIRED ABSENCE OF OTHER SPECIFIED PARTS OF DIGESTIVE TRACT: Chronic | ICD-10-CM

## 2020-08-14 DIAGNOSIS — C34.90 MALIGNANT NEOPLASM OF UNSPECIFIED PART OF UNSPECIFIED BRONCHUS OR LUNG: ICD-10-CM

## 2020-08-14 DIAGNOSIS — Z98.890 OTHER SPECIFIED POSTPROCEDURAL STATES: Chronic | ICD-10-CM

## 2020-08-14 PROCEDURE — 71250 CT THORAX DX C-: CPT | Mod: 26

## 2020-08-21 NOTE — ED ADULT NURSE NOTE - NS ED NURSE LEVEL OF CONSCIOUSNESS ORIENTATION
Oriented - self; Oriented - place; Oriented - time
**ATTENDING ADDENDUM (Dr. Barrie Perla): NO airbag deployment reported/seat belt/shoulder harness

## 2020-09-21 ENCOUNTER — APPOINTMENT (OUTPATIENT)
Dept: HEMATOLOGY ONCOLOGY | Facility: CLINIC | Age: 80
End: 2020-09-21
Payer: MEDICARE

## 2020-09-21 ENCOUNTER — OUTPATIENT (OUTPATIENT)
Dept: OUTPATIENT SERVICES | Facility: HOSPITAL | Age: 80
LOS: 1 days | Discharge: HOME | End: 2020-09-21

## 2020-09-21 ENCOUNTER — LABORATORY RESULT (OUTPATIENT)
Age: 80
End: 2020-09-21

## 2020-09-21 VITALS
RESPIRATION RATE: 14 BRPM | DIASTOLIC BLOOD PRESSURE: 70 MMHG | SYSTOLIC BLOOD PRESSURE: 154 MMHG | BODY MASS INDEX: 28.22 KG/M2 | WEIGHT: 140 LBS | HEART RATE: 70 BPM | HEIGHT: 59 IN | TEMPERATURE: 97.4 F

## 2020-09-21 DIAGNOSIS — C85.90 NON-HODGKIN LYMPHOMA, UNSPECIFIED, UNSPECIFIED SITE: ICD-10-CM

## 2020-09-21 DIAGNOSIS — Z98.890 OTHER SPECIFIED POSTPROCEDURAL STATES: Chronic | ICD-10-CM

## 2020-09-21 DIAGNOSIS — C34.32 MALIGNANT NEOPLASM OF LOWER LOBE, LEFT BRONCHUS OR LUNG: ICD-10-CM

## 2020-09-21 DIAGNOSIS — D59.9 ACQUIRED HEMOLYTIC ANEMIA, UNSPECIFIED: ICD-10-CM

## 2020-09-21 DIAGNOSIS — C34.90 MALIGNANT NEOPLASM OF UNSPECIFIED PART OF UNSPECIFIED BRONCHUS OR LUNG: ICD-10-CM

## 2020-09-21 DIAGNOSIS — Z90.49 ACQUIRED ABSENCE OF OTHER SPECIFIED PARTS OF DIGESTIVE TRACT: Chronic | ICD-10-CM

## 2020-09-21 LAB
HCT VFR BLD CALC: 32.6 %
HGB BLD-MCNC: 9.9 G/DL
MCHC RBC-ENTMCNC: 24.2 PG
MCHC RBC-ENTMCNC: 30.4 G/DL
MCV RBC AUTO: 79.7 FL
PLATELET # BLD AUTO: 180 K/UL
PMV BLD: 8.5 FL
RBC # BLD: 4.09 M/UL
RBC # FLD: 15.5 %
WBC # FLD AUTO: 4.75 K/UL

## 2020-09-21 PROCEDURE — 99214 OFFICE O/P EST MOD 30 MIN: CPT

## 2020-09-21 RX ORDER — LENVATINIB 4 MG/1
2 X 4 CAPSULE ORAL
Refills: 0 | Status: DISCONTINUED | COMMUNITY
End: 2020-09-21

## 2020-09-22 LAB
ALBUMIN SERPL ELPH-MCNC: 4 G/DL
ALP BLD-CCNC: 73 U/L
ALT SERPL-CCNC: 12 U/L
ANION GAP SERPL CALC-SCNC: 10 MMOL/L
AST SERPL-CCNC: 18 U/L
BILIRUB SERPL-MCNC: 0.3 MG/DL
BUN SERPL-MCNC: 23 MG/DL
CALCIUM SERPL-MCNC: 9.1 MG/DL
CHLORIDE SERPL-SCNC: 102 MMOL/L
CO2 SERPL-SCNC: 29 MMOL/L
CREAT SERPL-MCNC: 1.1 MG/DL
FERRITIN SERPL-MCNC: 188 NG/ML
GLUCOSE SERPL-MCNC: 222 MG/DL
HAPTOGLOB SERPL-MCNC: 232 MG/DL
IRON SATN MFR SERPL: 17 %
IRON SERPL-MCNC: 58 UG/DL
LDH SERPL-CCNC: 195
POTASSIUM SERPL-SCNC: 4.1 MMOL/L
PROT SERPL-MCNC: 6.2 G/DL
SODIUM SERPL-SCNC: 141 MMOL/L
TIBC SERPL-MCNC: 336 UG/DL
UIBC SERPL-MCNC: 278 UG/DL

## 2020-09-23 NOTE — ASSESSMENT
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  Elly has completed course of steroids as well as rituximab. Rituximab completed 04/08/2016, and she finished steroids approximately in early 06/2016.  She is in remission.\par - CBC reviewed. Hemoglobin is 9.9 with MCV of 79.7. Patient denies active bleeding and is not taking iron supplementation. Iron studies pending. Will notify patient of results and next steps in care plan. If no evidence of iron deficiency, anemia is likely secondary to chronic inflammation.\par \par 2.History of arteriovenous malformations on endoscopy. \par - She currently denies any bleeding.  \par - Iron studies pending from today.\par \par 3. Diabetes.  \par - She is on insulin.\par \par 4. Hypogammaglobulinemia.  \par - Her IgG that was previously checked was decreased. She has been asymptomatic this was due to Rituxan. Repeat is showing near normal levels in past . \par \par 5. She has a history of steroid use.  Her  DEXA scan in 8/2016 was normal.  DEXA in 3/20/19 showed osteopenia in femoral neck with FRAX score risk of fracture of 2.9% in 10 years.\par - c/w calcium and vitamin D.\par \par 6. 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 Spoke with Dr. Feldman, who agreed that this likely represents post-treatment effect. Will repeat NC CT chest in 11/2020.\par \par Follow up in 3 months.\par \par Patient was seen and examined with Dr. Hernandez, who agreed with the above plan of care.\par

## 2020-09-23 NOTE — END OF VISIT
[] : Fellow [FreeTextEntry3] : She is short of breath which is chronic, and has not worsen\par \par Her alst CT scans was discussed with Dr. Feldman who agrees that its radiation induced changes.\par \par Her anemia panel was normal. THus Is suepct her mild/moderate anemia is due to Anemia of chronic disease, COPD or CKD ( GFR is under  60)\par \par We will repeat CT chest in 2 months  from today.\par \par RTC in 3 months post CT.

## 2020-09-23 NOTE — HISTORY OF PRESENT ILLNESS
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  She finished it approximately in the beginning of 06/2016.  She also received 4  doses of rituximab therapy.\par \par HISTORY OF PRESENT ILLNESS:  Ms. Blackmon is a very pleasant 85yearold female with multiple medical problems.  She initially was being treated for secondary autoimmune hemolytic anemia in the setting of indolent Bcell nonHodgkin lymphoma, NOS.  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  Her initial PET scan only demonstrated mild splenomegaly.  She required steroids as well, but then when the tapering was attempted, her anemia worsened, and at that point in time, rituximab was initiated.\par  [de-identified] : January 17, 2017\yonathan Sanabria presents for followup today she hasn't seen you since July. She denies having recurrent illnesses. She denies having  fatigue she experienced before. She does have occasional headaches. Blood pressure today is elevated. Otherwise she has no complaints. \par \par 4/24/17\par She is doing well except  for trouble with sugars and BP. She is seeing appropriate specialists. No other complaints CBC from today is WNL.\par \par 7/26/17\par She is doing well. She has a cough with sputum production for now several months. CXR in July was clear. HAd a course of antibiotics that did not work. It may be her COPD. No bleedig, no B symptoms. Usual fatigue. \par \par 10/27/17\par She is doing well except for her diabetic neuropathy in her feet. States her A1C is 6. No bleeding. \par \par 1/25/18\par She is her for follow up for her NHL. She is doing well. No bleeding. No weight loss.  CBC from today is stable.\par \par \par 5/24/18\par She is doing well. She has fatigue. CBC was fine from today s visit.\par \par 9/17/18\par She is here for follow up. She may have a cold, fever 99, cough with green phlegm, not SOB. CBC showed a Hgb of 11 from today. She has no bleeding,normal stool and urine.\par \par 12/17/18\par Patient is here for a follow-up visit.  She is feeling well with no complaints.  Most recent CBC is stable, Hgb up to 11.4.  Patient denies fever, chills, nausea, vomiting.  She has received her flu vaccine this season.  \par \par \par 4/9/19\par She is here for follow up. She is doing well. 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 Duran.\par \par 5/20/19\par She is here for follow up. She had a PET/CT 4/18/19: Compared to 2/24/2016,focal FDG uptake (SUV 4.1; intensely avid on \par nonattenuation corrected images) coregistering with 1.8 x 1.6 cm left  lower lobe pulmonary nodule suspicious for biologic tumor activity\par In addition another new FDG avid right upper lobe 1.3 x 0.6 cm  groundglass nodule (SUV 2.1-FDG avid on attenuation corrected images) \par suspicious for biologic tumor activity. A 1 cm pretracheal lymph node is not FDG avid\par \par No other sites of abnormal FDG uptake\par \par She underwent a CT FNA biopsy of Left  lobe nodule that showed 5/13/19:   POSITIVE FOR MALIGNANT CELLS.\par Non-small cell carcinoma, favor squamous cell carcinoma.\par Immunohistochemistry studies were performed at [facility] on block 1-C\par and the results support the diagnosis (positive P-40; negative-\par TTF1/Napsin).\par \par \par She feels well otherwise\par \par 7/22/19\par She is here for follow up. She states she completed 5 fractions of radiation on July 12. She feels well except some fatigue. \par \par 10/2/19\par Patient is here for a follow-up visit with spouse.  She is feeling well with no new complaints.  Discussed findings from CT imaging reflecting slight improvement in LLL nodule post radiation and RUL nodule was stable.  Encouraged flu and pna vaccination with PCP.  \par CT Chest (9.30.19) IMPRESSION:  Decrease in size size of left lower lobe spiculated nodule measuring 1.5 x 0.7 cm previously measured 1.8 x 1.6 cm. Stable right upper lobe 1.3 x 0.6 cm groundglass nodule.\par \par 1/6/20\par Patient is here for a follow-up visit for autoimmune hemolytic anemia and hx of lymphoma with her .  She has had nausea and constipation/diarrhea over the last few weeks which has resolved.  She denies unintentional weightloss or b symptoms, although she reports slightly worsening of SOB with exertion.  Most recent CBC reviewed and is stable.  \par \par 6/15/2020\par She is here for follow up. She had a CTA in 2/2020 IMPRESSION: \par \par 1. No CTA evidence of aortic dissection. \par \par 2. No CT evidence of acute intrathoracic or abdominopelvic pathology. \par Symmetric perfusion to the kidneys. \par \par 3. 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 \par \par 4. Decreased size of the left lower lobe spiculated nodule measuring 1.1 x \par 0.7 cm (previously 1.5 x 0.7 cm). \par \par 5. Unchanged right upper lobe 1.3 x 0.6 cm groundglass nodule/opacity.\par \par She is on oxygen now due to SOB. She has a cough as well that is chornic.  \par \par 9/21/2020: Patient presents for follow up of low-grade NHL with secondary autoimmune hemolytic anemia, s/p steroids and rituximab in 2016, and [id number] (stage IA2) NSCLC s/p RT in 7/2019. She had a CT chest in 8/14/2020, which demonstrated increased LLL opacity without discrete nodule, increased left-sided pleural effusion, and no LAD. Results were discussed with patient's pulmonologist, who agreed that results likely represented post-treatment effect and agreed with surveillance CT scans. Hemoglobin decreased to 9.9 with MCV of 79.7 on today's CBC. Patient denies any active bleeding and states that she does not take iron supplementation. Her breathing is at her baseline. Her only complaint is fatigue.\par

## 2020-09-23 NOTE — REVIEW OF SYSTEMS
[Fatigue] : fatigue [Cough] : cough [SOB on Exertion] : shortness of breath during exertion [Negative] : Heme/Lymph [Recent Change In Weight] : ~T no recent weight change [Shortness Of Breath] : no shortness of breath [Wheezing] : no wheezing [Anxiety] : no anxiety [Depression] : no depression [FreeTextEntry6] : SOB is chronic from COPD [de-identified] : Diabetic neuropathy.

## 2020-09-23 NOTE — PHYSICAL EXAM
[Ambulatory and capable of all self care but unable to carry out any work activities] : Status 2- Ambulatory and capable of all self care but unable to carry out any work activities. Up and about more than 50% of waking hours [Normal] : affect appropriate [de-identified] : On oxygen

## 2020-11-13 ENCOUNTER — RESULT REVIEW (OUTPATIENT)
Age: 80
End: 2020-11-13

## 2020-11-13 ENCOUNTER — OUTPATIENT (OUTPATIENT)
Dept: OUTPATIENT SERVICES | Facility: HOSPITAL | Age: 80
LOS: 1 days | Discharge: HOME | End: 2020-11-13
Payer: MEDICARE

## 2020-11-13 DIAGNOSIS — C34.32 MALIGNANT NEOPLASM OF LOWER LOBE, LEFT BRONCHUS OR LUNG: ICD-10-CM

## 2020-11-13 DIAGNOSIS — Z90.49 ACQUIRED ABSENCE OF OTHER SPECIFIED PARTS OF DIGESTIVE TRACT: Chronic | ICD-10-CM

## 2020-11-13 DIAGNOSIS — Z98.890 OTHER SPECIFIED POSTPROCEDURAL STATES: Chronic | ICD-10-CM

## 2020-11-13 PROCEDURE — 71250 CT THORAX DX C-: CPT | Mod: 26

## 2021-01-11 ENCOUNTER — APPOINTMENT (OUTPATIENT)
Dept: HEMATOLOGY ONCOLOGY | Facility: CLINIC | Age: 81
End: 2021-01-11
Payer: MEDICARE

## 2021-01-11 ENCOUNTER — LABORATORY RESULT (OUTPATIENT)
Age: 81
End: 2021-01-11

## 2021-01-11 ENCOUNTER — OUTPATIENT (OUTPATIENT)
Dept: OUTPATIENT SERVICES | Facility: HOSPITAL | Age: 81
LOS: 1 days | Discharge: HOME | End: 2021-01-11

## 2021-01-11 DIAGNOSIS — Z98.890 OTHER SPECIFIED POSTPROCEDURAL STATES: Chronic | ICD-10-CM

## 2021-01-11 DIAGNOSIS — Z90.49 ACQUIRED ABSENCE OF OTHER SPECIFIED PARTS OF DIGESTIVE TRACT: Chronic | ICD-10-CM

## 2021-01-11 LAB
ALBUMIN SERPL ELPH-MCNC: 4 G/DL
ALP BLD-CCNC: 82 U/L
ALT SERPL-CCNC: 14 U/L
ANION GAP SERPL CALC-SCNC: 11 MMOL/L
AST SERPL-CCNC: 21 U/L
BILIRUB SERPL-MCNC: 0.4 MG/DL
BUN SERPL-MCNC: 29 MG/DL
CALCIUM SERPL-MCNC: 9 MG/DL
CHLORIDE SERPL-SCNC: 101 MMOL/L
CO2 SERPL-SCNC: 27 MMOL/L
CREAT SERPL-MCNC: 1.1 MG/DL
GLUCOSE SERPL-MCNC: 309 MG/DL
HCT VFR BLD CALC: 31.1 %
HGB BLD-MCNC: 10 G/DL
IRON SATN MFR SERPL: 18 %
IRON SERPL-MCNC: 58 UG/DL
LDH SERPL-CCNC: 209
MCHC RBC-ENTMCNC: 25.7 PG
MCHC RBC-ENTMCNC: 32.2 G/DL
MCV RBC AUTO: 79.9 FL
PLATELET # BLD AUTO: 178 K/UL
PMV BLD: 8.9 FL
POTASSIUM SERPL-SCNC: 3.9 MMOL/L
PROT SERPL-MCNC: 6.5 G/DL
RBC # BLD: 3.89 M/UL
RBC # FLD: 14.6 %
SODIUM SERPL-SCNC: 139 MMOL/L
TIBC SERPL-MCNC: 331 UG/DL
UIBC SERPL-MCNC: 273 UG/DL
WBC # FLD AUTO: 4.45 K/UL

## 2021-01-11 PROCEDURE — 99213 OFFICE O/P EST LOW 20 MIN: CPT

## 2021-01-11 NOTE — PHYSICAL EXAM
[Ambulatory and capable of all self care but unable to carry out any work activities] : Status 2- Ambulatory and capable of all self care but unable to carry out any work activities. Up and about more than 50% of waking hours [Normal] : normoactive bowel sounds, soft and nontender, no hepatosplenomegaly or masses appreciated [de-identified] : On oxygen [de-identified] : sounded cleared

## 2021-01-11 NOTE — REVIEW OF SYSTEMS
[Fatigue] : fatigue [Cough] : cough [SOB on Exertion] : shortness of breath during exertion [Negative] : Heme/Lymph [Recent Change In Weight] : ~T no recent weight change [Shortness Of Breath] : no shortness of breath [Wheezing] : no wheezing [Anxiety] : no anxiety [Depression] : no depression [FreeTextEntry6] : SOB is chronic from COPD, stable, cough is stable and chronic [de-identified] : Diabetic neuropathy.

## 2021-01-11 NOTE — ASSESSMENT
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  Elly has completed course of steroids as well as rituximab. Rituximab completed 04/08/2016, and she finished steroids approximately in early 06/2016.  She is in remission.\par - CBC reviewed. Stable. There was no hemolysis and  no evidence of iron deficiency, anemia is likely secondary to chronic inflammation due to her COPD.\par \par 2.History of arteriovenous malformations on endoscopy. \par - She currently denies any bleeding.  \par - Iron studies  were normal. CBC is stable, unlikely bleeding\par \par 3. Diabetes.  \par - She is on insulin.\par \par 4. Hypogammaglobulinemia.  \par - Her IgG that was previously checked was decreased. She has been asymptomatic this was due to Rituxan. Repeat is showing near normal levels in past . \par \par 5. She has a history of steroid use.  Her  DEXA scan in 8/2016 was normal.  DEXA in 3/20/19 showed osteopenia in femoral neck with FRAX score risk of fracture of 2.9% in 10 years.\par - c/w calcium and vitamin D.\par \par 6. 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 Spoke with Dr. Feldman, who agreed that this likely represents post-treatment effect. Will repeat NC CT chest  11/2020 No obvious progression.  Will check again in 6 months ~ April/May 2021\par \par 7. Microtic Anemia  is likely secondary to chronic inflammation due to her COPD.\par -hgb is stable and over 10,\par -will monitor\par \par \par Follow up in 3 months.\par \par \par

## 2021-01-11 NOTE — HISTORY OF PRESENT ILLNESS
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  She finished it approximately in the beginning of 06/2016.  She also received 4  doses of rituximab therapy.\par \par HISTORY OF PRESENT ILLNESS:  Ms. Blackmon is a very pleasant 85yearold female with multiple medical problems.  She initially was being treated for secondary autoimmune hemolytic anemia in the setting of indolent Bcell nonHodgkin lymphoma, NOS.  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  Her initial PET scan only demonstrated mild splenomegaly.  She required steroids as well, but then when the tapering was attempted, her anemia worsened, and at that point in time, rituximab was initiated.\par  [de-identified] : January 17, 2017\yonathan Sanabria presents for followup today she hasn't seen you since July. She denies having recurrent illnesses. She denies having  fatigue she experienced before. She does have occasional headaches. Blood pressure today is elevated. Otherwise she has no complaints. \par \par 4/24/17\par She is doing well except  for trouble with sugars and BP. She is seeing appropriate specialists. No other complaints CBC from today is WNL.\par \par 7/26/17\par She is doing well. She has a cough with sputum production for now several months. CXR in July was clear. HAd a course of antibiotics that did not work. It may be her COPD. No bleedig, no B symptoms. Usual fatigue. \par \par 10/27/17\par She is doing well except for her diabetic neuropathy in her feet. States her A1C is 6. No bleeding. \par \par 1/25/18\par She is her for follow up for her NHL. She is doing well. No bleeding. No weight loss.  CBC from today is stable.\par \par \par 5/24/18\par She is doing well. She has fatigue. CBC was fine from today s visit.\par \par 9/17/18\par She is here for follow up. She may have a cold, fever 99, cough with green phlegm, not SOB. CBC showed a Hgb of 11 from today. She has no bleeding,normal stool and urine.\par \par 12/17/18\par Patient is here for a follow-up visit.  She is feeling well with no complaints.  Most recent CBC is stable, Hgb up to 11.4.  Patient denies fever, chills, nausea, vomiting.  She has received her flu vaccine this season.  \par \par \par 4/9/19\par She is here for follow up. She is doing well. 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 Duran.\par \par 5/20/19\par She is here for follow up. She had a PET/CT 4/18/19: Compared to 2/24/2016,focal FDG uptake (SUV 4.1; intensely avid on \par nonattenuation corrected images) coregistering with 1.8 x 1.6 cm left  lower lobe pulmonary nodule suspicious for biologic tumor activity\par In addition another new FDG avid right upper lobe 1.3 x 0.6 cm  groundglass nodule (SUV 2.1-FDG avid on attenuation corrected images) \par suspicious for biologic tumor activity. A 1 cm pretracheal lymph node is not FDG avid\par \par No other sites of abnormal FDG uptake\par \par She underwent a CT FNA biopsy of Left  lobe nodule that showed 5/13/19:   POSITIVE FOR MALIGNANT CELLS.\par Non-small cell carcinoma, favor squamous cell carcinoma.\par Immunohistochemistry studies were performed at [facility] on block 1-C\par and the results support the diagnosis (positive P-40; negative-\par TTF1/Napsin).\par \par \par She feels well otherwise\par \par 7/22/19\par She is here for follow up. She states she completed 5 fractions of radiation on July 12. She feels well except some fatigue. \par \par 10/2/19\par Patient is here for a follow-up visit with spouse.  She is feeling well with no new complaints.  Discussed findings from CT imaging reflecting slight improvement in LLL nodule post radiation and RUL nodule was stable.  Encouraged flu and pna vaccination with PCP.  \par CT Chest (9.30.19) IMPRESSION:  Decrease in size size of left lower lobe spiculated nodule measuring 1.5 x 0.7 cm previously measured 1.8 x 1.6 cm. Stable right upper lobe 1.3 x 0.6 cm groundglass nodule.\par \par 1/6/20\par Patient is here for a follow-up visit for autoimmune hemolytic anemia and hx of lymphoma with her .  She has had nausea and constipation/diarrhea over the last few weeks which has resolved.  She denies unintentional weightloss or b symptoms, although she reports slightly worsening of SOB with exertion.  Most recent CBC reviewed and is stable.  \par \par 6/15/2020\par She is here for follow up. She had a CTA in 2/2020 IMPRESSION: \par \par 1. No CTA evidence of aortic dissection. \par \par 2. No CT evidence of acute intrathoracic or abdominopelvic pathology. \par Symmetric perfusion to the kidneys. \par \par 3. 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 \par \par 4. Decreased size of the left lower lobe spiculated nodule measuring 1.1 x \par 0.7 cm (previously 1.5 x 0.7 cm). \par \par 5. Unchanged right upper lobe 1.3 x 0.6 cm groundglass nodule/opacity.\par \par She is on oxygen now due to SOB. She has a cough as well that is chornic.  \par \par 9/21/2020: Patient presents for follow up of low-grade NHL with secondary autoimmune hemolytic anemia, s/p steroids and rituximab in 2016, and [id number] (stage IA2) NSCLC s/p RT in 7/2019. She had a CT chest in 8/14/2020, which demonstrated increased LLL opacity without discrete nodule, increased left-sided pleural effusion, and no LAD. Results were discussed with patient's pulmonologist, who agreed that results likely represented post-treatment effect and agreed with surveillance CT scans. Hemoglobin decreased to 9.9 with MCV of 79.7 on today's CBC. Patient denies any active bleeding and states that she does not take iron supplementation. Her breathing is at her baseline. Her only complaint is fatigue.\par \par \par 1/11/21\par She is here for follow up. Since last visit she remains anemic with microcytosis, Iron studies were normal last visit. She had a CT  Chest in 11/2020:  \par IMPRESSION:\par 1.  Since February 17, 2020, posttreatment changes again seen in the left lower lobe with decreased size of the left pleural effusion and left lower lobe subpleural opacity/rounded atelectasis.\par \par 2.  Unchanged right upper lobe groundglass nodule, which demonstrated suspicious FDG uptake on prior PET/CT.\par \par 3.  No CT evidence of an acute intrathoracic pathology.\par \par she feels well. She is on 2-3 L NC. Shit is baseline. \par \par

## 2021-01-12 LAB
FERRITIN SERPL-MCNC: 110 NG/ML
HAPTOGLOB SERPL-MCNC: 200 MG/DL

## 2021-01-19 ENCOUNTER — INPATIENT (INPATIENT)
Facility: HOSPITAL | Age: 81
LOS: 4 days | Discharge: HOME HEALTH | End: 2021-01-24
Attending: HOSPITALIST | Admitting: HOSPITALIST
Payer: MEDICARE

## 2021-01-19 VITALS
HEART RATE: 73 BPM | DIASTOLIC BLOOD PRESSURE: 74 MMHG | OXYGEN SATURATION: 97 % | TEMPERATURE: 98 F | RESPIRATION RATE: 18 BRPM | HEIGHT: 59 IN | SYSTOLIC BLOOD PRESSURE: 195 MMHG

## 2021-01-19 DIAGNOSIS — Z90.49 ACQUIRED ABSENCE OF OTHER SPECIFIED PARTS OF DIGESTIVE TRACT: Chronic | ICD-10-CM

## 2021-01-19 DIAGNOSIS — Z98.890 OTHER SPECIFIED POSTPROCEDURAL STATES: Chronic | ICD-10-CM

## 2021-01-19 LAB
ALBUMIN SERPL ELPH-MCNC: 4.4 G/DL — SIGNIFICANT CHANGE UP (ref 3.5–5.2)
ALP SERPL-CCNC: 70 U/L — SIGNIFICANT CHANGE UP (ref 30–115)
ALT FLD-CCNC: 16 U/L — SIGNIFICANT CHANGE UP (ref 0–41)
ANION GAP SERPL CALC-SCNC: 10 MMOL/L — SIGNIFICANT CHANGE UP (ref 7–14)
APTT BLD: 29.5 SEC — SIGNIFICANT CHANGE UP (ref 27–39.2)
AST SERPL-CCNC: 24 U/L — SIGNIFICANT CHANGE UP (ref 0–41)
BASOPHILS # BLD AUTO: 0.03 K/UL — SIGNIFICANT CHANGE UP (ref 0–0.2)
BASOPHILS NFR BLD AUTO: 0.6 % — SIGNIFICANT CHANGE UP (ref 0–1)
BILIRUB SERPL-MCNC: 0.4 MG/DL — SIGNIFICANT CHANGE UP (ref 0.2–1.2)
BUN SERPL-MCNC: 29 MG/DL — HIGH (ref 10–20)
CALCIUM SERPL-MCNC: 9.9 MG/DL — SIGNIFICANT CHANGE UP (ref 8.5–10.1)
CHLORIDE SERPL-SCNC: 101 MMOL/L — SIGNIFICANT CHANGE UP (ref 98–110)
CK MB CFR SERPL CALC: 5.5 NG/ML — SIGNIFICANT CHANGE UP (ref 0.6–6.3)
CO2 SERPL-SCNC: 29 MMOL/L — SIGNIFICANT CHANGE UP (ref 17–32)
CREAT SERPL-MCNC: 1 MG/DL — SIGNIFICANT CHANGE UP (ref 0.7–1.5)
EOSINOPHIL # BLD AUTO: 0.12 K/UL — SIGNIFICANT CHANGE UP (ref 0–0.7)
EOSINOPHIL NFR BLD AUTO: 2.5 % — SIGNIFICANT CHANGE UP (ref 0–8)
GLUCOSE BLDC GLUCOMTR-MCNC: 206 MG/DL — HIGH (ref 70–99)
GLUCOSE SERPL-MCNC: 141 MG/DL — HIGH (ref 70–99)
HCT VFR BLD CALC: 32.5 % — LOW (ref 37–47)
HGB BLD-MCNC: 10.4 G/DL — LOW (ref 12–16)
IMM GRANULOCYTES NFR BLD AUTO: 0.4 % — HIGH (ref 0.1–0.3)
INR BLD: 1.05 RATIO — SIGNIFICANT CHANGE UP (ref 0.65–1.3)
LIDOCAIN IGE QN: 10 U/L — SIGNIFICANT CHANGE UP (ref 7–60)
LYMPHOCYTES # BLD AUTO: 0.95 K/UL — LOW (ref 1.2–3.4)
LYMPHOCYTES # BLD AUTO: 19.7 % — LOW (ref 20.5–51.1)
MCHC RBC-ENTMCNC: 25.1 PG — LOW (ref 27–31)
MCHC RBC-ENTMCNC: 32 G/DL — SIGNIFICANT CHANGE UP (ref 32–37)
MCV RBC AUTO: 78.3 FL — LOW (ref 81–99)
MONOCYTES # BLD AUTO: 0.38 K/UL — SIGNIFICANT CHANGE UP (ref 0.1–0.6)
MONOCYTES NFR BLD AUTO: 7.9 % — SIGNIFICANT CHANGE UP (ref 1.7–9.3)
NEUTROPHILS # BLD AUTO: 3.33 K/UL — SIGNIFICANT CHANGE UP (ref 1.4–6.5)
NEUTROPHILS NFR BLD AUTO: 68.9 % — SIGNIFICANT CHANGE UP (ref 42.2–75.2)
NRBC # BLD: 0 /100 WBCS — SIGNIFICANT CHANGE UP (ref 0–0)
PLATELET # BLD AUTO: 185 K/UL — SIGNIFICANT CHANGE UP (ref 130–400)
POTASSIUM SERPL-MCNC: 4 MMOL/L — SIGNIFICANT CHANGE UP (ref 3.5–5)
POTASSIUM SERPL-SCNC: 4 MMOL/L — SIGNIFICANT CHANGE UP (ref 3.5–5)
PROT SERPL-MCNC: 6.8 G/DL — SIGNIFICANT CHANGE UP (ref 6–8)
PROTHROM AB SERPL-ACNC: 12.1 SEC — SIGNIFICANT CHANGE UP (ref 9.95–12.87)
RBC # BLD: 4.15 M/UL — LOW (ref 4.2–5.4)
RBC # FLD: 14.6 % — HIGH (ref 11.5–14.5)
SARS-COV-2 RNA SPEC QL NAA+PROBE: SIGNIFICANT CHANGE UP
SODIUM SERPL-SCNC: 140 MMOL/L — SIGNIFICANT CHANGE UP (ref 135–146)
TROPONIN T SERPL-MCNC: 0.04 NG/ML — CRITICAL HIGH
TROPONIN T SERPL-MCNC: 0.05 NG/ML — CRITICAL HIGH
WBC # BLD: 4.83 K/UL — SIGNIFICANT CHANGE UP (ref 4.8–10.8)
WBC # FLD AUTO: 4.83 K/UL — SIGNIFICANT CHANGE UP (ref 4.8–10.8)

## 2021-01-19 PROCEDURE — 72170 X-RAY EXAM OF PELVIS: CPT | Mod: 26

## 2021-01-19 PROCEDURE — 70450 CT HEAD/BRAIN W/O DYE: CPT | Mod: 26

## 2021-01-19 PROCEDURE — 93010 ELECTROCARDIOGRAM REPORT: CPT

## 2021-01-19 PROCEDURE — 99285 EMERGENCY DEPT VISIT HI MDM: CPT

## 2021-01-19 PROCEDURE — 72125 CT NECK SPINE W/O DYE: CPT | Mod: 26

## 2021-01-19 PROCEDURE — 99223 1ST HOSP IP/OBS HIGH 75: CPT

## 2021-01-19 PROCEDURE — 71045 X-RAY EXAM CHEST 1 VIEW: CPT | Mod: 26

## 2021-01-19 RX ORDER — DEXTROSE 50 % IN WATER 50 %
25 SYRINGE (ML) INTRAVENOUS ONCE
Refills: 0 | Status: DISCONTINUED | OUTPATIENT
Start: 2021-01-19 | End: 2021-01-24

## 2021-01-19 RX ORDER — ASPIRIN/CALCIUM CARB/MAGNESIUM 324 MG
1 TABLET ORAL
Qty: 0 | Refills: 0 | DISCHARGE

## 2021-01-19 RX ORDER — DEXTROSE 50 % IN WATER 50 %
15 SYRINGE (ML) INTRAVENOUS ONCE
Refills: 0 | Status: DISCONTINUED | OUTPATIENT
Start: 2021-01-19 | End: 2021-01-24

## 2021-01-19 RX ORDER — INSULIN LISPRO 100/ML
VIAL (ML) SUBCUTANEOUS
Refills: 0 | Status: DISCONTINUED | OUTPATIENT
Start: 2021-01-19 | End: 2021-01-24

## 2021-01-19 RX ORDER — GLUCAGON INJECTION, SOLUTION 0.5 MG/.1ML
1 INJECTION, SOLUTION SUBCUTANEOUS ONCE
Refills: 0 | Status: DISCONTINUED | OUTPATIENT
Start: 2021-01-19 | End: 2021-01-24

## 2021-01-19 RX ORDER — CHLORHEXIDINE GLUCONATE 213 G/1000ML
1 SOLUTION TOPICAL
Refills: 0 | Status: DISCONTINUED | OUTPATIENT
Start: 2021-01-19 | End: 2021-01-24

## 2021-01-19 RX ORDER — OMEPRAZOLE 10 MG/1
1 CAPSULE, DELAYED RELEASE ORAL
Qty: 0 | Refills: 0 | DISCHARGE

## 2021-01-19 RX ORDER — BUDESONIDE AND FORMOTEROL FUMARATE DIHYDRATE 160; 4.5 UG/1; UG/1
2 AEROSOL RESPIRATORY (INHALATION)
Refills: 0 | Status: DISCONTINUED | OUTPATIENT
Start: 2021-01-19 | End: 2021-01-24

## 2021-01-19 RX ORDER — DEXTROSE 50 % IN WATER 50 %
12.5 SYRINGE (ML) INTRAVENOUS ONCE
Refills: 0 | Status: DISCONTINUED | OUTPATIENT
Start: 2021-01-19 | End: 2021-01-24

## 2021-01-19 RX ORDER — ENOXAPARIN SODIUM 100 MG/ML
40 INJECTION SUBCUTANEOUS AT BEDTIME
Refills: 0 | Status: DISCONTINUED | OUTPATIENT
Start: 2021-01-19 | End: 2021-01-24

## 2021-01-19 RX ORDER — ATORVASTATIN CALCIUM 80 MG/1
20 TABLET, FILM COATED ORAL AT BEDTIME
Refills: 0 | Status: DISCONTINUED | OUTPATIENT
Start: 2021-01-19 | End: 2021-01-24

## 2021-01-19 RX ORDER — FUROSEMIDE 40 MG
20 TABLET ORAL DAILY
Refills: 0 | Status: DISCONTINUED | OUTPATIENT
Start: 2021-01-19 | End: 2021-01-24

## 2021-01-19 RX ORDER — PANTOPRAZOLE SODIUM 20 MG/1
40 TABLET, DELAYED RELEASE ORAL
Refills: 0 | Status: DISCONTINUED | OUTPATIENT
Start: 2021-01-19 | End: 2021-01-24

## 2021-01-19 RX ORDER — INSULIN LISPRO 100/ML
3 VIAL (ML) SUBCUTANEOUS
Refills: 0 | Status: DISCONTINUED | OUTPATIENT
Start: 2021-01-19 | End: 2021-01-20

## 2021-01-19 RX ORDER — TIOTROPIUM BROMIDE 18 UG/1
1 CAPSULE ORAL; RESPIRATORY (INHALATION) DAILY
Refills: 0 | Status: DISCONTINUED | OUTPATIENT
Start: 2021-01-19 | End: 2021-01-24

## 2021-01-19 RX ORDER — ALBUTEROL 90 UG/1
2 AEROSOL, METERED ORAL EVERY 6 HOURS
Refills: 0 | Status: DISCONTINUED | OUTPATIENT
Start: 2021-01-19 | End: 2021-01-24

## 2021-01-19 RX ORDER — SODIUM CHLORIDE 9 MG/ML
1000 INJECTION, SOLUTION INTRAVENOUS
Refills: 0 | Status: DISCONTINUED | OUTPATIENT
Start: 2021-01-19 | End: 2021-01-24

## 2021-01-19 RX ORDER — LOSARTAN POTASSIUM 100 MG/1
100 TABLET, FILM COATED ORAL DAILY
Refills: 0 | Status: DISCONTINUED | OUTPATIENT
Start: 2021-01-19 | End: 2021-01-24

## 2021-01-19 RX ORDER — ISOSORBIDE MONONITRATE 60 MG/1
30 TABLET, EXTENDED RELEASE ORAL DAILY
Refills: 0 | Status: DISCONTINUED | OUTPATIENT
Start: 2021-01-19 | End: 2021-01-24

## 2021-01-19 RX ORDER — DONEPEZIL HYDROCHLORIDE 10 MG/1
10 TABLET, FILM COATED ORAL AT BEDTIME
Refills: 0 | Status: DISCONTINUED | OUTPATIENT
Start: 2021-01-19 | End: 2021-01-24

## 2021-01-19 RX ORDER — RANITIDINE HYDROCHLORIDE 150 MG/1
1 TABLET, FILM COATED ORAL
Qty: 0 | Refills: 0 | DISCHARGE

## 2021-01-19 RX ORDER — SODIUM CHLORIDE 9 MG/ML
500 INJECTION, SOLUTION INTRAVENOUS ONCE
Refills: 0 | Status: COMPLETED | OUTPATIENT
Start: 2021-01-19 | End: 2021-01-19

## 2021-01-19 RX ORDER — MONTELUKAST 4 MG/1
10 TABLET, CHEWABLE ORAL DAILY
Refills: 0 | Status: DISCONTINUED | OUTPATIENT
Start: 2021-01-19 | End: 2021-01-24

## 2021-01-19 RX ORDER — ACETAMINOPHEN 500 MG
650 TABLET ORAL EVERY 6 HOURS
Refills: 0 | Status: DISCONTINUED | OUTPATIENT
Start: 2021-01-19 | End: 2021-01-24

## 2021-01-19 RX ORDER — INSULIN GLARGINE 100 [IU]/ML
10 INJECTION, SOLUTION SUBCUTANEOUS AT BEDTIME
Refills: 0 | Status: DISCONTINUED | OUTPATIENT
Start: 2021-01-19 | End: 2021-01-21

## 2021-01-19 RX ORDER — ASPIRIN/CALCIUM CARB/MAGNESIUM 324 MG
81 TABLET ORAL DAILY
Refills: 0 | Status: DISCONTINUED | OUTPATIENT
Start: 2021-01-19 | End: 2021-01-21

## 2021-01-19 RX ORDER — ASPIRIN/CALCIUM CARB/MAGNESIUM 324 MG
81 TABLET ORAL DAILY
Refills: 0 | Status: DISCONTINUED | OUTPATIENT
Start: 2021-01-19 | End: 2021-01-19

## 2021-01-19 RX ADMIN — ATORVASTATIN CALCIUM 20 MILLIGRAM(S): 80 TABLET, FILM COATED ORAL at 21:36

## 2021-01-19 RX ADMIN — INSULIN GLARGINE 10 UNIT(S): 100 INJECTION, SOLUTION SUBCUTANEOUS at 21:35

## 2021-01-19 RX ADMIN — ENOXAPARIN SODIUM 40 MILLIGRAM(S): 100 INJECTION SUBCUTANEOUS at 21:36

## 2021-01-19 RX ADMIN — SODIUM CHLORIDE 1000 MILLILITER(S): 9 INJECTION, SOLUTION INTRAVENOUS at 16:48

## 2021-01-19 NOTE — H&P ADULT - ASSESSMENT
80 year old female with PMHx of COPD (on 2L home NC PRN), CAD (S/p PCI *5 2011), HTN, DM2 (On insulin), DL, GERD presenting after a mechanical fall.    #Mechanical fall  CT head and neck unremarkable  PT assessment  Ambulate with assistance    #CAD s/p 5 stents  Continue Aspirin, Lasix, Statin    #COPD (on home O2 PRN)  Pulse Ox stable on RA  Continue home inhalers    #HTN  Elevated likely due to trauma  restart Losartan 100mg qd    #DM2  Hold home Novolog  Lantus/Lispro 10/3/3/3    #DL  On statin    #Right upper lung nodule  O/p pulm f/u    #DVT PPX: Lovenox  #GI PPX: On Protonix  #Dispo: From home, anticipate for AM  #Diet: Carb consistent  #Activity: With assistance  #Full code 80 year old female with PMHx of COPD (on 2L home NC PRN), CAD (S/p PCI *5 2011), Non-Hodgkin's Lymphoma (in remission), HTN, DM2 (On insulin), DL, GERD presenting after a mechanical fall.    #Mechanical fall  CT head and neck unremarkable  PT assessment  Ambulate with assistance    #CAD s/p 5 stents  Continue Aspirin, Lasix, Statin    #Elevated Troponin  Likely secondary to fall  F/u repeat and trend    #COPD (on home O2 PRN)  Pulse Ox stable on RA  Continue home inhalers    #HTN  Elevated likely due to trauma  restart Losartan 100mg qd    #DM2  Hold home Novolog  Lantus/Lispro 10/3/3/3    #DL  On statin    #Non-Hodgkin's Lymphoma (in remission)  O/p Onc f/u    #Right upper lung nodule  O/p pulm f/u    #DVT PPX: Lovenox  #GI PPX: On Protonix  #Dispo: From home, anticipate for AM  #Diet: Carb consistent  #Activity: With assistance  #Full code 80 year old female with PMHx of COPD (on 2L home NC PRN), CAD (S/p PCI *5 2011), Non-Hodgkin's Lymphoma (in remission), HTN, DM2 (On insulin), DL, GERD presenting after a mechanical fall.    #Mechanical fall  CT head and neck unremarkable  PT assessment  Ambulate with assistance    #CAD s/p 5 stents  Continue Lasix, Statin  Stopped Aspirin and Plavix per Dr. Leong's recommendation  O/p Cardio f/u    #Elevated Troponin  Likely secondary to fall  F/u repeat and trend    #COPD (on home O2 PRN)  Pulse Ox stable on RA  Continue home inhalers    #HTN  Elevated likely due to trauma  restart Losartan 100mg qd    #DM2  Hold home Novolog  Lantus/Lispro 10/3/3/3    #DL  On statin    #Non-Hodgkin's Lymphoma (in remission)  O/p Onc f/u    #Right upper lung nodule  O/p pulm f/u    #DVT PPX: Lovenox  #GI PPX: On Protonix  #Dispo: From home, anticipate for AM  #Diet: Carb consistent  #Activity: With assistance  #Full code

## 2021-01-19 NOTE — H&P ADULT - HISTORY OF PRESENT ILLNESS
80 year old female with PMHx of COPD (on 2L home NC PRN), CAD (S/p PCI *5 2011), HTN, DM2 (On insulin), DL, GERD presenting after a mechanical fall. Patient was in the hospital received COVID vaccine to left deltoid, no complications, was sitting on rolling walker, being wheeled by daughter, when walker hit curb edge and patient fell backward landing on the top of her head. Reported headache and feeling dizziness after. No chest pain, palpitations, seizure like activity befiore or after event.  Currently denies any complaints and feels better after eating. 80 year old female with PMHx of COPD (on 2L home NC PRN), CAD (S/p PCI *5 2011), HTN, DM2 (On insulin), Non-Hodgkin's Lymphoma (in remission) DL, GERD presenting after a mechanical fall. Patient was in the hospital received COVID vaccine to left deltoid, no complications, was sitting on rolling walker, being wheeled by daughter, when walker hit curb edge and patient fell backward landing on the top of her head. Reported headache and feeling dizziness after. No chest pain, palpitations, seizure like activity befiore or after event.  Currently denies any complaints and feels better after eating.

## 2021-01-19 NOTE — H&P ADULT - NSHPLABSRESULTS_GEN_ALL_CORE
10.4   4.83  )-----------( 185      ( 19 Jan 2021 14:37 )             32.5         01-19    140  |  101  |  29<H>  ----------------------------<  141<H>  4.0   |  29  |  1.0    Ca    9.9      19 Jan 2021 14:37    TPro  6.8  /  Alb  4.4  /  TBili  0.4  /  DBili  x   /  AST  24  /  ALT  16  /  AlkPhos  70  01-19

## 2021-01-19 NOTE — ED PROVIDER NOTE - NS ED ROS FT
Constitutional: See HPI.  Eyes: No visual changes, eye pain or discharge. No Photophobia  ENMT: No neck pain or stiffness. No limited ROM  Cardiac: No SOB or edema. No chest pain with exertion.  Respiratory: No cough or respiratory distress.   GI: No nausea, vomiting, diarrhea or abdominal pain.  : No dysuria, frequency or burning. No Discharge  MS: No myalgia, muscle weakness, joint pain or back pain.  Neuro: see hpi   Skin: No skin rash.  Except as documented in the HPI, all other systems are negative.

## 2021-01-19 NOTE — H&P ADULT - ATTENDING COMMENTS
HPI:  80 year old woman with a PMHx of COPD (on 2L home NC PRN), CAD (S/p PCI *5 2011), HTN, DM2 (On insulin), Non-Hodgkin's Lymphoma (in remission) DL, GERD presenting after a mechanical fall. She was in the hospital received COVID vaccine to left deltoid, no complications, was sitting on rolling walker, being wheeled by daughter, when walker hit curb edge and she fell backward hitting her head. She reported headache and feeling dizziness after. No chest pain, palpitations, SOB, diaphoresis, n/v/d, abdo pain, seizure like activity before or after event.  Currently denies any complaints and feels better after eating.    REVIEW OF SYSTEMS:  CONSTITUTIONAL:  No weakness, fevers, chills, night sweats, weight loss  EYES/ENT: No visual changes. No vertigo or dysphagia  NECK: No neck pain or stiffness  RESPIRATORY: No cough, wheezing, hemoptysis. No shortness of breath  CARDIOVASCULAR: No chest pain or palpitations. No lower extremity edema  GASTROINTESTINAL: No abdominal pain. No nausea, vomiting, diarrhea, or hematemesis  GENITOURINARY: No dysuria or hematuria   NEUROLOGICAL: No focal numbness or weakness  MSK: + Fall  SKIN: No rashes or itching  HEMATOLOGIC: No easy bruising or prolonged bleeding.      PHYSICAL EXAM:  GENERAL: NAD, well-developed, Non-toxic, stated age   HEAD:  Atraumatic, Normocephalic  EYES: EOMI, Sclera White   NECK: Supple, No JVD  CHEST/LUNG: Clear to auscultation bilaterally; No wheezing, rhonchi, or crackles  HEART: Regular rate and rhythm; s1, s2, No murmurs, rubs, or gallops  ABDOMEN: Soft, Nontender, Nondistended; Bowel sounds present, No rebound or guarding noted   EXTREMITIES:  No lower extremity edema or calf tenderness to palpation.  No clubbing or cyanosis  PSYCH: AAOx3, pleasant, cooperative, not anxious  NEUROLOGY: non-focal  SKIN: No rashes or lesions      ASSESSMENT AND PLAN:  Mechanical Fall: Trauma work up negative. Patient was using rolling walker because she has chronic dyspnea on exertion due to her COPD. Nursing to ambulate, if limited then will need PT Eval     Elevated Trop: asymptomatic, will trend CE, am EKG and cont tele. Added aspirin    COPD on 2L O2 PRN and at night: cont home inhalers, no evidence of exacerbation.     HTN/HLD/CAD s/p PCI: cont home medications    Diabetes: Basal bolus insulin, keep fingerstick glucose <180.     GERD: Protonix  DVT ppx: Lovenox/Heparin  GI ppx: Not indicated  GOC: Full code.  My note supersedes the residents in the event of a discrepancy.

## 2021-01-19 NOTE — ED PROVIDER NOTE - PROGRESS NOTE DETAILS
ATTENDING NOTE: 79 y/o female was sitting in a wheelchair after receiving covid vaccine today. Wheelchair hit a bump in the sidewalk and she fell out and hit her head. No LOC. No vomiting. + dizziness. No chest pain or palpitations. No hip pain.  O/E: Constitutional: Non-toxic in appearance. Head: No scalp hematoma. Eyes: PERRL. EOMI. ENT: No hemotympanum. Neck: No c-spine tenderness. Pulmonary: Lungs equal b/l. GI: ABD soft, no tenderness. Extremities: Pelvis stable, no hip tenderness. Extremities with no bony tenderness, no deformity. Back: No vertebral tenderness. Neuro: A&Ox3, GCS 15.   A/P: Imaging, will re-assess.

## 2021-01-19 NOTE — ED ADULT NURSE NOTE - PMH
CKD (chronic kidney disease)    Coronary artery disease involving native heart without angina pectoris, unspecified vessel or lesion type  s/p 5 stents  Diabetes  on insulin  Heart failure    High cholesterol    HTN (hypertension)    MI (myocardial infarction)  s/p 5 stents  Non Hodgkin's lymphoma  in remission. Follows with Dr Hernandez  Osteoarthritis    PVD (peripheral vascular disease)    Thrombocytopenia

## 2021-01-19 NOTE — ED ADULT TRIAGE NOTE - CHIEF COMPLAINT QUOTE
Patient s/p fall backwards in her wheelchair, + head injury, denies LOC. Patient denies blood thinner use.

## 2021-01-19 NOTE — ED PROVIDER NOTE - OBJECTIVE STATEMENT
80F PMHx of COPD (on 2L home NC PRN), CAD (S/p PCI 5 stents 2011), HTN, DM2 (On insulin), Non-Hodgkin's Lymphoma (in remission) HLD, GERD p/w mechanical fall. Patient was in the hospital received COVID vaccine ,being wheeled by daughter, when walker hit curb edge and patient fell backward landing on the top of her head. No chest pain, palpitations, seizure like activity.

## 2021-01-19 NOTE — ED PROVIDER NOTE - PHYSICAL EXAMINATION
VITAL SIGNS: I have reviewed nursing notes and confirm.  CONSTITUTIONAL: well-appearing, non-toxic  SKIN: Warm dry, normal skin turgor  HEAD: NCAT  EYES: EOMI, PERRLA, no scleral icterus  ENT: Moist mucous membranes, normal pharynx   NECK: Supple; non tender. Full ROM.   CARD: RRR, no murmurs, rubs or gallops  RESP: clear to ausculation b/l.  No rales, rhonchi, or wheezing.  ABD: soft, + BS, non-tender, non-distended, no rebound or guarding.   EXT: Full ROM, no bony tenderness, no pedal edema, no calf tenderness  NEURO: normal motor. normal sensory. CN II-XII intact. Cerebellar testing normal. Normal gait.  PSYCH: Cooperative, appropriate.

## 2021-01-19 NOTE — H&P ADULT - NSHPPHYSICALEXAM_GEN_ALL_CORE
PHYSICAL EXAM:T(C): 36.6 (01-19-21 @ 13:35), Max: 36.6 (01-19-21 @ 13:35)  HR: 73 (01-19-21 @ 13:35) (73 - 73)  BP: 195/74 (01-19-21 @ 13:35) (195/74 - 195/74)  RR: 18 (01-19-21 @ 13:35) (18 - 18)  SpO2: 97% (01-19-21 @ 13:35) (97% - 97%)  GENERAL: NAD, well-developed  HEAD:  Atraumatic, Normocephalic  EYES: EOMI, PERRLA, conjunctiva and sclera clear  NECK: Supple, No JVD  CHEST/LUNG: Clear to auscultation bilaterally; No wheeze  HEART: Regular rate and rhythm; No murmurs, rubs, or gallops  ABDOMEN: Soft, Nontender, Nondistended; Bowel sounds present  EXTREMITIES:  2+ Peripheral Pulses, No clubbing, cyanosis, or edema  PSYCH: AAOx3  NEUROLOGY: non-focal  SKIN: No rashes or lesions

## 2021-01-20 DIAGNOSIS — D64.9 ANEMIA, UNSPECIFIED: ICD-10-CM

## 2021-01-20 DIAGNOSIS — C34.90 MALIGNANT NEOPLASM OF UNSPECIFIED PART OF UNSPECIFIED BRONCHUS OR LUNG: ICD-10-CM

## 2021-01-20 LAB
ALBUMIN SERPL ELPH-MCNC: 3.6 G/DL — SIGNIFICANT CHANGE UP (ref 3.5–5.2)
ALP SERPL-CCNC: 75 U/L — SIGNIFICANT CHANGE UP (ref 30–115)
ALT FLD-CCNC: 12 U/L — SIGNIFICANT CHANGE UP (ref 0–41)
ANION GAP SERPL CALC-SCNC: 11 MMOL/L — SIGNIFICANT CHANGE UP (ref 7–14)
AST SERPL-CCNC: 19 U/L — SIGNIFICANT CHANGE UP (ref 0–41)
BASOPHILS # BLD AUTO: 0.02 K/UL — SIGNIFICANT CHANGE UP (ref 0–0.2)
BASOPHILS NFR BLD AUTO: 0.5 % — SIGNIFICANT CHANGE UP (ref 0–1)
BILIRUB SERPL-MCNC: 0.4 MG/DL — SIGNIFICANT CHANGE UP (ref 0.2–1.2)
BUN SERPL-MCNC: 22 MG/DL — HIGH (ref 10–20)
CALCIUM SERPL-MCNC: 9 MG/DL — SIGNIFICANT CHANGE UP (ref 8.5–10.1)
CHLORIDE SERPL-SCNC: 100 MMOL/L — SIGNIFICANT CHANGE UP (ref 98–110)
CK SERPL-CCNC: 102 U/L — SIGNIFICANT CHANGE UP (ref 0–225)
CO2 SERPL-SCNC: 29 MMOL/L — SIGNIFICANT CHANGE UP (ref 17–32)
CREAT SERPL-MCNC: 1.1 MG/DL — SIGNIFICANT CHANGE UP (ref 0.7–1.5)
EOSINOPHIL # BLD AUTO: 0.07 K/UL — SIGNIFICANT CHANGE UP (ref 0–0.7)
EOSINOPHIL NFR BLD AUTO: 1.9 % — SIGNIFICANT CHANGE UP (ref 0–8)
GLUCOSE BLDC GLUCOMTR-MCNC: 113 MG/DL — HIGH (ref 70–99)
GLUCOSE BLDC GLUCOMTR-MCNC: 134 MG/DL — HIGH (ref 70–99)
GLUCOSE BLDC GLUCOMTR-MCNC: 154 MG/DL — HIGH (ref 70–99)
GLUCOSE BLDC GLUCOMTR-MCNC: 242 MG/DL — HIGH (ref 70–99)
GLUCOSE SERPL-MCNC: 226 MG/DL — HIGH (ref 70–99)
HCT VFR BLD CALC: 30.3 % — LOW (ref 37–47)
HGB BLD-MCNC: 9.6 G/DL — LOW (ref 12–16)
IMM GRANULOCYTES NFR BLD AUTO: 0.3 % — SIGNIFICANT CHANGE UP (ref 0.1–0.3)
LYMPHOCYTES # BLD AUTO: 0.93 K/UL — LOW (ref 1.2–3.4)
LYMPHOCYTES # BLD AUTO: 24.7 % — SIGNIFICANT CHANGE UP (ref 20.5–51.1)
MAGNESIUM SERPL-MCNC: 1.5 MG/DL — LOW (ref 1.8–2.4)
MCHC RBC-ENTMCNC: 24.9 PG — LOW (ref 27–31)
MCHC RBC-ENTMCNC: 31.7 G/DL — LOW (ref 32–37)
MCV RBC AUTO: 78.7 FL — LOW (ref 81–99)
MONOCYTES # BLD AUTO: 0.31 K/UL — SIGNIFICANT CHANGE UP (ref 0.1–0.6)
MONOCYTES NFR BLD AUTO: 8.2 % — SIGNIFICANT CHANGE UP (ref 1.7–9.3)
NEUTROPHILS # BLD AUTO: 2.42 K/UL — SIGNIFICANT CHANGE UP (ref 1.4–6.5)
NEUTROPHILS NFR BLD AUTO: 64.4 % — SIGNIFICANT CHANGE UP (ref 42.2–75.2)
NRBC # BLD: 0 /100 WBCS — SIGNIFICANT CHANGE UP (ref 0–0)
PLATELET # BLD AUTO: 167 K/UL — SIGNIFICANT CHANGE UP (ref 130–400)
POTASSIUM SERPL-MCNC: 3.8 MMOL/L — SIGNIFICANT CHANGE UP (ref 3.5–5)
POTASSIUM SERPL-SCNC: 3.8 MMOL/L — SIGNIFICANT CHANGE UP (ref 3.5–5)
PROT SERPL-MCNC: 5.9 G/DL — LOW (ref 6–8)
RBC # BLD: 3.85 M/UL — LOW (ref 4.2–5.4)
RBC # FLD: 14.5 % — SIGNIFICANT CHANGE UP (ref 11.5–14.5)
SODIUM SERPL-SCNC: 140 MMOL/L — SIGNIFICANT CHANGE UP (ref 135–146)
TROPONIN T SERPL-MCNC: 0.03 NG/ML — CRITICAL HIGH
WBC # BLD: 3.76 K/UL — LOW (ref 4.8–10.8)
WBC # FLD AUTO: 3.76 K/UL — LOW (ref 4.8–10.8)

## 2021-01-20 PROCEDURE — 99233 SBSQ HOSP IP/OBS HIGH 50: CPT

## 2021-01-20 PROCEDURE — 93010 ELECTROCARDIOGRAM REPORT: CPT

## 2021-01-20 RX ORDER — AMLODIPINE BESYLATE 2.5 MG/1
5 TABLET ORAL ONCE
Refills: 0 | Status: COMPLETED | OUTPATIENT
Start: 2021-01-20 | End: 2021-01-20

## 2021-01-20 RX ORDER — INSULIN LISPRO 100/ML
6 VIAL (ML) SUBCUTANEOUS
Refills: 0 | Status: DISCONTINUED | OUTPATIENT
Start: 2021-01-20 | End: 2021-01-21

## 2021-01-20 RX ORDER — MAGNESIUM OXIDE 400 MG ORAL TABLET 241.3 MG
400 TABLET ORAL
Refills: 0 | Status: DISCONTINUED | OUTPATIENT
Start: 2021-01-20 | End: 2021-01-22

## 2021-01-20 RX ORDER — MAGNESIUM SULFATE 500 MG/ML
2 VIAL (ML) INJECTION ONCE
Refills: 0 | Status: COMPLETED | OUTPATIENT
Start: 2021-01-20 | End: 2021-01-20

## 2021-01-20 RX ADMIN — Medication 81 MILLIGRAM(S): at 13:51

## 2021-01-20 RX ADMIN — Medication 4: at 12:08

## 2021-01-20 RX ADMIN — Medication 20 MILLIGRAM(S): at 05:37

## 2021-01-20 RX ADMIN — INSULIN GLARGINE 10 UNIT(S): 100 INJECTION, SOLUTION SUBCUTANEOUS at 22:09

## 2021-01-20 RX ADMIN — Medication 3 UNIT(S): at 12:07

## 2021-01-20 RX ADMIN — LOSARTAN POTASSIUM 100 MILLIGRAM(S): 100 TABLET, FILM COATED ORAL at 05:37

## 2021-01-20 RX ADMIN — PANTOPRAZOLE SODIUM 40 MILLIGRAM(S): 20 TABLET, DELAYED RELEASE ORAL at 05:37

## 2021-01-20 RX ADMIN — DONEPEZIL HYDROCHLORIDE 10 MILLIGRAM(S): 10 TABLET, FILM COATED ORAL at 13:51

## 2021-01-20 RX ADMIN — ISOSORBIDE MONONITRATE 30 MILLIGRAM(S): 60 TABLET, EXTENDED RELEASE ORAL at 13:51

## 2021-01-20 RX ADMIN — Medication 25 GRAM(S): at 14:30

## 2021-01-20 RX ADMIN — AMLODIPINE BESYLATE 5 MILLIGRAM(S): 2.5 TABLET ORAL at 12:00

## 2021-01-20 RX ADMIN — TIOTROPIUM BROMIDE 1 CAPSULE(S): 18 CAPSULE ORAL; RESPIRATORY (INHALATION) at 09:46

## 2021-01-20 RX ADMIN — MAGNESIUM OXIDE 400 MG ORAL TABLET 400 MILLIGRAM(S): 241.3 TABLET ORAL at 17:02

## 2021-01-20 RX ADMIN — MONTELUKAST 10 MILLIGRAM(S): 4 TABLET, CHEWABLE ORAL at 13:51

## 2021-01-20 RX ADMIN — ATORVASTATIN CALCIUM 20 MILLIGRAM(S): 80 TABLET, FILM COATED ORAL at 22:08

## 2021-01-20 RX ADMIN — Medication 6 UNIT(S): at 17:01

## 2021-01-20 RX ADMIN — ENOXAPARIN SODIUM 40 MILLIGRAM(S): 100 INJECTION SUBCUTANEOUS at 22:09

## 2021-01-20 NOTE — PHYSICAL THERAPY INITIAL EVALUATION ADULT - GENERAL OBSERVATIONS, REHAB EVAL
PT Eval completed [phone]. Chart reviewed and case discussed with TEAGAN Giles. Pt encountered semi-reclined in bed in NAD, +tele, +PIV. Pt c/o dizziness while laying in supine, but was agreeable to participate in PT eval/tx. Resting BP assessed in supine: 174/71 mmHg, HR 67 pm. TEAGAN Giles made aware of pt current functional status.

## 2021-01-20 NOTE — PHYSICAL THERAPY INITIAL EVALUATION ADULT - PRECAUTIONS/LIMITATIONS, REHAB EVAL
Progress Notes by Naty Del Angel MD at 04/11/18 04:48 PM     Author:  Naty Del Angel MD Service:  (none) Author Type:  Physician     Filed:  04/11/18 04:49 PM Encounter Date:  4/11/2018 Status:  Signed     :  Naty Del Angel MD (Physician)            new patient to [facility][CD1.1T] chief complaint acne new problem. On face mainly. Ros neg dm .[CD1.1M] OBJECTIVE: well developed, well nourished appearing patient, alert and orietned, normal affect, exam of eyes, nose and ears, cheeks and remainder of face,neck,   revealed[CD1.1T] papules and comedones t zone face[CD1.1M] ASSESSMENT:[CD1.1T] acne[CD1.1M] PLAN:[CD1.1T] prescription benzamycin .[CD1.1M] Electronically Signed by:    Naty Del Angel MD , 4/11/2018[CD1.2T]       Revision History        User Key Date/Time User Provider Type Action    > CD1.2 04/11/18 04:49 PM Naty Del Angel MD Physician Sign     CD1.1 04/11/18 04:48 PM Naty Del Angel MD Physician     M - Manual, T - Template            
fall precautions

## 2021-01-20 NOTE — PROGRESS NOTE ADULT - SUBJECTIVE AND OBJECTIVE BOX
24H events:    Patient is a 80y old Female who presents with a chief complaint of Fall (19 Jan 2021 18:41)    Primary diagnosis of CHI (closed head injury)       Today is hospital day 1d. This morning patient was seen and examined at bedside, resting comfortably in bed.    No major events overnight  patient reports feeling dizzy when out of bed  Stable clinically  complaining of left shoulder pain  No events on tele    PAST MEDICAL & SURGICAL HISTORY  Coronary artery disease involving native heart without angina pectoris, unspecified vessel or lesion type  s/p 5 stents    Diabetes  on insulin    Osteoarthritis    CKD (chronic kidney disease)    Thrombocytopenia    PVD (peripheral vascular disease)    Non Hodgkin&#x27;s lymphoma  in remission. Follows with Dr Hernandez    Heart failure    High cholesterol    HTN (hypertension)    MI (myocardial infarction)  s/p 5 stents    H/O carotid endarterectomy  RIGHT    H/O cardiac catheterization  5 STENTS    History of cholecystectomy      SOCIAL HISTORY:  Social History:      ALLERGIES:  ACE inhibitors (Unknown)  BENZALKONIUM (Rash)  BETADINE (Rash)  Ceclor (Unknown)  codeine (Unknown)  latex (Unknown)  penicillin (Unknown)  RUBBER GLOVES (Rash)  sulfonamides (Unknown)    MEDICATIONS:  STANDING MEDICATIONS  aspirin  chewable 81 milliGRAM(s) Oral daily  atorvastatin 20 milliGRAM(s) Oral at bedtime  budesonide  80 MICROgram(s)/formoterol 4.5 MICROgram(s) Inhaler 2 Puff(s) Inhalation two times a day  chlorhexidine 4% Liquid 1 Application(s) Topical <User Schedule>  dextrose 40% Gel 15 Gram(s) Oral once  dextrose 5%. 1000 milliLiter(s) IV Continuous <Continuous>  dextrose 5%. 1000 milliLiter(s) IV Continuous <Continuous>  dextrose 50% Injectable 25 Gram(s) IV Push once  dextrose 50% Injectable 12.5 Gram(s) IV Push once  dextrose 50% Injectable 25 Gram(s) IV Push once  donepezil 10 milliGRAM(s) Oral at bedtime  enoxaparin Injectable 40 milliGRAM(s) SubCutaneous at bedtime  furosemide    Tablet 20 milliGRAM(s) Oral daily  glucagon  Injectable 1 milliGRAM(s) IntraMuscular once  insulin glargine Injectable (LANTUS) 10 Unit(s) SubCutaneous at bedtime  insulin lispro (ADMELOG) corrective regimen sliding scale   SubCutaneous three times a day before meals  insulin lispro Injectable (ADMELOG) 3 Unit(s) SubCutaneous three times a day before meals  isosorbide   mononitrate ER Tablet (IMDUR) 30 milliGRAM(s) Oral daily  losartan 100 milliGRAM(s) Oral daily  montelukast 10 milliGRAM(s) Oral daily  pantoprazole    Tablet 40 milliGRAM(s) Oral before breakfast  tiotropium 18 MICROgram(s) Capsule 1 Capsule(s) Inhalation daily    PRN MEDICATIONS  acetaminophen   Tablet .. 650 milliGRAM(s) Oral every 6 hours PRN  ALBUTerol    90 MICROgram(s) HFA Inhaler 2 Puff(s) Inhalation every 6 hours PRN    VITALS:   T(F): 96.8  HR: 78  BP: 186/74  RR: 18  SpO2: 97%    PHYSICAL EXAM:  GENERAL: NAD,  NERVOUS SYSTEM:  Alert & Oriented X3, non focal able to move 4 extremities no deficit  CHEST/LUNG: Clear to auscultation bilaterally;   HEART: Regular rate and rhythm; No murmurs, rubs, or gallops  ABDOMEN: Soft, Nontender, Nondistended; Bowel sounds present  EXTREMITIES:  No clubbing, cyanosis, or edema    LABS:                        10.4   4.83  )-----------( 185      ( 19 Jan 2021 14:37 )             32.5     01-19    140  |  101  |  29<H>  ----------------------------<  141<H>  4.0   |  29  |  1.0    Ca    9.9      19 Jan 2021 14:37    TPro  6.8  /  Alb  4.4  /  TBili  0.4  /  DBili  x   /  AST  24  /  ALT  16  /  AlkPhos  70  01-19    PT/INR - ( 19 Jan 2021 14:37 )   PT: 12.10 sec;   INR: 1.05 ratio         PTT - ( 19 Jan 2021 14:37 )  PTT:29.5 sec      Troponin T, Serum: 0.03 ng/mL <HH> (01-20-21 @ 06:35)  Troponin T, Serum: 0.05 ng/mL <HH> (01-19-21 @ 22:02)  Creatine Kinase, Serum: 102 U/L (01-19-21 @ 22:02)  Troponin T, Serum: 0.04 ng/mL <HH> (01-19-21 @ 14:37)      CARDIAC MARKERS ( 20 Jan 2021 06:35 )  x     / 0.03 ng/mL / x     / x     / x      CARDIAC MARKERS ( 19 Jan 2021 22:02 )  x     / 0.05 ng/mL / 102 U/L / x     / 5.5 ng/mL  CARDIAC MARKERS ( 19 Jan 2021 14:37 )  x     / 0.04 ng/mL / x     / x     / x          RADIOLOGY:      < from: CT Cervical Spine No Cont (01.19.21 @ 15:26) >  CT HEAD:    No acute intracranial pathology. No evidence of midline shift, mass effect or intracranial hemorrhage.    Mild chronic microvascular-type changes.    CT OF THE CERVICAL SPINE:    No evidence of fracture or dislocation.    Multilevel degenerative changes as detailed above.      < from: Xray Chest 1 View AP/PA (01.19.21 @ 15:41) >  No radiographic evidence of acute cardiopulmonary disease.    Right upper lobe groundglass pulmonary nodule described on November 13, 2020 CT better seen on CT.    < end of copied text >         24H events:    Patient is a 80y old Female who presents with a chief complaint of Fall (19 Jan 2021 18:41)    Primary diagnosis of CHI (closed head injury)       Today is hospital day 1d. This morning patient was seen and examined at bedside, resting comfortably in bed.    No major events overnight  patient reports feeling dizzy when out of bed  Stable clinically  complaining of left shoulder pain  No events on tele  no cp, sob, abd pain, fever  no ha, lightheadedness, syncope    PAST MEDICAL & SURGICAL HISTORY  Coronary artery disease involving native heart without angina pectoris, unspecified vessel or lesion type  s/p 5 stents    Diabetes  on insulin    Osteoarthritis    CKD (chronic kidney disease)    Thrombocytopenia    PVD (peripheral vascular disease)    Non Hodgkin&#x27;s lymphoma  in remission. Follows with Dr Hernandez    Heart failure    High cholesterol    HTN (hypertension)    MI (myocardial infarction)  s/p 5 stents    H/O carotid endarterectomy  RIGHT    H/O cardiac catheterization  5 STENTS    History of cholecystectomy      SOCIAL HISTORY:  Social History:      ALLERGIES:  ACE inhibitors (Unknown)  BENZALKONIUM (Rash)  BETADINE (Rash)  Ceclor (Unknown)  codeine (Unknown)  latex (Unknown)  penicillin (Unknown)  RUBBER GLOVES (Rash)  sulfonamides (Unknown)    MEDICATIONS:  STANDING MEDICATIONS  aspirin  chewable 81 milliGRAM(s) Oral daily  atorvastatin 20 milliGRAM(s) Oral at bedtime  budesonide  80 MICROgram(s)/formoterol 4.5 MICROgram(s) Inhaler 2 Puff(s) Inhalation two times a day  chlorhexidine 4% Liquid 1 Application(s) Topical <User Schedule>  dextrose 40% Gel 15 Gram(s) Oral once  dextrose 5%. 1000 milliLiter(s) IV Continuous <Continuous>  dextrose 5%. 1000 milliLiter(s) IV Continuous <Continuous>  dextrose 50% Injectable 25 Gram(s) IV Push once  dextrose 50% Injectable 12.5 Gram(s) IV Push once  dextrose 50% Injectable 25 Gram(s) IV Push once  donepezil 10 milliGRAM(s) Oral at bedtime  enoxaparin Injectable 40 milliGRAM(s) SubCutaneous at bedtime  furosemide    Tablet 20 milliGRAM(s) Oral daily  glucagon  Injectable 1 milliGRAM(s) IntraMuscular once  insulin glargine Injectable (LANTUS) 10 Unit(s) SubCutaneous at bedtime  insulin lispro (ADMELOG) corrective regimen sliding scale   SubCutaneous three times a day before meals  insulin lispro Injectable (ADMELOG) 3 Unit(s) SubCutaneous three times a day before meals  isosorbide   mononitrate ER Tablet (IMDUR) 30 milliGRAM(s) Oral daily  losartan 100 milliGRAM(s) Oral daily  montelukast 10 milliGRAM(s) Oral daily  pantoprazole    Tablet 40 milliGRAM(s) Oral before breakfast  tiotropium 18 MICROgram(s) Capsule 1 Capsule(s) Inhalation daily    PRN MEDICATIONS  acetaminophen   Tablet .. 650 milliGRAM(s) Oral every 6 hours PRN  ALBUTerol    90 MICROgram(s) HFA Inhaler 2 Puff(s) Inhalation every 6 hours PRN    VITALS:   T(F): 96.8  HR: 78  BP: 186/74  RR: 18  SpO2: 97%    PHYSICAL EXAM:  GENERAL: NAD,  NERVOUS SYSTEM:  Alert & Oriented X3, non focal able to move 4 extremities no deficit  CHEST/LUNG: Clear to auscultation bilaterally;   HEART: Regular rate and rhythm; No murmurs, rubs, or gallops  ABDOMEN: Soft, Nontender, Nondistended; Bowel sounds present  EXTREMITIES:  No clubbing, cyanosis, or edema    LABS:                        10.4   4.83  )-----------( 185      ( 19 Jan 2021 14:37 )             32.5     01-19    140  |  101  |  29<H>  ----------------------------<  141<H>  4.0   |  29  |  1.0    Ca    9.9      19 Jan 2021 14:37    TPro  6.8  /  Alb  4.4  /  TBili  0.4  /  DBili  x   /  AST  24  /  ALT  16  /  AlkPhos  70  01-19    PT/INR - ( 19 Jan 2021 14:37 )   PT: 12.10 sec;   INR: 1.05 ratio         PTT - ( 19 Jan 2021 14:37 )  PTT:29.5 sec      Troponin T, Serum: 0.03 ng/mL <HH> (01-20-21 @ 06:35)  Troponin T, Serum: 0.05 ng/mL <HH> (01-19-21 @ 22:02)  Creatine Kinase, Serum: 102 U/L (01-19-21 @ 22:02)  Troponin T, Serum: 0.04 ng/mL <HH> (01-19-21 @ 14:37)      CARDIAC MARKERS ( 20 Jan 2021 06:35 )  x     / 0.03 ng/mL / x     / x     / x      CARDIAC MARKERS ( 19 Jan 2021 22:02 )  x     / 0.05 ng/mL / 102 U/L / x     / 5.5 ng/mL  CARDIAC MARKERS ( 19 Jan 2021 14:37 )  x     / 0.04 ng/mL / x     / x     / x          RADIOLOGY:      < from: CT Cervical Spine No Cont (01.19.21 @ 15:26) >  CT HEAD:    No acute intracranial pathology. No evidence of midline shift, mass effect or intracranial hemorrhage.    Mild chronic microvascular-type changes.    CT OF THE CERVICAL SPINE:    No evidence of fracture or dislocation.    Multilevel degenerative changes as detailed above.      < from: Xray Chest 1 View AP/PA (01.19.21 @ 15:41) >  No radiographic evidence of acute cardiopulmonary disease.    Right upper lobe groundglass pulmonary nodule described on November 13, 2020 CT better seen on CT.    < end of copied text >

## 2021-01-20 NOTE — PHYSICAL THERAPY INITIAL EVALUATION ADULT - LEVEL OF INDEPENDENCE: SUPINE/SIT, REHAB EVAL
pt c/o incr dizziness upon sitting, pt safely returned to bed and VS assessed: /78 mmHg, HR 70 bpm, RN Flori Giles notified./minimum assist (75% patients effort)

## 2021-01-20 NOTE — PHYSICAL THERAPY INITIAL EVALUATION ADULT - CRITERIA FOR SKILLED THERAPEUTIC INTERVENTIONS
impairments found/functional limitations in following categories/rehab potential/therapy frequency/anticipated equipment needs at discharge/anticipated discharge recommendation

## 2021-01-20 NOTE — PATIENT PROFILE ADULT - NSPROREFERSVCHOMEDIABETES_GEN_A_NUR
FAMILY HISTORY:  Family history of breast cancer, Mother  Family history of coronary artery disease, Father,  of MI age 55 no

## 2021-01-20 NOTE — CONSULT NOTE ADULT - ASSESSMENT
IMPRESSION: Rehab of gait disorder post fall    PRECAUTIONS: [    ] Cardiac  [    ] Respiratory  [    ] Seizures [    ] Contact Isolation  [    ] Droplet Isolation  [    ] Other    Weight Bearing Status:     RECOMMENDATION:    Out of Bed to Chair     DVT/Decubiti Prophylaxis    REHAB PLAN:     [   x  ] Bedside P/T 3-5 times a week   [   x  ] Bedside O/T  2-3 times a week   [     ] No Rehab Therapy Indicated   [     ]  Speech Therapy   Conditioning/ROM                                 ADL  Bed Mobility                                            Conditioning/ROM  Transfers                                                  Bed Mobility  Sitting /Standing Balance                      Transfers                                        Gait Training                                            Sitting/Standing Balance  Stair Training [   ]Applicable                 Home equipment Eval                                                                     Splinting  [   ] Only      GOALS:   ADL   [    ]   Independent         Transfers  [    ] Independent            Ambulation  [     ] Independent     [     ] With device                            [    ]  CG                                               [    ]  CG                                                    [     ] CG                            [    ] Min A                                          [    ] Min A                                                [     ] Min  A          DISCHARGE PLAN:   [     ]  Good candidate for Intensive Rehabilitation/Hospital based-4A [facility]                                             Will tolerate 3hrs Intensive Rehab Daily                                       [   x   ]  Short Term Rehab in Skilled Nursing Facility  VS                                     [   x   ]  Home with Outpatient or VN services                                         [      ]  Possible Candidate for Intensive Hospital based Rehab

## 2021-01-20 NOTE — CONSULT NOTE ADULT - SUBJECTIVE AND OBJECTIVE BOX
HPI:  80 year old female with PMHx of COPD (on 2L home [place] PRN), CAD (S/p PCI *5 2011), HTN, DM2 (On insulin), Non-Hodgkin's Lymphoma (in remission) DL, GERD presenting after a mechanical fall. Patient was in the hospital received COVID vaccine to left deltoid, no complications, was sitting on rolling walker, being wheeled by daughter, when walker hit curb edge and patient fell backward landing on the top of her head. Reported headache and feeling dizziness after. No chest pain, palpitations, seizure like activity befiore or after event.  Currently denies any complaints and feels better after eating. (19 Jan 2021 18:41)      PAST MEDICAL & SURGICAL HISTORY:  Coronary artery disease involving native heart without angina pectoris, unspecified vessel or lesion type  s/p 5 stents    Diabetes  on insulin    Osteoarthritis    CKD (chronic kidney disease)    Thrombocytopenia    PVD (peripheral vascular disease)    Non Hodgkin&#x27;s lymphoma  in remission. Follows with Dr Hernandez    Heart failure    High cholesterol    HTN (hypertension)    MI (myocardial infarction)  s/p 5 stents    H/O carotid endarterectomy  RIGHT    H/O cardiac catheterization  5 STENTS    History of cholecystectomy        Hospital Course: eval for fall no FX , pt says gets dizzy when stands    TODAY'S SUBJECTIVE & REVIEW OF SYMPTOMS: no CP, no SOB, + dizziness other ROS unremarkable         MEDICATIONS  (STANDING):  aspirin  chewable 81 milliGRAM(s) Oral daily  atorvastatin 20 milliGRAM(s) Oral at bedtime  budesonide  80 MICROgram(s)/formoterol 4.5 MICROgram(s) Inhaler 2 Puff(s) Inhalation two times a day  chlorhexidine 4% Liquid 1 Application(s) Topical <User Schedule>  dextrose 40% Gel 15 Gram(s) Oral once  dextrose 5%. 1000 milliLiter(s) (50 mL/Hr) IV Continuous <Continuous>  dextrose 5%. 1000 milliLiter(s) (100 mL/Hr) IV Continuous <Continuous>  dextrose 50% Injectable 25 Gram(s) IV Push once  dextrose 50% Injectable 12.5 Gram(s) IV Push once  dextrose 50% Injectable 25 Gram(s) IV Push once  donepezil 10 milliGRAM(s) Oral at bedtime  enoxaparin Injectable 40 milliGRAM(s) SubCutaneous at bedtime  furosemide    Tablet 20 milliGRAM(s) Oral daily  glucagon  Injectable 1 milliGRAM(s) IntraMuscular once  insulin glargine Injectable (LANTUS) 10 Unit(s) SubCutaneous at bedtime  insulin lispro (ADMELOG) corrective regimen sliding scale   SubCutaneous three times a day before meals  insulin lispro Injectable (ADMELOG) 6 Unit(s) SubCutaneous three times a day before meals  isosorbide   mononitrate ER Tablet (IMDUR) 30 milliGRAM(s) Oral daily  losartan 100 milliGRAM(s) Oral daily  magnesium oxide 400 milliGRAM(s) Oral three times a day with meals  montelukast 10 milliGRAM(s) Oral daily  pantoprazole    Tablet 40 milliGRAM(s) Oral before breakfast  tiotropium 18 MICROgram(s) Capsule 1 Capsule(s) Inhalation daily    MEDICATIONS  (PRN):  acetaminophen   Tablet .. 650 milliGRAM(s) Oral every 6 hours PRN Temp greater or equal to 38C (100.4F)  ALBUTerol    90 MICROgram(s) HFA Inhaler 2 Puff(s) Inhalation every 6 hours PRN Shortness of Breath and/or Wheezing      FAMILY HISTORY:  FH: CAD (coronary artery disease)  FATHER        Allergies    ACE inhibitors (Unknown)  BENZALKONIUM (Rash)  BETADINE (Rash)  Ceclor (Unknown)  codeine (Unknown)  latex (Unknown)  penicillin (Unknown)  RUBBER GLOVES (Rash)  sulfonamides (Unknown)    Intolerances        SOCIAL HISTORY:    [  ] Etoh  [  ] Smoking  [  ] Substance abuse     Home Environment:  [  ] Home Alone  [ x ] Lives with Family  [  ] Home Health Aid    Dwelling:  [  ] Apartment  [x  ] [facility] House= [facility]  [  ] Adult Home  [  ] Skilled Nursing Facility      [  ] Short Term  [  ] Long Term  [x  ] Stairs   = chair lift    Elevator [  ]    FUNCTIONAL STATUS PTA: (Check all that apply)  Ambulation: [ x  ]Independent   [   ] Requires Assistance   [  ] Dependent     [  ] Non-Ambulatory       Assistive Device: [  ] SA Cane  [  ]  Q Cane  [  ] Walker  [  ]  Wheelchair  ADL : [ x ] Independent    [   ] Requires Assistance    [  ]  Dependent       Vital Signs Last 24 Hrs  T(C): 36.9 (20 Jan 2021 12:59), Max: 36.9 (20 Jan 2021 12:59)  T(F): 98.5 (20 Jan 2021 12:59), Max: 98.5 (20 Jan 2021 12:59)  HR: 73 (20 Jan 2021 12:59) (73 - 78)  BP: 151/65 (20 Jan 2021 12:59) (151/65 - 186/74)  BP(mean): --  RR: 18 (20 Jan 2021 12:59) (18 - 18)  SpO2: 97% (20 Jan 2021 05:55) (97% - 97%)      PHYSICAL EXAM: Alert & Oriented X3  GENERAL: NAD, well-groomed, well-developed  HEAD:  Atraumatic, Normocephalic  EYES: EOMI, PERRL  NECK: Supple  CHEST/LUNG: Clear to auscultation  HEART: Regular rate and rhythm  ABDOMEN: Soft, Nontender, Nondistended  EXTREMITIES:  No clubbing, cyanosis, or edema  ROM:  [  x ] WFL all extremities  [  ] Abnormal    NERVOUS SYSTEM:   Cranial Nerves 2-12: [ x  ] intact  [  ] Abnormal   Motor Strength: [ x  ] WFL all extremities   [  ] Abnormal        FUNCTIONAL STATUS: see therapy notes  Bed Mobility: [   ]Independent  [  ] Supervision  [ x ] Needs Assistance   [  ] N/A   Transfers: [   ] Independent  [  ] Supervision  [ x ] Needs Assistance  [  ]  N/A   Ambulation: [   ] Independent  [  ] Supervision  [ x ] Needs Assistance  [  ] N/A   ADL: [   ] Independent  [ x ] Requires Assistance  [  ] N/A       LABS:                        9.6    3.76  )-----------( 167      ( 20 Jan 2021 11:33 )             30.3     01-20    140  |  100  |  22<H>  ----------------------------<  226<H>  3.8   |  29  |  1.1    Ca    9.0      20 Jan 2021 11:33  Mg     1.5     01-20    TPro  5.9<L>  /  Alb  3.6  /  TBili  0.4  /  DBili  x   /  AST  19  /  ALT  12  /  AlkPhos  75  01-20    PT/INR - ( 19 Jan 2021 14:37 )   PT: 12.10 sec;   INR: 1.05 ratio         PTT - ( 19 Jan 2021 14:37 )  PTT:29.5 sec      RADIOLOGY & ADDITIONAL STUDIES:            Assesment:

## 2021-01-20 NOTE — PROGRESS NOTE ADULT - ATTENDING COMMENTS
#Fall, mechanical; no syncope/ presyncope  cth neg  orthostatics lying, sitting neg; check while standing  PT  repeat bmp, cbc #Fall, mechanical; no syncope/ presyncope  cth neg  orthostatics lying, sitting neg; check while standing  PT  repeat bmp, cbc  #Hypomagensemia  replete  #CKD III  stable, trend scr  #HFpEF of 60%, chronic  euvolemic on exam  cxr noted  lasix 20 po

## 2021-01-20 NOTE — PHYSICAL THERAPY INITIAL EVALUATION ADULT - PERTINENT HX OF CURRENT PROBLEM, REHAB EVAL
80 year old female with PMHx of COPD (on 2L home [place] PRN), CAD (S/p PCI *5 2011), HTN, DM2 (On insulin), Non-Hodgkin's Lymphoma (in remission) DL, GERD presenting after a mechanical fall. Patient was in the hospital received COVID vaccine to left deltoid, no complications, was sitting on rolling walker, being wheeled by daughter, when walker hit curb edge and patient fell backward landing on the top of her head. Reported headache and feeling dizziness after.

## 2021-01-21 ENCOUNTER — TRANSCRIPTION ENCOUNTER (OUTPATIENT)
Age: 81
End: 2021-01-21

## 2021-01-21 LAB
ALBUMIN SERPL ELPH-MCNC: 4 G/DL — SIGNIFICANT CHANGE UP (ref 3.5–5.2)
ALP SERPL-CCNC: 67 U/L — SIGNIFICANT CHANGE UP (ref 30–115)
ALT FLD-CCNC: 13 U/L — SIGNIFICANT CHANGE UP (ref 0–41)
ANION GAP SERPL CALC-SCNC: 9 MMOL/L — SIGNIFICANT CHANGE UP (ref 7–14)
AST SERPL-CCNC: 21 U/L — SIGNIFICANT CHANGE UP (ref 0–41)
BASOPHILS # BLD AUTO: 0.02 K/UL — SIGNIFICANT CHANGE UP (ref 0–0.2)
BASOPHILS NFR BLD AUTO: 0.5 % — SIGNIFICANT CHANGE UP (ref 0–1)
BILIRUB SERPL-MCNC: 0.6 MG/DL — SIGNIFICANT CHANGE UP (ref 0.2–1.2)
BUN SERPL-MCNC: 20 MG/DL — SIGNIFICANT CHANGE UP (ref 10–20)
CALCIUM SERPL-MCNC: 9.1 MG/DL — SIGNIFICANT CHANGE UP (ref 8.5–10.1)
CHLORIDE SERPL-SCNC: 103 MMOL/L — SIGNIFICANT CHANGE UP (ref 98–110)
CO2 SERPL-SCNC: 30 MMOL/L — SIGNIFICANT CHANGE UP (ref 17–32)
CREAT SERPL-MCNC: 1.1 MG/DL — SIGNIFICANT CHANGE UP (ref 0.7–1.5)
EOSINOPHIL # BLD AUTO: 0.12 K/UL — SIGNIFICANT CHANGE UP (ref 0–0.7)
EOSINOPHIL NFR BLD AUTO: 3.2 % — SIGNIFICANT CHANGE UP (ref 0–8)
GLUCOSE BLDC GLUCOMTR-MCNC: 105 MG/DL — HIGH (ref 70–99)
GLUCOSE BLDC GLUCOMTR-MCNC: 141 MG/DL — HIGH (ref 70–99)
GLUCOSE BLDC GLUCOMTR-MCNC: 143 MG/DL — HIGH (ref 70–99)
GLUCOSE BLDC GLUCOMTR-MCNC: 362 MG/DL — HIGH (ref 70–99)
GLUCOSE BLDC GLUCOMTR-MCNC: 444 MG/DL — HIGH (ref 70–99)
GLUCOSE SERPL-MCNC: 141 MG/DL — HIGH (ref 70–99)
HCT VFR BLD CALC: 33.4 % — LOW (ref 37–47)
HGB BLD-MCNC: 10.4 G/DL — LOW (ref 12–16)
IMM GRANULOCYTES NFR BLD AUTO: 0.3 % — SIGNIFICANT CHANGE UP (ref 0.1–0.3)
LYMPHOCYTES # BLD AUTO: 1.02 K/UL — LOW (ref 1.2–3.4)
LYMPHOCYTES # BLD AUTO: 26.9 % — SIGNIFICANT CHANGE UP (ref 20.5–51.1)
MAGNESIUM SERPL-MCNC: 2.3 MG/DL — SIGNIFICANT CHANGE UP (ref 1.8–2.4)
MCHC RBC-ENTMCNC: 24.6 PG — LOW (ref 27–31)
MCHC RBC-ENTMCNC: 31.1 G/DL — LOW (ref 32–37)
MCV RBC AUTO: 79 FL — LOW (ref 81–99)
MONOCYTES # BLD AUTO: 0.37 K/UL — SIGNIFICANT CHANGE UP (ref 0.1–0.6)
MONOCYTES NFR BLD AUTO: 9.8 % — HIGH (ref 1.7–9.3)
NEUTROPHILS # BLD AUTO: 2.25 K/UL — SIGNIFICANT CHANGE UP (ref 1.4–6.5)
NEUTROPHILS NFR BLD AUTO: 59.3 % — SIGNIFICANT CHANGE UP (ref 42.2–75.2)
NRBC # BLD: 0 /100 WBCS — SIGNIFICANT CHANGE UP (ref 0–0)
PLATELET # BLD AUTO: 176 K/UL — SIGNIFICANT CHANGE UP (ref 130–400)
POTASSIUM SERPL-MCNC: 3.9 MMOL/L — SIGNIFICANT CHANGE UP (ref 3.5–5)
POTASSIUM SERPL-SCNC: 3.9 MMOL/L — SIGNIFICANT CHANGE UP (ref 3.5–5)
PROT SERPL-MCNC: 6.3 G/DL — SIGNIFICANT CHANGE UP (ref 6–8)
RBC # BLD: 4.23 M/UL — SIGNIFICANT CHANGE UP (ref 4.2–5.4)
RBC # FLD: 14.6 % — HIGH (ref 11.5–14.5)
SODIUM SERPL-SCNC: 142 MMOL/L — SIGNIFICANT CHANGE UP (ref 135–146)
WBC # BLD: 3.79 K/UL — LOW (ref 4.8–10.8)
WBC # FLD AUTO: 3.79 K/UL — LOW (ref 4.8–10.8)

## 2021-01-21 PROCEDURE — 70551 MRI BRAIN STEM W/O DYE: CPT | Mod: 26

## 2021-01-21 PROCEDURE — 99233 SBSQ HOSP IP/OBS HIGH 50: CPT

## 2021-01-21 RX ORDER — HYDRALAZINE HCL 50 MG
10 TABLET ORAL ONCE
Refills: 0 | Status: COMPLETED | OUTPATIENT
Start: 2021-01-21 | End: 2021-01-21

## 2021-01-21 RX ORDER — INSULIN GLARGINE 100 [IU]/ML
15 INJECTION, SOLUTION SUBCUTANEOUS AT BEDTIME
Refills: 0 | Status: DISCONTINUED | OUTPATIENT
Start: 2021-01-21 | End: 2021-01-24

## 2021-01-21 RX ORDER — INSULIN LISPRO 100/ML
8 VIAL (ML) SUBCUTANEOUS
Refills: 0 | Status: DISCONTINUED | OUTPATIENT
Start: 2021-01-21 | End: 2021-01-24

## 2021-01-21 RX ORDER — AMLODIPINE BESYLATE 2.5 MG/1
10 TABLET ORAL ONCE
Refills: 0 | Status: COMPLETED | OUTPATIENT
Start: 2021-01-21 | End: 2021-01-21

## 2021-01-21 RX ORDER — MECLIZINE HCL 12.5 MG
12.5 TABLET ORAL THREE TIMES A DAY
Refills: 0 | Status: DISCONTINUED | OUTPATIENT
Start: 2021-01-21 | End: 2021-01-21

## 2021-01-21 RX ORDER — MECLIZINE HCL 12.5 MG
12.5 TABLET ORAL EVERY 8 HOURS
Refills: 0 | Status: DISCONTINUED | OUTPATIENT
Start: 2021-01-21 | End: 2021-01-22

## 2021-01-21 RX ADMIN — ENOXAPARIN SODIUM 40 MILLIGRAM(S): 100 INJECTION SUBCUTANEOUS at 21:20

## 2021-01-21 RX ADMIN — ATORVASTATIN CALCIUM 20 MILLIGRAM(S): 80 TABLET, FILM COATED ORAL at 21:21

## 2021-01-21 RX ADMIN — Medication 12: at 11:34

## 2021-01-21 RX ADMIN — Medication 10 MILLIGRAM(S): at 12:50

## 2021-01-21 RX ADMIN — ISOSORBIDE MONONITRATE 30 MILLIGRAM(S): 60 TABLET, EXTENDED RELEASE ORAL at 11:36

## 2021-01-21 RX ADMIN — LOSARTAN POTASSIUM 100 MILLIGRAM(S): 100 TABLET, FILM COATED ORAL at 05:21

## 2021-01-21 RX ADMIN — Medication 6 UNIT(S): at 11:36

## 2021-01-21 RX ADMIN — INSULIN GLARGINE 15 UNIT(S): 100 INJECTION, SOLUTION SUBCUTANEOUS at 21:21

## 2021-01-21 RX ADMIN — AMLODIPINE BESYLATE 10 MILLIGRAM(S): 2.5 TABLET ORAL at 11:33

## 2021-01-21 RX ADMIN — MAGNESIUM OXIDE 400 MG ORAL TABLET 400 MILLIGRAM(S): 241.3 TABLET ORAL at 17:48

## 2021-01-21 RX ADMIN — Medication 6 UNIT(S): at 08:23

## 2021-01-21 RX ADMIN — PANTOPRAZOLE SODIUM 40 MILLIGRAM(S): 20 TABLET, DELAYED RELEASE ORAL at 05:21

## 2021-01-21 RX ADMIN — BUDESONIDE AND FORMOTEROL FUMARATE DIHYDRATE 2 PUFF(S): 160; 4.5 AEROSOL RESPIRATORY (INHALATION) at 08:28

## 2021-01-21 RX ADMIN — MONTELUKAST 10 MILLIGRAM(S): 4 TABLET, CHEWABLE ORAL at 11:36

## 2021-01-21 RX ADMIN — Medication 12.5 MILLIGRAM(S): at 11:33

## 2021-01-21 RX ADMIN — CHLORHEXIDINE GLUCONATE 1 APPLICATION(S): 213 SOLUTION TOPICAL at 05:21

## 2021-01-21 RX ADMIN — MAGNESIUM OXIDE 400 MG ORAL TABLET 400 MILLIGRAM(S): 241.3 TABLET ORAL at 11:37

## 2021-01-21 RX ADMIN — DONEPEZIL HYDROCHLORIDE 10 MILLIGRAM(S): 10 TABLET, FILM COATED ORAL at 11:36

## 2021-01-21 RX ADMIN — MAGNESIUM OXIDE 400 MG ORAL TABLET 400 MILLIGRAM(S): 241.3 TABLET ORAL at 08:36

## 2021-01-21 RX ADMIN — Medication 20 MILLIGRAM(S): at 05:21

## 2021-01-21 NOTE — PROGRESS NOTE ADULT - SUBJECTIVE AND OBJECTIVE BOX
INTERVAL HPI/OVERNIGHT EVENTS:    SUBJECTIVE: Patient seen and examined at bedside.     no cp, sob, abd pain, fever  +dizziness upon standing, no lightheadedness, syncope, ha    OBJECTIVE:    VITAL SIGNS:  Vital Signs Last 24 Hrs  T(C): 36.9 (21 Jan 2021 05:25), Max: 36.9 (20 Jan 2021 12:59)  T(F): 98.4 (21 Jan 2021 05:25), Max: 98.5 (20 Jan 2021 12:59)  HR: 70 (21 Jan 2021 05:25) (68 - 73)  BP: 180/72 (21 Jan 2021 05:25) (151/65 - 182/75)  BP(mean): --  RR: 16 (21 Jan 2021 05:25) (16 - 18)  SpO2: --      PHYSICAL EXAM:    General: NAD  HEENT: NC/AT; PERRL, clear conjunctiva  Neck: supple  Respiratory: CTA b/l  Cardiovascular: +S1/S2; RRR  Abdomen: soft, NT/ND; +BS x4  Extremities: WWP, 2+ peripheral pulses b/l; no LE edema  Skin: normal color and turgor; no rash  Neurological:    MEDICATIONS:  MEDICATIONS  (STANDING):  amLODIPine   Tablet 10 milliGRAM(s) Oral once  aspirin  chewable 81 milliGRAM(s) Oral daily  atorvastatin 20 milliGRAM(s) Oral at bedtime  budesonide  80 MICROgram(s)/formoterol 4.5 MICROgram(s) Inhaler 2 Puff(s) Inhalation two times a day  chlorhexidine 4% Liquid 1 Application(s) Topical <User Schedule>  dextrose 40% Gel 15 Gram(s) Oral once  dextrose 5%. 1000 milliLiter(s) (50 mL/Hr) IV Continuous <Continuous>  dextrose 5%. 1000 milliLiter(s) (100 mL/Hr) IV Continuous <Continuous>  dextrose 50% Injectable 25 Gram(s) IV Push once  dextrose 50% Injectable 12.5 Gram(s) IV Push once  dextrose 50% Injectable 25 Gram(s) IV Push once  donepezil 10 milliGRAM(s) Oral at bedtime  enoxaparin Injectable 40 milliGRAM(s) SubCutaneous at bedtime  furosemide    Tablet 20 milliGRAM(s) Oral daily  glucagon  Injectable 1 milliGRAM(s) IntraMuscular once  insulin glargine Injectable (LANTUS) 10 Unit(s) SubCutaneous at bedtime  insulin lispro (ADMELOG) corrective regimen sliding scale   SubCutaneous three times a day before meals  insulin lispro Injectable (ADMELOG) 6 Unit(s) SubCutaneous three times a day before meals  isosorbide   mononitrate ER Tablet (IMDUR) 30 milliGRAM(s) Oral daily  losartan 100 milliGRAM(s) Oral daily  magnesium oxide 400 milliGRAM(s) Oral three times a day with meals  montelukast 10 milliGRAM(s) Oral daily  pantoprazole    Tablet 40 milliGRAM(s) Oral before breakfast  tiotropium 18 MICROgram(s) Capsule 1 Capsule(s) Inhalation daily    MEDICATIONS  (PRN):  acetaminophen   Tablet .. 650 milliGRAM(s) Oral every 6 hours PRN Temp greater or equal to 38C (100.4F)  ALBUTerol    90 MICROgram(s) HFA Inhaler 2 Puff(s) Inhalation every 6 hours PRN Shortness of Breath and/or Wheezing  meclizine 12.5 milliGRAM(s) Oral three times a day PRN vertigo      ALLERGIES:  Allergies    ACE inhibitors (Unknown)  BENZALKONIUM (Rash)  BETADINE (Rash)  Ceclor (Unknown)  codeine (Unknown)  latex (Unknown)  penicillin (Unknown)  RUBBER GLOVES (Rash)  sulfonamides (Unknown)    Intolerances        LABS:                        10.4   3.79  )-----------( 176      ( 21 Jan 2021 06:20 )             33.4     Hemoglobin: 10.4 g/dL (01-21 @ 06:20)  Hemoglobin: 9.6 g/dL (01-20 @ 11:33)  Hemoglobin: 10.4 g/dL (01-19 @ 14:37)    CBC Full  -  ( 21 Jan 2021 06:20 )  WBC Count : 3.79 K/uL  RBC Count : 4.23 M/uL  Hemoglobin : 10.4 g/dL  Hematocrit : 33.4 %  Platelet Count - Automated : 176 K/uL  Mean Cell Volume : 79.0 fL  Mean Cell Hemoglobin : 24.6 pg  Mean Cell Hemoglobin Concentration : 31.1 g/dL  Auto Neutrophil # : 2.25 K/uL  Auto Lymphocyte # : 1.02 K/uL  Auto Monocyte # : 0.37 K/uL  Auto Eosinophil # : 0.12 K/uL  Auto Basophil # : 0.02 K/uL  Auto Neutrophil % : 59.3 %  Auto Lymphocyte % : 26.9 %  Auto Monocyte % : 9.8 %  Auto Eosinophil % : 3.2 %  Auto Basophil % : 0.5 %    01-21    142  |  103  |  20  ----------------------------<  141<H>  3.9   |  30  |  1.1    Ca    9.1      21 Jan 2021 06:20  Mg     2.3     01-21    TPro  6.3  /  Alb  4.0  /  TBili  0.6  /  DBili  x   /  AST  21  /  ALT  13  /  AlkPhos  67  01-21    Creatinine Trend: 1.1<--, 1.1<--, 1.0<--  LIVER FUNCTIONS - ( 21 Jan 2021 06:20 )  Alb: 4.0 g/dL / Pro: 6.3 g/dL / ALK PHOS: 67 U/L / ALT: 13 U/L / AST: 21 U/L / GGT: x           PT/INR - ( 19 Jan 2021 14:37 )   PT: 12.10 sec;   INR: 1.05 ratio         PTT - ( 19 Jan 2021 14:37 )  PTT:29.5 sec    hs Troponin:              CSF:                      EKG:   MICROBIOLOGY:    IMAGING:      Labs, imaging, EKG personally reviewed    RADIOLOGY & ADDITIONAL TESTS: Reviewed.

## 2021-01-21 NOTE — OCCUPATIONAL THERAPY INITIAL EVALUATION ADULT - PERTINENT HX OF CURRENT PROBLEM, REHAB EVAL
Pt. reports she was admitted s/p fall from rollator that got caught in a sidewalk crack. Pt. reports she was sitting on it .

## 2021-01-21 NOTE — OCCUPATIONAL THERAPY INITIAL EVALUATION ADULT - GENERAL OBSERVATIONS, REHAB EVAL
Pt. received semifowler in bed.  Pt agreeable to OT eval.  Pt. seen from 10:25-10:55am. Pt left in bed how patient was received.  Pt. demo. severe dizziness upon supine to sit.  RN made aware

## 2021-01-21 NOTE — DISCHARGE NOTE NURSING/CASE MANAGEMENT/SOCIAL WORK - NSTRANSFERBELONGINGSRESP_GEN_A_NUR
yes Breathing spontaneous and unlabored. Breath sounds clear and equal bilaterally with regular rhythm.

## 2021-01-21 NOTE — OCCUPATIONAL THERAPY INITIAL EVALUATION ADULT - ADDITIONAL COMMENTS
Pt. reports she has home O2 and uses it when she needs it.  Pt. reports she was independent in ADL. She used a shower chair and grab bar when bathing. No commode needed or raised seat PTA. Pt. reports she minimally cooks and does minimal cleaning.

## 2021-01-21 NOTE — PROGRESS NOTE ADULT - SUBJECTIVE AND OBJECTIVE BOX
24H events:    Patient is a 80y old Female who presents with a chief complaint of Fall (21 Jan 2021 10:21)    Primary diagnosis of CHI (closed head injury)       Today is hospital day 2d. This morning patient was seen and examined at bedside, resting comfortably in bed.    No events overnight  reports feeling weak  Dizzy upon standing  rotatory vertigo and spinning sensation  reported upon moving her head  unable to walk with PT    PAST MEDICAL & SURGICAL HISTORY  Coronary artery disease involving native heart without angina pectoris, unspecified vessel or lesion type  s/p 5 stents    Diabetes  on insulin    Osteoarthritis    CKD (chronic kidney disease)    Thrombocytopenia    PVD (peripheral vascular disease)    Non Hodgkin&#x27;s lymphoma  in remission. Follows with Dr Hernandez    Heart failure    High cholesterol    HTN (hypertension)    MI (myocardial infarction)  s/p 5 stents    H/O carotid endarterectomy  RIGHT    H/O cardiac catheterization  5 STENTS    History of cholecystectomy      SOCIAL HISTORY:  Social History:      ALLERGIES:  ACE inhibitors (Unknown)  BENZALKONIUM (Rash)  BETADINE (Rash)  Ceclor (Unknown)  codeine (Unknown)  latex (Unknown)  penicillin (Unknown)  RUBBER GLOVES (Rash)  sulfonamides (Unknown)    MEDICATIONS:  STANDING MEDICATIONS  atorvastatin 20 milliGRAM(s) Oral at bedtime  budesonide  80 MICROgram(s)/formoterol 4.5 MICROgram(s) Inhaler 2 Puff(s) Inhalation two times a day  chlorhexidine 4% Liquid 1 Application(s) Topical <User Schedule>  dextrose 40% Gel 15 Gram(s) Oral once  dextrose 5%. 1000 milliLiter(s) IV Continuous <Continuous>  dextrose 5%. 1000 milliLiter(s) IV Continuous <Continuous>  dextrose 50% Injectable 25 Gram(s) IV Push once  dextrose 50% Injectable 12.5 Gram(s) IV Push once  dextrose 50% Injectable 25 Gram(s) IV Push once  donepezil 10 milliGRAM(s) Oral at bedtime  enoxaparin Injectable 40 milliGRAM(s) SubCutaneous at bedtime  furosemide    Tablet 20 milliGRAM(s) Oral daily  glucagon  Injectable 1 milliGRAM(s) IntraMuscular once  insulin glargine Injectable (LANTUS) 15 Unit(s) SubCutaneous at bedtime  insulin lispro (ADMELOG) corrective regimen sliding scale   SubCutaneous three times a day before meals  insulin lispro Injectable (ADMELOG) 8 Unit(s) SubCutaneous three times a day before meals  isosorbide   mononitrate ER Tablet (IMDUR) 30 milliGRAM(s) Oral daily  losartan 100 milliGRAM(s) Oral daily  magnesium oxide 400 milliGRAM(s) Oral three times a day with meals  montelukast 10 milliGRAM(s) Oral daily  pantoprazole    Tablet 40 milliGRAM(s) Oral before breakfast  tiotropium 18 MICROgram(s) Capsule 1 Capsule(s) Inhalation daily    PRN MEDICATIONS  acetaminophen   Tablet .. 650 milliGRAM(s) Oral every 6 hours PRN  ALBUTerol    90 MICROgram(s) HFA Inhaler 2 Puff(s) Inhalation every 6 hours PRN  meclizine 12.5 milliGRAM(s) Oral three times a day PRN    VITALS:   T(F): 98.4  HR: 83  BP: 206/81  RR: 18  SpO2: --    PHYSICAL EXAM:  GENERAL: NAD  EYES: Nystagmus   NERVOUS SYSTEM:  Alert & Oriented X3, non focal   CHEST/LUNG: Clear to auscultation bilaterally; No rales, rhonchi, wheezing, or rubs  HEART: Regular rate and rhythm; No murmurs, rubs, or gallops  ABDOMEN: Soft, Nontender, Nondistended; Bowel sounds present  EXTREMITIES:   No clubbing, cyanosis, or edema    LABS:                        10.4   3.79  )-----------( 176      ( 21 Jan 2021 06:20 )             33.4     01-21    142  |  103  |  20  ----------------------------<  141<H>  3.9   |  30  |  1.1    Ca    9.1      21 Jan 2021 06:20  Mg     2.3     01-21    TPro  6.3  /  Alb  4.0  /  TBili  0.6  /  DBili  x   /  AST  21  /  ALT  13  /  AlkPhos  67  01-21    PT/INR - ( 19 Jan 2021 14:37 )   PT: 12.10 sec;   INR: 1.05 ratio         PTT - ( 19 Jan 2021 14:37 )  PTT:29.5 sec          CARDIAC MARKERS ( 20 Jan 2021 06:35 )  x     / 0.03 ng/mL / x     / x     / x      CARDIAC MARKERS ( 19 Jan 2021 22:02 )  x     / 0.05 ng/mL / 102 U/L / x     / 5.5 ng/mL  CARDIAC MARKERS ( 19 Jan 2021 14:37 )  x     / 0.04 ng/mL / x     / x     / x          RADIOLOGY:

## 2021-01-21 NOTE — DISCHARGE NOTE NURSING/CASE MANAGEMENT/SOCIAL WORK - PATIENT PORTAL LINK FT
You can access the FollowMyHealth Patient Portal offered by [facility] by registering at the following website: [email or web address]. By joining [employer]’s FollowMyHealth portal, you will also be able to view your health information using other applications (apps) compatible with our system.

## 2021-01-22 LAB
ALBUMIN SERPL ELPH-MCNC: 3.8 G/DL — SIGNIFICANT CHANGE UP (ref 3.5–5.2)
ALP SERPL-CCNC: 70 U/L — SIGNIFICANT CHANGE UP (ref 30–115)
ALT FLD-CCNC: 14 U/L — SIGNIFICANT CHANGE UP (ref 0–41)
ANION GAP SERPL CALC-SCNC: 10 MMOL/L — SIGNIFICANT CHANGE UP (ref 7–14)
AST SERPL-CCNC: 22 U/L — SIGNIFICANT CHANGE UP (ref 0–41)
BASOPHILS # BLD AUTO: 0.02 K/UL — SIGNIFICANT CHANGE UP (ref 0–0.2)
BASOPHILS NFR BLD AUTO: 0.5 % — SIGNIFICANT CHANGE UP (ref 0–1)
BILIRUB SERPL-MCNC: 0.4 MG/DL — SIGNIFICANT CHANGE UP (ref 0.2–1.2)
BUN SERPL-MCNC: 25 MG/DL — HIGH (ref 10–20)
CALCIUM SERPL-MCNC: 8.8 MG/DL — SIGNIFICANT CHANGE UP (ref 8.5–10.1)
CHLORIDE SERPL-SCNC: 105 MMOL/L — SIGNIFICANT CHANGE UP (ref 98–110)
CO2 SERPL-SCNC: 29 MMOL/L — SIGNIFICANT CHANGE UP (ref 17–32)
CREAT SERPL-MCNC: 1 MG/DL — SIGNIFICANT CHANGE UP (ref 0.7–1.5)
EOSINOPHIL # BLD AUTO: 0.14 K/UL — SIGNIFICANT CHANGE UP (ref 0–0.7)
EOSINOPHIL NFR BLD AUTO: 3.3 % — SIGNIFICANT CHANGE UP (ref 0–8)
GLUCOSE BLDC GLUCOMTR-MCNC: 125 MG/DL — HIGH (ref 70–99)
GLUCOSE BLDC GLUCOMTR-MCNC: 154 MG/DL — HIGH (ref 70–99)
GLUCOSE BLDC GLUCOMTR-MCNC: 160 MG/DL — HIGH (ref 70–99)
GLUCOSE BLDC GLUCOMTR-MCNC: 163 MG/DL — HIGH (ref 70–99)
GLUCOSE SERPL-MCNC: 120 MG/DL — HIGH (ref 70–99)
HCT VFR BLD CALC: 33.1 % — LOW (ref 37–47)
HGB BLD-MCNC: 10.3 G/DL — LOW (ref 12–16)
IMM GRANULOCYTES NFR BLD AUTO: 0.5 % — HIGH (ref 0.1–0.3)
LYMPHOCYTES # BLD AUTO: 1.47 K/UL — SIGNIFICANT CHANGE UP (ref 1.2–3.4)
LYMPHOCYTES # BLD AUTO: 34.2 % — SIGNIFICANT CHANGE UP (ref 20.5–51.1)
MAGNESIUM SERPL-MCNC: 2.1 MG/DL — SIGNIFICANT CHANGE UP (ref 1.8–2.4)
MCHC RBC-ENTMCNC: 24.7 PG — LOW (ref 27–31)
MCHC RBC-ENTMCNC: 31.1 G/DL — LOW (ref 32–37)
MCV RBC AUTO: 79.4 FL — LOW (ref 81–99)
MONOCYTES # BLD AUTO: 0.45 K/UL — SIGNIFICANT CHANGE UP (ref 0.1–0.6)
MONOCYTES NFR BLD AUTO: 10.5 % — HIGH (ref 1.7–9.3)
NEUTROPHILS # BLD AUTO: 2.2 K/UL — SIGNIFICANT CHANGE UP (ref 1.4–6.5)
NEUTROPHILS NFR BLD AUTO: 51 % — SIGNIFICANT CHANGE UP (ref 42.2–75.2)
NRBC # BLD: 0 /100 WBCS — SIGNIFICANT CHANGE UP (ref 0–0)
PLATELET # BLD AUTO: 201 K/UL — SIGNIFICANT CHANGE UP (ref 130–400)
POTASSIUM SERPL-MCNC: 3.7 MMOL/L — SIGNIFICANT CHANGE UP (ref 3.5–5)
POTASSIUM SERPL-SCNC: 3.7 MMOL/L — SIGNIFICANT CHANGE UP (ref 3.5–5)
PROT SERPL-MCNC: 5.9 G/DL — LOW (ref 6–8)
RBC # BLD: 4.17 M/UL — LOW (ref 4.2–5.4)
RBC # FLD: 14.8 % — HIGH (ref 11.5–14.5)
SODIUM SERPL-SCNC: 144 MMOL/L — SIGNIFICANT CHANGE UP (ref 135–146)
WBC # BLD: 4.3 K/UL — LOW (ref 4.8–10.8)
WBC # FLD AUTO: 4.3 K/UL — LOW (ref 4.8–10.8)

## 2021-01-22 PROCEDURE — 99233 SBSQ HOSP IP/OBS HIGH 50: CPT

## 2021-01-22 RX ORDER — MECLIZINE HCL 12.5 MG
25 TABLET ORAL THREE TIMES A DAY
Refills: 0 | Status: DISCONTINUED | OUTPATIENT
Start: 2021-01-22 | End: 2021-01-24

## 2021-01-22 RX ORDER — AMLODIPINE BESYLATE 2.5 MG/1
10 TABLET ORAL DAILY
Refills: 0 | Status: DISCONTINUED | OUTPATIENT
Start: 2021-01-22 | End: 2021-01-24

## 2021-01-22 RX ORDER — MAGNESIUM OXIDE 400 MG ORAL TABLET 241.3 MG
400 TABLET ORAL DAILY
Refills: 0 | Status: DISCONTINUED | OUTPATIENT
Start: 2021-01-22 | End: 2021-01-24

## 2021-01-22 RX ORDER — AMLODIPINE BESYLATE 2.5 MG/1
5 TABLET ORAL DAILY
Refills: 0 | Status: DISCONTINUED | OUTPATIENT
Start: 2021-01-22 | End: 2021-01-22

## 2021-01-22 RX ADMIN — Medication 8 UNIT(S): at 08:11

## 2021-01-22 RX ADMIN — Medication 8 UNIT(S): at 17:42

## 2021-01-22 RX ADMIN — MAGNESIUM OXIDE 400 MG ORAL TABLET 400 MILLIGRAM(S): 241.3 TABLET ORAL at 11:14

## 2021-01-22 RX ADMIN — Medication 25 MILLIGRAM(S): at 13:32

## 2021-01-22 RX ADMIN — MAGNESIUM OXIDE 400 MG ORAL TABLET 400 MILLIGRAM(S): 241.3 TABLET ORAL at 08:11

## 2021-01-22 RX ADMIN — PANTOPRAZOLE SODIUM 40 MILLIGRAM(S): 20 TABLET, DELAYED RELEASE ORAL at 06:16

## 2021-01-22 RX ADMIN — DONEPEZIL HYDROCHLORIDE 10 MILLIGRAM(S): 10 TABLET, FILM COATED ORAL at 11:14

## 2021-01-22 RX ADMIN — Medication 2: at 17:42

## 2021-01-22 RX ADMIN — Medication 2: at 11:15

## 2021-01-22 RX ADMIN — ENOXAPARIN SODIUM 40 MILLIGRAM(S): 100 INJECTION SUBCUTANEOUS at 21:52

## 2021-01-22 RX ADMIN — Medication 25 MILLIGRAM(S): at 21:51

## 2021-01-22 RX ADMIN — Medication 12.5 MILLIGRAM(S): at 00:06

## 2021-01-22 RX ADMIN — LOSARTAN POTASSIUM 100 MILLIGRAM(S): 100 TABLET, FILM COATED ORAL at 06:14

## 2021-01-22 RX ADMIN — BUDESONIDE AND FORMOTEROL FUMARATE DIHYDRATE 2 PUFF(S): 160; 4.5 AEROSOL RESPIRATORY (INHALATION) at 08:11

## 2021-01-22 RX ADMIN — Medication 20 MILLIGRAM(S): at 06:14

## 2021-01-22 RX ADMIN — ISOSORBIDE MONONITRATE 30 MILLIGRAM(S): 60 TABLET, EXTENDED RELEASE ORAL at 11:14

## 2021-01-22 RX ADMIN — CHLORHEXIDINE GLUCONATE 1 APPLICATION(S): 213 SOLUTION TOPICAL at 06:11

## 2021-01-22 RX ADMIN — DONEPEZIL HYDROCHLORIDE 10 MILLIGRAM(S): 10 TABLET, FILM COATED ORAL at 23:02

## 2021-01-22 RX ADMIN — MONTELUKAST 10 MILLIGRAM(S): 4 TABLET, CHEWABLE ORAL at 11:14

## 2021-01-22 RX ADMIN — ATORVASTATIN CALCIUM 20 MILLIGRAM(S): 80 TABLET, FILM COATED ORAL at 21:52

## 2021-01-22 RX ADMIN — Medication 8 UNIT(S): at 11:15

## 2021-01-22 RX ADMIN — BUDESONIDE AND FORMOTEROL FUMARATE DIHYDRATE 2 PUFF(S): 160; 4.5 AEROSOL RESPIRATORY (INHALATION) at 21:52

## 2021-01-22 RX ADMIN — INSULIN GLARGINE 15 UNIT(S): 100 INJECTION, SOLUTION SUBCUTANEOUS at 21:52

## 2021-01-22 RX ADMIN — Medication 12.5 MILLIGRAM(S): at 06:15

## 2021-01-22 NOTE — PROGRESS NOTE ADULT - SUBJECTIVE AND OBJECTIVE BOX
VENKATESH RAMACHANDRAN 80y Female  MRN#: [id number]   Hospital Day: 3d    SUBJECTIVE  Patient is a 80y old Female who presents with a chief complaint of Fall (22 Jan 2021 10:15)  Currently admitted to medicine with the primary diagnosis of CHI (closed head injury)      INTERVAL HPI AND OVERNIGHT EVENTS:  Patient was examined and seen at bedside. This morning she is resting comfortably in bed and reports no issues or overnight events.    REVIEW OF SYMPTOMS:  +vertigo, +Nausea during vertigo episodes, no V/D/C, no CP, SOB    OBJECTIVE  PAST MEDICAL & SURGICAL HISTORY  Coronary artery disease involving native heart without angina pectoris, unspecified vessel or lesion type  s/p 5 stents    Diabetes  on insulin    Osteoarthritis    CKD (chronic kidney disease)    Thrombocytopenia    PVD (peripheral vascular disease)    Non Hodgkin&#x27;s lymphoma  in remission. Follows with Dr Hernandez    Heart failure    High cholesterol    HTN (hypertension)    MI (myocardial infarction)  s/p 5 stents    H/O carotid endarterectomy  RIGHT    H/O cardiac catheterization  5 STENTS    History of cholecystectomy      ALLERGIES:  ACE inhibitors (Unknown)  BENZALKONIUM (Rash)  BETADINE (Rash)  Ceclor (Unknown)  codeine (Unknown)  latex (Unknown)  penicillin (Unknown)  RUBBER GLOVES (Rash)  sulfonamides (Unknown)    MEDICATIONS:  STANDING MEDICATIONS  amLODIPine   Tablet 10 milliGRAM(s) Oral daily  atorvastatin 20 milliGRAM(s) Oral at bedtime  budesonide  80 MICROgram(s)/formoterol 4.5 MICROgram(s) Inhaler 2 Puff(s) Inhalation two times a day  chlorhexidine 4% Liquid 1 Application(s) Topical <User Schedule>  dextrose 40% Gel 15 Gram(s) Oral once  dextrose 5%. 1000 milliLiter(s) IV Continuous <Continuous>  dextrose 5%. 1000 milliLiter(s) IV Continuous <Continuous>  dextrose 50% Injectable 25 Gram(s) IV Push once  dextrose 50% Injectable 12.5 Gram(s) IV Push once  dextrose 50% Injectable 25 Gram(s) IV Push once  donepezil 10 milliGRAM(s) Oral at bedtime  enoxaparin Injectable 40 milliGRAM(s) SubCutaneous at bedtime  furosemide    Tablet 20 milliGRAM(s) Oral daily  glucagon  Injectable 1 milliGRAM(s) IntraMuscular once  insulin glargine Injectable (LANTUS) 15 Unit(s) SubCutaneous at bedtime  insulin lispro (ADMELOG) corrective regimen sliding scale   SubCutaneous three times a day before meals  insulin lispro Injectable (ADMELOG) 8 Unit(s) SubCutaneous three times a day before meals  isosorbide   mononitrate ER Tablet (IMDUR) 30 milliGRAM(s) Oral daily  losartan 100 milliGRAM(s) Oral daily  magnesium oxide 400 milliGRAM(s) Oral daily  meclizine 25 milliGRAM(s) Oral three times a day  montelukast 10 milliGRAM(s) Oral daily  pantoprazole    Tablet 40 milliGRAM(s) Oral before breakfast  tiotropium 18 MICROgram(s) Capsule 1 Capsule(s) Inhalation daily    PRN MEDICATIONS  acetaminophen   Tablet .. 650 milliGRAM(s) Oral every 6 hours PRN  ALBUTerol    90 MICROgram(s) HFA Inhaler 2 Puff(s) Inhalation every 6 hours PRN      VITAL SIGNS: Last 24 Hours  T(C): 35.9 (22 Jan 2021 14:09), Max: 36.9 (22 Jan 2021 05:54)  T(F): 96.6 (22 Jan 2021 14:09), Max: 98.4 (22 Jan 2021 05:54)  HR: 82 (22 Jan 2021 14:09) (77 - 86)  BP: 140/63 (22 Jan 2021 14:09) (140/63 - 183/77)  BP(mean): --  RR: 19 (22 Jan 2021 14:09) (19 - 20)  SpO2: 94% (22 Jan 2021 08:18) (94% - 97%)    LABS:                        10.3   4.30  )-----------( 201      ( 22 Jan 2021 07:19 )             33.1     01-22    144  |  105  |  25<H>  ----------------------------<  120<H>  3.7   |  29  |  1.0    Ca    8.8      22 Jan 2021 07:19  Mg     2.1     01-22    TPro  5.9<L>  /  Alb  3.8  /  TBili  0.4  /  DBili  x   /  AST  22  /  ALT  14  /  AlkPhos  70  01-22                  RADIOLOGY:      PHYSICAL EXAM:      ASSESSMENT & PLAN  #    PAST MEDICAL & SURGICAL HISTORY:  Coronary artery disease involving native heart without angina pectoris, unspecified vessel or lesion type  s/p 5 stents    Diabetes  on insulin    Osteoarthritis    CKD (chronic kidney disease)    Thrombocytopenia    PVD (peripheral vascular disease)    Non Hodgkin&#x27;s lymphoma  in remission. Follows with Dr Hernandez    Heart failure    High cholesterol    HTN (hypertension)    MI (myocardial infarction)  s/p 5 stents    H/O carotid endarterectomy  RIGHT    H/O cardiac catheterization  5 STENTS    History of cholecystectomy   VENKATESH RAMACHANDRAN 80y Female  MRN#: [id number]   Hospital Day: 3d    SUBJECTIVE  Patient is a 80y old Female who presents with a chief complaint of Fall (22 Jan 2021 10:15)  Currently admitted to medicine with the primary diagnosis of CHI (closed head injury)      INTERVAL HPI AND OVERNIGHT EVENTS:  Patient was examined and seen at bedside. This morning she is resting comfortably in bed and reports no issues or overnight events.    REVIEW OF SYMPTOMS:  +vertigo, +Nausea during vertigo episodes, no V/D/C, no CP, SOB    OBJECTIVE  PAST MEDICAL & SURGICAL HISTORY  Coronary artery disease involving native heart without angina pectoris, unspecified vessel or lesion type  s/p 5 stents    Diabetes  on insulin    Osteoarthritis    CKD (chronic kidney disease)    Thrombocytopenia    PVD (peripheral vascular disease)    Non Hodgkin&#x27;s lymphoma  in remission. Follows with Dr Hernandez    Heart failure    High cholesterol    HTN (hypertension)    MI (myocardial infarction)  s/p 5 stents    H/O carotid endarterectomy  RIGHT    H/O cardiac catheterization  5 STENTS    History of cholecystectomy      ALLERGIES:  ACE inhibitors (Unknown)  BENZALKONIUM (Rash)  BETADINE (Rash)  Ceclor (Unknown)  codeine (Unknown)  latex (Unknown)  penicillin (Unknown)  RUBBER GLOVES (Rash)  sulfonamides (Unknown)    MEDICATIONS:  STANDING MEDICATIONS  amLODIPine   Tablet 10 milliGRAM(s) Oral daily  atorvastatin 20 milliGRAM(s) Oral at bedtime  budesonide  80 MICROgram(s)/formoterol 4.5 MICROgram(s) Inhaler 2 Puff(s) Inhalation two times a day  chlorhexidine 4% Liquid 1 Application(s) Topical <User Schedule>  dextrose 40% Gel 15 Gram(s) Oral once  dextrose 5%. 1000 milliLiter(s) IV Continuous <Continuous>  dextrose 5%. 1000 milliLiter(s) IV Continuous <Continuous>  dextrose 50% Injectable 25 Gram(s) IV Push once  dextrose 50% Injectable 12.5 Gram(s) IV Push once  dextrose 50% Injectable 25 Gram(s) IV Push once  donepezil 10 milliGRAM(s) Oral at bedtime  enoxaparin Injectable 40 milliGRAM(s) SubCutaneous at bedtime  furosemide    Tablet 20 milliGRAM(s) Oral daily  glucagon  Injectable 1 milliGRAM(s) IntraMuscular once  insulin glargine Injectable (LANTUS) 15 Unit(s) SubCutaneous at bedtime  insulin lispro (ADMELOG) corrective regimen sliding scale   SubCutaneous three times a day before meals  insulin lispro Injectable (ADMELOG) 8 Unit(s) SubCutaneous three times a day before meals  isosorbide   mononitrate ER Tablet (IMDUR) 30 milliGRAM(s) Oral daily  losartan 100 milliGRAM(s) Oral daily  magnesium oxide 400 milliGRAM(s) Oral daily  meclizine 25 milliGRAM(s) Oral three times a day  montelukast 10 milliGRAM(s) Oral daily  pantoprazole    Tablet 40 milliGRAM(s) Oral before breakfast  tiotropium 18 MICROgram(s) Capsule 1 Capsule(s) Inhalation daily    PRN MEDICATIONS  acetaminophen   Tablet .. 650 milliGRAM(s) Oral every 6 hours PRN  ALBUTerol    90 MICROgram(s) HFA Inhaler 2 Puff(s) Inhalation every 6 hours PRN      VITAL SIGNS: Last 24 Hours  T(C): 35.9 (22 Jan 2021 14:09), Max: 36.9 (22 Jan 2021 05:54)  T(F): 96.6 (22 Jan 2021 14:09), Max: 98.4 (22 Jan 2021 05:54)  HR: 82 (22 Jan 2021 14:09) (77 - 86)  BP: 140/63 (22 Jan 2021 14:09) (140/63 - 183/77)  BP(mean): --  RR: 19 (22 Jan 2021 14:09) (19 - 20)  SpO2: 94% (22 Jan 2021 08:18) (94% - 97%)    LABS:                        10.3   4.30  )-----------( 201      ( 22 Jan 2021 07:19 )             33.1     01-22    144  |  105  |  25<H>  ----------------------------<  120<H>  3.7   |  29  |  1.0    Ca    8.8      22 Jan 2021 07:19  Mg     2.1     01-22    TPro  5.9<L>  /  Alb  3.8  /  TBili  0.4  /  DBili  x   /  AST  22  /  ALT  14  /  AlkPhos  70  01-22            RADIOLOGY:  < from: MR Head No Cont (01.21.21 @ 18:42) >  IMPRESSION:    Unremarkable MRI of the brain.    < end of copied text >    PHYSICAL EXAM:  GENERAL: NAD  EYES: Nystagmus   NERVOUS SYSTEM:  Alert & Oriented X3, non focal, full strength in LL BL  CHEST/LUNG: Clear to auscultation bilaterally; No rales, rhonchi, wheezing  HEART: Regular rate and rhythm; No murmurs  ABDOMEN: Soft, Nontender, Nondistended  EXTREMITIES:   No clubbing, cyanosis, or edema    ASSESSMENT & PLAN  80 year old female with PMHx of COPD (on 2L home NC PRN), CAD (S/p PCI *5 2011), Non-Hodgkin's Lymphoma (in remission), HTN, DM2 (On insulin), DL, GERD presenting after a mechanical fall.    #Mechanical fall  - CT head and neck unremarkable  - PT F/U  - Ambulate with assistance  - MRI unremarkable    #Vertigo  - Likely BPPV  - +nystagmus on epley  - Meclizine increased to 25q8    #CAD s/p 5 stents  - Denies any CP  - Continue Lasix, Statin   -O/p Cardio f/u    #Elevated Troponin  -trending down  -no chest pain    #COPD (on home O2 PRN)  - Pulse Ox stable on RA  - Continue home inhalers  - Not in exacerbation    #HTN  - amlodipine 10 given today  - cw Losartan 100mg qd    #DM2  - Hold home Novolog  - Lantus/Lispro 15/ 8/8/8  - Keep Fs 140-180    #DL  - cw atorvastatin 20    #Non-Hodgkin's Lymphoma (in remission)  - O/p Onc f/u    #Right upper lung nodule  - Right upper lobe groundglass pulmonary nodule described on November 13, 2020 CT better seen on CT  - Since February 17, 2020, posttreatment changes again seen in the left lower lobe with decreased size of the left pleural effusion and left lower lobe subpleural opacity/rounded atelectasis.  - Unchanged right upper lobe ground-glass nodule, which demonstrated suspicious FDG uptake on prior PET/CT.  - O/p pulm f/u    #MISC:  DVT PPX: Lovenox  GI PPX: On Protonix  Dispo: From home-->Will possibly need SNF or Home w/ Home PT  Diet: Carb consistent  Activity: With assistance  Full code    PAST MEDICAL & SURGICAL HISTORY:  Coronary artery disease involving native heart without angina pectoris, unspecified vessel or lesion type  s/p 5 stents    Diabetes  on insulin    Osteoarthritis    CKD (chronic kidney disease)    Thrombocytopenia    PVD (peripheral vascular disease)    Non Hodgkin&#x27;s lymphoma  in remission. Follows with Dr Hernandez    Heart failure    High cholesterol    HTN (hypertension)    MI (myocardial infarction)  s/p 5 stents    H/O carotid endarterectomy  RIGHT    H/O cardiac catheterization  5 STENTS    History of cholecystectomy

## 2021-01-22 NOTE — PROGRESS NOTE ADULT - SUBJECTIVE AND OBJECTIVE BOX
INTERVAL HPI/OVERNIGHT EVENTS:    SUBJECTIVE: Patient seen and examined at bedside.     no cp, sob, abd pain, fever  +dizziness, no lightheadedness, syncope, ha    OBJECTIVE:    VITAL SIGNS:  Vital Signs Last 24 Hrs  T(C): 36.9 (22 Jan 2021 05:54), Max: 36.9 (22 Jan 2021 05:54)  T(F): 98.4 (22 Jan 2021 05:54), Max: 98.4 (22 Jan 2021 05:54)  HR: 77 (22 Jan 2021 05:54) (72 - 86)  BP: 181/74 (22 Jan 2021 05:54) (150/67 - 206/81)  BP(mean): --  RR: 20 (22 Jan 2021 05:54) (18 - 20)  SpO2: 94% (22 Jan 2021 08:18) (94% - 97%)      PHYSICAL EXAM:    General: NAD  HEENT: NC/AT; PERRL, clear conjunctiva  Neck: supple  Respiratory: CTA b/l  Cardiovascular: +S1/S2; RRR  Abdomen: soft, NT/ND; +BS x4  Extremities: WWP, 2+ peripheral pulses b/l; no LE edema  Skin: normal color and turgor; no rash  Neurological:    MEDICATIONS:  MEDICATIONS  (STANDING):  amLODIPine   Tablet 5 milliGRAM(s) Oral daily  atorvastatin 20 milliGRAM(s) Oral at bedtime  budesonide  80 MICROgram(s)/formoterol 4.5 MICROgram(s) Inhaler 2 Puff(s) Inhalation two times a day  chlorhexidine 4% Liquid 1 Application(s) Topical <User Schedule>  dextrose 40% Gel 15 Gram(s) Oral once  dextrose 5%. 1000 milliLiter(s) (50 mL/Hr) IV Continuous <Continuous>  dextrose 5%. 1000 milliLiter(s) (100 mL/Hr) IV Continuous <Continuous>  dextrose 50% Injectable 25 Gram(s) IV Push once  dextrose 50% Injectable 12.5 Gram(s) IV Push once  dextrose 50% Injectable 25 Gram(s) IV Push once  donepezil 10 milliGRAM(s) Oral at bedtime  enoxaparin Injectable 40 milliGRAM(s) SubCutaneous at bedtime  furosemide    Tablet 20 milliGRAM(s) Oral daily  glucagon  Injectable 1 milliGRAM(s) IntraMuscular once  insulin glargine Injectable (LANTUS) 15 Unit(s) SubCutaneous at bedtime  insulin lispro (ADMELOG) corrective regimen sliding scale   SubCutaneous three times a day before meals  insulin lispro Injectable (ADMELOG) 8 Unit(s) SubCutaneous three times a day before meals  isosorbide   mononitrate ER Tablet (IMDUR) 30 milliGRAM(s) Oral daily  losartan 100 milliGRAM(s) Oral daily  magnesium oxide 400 milliGRAM(s) Oral daily  meclizine 25 milliGRAM(s) Oral three times a day  montelukast 10 milliGRAM(s) Oral daily  pantoprazole    Tablet 40 milliGRAM(s) Oral before breakfast  tiotropium 18 MICROgram(s) Capsule 1 Capsule(s) Inhalation daily    MEDICATIONS  (PRN):  acetaminophen   Tablet .. 650 milliGRAM(s) Oral every 6 hours PRN Temp greater or equal to 38C (100.4F)  ALBUTerol    90 MICROgram(s) HFA Inhaler 2 Puff(s) Inhalation every 6 hours PRN Shortness of Breath and/or Wheezing      ALLERGIES:  Allergies    ACE inhibitors (Unknown)  BENZALKONIUM (Rash)  BETADINE (Rash)  Ceclor (Unknown)  codeine (Unknown)  latex (Unknown)  penicillin (Unknown)  RUBBER GLOVES (Rash)  sulfonamides (Unknown)    Intolerances        LABS:                        10.3   4.30  )-----------( 201      ( 22 Jan 2021 07:19 )             33.1     Hemoglobin: 10.3 g/dL (01-22 @ 07:19)  Hemoglobin: 10.4 g/dL (01-21 @ 06:20)  Hemoglobin: 9.6 g/dL (01-20 @ 11:33)  Hemoglobin: 10.4 g/dL (01-19 @ 14:37)    CBC Full  -  ( 22 Jan 2021 07:19 )  WBC Count : 4.30 K/uL  RBC Count : 4.17 M/uL  Hemoglobin : 10.3 g/dL  Hematocrit : 33.1 %  Platelet Count - Automated : 201 K/uL  Mean Cell Volume : 79.4 fL  Mean Cell Hemoglobin : 24.7 pg  Mean Cell Hemoglobin Concentration : 31.1 g/dL  Auto Neutrophil # : 2.20 K/uL  Auto Lymphocyte # : 1.47 K/uL  Auto Monocyte # : 0.45 K/uL  Auto Eosinophil # : 0.14 K/uL  Auto Basophil # : 0.02 K/uL  Auto Neutrophil % : 51.0 %  Auto Lymphocyte % : 34.2 %  Auto Monocyte % : 10.5 %  Auto Eosinophil % : 3.3 %  Auto Basophil % : 0.5 %    01-22    144  |  105  |  25<H>  ----------------------------<  120<H>  3.7   |  29  |  1.0    Ca    8.8      22 Jan 2021 07:19  Mg     2.1     01-22    TPro  5.9<L>  /  Alb  3.8  /  TBili  0.4  /  DBili  x   /  AST  22  /  ALT  14  /  AlkPhos  70  01-22    Creatinine Trend: 1.0<--, 1.1<--, 1.1<--, 1.0<--  LIVER FUNCTIONS - ( 22 Jan 2021 07:19 )  Alb: 3.8 g/dL / Pro: 5.9 g/dL / ALK PHOS: 70 U/L / ALT: 14 U/L / AST: 22 U/L / GGT: x               hs Troponin:              CSF:                      EKG:   MICROBIOLOGY:    IMAGING:      Labs, imaging, EKG personally reviewed    RADIOLOGY & ADDITIONAL TESTS: Reviewed.

## 2021-01-22 NOTE — CONSULT NOTE ADULT - SUBJECTIVE AND OBJECTIVE BOX
ENT CONSULT:    Pt is a 80y female currently admitted s/p mechanical fall; ENT consulted for vertigo.       PAST MEDICAL & SURGICAL HISTORY:  CAD  Diabetes, on insulin  Osteoarthritis  CKD (chronic kidney disease)  Thrombocytopenia  PVD (peripheral vascular disease)  Non Hodgkin&#x27;s lymphoma, in remission. Follows with Dr Hernandez  Heart failure  High cholesterol  HTN (hypertension)  MI (myocardial infarction) s/p 5 stents  H/O carotid endarterectomy, RIGHT  H/O cardiac catheterization, 5 STENTS  History of cholecystectomy    MEDICATIONS  (STANDING):  amLODIPine   Tablet 10 milliGRAM(s) Oral daily  atorvastatin 20 milliGRAM(s) Oral at bedtime  budesonide  80 MICROgram(s)/formoterol 4.5 MICROgram(s) Inhaler 2 Puff(s) Inhalation two times a day  chlorhexidine 4% Liquid 1 Application(s) Topical <User Schedule>  dextrose 40% Gel 15 Gram(s) Oral once  dextrose 5%. 1000 milliLiter(s) (50 mL/Hr) IV Continuous <Continuous>  dextrose 5%. 1000 milliLiter(s) (100 mL/Hr) IV Continuous <Continuous>  dextrose 50% Injectable 25 Gram(s) IV Push once  dextrose 50% Injectable 12.5 Gram(s) IV Push once  dextrose 50% Injectable 25 Gram(s) IV Push once  donepezil 10 milliGRAM(s) Oral at bedtime  enoxaparin Injectable 40 milliGRAM(s) SubCutaneous at bedtime  furosemide    Tablet 20 milliGRAM(s) Oral daily  glucagon  Injectable 1 milliGRAM(s) IntraMuscular once  insulin glargine Injectable (LANTUS) 15 Unit(s) SubCutaneous at bedtime  insulin lispro (ADMELOG) corrective regimen sliding scale   SubCutaneous three times a day before meals  insulin lispro Injectable (ADMELOG) 8 Unit(s) SubCutaneous three times a day before meals  isosorbide   mononitrate ER Tablet (IMDUR) 30 milliGRAM(s) Oral daily  losartan 100 milliGRAM(s) Oral daily  magnesium oxide 400 milliGRAM(s) Oral daily  meclizine 25 milliGRAM(s) Oral three times a day  montelukast 10 milliGRAM(s) Oral daily  pantoprazole    Tablet 40 milliGRAM(s) Oral before breakfast  tiotropium 18 MICROgram(s) Capsule 1 Capsule(s) Inhalation daily    MEDICATIONS  (PRN):  acetaminophen   Tablet .. 650 milliGRAM(s) Oral every 6 hours PRN Temp greater or equal to 38C (100.4F)  ALBUTerol    90 MICROgram(s) HFA Inhaler 2 Puff(s) Inhalation every 6 hours PRN Shortness of Breath and/or Wheezing    Allergies  ACE inhibitors (Unknown)  BENZALKONIUM (Rash)  BETADINE (Rash)  Ceclor (Unknown)  codeine (Unknown)  latex (Unknown)  penicillin (Unknown)  RUBBER GLOVES (Rash)  sulfonamides (Unknown)    SOCIAL HISTORY:    FAMILY HISTORY:  FH: CAD (coronary artery disease)  FATHER    Review of Systems:  [ ] A ten point review of systems was otherwise negative except as noted.  [ ] Due to altered mental status/ intubation, subjective information was not able to be obtained from the patient. History was obtained, to the extent possible, from review of the chart and collateral sources of information     Vital Signs Last 24 Hrs  T(C): 36.7 (22 Jan 2021 21:19), Max: 36.9 (22 Jan 2021 05:54)  T(F): 98 (22 Jan 2021 21:19), Max: 98.4 (22 Jan 2021 05:54)  HR: 80 (22 Jan 2021 21:19) (77 - 86)  BP: 173/46 (22 Jan 2021 21:19) (140/63 - 181/74)  RR: 20 (22 Jan 2021 21:19) (19 - 20)  SpO2: 99% (22 Jan 2021 21:19) (94% - 99%)    PHYSICAL EXAM:  GEN:      LABS:                        10.3   4.30  )-----------( 201      ( 22 Jan 2021 07:19 )             33.1     01-22    144  |  105  |  25<H>  ----------------------------<  120<H>  3.7   |  29  |  1.0    Ca    8.8      22 Jan 2021 07:19  Mg     2.1     01-22    TPro  5.9<L>  /  Alb  3.8  /  TBili  0.4  /  DBili  x   /  AST  22  /  ALT  14  /  AlkPhos  70  01-22    RADIOLOGY & ADDITIONAL STUDIES:  < from: MR Head No Cont (01.21.21 @ 18:42) >  IMPRESSION:    Unremarkable MRI of the brain.    OMAIRA FUNEZ MD; Attending Radiologist  This document has been electronically signed. Jan 22 2021  9:11AM    < end of copied text >   ENT: Pt is an 79y/o F admitted s/p fall last monday +HT now with vertigo, medical team notes pt has +Teena Hallpike, and performed Epley maneuver a few days ago. Pt seen and examined at bedside. Reports improvement in her dizziness episodes especially after the epley maneuver and meclizine, states that she still feels a room spinning sensation if she looks down rapidly.     HPI:  80 year old female with PMHx of COPD (on 2L home NC PRN), CAD (S/p PCI *5 2011), HTN, DM2 (On insulin), Non-Hodgkin's Lymphoma (in remission) DL, GERD presenting after a mechanical fall. Patient was in the hospital received COVID vaccine to left deltoid, no complications, was sitting on rolling walker, being wheeled by daughter, when walker hit curb edge and patient fell backward landing on the top of her head. Reported headache and feeling dizziness after. No chest pain, palpitations, seizure like activity befiore or after event.  Currently denies any complaints and feels better after eating. (19 Jan 2021 18:41)    PAST MEDICAL & SURGICAL HISTORY:  Coronary artery disease involving native heart without angina pectoris, unspecified vessel or lesion type  s/p 5 stents  Diabetes  on insulin  Osteoarthritis  CKD (chronic kidney disease)  Thrombocytopenia  PVD (peripheral vascular disease)  Non Hodgkin&#x27;s lymphoma  in remission. Follows with Dr Hernandez  Heart failure  High cholesterol  HTN (hypertension)  MI (myocardial infarction)  s/p 5 stents  H/O carotid endarterectomy  RIGHT  H/O cardiac catheterization  5 STENTS  History of cholecystectomy    Allergies  ACE inhibitors (Unknown)  BENZALKONIUM (Rash)  BETADINE (Rash)  Ceclor (Unknown)  codeine (Unknown)  latex (Unknown)  penicillin (Unknown)  RUBBER GLOVES (Rash)  sulfonamides (Unknown)    MEDICATIONS  (STANDING):  amLODIPine   Tablet 10 milliGRAM(s) Oral daily  atorvastatin 20 milliGRAM(s) Oral at bedtime  budesonide  80 MICROgram(s)/formoterol 4.5 MICROgram(s) Inhaler 2 Puff(s) Inhalation two times a day  chlorhexidine 4% Liquid 1 Application(s) Topical <User Schedule>  dextrose 40% Gel 15 Gram(s) Oral once  dextrose 5%. 1000 milliLiter(s) (50 mL/Hr) IV Continuous <Continuous>  dextrose 5%. 1000 milliLiter(s) (100 mL/Hr) IV Continuous <Continuous>  dextrose 50% Injectable 25 Gram(s) IV Push once  dextrose 50% Injectable 12.5 Gram(s) IV Push once  dextrose 50% Injectable 25 Gram(s) IV Push once  donepezil 10 milliGRAM(s) Oral at bedtime  enoxaparin Injectable 40 milliGRAM(s) SubCutaneous at bedtime  furosemide    Tablet 20 milliGRAM(s) Oral daily  glucagon  Injectable 1 milliGRAM(s) IntraMuscular once  insulin glargine Injectable (LANTUS) 15 Unit(s) SubCutaneous at bedtime  insulin lispro (ADMELOG) corrective regimen sliding scale   SubCutaneous three times a day before meals  insulin lispro Injectable (ADMELOG) 8 Unit(s) SubCutaneous three times a day before meals  isosorbide   mononitrate ER Tablet (IMDUR) 30 milliGRAM(s) Oral daily  losartan 100 milliGRAM(s) Oral daily  magnesium oxide 400 milliGRAM(s) Oral daily  meclizine 25 milliGRAM(s) Oral three times a day  metoprolol tartrate 12.5 milliGRAM(s) Oral two times a day  montelukast 10 milliGRAM(s) Oral daily  pantoprazole    Tablet 40 milliGRAM(s) Oral before breakfast  tiotropium 18 MICROgram(s) Capsule 1 Capsule(s) Inhalation daily    MEDICATIONS  (PRN):  acetaminophen   Tablet .. 650 milliGRAM(s) Oral every 6 hours PRN Temp greater or equal to 38C (100.4F)  ALBUTerol    90 MICROgram(s) HFA Inhaler 2 Puff(s) Inhalation every 6 hours PRN Shortness of Breath and/or Wheezing    FAMILY HISTORY:  FH: CAD (coronary artery disease)  FATHER    ROS:   ENT: all negative except as noted in HPI   CV: denies palpitations  Pulm: denies SOB, cough, hemoptysis  GI: denies change in apetite, indigestion, n/v  : denies pertinent urinary symptoms, urgency  Neuro: denies numbness/tingling, loss of sensation  Psych: denies anxiety  MS: denies muscle weakness, instability  Heme: denies easy bruising or bleeding  Endo: denies heat/cold intolerance, excessive sweating  Vascular: denies LE edema    Vital Signs Last 24 Hrs  T(C): 36.1 (23 Jan 2021 05:03), Max: 36.7 (22 Jan 2021 21:19)  T(F): 97 (23 Jan 2021 05:03), Max: 98 (22 Jan 2021 21:19)  HR: 83 (23 Jan 2021 13:28) (80 - 83)  BP: 161/74 (23 Jan 2021 13:28) (140/63 - 173/46)  RR: 20 (23 Jan 2021 13:28) (19 - 20)  SpO2: 99% (22 Jan 2021 21:19) (95% - 99%)                        10.2   4.25  )-----------( 186      ( 23 Jan 2021 05:44 )             32.6    01-23    142  |  103  |  32<H>  ----------------------------<  188<H>  4.3   |  30  |  1.4    Ca    8.9      23 Jan 2021 05:44  Mg     2.0     01-23  TPro  5.9<L>  /  Alb  3.8  /  TBili  0.5  /  DBili  x   /  AST  43<H>  /  ALT  28  /  AlkPhos  65  01-23     PHYSICAL EXAM:  Gen: awake, alert, NAD. No drooling or pooling of secretions. No trismus.   Skin: No rashes, bruises, or lesions  HEENT: Head NC/AT. B/l EACs clear, TMs intact. Nares bilaterally patent, no blood or discharge noted. Oral cavity no erythema/edema. Tongue wnl. Uvula midline. Posterior oropharynx clear, no discharge/blood noted. Neck supple, trachea midline.   Resp: breathing easily, no stridor, no accessory muscle use   CV: no peripheral edema/cyanosis  GI: soft, nontender, nondistended  Neuro: A&Ox3  Psych: normal mood, normal affect    IMAGING/ADDITIONAL STUDIES:   EXAM:  MR BRAIN          PROCEDURE DATE:  01/21/2021    INTERPRETATION:  Clinical History / Reason for exam: Vertigo  TECHNIQUE: MRI brain without contrast. Multiplanar multisequential MRI of the brain without intravenous contrast administration on the 3 Rhoda magnet. The patient declined IV contrast administration.  Correlation with CT head dated 1/19/2021.    FINDINGS:  The ventricles and cortical sulci are within normal limits for age.  There is no evidence of acute intracranial hemorrhage, extra-axial fluid collection or midline shift.  There is no diffusion abnormality to suggest acute/subacute infarct. There is normal flow void present within the major vascular structures.  The pituitary is normal in size. The cerebellar tonsils are normal in position. There is normal marrow signal within the clivus.  The paranasal sinuses and mastoids are clear. The patient is status post bilateral cataract surgery.    IMPRESSION:  Unremarkable MRI of the brain.    OMAIRA FUNEZ MD; Attending Radiologist  This document has been electronically signed. Jan 22 2021  9:11AM

## 2021-01-22 NOTE — CONSULT NOTE ADULT - ASSESSMENT
Pt is a 80y female currently admitted s/p mechanical fall; ENT consulted for vertigo.  79y/o F with vertigo, +Teena Hallpike with response to Epley maneuvers, likely BPPV    - No acute ENT intervention at this time  - Pt is to be likely discharged today as per medical team  - Continue Meclizine prn   - Will need vestibular rehab as an outpatient, can follow up with Dr. Tenorio ENT as outpatient.

## 2021-01-23 ENCOUNTER — TRANSCRIPTION ENCOUNTER (OUTPATIENT)
Age: 81
End: 2021-01-23

## 2021-01-23 LAB
ALBUMIN SERPL ELPH-MCNC: 3.8 G/DL — SIGNIFICANT CHANGE UP (ref 3.5–5.2)
ALP SERPL-CCNC: 65 U/L — SIGNIFICANT CHANGE UP (ref 30–115)
ALT FLD-CCNC: 28 U/L — SIGNIFICANT CHANGE UP (ref 0–41)
ANION GAP SERPL CALC-SCNC: 9 MMOL/L — SIGNIFICANT CHANGE UP (ref 7–14)
AST SERPL-CCNC: 43 U/L — HIGH (ref 0–41)
BASOPHILS # BLD AUTO: 0.02 K/UL — SIGNIFICANT CHANGE UP (ref 0–0.2)
BASOPHILS NFR BLD AUTO: 0.5 % — SIGNIFICANT CHANGE UP (ref 0–1)
BILIRUB SERPL-MCNC: 0.5 MG/DL — SIGNIFICANT CHANGE UP (ref 0.2–1.2)
BUN SERPL-MCNC: 32 MG/DL — HIGH (ref 10–20)
CALCIUM SERPL-MCNC: 8.9 MG/DL — SIGNIFICANT CHANGE UP (ref 8.5–10.1)
CHLORIDE SERPL-SCNC: 103 MMOL/L — SIGNIFICANT CHANGE UP (ref 98–110)
CO2 SERPL-SCNC: 30 MMOL/L — SIGNIFICANT CHANGE UP (ref 17–32)
CREAT SERPL-MCNC: 1.4 MG/DL — SIGNIFICANT CHANGE UP (ref 0.7–1.5)
EOSINOPHIL # BLD AUTO: 0.16 K/UL — SIGNIFICANT CHANGE UP (ref 0–0.7)
EOSINOPHIL NFR BLD AUTO: 3.8 % — SIGNIFICANT CHANGE UP (ref 0–8)
GLUCOSE BLDC GLUCOMTR-MCNC: 118 MG/DL — HIGH (ref 70–99)
GLUCOSE BLDC GLUCOMTR-MCNC: 169 MG/DL — HIGH (ref 70–99)
GLUCOSE BLDC GLUCOMTR-MCNC: 179 MG/DL — HIGH (ref 70–99)
GLUCOSE BLDC GLUCOMTR-MCNC: 199 MG/DL — HIGH (ref 70–99)
GLUCOSE SERPL-MCNC: 188 MG/DL — HIGH (ref 70–99)
HCT VFR BLD CALC: 32.6 % — LOW (ref 37–47)
HGB BLD-MCNC: 10.2 G/DL — LOW (ref 12–16)
IMM GRANULOCYTES NFR BLD AUTO: 0.2 % — SIGNIFICANT CHANGE UP (ref 0.1–0.3)
LYMPHOCYTES # BLD AUTO: 1.49 K/UL — SIGNIFICANT CHANGE UP (ref 1.2–3.4)
LYMPHOCYTES # BLD AUTO: 35.1 % — SIGNIFICANT CHANGE UP (ref 20.5–51.1)
MAGNESIUM SERPL-MCNC: 2 MG/DL — SIGNIFICANT CHANGE UP (ref 1.8–2.4)
MCHC RBC-ENTMCNC: 25.4 PG — LOW (ref 27–31)
MCHC RBC-ENTMCNC: 31.3 G/DL — LOW (ref 32–37)
MCV RBC AUTO: 81.1 FL — SIGNIFICANT CHANGE UP (ref 81–99)
MONOCYTES # BLD AUTO: 0.57 K/UL — SIGNIFICANT CHANGE UP (ref 0.1–0.6)
MONOCYTES NFR BLD AUTO: 13.4 % — HIGH (ref 1.7–9.3)
NEUTROPHILS # BLD AUTO: 2 K/UL — SIGNIFICANT CHANGE UP (ref 1.4–6.5)
NEUTROPHILS NFR BLD AUTO: 47 % — SIGNIFICANT CHANGE UP (ref 42.2–75.2)
NRBC # BLD: 0 /100 WBCS — SIGNIFICANT CHANGE UP (ref 0–0)
PLATELET # BLD AUTO: 186 K/UL — SIGNIFICANT CHANGE UP (ref 130–400)
POTASSIUM SERPL-MCNC: 4.3 MMOL/L — SIGNIFICANT CHANGE UP (ref 3.5–5)
POTASSIUM SERPL-SCNC: 4.3 MMOL/L — SIGNIFICANT CHANGE UP (ref 3.5–5)
PROT SERPL-MCNC: 5.9 G/DL — LOW (ref 6–8)
RBC # BLD: 4.02 M/UL — LOW (ref 4.2–5.4)
RBC # FLD: 14.9 % — HIGH (ref 11.5–14.5)
SODIUM SERPL-SCNC: 142 MMOL/L — SIGNIFICANT CHANGE UP (ref 135–146)
WBC # BLD: 4.25 K/UL — LOW (ref 4.8–10.8)
WBC # FLD AUTO: 4.25 K/UL — LOW (ref 4.8–10.8)

## 2021-01-23 PROCEDURE — 99233 SBSQ HOSP IP/OBS HIGH 50: CPT

## 2021-01-23 RX ORDER — METOPROLOL TARTRATE 50 MG
12.5 TABLET ORAL
Refills: 0 | Status: DISCONTINUED | OUTPATIENT
Start: 2021-01-23 | End: 2021-01-24

## 2021-01-23 RX ADMIN — ENOXAPARIN SODIUM 40 MILLIGRAM(S): 100 INJECTION SUBCUTANEOUS at 21:36

## 2021-01-23 RX ADMIN — Medication 25 MILLIGRAM(S): at 13:26

## 2021-01-23 RX ADMIN — Medication 8 UNIT(S): at 08:01

## 2021-01-23 RX ADMIN — BUDESONIDE AND FORMOTEROL FUMARATE DIHYDRATE 2 PUFF(S): 160; 4.5 AEROSOL RESPIRATORY (INHALATION) at 08:04

## 2021-01-23 RX ADMIN — MAGNESIUM OXIDE 400 MG ORAL TABLET 400 MILLIGRAM(S): 241.3 TABLET ORAL at 11:45

## 2021-01-23 RX ADMIN — Medication 8 UNIT(S): at 12:38

## 2021-01-23 RX ADMIN — PANTOPRAZOLE SODIUM 40 MILLIGRAM(S): 20 TABLET, DELAYED RELEASE ORAL at 06:07

## 2021-01-23 RX ADMIN — AMLODIPINE BESYLATE 10 MILLIGRAM(S): 2.5 TABLET ORAL at 06:08

## 2021-01-23 RX ADMIN — Medication 20 MILLIGRAM(S): at 06:08

## 2021-01-23 RX ADMIN — LOSARTAN POTASSIUM 100 MILLIGRAM(S): 100 TABLET, FILM COATED ORAL at 06:08

## 2021-01-23 RX ADMIN — Medication 12.5 MILLIGRAM(S): at 17:14

## 2021-01-23 RX ADMIN — Medication 25 MILLIGRAM(S): at 21:35

## 2021-01-23 RX ADMIN — INSULIN GLARGINE 15 UNIT(S): 100 INJECTION, SOLUTION SUBCUTANEOUS at 21:36

## 2021-01-23 RX ADMIN — Medication 2: at 08:01

## 2021-01-23 RX ADMIN — Medication 25 MILLIGRAM(S): at 06:08

## 2021-01-23 RX ADMIN — Medication 8 UNIT(S): at 17:13

## 2021-01-23 RX ADMIN — ISOSORBIDE MONONITRATE 30 MILLIGRAM(S): 60 TABLET, EXTENDED RELEASE ORAL at 11:45

## 2021-01-23 RX ADMIN — DONEPEZIL HYDROCHLORIDE 10 MILLIGRAM(S): 10 TABLET, FILM COATED ORAL at 21:35

## 2021-01-23 RX ADMIN — CHLORHEXIDINE GLUCONATE 1 APPLICATION(S): 213 SOLUTION TOPICAL at 06:08

## 2021-01-23 RX ADMIN — ATORVASTATIN CALCIUM 20 MILLIGRAM(S): 80 TABLET, FILM COATED ORAL at 21:35

## 2021-01-23 RX ADMIN — MONTELUKAST 10 MILLIGRAM(S): 4 TABLET, CHEWABLE ORAL at 11:45

## 2021-01-23 RX ADMIN — Medication 2: at 12:39

## 2021-01-23 NOTE — DISCHARGE NOTE PROVIDER - PROVIDER TOKENS
PROVIDER:[TOKEN:[[id number]],FOLLOWUP:[1 week]],PROVIDER:[TOKEN:[[id number]],FOLLOWUP:[Routine],ESTABLISHEDPATIENT:[T]]

## 2021-01-23 NOTE — DISCHARGE NOTE PROVIDER - CARE PROVIDER_API CALL
Radha Tenorio)  Otolaryngology  [address], [address] [place] [address]  Phone: [phone]  Fax: [phone]  Follow Up Time: 1 [place] [address]  Phone: [phone]  Fax: [phone]  Established Patient  Follow Up Time: Routine

## 2021-01-23 NOTE — DISCHARGE NOTE PROVIDER - NSFOLLOWUPCLINICS_GEN_ALL_ED_FT
[facility]  ENT  [address], [address]nd [place] [address]  Phone: [phone]  Fax:   Follow Up Time:

## 2021-01-23 NOTE — DISCHARGE NOTE PROVIDER - NSDCCPCAREPLAN_GEN_ALL_CORE_FT
PRINCIPAL DISCHARGE DIAGNOSIS  Diagnosis: Vertigo  Assessment and Plan of Treatment: Vertigo is the sensation of the room spinning even though you are standing still. Vertigo can have many causes ranging from benign to more serious. You had a head CT scan as well as an MRI of the brain that both came back negative for anything acute.  Your vertigo is what's known as "Benign positional      SECONDARY DISCHARGE DIAGNOSES  Diagnosis: Elevated troponin  Assessment and Plan of Treatment:

## 2021-01-23 NOTE — DISCHARGE NOTE PROVIDER - HOSPITAL COURSE
80 year old female with PMHx of COPD (on 2L home NC PRN), CAD (S/p PCI *5 2011), HTN, DM2 (On insulin), Non-Hodgkin's Lymphoma (in remission) DL, GERD presenting after a mechanical fall. Patient was in the hospital received COVID vaccine to left deltoid, no complications, was sitting on rolling walker, being wheeled by daughter, when walker hit curb edge and patient fell backward landing on the top of her head. Reported headache and feeling dizziness after. No chest pain, palpitations, seizure like activity befiore or after event.  Currently denies any complaints and feels better after eating.    Pt underwent trauma workup in ED that was negative for any acute fractures or bleeding. However, hospital course c/b vertigo that was severe enough to render patient non-ambulatory. Patient treated with meclizine for vertigo symptoms. Pt was evaluated by physical therapy who were able to successfully walk with patient, however recommended discharge home with home PT. ENT evaluated patient and after discussing case, recommended to c/w current management with outpatient ENT f/u.  Hospital course delayed by arranging for safe disposition upon discharge. Pt only felt safe going home if could go home with walker. Rx provided to CM, walker given to pt and successfully discharged with plan to arrange for home PT

## 2021-01-23 NOTE — PROGRESS NOTE ADULT - SUBJECTIVE AND OBJECTIVE BOX
INTERVAL HPI/OVERNIGHT EVENTS:    SUBJECTIVE: Patient seen and examined at bedside.     no cp, sob, abd pain, fever  no ha, dizziness, lightheadedness, syncope    OBJECTIVE:    VITAL SIGNS:  Vital Signs Last 24 Hrs  T(C): 36.1 (23 Jan 2021 05:03), Max: 36.7 (22 Jan 2021 21:19)  T(F): 97 (23 Jan 2021 05:03), Max: 98 (22 Jan 2021 21:19)  HR: 81 (23 Jan 2021 05:03) (80 - 82)  BP: 151/61 (23 Jan 2021 05:03) (140/63 - 173/46)  BP(mean): --  RR: 19 (23 Jan 2021 05:03) (19 - 20)  SpO2: 99% (22 Jan 2021 21:19) (95% - 99%)      PHYSICAL EXAM:    General: NAD  HEENT: NC/AT; PERRL, clear conjunctiva  Neck: supple  Respiratory: CTA b/l  Cardiovascular: +S1/S2; RRR  Abdomen: soft, NT/ND; +BS x4  Extremities: WWP, 2+ peripheral pulses b/l; no LE edema  Skin: normal color and turgor; no rash  Neurological:    MEDICATIONS:  MEDICATIONS  (STANDING):  amLODIPine   Tablet 10 milliGRAM(s) Oral daily  atorvastatin 20 milliGRAM(s) Oral at bedtime  budesonide  80 MICROgram(s)/formoterol 4.5 MICROgram(s) Inhaler 2 Puff(s) Inhalation two times a day  chlorhexidine 4% Liquid 1 Application(s) Topical <User Schedule>  dextrose 40% Gel 15 Gram(s) Oral once  dextrose 5%. 1000 milliLiter(s) (50 mL/Hr) IV Continuous <Continuous>  dextrose 5%. 1000 milliLiter(s) (100 mL/Hr) IV Continuous <Continuous>  dextrose 50% Injectable 25 Gram(s) IV Push once  dextrose 50% Injectable 12.5 Gram(s) IV Push once  dextrose 50% Injectable 25 Gram(s) IV Push once  donepezil 10 milliGRAM(s) Oral at bedtime  enoxaparin Injectable 40 milliGRAM(s) SubCutaneous at bedtime  furosemide    Tablet 20 milliGRAM(s) Oral daily  glucagon  Injectable 1 milliGRAM(s) IntraMuscular once  insulin glargine Injectable (LANTUS) 15 Unit(s) SubCutaneous at bedtime  insulin lispro (ADMELOG) corrective regimen sliding scale   SubCutaneous three times a day before meals  insulin lispro Injectable (ADMELOG) 8 Unit(s) SubCutaneous three times a day before meals  isosorbide   mononitrate ER Tablet (IMDUR) 30 milliGRAM(s) Oral daily  losartan 100 milliGRAM(s) Oral daily  magnesium oxide 400 milliGRAM(s) Oral daily  meclizine 25 milliGRAM(s) Oral three times a day  metoprolol tartrate 12.5 milliGRAM(s) Oral two times a day  montelukast 10 milliGRAM(s) Oral daily  pantoprazole    Tablet 40 milliGRAM(s) Oral before breakfast  tiotropium 18 MICROgram(s) Capsule 1 Capsule(s) Inhalation daily    MEDICATIONS  (PRN):  acetaminophen   Tablet .. 650 milliGRAM(s) Oral every 6 hours PRN Temp greater or equal to 38C (100.4F)  ALBUTerol    90 MICROgram(s) HFA Inhaler 2 Puff(s) Inhalation every 6 hours PRN Shortness of Breath and/or Wheezing      ALLERGIES:  Allergies    ACE inhibitors (Unknown)  BENZALKONIUM (Rash)  BETADINE (Rash)  Ceclor (Unknown)  codeine (Unknown)  latex (Unknown)  penicillin (Unknown)  RUBBER GLOVES (Rash)  sulfonamides (Unknown)    Intolerances        LABS:                        10.2   4.25  )-----------( 186      ( 23 Jan 2021 05:44 )             32.6     Hemoglobin: 10.2 g/dL (01-23 @ 05:44)  Hemoglobin: 10.3 g/dL (01-22 @ 07:19)  Hemoglobin: 10.4 g/dL (01-21 @ 06:20)  Hemoglobin: 9.6 g/dL (01-20 @ 11:33)  Hemoglobin: 10.4 g/dL (01-19 @ 14:37)    CBC Full  -  ( 23 Jan 2021 05:44 )  WBC Count : 4.25 K/uL  RBC Count : 4.02 M/uL  Hemoglobin : 10.2 g/dL  Hematocrit : 32.6 %  Platelet Count - Automated : 186 K/uL  Mean Cell Volume : 81.1 fL  Mean Cell Hemoglobin : 25.4 pg  Mean Cell Hemoglobin Concentration : 31.3 g/dL  Auto Neutrophil # : 2.00 K/uL  Auto Lymphocyte # : 1.49 K/uL  Auto Monocyte # : 0.57 K/uL  Auto Eosinophil # : 0.16 K/uL  Auto Basophil # : 0.02 K/uL  Auto Neutrophil % : 47.0 %  Auto Lymphocyte % : 35.1 %  Auto Monocyte % : 13.4 %  Auto Eosinophil % : 3.8 %  Auto Basophil % : 0.5 %    01-23    142  |  103  |  32<H>  ----------------------------<  188<H>  4.3   |  30  |  1.4    Ca    8.9      23 Jan 2021 05:44  Mg     2.0     01-23    TPro  5.9<L>  /  Alb  3.8  /  TBili  0.5  /  DBili  x   /  AST  43<H>  /  ALT  28  /  AlkPhos  65  01-23    Creatinine Trend: 1.4<--, 1.0<--, 1.1<--, 1.1<--, 1.0<--  LIVER FUNCTIONS - ( 23 Jan 2021 05:44 )  Alb: 3.8 g/dL / Pro: 5.9 g/dL / ALK PHOS: 65 U/L / ALT: 28 U/L / AST: 43 U/L / GGT: x               hs Troponin:              CSF:                      EKG:   MICROBIOLOGY:    IMAGING:      Labs, imaging, EKG personally reviewed    RADIOLOGY & ADDITIONAL TESTS: Reviewed.

## 2021-01-23 NOTE — DISCHARGE NOTE PROVIDER - CARE PROVIDERS DIRECT ADDRESSES
[email or web address]

## 2021-01-23 NOTE — DISCHARGE NOTE PROVIDER - NSDCMRMEDTOKEN_GEN_ALL_CORE_FT
albuterol 90 mcg/inh inhalation aerosol: 2 puff(s) inhaled every 6 hours, As Needed  atorvastatin 20 mg oral tablet: 1 tab(s) orally once a day  donepezil 10 mg oral tablet: 1 tab(s) orally once a day (at bedtime)  Dulera 100 mcg-5 mcg/inh inhalation aerosol: 2 puff(s) inhaled 2 times a day  fenofibrate 48 mg oral tablet: 1 tab(s) orally once a day  Flovent 44 mcg/inh inhalation aerosol with adapter:   inositol 650 mg oral tablet: 2 tab(s) orally once a day  isosorbide mononitrate 30 mg oral tablet, extended release: 1 tab(s) orally once a day (in the morning)  Lasix 20 mg oral tablet: 1 tab(s) orally once a day  losartan 100 mg oral tablet: 1 tab(s) orally once a day  montelukast 10 mg oral tablet: 1 tab(s) orally once a day  NovoLOG 100 units/mL subcutaneous solution: 10,7,7  subcutaneous  omeprazole 20 mg oral delayed release capsule: 1 cap(s) orally once a day  Soliqua 100/33 subcutaneous solution: subcutaneous once a day  Spiriva 18 mcg inhalation capsule: 1 cap(s) inhaled once a day   albuterol 90 mcg/inh inhalation aerosol: 2 puff(s) inhaled every 6 hours, As Needed  amLODIPine 10 mg oral tablet: 1 tab(s) orally once a day  atorvastatin 20 mg oral tablet: 1 tab(s) orally once a day  donepezil 10 mg oral tablet: 1 tab(s) orally once a day (at bedtime)  Dulera 100 mcg-5 mcg/inh inhalation aerosol: 2 puff(s) inhaled 2 times a day  fenofibrate 48 mg oral tablet: 1 tab(s) orally once a day  Flovent 44 mcg/inh inhalation aerosol with adapter:   Imodium A-D 1 mg/7.5 mL oral liquid: 15 milliliter(s) orally once a day -for nausea   inositol 650 mg oral tablet: 2 tab(s) orally once a day  isosorbide mononitrate 30 mg oral tablet, extended release: 1 tab(s) orally once a day (in the morning)  Lasix 20 mg oral tablet: 1 tab(s) orally once a day  losartan 100 mg oral tablet: 1 tab(s) orally once a day  montelukast 10 mg oral tablet: 1 tab(s) orally once a day  NovoLOG 100 units/mL subcutaneous solution: 10,7,7  subcutaneous  Soliqua 100/33 subcutaneous solution: subcutaneous once a day  Spiriva 18 mcg inhalation capsule: 1 cap(s) inhaled once a day

## 2021-01-23 NOTE — PROGRESS NOTE ADULT - SUBJECTIVE AND OBJECTIVE BOX
VENKATESH RAMACHANDRAN 80y Female  MRN#: [id number]   Hospital Day: 4d    SUBJECTIVE  Patient is a 80y old Female who presents with a chief complaint of Fall (22 Jan 2021 22:07)  Currently admitted to medicine with the primary diagnosis of CHI (closed head injury)      INTERVAL HPI AND OVERNIGHT EVENTS:  Patient was examined and seen at bedside. This morning she is resting comfortably in bed and reports no issues or overnight events.    REVIEW OF SYMPTOMS:  +Vertigo. Denies any weakness, sob, cp, or pain. Denies any hearing loss or tinnitus    OBJECTIVE  PAST MEDICAL & SURGICAL HISTORY  Coronary artery disease involving native heart without angina pectoris, unspecified vessel or lesion type  s/p 5 stents    Diabetes  on insulin    Osteoarthritis    CKD (chronic kidney disease)    Thrombocytopenia    PVD (peripheral vascular disease)    Non Hodgkin&#x27;s lymphoma  in remission. Follows with Dr Hernandez    Heart failure    High cholesterol    HTN (hypertension)    MI (myocardial infarction)  s/p 5 stents    H/O carotid endarterectomy  RIGHT    H/O cardiac catheterization  5 STENTS    History of cholecystectomy      ALLERGIES:  ACE inhibitors (Unknown)  BENZALKONIUM (Rash)  BETADINE (Rash)  Ceclor (Unknown)  codeine (Unknown)  latex (Unknown)  penicillin (Unknown)  RUBBER GLOVES (Rash)  sulfonamides (Unknown)    MEDICATIONS:  STANDING MEDICATIONS  amLODIPine   Tablet 10 milliGRAM(s) Oral daily  atorvastatin 20 milliGRAM(s) Oral at bedtime  budesonide  80 MICROgram(s)/formoterol 4.5 MICROgram(s) Inhaler 2 Puff(s) Inhalation two times a day  chlorhexidine 4% Liquid 1 Application(s) Topical <User Schedule>  dextrose 40% Gel 15 Gram(s) Oral once  dextrose 5%. 1000 milliLiter(s) IV Continuous <Continuous>  dextrose 5%. 1000 milliLiter(s) IV Continuous <Continuous>  dextrose 50% Injectable 25 Gram(s) IV Push once  dextrose 50% Injectable 12.5 Gram(s) IV Push once  dextrose 50% Injectable 25 Gram(s) IV Push once  donepezil 10 milliGRAM(s) Oral at bedtime  enoxaparin Injectable 40 milliGRAM(s) SubCutaneous at bedtime  furosemide    Tablet 20 milliGRAM(s) Oral daily  glucagon  Injectable 1 milliGRAM(s) IntraMuscular once  insulin glargine Injectable (LANTUS) 15 Unit(s) SubCutaneous at bedtime  insulin lispro (ADMELOG) corrective regimen sliding scale   SubCutaneous three times a day before meals  insulin lispro Injectable (ADMELOG) 8 Unit(s) SubCutaneous three times a day before meals  isosorbide   mononitrate ER Tablet (IMDUR) 30 milliGRAM(s) Oral daily  losartan 100 milliGRAM(s) Oral daily  magnesium oxide 400 milliGRAM(s) Oral daily  meclizine 25 milliGRAM(s) Oral three times a day  metoprolol tartrate 12.5 milliGRAM(s) Oral two times a day  montelukast 10 milliGRAM(s) Oral daily  pantoprazole    Tablet 40 milliGRAM(s) Oral before breakfast  tiotropium 18 MICROgram(s) Capsule 1 Capsule(s) Inhalation daily    PRN MEDICATIONS  acetaminophen   Tablet .. 650 milliGRAM(s) Oral every 6 hours PRN  ALBUTerol    90 MICROgram(s) HFA Inhaler 2 Puff(s) Inhalation every 6 hours PRN      VITAL SIGNS: Last 24 Hours  T(C): 36.1 (23 Jan 2021 05:03), Max: 36.7 (22 Jan 2021 21:19)  T(F): 97 (23 Jan 2021 05:03), Max: 98 (22 Jan 2021 21:19)  HR: 81 (23 Jan 2021 05:03) (80 - 82)  BP: 151/61 (23 Jan 2021 05:03) (140/63 - 173/46)  BP(mean): --  RR: 19 (23 Jan 2021 05:03) (19 - 20)  SpO2: 99% (22 Jan 2021 21:19) (95% - 99%)    LABS:                        10.2   4.25  )-----------( 186      ( 23 Jan 2021 05:44 )             32.6     01-23    142  |  103  |  32<H>  ----------------------------<  188<H>  4.3   |  30  |  1.4    Ca    8.9      23 Jan 2021 05:44  Mg     2.0     01-23    TPro  5.9<L>  /  Alb  3.8  /  TBili  0.5  /  DBili  x   /  AST  43<H>  /  ALT  28  /  AlkPhos  65  01-23          RADIOLOGY:      PHYSICAL EXAM:  GENERAL: NAD  EYES: No Nystagmus   NERVOUS SYSTEM:  Alert & Oriented X3, non focal, full strength in LL BL  CHEST/LUNG: Clear to auscultation bilaterally; No rales, rhonchi, wheezing  HEART: Regular rate and rhythm; No murmurs  ABDOMEN: Soft, Nontender, Nondistended  EXTREMITIES: No clubbing, cyanosis, or edema    ASSESSMENT & PLAN  80 year old female with PMHx of COPD (on 2L home NC PRN), CAD (S/p PCI *5 2011), Non-Hodgkin's Lymphoma (in remission), HTN, DM2 (On insulin), DL, GERD presenting after a mechanical fall.    #Mechanical fall  - CT head and neck unremarkable  - PT F/U  - Ambulate with assistance  - MRI unremarkable    #Vertigo  - Likely BPPV--pt states improvement in severity of vertigo but still present  - L more affected than R  - +nystagmus on epley yesterday  - c/w Meclizine 25q8  - ENT consult placed--f/u recs  - Encourage ambulation (with assistance)--f/u PT    #CAD s/p 5 stents  - Denies any CP  - Continue Lasix, Statin   -O/p Cardio f/u    #Elevated Troponin  -trending down  -no chest pain    #COPD (on home O2 PRN)  - Pulse Ox stable on RA  - Continue home inhalers  - Not in exacerbation    #HTN  - amlodipine 10 given today  - cw Losartan 100mg qd    #DM2  - Hold home Novolog  - Lantus/Lispro 15/ 8/8/8  - Keep Fs 140-180    #DL  - cw atorvastatin 20    #Non-Hodgkin's Lymphoma (in remission)  - O/p Onc f/u    #Right upper lung nodule  - Right upper lobe groundglass pulmonary nodule described on November 13, 2020 CT better seen on CT  - Since February 17, 2020, posttreatment changes again seen in the left lower lobe with decreased size of the left pleural effusion and left lower lobe subpleural opacity/rounded atelectasis.  - Unchanged right upper lobe ground-glass nodule, which demonstrated suspicious FDG uptake on prior PET/CT.  - O/p pulm f/u    #MISC:  DVT PPX: Lovenox  GI PPX: On Protonix  Dispo: From home-->Will possibly need SNF or Home w/ Home PT  Diet: Carb consistent  Activity: With assistance  Full code    PAST MEDICAL & SURGICAL HISTORY:  Coronary artery disease involving native heart without angina pectoris, unspecified vessel or lesion type  s/p 5 stents    Diabetes  on insulin    Osteoarthritis    CKD (chronic kidney disease)    Thrombocytopenia    PVD (peripheral vascular disease)    Non Hodgkin&#x27;s lymphoma  in remission. Follows with Dr Hernandez    Heart failure    High cholesterol    HTN (hypertension)    MI (myocardial infarction)  s/p 5 stents    H/O carotid endarterectomy  RIGHT    H/O cardiac catheterization  5 STENTS    History of cholecystectomy        #Misc  - DVT Prophylaxis:  - GI Prophylaxis:  - Diet:  - Activity:  - IV Fluids:  - Code Status:    Dispo:

## 2021-01-24 VITALS
TEMPERATURE: 98 F | SYSTOLIC BLOOD PRESSURE: 162 MMHG | RESPIRATION RATE: 18 BRPM | DIASTOLIC BLOOD PRESSURE: 71 MMHG | HEART RATE: 62 BPM

## 2021-01-24 LAB
ALBUMIN SERPL ELPH-MCNC: 3.7 G/DL — SIGNIFICANT CHANGE UP (ref 3.5–5.2)
ALP SERPL-CCNC: 65 U/L — SIGNIFICANT CHANGE UP (ref 30–115)
ALT FLD-CCNC: 33 U/L — SIGNIFICANT CHANGE UP (ref 0–41)
ANION GAP SERPL CALC-SCNC: 10 MMOL/L — SIGNIFICANT CHANGE UP (ref 7–14)
AST SERPL-CCNC: 43 U/L — HIGH (ref 0–41)
BASOPHILS # BLD AUTO: 0.05 K/UL — SIGNIFICANT CHANGE UP (ref 0–0.2)
BASOPHILS NFR BLD AUTO: 0.9 % — SIGNIFICANT CHANGE UP (ref 0–1)
BILIRUB SERPL-MCNC: 0.5 MG/DL — SIGNIFICANT CHANGE UP (ref 0.2–1.2)
BUN SERPL-MCNC: 28 MG/DL — HIGH (ref 10–20)
CALCIUM SERPL-MCNC: 9 MG/DL — SIGNIFICANT CHANGE UP (ref 8.5–10.1)
CHLORIDE SERPL-SCNC: 104 MMOL/L — SIGNIFICANT CHANGE UP (ref 98–110)
CO2 SERPL-SCNC: 28 MMOL/L — SIGNIFICANT CHANGE UP (ref 17–32)
CREAT SERPL-MCNC: 1.2 MG/DL — SIGNIFICANT CHANGE UP (ref 0.7–1.5)
EOSINOPHIL # BLD AUTO: 0.16 K/UL — SIGNIFICANT CHANGE UP (ref 0–0.7)
EOSINOPHIL NFR BLD AUTO: 3 % — SIGNIFICANT CHANGE UP (ref 0–8)
GLUCOSE BLDC GLUCOMTR-MCNC: 142 MG/DL — HIGH (ref 70–99)
GLUCOSE BLDC GLUCOMTR-MCNC: 221 MG/DL — HIGH (ref 70–99)
GLUCOSE SERPL-MCNC: 143 MG/DL — HIGH (ref 70–99)
HCT VFR BLD CALC: 33.8 % — LOW (ref 37–47)
HGB BLD-MCNC: 10.3 G/DL — LOW (ref 12–16)
IMM GRANULOCYTES NFR BLD AUTO: 0.4 % — HIGH (ref 0.1–0.3)
LYMPHOCYTES # BLD AUTO: 1.54 K/UL — SIGNIFICANT CHANGE UP (ref 1.2–3.4)
LYMPHOCYTES # BLD AUTO: 29.1 % — SIGNIFICANT CHANGE UP (ref 20.5–51.1)
MAGNESIUM SERPL-MCNC: 2 MG/DL — SIGNIFICANT CHANGE UP (ref 1.8–2.4)
MCHC RBC-ENTMCNC: 24.5 PG — LOW (ref 27–31)
MCHC RBC-ENTMCNC: 30.5 G/DL — LOW (ref 32–37)
MCV RBC AUTO: 80.5 FL — LOW (ref 81–99)
MONOCYTES # BLD AUTO: 0.53 K/UL — SIGNIFICANT CHANGE UP (ref 0.1–0.6)
MONOCYTES NFR BLD AUTO: 10 % — HIGH (ref 1.7–9.3)
NEUTROPHILS # BLD AUTO: 2.99 K/UL — SIGNIFICANT CHANGE UP (ref 1.4–6.5)
NEUTROPHILS NFR BLD AUTO: 56.6 % — SIGNIFICANT CHANGE UP (ref 42.2–75.2)
NRBC # BLD: 0 /100 WBCS — SIGNIFICANT CHANGE UP (ref 0–0)
PLATELET # BLD AUTO: 204 K/UL — SIGNIFICANT CHANGE UP (ref 130–400)
POTASSIUM SERPL-MCNC: 4.2 MMOL/L — SIGNIFICANT CHANGE UP (ref 3.5–5)
POTASSIUM SERPL-SCNC: 4.2 MMOL/L — SIGNIFICANT CHANGE UP (ref 3.5–5)
PROT SERPL-MCNC: 5.8 G/DL — LOW (ref 6–8)
RBC # BLD: 4.2 M/UL — SIGNIFICANT CHANGE UP (ref 4.2–5.4)
RBC # FLD: 15.1 % — HIGH (ref 11.5–14.5)
SODIUM SERPL-SCNC: 142 MMOL/L — SIGNIFICANT CHANGE UP (ref 135–146)
WBC # BLD: 5.29 K/UL — SIGNIFICANT CHANGE UP (ref 4.8–10.8)
WBC # FLD AUTO: 5.29 K/UL — SIGNIFICANT CHANGE UP (ref 4.8–10.8)

## 2021-01-24 PROCEDURE — 99222 1ST HOSP IP/OBS MODERATE 55: CPT

## 2021-01-24 PROCEDURE — 99239 HOSP IP/OBS DSCHRG MGMT >30: CPT

## 2021-01-24 RX ORDER — MECLIZINE HCL 12.5 MG
1 TABLET ORAL
Qty: 90 | Refills: 0
Start: 2021-01-24 | End: 2021-02-22

## 2021-01-24 RX ORDER — LOPERAMIDE HCL 2 MG
1 TABLET ORAL
Qty: 180 | Refills: 0
Start: 2021-01-24 | End: 2021-02-22

## 2021-01-24 RX ORDER — LOPERAMIDE HCL 2 MG
15 TABLET ORAL
Qty: 450 | Refills: 0
Start: 2021-01-24 | End: 2021-02-22

## 2021-01-24 RX ORDER — OMEPRAZOLE 10 MG/1
1 CAPSULE, DELAYED RELEASE ORAL
Qty: 0 | Refills: 0 | DISCHARGE

## 2021-01-24 RX ORDER — AMLODIPINE BESYLATE 2.5 MG/1
1 TABLET ORAL
Qty: 30 | Refills: 0
Start: 2021-01-24 | End: 2021-02-22

## 2021-01-24 RX ADMIN — ISOSORBIDE MONONITRATE 30 MILLIGRAM(S): 60 TABLET, EXTENDED RELEASE ORAL at 11:55

## 2021-01-24 RX ADMIN — Medication 12.5 MILLIGRAM(S): at 05:41

## 2021-01-24 RX ADMIN — Medication 8 UNIT(S): at 08:00

## 2021-01-24 RX ADMIN — Medication 25 MILLIGRAM(S): at 05:41

## 2021-01-24 RX ADMIN — LOSARTAN POTASSIUM 100 MILLIGRAM(S): 100 TABLET, FILM COATED ORAL at 05:41

## 2021-01-24 RX ADMIN — BUDESONIDE AND FORMOTEROL FUMARATE DIHYDRATE 2 PUFF(S): 160; 4.5 AEROSOL RESPIRATORY (INHALATION) at 08:01

## 2021-01-24 RX ADMIN — Medication 20 MILLIGRAM(S): at 05:41

## 2021-01-24 RX ADMIN — MAGNESIUM OXIDE 400 MG ORAL TABLET 400 MILLIGRAM(S): 241.3 TABLET ORAL at 11:55

## 2021-01-24 RX ADMIN — PANTOPRAZOLE SODIUM 40 MILLIGRAM(S): 20 TABLET, DELAYED RELEASE ORAL at 05:41

## 2021-01-24 RX ADMIN — MONTELUKAST 10 MILLIGRAM(S): 4 TABLET, CHEWABLE ORAL at 11:55

## 2021-01-24 RX ADMIN — AMLODIPINE BESYLATE 10 MILLIGRAM(S): 2.5 TABLET ORAL at 05:41

## 2021-01-24 NOTE — PROGRESS NOTE ADULT - SUBJECTIVE AND OBJECTIVE BOX
DUARTEDEVYNCE  80y  Female  ***My note supersedes ALL resident notes that I sign.  My corrections for their notes are in my note.***    I can be reached directly on [id number]. My office number is [phone]. My personal cell number is [phone].    INTERVAL EVENTS: Here for f/u of vertigo, which she says is still present, but improving. She feels the Epley maneuver is helpful, we reviewed it again. She will do this at home as well. She did c/o diarrhea (3 episodes) this AM, which she thinks is from something she ate last night. She has no abd pain. No N/V. Pt would still like to go home.     T(F): 97.6 (01-24-21 @ 05:50), Max: 97.6 (01-24-21 @ 05:50)  HR: 62 (01-24-21 @ 05:50) (62 - 83)  BP: 162/71 (01-24-21 @ 05:50) (153/67 - 162/71)  RR: 18 (01-24-21 @ 05:50) (18 - 20)  SpO2: --    Gen: NAD  HEENT: PERRL, EOMI, mouth clr, nose clr  Neck: no nodes, no JVD, thyroid nl  lungs: clr  hrt: s1 s2 rrr no murmur  abd: soft, NT/ND, no HS megaly  ext: no edema, no c/c  neuro: aa ox3, cn intact, can move all 4 ext; no nystagmus    LABS:                      10.3    (    80.5   5.29  )-----------( ---------      204      ( 24 Jan 2021 06:36 )             33.8    (    15.1     142   (   104   (   143      01-24-21 @ 06:36  ----------------------               4.2   (   28   (   28                             -----                        1.2  Ca  9.0   Mg  2.0    P   --     LFT  5.8  (  0.5  (  43       01-24-21 @ 06:36  -------------------------  3.7  (  65  (  33    CAPILLARY BLOOD GLUCOSE  POCT Blood Glucose.: 142 (01-24-21 @ 07:54)  POCT Blood Glucose.: 169 (01-23-21 @ 21:18)  POCT Blood Glucose.: 118 (01-23-21 @ 17:09)  POCT Blood Glucose.: 199 (01-23-21 @ 12:23)  POCT Blood Glucose.: 179 (01-23-21 @ 07:54)  POCT Blood Glucose.: 163 (01-22-21 @ 21:46)  POCT Blood Glucose.: 160 (01-22-21 @ 17:27)  POCT Blood Glucose.: 154 (01-22-21 @ 11:12)    RADIOLOGY & ADDITIONAL TESTS:  < from: MR Head No Cont (01.21.21 @ 18:42) >  IMPRESSION:    Unremarkable MRI of the brain.    < end of copied text >    < from: Xray Chest 1 View AP/PA (01.19.21 @ 15:41) >  Impression:    No radiographic evidence of acute cardiopulmonary disease.    Right upper lobe groundglass pulmonary nodule described on November 13, 2020 CT better seen on CT.    < end of copied text >    < from: Xray Pelvis AP only (01.19.21 @ 15:40) >  Impression:    No evidence of acute fracture.  Degenerative change of bilateral hips, lower lumbar spine. Osteopenia.    < end of copied text >    < from: CT Cervical Spine No Cont (01.19.21 @ 15:26) >  IMPRESSION:    CT HEAD:    No acute intracranial pathology. No evidence of midline shift, mass effect or intracranial hemorrhage.    Mild chronic microvascular-type changes.    CT OF THE CERVICAL SPINE:    No evidence of fracture or dislocation.    Multilevel degenerative changes as detailed above.    < end of copied text >    MEDICATIONS:    acetaminophen   Tablet .. 650 milliGRAM(s) Oral every 6 hours PRN  ALBUTerol    90 MICROgram(s) HFA Inhaler 2 Puff(s) Inhalation every 6 hours PRN  amLODIPine   Tablet 10 milliGRAM(s) Oral daily  atorvastatin 20 milliGRAM(s) Oral at bedtime  budesonide  80 MICROgram(s)/formoterol 4.5 MICROgram(s) Inhaler 2 Puff(s) Inhalation two times a day  chlorhexidine 4% Liquid 1 Application(s) Topical <User Schedule>  donepezil 10 milliGRAM(s) Oral at bedtime  enoxaparin Injectable 40 milliGRAM(s) SubCutaneous at bedtime  furosemide    Tablet 20 milliGRAM(s) Oral daily  insulin glargine Injectable (LANTUS) 15 Unit(s) SubCutaneous at bedtime  insulin lispro (ADMELOG) corrective regimen sliding scale   SubCutaneous three times a day before meals  insulin lispro Injectable (ADMELOG) 8 Unit(s) SubCutaneous three times a day before meals  isosorbide   mononitrate ER Tablet (IMDUR) 30 milliGRAM(s) Oral daily  losartan 100 milliGRAM(s) Oral daily  magnesium oxide 400 milliGRAM(s) Oral daily  meclizine 25 milliGRAM(s) Oral three times a day  metoprolol tartrate 12.5 milliGRAM(s) Oral two times a day  montelukast 10 milliGRAM(s) Oral daily  pantoprazole    Tablet 40 milliGRAM(s) Oral before breakfast  tiotropium 18 MICROgram(s) Capsule 1 Capsule(s) Inhalation daily

## 2021-01-24 NOTE — PROGRESS NOTE ADULT - ASSESSMENT
80 year old female with PMHx of COPD (on 2L home NC PRN), CAD (S/p PCI *5 2011), Non-Hodgkin's Lymphoma (in remission), HTN, DM2 (On insulin), DL, GERD presenting after a mechanical fall.    #Mechanical fall    - CT head and neck unremarkable  - PT assessment  - Ambulate with assistance  - Rehab CS    #CAD s/p 5 stents    - Continue Lasix, Statin, ASA   -O/p Cardio f/u    #Elevated Troponin    -trending down  -no chest pain    #COPD (on home O2 PRN)    - Pulse Ox stable on RA  - Continue home inhalers  - Not in exacerbation    #HTN    - cw Losartan 100mg qd    #DM2    - Hold home Novolog  - Lantus/Lispro 10/3/3/3  - Keep Fs 140-180    #DL    - cw atorvastatin 20    #Non-Hodgkin's Lymphoma (in remission)    - O/p Onc f/u    #Right upper lung nodule    - Right upper lobe groundglass pulmonary nodule described on November 13, 2020 CT better seen on CT  - Since February 17, 2020, posttreatment changes again seen in the left lower lobe with decreased size of the left pleural effusion and left lower lobe subpleural opacity/rounded atelectasis.  - Unchanged right upper lobe ground-glass nodule, which demonstrated suspicious FDG uptake on prior PET/CT.  - O/p pulm f/u    #DVT PPX: Lovenox  #GI PPX: On Protonix  #Dispo: From home  #Diet: Carb consistent  #Activity: With assistance  #Full code    Pending Rehab; Anticipate for tomorrow
80F PMHx COPD on home o2, CAD, nonhodgkins lymphoma in remission, HTN, DM2 here with mechanical fall.    # Fall, mechanical; fell out of wheelchair when wheel got caught  + head trauma, no syncope  CT H neg  MRI B neg  orthostatics neg  CM is getting pt walker for home use (agree)    # Vertigo 2/2 BPPV  improved a bit w/ Epley maneuver - pt will cont at home (I taught her again)  meclizine 25mg po q8 prn vertigo (until symptoms resolved)  vestibular rehab as outpt  ENT noted: can f/u w/ Dr Garcia as outpt    # mild diarrhea  doubt c diff  prob related to food from yesterday  can use imodium 2mg po q4 prn at home  BRAT diet  d/c PPI and d/c Mg Oxide    # CKD III (base Cr low 1s)  stable    # HFpEF of 60%, chronic  lasix 20 po    # COPD - stable  can use O2 as ordered for home use  albuterol prn  symbicort  singulair 10  spiriva    # CAD  asa  lipitor 20    # NHL - in remission    # HTN  lasix 20  imdur 30  losartan 100  norvasc 10  metoprolol 12.5mg po q12    # DM2  lantus 15 and lisp 8/meal  can resume he usual home meds (novolog and soliqua) upon d/c    # DLD  cont Lip 20mg po qhs    # mild vascular dementia  cont donepezil 10mg qhs  might need to lower dose if diarrhea continues    # DVT ppx: alison    Dispo: d/c home today
80F PMHx COPD on home o2, CAD, nonhodgkins lymphoma, HTN, DM2 here with mechanical fall.    #Fall, mechanical; fell out of wheelchair when wheel got caught  +head trauma, no syncope  cth neg  orthostatics lying, sitting neg; check while standing  PT  discharge planning  #Hypomagensemia  replete  #CKD III  stable, trend scr  #HFpEF of 60%, chronic  euvolemic on exam  cxr noted  lasix 20 po  #COPD  albuterol prn  symbicort  singulair 10  spiriva  #CAD  asa  lipitor 20  #Nonhodgkins lymphoma  stable, outpt f/u  #HTN  lasix 20  imdur 30  losartan 100  norvasc 10  #DM2  lantus 10  humalog 6 tid  ssi  #DVT ppx  loveno    #Progress Note Handoff:  Pending (specify):  Consults_________, Tests________, Test Results_______, Other____orthostatics_____  Family discussion: d/w pt at bedside re: treatment plan, primary dx  Disposition: Home___/SNF___/Other________/Unknown at this time____x____.  
80F PMHx COPD on home o2, CAD, nonhodgkins lymphoma, HTN, DM2 here with mechanical fall.    #Fall, mechanical; fell out of wheelchair when wheel got caught  +head trauma, no syncope  cth neg  orthostatics neg  PT  discharge planning, tenatively to snf  #Dizziness, presumed due to BPPV  +guillermo hallpike  much improved s/p epley  meclizine prn  mri neg  vestibular rehab upon d/c  #CKD III  stable, trend scr  #HFpEF of 60%, chronic  euvolemic on exam  cxr noted  lasix 20 po  #COPD  albuterol prn  symbicort  singulair 10  spiriva  #CAD  asa  lipitor 20  #Nonhodgkins lymphoma  stable, outpt f/u  #HTN  lasix 20  imdur 30  losartan 100  norvasc 10  #DM2  lantus 10  humalog 6 tid  ssi  #DVT ppx  loveno    #Progress Note Handoff:  Pending (specify):  Consults_________, Tests________, Test Results_______, Other____d/c planning_____  Family discussion: d/w pt at bedside re: treatment plan, primary dx  Disposition: Home___/SNF___/Other________/Unknown at this time____x____.  
80 year old female with PMHx of COPD (on 2L home NC PRN), CAD (S/p PCI *5 2011), Non-Hodgkin's Lymphoma (in remission), HTN, DM2 (On insulin), DL, GERD presenting after a mechanical fall.    #Mechanical fall    - CT head and neck unremarkable  - PT assessment  - Ambulate with assistance  - Brain MRI ordered for vertigo  - Meclizine PRN    #CAD s/p 5 stents    - Continue Lasix, Statin   -O/p Cardio f/u    #Elevated Troponin    -trending down  -no chest pain    #COPD (on home O2 PRN)    - Pulse Ox stable on RA  - Continue home inhalers  - Not in exacerbation    #HTN    - amlodipine 10 given today  - cw Losartan 100mg qd    #DM2    - Hold home Novolog  - Lantus/Lispro 15/ 8/8/8  - Keep Fs 140-180    #DL    - cw atorvastatin 20    #Non-Hodgkin's Lymphoma (in remission)    - O/p Onc f/u    #Right upper lung nodule    - Right upper lobe groundglass pulmonary nodule described on November 13, 2020 CT better seen on CT  - Since February 17, 2020, posttreatment changes again seen in the left lower lobe with decreased size of the left pleural effusion and left lower lobe subpleural opacity/rounded atelectasis.  - Unchanged right upper lobe ground-glass nodule, which demonstrated suspicious FDG uptake on prior PET/CT.  - O/p pulm f/u    #DVT PPX: Lovenox  #GI PPX: On Protonix  #Dispo: From home  #Diet: Carb consistent  #Activity: With assistance  #Full code  
80F PMHx COPD on home o2, CAD, nonhodgkins lymphoma, HTN, DM2 here with mechanical fall.    #Fall, mechanical; fell out of wheelchair when wheel got caught  +head trauma, no syncope  cth neg  orthostatics neg  PT  discharge planning, tenatively to snf  #Dizziness, presumed due to BPPV  +guillermo hallpike, s/p epley this am  meclizine prn  mri neg  vestibular rehab upon d/c  #CKD III  stable, trend scr  #HFpEF of 60%, chronic  euvolemic on exam  cxr noted  lasix 20 po  #COPD  albuterol prn  symbicort  singulair 10  spiriva  #CAD  asa  lipitor 20  #Nonhodgkins lymphoma  stable, outpt f/u  #HTN  lasix 20  imdur 30  losartan 100  norvasc 10  #DM2  lantus 10  humalog 6 tid  ssi  #DVT ppx  loveno    #Progress Note Handoff:  Pending (specify):  Consults_________, Tests________, Test Results_______, Other____d/c planning_____  Family discussion: d/w pt at bedside re: treatment plan, primary dx  Disposition: Home___/SNF___/Other________/Unknown at this time____x____.

## 2021-02-03 DIAGNOSIS — I13.0 HYPERTENSIVE HEART AND CHRONIC KIDNEY DISEASE WITH HEART FAILURE AND STAGE 1 THROUGH STAGE 4 CHRONIC KIDNEY DISEASE, OR UNSPECIFIED CHRONIC KIDNEY DISEASE: ICD-10-CM

## 2021-02-03 DIAGNOSIS — F01.50 VASCULAR DEMENTIA WITHOUT BEHAVIORAL DISTURBANCE: ICD-10-CM

## 2021-02-03 DIAGNOSIS — M19.90 UNSPECIFIED OSTEOARTHRITIS, UNSPECIFIED SITE: ICD-10-CM

## 2021-02-03 DIAGNOSIS — E83.42 HYPOMAGNESEMIA: ICD-10-CM

## 2021-02-03 DIAGNOSIS — R51.9 HEADACHE, UNSPECIFIED: ICD-10-CM

## 2021-02-03 DIAGNOSIS — I25.10 ATHEROSCLEROTIC HEART DISEASE OF NATIVE CORONARY ARTERY WITHOUT ANGINA PECTORIS: ICD-10-CM

## 2021-02-03 DIAGNOSIS — Z88.0 ALLERGY STATUS TO PENICILLIN: ICD-10-CM

## 2021-02-03 DIAGNOSIS — R91.1 SOLITARY PULMONARY NODULE: ICD-10-CM

## 2021-02-03 DIAGNOSIS — E11.51 TYPE 2 DIABETES MELLITUS WITH DIABETIC PERIPHERAL ANGIOPATHY WITHOUT GANGRENE: ICD-10-CM

## 2021-02-03 DIAGNOSIS — Z91.81 HISTORY OF FALLING: ICD-10-CM

## 2021-02-03 DIAGNOSIS — R26.9 UNSPECIFIED ABNORMALITIES OF GAIT AND MOBILITY: ICD-10-CM

## 2021-02-03 DIAGNOSIS — J44.9 CHRONIC OBSTRUCTIVE PULMONARY DISEASE, UNSPECIFIED: ICD-10-CM

## 2021-02-03 DIAGNOSIS — Z85.72 PERSONAL HISTORY OF NON-HODGKIN LYMPHOMAS: ICD-10-CM

## 2021-02-03 DIAGNOSIS — I25.2 OLD MYOCARDIAL INFARCTION: ICD-10-CM

## 2021-02-03 DIAGNOSIS — N18.30 CHRONIC KIDNEY DISEASE, STAGE 3 UNSPECIFIED: ICD-10-CM

## 2021-02-03 DIAGNOSIS — Z95.5 PRESENCE OF CORONARY ANGIOPLASTY IMPLANT AND GRAFT: ICD-10-CM

## 2021-02-03 DIAGNOSIS — Z92.22 PERSONAL HISTORY OF MONOCLONAL DRUG THERAPY: ICD-10-CM

## 2021-02-03 DIAGNOSIS — I50.32 CHRONIC DIASTOLIC (CONGESTIVE) HEART FAILURE: ICD-10-CM

## 2021-02-03 DIAGNOSIS — E78.5 HYPERLIPIDEMIA, UNSPECIFIED: ICD-10-CM

## 2021-02-03 DIAGNOSIS — E11.22 TYPE 2 DIABETES MELLITUS WITH DIABETIC CHRONIC KIDNEY DISEASE: ICD-10-CM

## 2021-02-03 DIAGNOSIS — H81.10 BENIGN PAROXYSMAL VERTIGO, UNSPECIFIED EAR: ICD-10-CM

## 2021-02-03 DIAGNOSIS — Z79.4 LONG TERM (CURRENT) USE OF INSULIN: ICD-10-CM

## 2021-02-03 DIAGNOSIS — R79.89 OTHER SPECIFIED ABNORMAL FINDINGS OF BLOOD CHEMISTRY: ICD-10-CM

## 2021-02-03 DIAGNOSIS — Z99.81 DEPENDENCE ON SUPPLEMENTAL OXYGEN: ICD-10-CM

## 2021-02-03 DIAGNOSIS — R19.7 DIARRHEA, UNSPECIFIED: ICD-10-CM

## 2021-02-26 ENCOUNTER — APPOINTMENT (OUTPATIENT)
Dept: OTOLARYNGOLOGY | Facility: CLINIC | Age: 81
End: 2021-02-26
Payer: MEDICARE

## 2021-02-26 VITALS — WEIGHT: 137 LBS | BODY MASS INDEX: 27.67 KG/M2

## 2021-02-26 DIAGNOSIS — H90.3 SENSORINEURAL HEARING LOSS, BILATERAL: ICD-10-CM

## 2021-02-26 DIAGNOSIS — R42 DIZZINESS AND GIDDINESS: ICD-10-CM

## 2021-02-26 PROCEDURE — 92570 ACOUSTIC IMMITANCE TESTING: CPT

## 2021-02-26 PROCEDURE — 99214 OFFICE O/P EST MOD 30 MIN: CPT | Mod: 25

## 2021-02-26 PROCEDURE — 99072 ADDL SUPL MATRL&STAF TM PHE: CPT

## 2021-02-26 PROCEDURE — 92557 COMPREHENSIVE HEARING TEST: CPT

## 2021-02-26 NOTE — ASSESSMENT
[FreeTextEntry1] : - patient hesitant about getting VNG, discussed with her about having vestibular therapy, she currently has a physical therapist who comes to her home twice a week, completed this, overall she feels improved. Offered vestibular therapy, she is not interested, is doing exercises at home from previous PT, wants to "see how it goes."\par - annual hearing test\par

## 2021-02-26 NOTE — DATA REVIEWED
[de-identified] : relevant images and reports personally reviewed by me:\par ]\par 2/26/21: normal hearing AU sloping to mild to severe SNHL AU, type A tymps AU [de-identified] : relevant images and reports personally reviewed by me:\par \par \par EXAM: MR BRAIN\par \par \par PROCEDURE DATE: 01/21/2021\par \par \par \par \par INTERPRETATION: Clinical History / Reason for exam: Vertigo\par \par TECHNIQUE: MRI brain without contrast. Multiplanar multisequential MRI of the brain without intravenous contrast administration on the 3 Rhoda magnet. The patient declined IV contrast administration.\par \par Correlation with CT head dated 1/19/2021.\par \par FINDINGS:\par \par The ventricles and cortical sulci are within normal limits for age.\par \par There is no evidence of acute intracranial hemorrhage, extra-axial fluid collection or midline shift.\par \par There is no diffusion abnormality to suggest acute/subacute infarct. There is normal flow void present within the major vascular structures.\par \par The pituitary is normal in size. The cerebellar tonsils are normal in position. There is normal marrow signal within the clivus.\par \par The paranasal sinuses and mastoids are clear. The patient is status post bilateral cataract surgery.\par \par IMPRESSION:\par \par Unremarkable MRI of the brain.\par \par \par \par \par \par \par \par OMAIRA FUNEZ MD; Attending Radiologist\par This document has been electronically signed. Jan 22 2021 9:11AM

## 2021-02-26 NOTE — PHYSICAL EXAM
[Midline] : trachea located in midline position [Normal] : no rashes [FreeTextEntry1] : on 2L NC [de-identified] : unable to perform due to patient condition, on oxygen

## 2021-02-26 NOTE — CONSULT LETTER
[Dear  ___] : Dear  [unfilled], [Consult Letter:] : I had the pleasure of evaluating your patient, [unfilled]. [Please see my note below.] : Please see my note below. [Consult Closing:] : Thank you very much for allowing me to participate in the care of this patient.  If you have any questions, please do not hesitate to contact me. [Sincerely,] : Sincerely, [FreeTextEntry2] : Dr Bebo Shields [FreeTextEntry3] : Radha Tenorio MD\par Otolaryngology - Head & Neck Surgery\par

## 2021-02-26 NOTE — HISTORY OF PRESENT ILLNESS
[de-identified] : Patient presents today with c/o vertigo. Patient states she fell on 1/19/2021 and was admitted to hospital for evaluation. She started having dizziness and headaches after the fall. She admits room spinning when turning her head a certain position. When getting up quickly room spinning occurs. She admits dizziness has been improving since the fall first happened, continues to have it intermittently but overall improved. No prior h/o dizziness. Patient also c/o b/l otalgia. No tinnitus. Some hearing loss due to age. No h/o ear infections. Prior to her fall she notes a liquid discharge from right ear but was told it was normal. \par MRI of brain performed in hospital reported as normal. 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 She has COPD and is on oxygen, 2L.

## 2021-04-26 ENCOUNTER — APPOINTMENT (OUTPATIENT)
Dept: HEMATOLOGY ONCOLOGY | Facility: CLINIC | Age: 81
End: 2021-04-26
Payer: MEDICARE

## 2021-04-26 ENCOUNTER — LABORATORY RESULT (OUTPATIENT)
Age: 81
End: 2021-04-26

## 2021-04-26 LAB
ALBUMIN SERPL ELPH-MCNC: 3.8 G/DL
ALP BLD-CCNC: 80 U/L
ALT SERPL-CCNC: 14 U/L
ANION GAP SERPL CALC-SCNC: 8 MMOL/L
AST SERPL-CCNC: 22 U/L
BILIRUB SERPL-MCNC: 0.3 MG/DL
BUN SERPL-MCNC: 25 MG/DL
CALCIUM SERPL-MCNC: 9.3 MG/DL
CHLORIDE SERPL-SCNC: 109 MMOL/L
CO2 SERPL-SCNC: 25 MMOL/L
CREAT SERPL-MCNC: 1 MG/DL
GLUCOSE SERPL-MCNC: 75 MG/DL
HCT VFR BLD CALC: 31 %
HGB BLD-MCNC: 9.7 G/DL
IRON SATN MFR SERPL: 14 %
IRON SERPL-MCNC: 41 UG/DL
LDH SERPL-CCNC: 237
MCHC RBC-ENTMCNC: 25.4 PG
MCHC RBC-ENTMCNC: 31.3 G/DL
MCV RBC AUTO: 81.2 FL
PLATELET # BLD AUTO: 166 K/UL
PMV BLD: 8.4 FL
POTASSIUM SERPL-SCNC: 4.3 MMOL/L
PROT SERPL-MCNC: 6.4 G/DL
RBC # BLD: 3.82 M/UL
RBC # FLD: 14.6 %
SODIUM SERPL-SCNC: 142 MMOL/L
TIBC SERPL-MCNC: 290 UG/DL
UIBC SERPL-MCNC: 249 UG/DL
WBC # FLD AUTO: 5.33 K/UL

## 2021-04-26 PROCEDURE — 99213 OFFICE O/P EST LOW 20 MIN: CPT

## 2021-04-26 RX ORDER — LOPERAMIDE HYDROCHLORIDE 2 MG/1
2 CAPSULE ORAL
Qty: 180 | Refills: 0 | Status: ACTIVE | COMMUNITY
Start: 2021-01-24

## 2021-04-26 RX ORDER — IPRATROPIUM BROMIDE AND ALBUTEROL SULFATE 2.5; .5 MG/3ML; MG/3ML
0.5-2.5 (3) SOLUTION RESPIRATORY (INHALATION)
Qty: 1080 | Refills: 0 | Status: ACTIVE | COMMUNITY
Start: 2020-12-22

## 2021-04-26 RX ORDER — TRIAMCINOLONE ACETONIDE 1 MG/G
0.1 OINTMENT TOPICAL
Qty: 60 | Refills: 0 | Status: ACTIVE | COMMUNITY
Start: 2021-01-05

## 2021-04-26 RX ORDER — AMLODIPINE BESYLATE 10 MG/1
10 TABLET ORAL
Qty: 30 | Refills: 0 | Status: ACTIVE | COMMUNITY
Start: 2021-01-24

## 2021-04-26 RX ORDER — MECLIZINE HYDROCHLORIDE 25 MG/1
25 TABLET ORAL
Qty: 90 | Refills: 0 | Status: ACTIVE | COMMUNITY
Start: 2021-01-24

## 2021-04-26 NOTE — REVIEW OF SYSTEMS
[Fatigue] : fatigue [Cough] : cough [SOB on Exertion] : shortness of breath during exertion [Negative] : Heme/Lymph [Recent Change In Weight] : ~T no recent weight change [Shortness Of Breath] : no shortness of breath [Wheezing] : no wheezing [Anxiety] : no anxiety [Depression] : no depression [FreeTextEntry6] : SOB is chronic from COPD, stable, cough is stable and chronic [de-identified] : Diabetic neuropathy.

## 2021-04-26 NOTE — ASSESSMENT
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  Elly has completed course of steroids as well as rituximab. Rituximab completed 04/08/2016, and she finished steroids approximately in early 06/2016.  She is in remission.\par - CBC reviewed. 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 \par - She currently denies any bleeding.  \par - Iron studies  were normal, will recheck . CBC is stable, unlikely bleeding\par \par 3. Diabetes.  \par - She is on insulin.\par \par 4. Hypogammaglobulinemia.  \par - Her IgG that was previously checked was decreased. She has been asymptomatic this was due to Rituxan. Repeat is showing near normal levels in past . \par \par 5. She has a history of steroid use.  Her  DEXA scan in 8/2016 was normal.  DEXA in 3/20/19 showed osteopenia in femoral neck with FRAX score risk of fracture of 2.9% in 10 years.\par - c/w calcium and vitamin D.\par \par 6. 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 Spoke with Dr. Feldman, who agreed that this likely represents post-treatment effect. Will repeat NC CT chest  11/2020 No obvious progression.  Will check again  now. \par \par 7. Microtic Anemia  is likely secondary to chronic inflammation due to her COPD as well as CKD last GFR was 47.\par -hgb is stable and  just over \par -will monitor, if no NESHA and no hemolysis may need MICHELLE in future given CKD\par \par \par Follow up in 3-4 months. CBC, Retic, LDH, CMP and iron studies sent. \par \par \par

## 2021-04-26 NOTE — HISTORY OF PRESENT ILLNESS
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  She finished it approximately in the beginning of 06/2016.  She also received 4  doses of rituximab therapy.\par \par HISTORY OF PRESENT ILLNESS:  Ms. Blackmon is a very pleasant 85yearold female with multiple medical problems.  She initially was being treated for secondary autoimmune hemolytic anemia in the setting of indolent Bcell nonHodgkin lymphoma, NOS.  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  Her initial PET scan only demonstrated mild splenomegaly.  She required steroids as well, but then when the tapering was attempted, her anemia worsened, and at that point in time, rituximab was initiated.\par  [de-identified] : January 17, 2017\yonathan Sanabria presents for followup today she hasn't seen you since July. She denies having recurrent illnesses. She denies having  fatigue she experienced before. She does have occasional headaches. Blood pressure today is elevated. Otherwise she has no complaints. \par \par 4/24/17\par She is doing well except  for trouble with sugars and BP. She is seeing appropriate specialists. No other complaints CBC from today is WNL.\par \par 7/26/17\par She is doing well. She has a cough with sputum production for now several months. CXR in July was clear. HAd a course of antibiotics that did not work. It may be her COPD. No bleedig, no B symptoms. Usual fatigue. \par \par 10/27/17\par She is doing well except for her diabetic neuropathy in her feet. States her A1C is 6. No bleeding. \par \par 1/25/18\par She is her for follow up for her NHL. She is doing well. No bleeding. No weight loss.  CBC from today is stable.\par \par \par 5/24/18\par She is doing well. She has fatigue. CBC was fine from today s visit.\par \par 9/17/18\par She is here for follow up. She may have a cold, fever 99, cough with green phlegm, not SOB. CBC showed a Hgb of 11 from today. She has no bleeding,normal stool and urine.\par \par 12/17/18\par Patient is here for a follow-up visit.  She is feeling well with no complaints.  Most recent CBC is stable, Hgb up to 11.4.  Patient denies fever, chills, nausea, vomiting.  She has received her flu vaccine this season.  \par \par \par 4/9/19\par She is here for follow up. She is doing well. 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 Duran.\par \par 5/20/19\par She is here for follow up. She had a PET/CT 4/18/19: Compared to 2/24/2016,focal FDG uptake (SUV 4.1; intensely avid on \par nonattenuation corrected images) coregistering with 1.8 x 1.6 cm left  lower lobe pulmonary nodule suspicious for biologic tumor activity\par In addition another new FDG avid right upper lobe 1.3 x 0.6 cm  groundglass nodule (SUV 2.1-FDG avid on attenuation corrected images) \par suspicious for biologic tumor activity. A 1 cm pretracheal lymph node is not FDG avid\par \par No other sites of abnormal FDG uptake\par \par She underwent a CT FNA biopsy of Left  lobe nodule that showed 5/13/19:   POSITIVE FOR MALIGNANT CELLS.\par Non-small cell carcinoma, favor squamous cell carcinoma.\par Immunohistochemistry studies were performed at [facility] on block 1-C\par and the results support the diagnosis (positive P-40; negative-\par TTF1/Napsin).\par \par \par She feels well otherwise\par \par 7/22/19\par She is here for follow up. She states she completed 5 fractions of radiation on July 12. She feels well except some fatigue. \par \par 10/2/19\par Patient is here for a follow-up visit with spouse.  She is feeling well with no new complaints.  Discussed findings from CT imaging reflecting slight improvement in LLL nodule post radiation and RUL nodule was stable.  Encouraged flu and pna vaccination with PCP.  \par CT Chest (9.30.19) IMPRESSION:  Decrease in size size of left lower lobe spiculated nodule measuring 1.5 x 0.7 cm previously measured 1.8 x 1.6 cm. Stable right upper lobe 1.3 x 0.6 cm groundglass nodule.\par \par 1/6/20\par Patient is here for a follow-up visit for autoimmune hemolytic anemia and hx of lymphoma with her .  She has had nausea and constipation/diarrhea over the last few weeks which has resolved.  She denies unintentional weightloss or b symptoms, although she reports slightly worsening of SOB with exertion.  Most recent CBC reviewed and is stable.  \par \par 6/15/2020\par She is here for follow up. She had a CTA in 2/2020 IMPRESSION: \par \par 1. No CTA evidence of aortic dissection. \par \par 2. No CT evidence of acute intrathoracic or abdominopelvic pathology. \par Symmetric perfusion to the kidneys. \par \par 3. 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 \par \par 4. Decreased size of the left lower lobe spiculated nodule measuring 1.1 x \par 0.7 cm (previously 1.5 x 0.7 cm). \par \par 5. Unchanged right upper lobe 1.3 x 0.6 cm groundglass nodule/opacity.\par \par She is on oxygen now due to SOB. She has a cough as well that is chornic.  \par \par 9/21/2020: Patient presents for follow up of low-grade NHL with secondary autoimmune hemolytic anemia, s/p steroids and rituximab in 2016, and [id number] (stage IA2) NSCLC s/p RT in 7/2019. She had a CT chest in 8/14/2020, which demonstrated increased LLL opacity without discrete nodule, increased left-sided pleural effusion, and no LAD. Results were discussed with patient's pulmonologist, who agreed that results likely represented post-treatment effect and agreed with surveillance CT scans. Hemoglobin decreased to 9.9 with MCV of 79.7 on today's CBC. Patient denies any active bleeding and states that she does not take iron supplementation. Her breathing is at her baseline. Her only complaint is fatigue.\par \par \par 1/11/21\par She is here for follow up. Since last visit she remains anemic with microcytosis, Iron studies were normal last visit. She had a CT  Chest in 11/2020:  \par IMPRESSION:\par 1.  Since February 17, 2020, posttreatment changes again seen in the left lower lobe with decreased size of the left pleural effusion and left lower lobe subpleural opacity/rounded atelectasis.\par \par 2.  Unchanged right upper lobe groundglass nodule, which demonstrated suspicious FDG uptake on prior PET/CT.\par \par 3.  No CT evidence of an acute intrathoracic pathology.\par \par she feels well. She is on 2-3 L NC. Shit is baseline. \par \par 4/26/21\par She is here for follow up. Since last visit she had MR brain for vertigo in 1/2021: IMPRESSION: Unremarkable MRI of the brain. hgb today is just bellow 10.  She was diagnosed with BPV and feels much better now with exercises. SOB is stable and is on Oxygen. No new complaints.

## 2021-04-26 NOTE — PHYSICAL EXAM
[Ambulatory and capable of all self care but unable to carry out any work activities] : Status 2- Ambulatory and capable of all self care but unable to carry out any work activities. Up and about more than 50% of waking hours [Normal] : affect appropriate [de-identified] : On oxygen

## 2021-05-12 ENCOUNTER — RESULT REVIEW (OUTPATIENT)
Age: 81
End: 2021-05-12

## 2021-05-12 ENCOUNTER — OUTPATIENT (OUTPATIENT)
Dept: OUTPATIENT SERVICES | Facility: HOSPITAL | Age: 81
LOS: 1 days | Discharge: HOME | End: 2021-05-12
Payer: MEDICARE

## 2021-05-12 DIAGNOSIS — Z98.890 OTHER SPECIFIED POSTPROCEDURAL STATES: Chronic | ICD-10-CM

## 2021-05-12 DIAGNOSIS — C34.90 MALIGNANT NEOPLASM OF UNSPECIFIED PART OF UNSPECIFIED BRONCHUS OR LUNG: ICD-10-CM

## 2021-05-12 DIAGNOSIS — Z90.49 ACQUIRED ABSENCE OF OTHER SPECIFIED PARTS OF DIGESTIVE TRACT: Chronic | ICD-10-CM

## 2021-05-12 PROCEDURE — 71250 CT THORAX DX C-: CPT | Mod: 26

## 2021-08-09 ENCOUNTER — APPOINTMENT (OUTPATIENT)
Dept: HEMATOLOGY ONCOLOGY | Facility: CLINIC | Age: 81
End: 2021-08-09
Payer: MEDICARE

## 2021-08-09 ENCOUNTER — LABORATORY RESULT (OUTPATIENT)
Age: 81
End: 2021-08-09

## 2021-08-09 LAB
ALBUMIN SERPL ELPH-MCNC: 3.7 G/DL
ALP BLD-CCNC: 80 U/L
ALT SERPL-CCNC: 24 U/L
ANION GAP SERPL CALC-SCNC: 15 MMOL/L
AST SERPL-CCNC: 16 U/L
BILIRUB SERPL-MCNC: 0.5 MG/DL
BUN SERPL-MCNC: 30 MG/DL
CALCIUM SERPL-MCNC: 9.1 MG/DL
CHLORIDE SERPL-SCNC: 97 MMOL/L
CO2 SERPL-SCNC: 24 MMOL/L
CREAT SERPL-MCNC: 1.2 MG/DL
GLUCOSE SERPL-MCNC: 528 MG/DL
HCT VFR BLD CALC: 36.1 %
HGB BLD-MCNC: 11.4 G/DL
LDH SERPL-CCNC: 219
MCHC RBC-ENTMCNC: 25.8 PG
MCHC RBC-ENTMCNC: 31.6 G/DL
MCV RBC AUTO: 81.7 FL
PLATELET # BLD AUTO: 159 K/UL
PMV BLD: 9 FL
POTASSIUM SERPL-SCNC: 4.7 MMOL/L
PROT SERPL-MCNC: 5.9 G/DL
RBC # BLD: 4.42 M/UL
RBC # FLD: 15.7 %
RETICS # AUTO: 1.8 %
RETICS AGGREG/RBC NFR: 80.9 K/UL
SODIUM SERPL-SCNC: 136 MMOL/L
WBC # FLD AUTO: 11.43 K/UL

## 2021-08-09 PROCEDURE — 99214 OFFICE O/P EST MOD 30 MIN: CPT

## 2021-08-09 NOTE — PHYSICAL EXAM
[Ambulatory and capable of all self care but unable to carry out any work activities] : Status 2- Ambulatory and capable of all self care but unable to carry out any work activities. Up and about more than 50% of waking hours [Normal] : affect appropriate [de-identified] : On oxygen

## 2021-08-09 NOTE — ASSESSMENT
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  Elly has completed course of steroids as well as rituximab. Rituximab completed 04/08/2016, and she finished steroids approximately in early 06/2016.  She is in remission.\par - CBC reviewed with mild anemia and leukocytosis from steroids. CMP today \par -will monitor \par \par 2.History of arteriovenous malformations on endoscopy. \par - She currently denies any bleeding.  \par -  Hemiglobin has improved  unlikely bleeding will check iron studies\par \par 3. Diabetes.  \par - She is on insulin.\par \par 4. Hypogammaglobulinemia.  \par - Her IgG that was previously checked was decreased. She has been asymptomatic this was due to Rituxan. Repeat is showing near normal levels in past . \par \par 5. She has a history of steroid use.  Her  DEXA scan in 8/2016 was normal.  DEXA in 3/20/19 showed osteopenia in femoral neck with FRAX score risk of fracture of 2.9% in 10 years.\par - c/w calcium and vitamin D.\par \par 6. [id number] stage IA2 SCC of lung left lower lobe.\par - s/p 5/5 fractions RT competed 7/12/19.\par - Repeat CT chest in 8/14/2020 showed increased left lower lobe opacity without discrete nodule, increased left-sided pleural effusion. Spoke with Dr. Feldman, who agreed that this likely represents post-treatment effect. Will repeat NC CT chest 5/2021 SONIDO. will recheck again in 11/2021\par \par 7. Microtic Anemia  is likely secondary to chronic inflammation due to her COPD as well as CKD last GFR was 47.\par -hgb is stable and  just over \par - improving now\par \par \par Follow up in November LDH, CMP and iron studies sent. \par \par \par

## 2021-08-09 NOTE — REVIEW OF SYSTEMS
[Fatigue] : fatigue [Cough] : cough [SOB on Exertion] : shortness of breath during exertion [Negative] : Heme/Lymph [Recent Change In Weight] : ~T no recent weight change [Shortness Of Breath] : no shortness of breath [Wheezing] : no wheezing [Anxiety] : no anxiety [Depression] : no depression [FreeTextEntry5] : She has edema in legs when standing for to long or if eats lots of salt [FreeTextEntry6] : SOB is chronic from COPD, stable, cough is stable and chronic [de-identified] : Diabetic neuropathy.

## 2021-08-09 NOTE — HISTORY OF PRESENT ILLNESS
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  She finished it approximately in the beginning of 06/2016.  She also received 4  doses of rituximab therapy.\par \par HISTORY OF PRESENT ILLNESS:  Ms. Blackmon is a very pleasant 85yearold female with multiple medical problems.  She initially was being treated for secondary autoimmune hemolytic anemia in the setting of indolent Bcell nonHodgkin lymphoma, NOS.  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  Her initial PET scan only demonstrated mild splenomegaly.  She required steroids as well, but then when the tapering was attempted, her anemia worsened, and at that point in time, rituximab was initiated.\par  [de-identified] : January 17, 2017\yonathan Sanabria presents for followup today she hasn't seen you since July. She denies having recurrent illnesses. She denies having  fatigue she experienced before. She does have occasional headaches. Blood pressure today is elevated. Otherwise she has no complaints. \par \par 4/24/17\par She is doing well except  for trouble with sugars and BP. She is seeing appropriate specialists. No other complaints CBC from today is WNL.\par \par 7/26/17\par She is doing well. She has a cough with sputum production for now several months. CXR in July was clear. HAd a course of antibiotics that did not work. It may be her COPD. No bleedig, no B symptoms. Usual fatigue. \par \par 10/27/17\par She is doing well except for her diabetic neuropathy in her feet. States her A1C is 6. No bleeding. \par \par 1/25/18\par She is her for follow up for her NHL. She is doing well. No bleeding. No weight loss.  CBC from today is stable.\par \par \par 5/24/18\par She is doing well. She has fatigue. CBC was fine from today s visit.\par \par 9/17/18\par She is here for follow up. She may have a cold, fever 99, cough with green phlegm, not SOB. CBC showed a Hgb of 11 from today. She has no bleeding,normal stool and urine.\par \par 12/17/18\par Patient is here for a follow-up visit.  She is feeling well with no complaints.  Most recent CBC is stable, Hgb up to 11.4.  Patient denies fever, chills, nausea, vomiting.  She has received her flu vaccine this season.  \par \par \par 4/9/19\par She is here for follow up. She is doing well. 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 Duran.\par \par 5/20/19\par She is here for follow up. She had a PET/CT 4/18/19: Compared to 2/24/2016,focal FDG uptake (SUV 4.1; intensely avid on \par nonattenuation corrected images) coregistering with 1.8 x 1.6 cm left  lower lobe pulmonary nodule suspicious for biologic tumor activity\par In addition another new FDG avid right upper lobe 1.3 x 0.6 cm  groundglass nodule (SUV 2.1-FDG avid on attenuation corrected images) \par suspicious for biologic tumor activity. A 1 cm pretracheal lymph node is not FDG avid\par \par No other sites of abnormal FDG uptake\par \par She underwent a CT FNA biopsy of Left  lobe nodule that showed 5/13/19:   POSITIVE FOR MALIGNANT CELLS.\par Non-small cell carcinoma, favor squamous cell carcinoma.\par Immunohistochemistry studies were performed at [facility] on block 1-C\par and the results support the diagnosis (positive P-40; negative-\par TTF1/Napsin).\par \par \par She feels well otherwise\par \par 7/22/19\par She is here for follow up. She states she completed 5 fractions of radiation on July 12. She feels well except some fatigue. \par \par 10/2/19\par Patient is here for a follow-up visit with spouse.  She is feeling well with no new complaints.  Discussed findings from CT imaging reflecting slight improvement in LLL nodule post radiation and RUL nodule was stable.  Encouraged flu and pna vaccination with PCP.  \par CT Chest (9.30.19) IMPRESSION:  Decrease in size size of left lower lobe spiculated nodule measuring 1.5 x 0.7 cm previously measured 1.8 x 1.6 cm. Stable right upper lobe 1.3 x 0.6 cm groundglass nodule.\par \par 1/6/20\par Patient is here for a follow-up visit for autoimmune hemolytic anemia and hx of lymphoma with her .  She has had nausea and constipation/diarrhea over the last few weeks which has resolved.  She denies unintentional weightloss or b symptoms, although she reports slightly worsening of SOB with exertion.  Most recent CBC reviewed and is stable.  \par \par 6/15/2020\par She is here for follow up. She had a CTA in 2/2020 IMPRESSION: \par \par 1. No CTA evidence of aortic dissection. \par \par 2. No CT evidence of acute intrathoracic or abdominopelvic pathology. \par Symmetric perfusion to the kidneys. \par \par 3. 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 \par \par 4. Decreased size of the left lower lobe spiculated nodule measuring 1.1 x \par 0.7 cm (previously 1.5 x 0.7 cm). \par \par 5. Unchanged right upper lobe 1.3 x 0.6 cm groundglass nodule/opacity.\par \par She is on oxygen now due to SOB. She has a cough as well that is chornic.  \par \par 9/21/2020: Patient presents for follow up of low-grade NHL with secondary autoimmune hemolytic anemia, s/p steroids and rituximab in 2016, and [id number] (stage IA2) NSCLC s/p RT in 7/2019. She had a CT chest in 8/14/2020, which demonstrated increased LLL opacity without discrete nodule, increased left-sided pleural effusion, and no LAD. Results were discussed with patient's pulmonologist, who agreed that results likely represented post-treatment effect and agreed with surveillance CT scans. Hemoglobin decreased to 9.9 with MCV of 79.7 on today's CBC. Patient denies any active bleeding and states that she does not take iron supplementation. Her breathing is at her baseline. Her only complaint is fatigue.\par \par \par 1/11/21\par She is here for follow up. Since last visit she remains anemic with microcytosis, Iron studies were normal last visit. She had a CT  Chest in 11/2020:  \par IMPRESSION:\par 1.  Since February 17, 2020, posttreatment changes again seen in the left lower lobe with decreased size of the left pleural effusion and left lower lobe subpleural opacity/rounded atelectasis.\par \par 2.  Unchanged right upper lobe groundglass nodule, which demonstrated suspicious FDG uptake on prior PET/CT.\par \par 3.  No CT evidence of an acute intrathoracic pathology.\par \par she feels well. She is on 2-3 L NC. Shit is baseline. \par \par 4/26/21\par She is here for follow up. Since last visit she had MR brain for vertigo in 1/2021: IMPRESSION: Unremarkable MRI of the brain. hgb today is just bellow 10.  She was diagnosed with BPV and feels much better now with exercises. SOB is stable and is on Oxygen. No new complaints.\par \par 8/9/21\par She is here for follow up. She had  CBC today that showed mild leukocytosis left shifted and hgb is now 11.4 up from 9.7. She had a CT chest in may of 2021 showed  Since 11/13/2020\par \par No current CT evidence of active intrathoracic disease.\par \par Stable post therapeutic changes left lower lobe with consolidative opacities and stable trace pleural fluid.\par \par Stable partial atelectasis right middle lobe. Stable emphysema.\par \par Stable pulmonary nodules.\par \par \par She is currenly on Prednisoen 30 mg daily fpr CPD and on a taper.

## 2021-08-12 LAB — FERRITIN SERPL-MCNC: 91 NG/ML

## 2021-11-04 NOTE — ED PROVIDER NOTE - TOBACCO USE
Unknown if ever smoked Melolabial Transposition Flap Text: The defect edges were debeveled with a #15 scalpel blade.  Given the location of the defect and the proximity to free margins a melolabial flap was deemed most appropriate.  Using a sterile surgical marker, an appropriate melolabial transposition flap was drawn incorporating the defect.    The area thus outlined was incised deep to adipose tissue with a #15 scalpel blade.  The skin margins were undermined to an appropriate distance in all directions utilizing iris scissors.

## 2021-11-08 ENCOUNTER — OUTPATIENT (OUTPATIENT)
Dept: OUTPATIENT SERVICES | Facility: HOSPITAL | Age: 81
LOS: 1 days | Discharge: HOME | End: 2021-11-08

## 2021-11-08 ENCOUNTER — LABORATORY RESULT (OUTPATIENT)
Age: 81
End: 2021-11-08

## 2021-11-08 ENCOUNTER — APPOINTMENT (OUTPATIENT)
Dept: HEMATOLOGY ONCOLOGY | Facility: CLINIC | Age: 81
End: 2021-11-08
Payer: MEDICARE

## 2021-11-08 DIAGNOSIS — C85.90 NON-HODGKIN LYMPHOMA, UNSPECIFIED, UNSPECIFIED SITE: ICD-10-CM

## 2021-11-08 DIAGNOSIS — Z98.890 OTHER SPECIFIED POSTPROCEDURAL STATES: Chronic | ICD-10-CM

## 2021-11-08 DIAGNOSIS — D64.9 ANEMIA, UNSPECIFIED: ICD-10-CM

## 2021-11-08 DIAGNOSIS — Z90.49 ACQUIRED ABSENCE OF OTHER SPECIFIED PARTS OF DIGESTIVE TRACT: Chronic | ICD-10-CM

## 2021-11-08 DIAGNOSIS — C34.32 MALIGNANT NEOPLASM OF LOWER LOBE, LEFT BRONCHUS OR LUNG: ICD-10-CM

## 2021-11-08 DIAGNOSIS — C34.90 MALIGNANT NEOPLASM OF UNSPECIFIED PART OF UNSPECIFIED BRONCHUS OR LUNG: ICD-10-CM

## 2021-11-08 LAB
HCT VFR BLD CALC: 36.7 %
HGB BLD-MCNC: 11.9 G/DL
IRON SATN MFR SERPL: 21 %
IRON SERPL-MCNC: 77 UG/DL
LDH SERPL-CCNC: 232
MCHC RBC-ENTMCNC: 27.7 PG
MCHC RBC-ENTMCNC: 32.4 G/DL
MCV RBC AUTO: 85.3 FL
PLATELET # BLD AUTO: 162 K/UL
PMV BLD: 8.9 FL
RBC # BLD: 4.3 M/UL
RBC # FLD: 14.6 %
RETICS # AUTO: 2.4 %
RETICS AGGREG/RBC NFR: 104.5 K/UL
TIBC SERPL-MCNC: 365 UG/DL
UIBC SERPL-MCNC: 288 UG/DL
WBC # FLD AUTO: 7.59 K/UL

## 2021-11-08 PROCEDURE — 99214 OFFICE O/P EST MOD 30 MIN: CPT

## 2021-11-08 NOTE — HISTORY OF PRESENT ILLNESS
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  She finished it approximately in the beginning of 06/2016.  She also received 4  doses of rituximab therapy.\par \par HISTORY OF PRESENT ILLNESS:  Ms. Blackmon is a very pleasant 85yearold female with multiple medical problems.  She initially was being treated for secondary autoimmune hemolytic anemia in the setting of indolent Bcell nonHodgkin lymphoma, NOS.  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  Her initial PET scan only demonstrated mild splenomegaly.  She required steroids as well, but then when the tapering was attempted, her anemia worsened, and at that point in time, rituximab was initiated.\par  [de-identified] : January 17, 2017\yonathan Sanabria presents for followup today she hasn't seen you since July. She denies having recurrent illnesses. She denies having  fatigue she experienced before. She does have occasional headaches. Blood pressure today is elevated. Otherwise she has no complaints. \par \par 4/24/17\par She is doing well except  for trouble with sugars and BP. She is seeing appropriate specialists. No other complaints CBC from today is WNL.\par \par 7/26/17\par She is doing well. She has a cough with sputum production for now several months. CXR in July was clear. HAd a course of antibiotics that did not work. It may be her COPD. No bleedig, no B symptoms. Usual fatigue. \par \par 10/27/17\par She is doing well except for her diabetic neuropathy in her feet. States her A1C is 6. No bleeding. \par \par 1/25/18\par She is her for follow up for her NHL. She is doing well. No bleeding. No weight loss.  CBC from today is stable.\par \par \par 5/24/18\par She is doing well. She has fatigue. CBC was fine from today s visit.\par \par 9/17/18\par She is here for follow up. She may have a cold, fever 99, cough with green phlegm, not SOB. CBC showed a Hgb of 11 from today. She has no bleeding,normal stool and urine.\par \par 12/17/18\par Patient is here for a follow-up visit.  She is feeling well with no complaints.  Most recent CBC is stable, Hgb up to 11.4.  Patient denies fever, chills, nausea, vomiting.  She has received her flu vaccine this season.  \par \par \par 4/9/19\par She is here for follow up. She is doing well. 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 Duran.\par \par 5/20/19\par She is here for follow up. She had a PET/CT 4/18/19: Compared to 2/24/2016,focal FDG uptake (SUV 4.1; intensely avid on \par nonattenuation corrected images) coregistering with 1.8 x 1.6 cm left  lower lobe pulmonary nodule suspicious for biologic tumor activity\par In addition another new FDG avid right upper lobe 1.3 x 0.6 cm  groundglass nodule (SUV 2.1-FDG avid on attenuation corrected images) \par suspicious for biologic tumor activity. A 1 cm pretracheal lymph node is not FDG avid\par \par No other sites of abnormal FDG uptake\par \par She underwent a CT FNA biopsy of Left  lobe nodule that showed 5/13/19:   POSITIVE FOR MALIGNANT CELLS.\par Non-small cell carcinoma, favor squamous cell carcinoma.\par Immunohistochemistry studies were performed at [facility] on block 1-C\par and the results support the diagnosis (positive P-40; negative-\par TTF1/Napsin).\par \par \par She feels well otherwise\par \par 7/22/19\par She is here for follow up. She states she completed 5 fractions of radiation on July 12. She feels well except some fatigue. \par \par 10/2/19\par Patient is here for a follow-up visit with spouse.  She is feeling well with no new complaints.  Discussed findings from CT imaging reflecting slight improvement in LLL nodule post radiation and RUL nodule was stable.  Encouraged flu and pna vaccination with PCP.  \par CT Chest (9.30.19) IMPRESSION:  Decrease in size size of left lower lobe spiculated nodule measuring 1.5 x 0.7 cm previously measured 1.8 x 1.6 cm. Stable right upper lobe 1.3 x 0.6 cm groundglass nodule.\par \par 1/6/20\par Patient is here for a follow-up visit for autoimmune hemolytic anemia and hx of lymphoma with her .  She has had nausea and constipation/diarrhea over the last few weeks which has resolved.  She denies unintentional weightloss or b symptoms, although she reports slightly worsening of SOB with exertion.  Most recent CBC reviewed and is stable.  \par \par 6/15/2020\par She is here for follow up. She had a CTA in 2/2020 IMPRESSION: \par \par 1. No CTA evidence of aortic dissection. \par \par 2. No CT evidence of acute intrathoracic or abdominopelvic pathology. \par Symmetric perfusion to the kidneys. \par \par 3. 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 \par \par 4. Decreased size of the left lower lobe spiculated nodule measuring 1.1 x \par 0.7 cm (previously 1.5 x 0.7 cm). \par \par 5. Unchanged right upper lobe 1.3 x 0.6 cm groundglass nodule/opacity.\par \par She is on oxygen now due to SOB. She has a cough as well that is chornic.  \par \par 9/21/2020: Patient presents for follow up of low-grade NHL with secondary autoimmune hemolytic anemia, s/p steroids and rituximab in 2016, and [id number] (stage IA2) NSCLC s/p RT in 7/2019. She had a CT chest in 8/14/2020, which demonstrated increased LLL opacity without discrete nodule, increased left-sided pleural effusion, and no LAD. Results were discussed with patient's pulmonologist, who agreed that results likely represented post-treatment effect and agreed with surveillance CT scans. Hemoglobin decreased to 9.9 with MCV of 79.7 on today's CBC. Patient denies any active bleeding and states that she does not take iron supplementation. Her breathing is at her baseline. Her only complaint is fatigue.\par \par \par 1/11/21\par She is here for follow up. Since last visit she remains anemic with microcytosis, Iron studies were normal last visit. She had a CT  Chest in 11/2020:  \par IMPRESSION:\par 1.  Since February 17, 2020, posttreatment changes again seen in the left lower lobe with decreased size of the left pleural effusion and left lower lobe subpleural opacity/rounded atelectasis.\par \par 2.  Unchanged right upper lobe groundglass nodule, which demonstrated suspicious FDG uptake on prior PET/CT.\par \par 3.  No CT evidence of an acute intrathoracic pathology.\par \par she feels well. She is on 2-3 L NC. Shit is baseline. \par \par 4/26/21\par She is here for follow up. Since last visit she had MR brain for vertigo in 1/2021: IMPRESSION: Unremarkable MRI of the brain. hgb today is just bellow 10.  She was diagnosed with BPV and feels much better now with exercises. SOB is stable and is on Oxygen. No new complaints.\par \par 8/9/21\par She is here for follow up. She had  CBC today that showed mild leukocytosis left shifted and hgb is now 11.4 up from 9.7. She had a CT chest in may of 2021 showed  Since 11/13/2020\par \par No current CT evidence of active intrathoracic disease.\par \par Stable post therapeutic changes left lower lobe with consolidative opacities and stable trace pleural fluid.\par \par Stable partial atelectasis right middle lobe. Stable emphysema.\par \par Stable pulmonary nodules.\par \par \par She is currenly on Prednisoen 30 mg daily fpr CPD and on a taper.\par \par 11/8/21\par She is here for follow up. She is with her daughter. Not SOB. Has oxygen with her but not using it. Has a walker. CBC today just mild anemia of 11.9. LAst CT chest showed 5/2021  no obvious reoccurrence

## 2021-11-08 NOTE — REVIEW OF SYSTEMS
[Fatigue] : fatigue [Recent Change In Weight] : ~T no recent weight change [Shortness Of Breath] : no shortness of breath [Wheezing] : no wheezing [Cough] : cough [SOB on Exertion] : shortness of breath during exertion [Anxiety] : no anxiety [Depression] : no depression [Negative] : Heme/Lymph [FreeTextEntry5] : She has edema in legs when standing for to long or if eats lots of salt [FreeTextEntry6] : SOB is chronic from COPD, stable, cough is stable and chronic [de-identified] : Diabetic neuropathy.

## 2021-11-08 NOTE — PHYSICAL EXAM
[Ambulatory and capable of all self care but unable to carry out any work activities] : Status 2- Ambulatory and capable of all self care but unable to carry out any work activities. Up and about more than 50% of waking hours [Normal] : affect appropriate [de-identified] : Not on oxygen today

## 2021-11-08 NOTE — ASSESSMENT
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  Elly has completed course of steroids as well as rituximab. Rituximab completed 04/08/2016, and she finished steroids approximately in early 06/2016.  She is in remission.\par - CBC reviewed with mild anemia only. \par -will monitor \par \par 2.History of arteriovenous malformations on endoscopy. \par - She currently denies any bleeding.  \par -  Hemiglobin has improved  unlikely bleeding will check iron studies today again\par \par 3. Diabetes.  \par - She is on insulin.\par \par 4. Hypogammaglobulinemia.  \par - Her IgG that was previously checked was decreased. She has been asymptomatic this was due to Rituxan. Repeat is showing near normal levels in past . \par \par 5. She has a history of steroid use.  Her  DEXA scan in 8/2016 was normal.  DEXA in 3/20/19 showed osteopenia in femoral neck with FRAX score risk of fracture of 2.9% in 10 years.\par - c/w calcium and vitamin D.\par -we can repeat again with future visit\par \par 6. [id number] stage IA2 SCC of lung left lower lobe.\par - s/p 5/5 fractions RT competed 7/12/19.\par - Repeat CT chest in 8/14/2020 showed increased left lower lobe opacity without discrete nodule, increased left-sided pleural effusion. Spoke with Dr. Feldman, who agreed that this likely represents post-treatment effect.  repeat NC CT chest 5/2021 [place]. will recheck again now. \par \par 7. Microtic Anemia  is likely secondary to chronic inflammation due to her COPD as well as CKD last GFR was 47.\par -hgb is improving now as her respiratory status improved\par \par \par Follow up in 4 months. Will call with CT chest resutls\par \par \par

## 2021-11-09 LAB — FERRITIN SERPL-MCNC: 114 NG/ML

## 2021-11-18 DIAGNOSIS — D59.9 ACQUIRED HEMOLYTIC ANEMIA, UNSPECIFIED: ICD-10-CM

## 2021-11-21 ENCOUNTER — OUTPATIENT (OUTPATIENT)
Dept: OUTPATIENT SERVICES | Facility: HOSPITAL | Age: 81
LOS: 1 days | Discharge: HOME | End: 2021-11-21
Payer: MEDICARE

## 2021-11-21 ENCOUNTER — RESULT REVIEW (OUTPATIENT)
Age: 81
End: 2021-11-21

## 2021-11-21 DIAGNOSIS — Z90.49 ACQUIRED ABSENCE OF OTHER SPECIFIED PARTS OF DIGESTIVE TRACT: Chronic | ICD-10-CM

## 2021-11-21 DIAGNOSIS — Z98.890 OTHER SPECIFIED POSTPROCEDURAL STATES: Chronic | ICD-10-CM

## 2021-11-21 DIAGNOSIS — C34.90 MALIGNANT NEOPLASM OF UNSPECIFIED PART OF UNSPECIFIED BRONCHUS OR LUNG: ICD-10-CM

## 2021-11-21 PROCEDURE — 71250 CT THORAX DX C-: CPT | Mod: 26,MH

## 2022-01-01 ENCOUNTER — LABORATORY RESULT (OUTPATIENT)
Age: 82
End: 2022-01-01

## 2022-01-01 ENCOUNTER — INPATIENT (INPATIENT)
Facility: HOSPITAL | Age: 82
LOS: 15 days | End: 2023-01-10
Attending: INTERNAL MEDICINE | Admitting: INTERNAL MEDICINE
Payer: MEDICARE

## 2022-01-01 ENCOUNTER — TRANSCRIPTION ENCOUNTER (OUTPATIENT)
Age: 82
End: 2022-01-01

## 2022-01-01 ENCOUNTER — APPOINTMENT (OUTPATIENT)
Dept: HEMATOLOGY ONCOLOGY | Facility: CLINIC | Age: 82
End: 2022-01-01

## 2022-01-01 ENCOUNTER — RESULT REVIEW (OUTPATIENT)
Age: 82
End: 2022-01-01

## 2022-01-01 ENCOUNTER — APPOINTMENT (OUTPATIENT)
Dept: HEMATOLOGY ONCOLOGY | Facility: CLINIC | Age: 82
End: 2022-01-01
Payer: MEDICARE

## 2022-01-01 ENCOUNTER — NON-APPOINTMENT (OUTPATIENT)
Age: 82
End: 2022-01-01

## 2022-01-01 ENCOUNTER — OUTPATIENT (OUTPATIENT)
Dept: OUTPATIENT SERVICES | Facility: HOSPITAL | Age: 82
LOS: 1 days | Discharge: HOME | End: 2022-01-01

## 2022-01-01 ENCOUNTER — APPOINTMENT (OUTPATIENT)
Dept: CARE COORDINATION | Facility: HOME HEALTH | Age: 82
End: 2022-01-01
Payer: MEDICARE

## 2022-01-01 ENCOUNTER — OUTPATIENT (OUTPATIENT)
Dept: OUTPATIENT SERVICES | Facility: HOSPITAL | Age: 82
LOS: 1 days | End: 2022-01-01

## 2022-01-01 ENCOUNTER — INPATIENT (INPATIENT)
Facility: HOSPITAL | Age: 82
LOS: 6 days | Discharge: HOME HEALTH | End: 2022-05-03
Attending: HOSPITALIST | Admitting: HOSPITALIST
Payer: MEDICARE

## 2022-01-01 ENCOUNTER — OUTPATIENT (OUTPATIENT)
Dept: OUTPATIENT SERVICES | Facility: HOSPITAL | Age: 82
LOS: 1 days | Discharge: HOME | End: 2022-01-01
Payer: MEDICARE

## 2022-01-01 VITALS
TEMPERATURE: 97.6 F | SYSTOLIC BLOOD PRESSURE: 149 MMHG | BODY MASS INDEX: 28.55 KG/M2 | WEIGHT: 136 LBS | OXYGEN SATURATION: 95 % | HEART RATE: 85 BPM | DIASTOLIC BLOOD PRESSURE: 58 MMHG | HEIGHT: 58 IN

## 2022-01-01 VITALS
OXYGEN SATURATION: 97 % | HEIGHT: 59 IN | RESPIRATION RATE: 28 BRPM | HEART RATE: 97 BPM | DIASTOLIC BLOOD PRESSURE: 74 MMHG | TEMPERATURE: 98 F | SYSTOLIC BLOOD PRESSURE: 180 MMHG

## 2022-01-01 VITALS
DIASTOLIC BLOOD PRESSURE: 74 MMHG | TEMPERATURE: 98 F | HEART RATE: 99 BPM | RESPIRATION RATE: 18 BRPM | SYSTOLIC BLOOD PRESSURE: 148 MMHG

## 2022-01-01 VITALS
SYSTOLIC BLOOD PRESSURE: 141 MMHG | BODY MASS INDEX: 27.5 KG/M2 | HEIGHT: 58 IN | WEIGHT: 131 LBS | RESPIRATION RATE: 14 BRPM | DIASTOLIC BLOOD PRESSURE: 63 MMHG | HEART RATE: 80 BPM

## 2022-01-01 VITALS — OXYGEN SATURATION: 80 % | RESPIRATION RATE: 30 BRPM

## 2022-01-01 DIAGNOSIS — C85.90 NON-HODGKIN LYMPHOMA, UNSPECIFIED, UNSPECIFIED SITE: ICD-10-CM

## 2022-01-01 DIAGNOSIS — I25.10 ATHEROSCLEROTIC HEART DISEASE OF NATIVE CORONARY ARTERY WITHOUT ANGINA PECTORIS: ICD-10-CM

## 2022-01-01 DIAGNOSIS — D59.9 ACQUIRED HEMOLYTIC ANEMIA, UNSPECIFIED: ICD-10-CM

## 2022-01-01 DIAGNOSIS — Z90.49 ACQUIRED ABSENCE OF OTHER SPECIFIED PARTS OF DIGESTIVE TRACT: Chronic | ICD-10-CM

## 2022-01-01 DIAGNOSIS — E11.51 TYPE 2 DIABETES MELLITUS WITH DIABETIC PERIPHERAL ANGIOPATHY WITHOUT GANGRENE: ICD-10-CM

## 2022-01-01 DIAGNOSIS — Z98.890 OTHER SPECIFIED POSTPROCEDURAL STATES: Chronic | ICD-10-CM

## 2022-01-01 DIAGNOSIS — Z88.2 ALLERGY STATUS TO SULFONAMIDES: ICD-10-CM

## 2022-01-01 DIAGNOSIS — Z98.890 OTHER SPECIFIED POSTPROCEDURAL STATES: ICD-10-CM

## 2022-01-01 DIAGNOSIS — J96.02 ACUTE RESPIRATORY FAILURE WITH HYPERCAPNIA: ICD-10-CM

## 2022-01-01 DIAGNOSIS — C34.90 MALIGNANT NEOPLASM OF UNSPECIFIED PART OF UNSPECIFIED BRONCHUS OR LUNG: ICD-10-CM

## 2022-01-01 DIAGNOSIS — F03.90 UNSPECIFIED DEMENTIA WITHOUT BEHAVIORAL DISTURBANCE: ICD-10-CM

## 2022-01-01 DIAGNOSIS — Z95.5 PRESENCE OF CORONARY ANGIOPLASTY IMPLANT AND GRAFT: ICD-10-CM

## 2022-01-01 DIAGNOSIS — Z88.0 ALLERGY STATUS TO PENICILLIN: ICD-10-CM

## 2022-01-01 DIAGNOSIS — I50.32 CHRONIC DIASTOLIC (CONGESTIVE) HEART FAILURE: ICD-10-CM

## 2022-01-01 DIAGNOSIS — C34.32 MALIGNANT NEOPLASM OF LOWER LOBE, LEFT BRONCHUS OR LUNG: ICD-10-CM

## 2022-01-01 DIAGNOSIS — I27.20 PULMONARY HYPERTENSION, UNSPECIFIED: ICD-10-CM

## 2022-01-01 DIAGNOSIS — J96.01 ACUTE RESPIRATORY FAILURE WITH HYPOXIA: ICD-10-CM

## 2022-01-01 DIAGNOSIS — Z85.118 PERSONAL HISTORY OF OTHER MALIGNANT NEOPLASM OF BRONCHUS AND LUNG: ICD-10-CM

## 2022-01-01 DIAGNOSIS — Z90.49 ACQUIRED ABSENCE OF OTHER SPECIFIED PARTS OF DIGESTIVE TRACT: ICD-10-CM

## 2022-01-01 DIAGNOSIS — Z79.4 LONG TERM (CURRENT) USE OF INSULIN: ICD-10-CM

## 2022-01-01 DIAGNOSIS — M19.90 UNSPECIFIED OSTEOARTHRITIS, UNSPECIFIED SITE: ICD-10-CM

## 2022-01-01 DIAGNOSIS — I13.0 HYPERTENSIVE HEART AND CHRONIC KIDNEY DISEASE WITH HEART FAILURE AND STAGE 1 THROUGH STAGE 4 CHRONIC KIDNEY DISEASE, OR UNSPECIFIED CHRONIC KIDNEY DISEASE: ICD-10-CM

## 2022-01-01 DIAGNOSIS — Z91.040 LATEX ALLERGY STATUS: ICD-10-CM

## 2022-01-01 DIAGNOSIS — E11.9 TYPE 2 DIABETES MELLITUS W/OUT COMPLICATIONS: ICD-10-CM

## 2022-01-01 DIAGNOSIS — Z79.51 LONG TERM (CURRENT) USE OF INHALED STEROIDS: ICD-10-CM

## 2022-01-01 DIAGNOSIS — J44.1 CHRONIC OBSTRUCTIVE PULMONARY DISEASE WITH (ACUTE) EXACERBATION: ICD-10-CM

## 2022-01-01 DIAGNOSIS — I25.2 OLD MYOCARDIAL INFARCTION: ICD-10-CM

## 2022-01-01 DIAGNOSIS — E11.22 TYPE 2 DIABETES MELLITUS WITH DIABETIC CHRONIC KIDNEY DISEASE: ICD-10-CM

## 2022-01-01 DIAGNOSIS — J44.9 CHRONIC OBSTRUCTIVE PULMONARY DISEASE, UNSPECIFIED: ICD-10-CM

## 2022-01-01 DIAGNOSIS — N18.9 CHRONIC KIDNEY DISEASE, UNSPECIFIED: ICD-10-CM

## 2022-01-01 DIAGNOSIS — Z92.3 PERSONAL HISTORY OF IRRADIATION: ICD-10-CM

## 2022-01-01 DIAGNOSIS — Z88.8 ALLERGY STATUS TO OTHER DRUGS, MEDICAMENTS AND BIOLOGICAL SUBSTANCES STATUS: ICD-10-CM

## 2022-01-01 DIAGNOSIS — D64.9 ANEMIA, UNSPECIFIED: ICD-10-CM

## 2022-01-01 DIAGNOSIS — R06.02 SHORTNESS OF BREATH: ICD-10-CM

## 2022-01-01 DIAGNOSIS — E11.39 TYPE 2 DIABETES MELLITUS WITH OTHER DIABETIC OPHTHALMIC COMPLICATION: ICD-10-CM

## 2022-01-01 DIAGNOSIS — Z87.891 PERSONAL HISTORY OF NICOTINE DEPENDENCE: ICD-10-CM

## 2022-01-01 DIAGNOSIS — H81.10 BENIGN PAROXYSMAL VERTIGO, UNSPECIFIED EAR: ICD-10-CM

## 2022-01-01 DIAGNOSIS — Z99.81 DEPENDENCE ON SUPPLEMENTAL OXYGEN: ICD-10-CM

## 2022-01-01 DIAGNOSIS — E78.5 HYPERLIPIDEMIA, UNSPECIFIED: ICD-10-CM

## 2022-01-01 DIAGNOSIS — I21.A1 MYOCARDIAL INFARCTION TYPE 2: ICD-10-CM

## 2022-01-01 DIAGNOSIS — I50.9 HEART FAILURE, UNSPECIFIED: ICD-10-CM

## 2022-01-01 LAB
A1C WITH ESTIMATED AVERAGE GLUCOSE RESULT: 9.2 % — HIGH (ref 4–5.6)
A1C WITH ESTIMATED AVERAGE GLUCOSE RESULT: 9.4 % — HIGH (ref 4–5.6)
A1C WITH ESTIMATED AVERAGE GLUCOSE RESULT: 9.4 % — HIGH (ref 4–5.6)
ALBUMIN SERPL ELPH-MCNC: 3.5 G/DL — SIGNIFICANT CHANGE UP (ref 3.5–5.2)
ALBUMIN SERPL ELPH-MCNC: 3.6 G/DL — SIGNIFICANT CHANGE UP (ref 3.5–5.2)
ALBUMIN SERPL ELPH-MCNC: 3.7 G/DL — SIGNIFICANT CHANGE UP (ref 3.5–5.2)
ALBUMIN SERPL ELPH-MCNC: 3.8 G/DL
ALBUMIN SERPL ELPH-MCNC: 3.8 G/DL — SIGNIFICANT CHANGE UP (ref 3.5–5.2)
ALBUMIN SERPL ELPH-MCNC: 3.9 G/DL
ALBUMIN SERPL ELPH-MCNC: 3.9 G/DL — SIGNIFICANT CHANGE UP (ref 3.5–5.2)
ALBUMIN SERPL ELPH-MCNC: 3.9 G/DL — SIGNIFICANT CHANGE UP (ref 3.5–5.2)
ALBUMIN SERPL ELPH-MCNC: 4 G/DL — SIGNIFICANT CHANGE UP (ref 3.5–5.2)
ALP BLD-CCNC: 61 U/L
ALP BLD-CCNC: 69 U/L
ALP SERPL-CCNC: 54 U/L — SIGNIFICANT CHANGE UP (ref 30–115)
ALP SERPL-CCNC: 58 U/L — SIGNIFICANT CHANGE UP (ref 30–115)
ALP SERPL-CCNC: 59 U/L — SIGNIFICANT CHANGE UP (ref 30–115)
ALP SERPL-CCNC: 60 U/L — SIGNIFICANT CHANGE UP (ref 30–115)
ALP SERPL-CCNC: 61 U/L — SIGNIFICANT CHANGE UP (ref 30–115)
ALP SERPL-CCNC: 62 U/L — SIGNIFICANT CHANGE UP (ref 30–115)
ALP SERPL-CCNC: 66 U/L — SIGNIFICANT CHANGE UP (ref 30–115)
ALP SERPL-CCNC: 67 U/L — SIGNIFICANT CHANGE UP (ref 30–115)
ALP SERPL-CCNC: 67 U/L — SIGNIFICANT CHANGE UP (ref 30–115)
ALP SERPL-CCNC: 69 U/L — SIGNIFICANT CHANGE UP (ref 30–115)
ALP SERPL-CCNC: 70 U/L — SIGNIFICANT CHANGE UP (ref 30–115)
ALP SERPL-CCNC: 70 U/L — SIGNIFICANT CHANGE UP (ref 30–115)
ALP SERPL-CCNC: 72 U/L — SIGNIFICANT CHANGE UP (ref 30–115)
ALT FLD-CCNC: 22 U/L — SIGNIFICANT CHANGE UP (ref 0–41)
ALT FLD-CCNC: 23 U/L — SIGNIFICANT CHANGE UP (ref 0–41)
ALT FLD-CCNC: 28 U/L — SIGNIFICANT CHANGE UP (ref 0–41)
ALT FLD-CCNC: 31 U/L — SIGNIFICANT CHANGE UP (ref 0–41)
ALT FLD-CCNC: 32 U/L — SIGNIFICANT CHANGE UP (ref 0–41)
ALT FLD-CCNC: 33 U/L — SIGNIFICANT CHANGE UP (ref 0–41)
ALT FLD-CCNC: 33 U/L — SIGNIFICANT CHANGE UP (ref 0–41)
ALT FLD-CCNC: 35 U/L — SIGNIFICANT CHANGE UP (ref 0–41)
ALT FLD-CCNC: 36 U/L — SIGNIFICANT CHANGE UP (ref 0–41)
ALT FLD-CCNC: 36 U/L — SIGNIFICANT CHANGE UP (ref 0–41)
ALT FLD-CCNC: 38 U/L — SIGNIFICANT CHANGE UP (ref 0–41)
ALT FLD-CCNC: 44 U/L — HIGH (ref 0–41)
ALT FLD-CCNC: 45 U/L — HIGH (ref 0–41)
ALT SERPL-CCNC: 20 U/L
ALT SERPL-CCNC: 27 U/L
ANION GAP SERPL CALC-SCNC: 10 MMOL/L — SIGNIFICANT CHANGE UP (ref 7–14)
ANION GAP SERPL CALC-SCNC: 11 MMOL/L
ANION GAP SERPL CALC-SCNC: 11 MMOL/L — SIGNIFICANT CHANGE UP (ref 7–14)
ANION GAP SERPL CALC-SCNC: 11 MMOL/L — SIGNIFICANT CHANGE UP (ref 7–14)
ANION GAP SERPL CALC-SCNC: 12 MMOL/L — SIGNIFICANT CHANGE UP (ref 7–14)
ANION GAP SERPL CALC-SCNC: 13 MMOL/L — SIGNIFICANT CHANGE UP (ref 7–14)
ANION GAP SERPL CALC-SCNC: 14 MMOL/L — SIGNIFICANT CHANGE UP (ref 7–14)
ANION GAP SERPL CALC-SCNC: 15 MMOL/L — HIGH (ref 7–14)
ANION GAP SERPL CALC-SCNC: 15 MMOL/L — HIGH (ref 7–14)
ANION GAP SERPL CALC-SCNC: 16 MMOL/L
ANION GAP SERPL CALC-SCNC: 16 MMOL/L — HIGH (ref 7–14)
ANION GAP SERPL CALC-SCNC: 17 MMOL/L — HIGH (ref 7–14)
ANION GAP SERPL CALC-SCNC: 19 MMOL/L — HIGH (ref 7–14)
ANION GAP SERPL CALC-SCNC: 19 MMOL/L — HIGH (ref 7–14)
APTT BLD: 27 SEC — SIGNIFICANT CHANGE UP (ref 27–39.2)
AST SERPL-CCNC: 194 U/L — HIGH (ref 0–41)
AST SERPL-CCNC: 20 U/L
AST SERPL-CCNC: 20 U/L — SIGNIFICANT CHANGE UP (ref 0–41)
AST SERPL-CCNC: 20 U/L — SIGNIFICANT CHANGE UP (ref 0–41)
AST SERPL-CCNC: 23 U/L
AST SERPL-CCNC: 23 U/L — SIGNIFICANT CHANGE UP (ref 0–41)
AST SERPL-CCNC: 25 U/L — SIGNIFICANT CHANGE UP (ref 0–41)
AST SERPL-CCNC: 26 U/L — SIGNIFICANT CHANGE UP (ref 0–41)
AST SERPL-CCNC: 28 U/L — SIGNIFICANT CHANGE UP (ref 0–41)
AST SERPL-CCNC: 32 U/L — SIGNIFICANT CHANGE UP (ref 0–41)
AST SERPL-CCNC: 32 U/L — SIGNIFICANT CHANGE UP (ref 0–41)
AST SERPL-CCNC: 34 U/L — SIGNIFICANT CHANGE UP (ref 0–41)
AST SERPL-CCNC: 36 U/L — SIGNIFICANT CHANGE UP (ref 0–41)
AST SERPL-CCNC: 50 U/L — HIGH (ref 0–41)
AST SERPL-CCNC: 89 U/L — HIGH (ref 0–41)
B-OH-BUTYR SERPL-SCNC: <0.2 MMOL/L — SIGNIFICANT CHANGE UP
BASE EXCESS BLDV CALC-SCNC: -1.4 MMOL/L — SIGNIFICANT CHANGE UP (ref -2–3)
BASE EXCESS BLDV CALC-SCNC: -10.4 MMOL/L — LOW (ref -2–3)
BASE EXCESS BLDV CALC-SCNC: 0.6 MMOL/L — SIGNIFICANT CHANGE UP (ref -2–3)
BASOPHILS # BLD AUTO: 0 K/UL — SIGNIFICANT CHANGE UP (ref 0–0.2)
BASOPHILS # BLD AUTO: 0 K/UL — SIGNIFICANT CHANGE UP (ref 0–0.2)
BASOPHILS # BLD AUTO: 0.01 K/UL
BASOPHILS # BLD AUTO: 0.01 K/UL — SIGNIFICANT CHANGE UP (ref 0–0.2)
BASOPHILS # BLD AUTO: 0.02 K/UL — SIGNIFICANT CHANGE UP (ref 0–0.2)
BASOPHILS # BLD AUTO: 0.09 K/UL — SIGNIFICANT CHANGE UP (ref 0–0.2)
BASOPHILS NFR BLD AUTO: 0 % — SIGNIFICANT CHANGE UP (ref 0–1)
BASOPHILS NFR BLD AUTO: 0 % — SIGNIFICANT CHANGE UP (ref 0–1)
BASOPHILS NFR BLD AUTO: 0.1 %
BASOPHILS NFR BLD AUTO: 0.1 % — SIGNIFICANT CHANGE UP (ref 0–1)
BASOPHILS NFR BLD AUTO: 0.2 % — SIGNIFICANT CHANGE UP (ref 0–1)
BASOPHILS NFR BLD AUTO: 0.4 % — SIGNIFICANT CHANGE UP (ref 0–1)
BILIRUB SERPL-MCNC: 0.3 MG/DL
BILIRUB SERPL-MCNC: 0.3 MG/DL — SIGNIFICANT CHANGE UP (ref 0.2–1.2)
BILIRUB SERPL-MCNC: 0.4 MG/DL — SIGNIFICANT CHANGE UP (ref 0.2–1.2)
BILIRUB SERPL-MCNC: 0.5 MG/DL — SIGNIFICANT CHANGE UP (ref 0.2–1.2)
BILIRUB SERPL-MCNC: 0.6 MG/DL
BILIRUB SERPL-MCNC: 0.6 MG/DL — SIGNIFICANT CHANGE UP (ref 0.2–1.2)
BILIRUB SERPL-MCNC: 0.6 MG/DL — SIGNIFICANT CHANGE UP (ref 0.2–1.2)
BILIRUB SERPL-MCNC: 0.8 MG/DL — SIGNIFICANT CHANGE UP (ref 0.2–1.2)
BILIRUB SERPL-MCNC: 0.9 MG/DL — SIGNIFICANT CHANGE UP (ref 0.2–1.2)
BILIRUB SERPL-MCNC: 1 MG/DL — SIGNIFICANT CHANGE UP (ref 0.2–1.2)
BILIRUB SERPL-MCNC: 1.2 MG/DL — SIGNIFICANT CHANGE UP (ref 0.2–1.2)
BLD GP AB SCN SERPL QL: SIGNIFICANT CHANGE UP
BUN SERPL-MCNC: 17 MG/DL — SIGNIFICANT CHANGE UP (ref 10–20)
BUN SERPL-MCNC: 20 MG/DL — SIGNIFICANT CHANGE UP (ref 10–20)
BUN SERPL-MCNC: 25 MG/DL
BUN SERPL-MCNC: 33 MG/DL — HIGH (ref 10–20)
BUN SERPL-MCNC: 35 MG/DL — HIGH (ref 10–20)
BUN SERPL-MCNC: 37 MG/DL
BUN SERPL-MCNC: 37 MG/DL — HIGH (ref 10–20)
BUN SERPL-MCNC: 38 MG/DL — HIGH (ref 10–20)
BUN SERPL-MCNC: 39 MG/DL — HIGH (ref 10–20)
BUN SERPL-MCNC: 39 MG/DL — HIGH (ref 10–20)
BUN SERPL-MCNC: 43 MG/DL — HIGH (ref 10–20)
BUN SERPL-MCNC: 45 MG/DL — HIGH (ref 10–20)
BUN SERPL-MCNC: 47 MG/DL — HIGH (ref 10–20)
BUN SERPL-MCNC: 48 MG/DL — HIGH (ref 10–20)
BUN SERPL-MCNC: 52 MG/DL — HIGH (ref 10–20)
BUN SERPL-MCNC: 53 MG/DL — HIGH (ref 10–20)
BUN SERPL-MCNC: 54 MG/DL — HIGH (ref 10–20)
BUN SERPL-MCNC: 55 MG/DL — HIGH (ref 10–20)
BUN SERPL-MCNC: 64 MG/DL — CRITICAL HIGH (ref 10–20)
BUN SERPL-MCNC: 66 MG/DL — CRITICAL HIGH (ref 10–20)
BUN SERPL-MCNC: 70 MG/DL — CRITICAL HIGH (ref 10–20)
CA-I SERPL-SCNC: 1.2 MMOL/L — SIGNIFICANT CHANGE UP (ref 1.15–1.33)
CA-I SERPL-SCNC: 1.22 MMOL/L — SIGNIFICANT CHANGE UP (ref 1.15–1.33)
CA-I SERPL-SCNC: 1.3 MMOL/L — SIGNIFICANT CHANGE UP (ref 1.15–1.33)
CALCIUM SERPL-MCNC: 8.4 MG/DL — SIGNIFICANT CHANGE UP (ref 8.4–10.5)
CALCIUM SERPL-MCNC: 8.7 MG/DL — SIGNIFICANT CHANGE UP (ref 8.5–10.1)
CALCIUM SERPL-MCNC: 8.8 MG/DL — SIGNIFICANT CHANGE UP (ref 8.5–10.1)
CALCIUM SERPL-MCNC: 8.9 MG/DL — SIGNIFICANT CHANGE UP (ref 8.4–10.5)
CALCIUM SERPL-MCNC: 8.9 MG/DL — SIGNIFICANT CHANGE UP (ref 8.5–10.1)
CALCIUM SERPL-MCNC: 9 MG/DL — SIGNIFICANT CHANGE UP (ref 8.4–10.4)
CALCIUM SERPL-MCNC: 9 MG/DL — SIGNIFICANT CHANGE UP (ref 8.5–10.1)
CALCIUM SERPL-MCNC: 9 MG/DL — SIGNIFICANT CHANGE UP (ref 8.5–10.1)
CALCIUM SERPL-MCNC: 9.1 MG/DL — SIGNIFICANT CHANGE UP (ref 8.4–10.5)
CALCIUM SERPL-MCNC: 9.1 MG/DL — SIGNIFICANT CHANGE UP (ref 8.4–10.5)
CALCIUM SERPL-MCNC: 9.2 MG/DL — SIGNIFICANT CHANGE UP (ref 8.5–10.1)
CALCIUM SERPL-MCNC: 9.4 MG/DL — SIGNIFICANT CHANGE UP (ref 8.4–10.5)
CALCIUM SERPL-MCNC: 9.5 MG/DL
CALCIUM SERPL-MCNC: 9.5 MG/DL — SIGNIFICANT CHANGE UP (ref 8.4–10.5)
CALCIUM SERPL-MCNC: 9.6 MG/DL — SIGNIFICANT CHANGE UP (ref 8.4–10.5)
CALCIUM SERPL-MCNC: 9.8 MG/DL
CHLORIDE SERPL-SCNC: 100 MMOL/L — SIGNIFICANT CHANGE UP (ref 98–110)
CHLORIDE SERPL-SCNC: 101 MMOL/L — SIGNIFICANT CHANGE UP (ref 98–110)
CHLORIDE SERPL-SCNC: 103 MMOL/L — SIGNIFICANT CHANGE UP (ref 98–110)
CHLORIDE SERPL-SCNC: 104 MMOL/L — SIGNIFICANT CHANGE UP (ref 98–110)
CHLORIDE SERPL-SCNC: 105 MMOL/L — SIGNIFICANT CHANGE UP (ref 98–110)
CHLORIDE SERPL-SCNC: 106 MMOL/L
CHLORIDE SERPL-SCNC: 94 MMOL/L — LOW (ref 98–110)
CHLORIDE SERPL-SCNC: 95 MMOL/L — LOW (ref 98–110)
CHLORIDE SERPL-SCNC: 95 MMOL/L — LOW (ref 98–110)
CHLORIDE SERPL-SCNC: 98 MMOL/L — SIGNIFICANT CHANGE UP (ref 98–110)
CHLORIDE SERPL-SCNC: 99 MMOL/L
CHLORIDE SERPL-SCNC: 99 MMOL/L — SIGNIFICANT CHANGE UP (ref 98–110)
CHLORIDE SERPL-SCNC: 99 MMOL/L — SIGNIFICANT CHANGE UP (ref 98–110)
CHOLEST SERPL-MCNC: 173 MG/DL — SIGNIFICANT CHANGE UP
CO2 SERPL-SCNC: 20 MMOL/L — SIGNIFICANT CHANGE UP (ref 17–32)
CO2 SERPL-SCNC: 22 MMOL/L — SIGNIFICANT CHANGE UP (ref 17–32)
CO2 SERPL-SCNC: 24 MMOL/L — SIGNIFICANT CHANGE UP (ref 17–32)
CO2 SERPL-SCNC: 24 MMOL/L — SIGNIFICANT CHANGE UP (ref 17–32)
CO2 SERPL-SCNC: 25 MMOL/L
CO2 SERPL-SCNC: 25 MMOL/L
CO2 SERPL-SCNC: 25 MMOL/L — SIGNIFICANT CHANGE UP (ref 17–32)
CO2 SERPL-SCNC: 25 MMOL/L — SIGNIFICANT CHANGE UP (ref 17–32)
CO2 SERPL-SCNC: 26 MMOL/L — SIGNIFICANT CHANGE UP (ref 17–32)
CO2 SERPL-SCNC: 27 MMOL/L — SIGNIFICANT CHANGE UP (ref 17–32)
CO2 SERPL-SCNC: 28 MMOL/L — SIGNIFICANT CHANGE UP (ref 17–32)
CO2 SERPL-SCNC: 29 MMOL/L — SIGNIFICANT CHANGE UP (ref 17–32)
CO2 SERPL-SCNC: 31 MMOL/L — SIGNIFICANT CHANGE UP (ref 17–32)
CO2 SERPL-SCNC: 31 MMOL/L — SIGNIFICANT CHANGE UP (ref 17–32)
CO2 SERPL-SCNC: 32 MMOL/L — SIGNIFICANT CHANGE UP (ref 17–32)
CREAT SERPL-MCNC: 1 MG/DL — SIGNIFICANT CHANGE UP (ref 0.7–1.5)
CREAT SERPL-MCNC: 1.1 MG/DL
CREAT SERPL-MCNC: 1.1 MG/DL — SIGNIFICANT CHANGE UP (ref 0.7–1.5)
CREAT SERPL-MCNC: 1.2 MG/DL
CREAT SERPL-MCNC: 1.3 MG/DL — SIGNIFICANT CHANGE UP (ref 0.7–1.5)
CREAT SERPL-MCNC: 1.3 MG/DL — SIGNIFICANT CHANGE UP (ref 0.7–1.5)
CREAT SERPL-MCNC: 1.4 MG/DL — SIGNIFICANT CHANGE UP (ref 0.7–1.5)
CREAT SERPL-MCNC: 1.4 MG/DL — SIGNIFICANT CHANGE UP (ref 0.7–1.5)
CREAT SERPL-MCNC: 1.5 MG/DL — SIGNIFICANT CHANGE UP (ref 0.7–1.5)
CREAT SERPL-MCNC: 1.6 MG/DL — HIGH (ref 0.7–1.5)
CREAT SERPL-MCNC: 1.7 MG/DL — HIGH (ref 0.7–1.5)
CREAT SERPL-MCNC: 1.7 MG/DL — HIGH (ref 0.7–1.5)
CREAT SERPL-MCNC: 1.8 MG/DL — HIGH (ref 0.7–1.5)
CULTURE RESULTS: SIGNIFICANT CHANGE UP
D DIMER BLD IA.RAPID-MCNC: 270 NG/ML DDU — HIGH (ref 0–230)
EGFR: 28 ML/MIN/1.73M2 — LOW
EGFR: 30 ML/MIN/1.73M2 — LOW
EGFR: 30 ML/MIN/1.73M2 — LOW
EGFR: 32 ML/MIN/1.73M2 — LOW
EGFR: 34 ML/MIN/1.73M2 — LOW
EGFR: 37 ML/MIN/1.73M2 — LOW
EGFR: 37 ML/MIN/1.73M2 — LOW
EGFR: 41 ML/MIN/1.73M2 — LOW
EGFR: 41 ML/MIN/1.73M2 — LOW
EGFR: 45 ML/MIN/1.73M2
EGFR: 50 ML/MIN/1.73M2
EGFR: 50 ML/MIN/1.73M2 — LOW
EGFR: 56 ML/MIN/1.73M2 — LOW
EOSINOPHIL # BLD AUTO: 0 K/UL
EOSINOPHIL # BLD AUTO: 0 K/UL — SIGNIFICANT CHANGE UP (ref 0–0.7)
EOSINOPHIL # BLD AUTO: 0.01 K/UL — SIGNIFICANT CHANGE UP (ref 0–0.7)
EOSINOPHIL # BLD AUTO: 0.02 K/UL — SIGNIFICANT CHANGE UP (ref 0–0.7)
EOSINOPHIL # BLD AUTO: 0.07 K/UL — SIGNIFICANT CHANGE UP (ref 0–0.7)
EOSINOPHIL # BLD AUTO: 0.07 K/UL — SIGNIFICANT CHANGE UP (ref 0–0.7)
EOSINOPHIL # BLD AUTO: 0.08 K/UL — SIGNIFICANT CHANGE UP (ref 0–0.7)
EOSINOPHIL NFR BLD AUTO: 0 %
EOSINOPHIL NFR BLD AUTO: 0 % — SIGNIFICANT CHANGE UP (ref 0–8)
EOSINOPHIL NFR BLD AUTO: 0.3 % — SIGNIFICANT CHANGE UP (ref 0–8)
EOSINOPHIL NFR BLD AUTO: 0.6 % — SIGNIFICANT CHANGE UP (ref 0–8)
EOSINOPHIL NFR BLD AUTO: 0.7 % — SIGNIFICANT CHANGE UP (ref 0–8)
EOSINOPHIL NFR BLD AUTO: 0.7 % — SIGNIFICANT CHANGE UP (ref 0–8)
ESTIMATED AVERAGE GLUCOSE: 217 MG/DL — HIGH (ref 68–114)
ESTIMATED AVERAGE GLUCOSE: 223 MG/DL — HIGH (ref 68–114)
ESTIMATED AVERAGE GLUCOSE: 223 MG/DL — HIGH (ref 68–114)
FERRITIN SERPL-MCNC: 109 NG/ML
FERRITIN SERPL-MCNC: 130 NG/ML
FERRITIN SERPL-MCNC: 161 NG/ML
FLUAV AG NPH QL: SIGNIFICANT CHANGE UP
FLUBV AG NPH QL: SIGNIFICANT CHANGE UP
GAS PNL BLDA: SIGNIFICANT CHANGE UP
GAS PNL BLDV: 137 MMOL/L — SIGNIFICANT CHANGE UP (ref 136–145)
GAS PNL BLDV: 138 MMOL/L — SIGNIFICANT CHANGE UP (ref 136–145)
GAS PNL BLDV: 139 MMOL/L — SIGNIFICANT CHANGE UP (ref 136–145)
GAS PNL BLDV: SIGNIFICANT CHANGE UP
GLUCOSE BLDC GLUCOMTR-MCNC: 135 MG/DL — HIGH (ref 70–99)
GLUCOSE BLDC GLUCOMTR-MCNC: 145 MG/DL — HIGH (ref 70–99)
GLUCOSE BLDC GLUCOMTR-MCNC: 152 MG/DL — HIGH (ref 70–99)
GLUCOSE BLDC GLUCOMTR-MCNC: 152 MG/DL — HIGH (ref 70–99)
GLUCOSE BLDC GLUCOMTR-MCNC: 157 MG/DL — HIGH (ref 70–99)
GLUCOSE BLDC GLUCOMTR-MCNC: 171 MG/DL — HIGH (ref 70–99)
GLUCOSE BLDC GLUCOMTR-MCNC: 197 MG/DL — HIGH (ref 70–99)
GLUCOSE BLDC GLUCOMTR-MCNC: 205 MG/DL — HIGH (ref 70–99)
GLUCOSE BLDC GLUCOMTR-MCNC: 214 MG/DL — HIGH (ref 70–99)
GLUCOSE BLDC GLUCOMTR-MCNC: 216 MG/DL — HIGH (ref 70–99)
GLUCOSE BLDC GLUCOMTR-MCNC: 234 MG/DL — HIGH (ref 70–99)
GLUCOSE BLDC GLUCOMTR-MCNC: 234 MG/DL — HIGH (ref 70–99)
GLUCOSE BLDC GLUCOMTR-MCNC: 243 MG/DL — HIGH (ref 70–99)
GLUCOSE BLDC GLUCOMTR-MCNC: 244 MG/DL — HIGH (ref 70–99)
GLUCOSE BLDC GLUCOMTR-MCNC: 249 MG/DL — HIGH (ref 70–99)
GLUCOSE BLDC GLUCOMTR-MCNC: 251 MG/DL — HIGH (ref 70–99)
GLUCOSE BLDC GLUCOMTR-MCNC: 253 MG/DL — HIGH (ref 70–99)
GLUCOSE BLDC GLUCOMTR-MCNC: 257 MG/DL — HIGH (ref 70–99)
GLUCOSE BLDC GLUCOMTR-MCNC: 262 MG/DL — HIGH (ref 70–99)
GLUCOSE BLDC GLUCOMTR-MCNC: 262 MG/DL — HIGH (ref 70–99)
GLUCOSE BLDC GLUCOMTR-MCNC: 263 MG/DL — HIGH (ref 70–99)
GLUCOSE BLDC GLUCOMTR-MCNC: 266 MG/DL — HIGH (ref 70–99)
GLUCOSE BLDC GLUCOMTR-MCNC: 269 MG/DL — HIGH (ref 70–99)
GLUCOSE BLDC GLUCOMTR-MCNC: 279 MG/DL — HIGH (ref 70–99)
GLUCOSE BLDC GLUCOMTR-MCNC: 280 MG/DL — HIGH (ref 70–99)
GLUCOSE BLDC GLUCOMTR-MCNC: 281 MG/DL — HIGH (ref 70–99)
GLUCOSE BLDC GLUCOMTR-MCNC: 282 MG/DL — HIGH (ref 70–99)
GLUCOSE BLDC GLUCOMTR-MCNC: 289 MG/DL — HIGH (ref 70–99)
GLUCOSE BLDC GLUCOMTR-MCNC: 294 MG/DL — HIGH (ref 70–99)
GLUCOSE BLDC GLUCOMTR-MCNC: 308 MG/DL — HIGH (ref 70–99)
GLUCOSE BLDC GLUCOMTR-MCNC: 310 MG/DL — HIGH (ref 70–99)
GLUCOSE BLDC GLUCOMTR-MCNC: 313 MG/DL — HIGH (ref 70–99)
GLUCOSE BLDC GLUCOMTR-MCNC: 320 MG/DL — HIGH (ref 70–99)
GLUCOSE BLDC GLUCOMTR-MCNC: 321 MG/DL — HIGH (ref 70–99)
GLUCOSE BLDC GLUCOMTR-MCNC: 321 MG/DL — HIGH (ref 70–99)
GLUCOSE BLDC GLUCOMTR-MCNC: 325 MG/DL — HIGH (ref 70–99)
GLUCOSE BLDC GLUCOMTR-MCNC: 333 MG/DL — HIGH (ref 70–99)
GLUCOSE BLDC GLUCOMTR-MCNC: 337 MG/DL — HIGH (ref 70–99)
GLUCOSE BLDC GLUCOMTR-MCNC: 346 MG/DL — HIGH (ref 70–99)
GLUCOSE BLDC GLUCOMTR-MCNC: 347 MG/DL — HIGH (ref 70–99)
GLUCOSE BLDC GLUCOMTR-MCNC: 350 MG/DL — HIGH (ref 70–99)
GLUCOSE BLDC GLUCOMTR-MCNC: 359 MG/DL — HIGH (ref 70–99)
GLUCOSE BLDC GLUCOMTR-MCNC: 359 MG/DL — HIGH (ref 70–99)
GLUCOSE BLDC GLUCOMTR-MCNC: 367 MG/DL — HIGH (ref 70–99)
GLUCOSE BLDC GLUCOMTR-MCNC: 368 MG/DL — HIGH (ref 70–99)
GLUCOSE BLDC GLUCOMTR-MCNC: 376 MG/DL — HIGH (ref 70–99)
GLUCOSE BLDC GLUCOMTR-MCNC: 381 MG/DL — HIGH (ref 70–99)
GLUCOSE BLDC GLUCOMTR-MCNC: 399 MG/DL — HIGH (ref 70–99)
GLUCOSE BLDC GLUCOMTR-MCNC: 399 MG/DL — HIGH (ref 70–99)
GLUCOSE BLDC GLUCOMTR-MCNC: 400 MG/DL — HIGH (ref 70–99)
GLUCOSE BLDC GLUCOMTR-MCNC: 405 MG/DL — HIGH (ref 70–99)
GLUCOSE BLDC GLUCOMTR-MCNC: 406 MG/DL — HIGH (ref 70–99)
GLUCOSE BLDC GLUCOMTR-MCNC: 410 MG/DL — HIGH (ref 70–99)
GLUCOSE BLDC GLUCOMTR-MCNC: 410 MG/DL — HIGH (ref 70–99)
GLUCOSE BLDC GLUCOMTR-MCNC: 420 MG/DL — HIGH (ref 70–99)
GLUCOSE BLDC GLUCOMTR-MCNC: 428 MG/DL — HIGH (ref 70–99)
GLUCOSE BLDC GLUCOMTR-MCNC: 437 MG/DL — HIGH (ref 70–99)
GLUCOSE BLDC GLUCOMTR-MCNC: 439 MG/DL — HIGH (ref 70–99)
GLUCOSE BLDC GLUCOMTR-MCNC: 448 MG/DL — HIGH (ref 70–99)
GLUCOSE BLDC GLUCOMTR-MCNC: 460 MG/DL — CRITICAL HIGH (ref 70–99)
GLUCOSE BLDC GLUCOMTR-MCNC: 465 MG/DL — CRITICAL HIGH (ref 70–99)
GLUCOSE BLDC GLUCOMTR-MCNC: 483 MG/DL — CRITICAL HIGH (ref 70–99)
GLUCOSE BLDC GLUCOMTR-MCNC: 72 MG/DL — SIGNIFICANT CHANGE UP (ref 70–99)
GLUCOSE BLDC GLUCOMTR-MCNC: 77 MG/DL — SIGNIFICANT CHANGE UP (ref 70–99)
GLUCOSE BLDC GLUCOMTR-MCNC: 78 MG/DL — SIGNIFICANT CHANGE UP (ref 70–99)
GLUCOSE BLDC GLUCOMTR-MCNC: 79 MG/DL — SIGNIFICANT CHANGE UP (ref 70–99)
GLUCOSE BLDC GLUCOMTR-MCNC: 80 MG/DL — SIGNIFICANT CHANGE UP (ref 70–99)
GLUCOSE SERPL-MCNC: 116 MG/DL — HIGH (ref 70–99)
GLUCOSE SERPL-MCNC: 118 MG/DL — HIGH (ref 70–99)
GLUCOSE SERPL-MCNC: 132 MG/DL — HIGH (ref 70–99)
GLUCOSE SERPL-MCNC: 158 MG/DL
GLUCOSE SERPL-MCNC: 162 MG/DL — HIGH (ref 70–99)
GLUCOSE SERPL-MCNC: 184 MG/DL — HIGH (ref 70–99)
GLUCOSE SERPL-MCNC: 215 MG/DL — HIGH (ref 70–99)
GLUCOSE SERPL-MCNC: 241 MG/DL — HIGH (ref 70–99)
GLUCOSE SERPL-MCNC: 252 MG/DL — HIGH (ref 70–99)
GLUCOSE SERPL-MCNC: 267 MG/DL — HIGH (ref 70–99)
GLUCOSE SERPL-MCNC: 321 MG/DL — HIGH (ref 70–99)
GLUCOSE SERPL-MCNC: 344 MG/DL — HIGH (ref 70–99)
GLUCOSE SERPL-MCNC: 364 MG/DL — HIGH (ref 70–99)
GLUCOSE SERPL-MCNC: 372 MG/DL
GLUCOSE SERPL-MCNC: 381 MG/DL — HIGH (ref 70–99)
GLUCOSE SERPL-MCNC: 408 MG/DL — HIGH (ref 70–99)
GLUCOSE SERPL-MCNC: 408 MG/DL — HIGH (ref 70–99)
GLUCOSE SERPL-MCNC: 433 MG/DL — HIGH (ref 70–99)
GLUCOSE SERPL-MCNC: 549 MG/DL — CRITICAL HIGH (ref 70–99)
GLUCOSE SERPL-MCNC: 68 MG/DL — LOW (ref 70–99)
GLUCOSE SERPL-MCNC: 94 MG/DL — SIGNIFICANT CHANGE UP (ref 70–99)
HAPTOGLOB SERPL-MCNC: 268 MG/DL
HAPTOGLOB SERPL-MCNC: 269 MG/DL
HCO3 BLDV-SCNC: 20 MMOL/L — LOW (ref 22–29)
HCO3 BLDV-SCNC: 25 MMOL/L — SIGNIFICANT CHANGE UP (ref 22–29)
HCO3 BLDV-SCNC: 27 MMOL/L — SIGNIFICANT CHANGE UP (ref 22–29)
HCT VFR BLD CALC: 26.5 % — LOW (ref 37–47)
HCT VFR BLD CALC: 28.6 % — LOW (ref 37–47)
HCT VFR BLD CALC: 28.9 % — LOW (ref 37–47)
HCT VFR BLD CALC: 29.4 % — LOW (ref 37–47)
HCT VFR BLD CALC: 29.6 % — LOW (ref 37–47)
HCT VFR BLD CALC: 30.2 % — LOW (ref 37–47)
HCT VFR BLD CALC: 31 % — LOW (ref 37–47)
HCT VFR BLD CALC: 31.3 %
HCT VFR BLD CALC: 31.9 % — LOW (ref 37–47)
HCT VFR BLD CALC: 32.1 % — LOW (ref 37–47)
HCT VFR BLD CALC: 32.4 % — LOW (ref 37–47)
HCT VFR BLD CALC: 32.6 %
HCT VFR BLD CALC: 32.6 % — LOW (ref 37–47)
HCT VFR BLD CALC: 32.8 % — LOW (ref 37–47)
HCT VFR BLD CALC: 33.4 % — LOW (ref 37–47)
HCT VFR BLD CALC: 34.1 %
HCT VFR BLD CALC: 34.3 % — LOW (ref 37–47)
HCT VFR BLD CALC: 35.8 % — LOW (ref 37–47)
HCT VFR BLDA CALC: 31 % — LOW (ref 39–51)
HCT VFR BLDA CALC: 33 % — LOW (ref 39–51)
HCT VFR BLDA CALC: 37 % — LOW (ref 39–51)
HDLC SERPL-MCNC: 66 MG/DL — SIGNIFICANT CHANGE UP
HGB BLD CALC-MCNC: 10.3 G/DL — LOW (ref 12.6–17.4)
HGB BLD CALC-MCNC: 11.1 G/DL — LOW (ref 12.6–17.4)
HGB BLD CALC-MCNC: 12.4 G/DL — LOW (ref 12.6–17.4)
HGB BLD-MCNC: 10 G/DL — LOW (ref 12–16)
HGB BLD-MCNC: 10.2 G/DL
HGB BLD-MCNC: 10.3 G/DL — LOW (ref 12–16)
HGB BLD-MCNC: 10.4 G/DL — LOW (ref 12–16)
HGB BLD-MCNC: 10.7 G/DL — LOW (ref 12–16)
HGB BLD-MCNC: 11 G/DL — LOW (ref 12–16)
HGB BLD-MCNC: 11.2 G/DL
HGB BLD-MCNC: 11.2 G/DL — LOW (ref 12–16)
HGB BLD-MCNC: 8.8 G/DL — LOW (ref 12–16)
HGB BLD-MCNC: 9.2 G/DL — LOW (ref 12–16)
HGB BLD-MCNC: 9.3 G/DL — LOW (ref 12–16)
HGB BLD-MCNC: 9.4 G/DL — LOW (ref 12–16)
HGB BLD-MCNC: 9.6 G/DL — LOW (ref 12–16)
HGB BLD-MCNC: 9.6 G/DL — LOW (ref 12–16)
HGB BLD-MCNC: 9.7 G/DL
IMM GRANULOCYTES NFR BLD AUTO: 1 % — HIGH (ref 0.1–0.3)
IMM GRANULOCYTES NFR BLD AUTO: 1.4 % — HIGH (ref 0.1–0.3)
IMM GRANULOCYTES NFR BLD AUTO: 1.5 % — HIGH (ref 0.1–0.3)
IMM GRANULOCYTES NFR BLD AUTO: 1.6 % — HIGH (ref 0.1–0.3)
IMM GRANULOCYTES NFR BLD AUTO: 1.9 %
IMM GRANULOCYTES NFR BLD AUTO: 2.8 % — HIGH (ref 0.1–0.3)
IMM GRANULOCYTES NFR BLD AUTO: 2.9 % — HIGH (ref 0.1–0.3)
INR BLD: 1.11 RATIO — SIGNIFICANT CHANGE UP (ref 0.65–1.3)
IRON SATN MFR SERPL: 17 %
IRON SATN MFR SERPL: 23 %
IRON SATN MFR SERPL: 28 %
IRON SERPL-MCNC: 53 UG/DL
IRON SERPL-MCNC: 76 UG/DL
IRON SERPL-MCNC: 94 UG/DL
LACTATE BLDV-MCNC: 1.8 MMOL/L — SIGNIFICANT CHANGE UP (ref 0.5–2)
LACTATE BLDV-MCNC: 4.7 MMOL/L — CRITICAL HIGH (ref 0.5–2)
LACTATE BLDV-MCNC: 9.3 MMOL/L — CRITICAL HIGH (ref 0.5–2)
LDH SERPL-CCNC: 206
LDH SERPL-CCNC: 218
LDH SERPL-CCNC: 250
LIPID PNL WITH DIRECT LDL SERPL: 87 MG/DL — SIGNIFICANT CHANGE UP
LYMPHOCYTES # BLD AUTO: 0.41 K/UL — LOW (ref 1.2–3.4)
LYMPHOCYTES # BLD AUTO: 0.44 K/UL — LOW (ref 1.2–3.4)
LYMPHOCYTES # BLD AUTO: 0.49 K/UL — LOW (ref 1.2–3.4)
LYMPHOCYTES # BLD AUTO: 0.56 K/UL — LOW (ref 1.2–3.4)
LYMPHOCYTES # BLD AUTO: 0.63 K/UL
LYMPHOCYTES # BLD AUTO: 0.65 K/UL — LOW (ref 1.2–3.4)
LYMPHOCYTES # BLD AUTO: 0.67 K/UL — LOW (ref 1.2–3.4)
LYMPHOCYTES # BLD AUTO: 0.75 K/UL — LOW (ref 1.2–3.4)
LYMPHOCYTES # BLD AUTO: 1.12 K/UL — LOW (ref 1.2–3.4)
LYMPHOCYTES # BLD AUTO: 1.18 K/UL — LOW (ref 1.2–3.4)
LYMPHOCYTES # BLD AUTO: 1.41 K/UL — SIGNIFICANT CHANGE UP (ref 1.2–3.4)
LYMPHOCYTES # BLD AUTO: 10.1 % — LOW (ref 20.5–51.1)
LYMPHOCYTES # BLD AUTO: 10.4 % — LOW (ref 20.5–51.1)
LYMPHOCYTES # BLD AUTO: 10.8 % — LOW (ref 20.5–51.1)
LYMPHOCYTES # BLD AUTO: 11 % — LOW (ref 20.5–51.1)
LYMPHOCYTES # BLD AUTO: 12.5 % — LOW (ref 20.5–51.1)
LYMPHOCYTES # BLD AUTO: 2.52 K/UL — SIGNIFICANT CHANGE UP (ref 1.2–3.4)
LYMPHOCYTES # BLD AUTO: 3.5 % — LOW (ref 20.5–51.1)
LYMPHOCYTES # BLD AUTO: 3.8 % — LOW (ref 20.5–51.1)
LYMPHOCYTES # BLD AUTO: 7.9 % — LOW (ref 20.5–51.1)
LYMPHOCYTES # BLD AUTO: 8.2 % — LOW (ref 20.5–51.1)
LYMPHOCYTES # BLD AUTO: 9.4 % — LOW (ref 20.5–51.1)
LYMPHOCYTES # BLD AUTO: 9.8 % — LOW (ref 20.5–51.1)
LYMPHOCYTES NFR BLD AUTO: 6.6 %
MAGNESIUM SERPL-MCNC: 1.7 MG/DL — LOW (ref 1.8–2.4)
MAGNESIUM SERPL-MCNC: 1.8 MG/DL — SIGNIFICANT CHANGE UP (ref 1.8–2.4)
MAGNESIUM SERPL-MCNC: 1.9 MG/DL — SIGNIFICANT CHANGE UP (ref 1.8–2.4)
MAGNESIUM SERPL-MCNC: 2 MG/DL — SIGNIFICANT CHANGE UP (ref 1.8–2.4)
MAGNESIUM SERPL-MCNC: 2.6 MG/DL — HIGH (ref 1.8–2.4)
MAN DIFF?: NORMAL
MCHC RBC-ENTMCNC: 25.8 PG
MCHC RBC-ENTMCNC: 26 PG — LOW (ref 27–31)
MCHC RBC-ENTMCNC: 26.2 PG — LOW (ref 27–31)
MCHC RBC-ENTMCNC: 26.3 PG — LOW (ref 27–31)
MCHC RBC-ENTMCNC: 26.5 PG — LOW (ref 27–31)
MCHC RBC-ENTMCNC: 26.6 PG — LOW (ref 27–31)
MCHC RBC-ENTMCNC: 26.9 PG
MCHC RBC-ENTMCNC: 27.5 PG — SIGNIFICANT CHANGE UP (ref 27–31)
MCHC RBC-ENTMCNC: 27.6 PG — SIGNIFICANT CHANGE UP (ref 27–31)
MCHC RBC-ENTMCNC: 27.6 PG — SIGNIFICANT CHANGE UP (ref 27–31)
MCHC RBC-ENTMCNC: 27.7 PG — SIGNIFICANT CHANGE UP (ref 27–31)
MCHC RBC-ENTMCNC: 28.1 PG — SIGNIFICANT CHANGE UP (ref 27–31)
MCHC RBC-ENTMCNC: 28.5 PG — SIGNIFICANT CHANGE UP (ref 27–31)
MCHC RBC-ENTMCNC: 29 PG
MCHC RBC-ENTMCNC: 29.9 G/DL — LOW (ref 32–37)
MCHC RBC-ENTMCNC: 31 G/DL
MCHC RBC-ENTMCNC: 31.3 G/DL
MCHC RBC-ENTMCNC: 31.8 G/DL — LOW (ref 32–37)
MCHC RBC-ENTMCNC: 32 G/DL — SIGNIFICANT CHANGE UP (ref 32–37)
MCHC RBC-ENTMCNC: 32.1 G/DL — SIGNIFICANT CHANGE UP (ref 32–37)
MCHC RBC-ENTMCNC: 32.2 G/DL — SIGNIFICANT CHANGE UP (ref 32–37)
MCHC RBC-ENTMCNC: 32.2 G/DL — SIGNIFICANT CHANGE UP (ref 32–37)
MCHC RBC-ENTMCNC: 32.3 G/DL — SIGNIFICANT CHANGE UP (ref 32–37)
MCHC RBC-ENTMCNC: 32.3 G/DL — SIGNIFICANT CHANGE UP (ref 32–37)
MCHC RBC-ENTMCNC: 32.4 G/DL — SIGNIFICANT CHANGE UP (ref 32–37)
MCHC RBC-ENTMCNC: 32.4 G/DL — SIGNIFICANT CHANGE UP (ref 32–37)
MCHC RBC-ENTMCNC: 32.6 G/DL — SIGNIFICANT CHANGE UP (ref 32–37)
MCHC RBC-ENTMCNC: 32.8 G/DL
MCHC RBC-ENTMCNC: 32.8 G/DL — SIGNIFICANT CHANGE UP (ref 32–37)
MCHC RBC-ENTMCNC: 33 G/DL — SIGNIFICANT CHANGE UP (ref 32–37)
MCHC RBC-ENTMCNC: 33.2 G/DL — SIGNIFICANT CHANGE UP (ref 32–37)
MCHC RBC-ENTMCNC: 33.5 G/DL — SIGNIFICANT CHANGE UP (ref 32–37)
MCV RBC AUTO: 80.6 FL — LOW (ref 81–99)
MCV RBC AUTO: 81.1 FL — SIGNIFICANT CHANGE UP (ref 81–99)
MCV RBC AUTO: 81.2 FL — SIGNIFICANT CHANGE UP (ref 81–99)
MCV RBC AUTO: 81.3 FL — SIGNIFICANT CHANGE UP (ref 81–99)
MCV RBC AUTO: 81.4 FL — SIGNIFICANT CHANGE UP (ref 81–99)
MCV RBC AUTO: 82.1 FL — SIGNIFICANT CHANGE UP (ref 81–99)
MCV RBC AUTO: 82.2 FL — SIGNIFICANT CHANGE UP (ref 81–99)
MCV RBC AUTO: 82.5 FL
MCV RBC AUTO: 82.7 FL — SIGNIFICANT CHANGE UP (ref 81–99)
MCV RBC AUTO: 84.7 FL — SIGNIFICANT CHANGE UP (ref 81–99)
MCV RBC AUTO: 85 FL — SIGNIFICANT CHANGE UP (ref 81–99)
MCV RBC AUTO: 85.1 FL — SIGNIFICANT CHANGE UP (ref 81–99)
MCV RBC AUTO: 85.4 FL — SIGNIFICANT CHANGE UP (ref 81–99)
MCV RBC AUTO: 85.8 FL — SIGNIFICANT CHANGE UP (ref 81–99)
MCV RBC AUTO: 86.4 FL — SIGNIFICANT CHANGE UP (ref 81–99)
MCV RBC AUTO: 86.9 FL
MCV RBC AUTO: 87.1 FL — SIGNIFICANT CHANGE UP (ref 81–99)
MCV RBC AUTO: 88.3 FL
MONOCYTES # BLD AUTO: 0.13 K/UL — SIGNIFICANT CHANGE UP (ref 0.1–0.6)
MONOCYTES # BLD AUTO: 0.2 K/UL — SIGNIFICANT CHANGE UP (ref 0.1–0.6)
MONOCYTES # BLD AUTO: 0.21 K/UL — SIGNIFICANT CHANGE UP (ref 0.1–0.6)
MONOCYTES # BLD AUTO: 0.31 K/UL — SIGNIFICANT CHANGE UP (ref 0.1–0.6)
MONOCYTES # BLD AUTO: 0.32 K/UL — SIGNIFICANT CHANGE UP (ref 0.1–0.6)
MONOCYTES # BLD AUTO: 0.43 K/UL — SIGNIFICANT CHANGE UP (ref 0.1–0.6)
MONOCYTES # BLD AUTO: 0.46 K/UL
MONOCYTES # BLD AUTO: 0.47 K/UL — SIGNIFICANT CHANGE UP (ref 0.1–0.6)
MONOCYTES # BLD AUTO: 0.51 K/UL — SIGNIFICANT CHANGE UP (ref 0.1–0.6)
MONOCYTES # BLD AUTO: 0.69 K/UL — HIGH (ref 0.1–0.6)
MONOCYTES # BLD AUTO: 0.78 K/UL — HIGH (ref 0.1–0.6)
MONOCYTES # BLD AUTO: 1.18 K/UL — HIGH (ref 0.1–0.6)
MONOCYTES NFR BLD AUTO: 1.6 % — LOW (ref 1.7–9.3)
MONOCYTES NFR BLD AUTO: 2.5 % — SIGNIFICANT CHANGE UP (ref 1.7–9.3)
MONOCYTES NFR BLD AUTO: 3 % — SIGNIFICANT CHANGE UP (ref 1.7–9.3)
MONOCYTES NFR BLD AUTO: 3.1 % — SIGNIFICANT CHANGE UP (ref 1.7–9.3)
MONOCYTES NFR BLD AUTO: 4.5 % — SIGNIFICANT CHANGE UP (ref 1.7–9.3)
MONOCYTES NFR BLD AUTO: 4.5 % — SIGNIFICANT CHANGE UP (ref 1.7–9.3)
MONOCYTES NFR BLD AUTO: 4.8 %
MONOCYTES NFR BLD AUTO: 5 % — SIGNIFICANT CHANGE UP (ref 1.7–9.3)
MONOCYTES NFR BLD AUTO: 5.1 % — SIGNIFICANT CHANGE UP (ref 1.7–9.3)
MONOCYTES NFR BLD AUTO: 6.1 % — SIGNIFICANT CHANGE UP (ref 1.7–9.3)
MONOCYTES NFR BLD AUTO: 6.8 % — SIGNIFICANT CHANGE UP (ref 1.7–9.3)
MONOCYTES NFR BLD AUTO: 7.1 % — SIGNIFICANT CHANGE UP (ref 1.7–9.3)
NEUTROPHILS # BLD AUTO: 11.73 K/UL — HIGH (ref 1.4–6.5)
NEUTROPHILS # BLD AUTO: 11.86 K/UL — HIGH (ref 1.4–6.5)
NEUTROPHILS # BLD AUTO: 18.82 K/UL — HIGH (ref 1.4–6.5)
NEUTROPHILS # BLD AUTO: 3.59 K/UL — SIGNIFICANT CHANGE UP (ref 1.4–6.5)
NEUTROPHILS # BLD AUTO: 5.41 K/UL — SIGNIFICANT CHANGE UP (ref 1.4–6.5)
NEUTROPHILS # BLD AUTO: 5.89 K/UL — SIGNIFICANT CHANGE UP (ref 1.4–6.5)
NEUTROPHILS # BLD AUTO: 5.96 K/UL — SIGNIFICANT CHANGE UP (ref 1.4–6.5)
NEUTROPHILS # BLD AUTO: 8.07 K/UL — HIGH (ref 1.4–6.5)
NEUTROPHILS # BLD AUTO: 8.3 K/UL
NEUTROPHILS # BLD AUTO: 8.32 K/UL — HIGH (ref 1.4–6.5)
NEUTROPHILS # BLD AUTO: 8.9 K/UL — HIGH (ref 1.4–6.5)
NEUTROPHILS # BLD AUTO: 9.18 K/UL — HIGH (ref 1.4–6.5)
NEUTROPHILS NFR BLD AUTO: 79.1 % — HIGH (ref 42.2–75.2)
NEUTROPHILS NFR BLD AUTO: 80.5 % — HIGH (ref 42.2–75.2)
NEUTROPHILS NFR BLD AUTO: 80.8 % — HIGH (ref 42.2–75.2)
NEUTROPHILS NFR BLD AUTO: 81.2 % — HIGH (ref 42.2–75.2)
NEUTROPHILS NFR BLD AUTO: 81.6 % — HIGH (ref 42.2–75.2)
NEUTROPHILS NFR BLD AUTO: 84.6 % — HIGH (ref 42.2–75.2)
NEUTROPHILS NFR BLD AUTO: 85.8 % — HIGH (ref 42.2–75.2)
NEUTROPHILS NFR BLD AUTO: 86.1 % — HIGH (ref 42.2–75.2)
NEUTROPHILS NFR BLD AUTO: 86.3 % — HIGH (ref 42.2–75.2)
NEUTROPHILS NFR BLD AUTO: 86.6 %
NEUTROPHILS NFR BLD AUTO: 92.2 % — HIGH (ref 42.2–75.2)
NEUTROPHILS NFR BLD AUTO: 93.8 % — HIGH (ref 42.2–75.2)
NON HDL CHOLESTEROL: 107 MG/DL — SIGNIFICANT CHANGE UP
NRBC # BLD: 0 /100 WBCS — SIGNIFICANT CHANGE UP (ref 0–0)
NT-PROBNP SERPL-SCNC: 2864 PG/ML — HIGH (ref 0–300)
NT-PROBNP SERPL-SCNC: 7102 PG/ML — HIGH (ref 0–300)
NT-PROBNP SERPL-SCNC: HIGH PG/ML (ref 0–300)
PCO2 BLDV: 50 MMHG — HIGH (ref 39–42)
PCO2 BLDV: 50 MMHG — HIGH (ref 39–42)
PCO2 BLDV: 70 MMHG — HIGH (ref 39–42)
PH BLDV: 7.07 — LOW (ref 7.32–7.43)
PH BLDV: 7.31 — LOW (ref 7.32–7.43)
PH BLDV: 7.34 — SIGNIFICANT CHANGE UP (ref 7.32–7.43)
PLATELET # BLD AUTO: 119 K/UL — LOW (ref 130–400)
PLATELET # BLD AUTO: 123 K/UL — LOW (ref 130–400)
PLATELET # BLD AUTO: 129 K/UL — LOW (ref 130–400)
PLATELET # BLD AUTO: 134 K/UL — SIGNIFICANT CHANGE UP (ref 130–400)
PLATELET # BLD AUTO: 135 K/UL — SIGNIFICANT CHANGE UP (ref 130–400)
PLATELET # BLD AUTO: 141 K/UL — SIGNIFICANT CHANGE UP (ref 130–400)
PLATELET # BLD AUTO: 146 K/UL
PLATELET # BLD AUTO: 148 K/UL — SIGNIFICANT CHANGE UP (ref 130–400)
PLATELET # BLD AUTO: 150 K/UL — SIGNIFICANT CHANGE UP (ref 130–400)
PLATELET # BLD AUTO: 154 K/UL — SIGNIFICANT CHANGE UP (ref 130–400)
PLATELET # BLD AUTO: 157 K/UL — SIGNIFICANT CHANGE UP (ref 130–400)
PLATELET # BLD AUTO: 158 K/UL
PLATELET # BLD AUTO: 162 K/UL — SIGNIFICANT CHANGE UP (ref 130–400)
PLATELET # BLD AUTO: 163 K/UL — SIGNIFICANT CHANGE UP (ref 130–400)
PLATELET # BLD AUTO: 173 K/UL — SIGNIFICANT CHANGE UP (ref 130–400)
PLATELET # BLD AUTO: 203 K/UL
PLATELET # BLD AUTO: 217 K/UL — SIGNIFICANT CHANGE UP (ref 130–400)
PLATELET # BLD AUTO: 260 K/UL — SIGNIFICANT CHANGE UP (ref 130–400)
PMV BLD: 8.9 FL
PMV BLD: 8.9 FL
PO2 BLDV: 33 MMHG — SIGNIFICANT CHANGE UP
PO2 BLDV: 35 MMHG — SIGNIFICANT CHANGE UP
PO2 BLDV: 55 MMHG — SIGNIFICANT CHANGE UP
POTASSIUM BLDV-SCNC: 3.9 MMOL/L — SIGNIFICANT CHANGE UP (ref 3.5–5.1)
POTASSIUM BLDV-SCNC: 5.4 MMOL/L — HIGH (ref 3.5–5.1)
POTASSIUM BLDV-SCNC: 5.5 MMOL/L — HIGH (ref 3.5–5.1)
POTASSIUM SERPL-MCNC: 2.9 MMOL/L — LOW (ref 3.5–5)
POTASSIUM SERPL-MCNC: 3.3 MMOL/L — LOW (ref 3.5–5)
POTASSIUM SERPL-MCNC: 3.6 MMOL/L — SIGNIFICANT CHANGE UP (ref 3.5–5)
POTASSIUM SERPL-MCNC: 3.7 MMOL/L — SIGNIFICANT CHANGE UP (ref 3.5–5)
POTASSIUM SERPL-MCNC: 3.9 MMOL/L — SIGNIFICANT CHANGE UP (ref 3.5–5)
POTASSIUM SERPL-MCNC: 4 MMOL/L — SIGNIFICANT CHANGE UP (ref 3.5–5)
POTASSIUM SERPL-MCNC: 4.1 MMOL/L — SIGNIFICANT CHANGE UP (ref 3.5–5)
POTASSIUM SERPL-MCNC: 4.3 MMOL/L — SIGNIFICANT CHANGE UP (ref 3.5–5)
POTASSIUM SERPL-MCNC: 4.3 MMOL/L — SIGNIFICANT CHANGE UP (ref 3.5–5)
POTASSIUM SERPL-MCNC: 4.4 MMOL/L — SIGNIFICANT CHANGE UP (ref 3.5–5)
POTASSIUM SERPL-MCNC: 4.4 MMOL/L — SIGNIFICANT CHANGE UP (ref 3.5–5)
POTASSIUM SERPL-MCNC: 4.5 MMOL/L — SIGNIFICANT CHANGE UP (ref 3.5–5)
POTASSIUM SERPL-MCNC: 4.5 MMOL/L — SIGNIFICANT CHANGE UP (ref 3.5–5)
POTASSIUM SERPL-MCNC: 5.4 MMOL/L — HIGH (ref 3.5–5)
POTASSIUM SERPL-MCNC: 5.4 MMOL/L — HIGH (ref 3.5–5)
POTASSIUM SERPL-SCNC: 2.9 MMOL/L — LOW (ref 3.5–5)
POTASSIUM SERPL-SCNC: 3.3 MMOL/L — LOW (ref 3.5–5)
POTASSIUM SERPL-SCNC: 3.6 MMOL/L — SIGNIFICANT CHANGE UP (ref 3.5–5)
POTASSIUM SERPL-SCNC: 3.7 MMOL/L — SIGNIFICANT CHANGE UP (ref 3.5–5)
POTASSIUM SERPL-SCNC: 3.9 MMOL/L — SIGNIFICANT CHANGE UP (ref 3.5–5)
POTASSIUM SERPL-SCNC: 4 MMOL/L — SIGNIFICANT CHANGE UP (ref 3.5–5)
POTASSIUM SERPL-SCNC: 4.1 MMOL/L — SIGNIFICANT CHANGE UP (ref 3.5–5)
POTASSIUM SERPL-SCNC: 4.3 MMOL/L — SIGNIFICANT CHANGE UP (ref 3.5–5)
POTASSIUM SERPL-SCNC: 4.3 MMOL/L — SIGNIFICANT CHANGE UP (ref 3.5–5)
POTASSIUM SERPL-SCNC: 4.4 MMOL/L
POTASSIUM SERPL-SCNC: 4.4 MMOL/L — SIGNIFICANT CHANGE UP (ref 3.5–5)
POTASSIUM SERPL-SCNC: 4.4 MMOL/L — SIGNIFICANT CHANGE UP (ref 3.5–5)
POTASSIUM SERPL-SCNC: 4.5 MMOL/L
POTASSIUM SERPL-SCNC: 4.5 MMOL/L — SIGNIFICANT CHANGE UP (ref 3.5–5)
POTASSIUM SERPL-SCNC: 4.5 MMOL/L — SIGNIFICANT CHANGE UP (ref 3.5–5)
POTASSIUM SERPL-SCNC: 5.4 MMOL/L — HIGH (ref 3.5–5)
POTASSIUM SERPL-SCNC: 5.4 MMOL/L — HIGH (ref 3.5–5)
PROCALCITONIN SERPL-MCNC: 0.1 NG/ML — SIGNIFICANT CHANGE UP (ref 0.02–0.1)
PROCALCITONIN SERPL-MCNC: 0.2 NG/ML — HIGH (ref 0.02–0.1)
PROCALCITONIN SERPL-MCNC: 0.25 NG/ML — HIGH (ref 0.02–0.1)
PROT SERPL-MCNC: 5.2 G/DL — LOW (ref 6–8)
PROT SERPL-MCNC: 5.3 G/DL — LOW (ref 6–8)
PROT SERPL-MCNC: 5.4 G/DL — LOW (ref 6–8)
PROT SERPL-MCNC: 5.4 G/DL — LOW (ref 6–8)
PROT SERPL-MCNC: 5.5 G/DL — LOW (ref 6–8)
PROT SERPL-MCNC: 5.6 G/DL — LOW (ref 6–8)
PROT SERPL-MCNC: 5.8 G/DL — LOW (ref 6–8)
PROT SERPL-MCNC: 6 G/DL — SIGNIFICANT CHANGE UP (ref 6–8)
PROT SERPL-MCNC: 6 G/DL — SIGNIFICANT CHANGE UP (ref 6–8)
PROT SERPL-MCNC: 6.1 G/DL
PROT SERPL-MCNC: 6.1 G/DL
PROTHROM AB SERPL-ACNC: 12.7 SEC — SIGNIFICANT CHANGE UP (ref 9.95–12.87)
RAPID RVP RESULT: SIGNIFICANT CHANGE UP
RBC # BLD: 3.13 M/UL — LOW (ref 4.2–5.4)
RBC # BLD: 3.35 M/UL — LOW (ref 4.2–5.4)
RBC # BLD: 3.37 M/UL — LOW (ref 4.2–5.4)
RBC # BLD: 3.48 M/UL — LOW (ref 4.2–5.4)
RBC # BLD: 3.58 M/UL — LOW (ref 4.2–5.4)
RBC # BLD: 3.6 M/UL
RBC # BLD: 3.65 M/UL — LOW (ref 4.2–5.4)
RBC # BLD: 3.77 M/UL — LOW (ref 4.2–5.4)
RBC # BLD: 3.86 M/UL
RBC # BLD: 3.93 M/UL — LOW (ref 4.2–5.4)
RBC # BLD: 3.93 M/UL — LOW (ref 4.2–5.4)
RBC # BLD: 3.95 M/UL
RBC # BLD: 3.95 M/UL — LOW (ref 4.2–5.4)
RBC # BLD: 3.97 M/UL — LOW (ref 4.2–5.4)
RBC # BLD: 4.02 M/UL — LOW (ref 4.2–5.4)
RBC # BLD: 4.02 M/UL — LOW (ref 4.2–5.4)
RBC # BLD: 4.03 M/UL — LOW (ref 4.2–5.4)
RBC # BLD: 4.11 M/UL — LOW (ref 4.2–5.4)
RBC # FLD: 13.4 % — SIGNIFICANT CHANGE UP (ref 11.5–14.5)
RBC # FLD: 13.5 % — SIGNIFICANT CHANGE UP (ref 11.5–14.5)
RBC # FLD: 13.6 % — SIGNIFICANT CHANGE UP (ref 11.5–14.5)
RBC # FLD: 13.7 % — SIGNIFICANT CHANGE UP (ref 11.5–14.5)
RBC # FLD: 13.8 % — SIGNIFICANT CHANGE UP (ref 11.5–14.5)
RBC # FLD: 14 %
RBC # FLD: 14.1 % — SIGNIFICANT CHANGE UP (ref 11.5–14.5)
RBC # FLD: 14.8 %
RBC # FLD: 15.1 %
RBC # FLD: 16.6 % — HIGH (ref 11.5–14.5)
RBC # FLD: 16.8 % — HIGH (ref 11.5–14.5)
RBC # FLD: 16.9 % — HIGH (ref 11.5–14.5)
RBC # FLD: 17 % — HIGH (ref 11.5–14.5)
RBC # FLD: 17 % — HIGH (ref 11.5–14.5)
RBC # FLD: 17.1 % — HIGH (ref 11.5–14.5)
RBC # FLD: 17.2 % — HIGH (ref 11.5–14.5)
RETICS # AUTO: 2 %
RETICS # AUTO: 2.5 %
RETICS AGGREG/RBC NFR: 73.7 K/UL
RETICS AGGREG/RBC NFR: 97.7 K/UL
RSV RNA NPH QL NAA+NON-PROBE: SIGNIFICANT CHANGE UP
SAO2 % BLDV: 56.7 % — SIGNIFICANT CHANGE UP
SAO2 % BLDV: 59.2 % — SIGNIFICANT CHANGE UP
SAO2 % BLDV: 79.7 % — SIGNIFICANT CHANGE UP
SARS-COV-2 RNA SPEC QL NAA+PROBE: SIGNIFICANT CHANGE UP
SODIUM SERPL-SCNC: 137 MMOL/L — SIGNIFICANT CHANGE UP (ref 135–146)
SODIUM SERPL-SCNC: 137 MMOL/L — SIGNIFICANT CHANGE UP (ref 135–146)
SODIUM SERPL-SCNC: 138 MMOL/L — SIGNIFICANT CHANGE UP (ref 135–146)
SODIUM SERPL-SCNC: 139 MMOL/L — SIGNIFICANT CHANGE UP (ref 135–146)
SODIUM SERPL-SCNC: 139 MMOL/L — SIGNIFICANT CHANGE UP (ref 135–146)
SODIUM SERPL-SCNC: 140 MMOL/L
SODIUM SERPL-SCNC: 140 MMOL/L — SIGNIFICANT CHANGE UP (ref 135–146)
SODIUM SERPL-SCNC: 140 MMOL/L — SIGNIFICANT CHANGE UP (ref 135–146)
SODIUM SERPL-SCNC: 141 MMOL/L — SIGNIFICANT CHANGE UP (ref 135–146)
SODIUM SERPL-SCNC: 142 MMOL/L
SODIUM SERPL-SCNC: 142 MMOL/L — SIGNIFICANT CHANGE UP (ref 135–146)
SODIUM SERPL-SCNC: 142 MMOL/L — SIGNIFICANT CHANGE UP (ref 135–146)
SODIUM SERPL-SCNC: 143 MMOL/L — SIGNIFICANT CHANGE UP (ref 135–146)
SODIUM SERPL-SCNC: 144 MMOL/L — SIGNIFICANT CHANGE UP (ref 135–146)
SODIUM SERPL-SCNC: 144 MMOL/L — SIGNIFICANT CHANGE UP (ref 135–146)
SODIUM SERPL-SCNC: 146 MMOL/L — SIGNIFICANT CHANGE UP (ref 135–146)
SODIUM SERPL-SCNC: 149 MMOL/L — HIGH (ref 135–146)
SPECIMEN SOURCE: SIGNIFICANT CHANGE UP
TIBC SERPL-MCNC: 319 UG/DL
TIBC SERPL-MCNC: 327 UG/DL
TIBC SERPL-MCNC: 337 UG/DL
TRIGL SERPL-MCNC: 102 MG/DL — SIGNIFICANT CHANGE UP
TROPONIN T SERPL-MCNC: 0.04 NG/ML — CRITICAL HIGH
TROPONIN T SERPL-MCNC: 0.05 NG/ML — CRITICAL HIGH
TROPONIN T SERPL-MCNC: 0.05 NG/ML — CRITICAL HIGH
TROPONIN T SERPL-MCNC: 0.06 NG/ML — CRITICAL HIGH
TROPONIN T SERPL-MCNC: 2.71 NG/ML — CRITICAL HIGH
TROPONIN T SERPL-MCNC: 3.36 NG/ML — CRITICAL HIGH
TROPONIN T SERPL-MCNC: 4.14 NG/ML — CRITICAL HIGH
TSH SERPL-MCNC: 0.52 UIU/ML — SIGNIFICANT CHANGE UP (ref 0.27–4.2)
UIBC SERPL-MCNC: 243 UG/DL
UIBC SERPL-MCNC: 251 UG/DL
UIBC SERPL-MCNC: 266 UG/DL
WBC # BLD: 10.19 K/UL — SIGNIFICANT CHANGE UP (ref 4.8–10.8)
WBC # BLD: 10.65 K/UL — SIGNIFICANT CHANGE UP (ref 4.8–10.8)
WBC # BLD: 10.9 K/UL — HIGH (ref 4.8–10.8)
WBC # BLD: 11.26 K/UL — HIGH (ref 4.8–10.8)
WBC # BLD: 11.4 K/UL — HIGH (ref 4.8–10.8)
WBC # BLD: 11.8 K/UL — HIGH (ref 4.8–10.8)
WBC # BLD: 12.5 K/UL — HIGH (ref 4.8–10.8)
WBC # BLD: 12.86 K/UL — HIGH (ref 4.8–10.8)
WBC # BLD: 14.34 K/UL — HIGH (ref 4.8–10.8)
WBC # BLD: 23.29 K/UL — HIGH (ref 4.8–10.8)
WBC # BLD: 4.17 K/UL — LOW (ref 4.8–10.8)
WBC # BLD: 6.65 K/UL — SIGNIFICANT CHANGE UP (ref 4.8–10.8)
WBC # BLD: 6.83 K/UL — SIGNIFICANT CHANGE UP (ref 4.8–10.8)
WBC # BLD: 6.95 K/UL — SIGNIFICANT CHANGE UP (ref 4.8–10.8)
WBC # BLD: 9.54 K/UL — SIGNIFICANT CHANGE UP (ref 4.8–10.8)
WBC # FLD AUTO: 10.19 K/UL — SIGNIFICANT CHANGE UP (ref 4.8–10.8)
WBC # FLD AUTO: 10.65 K/UL — SIGNIFICANT CHANGE UP (ref 4.8–10.8)
WBC # FLD AUTO: 10.9 K/UL — HIGH (ref 4.8–10.8)
WBC # FLD AUTO: 11.26 K/UL — HIGH (ref 4.8–10.8)
WBC # FLD AUTO: 11.4 K/UL — HIGH (ref 4.8–10.8)
WBC # FLD AUTO: 11.8 K/UL — HIGH (ref 4.8–10.8)
WBC # FLD AUTO: 12.5 K/UL — HIGH (ref 4.8–10.8)
WBC # FLD AUTO: 12.86 K/UL — HIGH (ref 4.8–10.8)
WBC # FLD AUTO: 14.34 K/UL — HIGH (ref 4.8–10.8)
WBC # FLD AUTO: 23.29 K/UL — HIGH (ref 4.8–10.8)
WBC # FLD AUTO: 4.17 K/UL — LOW (ref 4.8–10.8)
WBC # FLD AUTO: 6 K/UL
WBC # FLD AUTO: 6.17 K/UL
WBC # FLD AUTO: 6.65 K/UL — SIGNIFICANT CHANGE UP (ref 4.8–10.8)
WBC # FLD AUTO: 6.83 K/UL — SIGNIFICANT CHANGE UP (ref 4.8–10.8)
WBC # FLD AUTO: 6.95 K/UL — SIGNIFICANT CHANGE UP (ref 4.8–10.8)
WBC # FLD AUTO: 9.54 K/UL — SIGNIFICANT CHANGE UP (ref 4.8–10.8)
WBC # FLD AUTO: 9.58 K/UL

## 2022-01-01 PROCEDURE — 99232 SBSQ HOSP IP/OBS MODERATE 35: CPT

## 2022-01-01 PROCEDURE — 71045 X-RAY EXAM CHEST 1 VIEW: CPT | Mod: 26

## 2022-01-01 PROCEDURE — 93306 TTE W/DOPPLER COMPLETE: CPT | Mod: 26

## 2022-01-01 PROCEDURE — 99222 1ST HOSP IP/OBS MODERATE 55: CPT

## 2022-01-01 PROCEDURE — 76604 US EXAM CHEST: CPT | Mod: 26

## 2022-01-01 PROCEDURE — 99233 SBSQ HOSP IP/OBS HIGH 50: CPT

## 2022-01-01 PROCEDURE — 99223 1ST HOSP IP/OBS HIGH 75: CPT

## 2022-01-01 PROCEDURE — 93010 ELECTROCARDIOGRAM REPORT: CPT

## 2022-01-01 PROCEDURE — 71046 X-RAY EXAM CHEST 2 VIEWS: CPT | Mod: 26

## 2022-01-01 PROCEDURE — 99214 OFFICE O/P EST MOD 30 MIN: CPT

## 2022-01-01 PROCEDURE — 99291 CRITICAL CARE FIRST HOUR: CPT | Mod: 25

## 2022-01-01 PROCEDURE — 71250 CT THORAX DX C-: CPT | Mod: 26

## 2022-01-01 PROCEDURE — 99239 HOSP IP/OBS DSCHRG MGMT >30: CPT

## 2022-01-01 PROCEDURE — 93308 TTE F-UP OR LMTD: CPT | Mod: 26

## 2022-01-01 PROCEDURE — 99291 CRITICAL CARE FIRST HOUR: CPT

## 2022-01-01 PROCEDURE — 99348 HOME/RES VST EST LOW MDM 30: CPT | Mod: 95

## 2022-01-01 PROCEDURE — 93010 ELECTROCARDIOGRAM REPORT: CPT | Mod: 76

## 2022-01-01 RX ORDER — INSULIN GLARGINE 100 [IU]/ML
18 INJECTION, SOLUTION SUBCUTANEOUS AT BEDTIME
Refills: 0 | Status: DISCONTINUED | OUTPATIENT
Start: 2022-01-01 | End: 2023-01-01

## 2022-01-01 RX ORDER — DEXTROSE 50 % IN WATER 50 %
25 SYRINGE (ML) INTRAVENOUS ONCE
Refills: 0 | Status: DISCONTINUED | OUTPATIENT
Start: 2022-01-01 | End: 2022-01-01

## 2022-01-01 RX ORDER — LOSARTAN POTASSIUM 100 MG/1
100 TABLET, FILM COATED ORAL DAILY
Refills: 0 | Status: DISCONTINUED | OUTPATIENT
Start: 2022-01-01 | End: 2022-01-01

## 2022-01-01 RX ORDER — OMEPRAZOLE 10 MG/1
1 CAPSULE, DELAYED RELEASE ORAL
Qty: 0 | Refills: 0 | DISCHARGE

## 2022-01-01 RX ORDER — ACETAMINOPHEN 500 MG
650 TABLET ORAL EVERY 6 HOURS
Refills: 0 | Status: DISCONTINUED | OUTPATIENT
Start: 2022-01-01 | End: 2022-01-01

## 2022-01-01 RX ORDER — ATORVASTATIN CALCIUM 80 MG/1
1 TABLET, FILM COATED ORAL
Qty: 0 | Refills: 0 | DISCHARGE

## 2022-01-01 RX ORDER — INSULIN LISPRO 100/ML
4 VIAL (ML) SUBCUTANEOUS ONCE
Refills: 0 | Status: COMPLETED | OUTPATIENT
Start: 2022-01-01 | End: 2022-01-01

## 2022-01-01 RX ORDER — POTASSIUM CHLORIDE 20 MEQ
40 PACKET (EA) ORAL ONCE
Refills: 0 | Status: COMPLETED | OUTPATIENT
Start: 2022-01-01 | End: 2022-01-01

## 2022-01-01 RX ORDER — IPRATROPIUM/ALBUTEROL SULFATE 18-103MCG
3 AEROSOL WITH ADAPTER (GRAM) INHALATION EVERY 6 HOURS
Refills: 0 | Status: DISCONTINUED | OUTPATIENT
Start: 2022-01-01 | End: 2023-01-01

## 2022-01-01 RX ORDER — FUROSEMIDE 40 MG
1 TABLET ORAL
Qty: 0 | Refills: 0 | DISCHARGE

## 2022-01-01 RX ORDER — DEXTROSE 50 % IN WATER 50 %
12.5 SYRINGE (ML) INTRAVENOUS ONCE
Refills: 0 | Status: DISCONTINUED | OUTPATIENT
Start: 2022-01-01 | End: 2022-01-01

## 2022-01-01 RX ORDER — INSULIN GLARGINE 100 [IU]/ML
15 INJECTION, SOLUTION SUBCUTANEOUS AT BEDTIME
Refills: 0 | Status: DISCONTINUED | OUTPATIENT
Start: 2022-01-01 | End: 2022-01-01

## 2022-01-01 RX ORDER — MAGNESIUM SULFATE 500 MG/ML
2 VIAL (ML) INJECTION ONCE
Refills: 0 | Status: COMPLETED | OUTPATIENT
Start: 2022-01-01 | End: 2022-01-01

## 2022-01-01 RX ORDER — AMLODIPINE BESYLATE 2.5 MG/1
10 TABLET ORAL DAILY
Refills: 0 | Status: DISCONTINUED | OUTPATIENT
Start: 2022-01-01 | End: 2022-01-01

## 2022-01-01 RX ORDER — DONEPEZIL HYDROCHLORIDE 10 MG/1
1 TABLET, FILM COATED ORAL
Qty: 0 | Refills: 0 | DISCHARGE

## 2022-01-01 RX ORDER — FUROSEMIDE 40 MG
20 TABLET ORAL DAILY
Refills: 0 | Status: DISCONTINUED | OUTPATIENT
Start: 2022-01-01 | End: 2022-01-01

## 2022-01-01 RX ORDER — ATORVASTATIN CALCIUM 80 MG/1
40 TABLET, FILM COATED ORAL AT BEDTIME
Refills: 0 | Status: DISCONTINUED | OUTPATIENT
Start: 2022-01-01 | End: 2023-01-01

## 2022-01-01 RX ORDER — PREDNISONE 10 MG/1
10 TABLET ORAL
Refills: 0 | Status: ACTIVE | COMMUNITY

## 2022-01-01 RX ORDER — MOMETASONE FUROATE AND FORMOTEROL FUMARATE DIHYDRATE 200; 5 UG/1; UG/1
2 AEROSOL RESPIRATORY (INHALATION)
Qty: 0 | Refills: 0 | DISCHARGE

## 2022-01-01 RX ORDER — METOPROLOL TARTRATE 50 MG
12.5 TABLET ORAL DAILY
Refills: 0 | Status: DISCONTINUED | OUTPATIENT
Start: 2022-01-01 | End: 2022-01-01

## 2022-01-01 RX ORDER — INSULIN LISPRO 100/ML
2 VIAL (ML) SUBCUTANEOUS ONCE
Refills: 0 | Status: COMPLETED | OUTPATIENT
Start: 2022-01-01 | End: 2022-01-01

## 2022-01-01 RX ORDER — INSULIN GLARGINE AND LIXISENATIDE 100; 33 U/ML; UG/ML
0 INJECTION, SOLUTION SUBCUTANEOUS
Qty: 0 | Refills: 0 | DISCHARGE

## 2022-01-01 RX ORDER — FENOFIBRATE,MICRONIZED 130 MG
1 CAPSULE ORAL
Qty: 0 | Refills: 0 | DISCHARGE

## 2022-01-01 RX ORDER — ACYCLOVIR SODIUM 500 MG
800 VIAL (EA) INTRAVENOUS
Refills: 0 | Status: DISCONTINUED | OUTPATIENT
Start: 2022-01-01 | End: 2022-01-01

## 2022-01-01 RX ORDER — DEXTROSE 50 % IN WATER 50 %
15 SYRINGE (ML) INTRAVENOUS ONCE
Refills: 0 | Status: DISCONTINUED | OUTPATIENT
Start: 2022-01-01 | End: 2022-01-01

## 2022-01-01 RX ORDER — HYDROCORTISONE 20 MG
200 TABLET ORAL ONCE
Refills: 0 | Status: COMPLETED | OUTPATIENT
Start: 2022-01-01 | End: 2022-01-01

## 2022-01-01 RX ORDER — INSULIN HUMAN 100 [IU]/ML
6 INJECTION, SOLUTION SUBCUTANEOUS ONCE
Refills: 0 | Status: COMPLETED | OUTPATIENT
Start: 2022-01-01 | End: 2022-01-01

## 2022-01-01 RX ORDER — FLUTICASONE PROPIONATE 220 MCG
0 AEROSOL WITH ADAPTER (GRAM) INHALATION
Qty: 0 | Refills: 0 | DISCHARGE

## 2022-01-01 RX ORDER — INSULIN GLARGINE 100 [IU]/ML
35 INJECTION, SOLUTION SUBCUTANEOUS AT BEDTIME
Refills: 0 | Status: DISCONTINUED | OUTPATIENT
Start: 2022-01-01 | End: 2022-01-01

## 2022-01-01 RX ORDER — PANTOPRAZOLE SODIUM 20 MG/1
40 TABLET, DELAYED RELEASE ORAL
Refills: 0 | Status: DISCONTINUED | OUTPATIENT
Start: 2022-01-01 | End: 2022-01-01

## 2022-01-01 RX ORDER — INSULIN HUMAN 100 [IU]/ML
10 INJECTION, SOLUTION SUBCUTANEOUS ONCE
Refills: 0 | Status: COMPLETED | OUTPATIENT
Start: 2022-01-01 | End: 2022-01-01

## 2022-01-01 RX ORDER — FUROSEMIDE 40 MG
40 TABLET ORAL
Refills: 0 | Status: DISCONTINUED | OUTPATIENT
Start: 2022-01-01 | End: 2022-01-01

## 2022-01-01 RX ORDER — TIOTROPIUM BROMIDE 18 UG/1
1 CAPSULE ORAL; RESPIRATORY (INHALATION)
Qty: 0 | Refills: 0 | DISCHARGE

## 2022-01-01 RX ORDER — IPRATROPIUM/ALBUTEROL SULFATE 18-103MCG
3 AEROSOL WITH ADAPTER (GRAM) INHALATION EVERY 6 HOURS
Refills: 0 | Status: DISCONTINUED | OUTPATIENT
Start: 2022-01-01 | End: 2022-01-01

## 2022-01-01 RX ORDER — ASPIRIN/CALCIUM CARB/MAGNESIUM 324 MG
324 TABLET ORAL ONCE
Refills: 0 | Status: COMPLETED | OUTPATIENT
Start: 2022-01-01 | End: 2022-01-01

## 2022-01-01 RX ORDER — FLUTICASONE PROPIONATE 110 UG/1
110 AEROSOL, METERED RESPIRATORY (INHALATION)
Refills: 0 | Status: DISCONTINUED | COMMUNITY
End: 2022-01-01

## 2022-01-01 RX ORDER — SODIUM CHLORIDE 9 MG/ML
1000 INJECTION, SOLUTION INTRAVENOUS
Refills: 0 | Status: DISCONTINUED | OUTPATIENT
Start: 2022-01-01 | End: 2022-01-01

## 2022-01-01 RX ORDER — INSULIN GLARGINE AND LIXISENATIDE 100; 33 U/ML; UG/ML
35 INJECTION, SOLUTION SUBCUTANEOUS
Qty: 0 | Refills: 0 | DISCHARGE

## 2022-01-01 RX ORDER — ISOSORBIDE MONONITRATE 60 MG/1
1 TABLET, EXTENDED RELEASE ORAL
Qty: 0 | Refills: 0 | DISCHARGE

## 2022-01-01 RX ORDER — CLOPIDOGREL BISULFATE 75 MG/1
150 TABLET, FILM COATED ORAL ONCE
Refills: 0 | Status: COMPLETED | OUTPATIENT
Start: 2022-01-01 | End: 2022-01-01

## 2022-01-01 RX ORDER — GABAPENTIN 400 MG/1
0 CAPSULE ORAL
Qty: 0 | Refills: 0 | DISCHARGE

## 2022-01-01 RX ORDER — ASPIRIN/CALCIUM CARB/MAGNESIUM 324 MG
1 TABLET ORAL
Qty: 30 | Refills: 0
Start: 2022-01-01 | End: 2022-01-01

## 2022-01-01 RX ORDER — BUDESONIDE AND FORMOTEROL FUMARATE DIHYDRATE 160; 4.5 UG/1; UG/1
2 AEROSOL RESPIRATORY (INHALATION)
Refills: 0 | Status: DISCONTINUED | OUTPATIENT
Start: 2022-01-01 | End: 2022-01-01

## 2022-01-01 RX ORDER — LOSARTAN POTASSIUM 100 MG/1
1 TABLET, FILM COATED ORAL
Qty: 0 | Refills: 0 | DISCHARGE

## 2022-01-01 RX ORDER — INSULIN LISPRO 100/ML
5 VIAL (ML) SUBCUTANEOUS ONCE
Refills: 0 | Status: COMPLETED | OUTPATIENT
Start: 2022-01-01 | End: 2022-01-01

## 2022-01-01 RX ORDER — SODIUM CHLORIDE 9 MG/ML
1000 INJECTION, SOLUTION INTRAVENOUS
Refills: 0 | Status: DISCONTINUED | OUTPATIENT
Start: 2022-01-01 | End: 2023-01-01

## 2022-01-01 RX ORDER — GLUCAGON INJECTION, SOLUTION 0.5 MG/.1ML
1 INJECTION, SOLUTION SUBCUTANEOUS ONCE
Refills: 0 | Status: DISCONTINUED | OUTPATIENT
Start: 2022-01-01 | End: 2022-01-01

## 2022-01-01 RX ORDER — SODIUM CHLORIDE 9 MG/ML
1000 INJECTION, SOLUTION INTRAVENOUS ONCE
Refills: 0 | Status: COMPLETED | OUTPATIENT
Start: 2022-01-01 | End: 2022-01-01

## 2022-01-01 RX ORDER — TRAMADOL HYDROCHLORIDE 50 MG/1
50 TABLET ORAL THREE TIMES A DAY
Refills: 0 | Status: DISCONTINUED | OUTPATIENT
Start: 2022-01-01 | End: 2022-01-01

## 2022-01-01 RX ORDER — METOPROLOL TARTRATE 50 MG
50 TABLET ORAL DAILY
Refills: 0 | Status: DISCONTINUED | OUTPATIENT
Start: 2022-01-01 | End: 2023-01-01

## 2022-01-01 RX ORDER — DEXTROSE 50 % IN WATER 50 %
15 SYRINGE (ML) INTRAVENOUS ONCE
Refills: 0 | Status: DISCONTINUED | OUTPATIENT
Start: 2022-01-01 | End: 2023-01-01

## 2022-01-01 RX ORDER — INSULIN LISPRO 100/ML
10 VIAL (ML) SUBCUTANEOUS ONCE
Refills: 0 | Status: COMPLETED | OUTPATIENT
Start: 2022-01-01 | End: 2022-01-01

## 2022-01-01 RX ORDER — FUROSEMIDE 40 MG
40 TABLET ORAL ONCE
Refills: 0 | Status: COMPLETED | OUTPATIENT
Start: 2022-01-01 | End: 2022-01-01

## 2022-01-01 RX ORDER — DONEPEZIL HYDROCHLORIDE 10 MG/1
10 TABLET, FILM COATED ORAL AT BEDTIME
Refills: 0 | Status: DISCONTINUED | OUTPATIENT
Start: 2022-01-01 | End: 2023-01-01

## 2022-01-01 RX ORDER — INSULIN LISPRO 100/ML
10 VIAL (ML) SUBCUTANEOUS
Refills: 0 | Status: DISCONTINUED | OUTPATIENT
Start: 2022-01-01 | End: 2022-01-01

## 2022-01-01 RX ORDER — MONTELUKAST 4 MG/1
1 TABLET, CHEWABLE ORAL
Qty: 0 | Refills: 0 | DISCHARGE

## 2022-01-01 RX ORDER — INSULIN GLARGINE 100 [IU]/ML
10 INJECTION, SOLUTION SUBCUTANEOUS AT BEDTIME
Refills: 0 | Status: DISCONTINUED | OUTPATIENT
Start: 2022-01-01 | End: 2022-01-01

## 2022-01-01 RX ORDER — FUROSEMIDE 40 MG
40 TABLET ORAL DAILY
Refills: 0 | Status: DISCONTINUED | OUTPATIENT
Start: 2022-01-01 | End: 2022-01-01

## 2022-01-01 RX ORDER — METOPROLOL TARTRATE 50 MG
25 TABLET ORAL DAILY
Refills: 0 | Status: DISCONTINUED | OUTPATIENT
Start: 2022-01-01 | End: 2022-01-01

## 2022-01-01 RX ORDER — INSULIN LISPRO 100/ML
15 VIAL (ML) SUBCUTANEOUS
Refills: 0 | Status: DISCONTINUED | OUTPATIENT
Start: 2022-01-01 | End: 2022-01-01

## 2022-01-01 RX ORDER — VALACYCLOVIR 500 MG/1
1000 TABLET, FILM COATED ORAL THREE TIMES A DAY
Refills: 0 | Status: DISCONTINUED | OUTPATIENT
Start: 2022-01-01 | End: 2022-01-01

## 2022-01-01 RX ORDER — INSULIN LISPRO 100/ML
VIAL (ML) SUBCUTANEOUS
Refills: 0 | Status: DISCONTINUED | OUTPATIENT
Start: 2022-01-01 | End: 2022-01-01

## 2022-01-01 RX ORDER — ENOXAPARIN SODIUM 100 MG/ML
60 INJECTION SUBCUTANEOUS EVERY 12 HOURS
Refills: 0 | Status: DISCONTINUED | OUTPATIENT
Start: 2022-01-01 | End: 2022-01-01

## 2022-01-01 RX ORDER — MONTELUKAST 4 MG/1
10 TABLET, CHEWABLE ORAL DAILY
Refills: 0 | Status: DISCONTINUED | OUTPATIENT
Start: 2022-01-01 | End: 2023-01-01

## 2022-01-01 RX ORDER — CLOPIDOGREL BISULFATE 75 MG/1
75 TABLET, FILM COATED ORAL DAILY
Refills: 0 | Status: DISCONTINUED | OUTPATIENT
Start: 2022-01-01 | End: 2023-01-01

## 2022-01-01 RX ORDER — IPRATROPIUM/ALBUTEROL SULFATE 18-103MCG
3 AEROSOL WITH ADAPTER (GRAM) INHALATION
Refills: 0 | Status: DISCONTINUED | OUTPATIENT
Start: 2022-01-01 | End: 2022-01-01

## 2022-01-01 RX ORDER — ATORVASTATIN CALCIUM 80 MG/1
20 TABLET, FILM COATED ORAL AT BEDTIME
Refills: 0 | Status: DISCONTINUED | OUTPATIENT
Start: 2022-01-01 | End: 2022-01-01

## 2022-01-01 RX ORDER — DEXTROSE 50 % IN WATER 50 %
25 SYRINGE (ML) INTRAVENOUS ONCE
Refills: 0 | Status: DISCONTINUED | OUTPATIENT
Start: 2022-01-01 | End: 2023-01-01

## 2022-01-01 RX ORDER — LABETALOL HCL 100 MG
10 TABLET ORAL ONCE
Refills: 0 | Status: COMPLETED | OUTPATIENT
Start: 2022-01-01 | End: 2022-01-01

## 2022-01-01 RX ORDER — ASPIRIN 81 MG/1
81 TABLET, COATED ORAL DAILY
Refills: 0 | Status: ACTIVE | COMMUNITY

## 2022-01-01 RX ORDER — ISOSORBIDE MONONITRATE 60 MG/1
60 TABLET, EXTENDED RELEASE ORAL DAILY
Refills: 0 | Status: DISCONTINUED | OUTPATIENT
Start: 2022-01-01 | End: 2022-01-01

## 2022-01-01 RX ORDER — CALCIUM GLUCONATE 100 MG/ML
2 VIAL (ML) INTRAVENOUS ONCE
Refills: 0 | Status: COMPLETED | OUTPATIENT
Start: 2022-01-01 | End: 2022-01-01

## 2022-01-01 RX ORDER — INSULIN LISPRO 100/ML
6 VIAL (ML) SUBCUTANEOUS
Refills: 0 | Status: DISCONTINUED | OUTPATIENT
Start: 2022-01-01 | End: 2023-01-01

## 2022-01-01 RX ORDER — HEPARIN SODIUM 5000 [USP'U]/ML
5000 INJECTION INTRAVENOUS; SUBCUTANEOUS EVERY 12 HOURS
Refills: 0 | Status: DISCONTINUED | OUTPATIENT
Start: 2022-01-01 | End: 2022-01-01

## 2022-01-01 RX ORDER — IPRATROPIUM/ALBUTEROL SULFATE 18-103MCG
3 AEROSOL WITH ADAPTER (GRAM) INHALATION EVERY 4 HOURS
Refills: 0 | Status: DISCONTINUED | OUTPATIENT
Start: 2022-01-01 | End: 2022-01-01

## 2022-01-01 RX ORDER — PANTOPRAZOLE SODIUM 20 MG/1
40 TABLET, DELAYED RELEASE ORAL ONCE
Refills: 0 | Status: COMPLETED | OUTPATIENT
Start: 2022-01-01 | End: 2022-01-01

## 2022-01-01 RX ORDER — ISOSORBIDE MONONITRATE 60 MG/1
3 TABLET, EXTENDED RELEASE ORAL
Qty: 90 | Refills: 0
Start: 2022-01-01 | End: 2022-01-01

## 2022-01-01 RX ORDER — CLOPIDOGREL BISULFATE 75 MG/1
300 TABLET, FILM COATED ORAL ONCE
Refills: 0 | Status: COMPLETED | OUTPATIENT
Start: 2022-01-01 | End: 2022-01-01

## 2022-01-01 RX ORDER — POTASSIUM CHLORIDE 20 MEQ
20 PACKET (EA) ORAL
Refills: 0 | Status: COMPLETED | OUTPATIENT
Start: 2022-01-01 | End: 2022-01-01

## 2022-01-01 RX ORDER — ASPIRIN/CALCIUM CARB/MAGNESIUM 324 MG
1 TABLET ORAL
Qty: 0 | Refills: 0 | DISCHARGE
Start: 2022-01-01

## 2022-01-01 RX ORDER — ISOSORBIDE MONONITRATE 60 MG/1
30 TABLET, EXTENDED RELEASE ORAL DAILY
Refills: 0 | Status: DISCONTINUED | OUTPATIENT
Start: 2022-01-01 | End: 2022-01-01

## 2022-01-01 RX ORDER — IPRATROPIUM/ALBUTEROL SULFATE 18-103MCG
3 AEROSOL WITH ADAPTER (GRAM) INHALATION
Refills: 0 | Status: COMPLETED | OUTPATIENT
Start: 2022-01-01 | End: 2022-01-01

## 2022-01-01 RX ORDER — FENOFIBRATE 48 MG/1
48 TABLET ORAL
Qty: 90 | Refills: 0 | Status: DISCONTINUED | COMMUNITY
Start: 2017-04-12 | End: 2022-01-01

## 2022-01-01 RX ORDER — INSULIN LISPRO 100/ML
VIAL (ML) SUBCUTANEOUS AT BEDTIME
Refills: 0 | Status: DISCONTINUED | OUTPATIENT
Start: 2022-01-01 | End: 2022-01-01

## 2022-01-01 RX ORDER — LOSARTAN POTASSIUM 100 MG/1
100 TABLET, FILM COATED ORAL DAILY
Refills: 0 | Status: ACTIVE | COMMUNITY

## 2022-01-01 RX ORDER — FENOFIBRATE,MICRONIZED 130 MG
48 CAPSULE ORAL DAILY
Refills: 0 | Status: DISCONTINUED | OUTPATIENT
Start: 2022-01-01 | End: 2022-01-01

## 2022-01-01 RX ORDER — MECLIZINE HCL 12.5 MG
25 TABLET ORAL THREE TIMES A DAY
Refills: 0 | Status: DISCONTINUED | OUTPATIENT
Start: 2022-01-01 | End: 2022-01-01

## 2022-01-01 RX ORDER — ALBUTEROL 90 UG/1
2 AEROSOL, METERED ORAL
Qty: 0 | Refills: 0 | DISCHARGE

## 2022-01-01 RX ORDER — INSULIN HUMAN 100 [IU]/ML
8 INJECTION, SOLUTION SUBCUTANEOUS ONCE
Refills: 0 | Status: COMPLETED | OUTPATIENT
Start: 2022-01-01 | End: 2022-01-01

## 2022-01-01 RX ORDER — DONEPEZIL HYDROCHLORIDE 10 MG/1
10 TABLET, FILM COATED ORAL AT BEDTIME
Refills: 0 | Status: DISCONTINUED | OUTPATIENT
Start: 2022-01-01 | End: 2022-01-01

## 2022-01-01 RX ORDER — INSULIN LISPRO 100/ML
6 VIAL (ML) SUBCUTANEOUS
Refills: 0 | Status: DISCONTINUED | OUTPATIENT
Start: 2022-01-01 | End: 2022-01-01

## 2022-01-01 RX ORDER — ISOSORBIDE MONONITRATE 60 MG/1
90 TABLET, EXTENDED RELEASE ORAL DAILY
Refills: 0 | Status: DISCONTINUED | OUTPATIENT
Start: 2022-01-01 | End: 2022-01-01

## 2022-01-01 RX ORDER — IPRATROPIUM/ALBUTEROL SULFATE 18-103MCG
3 AEROSOL WITH ADAPTER (GRAM) INHALATION
Qty: 0 | Refills: 0 | DISCHARGE
Start: 2022-01-01

## 2022-01-01 RX ORDER — MONTELUKAST 4 MG/1
10 TABLET, CHEWABLE ORAL DAILY
Refills: 0 | Status: DISCONTINUED | OUTPATIENT
Start: 2022-01-01 | End: 2022-01-01

## 2022-01-01 RX ORDER — ALBUTEROL 90 UG/1
2 AEROSOL, METERED ORAL EVERY 4 HOURS
Refills: 0 | Status: DISCONTINUED | OUTPATIENT
Start: 2022-01-01 | End: 2022-01-01

## 2022-01-01 RX ORDER — ASPIRIN/CALCIUM CARB/MAGNESIUM 324 MG
81 TABLET ORAL DAILY
Refills: 0 | Status: DISCONTINUED | OUTPATIENT
Start: 2022-01-01 | End: 2023-01-01

## 2022-01-01 RX ORDER — INSULIN LISPRO 100/ML
VIAL (ML) SUBCUTANEOUS
Refills: 0 | Status: DISCONTINUED | OUTPATIENT
Start: 2022-01-01 | End: 2023-01-01

## 2022-01-01 RX ORDER — TIOTROPIUM BROMIDE 18 UG/1
1 CAPSULE ORAL; RESPIRATORY (INHALATION) DAILY
Refills: 0 | Status: DISCONTINUED | OUTPATIENT
Start: 2022-01-01 | End: 2022-01-01

## 2022-01-01 RX ORDER — IPRATROPIUM/ALBUTEROL SULFATE 18-103MCG
3 AEROSOL WITH ADAPTER (GRAM) INHALATION
Qty: 540 | Refills: 0
Start: 2022-01-01 | End: 2022-01-01

## 2022-01-01 RX ORDER — DEXTROSE 50 % IN WATER 50 %
12.5 SYRINGE (ML) INTRAVENOUS ONCE
Refills: 0 | Status: DISCONTINUED | OUTPATIENT
Start: 2022-01-01 | End: 2023-01-01

## 2022-01-01 RX ORDER — INSULIN GLARGINE 100 [IU]/ML
30 INJECTION, SOLUTION SUBCUTANEOUS AT BEDTIME
Refills: 0 | Status: DISCONTINUED | OUTPATIENT
Start: 2022-01-01 | End: 2022-01-01

## 2022-01-01 RX ORDER — INSULIN ASPART 100 [IU]/ML
1077 INJECTION, SOLUTION SUBCUTANEOUS
Qty: 0 | Refills: 0 | DISCHARGE

## 2022-01-01 RX ORDER — INSULIN LISPRO 100/ML
8 VIAL (ML) SUBCUTANEOUS
Refills: 0 | Status: DISCONTINUED | OUTPATIENT
Start: 2022-01-01 | End: 2022-01-01

## 2022-01-01 RX ORDER — ASPIRIN/CALCIUM CARB/MAGNESIUM 324 MG
81 TABLET ORAL DAILY
Refills: 0 | Status: DISCONTINUED | OUTPATIENT
Start: 2022-01-01 | End: 2022-01-01

## 2022-01-01 RX ORDER — DEXTROSE MONOHYDRATE, SODIUM CHLORIDE, AND POTASSIUM CHLORIDE 50; .745; 4.5 G/1000ML; G/1000ML; G/1000ML
1000 INJECTION, SOLUTION INTRAVENOUS
Refills: 0 | Status: DISCONTINUED | OUTPATIENT
Start: 2022-01-01 | End: 2022-01-01

## 2022-01-01 RX ORDER — INSULIN GLARGINE 100 [IU]/ML
40 INJECTION, SOLUTION SUBCUTANEOUS AT BEDTIME
Refills: 0 | Status: DISCONTINUED | OUTPATIENT
Start: 2022-01-01 | End: 2022-01-01

## 2022-01-01 RX ORDER — DIPHENHYDRAMINE HCL 50 MG
50 CAPSULE ORAL ONCE
Refills: 0 | Status: COMPLETED | OUTPATIENT
Start: 2022-01-01 | End: 2022-01-01

## 2022-01-01 RX ORDER — GLUCAGON INJECTION, SOLUTION 0.5 MG/.1ML
1 INJECTION, SOLUTION SUBCUTANEOUS ONCE
Refills: 0 | Status: DISCONTINUED | OUTPATIENT
Start: 2022-01-01 | End: 2023-01-01

## 2022-01-01 RX ADMIN — MONTELUKAST 10 MILLIGRAM(S): 4 TABLET, CHEWABLE ORAL at 12:45

## 2022-01-01 RX ADMIN — Medication 3 MILLILITER(S): at 23:51

## 2022-01-01 RX ADMIN — Medication 15 UNIT(S): at 11:36

## 2022-01-01 RX ADMIN — DONEPEZIL HYDROCHLORIDE 10 MILLIGRAM(S): 10 TABLET, FILM COATED ORAL at 22:11

## 2022-01-01 RX ADMIN — Medication 15 UNIT(S): at 17:57

## 2022-01-01 RX ADMIN — BUDESONIDE AND FORMOTEROL FUMARATE DIHYDRATE 2 PUFF(S): 160; 4.5 AEROSOL RESPIRATORY (INHALATION) at 19:27

## 2022-01-01 RX ADMIN — Medication 3 MILLILITER(S): at 20:31

## 2022-01-01 RX ADMIN — TIOTROPIUM BROMIDE 1 CAPSULE(S): 18 CAPSULE ORAL; RESPIRATORY (INHALATION) at 09:12

## 2022-01-01 RX ADMIN — Medication 100 MILLIGRAM(S): at 17:06

## 2022-01-01 RX ADMIN — Medication 81 MILLIGRAM(S): at 11:30

## 2022-01-01 RX ADMIN — DONEPEZIL HYDROCHLORIDE 10 MILLIGRAM(S): 10 TABLET, FILM COATED ORAL at 22:15

## 2022-01-01 RX ADMIN — PANTOPRAZOLE SODIUM 40 MILLIGRAM(S): 20 TABLET, DELAYED RELEASE ORAL at 08:47

## 2022-01-01 RX ADMIN — TIOTROPIUM BROMIDE 1 CAPSULE(S): 18 CAPSULE ORAL; RESPIRATORY (INHALATION) at 08:59

## 2022-01-01 RX ADMIN — Medication 3 MILLILITER(S): at 07:55

## 2022-01-01 RX ADMIN — LOSARTAN POTASSIUM 100 MILLIGRAM(S): 100 TABLET, FILM COATED ORAL at 05:31

## 2022-01-01 RX ADMIN — Medication 6 UNIT(S): at 12:38

## 2022-01-01 RX ADMIN — ALBUTEROL 2 PUFF(S): 90 AEROSOL, METERED ORAL at 13:13

## 2022-01-01 RX ADMIN — Medication 15 UNIT(S): at 11:59

## 2022-01-01 RX ADMIN — Medication 60 MILLIGRAM(S): at 05:51

## 2022-01-01 RX ADMIN — ISOSORBIDE MONONITRATE 90 MILLIGRAM(S): 60 TABLET, EXTENDED RELEASE ORAL at 12:50

## 2022-01-01 RX ADMIN — Medication 4: at 22:14

## 2022-01-01 RX ADMIN — Medication 800 MILLIGRAM(S): at 15:47

## 2022-01-01 RX ADMIN — Medication 3 MILLILITER(S): at 09:05

## 2022-01-01 RX ADMIN — LOSARTAN POTASSIUM 100 MILLIGRAM(S): 100 TABLET, FILM COATED ORAL at 06:13

## 2022-01-01 RX ADMIN — Medication 60 MILLIGRAM(S): at 05:47

## 2022-01-01 RX ADMIN — Medication 800 MILLIGRAM(S): at 23:39

## 2022-01-01 RX ADMIN — DONEPEZIL HYDROCHLORIDE 10 MILLIGRAM(S): 10 TABLET, FILM COATED ORAL at 21:58

## 2022-01-01 RX ADMIN — Medication 5: at 11:59

## 2022-01-01 RX ADMIN — ATORVASTATIN CALCIUM 20 MILLIGRAM(S): 80 TABLET, FILM COATED ORAL at 23:46

## 2022-01-01 RX ADMIN — Medication 15 UNIT(S): at 18:44

## 2022-01-01 RX ADMIN — Medication 3 MILLILITER(S): at 16:36

## 2022-01-01 RX ADMIN — DONEPEZIL HYDROCHLORIDE 10 MILLIGRAM(S): 10 TABLET, FILM COATED ORAL at 22:35

## 2022-01-01 RX ADMIN — ALBUTEROL 2 PUFF(S): 90 AEROSOL, METERED ORAL at 09:12

## 2022-01-01 RX ADMIN — HEPARIN SODIUM 5000 UNIT(S): 5000 INJECTION INTRAVENOUS; SUBCUTANEOUS at 05:48

## 2022-01-01 RX ADMIN — Medication 15 UNIT(S): at 16:30

## 2022-01-01 RX ADMIN — Medication 10 UNIT(S): at 06:19

## 2022-01-01 RX ADMIN — Medication 3 MILLILITER(S): at 14:10

## 2022-01-01 RX ADMIN — DONEPEZIL HYDROCHLORIDE 10 MILLIGRAM(S): 10 TABLET, FILM COATED ORAL at 21:28

## 2022-01-01 RX ADMIN — Medication 25 MILLIGRAM(S): at 05:41

## 2022-01-01 RX ADMIN — ATORVASTATIN CALCIUM 20 MILLIGRAM(S): 80 TABLET, FILM COATED ORAL at 22:25

## 2022-01-01 RX ADMIN — Medication 15 UNIT(S): at 17:45

## 2022-01-01 RX ADMIN — Medication 20 MILLIGRAM(S): at 05:22

## 2022-01-01 RX ADMIN — AMLODIPINE BESYLATE 10 MILLIGRAM(S): 2.5 TABLET ORAL at 05:46

## 2022-01-01 RX ADMIN — HEPARIN SODIUM 5000 UNIT(S): 5000 INJECTION INTRAVENOUS; SUBCUTANEOUS at 05:51

## 2022-01-01 RX ADMIN — Medication 3 MILLILITER(S): at 16:33

## 2022-01-01 RX ADMIN — Medication 20 MILLIGRAM(S): at 05:32

## 2022-01-01 RX ADMIN — Medication 81 MILLIGRAM(S): at 12:32

## 2022-01-01 RX ADMIN — Medication 3 MILLILITER(S): at 12:03

## 2022-01-01 RX ADMIN — INSULIN GLARGINE 40 UNIT(S): 100 INJECTION, SOLUTION SUBCUTANEOUS at 21:53

## 2022-01-01 RX ADMIN — INSULIN HUMAN 10 UNIT(S): 100 INJECTION, SOLUTION SUBCUTANEOUS at 21:54

## 2022-01-01 RX ADMIN — Medication 15 UNIT(S): at 12:12

## 2022-01-01 RX ADMIN — Medication 4: at 22:05

## 2022-01-01 RX ADMIN — ATORVASTATIN CALCIUM 20 MILLIGRAM(S): 80 TABLET, FILM COATED ORAL at 21:29

## 2022-01-01 RX ADMIN — Medication 125 MILLIGRAM(S): at 16:21

## 2022-01-01 RX ADMIN — Medication 4: at 11:36

## 2022-01-01 RX ADMIN — Medication 4: at 12:44

## 2022-01-01 RX ADMIN — Medication 20 MILLIGRAM(S): at 05:33

## 2022-01-01 RX ADMIN — Medication 100 MILLIGRAM(S): at 20:14

## 2022-01-01 RX ADMIN — MONTELUKAST 10 MILLIGRAM(S): 4 TABLET, CHEWABLE ORAL at 12:07

## 2022-01-01 RX ADMIN — Medication 5: at 11:47

## 2022-01-01 RX ADMIN — Medication 3 MILLILITER(S): at 22:05

## 2022-01-01 RX ADMIN — ATORVASTATIN CALCIUM 20 MILLIGRAM(S): 80 TABLET, FILM COATED ORAL at 21:58

## 2022-01-01 RX ADMIN — Medication 6: at 12:49

## 2022-01-01 RX ADMIN — INSULIN GLARGINE 10 UNIT(S): 100 INJECTION, SOLUTION SUBCUTANEOUS at 22:11

## 2022-01-01 RX ADMIN — Medication 3 MILLILITER(S): at 08:58

## 2022-01-01 RX ADMIN — AMLODIPINE BESYLATE 10 MILLIGRAM(S): 2.5 TABLET ORAL at 05:50

## 2022-01-01 RX ADMIN — Medication 3: at 16:30

## 2022-01-01 RX ADMIN — Medication 25 MILLIGRAM(S): at 05:07

## 2022-01-01 RX ADMIN — MONTELUKAST 10 MILLIGRAM(S): 4 TABLET, CHEWABLE ORAL at 12:09

## 2022-01-01 RX ADMIN — Medication 60 MILLIGRAM(S): at 17:46

## 2022-01-01 RX ADMIN — MONTELUKAST 10 MILLIGRAM(S): 4 TABLET, CHEWABLE ORAL at 12:52

## 2022-01-01 RX ADMIN — TIOTROPIUM BROMIDE 1 CAPSULE(S): 18 CAPSULE ORAL; RESPIRATORY (INHALATION) at 08:15

## 2022-01-01 RX ADMIN — Medication 100 MILLIGRAM(S): at 18:04

## 2022-01-01 RX ADMIN — Medication 100 MILLIGRAM(S): at 17:53

## 2022-01-01 RX ADMIN — Medication 60 MILLIGRAM(S): at 06:13

## 2022-01-01 RX ADMIN — Medication 125 MILLIGRAM(S): at 14:09

## 2022-01-01 RX ADMIN — Medication 25 MILLIGRAM(S): at 06:03

## 2022-01-01 RX ADMIN — Medication 100 MILLIGRAM(S): at 17:45

## 2022-01-01 RX ADMIN — INSULIN GLARGINE 35 UNIT(S): 100 INJECTION, SOLUTION SUBCUTANEOUS at 21:29

## 2022-01-01 RX ADMIN — CLOPIDOGREL BISULFATE 75 MILLIGRAM(S): 75 TABLET, FILM COATED ORAL at 12:33

## 2022-01-01 RX ADMIN — Medication 3 MILLILITER(S): at 15:14

## 2022-01-01 RX ADMIN — LOSARTAN POTASSIUM 100 MILLIGRAM(S): 100 TABLET, FILM COATED ORAL at 05:49

## 2022-01-01 RX ADMIN — Medication 800 MILLIGRAM(S): at 23:58

## 2022-01-01 RX ADMIN — Medication 81 MILLIGRAM(S): at 10:36

## 2022-01-01 RX ADMIN — Medication 125 MILLIGRAM(S): at 05:05

## 2022-01-01 RX ADMIN — Medication 3 MILLILITER(S): at 16:22

## 2022-01-01 RX ADMIN — Medication 3 MILLILITER(S): at 21:12

## 2022-01-01 RX ADMIN — LOSARTAN POTASSIUM 100 MILLIGRAM(S): 100 TABLET, FILM COATED ORAL at 05:39

## 2022-01-01 RX ADMIN — INSULIN GLARGINE 15 UNIT(S): 100 INJECTION, SOLUTION SUBCUTANEOUS at 21:47

## 2022-01-01 RX ADMIN — Medication 3 MILLILITER(S): at 20:29

## 2022-01-01 RX ADMIN — Medication 20 MILLIGRAM(S): at 05:05

## 2022-01-01 RX ADMIN — Medication 50 MILLIGRAM(S): at 05:34

## 2022-01-01 RX ADMIN — Medication 60 MILLIGRAM(S): at 18:04

## 2022-01-01 RX ADMIN — Medication 3 MILLILITER(S): at 10:36

## 2022-01-01 RX ADMIN — ATORVASTATIN CALCIUM 20 MILLIGRAM(S): 80 TABLET, FILM COATED ORAL at 21:43

## 2022-01-01 RX ADMIN — Medication 4: at 17:56

## 2022-01-01 RX ADMIN — Medication 3 MILLILITER(S): at 08:14

## 2022-01-01 RX ADMIN — Medication 81 MILLIGRAM(S): at 12:46

## 2022-01-01 RX ADMIN — Medication 3 MILLILITER(S): at 11:59

## 2022-01-01 RX ADMIN — Medication 8: at 17:08

## 2022-01-01 RX ADMIN — INSULIN GLARGINE 18 UNIT(S): 100 INJECTION, SOLUTION SUBCUTANEOUS at 22:34

## 2022-01-01 RX ADMIN — DONEPEZIL HYDROCHLORIDE 10 MILLIGRAM(S): 10 TABLET, FILM COATED ORAL at 22:25

## 2022-01-01 RX ADMIN — Medication 3 MILLILITER(S): at 14:18

## 2022-01-01 RX ADMIN — LOSARTAN POTASSIUM 100 MILLIGRAM(S): 100 TABLET, FILM COATED ORAL at 05:46

## 2022-01-01 RX ADMIN — Medication 25 GRAM(S): at 22:19

## 2022-01-01 RX ADMIN — Medication 6: at 12:36

## 2022-01-01 RX ADMIN — Medication 81 MILLIGRAM(S): at 13:20

## 2022-01-01 RX ADMIN — ATORVASTATIN CALCIUM 20 MILLIGRAM(S): 80 TABLET, FILM COATED ORAL at 21:59

## 2022-01-01 RX ADMIN — Medication 8: at 23:19

## 2022-01-01 RX ADMIN — HEPARIN SODIUM 5000 UNIT(S): 5000 INJECTION INTRAVENOUS; SUBCUTANEOUS at 17:57

## 2022-01-01 RX ADMIN — Medication 800 MILLIGRAM(S): at 22:35

## 2022-01-01 RX ADMIN — ISOSORBIDE MONONITRATE 90 MILLIGRAM(S): 60 TABLET, EXTENDED RELEASE ORAL at 15:20

## 2022-01-01 RX ADMIN — Medication 8 UNIT(S): at 08:46

## 2022-01-01 RX ADMIN — Medication 800 MILLIGRAM(S): at 00:02

## 2022-01-01 RX ADMIN — Medication 40 MILLIGRAM(S): at 05:41

## 2022-01-01 RX ADMIN — PANTOPRAZOLE SODIUM 40 MILLIGRAM(S): 20 TABLET, DELAYED RELEASE ORAL at 20:13

## 2022-01-01 RX ADMIN — BUDESONIDE AND FORMOTEROL FUMARATE DIHYDRATE 2 PUFF(S): 160; 4.5 AEROSOL RESPIRATORY (INHALATION) at 08:46

## 2022-01-01 RX ADMIN — TIOTROPIUM BROMIDE 1 CAPSULE(S): 18 CAPSULE ORAL; RESPIRATORY (INHALATION) at 08:39

## 2022-01-01 RX ADMIN — Medication 20 MILLIGRAM(S): at 05:51

## 2022-01-01 RX ADMIN — INSULIN HUMAN 8 UNIT(S): 100 INJECTION, SOLUTION SUBCUTANEOUS at 17:01

## 2022-01-01 RX ADMIN — Medication 3: at 17:22

## 2022-01-01 RX ADMIN — ALBUTEROL 2 PUFF(S): 90 AEROSOL, METERED ORAL at 05:51

## 2022-01-01 RX ADMIN — Medication 81 MILLIGRAM(S): at 12:09

## 2022-01-01 RX ADMIN — Medication 20 MILLIGRAM(S): at 06:13

## 2022-01-01 RX ADMIN — Medication 4: at 17:38

## 2022-01-01 RX ADMIN — Medication 6 UNIT(S): at 08:13

## 2022-01-01 RX ADMIN — LOSARTAN POTASSIUM 100 MILLIGRAM(S): 100 TABLET, FILM COATED ORAL at 05:52

## 2022-01-01 RX ADMIN — Medication 3 MILLILITER(S): at 06:43

## 2022-01-01 RX ADMIN — CLOPIDOGREL BISULFATE 300 MILLIGRAM(S): 75 TABLET, FILM COATED ORAL at 16:12

## 2022-01-01 RX ADMIN — Medication 12.5 MILLIGRAM(S): at 05:22

## 2022-01-01 RX ADMIN — DONEPEZIL HYDROCHLORIDE 10 MILLIGRAM(S): 10 TABLET, FILM COATED ORAL at 22:19

## 2022-01-01 RX ADMIN — INSULIN GLARGINE 10 UNIT(S): 100 INJECTION, SOLUTION SUBCUTANEOUS at 22:13

## 2022-01-01 RX ADMIN — HEPARIN SODIUM 5000 UNIT(S): 5000 INJECTION INTRAVENOUS; SUBCUTANEOUS at 16:32

## 2022-01-01 RX ADMIN — DONEPEZIL HYDROCHLORIDE 10 MILLIGRAM(S): 10 TABLET, FILM COATED ORAL at 23:46

## 2022-01-01 RX ADMIN — Medication 3 MILLILITER(S): at 08:00

## 2022-01-01 RX ADMIN — Medication 3 MILLILITER(S): at 03:01

## 2022-01-01 RX ADMIN — Medication 3 MILLILITER(S): at 08:04

## 2022-01-01 RX ADMIN — BUDESONIDE AND FORMOTEROL FUMARATE DIHYDRATE 2 PUFF(S): 160; 4.5 AEROSOL RESPIRATORY (INHALATION) at 20:59

## 2022-01-01 RX ADMIN — Medication 2: at 16:21

## 2022-01-01 RX ADMIN — Medication 50 MILLIGRAM(S): at 05:39

## 2022-01-01 RX ADMIN — Medication 15 UNIT(S): at 12:44

## 2022-01-01 RX ADMIN — MONTELUKAST 10 MILLIGRAM(S): 4 TABLET, CHEWABLE ORAL at 11:30

## 2022-01-01 RX ADMIN — Medication 6: at 12:11

## 2022-01-01 RX ADMIN — Medication 8: at 08:11

## 2022-01-01 RX ADMIN — AMLODIPINE BESYLATE 10 MILLIGRAM(S): 2.5 TABLET ORAL at 06:13

## 2022-01-01 RX ADMIN — TIOTROPIUM BROMIDE 1 CAPSULE(S): 18 CAPSULE ORAL; RESPIRATORY (INHALATION) at 10:14

## 2022-01-01 RX ADMIN — ISOSORBIDE MONONITRATE 90 MILLIGRAM(S): 60 TABLET, EXTENDED RELEASE ORAL at 12:46

## 2022-01-01 RX ADMIN — Medication 800 MILLIGRAM(S): at 20:17

## 2022-01-01 RX ADMIN — BUDESONIDE AND FORMOTEROL FUMARATE DIHYDRATE 2 PUFF(S): 160; 4.5 AEROSOL RESPIRATORY (INHALATION) at 08:15

## 2022-01-01 RX ADMIN — Medication 10: at 08:12

## 2022-01-01 RX ADMIN — Medication 15 UNIT(S): at 12:50

## 2022-01-01 RX ADMIN — Medication 10 UNIT(S): at 09:20

## 2022-01-01 RX ADMIN — Medication 3 MILLILITER(S): at 13:44

## 2022-01-01 RX ADMIN — Medication 40 MILLIGRAM(S): at 13:04

## 2022-01-01 RX ADMIN — MONTELUKAST 10 MILLIGRAM(S): 4 TABLET, CHEWABLE ORAL at 12:33

## 2022-01-01 RX ADMIN — Medication 20 MILLIGRAM(S): at 05:46

## 2022-01-01 RX ADMIN — Medication 800 MILLIGRAM(S): at 12:45

## 2022-01-01 RX ADMIN — Medication 20 MILLIGRAM(S): at 05:50

## 2022-01-01 RX ADMIN — INSULIN GLARGINE 18 UNIT(S): 100 INJECTION, SOLUTION SUBCUTANEOUS at 22:16

## 2022-01-01 RX ADMIN — Medication 100 MILLIGRAM(S): at 20:56

## 2022-01-01 RX ADMIN — ATORVASTATIN CALCIUM 40 MILLIGRAM(S): 80 TABLET, FILM COATED ORAL at 22:20

## 2022-01-01 RX ADMIN — INSULIN GLARGINE 30 UNIT(S): 100 INJECTION, SOLUTION SUBCUTANEOUS at 23:53

## 2022-01-01 RX ADMIN — Medication 60 MILLIGRAM(S): at 05:50

## 2022-01-01 RX ADMIN — Medication 3 MILLILITER(S): at 20:02

## 2022-01-01 RX ADMIN — Medication 4: at 17:14

## 2022-01-01 RX ADMIN — Medication 40 MILLIEQUIVALENT(S): at 13:12

## 2022-01-01 RX ADMIN — Medication 8: at 08:29

## 2022-01-01 RX ADMIN — Medication 8: at 21:55

## 2022-01-01 RX ADMIN — Medication 800 MILLIGRAM(S): at 12:51

## 2022-01-01 RX ADMIN — Medication 100 MILLIGRAM(S): at 05:09

## 2022-01-01 RX ADMIN — Medication 3 MILLILITER(S): at 12:23

## 2022-01-01 RX ADMIN — Medication 800 MILLIGRAM(S): at 15:49

## 2022-01-01 RX ADMIN — Medication 800 MILLIGRAM(S): at 11:22

## 2022-01-01 RX ADMIN — ATORVASTATIN CALCIUM 40 MILLIGRAM(S): 80 TABLET, FILM COATED ORAL at 22:15

## 2022-01-01 RX ADMIN — Medication 800 MILLIGRAM(S): at 01:07

## 2022-01-01 RX ADMIN — Medication 2: at 08:01

## 2022-01-01 RX ADMIN — Medication 4 UNIT(S): at 17:09

## 2022-01-01 RX ADMIN — Medication 10 UNIT(S): at 17:39

## 2022-01-01 RX ADMIN — Medication 3 MILLILITER(S): at 20:58

## 2022-01-01 RX ADMIN — Medication 800 MILLIGRAM(S): at 08:19

## 2022-01-01 RX ADMIN — Medication 8 UNIT(S): at 12:16

## 2022-01-01 RX ADMIN — Medication 3 MILLILITER(S): at 09:21

## 2022-01-01 RX ADMIN — Medication 20 MILLIGRAM(S): at 05:39

## 2022-01-01 RX ADMIN — Medication 3: at 08:18

## 2022-01-01 RX ADMIN — Medication 150 GRAM(S): at 14:09

## 2022-01-01 RX ADMIN — CLOPIDOGREL BISULFATE 75 MILLIGRAM(S): 75 TABLET, FILM COATED ORAL at 12:09

## 2022-01-01 RX ADMIN — ATORVASTATIN CALCIUM 40 MILLIGRAM(S): 80 TABLET, FILM COATED ORAL at 22:11

## 2022-01-01 RX ADMIN — INSULIN GLARGINE 10 UNIT(S): 100 INJECTION, SOLUTION SUBCUTANEOUS at 21:55

## 2022-01-01 RX ADMIN — Medication 3 MILLILITER(S): at 21:05

## 2022-01-01 RX ADMIN — Medication 15 UNIT(S): at 08:54

## 2022-01-01 RX ADMIN — Medication 3 MILLILITER(S): at 23:36

## 2022-01-01 RX ADMIN — Medication 40 MILLIGRAM(S): at 17:36

## 2022-01-01 RX ADMIN — HEPARIN SODIUM 5000 UNIT(S): 5000 INJECTION INTRAVENOUS; SUBCUTANEOUS at 05:39

## 2022-01-01 RX ADMIN — Medication 800 MILLIGRAM(S): at 12:07

## 2022-01-01 RX ADMIN — Medication 50 MILLIEQUIVALENT(S): at 17:21

## 2022-01-01 RX ADMIN — Medication 2: at 17:45

## 2022-01-01 RX ADMIN — Medication 4 UNIT(S): at 12:10

## 2022-01-01 RX ADMIN — ENOXAPARIN SODIUM 60 MILLIGRAM(S): 100 INJECTION SUBCUTANEOUS at 05:05

## 2022-01-01 RX ADMIN — LOSARTAN POTASSIUM 100 MILLIGRAM(S): 100 TABLET, FILM COATED ORAL at 05:33

## 2022-01-01 RX ADMIN — PANTOPRAZOLE SODIUM 40 MILLIGRAM(S): 20 TABLET, DELAYED RELEASE ORAL at 05:52

## 2022-01-01 RX ADMIN — Medication 50 MILLIGRAM(S): at 18:07

## 2022-01-01 RX ADMIN — Medication 800 MILLIGRAM(S): at 16:32

## 2022-01-01 RX ADMIN — SODIUM CHLORIDE 1000 MILLILITER(S): 9 INJECTION, SOLUTION INTRAVENOUS at 15:55

## 2022-01-01 RX ADMIN — Medication 100 MILLIGRAM(S): at 05:07

## 2022-01-01 RX ADMIN — CLOPIDOGREL BISULFATE 75 MILLIGRAM(S): 75 TABLET, FILM COATED ORAL at 18:07

## 2022-01-01 RX ADMIN — Medication 4: at 08:46

## 2022-01-01 RX ADMIN — INSULIN GLARGINE 10 UNIT(S): 100 INJECTION, SOLUTION SUBCUTANEOUS at 21:38

## 2022-01-01 RX ADMIN — Medication 100 MILLIGRAM(S): at 05:40

## 2022-01-01 RX ADMIN — Medication 15 UNIT(S): at 08:01

## 2022-01-01 RX ADMIN — Medication 6 UNIT(S): at 18:37

## 2022-01-01 RX ADMIN — ISOSORBIDE MONONITRATE 60 MILLIGRAM(S): 60 TABLET, EXTENDED RELEASE ORAL at 12:08

## 2022-01-01 RX ADMIN — Medication 3 MILLILITER(S): at 16:21

## 2022-01-01 RX ADMIN — INSULIN GLARGINE 40 UNIT(S): 100 INJECTION, SOLUTION SUBCUTANEOUS at 22:06

## 2022-01-01 RX ADMIN — PANTOPRAZOLE SODIUM 40 MILLIGRAM(S): 20 TABLET, DELAYED RELEASE ORAL at 08:54

## 2022-01-01 RX ADMIN — MONTELUKAST 10 MILLIGRAM(S): 4 TABLET, CHEWABLE ORAL at 11:22

## 2022-01-01 RX ADMIN — ATORVASTATIN CALCIUM 40 MILLIGRAM(S): 80 TABLET, FILM COATED ORAL at 21:39

## 2022-01-01 RX ADMIN — TIOTROPIUM BROMIDE 1 CAPSULE(S): 18 CAPSULE ORAL; RESPIRATORY (INHALATION) at 09:22

## 2022-01-01 RX ADMIN — HEPARIN SODIUM 5000 UNIT(S): 5000 INJECTION INTRAVENOUS; SUBCUTANEOUS at 17:28

## 2022-01-01 RX ADMIN — AMLODIPINE BESYLATE 10 MILLIGRAM(S): 2.5 TABLET ORAL at 05:39

## 2022-01-01 RX ADMIN — Medication 3 MILLILITER(S): at 07:44

## 2022-01-01 RX ADMIN — PANTOPRAZOLE SODIUM 40 MILLIGRAM(S): 20 TABLET, DELAYED RELEASE ORAL at 08:30

## 2022-01-01 RX ADMIN — HEPARIN SODIUM 5000 UNIT(S): 5000 INJECTION INTRAVENOUS; SUBCUTANEOUS at 17:48

## 2022-01-01 RX ADMIN — DONEPEZIL HYDROCHLORIDE 10 MILLIGRAM(S): 10 TABLET, FILM COATED ORAL at 21:42

## 2022-01-01 RX ADMIN — Medication 15 UNIT(S): at 17:22

## 2022-01-01 RX ADMIN — Medication 3: at 12:10

## 2022-01-01 RX ADMIN — ISOSORBIDE MONONITRATE 90 MILLIGRAM(S): 60 TABLET, EXTENDED RELEASE ORAL at 13:37

## 2022-01-01 RX ADMIN — ISOSORBIDE MONONITRATE 30 MILLIGRAM(S): 60 TABLET, EXTENDED RELEASE ORAL at 11:30

## 2022-01-01 RX ADMIN — Medication 81 MILLIGRAM(S): at 11:54

## 2022-01-01 RX ADMIN — Medication 50 MILLIEQUIVALENT(S): at 20:16

## 2022-01-01 RX ADMIN — Medication 40 MILLIGRAM(S): at 05:07

## 2022-01-01 RX ADMIN — HEPARIN SODIUM 5000 UNIT(S): 5000 INJECTION INTRAVENOUS; SUBCUTANEOUS at 17:44

## 2022-01-01 RX ADMIN — AMLODIPINE BESYLATE 10 MILLIGRAM(S): 2.5 TABLET ORAL at 05:31

## 2022-01-01 RX ADMIN — Medication 40 MILLIGRAM(S): at 06:03

## 2022-01-01 RX ADMIN — Medication 10 MILLIGRAM(S): at 06:52

## 2022-01-01 RX ADMIN — BUDESONIDE AND FORMOTEROL FUMARATE DIHYDRATE 2 PUFF(S): 160; 4.5 AEROSOL RESPIRATORY (INHALATION) at 07:58

## 2022-01-01 RX ADMIN — Medication 15 UNIT(S): at 11:47

## 2022-01-01 RX ADMIN — HEPARIN SODIUM 5000 UNIT(S): 5000 INJECTION INTRAVENOUS; SUBCUTANEOUS at 05:33

## 2022-01-01 RX ADMIN — CLOPIDOGREL BISULFATE 75 MILLIGRAM(S): 75 TABLET, FILM COATED ORAL at 13:19

## 2022-01-01 RX ADMIN — Medication 3 MILLILITER(S): at 19:26

## 2022-01-01 RX ADMIN — PANTOPRAZOLE SODIUM 40 MILLIGRAM(S): 20 TABLET, DELAYED RELEASE ORAL at 05:33

## 2022-01-01 RX ADMIN — ALBUTEROL 2 PUFF(S): 90 AEROSOL, METERED ORAL at 00:22

## 2022-01-01 RX ADMIN — INSULIN HUMAN 6 UNIT(S): 100 INJECTION, SOLUTION SUBCUTANEOUS at 20:13

## 2022-01-01 RX ADMIN — Medication 800 MILLIGRAM(S): at 08:54

## 2022-01-01 RX ADMIN — Medication 3 MILLILITER(S): at 06:44

## 2022-01-01 RX ADMIN — PANTOPRAZOLE SODIUM 40 MILLIGRAM(S): 20 TABLET, DELAYED RELEASE ORAL at 05:39

## 2022-01-01 RX ADMIN — Medication 6: at 12:16

## 2022-01-01 RX ADMIN — MONTELUKAST 10 MILLIGRAM(S): 4 TABLET, CHEWABLE ORAL at 13:19

## 2022-01-01 RX ADMIN — Medication 3 MILLILITER(S): at 14:00

## 2022-01-01 RX ADMIN — Medication 30 MILLIGRAM(S): at 05:39

## 2022-01-01 RX ADMIN — MONTELUKAST 10 MILLIGRAM(S): 4 TABLET, CHEWABLE ORAL at 11:54

## 2022-01-01 RX ADMIN — INSULIN GLARGINE 40 UNIT(S): 100 INJECTION, SOLUTION SUBCUTANEOUS at 23:38

## 2022-01-01 RX ADMIN — DONEPEZIL HYDROCHLORIDE 10 MILLIGRAM(S): 10 TABLET, FILM COATED ORAL at 22:13

## 2022-01-01 RX ADMIN — Medication 60 MILLIGRAM(S): at 17:37

## 2022-01-01 RX ADMIN — Medication 100 MILLIGRAM(S): at 18:07

## 2022-01-01 RX ADMIN — ISOSORBIDE MONONITRATE 30 MILLIGRAM(S): 60 TABLET, EXTENDED RELEASE ORAL at 12:06

## 2022-01-01 RX ADMIN — Medication 100 MILLIGRAM(S): at 05:22

## 2022-01-01 RX ADMIN — CLOPIDOGREL BISULFATE 150 MILLIGRAM(S): 75 TABLET, FILM COATED ORAL at 11:30

## 2022-01-01 RX ADMIN — AMLODIPINE BESYLATE 10 MILLIGRAM(S): 2.5 TABLET ORAL at 05:32

## 2022-01-01 RX ADMIN — ATORVASTATIN CALCIUM 40 MILLIGRAM(S): 80 TABLET, FILM COATED ORAL at 22:14

## 2022-01-01 RX ADMIN — HEPARIN SODIUM 5000 UNIT(S): 5000 INJECTION INTRAVENOUS; SUBCUTANEOUS at 05:32

## 2022-01-01 RX ADMIN — Medication 3 MILLILITER(S): at 20:16

## 2022-01-01 RX ADMIN — Medication 40 MILLIGRAM(S): at 16:10

## 2022-01-01 RX ADMIN — ATORVASTATIN CALCIUM 20 MILLIGRAM(S): 80 TABLET, FILM COATED ORAL at 22:35

## 2022-01-01 RX ADMIN — Medication 2: at 18:45

## 2022-01-01 RX ADMIN — Medication 40 MILLIGRAM(S): at 20:57

## 2022-01-01 RX ADMIN — Medication 3 MILLILITER(S): at 07:37

## 2022-01-01 RX ADMIN — PANTOPRAZOLE SODIUM 40 MILLIGRAM(S): 20 TABLET, DELAYED RELEASE ORAL at 05:46

## 2022-01-01 RX ADMIN — Medication 3 MILLILITER(S): at 11:35

## 2022-01-01 RX ADMIN — Medication 100 MILLIGRAM(S): at 06:02

## 2022-01-01 RX ADMIN — Medication 15 UNIT(S): at 08:19

## 2022-01-01 RX ADMIN — Medication 4: at 06:20

## 2022-01-01 RX ADMIN — Medication 800 MILLIGRAM(S): at 07:56

## 2022-01-01 RX ADMIN — Medication 50 MILLIGRAM(S): at 05:32

## 2022-01-01 RX ADMIN — Medication 3 MILLILITER(S): at 20:24

## 2022-01-01 RX ADMIN — ATORVASTATIN CALCIUM 20 MILLIGRAM(S): 80 TABLET, FILM COATED ORAL at 21:42

## 2022-01-01 RX ADMIN — BUDESONIDE AND FORMOTEROL FUMARATE DIHYDRATE 2 PUFF(S): 160; 4.5 AEROSOL RESPIRATORY (INHALATION) at 20:33

## 2022-01-01 RX ADMIN — DONEPEZIL HYDROCHLORIDE 10 MILLIGRAM(S): 10 TABLET, FILM COATED ORAL at 21:43

## 2022-01-01 RX ADMIN — HEPARIN SODIUM 5000 UNIT(S): 5000 INJECTION INTRAVENOUS; SUBCUTANEOUS at 06:13

## 2022-01-01 RX ADMIN — Medication 800 MILLIGRAM(S): at 21:52

## 2022-01-01 RX ADMIN — Medication 100 MILLIGRAM(S): at 05:41

## 2022-01-01 RX ADMIN — Medication 5 UNIT(S): at 10:53

## 2022-01-01 RX ADMIN — Medication 3 MILLILITER(S): at 08:10

## 2022-01-01 RX ADMIN — BUDESONIDE AND FORMOTEROL FUMARATE DIHYDRATE 2 PUFF(S): 160; 4.5 AEROSOL RESPIRATORY (INHALATION) at 09:11

## 2022-01-01 RX ADMIN — Medication 3 MILLILITER(S): at 14:09

## 2022-01-01 RX ADMIN — Medication 8: at 22:11

## 2022-01-01 RX ADMIN — Medication 800 MILLIGRAM(S): at 08:49

## 2022-01-01 RX ADMIN — Medication 800 MILLIGRAM(S): at 21:39

## 2022-01-01 RX ADMIN — Medication 12.5 MILLIGRAM(S): at 14:58

## 2022-03-07 PROBLEM — C34.90: Status: ACTIVE | Noted: 2019-07-22

## 2022-03-07 NOTE — ASSESSMENT
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  Elly has completed course of steroids as well as rituximab. Rituximab completed 04/08/2016, and she finished steroids approximately in early 06/2016.  She is in remission.\par - will repeat blood work today including CBC, cmp , iron studies and LDH . \par -will monitor \par \par 2.History of arteriovenous malformations on endoscopy. \par - She currently denies any bleeding.  \par -  Hemiglobin has improved  unlikely bleeding will check iron studies\par \par 3. Diabetes.  \par - She is on insulin.\par \par 4. Hypogammaglobulinemia.  \par - Her IgG that was previously checked was decreased. She has been asymptomatic this was due to Rituxan. Repeat is showing near normal levels in past . \par \par 5. She has a history of steroid use.  Her  DEXA scan in 8/2016 was normal.  DEXA in 3/20/19 showed osteopenia in femoral neck with FRAX score risk of fracture of 2.9% in 10 years.\par - c/w calcium and vitamin D.\par \par 6. [id number] stage IA2 SCC of lung left lower lobe.\par - s/p 5/5 fractions RT competed 7/12/19.\par - Repeat CT chest in 8/14/2020 showed increased left lower lobe opacity without discrete nodule, increased left-sided pleural effusion. Spoke with Dr. Feldman, who agreed that this likely represents post-treatment effect. Will repeat NC CT chest 5/2021 SONIDO. Repeat CT showed stable findings , 11/21 . \par Will repeat CT in 6 months , May , 2022 . \par \par 7. Microtic Anemia  is likely secondary to chronic inflammation due to her COPD as well as CKD last GFR was 47.\par -will repeat CBC  \par -was  improving on last blood work . \par \par \par Follow up in November LDH, CMP and iron studies sent. \par \par \par

## 2022-03-07 NOTE — END OF VISIT
[] : Fellow [FreeTextEntry3] : She is here with her daughter. She is SOB when she ambulates and has oxygen when needed. CBC from today showed stable mild anemia likely due to anemia of chronic inflammation from COPD. Her last CT chest was SONIDO. Will repeat iron studies today as well and CMP and will check CT chest in May for her Stge I lung cancer and if SONIDO on CT will RTC in 6 months.

## 2022-03-07 NOTE — REVIEW OF SYSTEMS
[Fatigue] : fatigue [Cough] : cough [SOB on Exertion] : shortness of breath during exertion [Negative] : Heme/Lymph [Recent Change In Weight] : ~T no recent weight change [Shortness Of Breath] : no shortness of breath [Wheezing] : no wheezing [Anxiety] : no anxiety [Depression] : no depression [FreeTextEntry5] : She has edema in legs when standing for to long or if eats lots of salt, improved now  [FreeTextEntry6] : SOB is chronic from COPD, stable, cough is stable and chronic [de-identified] : Diabetic neuropathy.

## 2022-03-07 NOTE — HISTORY OF PRESENT ILLNESS
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  She finished it approximately in the beginning of 06/2016.  She also received 4  doses of rituximab therapy.\par \par HISTORY OF PRESENT ILLNESS:  Ms. Blackmon is a very pleasant 85yearold female with multiple medical problems.  She initially was being treated for secondary autoimmune hemolytic anemia in the setting of indolent Bcell nonHodgkin lymphoma, NOS.  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  Her initial PET scan only demonstrated mild splenomegaly.  She required steroids as well, but then when the tapering was attempted, her anemia worsened, and at that point in time, rituximab was initiated.\par  [de-identified] : January 17, 2017\yonathan Sanabria presents for followup today she hasn't seen you since July. She denies having recurrent illnesses. She denies having  fatigue she experienced before. She does have occasional headaches. Blood pressure today is elevated. Otherwise she has no complaints. \par \par 4/24/17\par She is doing well except  for trouble with sugars and BP. She is seeing appropriate specialists. No other complaints CBC from today is WNL.\par \par 7/26/17\par She is doing well. She has a cough with sputum production for now several months. CXR in July was clear. HAd a course of antibiotics that did not work. It may be her COPD. No bleedig, no B symptoms. Usual fatigue. \par \par 10/27/17\par She is doing well except for her diabetic neuropathy in her feet. States her A1C is 6. No bleeding. \par \par 1/25/18\par She is her for follow up for her NHL. She is doing well. No bleeding. No weight loss.  CBC from today is stable.\par \par \par 5/24/18\par She is doing well. She has fatigue. CBC was fine from today s visit.\par \par 9/17/18\par She is here for follow up. She may have a cold, fever 99, cough with green phlegm, not SOB. CBC showed a Hgb of 11 from today. She has no bleeding,normal stool and urine.\par \par 12/17/18\par Patient is here for a follow-up visit.  She is feeling well with no complaints.  Most recent CBC is stable, Hgb up to 11.4.  Patient denies fever, chills, nausea, vomiting.  She has received her flu vaccine this season.  \par \par \par 4/9/19\par She is here for follow up. She is doing well. 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 Duran.\par \par 5/20/19\par She is here for follow up. She had a PET/CT 4/18/19: Compared to 2/24/2016,focal FDG uptake (SUV 4.1; intensely avid on \par nonattenuation corrected images) coregistering with 1.8 x 1.6 cm left  lower lobe pulmonary nodule suspicious for biologic tumor activity\par In addition another new FDG avid right upper lobe 1.3 x 0.6 cm  groundglass nodule (SUV 2.1-FDG avid on attenuation corrected images) \par suspicious for biologic tumor activity. A 1 cm pretracheal lymph node is not FDG avid\par \par No other sites of abnormal FDG uptake\par \par She underwent a CT FNA biopsy of Left  lobe nodule that showed 5/13/19:   POSITIVE FOR MALIGNANT CELLS.\par Non-small cell carcinoma, favor squamous cell carcinoma.\par Immunohistochemistry studies were performed at [facility] on block 1-C\par and the results support the diagnosis (positive P-40; negative-\par TTF1/Napsin).\par \par \par She feels well otherwise\par \par 7/22/19\par She is here for follow up. She states she completed 5 fractions of radiation on July 12. She feels well except some fatigue. \par \par 10/2/19\par Patient is here for a follow-up visit with spouse.  She is feeling well with no new complaints.  Discussed findings from CT imaging reflecting slight improvement in LLL nodule post radiation and RUL nodule was stable.  Encouraged flu and pna vaccination with PCP.  \par CT Chest (9.30.19) IMPRESSION:  Decrease in size size of left lower lobe spiculated nodule measuring 1.5 x 0.7 cm previously measured 1.8 x 1.6 cm. Stable right upper lobe 1.3 x 0.6 cm groundglass nodule.\par \par 1/6/20\par Patient is here for a follow-up visit for autoimmune hemolytic anemia and hx of lymphoma with her .  She has had nausea and constipation/diarrhea over the last few weeks which has resolved.  She denies unintentional weightloss or b symptoms, although she reports slightly worsening of SOB with exertion.  Most recent CBC reviewed and is stable.  \par \par 6/15/2020\par She is here for follow up. She had a CTA in 2/2020 IMPRESSION: \par \par 1. No CTA evidence of aortic dissection. \par \par 2. No CT evidence of acute intrathoracic or abdominopelvic pathology. \par Symmetric perfusion to the kidneys. \par \par 3. 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 \par \par 4. Decreased size of the left lower lobe spiculated nodule measuring 1.1 x \par 0.7 cm (previously 1.5 x 0.7 cm). \par \par 5. Unchanged right upper lobe 1.3 x 0.6 cm groundglass nodule/opacity.\par \par She is on oxygen now due to SOB. She has a cough as well that is chornic.  \par \par 9/21/2020: Patient presents for follow up of low-grade NHL with secondary autoimmune hemolytic anemia, s/p steroids and rituximab in 2016, and [id number] (stage IA2) NSCLC s/p RT in 7/2019. She had a CT chest in 8/14/2020, which demonstrated increased LLL opacity without discrete nodule, increased left-sided pleural effusion, and no LAD. Results were discussed with patient's pulmonologist, who agreed that results likely represented post-treatment effect and agreed with surveillance CT scans. Hemoglobin decreased to 9.9 with MCV of 79.7 on today's CBC. Patient denies any active bleeding and states that she does not take iron supplementation. Her breathing is at her baseline. Her only complaint is fatigue.\par \par \par 1/11/21\par She is here for follow up. Since last visit she remains anemic with microcytosis, Iron studies were normal last visit. She had a CT  Chest in 11/2020:  \par IMPRESSION:\par 1.  Since February 17, 2020, posttreatment changes again seen in the left lower lobe with decreased size of the left pleural effusion and left lower lobe subpleural opacity/rounded atelectasis.\par \par 2.  Unchanged right upper lobe groundglass nodule, which demonstrated suspicious FDG uptake on prior PET/CT.\par \par 3.  No CT evidence of an acute intrathoracic pathology.\par \par she feels well. She is on 2-3 L NC. Shit is baseline. \par \par 4/26/21\par She is here for follow up. Since last visit she had MR brain for vertigo in 1/2021: IMPRESSION: Unremarkable MRI of the brain. hgb today is just bellow 10.  She was diagnosed with BPV and feels much better now with exercises. SOB is stable and is on Oxygen. No new complaints.\par \par 8/9/21\par She is here for follow up. She had  CBC today that showed mild leukocytosis left shifted and hgb is now 11.4 up from 9.7. She had a CT chest in may of 2021 showed  Since 11/13/2020\par \par No current CT evidence of active intrathoracic disease.\par \par Stable post therapeutic changes left lower lobe with consolidative opacities and stable trace pleural fluid.\par \par Stable partial atelectasis right middle lobe. Stable emphysema.\par \par Stable pulmonary nodules.\par \par \par She is currenly on Prednisoen 30 mg daily for CPD and on a taper.\par \par 3/7/22 \par Pt returns for f/u today . She reports feeling fine, denies any bleeding , bruising , loss of appetite , lost a few pounds but reports it was intentional as pt was planing to loose weight since her lipid panel was abnormal . \par Last CT chest was in November 2021 , it showed : There is no pleural effusion. There is no pneumothorax.  Emphysema. Stable approximate 1.8 cm groundglass nodule, right upper lobe. Stable near complete atelectasis right middle lobe. Stable post therapeutic changes left lower lobe with consolidative opacities and stable trace pleural fluid. Stable 4 mm solid nodule, posterior left upper lobe and left lower lobe (4/158)\par \par Pt reports no new symptoms , uses O2 as needed .

## 2022-03-07 NOTE — PHYSICAL EXAM
[Ambulatory and capable of all self care but unable to carry out any work activities] : Status 2- Ambulatory and capable of all self care but unable to carry out any work activities. Up and about more than 50% of waking hours [Normal] : full range of motion and no deformities appreciated [de-identified] : ambulates with a walker , has portable O2 . Not SOB on exertion

## 2022-03-07 NOTE — CONSULT LETTER
[Dear  ___] : Dear  [unfilled], [Courtesy Letter:] : I had the pleasure of seeing your patient, [unfilled], in my office today. [Please see my note below.] : Please see my note below. [Sincerely,] : Sincerely, [FreeTextEntry3] : Rylan

## 2022-04-26 NOTE — H&P ADULT - NSHPPHYSICALEXAM_GEN_ALL_CORE
PHYSICAL EXAM:  GENERAL: NAD on 2 L of o2   HEAD:  Atraumatic, Normocephalic  ENT: dry mucous membranes  CHEST/LUNG: + bilateral wheeze, decreased breath sounds   HEART: tachycardic; No murmurs, rubs, or gallops  ABDOMEN: Soft, Nontender, Nondistended.   EXTREMITIES:  + LE edema   NERVOUS SYSTEM:  Alert & Oriented X3, speech clear. No deficits

## 2022-04-26 NOTE — H&P ADULT - HISTORY OF PRESENT ILLNESS
Pt is an 83 y/o female with PMH of CAD s/p stents, non hodgkin lymphoma in remission, lung cancer in remission, carotid endarterectomy, CHF and COPD on 2L NC prn presenting for shortness of breath x 2 days. Has been using 2L NC more frequently at home. Denies fever, cough, congestion, vomiting or diarrhea. Cardiologist Dang    In the ED, vitals BP: 180/74 HR: 97 RR: 28 Spo2 97% on 2 L of o2 via NC. Labs significant for hgb 9.6 (baseline 9-10), MCV 85, glucose 267, trop 0.06 (previously 0.03-0.05) BNP 2864 (previously 1k), VBG pH 7.34, Pco2 50, hco3 27. EKG: ischemic changes - biphasic t waves in V1-3 and TWI in leads I, aVL, V5 and V6. Patient given solumedrol 125, levaquin and albuterol in ED. Patient currently admitted to SDU for further management.  Pt is an 83 y/o female with PMH of CAD s/p stents, non hodgkin lymphoma in remission, lung cancer in remission, carotid endarterectomy, CHF and COPD on 2L NC prn presenting for shortness of breath x 2 days. Has been using 2L NC more frequently at home. Denies fever, cough, congestion, vomiting or diarrhea. Cardiologist Dang    In the ED, vitals BP: 180/74 HR: 97 RR: 28 Spo2 97% on 2 L of o2 via NC. Labs significant for hgb 9.6 (baseline 9-10), MCV 85, glucose 267, trop 0.06 (previously 0.03-0.05) BNP 2864 (previously 1k), VBG pH 7.34, Pco2 50, hco3 27. EKG: ischemic changes - biphasic t waves in V1-3 and TWI in leads I, aVL, V5 and V6. CXR: unchanges from prior in 01/2021);  Patient given solumedrol 125, levaquin and albuterol in ED. Patient currently admitted to SDU for further management.  Pt is an 81 y/o female with PMH of CAD s/p stents, non hodgkin lymphoma in remission, lung cancer in remission, carotid endarterectomy, CHF and COPD on 2L NC prn presenting for shortness of breath x 2 days. Has been using 2L NC more frequently at home. + increased productive cough. Denies fever, hemoptysis , congestion, vomiting or diarrhea. Cardiologist Dang    In the ED, vitals BP: 180/74 HR: 97 RR: 28 Spo2 97% on 2 L of o2 via NC. Labs significant for hgb 9.6 (baseline 9-10), MCV 85, glucose 267, trop 0.06 (previously 0.03-0.05) BNP 2864 (previously 1k), VBG pH 7.34, Pco2 50, hco3 27. EKG: ischemic changes - biphasic t waves in V1-3 and TWI in leads I, aVL, V5 and V6. CXR: unchanges from prior in 01/2021);  Patient given solumedrol 125, levaquin and albuterol in ED. Patient currently admitted to SDU for further management.  Pt is an 81 y/o female with PMH of CAD s/p stents, non hodgkin lymphoma in remission, lung cancer in remission, carotid endarterectomy, CHF and COPD on 2L NC prn presenting for shortness of breath x 2 days. Has been using 2L NC more frequently at home. + increased productive cough. Denies fever, hemoptysis , congestion, vomiting or diarrhea. Cardiologist Dang    In the ED, vitals BP: 180/74 HR: 97 RR: 28 Spo2 97% on bipap initially and then weaned to 2 L of o2 via NC. Labs significant for hgb 9.6 (baseline 9-10), MCV 85, glucose 267, trop 0.06 (previously 0.03-0.05) BNP 2864 (previously 1k), VBG pH 7.34, Pco2 50, hco3 27. EKG: ischemic changes - biphasic t waves in V1-3 and TWI in leads I, aVL, V5 and V6. CXR: unchanges from prior in 01/2021);  Patient given solumedrol 125, levaquin and albuterol in ED. Patient currently admitted to SDU for further management.

## 2022-04-26 NOTE — H&P ADULT - NSHPLABSRESULTS_GEN_ALL_CORE
<<<<<LABS>>>>>                        9.6    6.95  )-----------( 157      ( 26 Apr 2022 15:48 )             29.6     04-26    139  |  104  |  17  ----------------------------<  267<H>  4.1   |  24  |  1.1    Ca    8.8      26 Apr 2022 15:48    TPro  5.8<L>  /  Alb  3.7  /  TBili  0.6  /  DBili  x   /  AST  32  /  ALT  45<H>  /  AlkPhos  61  04-26          Troponin T, Serum: 0.06 ng/mL *HH* (04-26-22 @ 15:48)    [id number]  CARDIAC MARKERS ( 26 Apr 2022 15:48 )  x     / 0.06 ng/mL / x     / x     / x

## 2022-04-26 NOTE — ED ADULT NURSE NOTE - OBJECTIVE STATEMENT
C/o shortness of breath, patient tachypneic w/ labored breathing, b/l wheezing auscultated. Pt connected to cardiac monitor with cyclic VS. medications given Pt placed on Bipap by RT

## 2022-04-26 NOTE — ED PROVIDER NOTE - ATTENDING CONTRIBUTION TO CARE
82-year-old female with history of CAD non-Hodgkin's lymphoma, lung CA, CHF and COPD, now with 4-day history of worsening shortness of breath on exertion.  Patient is on home O2.  Positive productive cough.    Tachypnea, borderline febrile, tachycardia, (+) respiratory distress, pink conj, anicteric, dry MM, neck supple, positive bilateral wheezing, RRR, equal radial pulses bilat, abd soft/nt/nd, no cva tend. no calves tend, + edema, no fnd. no rashes.    a/p: labs, imaging, reassess

## 2022-04-26 NOTE — ED PROVIDER NOTE - NS ED ROS FT
Review of Systems:  CONSTITUTIONAL: No fever, No diaphoresis, No weight change  SKIN: No rash  HEMATOLOGIC: No abnormal bleeding or bruising  EYES: No eye pain, No blurred vision  ENT: No change in hearing, No sore throat, No neck pain, No rhinorrhea, No ear pain  RESPIRATORY: + shortness of breath, No cough  CARDIAC: No chest pain, No palpitations  GI: No abdominal pain, No nausea, No vomiting, No diarrhea, No constipation, No bright red blood per rectum or melena. No flank pain  : No dysuria, frequency, hematuria.   ENDO: No polydypsia, No polyuria, No heat/cold intolerance  MUSCULOSKELETAL: No joint paint, No swelling, No back pain  NEUROLOGIC: No numbness, No focal weakness, No headache, No dizziness  All other systems negative, unless specified in HPI

## 2022-04-26 NOTE — ED PROVIDER NOTE - CLINICAL SUMMARY MEDICAL DECISION MAKING FREE TEXT BOX
Acute respiratory distress/failure, wheezing, COPD exacerbation, Borderline febrile,tolerating BiPAP.  Labs and imaging reviewed.

## 2022-04-26 NOTE — ED PROVIDER NOTE - PHYSICAL EXAMINATION
CONST: elderly female  HEAD:  normocephalic, atraumatic  EYES:  PERRLA, conjunctivae without injection, drainage or discharge  ENMT: nasal mucosa moist; mouth moist without ulcerations or lesions; throat moist without erythema, exudate, ulcerations or lesions  NECK:  supple  CARDIAC:  regular rate and rhythm, normal S1 and S2, no murmurs, rubs or gallops  RESP:  tachypneic, bilateral wheezing  ABDOMEN:  soft, nontender, nondistended  MUSCULOSKELETAL/NEURO:  CN II-XII grossly intact, sensation intact throughout, 5/5 strength in all extremities, normal movement, normal tone  SKIN:  normal skin color for age and race, well-perfused; warm and dry

## 2022-04-26 NOTE — ED ADULT NURSE NOTE - NSIMPLEMENTINTERV_GEN_ALL_ED
Implemented All Fall with Harm Risk Interventions:  [place] to call system. Call bell, personal items and telephone within reach. Instruct patient to call for assistance. Room bathroom lighting operational. Non-slip footwear when patient is off stretcher. Physically safe environment: no spills, clutter or unnecessary equipment. Stretcher in lowest position, wheels locked, appropriate side rails in place. Provide visual cue, wrist band, yellow gown, etc. Monitor gait and stability. Monitor for mental status changes and reorient to person, place, and time. Review medications for side effects contributing to fall risk. Reinforce activity limits and safety measures with patient and family. Provide visual clues: red socks.

## 2022-04-26 NOTE — ED PROVIDER NOTE - PROGRESS NOTE DETAILS
PS: Reduced EF seen on echo Authored by Dr. Liz: s/o to dr quiñonez - pending chem lab, icu eval and dispo PS: Cardio consult placed for ischemic changes - biphasic t waves in V1-3 and TWI in leads I, aVL, V5 and V6. Pt feeling better, less SOB, satting well. Placed on 2L NC, satting well OG:Ok for SDU per Dr. Valle.  Dang consulted for ischemic changes on EKG.   Pt trialed off bipap . satting well on baselin 2 L OG:Ok for SDU per Dr. Valle.  Dang consulted for ischemic changes on EKG as cardiology fellow says they do not cover his patients .   Pt trialed off bipap . satting well on baselin 2 L Og : delayed entry. Personally made 2nd call to Dr. Leong's service. Dr. John on call.  no call back received. Dr. Perdue ( MAR) was made aware that I did not receive a call back after 2 attempts and to ensure inpt team gets in touch with patients cardiologist regarding her ischemic changes. Pt and daughters at bedside made aware of ischemic changes and attempt to call Dang.  Pt was reassessed and denied any chest pain at this time.

## 2022-04-26 NOTE — H&P ADULT - ASSESSMENT
Pt is an 83 y/o female with PMH of CAD s/p stents, non hodgkin lymphoma in remission, lung cancer in remission, carotid endarterectomy, CHF and COPD on 2L NC prn presenting for shortness of breath x 2 days.       #acute hypoxic/hypercapnic respiratory failure 2/2 to copd exacerbation?  -In the ED, vitals BP: 180/74 HR: 97 RR: 28 Spo2 97% on 2 L of o2 via NC.  - Labs significant for hgb 9.6 (baseline 9-10), MCV 85, glucose 267, trop 0.06 (previously 0.03-0.05) BNP 2864 (previously 1k), VBG pH 7.34, Pco2 50, hco3 27.   - s/p solumedrol 125, levaquin and albuterol in ED.   - f/u duplex, d-dimer, blood cultures, procal  - Continue duonebs, symbicort, levquin and solumedrol 60 mg BID   - f/u pulm consult, Dr Feldman (requested by Dr Fernandes)     # ischemic changes - biphasic t waves in V1-3 and TWI in leads I, aVL, V5 and V6 EKG - hx of CAD s/p 5 stents   - patient denies chest pain.   - trop 0.06 (previously 0.03-0.05) BNP 2864 (previously 1k)  - Trend trops and EKG  - stopped aspirin and plavix as per cardiology reccs?  - Dr. Leong consulted in ED  - f/u cardiology recommendations     #CHF  - c/w with lasix 20 mg  - BNP 2864 (previously 1k)  - ECHO (2020)-  EF: 60 to 65%; G1DD, severe Pulm HTN      # HTN - uncontrolled on admission (227/95)  - Home meds: lasix 20, imdur 30, losartan 100, norvasc 10, metoprolol 12.5mg po q12    # DM  - f/u hemoglobin A1C  - fingersticks ACHS  - started on insulin protocol  - OP follow up with Dr Reddy     # CKD - stable     # NHL/lung cancer - in remission  - patient follows with Dr Hernandez. OP follow up upon discharge    # DLD  - f/u lipid profile in AM  -C/W atorvastatin 20    DVT ppx: lovenox  GI ppx: PPI  ambulate as tolerated  Dispo: from home  FULL CODE            Pt is an 83 y/o female with PMH of CAD s/p stents, non hodgkin lymphoma in remission, lung cancer in remission, carotid endarterectomy, CHF and COPD on 2L NC prn presenting for shortness of breath x 2 days.       #acute hypoxic/hypercapnic respiratory failure 2/2 to copd exacerbation?  -In the ED, vitals BP: 180/74 HR: 97 RR: 28 Spo2 97% on 2 L of o2 via NC.  - Labs significant for hgb 9.6 (baseline 9-10), MCV 85, glucose 267, trop 0.06 (previously 0.03-0.05) BNP 2864 (previously 1k), VBG pH 7.34, Pco2 50, hco3 27.   - s/p solumedrol 125, levaquin and albuterol in ED.   - f/u duplex, d-dimer, blood cultures, procal  - Continue duonebs, symbicort, levquin and solumedrol 60 mg BID   - home meds: spiriva, dulera, albuterol, flovent, montelukast  - f/u pulm consult, Dr Feldman (requested by Dr Fernandes)     # ischemic changes - biphasic t waves in V1-3 and TWI in leads I, aVL, V5 and V6 EKG - hx of CAD s/p 5 stents   - patient denies chest pain.   - trop 0.06 (previously 0.03-0.05) BNP 2864 (previously 1k)  - Trend trops and EKG  - stopped aspirin and plavix as per cardiology reccs?  - Dr. Leong consulted in ED  - f/u cardiology recommendations     #CHF  - c/w with lasix 20 mg  - BNP 2864 (previously 1k)  - ECHO (2020)-  EF: 60 to 65%; G1DD, severe Pulm HTN      # HTN - uncontrolled on admission (227/95)  - Home meds: lasix 20, imdur 30, losartan 100, norvasc 10, metoprolol 12.5mg po q12    # DM  - f/u hemoglobin A1C  - fingersticks ACHS  - started on insulin protocol  - OP follow up with Dr Reddy     # CKD - stable     # NHL/lung cancer - in remission  - patient follows with Dr Hernandez. OP follow up upon discharge    # DLD  - f/u lipid profile in AM  -C/W atorvastatin 20, fenofibrate 38 mg daily     #dementia/dizziness/vertigo  - c/w with meclizine 25 mg tid  - c/w donepezil 10 mg daily         DVT ppx: lovenox  GI ppx: PPI  ambulate as tolerated  Dispo: from home  FULL CODE            Pt is an 83 y/o female with PMH of CAD s/p stents, non hodgkin lymphoma in remission, lung cancer in remission, carotid endarterectomy, CHF and COPD on 2L NC prn presenting for shortness of breath x 2 days.       #acute hypoxic/hypercapnic respiratory failure 2/2 to copd exacerbation?  -In the ED, vitals BP: 180/74 HR: 97 RR: 28 Spo2 97% on 2 L of o2 via NC.  - Labs significant for hgb 9.6 (baseline 9-10), MCV 85, glucose 267, trop 0.06 (previously 0.03-0.05) BNP 2864 (previously 1k), VBG pH 7.34, Pco2 50, hco3 27.   -CXR: unchanges from prior in 01/2021)  - s/p solumedrol 125, levaquin and albuterol in ED.   - f/u duplex, d-dimer, blood cultures, procal  - Continue duonebs, symbicort, levquin and solumedrol 60 mg BID   - home meds: spiriva, dulera, albuterol, flovent, montelukast  - f/u pulm consult, Dr Feldman (requested by Dr Fernandes)     # ischemic changes - biphasic t waves in V1-3 and TWI in leads I, aVL, V5 and V6 EKG - hx of CAD s/p 5 stents   - patient denies chest pain.   - trop 0.06 (previously 0.03-0.05) BNP 2864 (previously 1k)  - Trend trops and EKG  - stopped aspirin and plavix as per cardiology reccs?  - Dr. Leong consulted in ED  - f/u cardiology recommendations     #CHF  - c/w with lasix 20 mg  - BNP 2864 (previously 1k)  - ECHO (2020)-  EF: 60 to 65%; G1DD, severe Pulm HTN  - f/u repeat echo     # HTN - uncontrolled on admission (227/95)  - Home meds: lasix 20, imdur 30, losartan 100, norvasc 10, metoprolol 12.5mg po q12    # DM  - f/u hemoglobin A1C  - fingersticks ACHS  - started on insulin protocol  - OP follow up with Dr Reddy     # CKD - stable     # NHL: in remission  - patient follows with Dr Hernandez. 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  - s/p 5/5 fractions RT competed 7/12/19.   5/2021 SONIDO. Repeat CT showed stable findings , 11/21.   - Planned for repeat CT chest  in 6 months, May 2022.   [facility]       # [facility]  - f/u lipid profile in AM  -C/W atorvastatin 20, fenofibrate 38 mg daily     #dementia/dizziness/vertigo  - c/w with meclizine 25 mg tid  - c/w donepezil 10 mg daily       DVT ppx: lovenox  GI ppx: PPI  ambulate as tolerated  Dispo: from home  FULL CODE            Pt is an 83 y/o female with PMH of CAD s/p stents, non hodgkin lymphoma in remission, lung cancer in remission, carotid endarterectomy, CHF and COPD on 2L NC prn presenting for shortness of breath x 2 days.       #acute hypoxic/hypercapnic respiratory failure 2/2 to copd exacerbation?  -In the ED, vitals BP: 180/74 HR: 97 RR: 28 Spo2 97% on 2 L of o2 via NC.  - Labs significant for hgb 9.6 (baseline 9-10), MCV 85, glucose 267, trop 0.06 (previously 0.03-0.05) BNP 2864 (previously 1k), VBG pH 7.34, Pco2 50, hco3 27.   -CXR: unchanges from prior in 01/2021)  - s/p solumedrol 125, levaquin and albuterol in ED.   - f/u duplex, d-dimer, blood cultures, procal  - Continue duonebs, symbicort, levquin and solumedrol 60 mg BID   - home meds: spiriva, dulera, albuterol, flovent, montelukast  -(pulm: Dr. Feldman)    # ischemic changes - biphasic t waves in V1-3 and TWI in leads I, aVL, V5 and V6 EKG - hx of CAD s/p 5 stents   - patient denies chest pain.   - trop 0.06 (previously 0.03-0.05) BNP 2864 (previously 1k)  - Trend trops and EKG  - stopped aspirin and plavix as per cardiology reccs?  - Dr. Leong consulted in ED  - f/u cardiology recommendations     #CHF  - c/w with lasix 20 mg  - BNP 2864 (previously 1k)  - ECHO (2020)-  EF: 60 to 65%; G1DD, severe Pulm HTN  - f/u repeat echo     # HTN - uncontrolled on admission (227/95)  - Home meds: lasix 20, imdur 30, losartan 100, norvasc 10    # DM  - f/u hemoglobin A1C  - fingersticks ACHS  - started on insulin protocol  - OP follow up with Dr Reddy     # CKD - stable     # NHL: in remission  - patient follows with Dr Hernandez. 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  - s/p 5/5 fractions RT competed 7/12/19.   5/2021 SONIDO. Repeat CT showed stable findings , 11/21.   - Planned for repeat CT chest  in 6 months, May 2022.   - f/u with DR. Hernandez [facility]     # DLD  - f/u lipid profile in AM  -C/W atorvastatin 20    #dementia/dizziness/vertigo  - c/w with meclizine 25 mg tid  - c/w donepezil 10 mg daily       DVT ppx: lovenox  GI ppx: PPI  ambulate as tolerated  Dispo: from home  FULL CODE

## 2022-04-26 NOTE — ED PROVIDER NOTE - OBJECTIVE STATEMENT
Pt is an 83 y/o female with PMH of CAD s/p stents, non hodgkin lymphoma in remission, lung cancer in remission, carotid endarterectomy, CHF and COPD on 2L NC prn presenting for shortness of breath x 2 days. Has been using 2L NC more frequently at home. Denies fever, cough, congestion, vomiting or diarrhea. Pt is an 83 y/o female with PMH of CAD s/p stents, non hodgkin lymphoma in remission, lung cancer in remission, carotid endarterectomy, CHF and COPD on 2L NC prn presenting for shortness of breath x 2 days. Has been using 2L NC more frequently at home. Denies fever, cough, congestion, vomiting or diarrhea. Cardiologist Dang

## 2022-04-26 NOTE — ED ADULT TRIAGE NOTE - CHIEF COMPLAINT QUOTE
Pt LVM to r/s 8/6/21 procedure. States she can be reached at work ([employer]) at [phone]; she states to have them page her at work.
Writer called pt to reschedule. Pt [email or web address] at [facility] Pt voice understanding. New prep instruction mail to pt.
lvm for Rafy Mathis to reschedule colon/egd on 08/06/21 with Dr Anya Abdalla due to change in provider schedule.
C/o SOB and bilateral LE swelling worsening since Sunday

## 2022-04-26 NOTE — ED PROVIDER NOTE - CARE PLAN
Principal Discharge DX:	COPD exacerbation   1 Principal Discharge DX:	COPD exacerbation  Secondary Diagnosis:	Elevated troponin

## 2022-04-27 NOTE — PROGRESS NOTE ADULT - ATTENDING COMMENTS
81 y/o female with PMH of CAD s/p stents, non hodgkin lymphoma in remission, lung cancer in remission, carotid endarterectomy, CHF and COPD on 2L NC prn presenting for shortness of breath x 2 days.     #COPD exacerbation (on home O2 2L PRN)  CXR (04/26): Left lower lung scarring with cardiomegaly. Posttraumatic change 83 y/o female with PMH of CAD s/p stents, non hodgkin lymphoma in remission, lung cancer in remission, carotid endarterectomy, CHF and COPD on 2L NC prn presenting for shortness of breath x 2 days.     #COPD exacerbation (on home O2 2L PRN)  #possible CAP  CXR (04/26): Left lower lung scarring with cardiomegaly. Posttraumatic change  - Cont solumedrol 60 BID + Duonebs q4h ATC and PRN  - Cont levaquin 750mg qD x5d  - f/u procalcitonin  - Sputum cx if able    #DM2  - Continue lantus/lispro 30-10-10-10  - ISS    #TWI on EKG  #Hx of CAD s/p PCI x5  #Troponemia  Troponins stable at 0.05  - Cardio eval pending  - Monitor on telemetry    #Chronic HFpEF  - c/w with lasix 20 mg  - f/u repeat echo     # HTN - uncontrolled on admission (227/95), now improved  #HLD  - Home meds: lasix 20, imdur 30, losartan 100, norvasc 10  - Cont atorvastatin 20mg qD  # CKD - stable     # NHL: in remission  - patient follows with Dr Hernandez. 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  - s/p 5/5 fractions RT competed 7/12/19.   5/2021 SONIDO. Repeat CT showed stable findings , 11/21.   - Planned for repeat CT chest  in 6 months, May 2022.   - f/u with DR. [facility]     #Vertigo  - c/w with meclizine 25 mg tid    #Dementia  - c/w donepezil 10 mg daily     DVT PPX heparin    #Progress Note Handoff  Pending (specify): Continue IV steroids, wean off O2 as tolerated  Family discussion: d/w pt regarding management of COPD exacerbation  Disposition: Home 81 y/o female with PMH of CAD s/p stents, non hodgkin lymphoma in remission, lung cancer in remission, carotid endarterectomy, CHF and COPD on 2L NC prn presenting for shortness of breath x 2 days.     #COPD exacerbation (on home O2 2L PRN)  #possible CAP  #Possible acute on chronic HFpEF  CXR (04/26): Left lower lung scarring with cardiomegaly. Posttraumatic change  - Cont solumedrol 60 BID + Duonebs q4h ATC and PRN  - Cont levaquin 750mg qD x5d  - f/u procalcitonin  - Sputum cx if able  - c/w with lasix 20 mg --> Give one dose IV today  - f/u repeat echo  - Pulm eval, Dr. Feldman    #DM2  - Continue lantus/lispro 30-10-10-10  - ISS    #TWI on EKG  #Hx of CAD s/p PCI x5  #Troponemia  Troponins stable at 0.05  - Cardio eval pending  - Monitor on telemetry    # HTN - uncontrolled on admission (227/95), now improved  #HLD  - Home meds: lasix 20, imdur 30, losartan 100, norvasc 10  - Cont atorvastatin 20mg qD  # CKD - stable     # NHL: in remission  - patient follows with Dr Hernandez. 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  - s/p 5/5 fractions RT competed 7/12/19.   5/2021 SONIDO. Repeat CT showed stable findings , 11/21.   - Planned for repeat CT chest  in 6 months, May 2022.   - f/u with DR. Hernandez [facility]     #Vertigo  - c/w with meclizine 25 mg tid    #Dementia  - c/w donepezil 10 mg daily     DVT PPX heparin    #Progress Note Handoff  Pending (specify): Continue IV steroids, wean off O2 as tolerated  Family discussion: d/w pt regarding management of COPD exacerbation  Disposition: Home 83 y/o female with PMH of CAD s/p stents, non hodgkin lymphoma in remission, lung cancer in remission, carotid endarterectomy, CHF and COPD on 2L NC prn presenting for shortness of breath x 2 days.     #COPD exacerbation (on home O2 2L PRN)  #possible CAP  #Possible acute on chronic HFpEF  CXR (04/26): Left lower lung scarring with cardiomegaly. Posttraumatic change  - Cont solumedrol 60 BID + Duonebs q4h ATC and PRN  - Cont levaquin 750mg qD x5d  - f/u procalcitonin  - Sputum cx if able  - c/w with lasix 20 mg --> Give additional 40mg IV dose today  - f/u repeat echo  - Pulm leif, Dr. Feldman    #DM2  - Continue lantus/lispro 30-10-10-10  - ISS    #TWI on EKG  #Hx of CAD s/p PCI x5  #Troponemia  Troponins stable at 0.05  - Cardio eval pending  - Monitor on telemetry    # HTN - uncontrolled on admission (227/95), now improved  #HLD  - Home meds: lasix 20, imdur 30, losartan 100, norvasc 10  - Cont atorvastatin 20mg qD  # CKD - stable     # NHL: in remission  - patient follows with Dr Hernandez. 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  - s/p 5/5 fractions RT competed 7/12/19.   5/2021 SONIDO. Repeat CT showed stable findings , 11/21.   - Planned for repeat CT chest  in 6 months, May 2022.   - f/u with DR. Hernandez [facility]     #Vertigo  - c/w with meclizine 25 mg tid    #Dementia  - c/w donepezil 10 mg daily     DVT PPX heparin    #Progress Note Handoff  Pending (specify): Continue IV steroids, wean off O2 as tolerated  Family discussion: d/w pt regarding management of COPD exacerbation  Disposition: Home

## 2022-04-27 NOTE — CONSULT NOTE ADULT - ASSESSMENT
COPD on intermittent home O2  CAD s/p PCI  cor pulmonale  Type II MI    - continue CCB, losartan and imdur  - pulmonary recommendations appreciated  - agree with lasix, can give one dose IV  - insulin for management of hyperglycemia and being given IV steroids  - 2d echo  - re-instate aspirin, there was a question on afib as an outpatient with patient's HR in 160s and started on DOAC hence aspirin and plavix discontinued.

## 2022-04-27 NOTE — PROGRESS NOTE ADULT - SUBJECTIVE AND OBJECTIVE BOX
SUBJECTIVE:  Patient is a 82y old Female who presents with a chief complaint of shortness of breath (27 Apr 2022 06:14)   Currently admitted to medicine with the primary diagnosis of COPD exacerbation     Today is hospital day 1d. There are no new issues or overnight events.     HPI  HPI:  Pt is an 83 y/o female with PMH of CAD s/p stents, non hodgkin lymphoma in remission, lung cancer in remission, carotid endarterectomy, CHF and COPD on 2L NC prn presenting for shortness of breath x 2 days. Has been using 2L NC more frequently at home. + increased productive cough. Denies fever, hemoptysis , congestion, vomiting or diarrhea. Cardiologist Dang    In the ED, vitals BP: 180/74 HR: 97 RR: 28 Spo2 97% on bipap initially and then weaned to 2 L of o2 via NC. Labs significant for hgb 9.6 (baseline 9-10), MCV 85, glucose 267, trop 0.06 (previously 0.03-0.05) BNP 2864 (previously 1k), VBG pH 7.34, Pco2 50, hco3 27. EKG: ischemic changes - biphasic t waves in V1-3 and TWI in leads I, aVL, V5 and V6. CXR: unchanges from prior in 01/2021);  Patient given solumedrol 125, levaquin and albuterol in ED. Patient currently admitted to SDU for further management.  (26 Apr 2022 20:44)      PAST MEDICAL & SURGICAL HISTORY  MI (myocardial infarction)  s/p 5 stents    HTN (hypertension)    High cholesterol    Heart failure    Non Hodgkin&#x27;s lymphoma  in remission. Follows with Dr Hernandez    PVD (peripheral vascular disease)    Thrombocytopenia    CKD (chronic kidney disease)    Osteoarthritis    Diabetes  on insulin    Coronary artery disease involving native heart without angina pectoris, unspecified vessel or lesion type  s/p 5 stents    History of cholecystectomy    H/O cardiac catheterization  5 STENTS    H/O carotid endarterectomy  RIGHT      ALLERGIES:  ACE inhibitors (Unknown)  BENZALKONIUM (Rash)  BETADINE (Rash)  Ceclor (Unknown)  codeine (Unknown)  latex (Unknown)  penicillin (Unknown)  RUBBER GLOVES (Rash)  sulfonamides (Unknown)    MEDICATIONS:  HOME MEDICATIONS  albuterol 90 mcg/inh inhalation aerosol: 2 puff(s) inhaled every 6 hours, As Needed  amLODIPine 10 mg oral tablet: 1 tab(s) orally once a day  atorvastatin 20 mg oral tablet: 1 tab(s) orally once a day  donepezil 10 mg oral tablet: 1 tab(s) orally once a day (at bedtime)  Dulera 100 mcg-5 mcg/inh inhalation aerosol: 2 puff(s) inhaled 2 times a day  Flovent 44 mcg/inh inhalation aerosol with adapter:   Imodium A-D 2 mg oral tablet: 1 tab(s) orally every 4 hours PRN for diarrhea  inositol 650 mg oral tablet: 2 tab(s) orally once a day  isosorbide mononitrate 30 mg oral tablet, extended release: 1 tab(s) orally once a day (in the morning)  Lasix 20 mg oral tablet: 1 tab(s) orally once a day  losartan 100 mg oral tablet: 1 tab(s) orally once a day  meclizine 25 mg oral tablet: 1 tab(s) orally 3 times a day  montelukast 10 mg oral tablet: 1 tab(s) orally once a day  NovoLOG 100 units/mL subcutaneous solution: 10,7,7  subcutaneous  omeprazole 40 mg oral delayed release capsule: 1 cap(s) orally once a day  Soliqua 100/33 subcutaneous solution: subcutaneous once a day  Spiriva 18 mcg inhalation capsule: 1 cap(s) inhaled once a day    STANDING MEDICATIONS  ALBUTerol    90 MICROgram(s) HFA Inhaler 2 Puff(s) Inhalation every 4 hours  albuterol/ipratropium for Nebulization 3 milliLiter(s) Nebulizer every 20 minutes  amLODIPine   Tablet 10 milliGRAM(s) Oral daily  atorvastatin 20 milliGRAM(s) Oral at bedtime  budesonide 160 MICROgram(s)/formoterol 4.5 MICROgram(s) Inhaler 2 Puff(s) Inhalation two times a day  dextrose 5%. 1000 milliLiter(s) IV Continuous <Continuous>  dextrose 5%. 1000 milliLiter(s) IV Continuous <Continuous>  dextrose 50% Injectable 25 Gram(s) IV Push once  dextrose 50% Injectable 12.5 Gram(s) IV Push once  dextrose 50% Injectable 25 Gram(s) IV Push once  donepezil 10 milliGRAM(s) Oral at bedtime  furosemide    Tablet 20 milliGRAM(s) Oral daily  glucagon  Injectable 1 milliGRAM(s) IntraMuscular once  heparin   Injectable 5000 Unit(s) SubCutaneous every 12 hours  insulin glargine Injectable (LANTUS) 15 Unit(s) SubCutaneous at bedtime  insulin lispro (ADMELOG) corrective regimen sliding scale   SubCutaneous three times a day before meals  insulin lispro Injectable (ADMELOG) 8 Unit(s) SubCutaneous three times a day before meals  isosorbide   mononitrate ER Tablet (IMDUR) 30 milliGRAM(s) Oral daily  levoFLOXacin IVPB 750 milliGRAM(s) IV Intermittent every 24 hours  losartan 100 milliGRAM(s) Oral daily  methylPREDNISolone sodium succinate Injectable 60 milliGRAM(s) IV Push two times a day  montelukast 10 milliGRAM(s) Oral daily  pantoprazole    Tablet 40 milliGRAM(s) Oral before breakfast  tiotropium 18 MICROgram(s) Capsule 1 Capsule(s) Inhalation daily    PRN MEDICATIONS  dextrose Oral Gel 15 Gram(s) Oral once PRN  meclizine 25 milliGRAM(s) Oral three times a day PRN    VITALS:   T(C): 36.2 (04-27-22 @ 07:31), Max: 37.9 (04-26-22 @ 16:00)  T(F): 97.1 (04-27-22 @ 07:31), Max: 100.2 (04-26-22 @ 16:00)  HR: 85 (04-27-22 @ 07:31) (81 - 97)  BP: 163/70 (04-27-22 @ 07:31) (163/70 - 193/79)  BP(mean): --  ABP: --  ABP(mean): --  RR: 24 (04-27-22 @ 07:31) (24 - 28)  SpO2: 100% (04-27-22 @ 07:31) (94% - 100%)  LABS:                        9.6    6.95  )-----------( 157      ( 26 Apr 2022 15:48 )             29.6     04-26    139  |  104  |  17  ----------------------------<  267<H>  4.1   |  24  |  1.1    Ca    8.8      26 Apr 2022 15:48    TPro  5.8<L>  /  Alb  3.7  /  TBili  0.6  /  DBili  x   /  AST  32  /  ALT  45<H>  /  AlkPhos  61  04-26        I&O's Detail      Troponin T, Serum: 0.05 ng/mL *HH* (04-27-22 @ 00:30)  Troponin T, Serum: 0.05 ng/mL *HH* (04-26-22 @ 20:00)  Troponin T, Serum: 0.06 ng/mL *HH* (04-26-22 @ 15:48)        CARDIAC MARKERS ( 27 Apr 2022 00:30 )  x     / 0.05 ng/mL / x     / x     / x      CARDIAC MARKERS ( 26 Apr 2022 20:00 )  x     / 0.05 ng/mL / x     / x     / x      CARDIAC MARKERS ( 26 Apr 2022 15:48 )  x     / 0.06 ng/mL / x     / x     / x          RADIOLOGY:  EKG  12 Lead ECG:   Ventricular Rate 84 BPM    Atrial Rate 84 BPM    P-R Interval 136 ms    QRS Duration 80 ms    Q-T Interval 384 ms    QTC Calculation(Bazett) 453 ms    P Axis 52 degrees    R Axis 40 degrees    T Axis 174 degrees    Diagnosis Line Normal sinus rhythm  Cannot rule out Inferior infarct , age undetermined  Cannot rule out Anterior infarct , age undetermined  ST & T wave abnormality, consider lateral ischemia  Abnormal ECG    Confirmed by QUITA LEMA MD ([id number]) on 4/26/2022 10:54:19 PM (04-26-22 @ 16:10)  12 Lead ECG:   Ventricular Rate 70 BPM    Atrial Rate 70 BPM    P-R Interval 148 ms    QRS Duration 84 ms    Q-T Interval 406 ms    QTC Calculation(Bazett) 438 ms    P Axis 58 degrees    R Axis 46 degrees    T Axis 110 degrees    Diagnosis Line Normal sinus rhythm  Nonspecific T wave abnormality  Abnormal ECG    Confirmed by Julio Hodges ([id number]) on 1/20/2021 9:49:42 AM (01-20-21 @ 08:00)    Xray Chest 1 View- PORTABLE-Urgent:   ACC: [id number] EXAM:  XR CHEST PORTABLE URGENT 1V                          PROCEDURE DATE:  04/26/2022          INTERPRETATION:  Clinical History / Reason for exam: Shortness of breath    Comparison : Chest radiograph January 19, 2021.    Technique/Positioning: Frontal portable.    Findings:    Support devices: Telemetry leads are seen    Cardiac/mediastinum/hilum: Heart is enlarged    Lung parenchyma/Pleura: Basilar opacities.    Skeleton/soft tissues: Posttraumatic appearance of the left hemithorax.   Degenerative changes of the shoulders.    Impression:    Left lower lung scarring with cardiomegaly. Posttraumatic change.        --- End of Report ---            ARMINDA PARTIDA MD; Attending Interventional Radiologist  This document has been electronically signed. Apr 27 2022  7:34AM (04-26-22 @ 16:47)    PHYSICAL EXAM:  GENERAL: NAD on 2 L of o2   HEAD:  Atraumatic, Normocephalic  ENT: dry mucous membranes  CHEST/LUNG: + bilateral wheeze, decreased breath sounds   HEART: tachycardic; No murmurs, rubs, or gallops  ABDOMEN: Soft, Nontender, Nondistended.   EXTREMITIES:  + LE edema   NERVOUS SYSTEM:  Alert & Oriented X3, speech clear. No deficits   SUBJECTIVE:  Patient is a 82y old Female who presents with a chief complaint of shortness of breath (27 Apr 2022 06:14)   Currently admitted to medicine with the primary diagnosis of COPD exacerbation     Today is hospital day 1d. FS > 400 today morning. On 35U long acting insulin at home.     HPI  HPI:  Pt is an 83 y/o female with PMH of CAD s/p stents, non hodgkin lymphoma in remission, lung cancer in remission, carotid endarterectomy, CHF and COPD on 2L NC prn presenting for shortness of breath x 2 days. Has been using 2L NC more frequently at home. + increased productive cough. Denies fever, hemoptysis , congestion, vomiting or diarrhea. Cardiologist Dang    In the ED, vitals BP: 180/74 HR: 97 RR: 28 Spo2 97% on bipap initially and then weaned to 2 L of o2 via NC. Labs significant for hgb 9.6 (baseline 9-10), MCV 85, glucose 267, trop 0.06 (previously 0.03-0.05) BNP 2864 (previously 1k), VBG pH 7.34, Pco2 50, hco3 27. EKG: ischemic changes - biphasic t waves in V1-3 and TWI in leads I, aVL, V5 and V6. CXR: unchanges from prior in 01/2021);  Patient given solumedrol 125, levaquin and albuterol in ED. Patient currently admitted to SDU for further management.  (26 Apr 2022 20:44)      PAST MEDICAL & SURGICAL HISTORY  MI (myocardial infarction)  s/p 5 stents    HTN (hypertension)    High cholesterol    Heart failure    Non Hodgkin&#x27;s lymphoma  in remission. Follows with Dr Hernandez    PVD (peripheral vascular disease)    Thrombocytopenia    CKD (chronic kidney disease)    Osteoarthritis    Diabetes  on insulin    Coronary artery disease involving native heart without angina pectoris, unspecified vessel or lesion type  s/p 5 stents    History of cholecystectomy    H/O cardiac catheterization  5 STENTS    H/O carotid endarterectomy  RIGHT      ALLERGIES:  ACE inhibitors (Unknown)  BENZALKONIUM (Rash)  BETADINE (Rash)  Ceclor (Unknown)  codeine (Unknown)  latex (Unknown)  penicillin (Unknown)  RUBBER GLOVES (Rash)  sulfonamides (Unknown)    MEDICATIONS:  HOME MEDICATIONS  albuterol 90 mcg/inh inhalation aerosol: 2 puff(s) inhaled every 6 hours, As Needed  amLODIPine 10 mg oral tablet: 1 tab(s) orally once a day  atorvastatin 20 mg oral tablet: 1 tab(s) orally once a day  donepezil 10 mg oral tablet: 1 tab(s) orally once a day (at bedtime)  Dulera 100 mcg-5 mcg/inh inhalation aerosol: 2 puff(s) inhaled 2 times a day  Flovent 44 mcg/inh inhalation aerosol with adapter:   Imodium A-D 2 mg oral tablet: 1 tab(s) orally every 4 hours PRN for diarrhea  inositol 650 mg oral tablet: 2 tab(s) orally once a day  isosorbide mononitrate 30 mg oral tablet, extended release: 1 tab(s) orally once a day (in the morning)  Lasix 20 mg oral tablet: 1 tab(s) orally once a day  losartan 100 mg oral tablet: 1 tab(s) orally once a day  meclizine 25 mg oral tablet: 1 tab(s) orally 3 times a day  montelukast 10 mg oral tablet: 1 tab(s) orally once a day  NovoLOG 100 units/mL subcutaneous solution: 10,7,7  subcutaneous  omeprazole 40 mg oral delayed release capsule: 1 cap(s) orally once a day  Soliqua 100/33 subcutaneous solution: subcutaneous once a day  Spiriva 18 mcg inhalation capsule: 1 cap(s) inhaled once a day    STANDING MEDICATIONS  ALBUTerol    90 MICROgram(s) HFA Inhaler 2 Puff(s) Inhalation every 4 hours  albuterol/ipratropium for Nebulization 3 milliLiter(s) Nebulizer every 20 minutes  amLODIPine   Tablet 10 milliGRAM(s) Oral daily  atorvastatin 20 milliGRAM(s) Oral at bedtime  budesonide 160 MICROgram(s)/formoterol 4.5 MICROgram(s) Inhaler 2 Puff(s) Inhalation two times a day  dextrose 5%. 1000 milliLiter(s) IV Continuous <Continuous>  dextrose 5%. 1000 milliLiter(s) IV Continuous <Continuous>  dextrose 50% Injectable 25 Gram(s) IV Push once  dextrose 50% Injectable 12.5 Gram(s) IV Push once  dextrose 50% Injectable 25 Gram(s) IV Push once  donepezil 10 milliGRAM(s) Oral at bedtime  furosemide    Tablet 20 milliGRAM(s) Oral daily  glucagon  Injectable 1 milliGRAM(s) IntraMuscular once  heparin   Injectable 5000 Unit(s) SubCutaneous every 12 hours  insulin glargine Injectable (LANTUS) 15 Unit(s) SubCutaneous at bedtime  insulin lispro (ADMELOG) corrective regimen sliding scale   SubCutaneous three times a day before meals  insulin lispro Injectable (ADMELOG) 8 Unit(s) SubCutaneous three times a day before meals  isosorbide   mononitrate ER Tablet (IMDUR) 30 milliGRAM(s) Oral daily  levoFLOXacin IVPB 750 milliGRAM(s) IV Intermittent every 24 hours  losartan 100 milliGRAM(s) Oral daily  methylPREDNISolone sodium succinate Injectable 60 milliGRAM(s) IV Push two times a day  montelukast 10 milliGRAM(s) Oral daily  pantoprazole    Tablet 40 milliGRAM(s) Oral before breakfast  tiotropium 18 MICROgram(s) Capsule 1 Capsule(s) Inhalation daily    PRN MEDICATIONS  dextrose Oral Gel 15 Gram(s) Oral once PRN  meclizine 25 milliGRAM(s) Oral three times a day PRN    VITALS:   T(C): 36.2 (04-27-22 @ 07:31), Max: 37.9 (04-26-22 @ 16:00)  T(F): 97.1 (04-27-22 @ 07:31), Max: 100.2 (04-26-22 @ 16:00)  HR: 85 (04-27-22 @ 07:31) (81 - 97)  BP: 163/70 (04-27-22 @ 07:31) (163/70 - 193/79)  BP(mean): --  ABP: --  ABP(mean): --  RR: 24 (04-27-22 @ 07:31) (24 - 28)  SpO2: 100% (04-27-22 @ 07:31) (94% - 100%)  LABS:                        9.6    6.95  )-----------( 157      ( 26 Apr 2022 15:48 )             29.6     04-26    139  |  104  |  17  ----------------------------<  267<H>  4.1   |  24  |  1.1    Ca    8.8      26 Apr 2022 15:48    TPro  5.8<L>  /  Alb  3.7  /  TBili  0.6  /  DBili  x   /  AST  32  /  ALT  45<H>  /  AlkPhos  61  04-26        I&O's Detail      Troponin T, Serum: 0.05 ng/mL *HH* (04-27-22 @ 00:30)  Troponin T, Serum: 0.05 ng/mL *HH* (04-26-22 @ 20:00)  Troponin T, Serum: 0.06 ng/mL *HH* (04-26-22 @ 15:48)        CARDIAC MARKERS ( 27 Apr 2022 00:30 )  x     / 0.05 ng/mL / x     / x     / x      CARDIAC MARKERS ( 26 Apr 2022 20:00 )  x     / 0.05 ng/mL / x     / x     / x      CARDIAC MARKERS ( 26 Apr 2022 15:48 )  x     / 0.06 ng/mL / x     / x     / x          RADIOLOGY:  EKG  12 Lead ECG:   Ventricular Rate 84 BPM    Atrial Rate 84 BPM    P-R Interval 136 ms    QRS Duration 80 ms    Q-T Interval 384 ms    QTC Calculation(Bazett) 453 ms    P Axis 52 degrees    R Axis 40 degrees    T Axis 174 degrees    Diagnosis Line Normal sinus rhythm  Cannot rule out Inferior infarct , age undetermined  Cannot rule out Anterior infarct , age undetermined  ST & T wave abnormality, consider lateral ischemia  Abnormal ECG    Confirmed by QUITA LEMA MD ([id number]) on 4/26/2022 10:54:19 PM (04-26-22 @ 16:10)  12 Lead ECG:   Ventricular Rate 70 BPM    Atrial Rate 70 BPM    P-R Interval 148 ms    QRS Duration 84 ms    Q-T Interval 406 ms    QTC Calculation(Bazett) 438 ms    P Axis 58 degrees    R Axis 46 degrees    T Axis 110 degrees    Diagnosis Line Normal sinus rhythm  Nonspecific T wave abnormality  Abnormal ECG    Confirmed by Julio Hodges ([id number]) on 1/20/2021 9:49:42 AM (01-20-21 @ 08:00)    Xray Chest 1 View- PORTABLE-Urgent:   ACC: [id number] EXAM:  XR CHEST PORTABLE URGENT 1V                          PROCEDURE DATE:  04/26/2022          INTERPRETATION:  Clinical History / Reason for exam: Shortness of breath    Comparison : Chest radiograph January 19, 2021.    Technique/Positioning: Frontal portable.    Findings:    Support devices: Telemetry leads are seen    Cardiac/mediastinum/hilum: Heart is enlarged    Lung parenchyma/Pleura: Basilar opacities.    Skeleton/soft tissues: Posttraumatic appearance of the left hemithorax.   Degenerative changes of the shoulders.    Impression:    Left lower lung scarring with cardiomegaly. Posttraumatic change.        --- End of Report ---            ARMINDA PARTIDA MD; Attending Interventional Radiologist  This document has been electronically signed. Apr 27 2022  7:34AM (04-26-22 @ 16:47)    PHYSICAL EXAM:  GENERAL: NAD on 2 L of o2   HEAD:  Atraumatic, Normocephalic  ENT: dry mucous membranes  CHEST/LUNG: + bilateral wheeze, decreased breath sounds   HEART: tachycardic; No murmurs, rubs, or gallops  ABDOMEN: Soft, Nontender, Nondistended.   EXTREMITIES:  + LE edema   NERVOUS SYSTEM:  Alert & Oriented X3, speech clear. No deficits

## 2022-04-27 NOTE — CONSULT NOTE ADULT - ASSESSMENT
IMPRESSION:    COPD exacerbation/ on NIV  Lung ca on remission  NHL remission  severe COPD on home oxygen  CAD      PLAN:    CNS: Avoid CNS depressant    HEENT:  Oral care    PULMONARY:  HOB @ 45 degrees, aspiration precaution, solumedrol 60 q 12, Neb q 4, NIV at night, NC during day    CARDIOVASCULAR: avoid overload, lasix. echo.     GI: GI prophylaxis                                          Feeding po    RENAL:  F/u  lytes.  Correct as needed. accurate I/O    INFECTIOUS DISEASE: abx, procal, sputum gram stain/ cx    HEMATOLOGICAL:  DVT prophylaxis. LE doppler    ENDOCRINE:  Follow up FS.  Insulin protocol if needed.    SDU  GOC

## 2022-04-27 NOTE — PROGRESS NOTE ADULT - ASSESSMENT
Pt is an 81 y/o female with PMH of CAD s/p stents, non hodgkin lymphoma in remission, lung cancer in remission, carotid endarterectomy, CHF and COPD on 2L NC prn presenting for shortness of breath x 2 days.       #acute hypoxic/hypercapnic respiratory failure 2/2 to copd exacerbation?  -In the ED, vitals BP: 180/74 HR: 97 RR: 28 Spo2 97% on 2 L of o2 via NC.  - Labs significant for hgb 9.6 (baseline 9-10), MCV 85, glucose 267, trop 0.06 (previously 0.03-0.05) BNP 2864 (previously 1k), VBG pH 7.34, Pco2 50, hco3 27.   -CXR: unchanges from prior in 01/2021)  - s/p solumedrol 125, levaquin and albuterol in ED.   - f/u duplex, d-dimer, blood cultures, procal  - Continue duonebs, symbicort, levquin and solumedrol 60 mg BID   - home meds: spiriva, dulera, albuterol, flovent, montelukast  -(pulm: Dr. Feldman)  - Start solumedrol 60 mg q12h, nebs q4h    # ischemic changes - biphasic t waves in V1-3 and TWI in leads I, aVL, V5 and V6 EKG - hx of CAD s/p 5 stents   - patient denies chest pain.   - trop 0.06 (previously 0.03-0.05) BNP 2864 (previously 1k)  - Trend trops and EKG  - stopped aspirin and plavix as per cardiology reccs?  - Dr. Leong consulted in ED  - f/u cardiology recommendations     #CHF  - c/w with lasix 20 mg  - BNP 2864 (previously 1k)  - ECHO (2020)-  EF: 60 to 65%; G1DD, severe Pulm HTN  - f/u repeat echo     # HTN - uncontrolled on admission (227/95)  - Home meds: lasix 20, imdur 30, losartan 100, norvasc 10    # DM  - f/u hemoglobin A1C  - fingersticks ACHS  - started on insulin protocol  - OP follow up with Dr Reddy     # CKD - stable     # NHL: in remission  - patient follows with Dr Hernandez. 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  - s/p 5/5 fractions RT competed 7/12/19.   5/2021 SONID[facility]. Repeat CT showed stable findings , 11/21.   - Planned for repeat CT chest  in 6 months, May 2022.   [facility]     # D[facility]  - f/u lipid profile in AM  -C/W atorvastatin 20    #dementia/dizziness/vertigo  - c/w with meclizine 25 mg tid  - c/w donepezil 10 mg daily       DVT ppx: lovenox  GI ppx: PPI  ambulate as tolerated  Dispo: SDU  FULL CODE  Pt is an 81 y/o female with PMH of CAD s/p stents, non hodgkin lymphoma in remission, lung cancer in remission, carotid endarterectomy, CHF and COPD on 2L NC prn presenting for shortness of breath x 2 days.     #acute hypoxic/hypercapnic respiratory failure 2/2 to copd exacerbation?  -In the ED, vitals BP: 180/74 HR: 97 RR: 28 Spo2 97% on 2 L of o2 via NC.  - Labs significant for hgb 9.6 (baseline 9-10), MCV 85, glucose 267, trop 0.06 (previously 0.03-0.05) BNP 2864 (previously 1k), VBG pH 7.34, Pco2 50, hco3 27.   -CXR: unchanges from prior in 01/2021)  - s/p solumedrol 125, levaquin and albuterol in ED.   - f/u duplex, d-dimer, blood cultures, procal  - Continue duonebs, symbicort, levquin and solumedrol 60 mg BID   - home meds: spiriva, dulera, albuterol, flovent, montelukast  -(pulm: Dr. Feldman)  - Start solumedrol 60 mg q12h, nebs q4h    #Uncontrolled  DM  - f/u hemoglobin A1C  - fingersticks ACHS  - insulin protocol  - OP follow up with Dr Reddy     # ischemic changes - biphasic t waves in V1-3 and TWI in leads I, aVL, V5 and V6 EKG - hx of CAD s/p 5 stents   - patient denies chest pain.   - trop 0.06 (previously 0.03-0.05) BNP 2864 (previously 1k)  - Trend trops and EKG  - stopped aspirin and plavix as per cardiology reccs?  - Dr. Leong consulted in ED  - f/u cardiology recommendations     #CHF  - c/w with lasix 20 mg  - BNP 2864 (previously 1k)  - ECHO (2020)-  EF: 60 to 65%; G1DD, severe Pulm HTN  - f/u repeat echo     # HTN - uncontrolled on admission (227/95)  - Home meds: lasix 20, imdur 30, losartan 100, norvasc 10    # CKD - stable     # NHL: in remission  - patient follows with Dr Hernandez. 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  - s/p 5/5 fractions RT competed 7/12/19.   5/2021 SONIDO. Repeat CT showed stable findings , 11/21.   - Planned for repeat CT chest  in 6 months, May 2022.   [facility]     # [facility]  - f/u lipid profile in AM  -C/W atorvastatin 20    #dementia/dizziness/vertigo  - c/w with meclizine 25 mg tid  - c/w donepezil 10 mg daily       DVT ppx: lovenox  GI ppx: PPI  ambulate as tolerated  Dispo: SDU  FULL CODE

## 2022-04-27 NOTE — CONSULT NOTE ADULT - SUBJECTIVE AND OBJECTIVE BOX
Patient is a 82y old  Female who presents with a chief complaint of shortness of breath (26 Apr 2022 20:44)      83 y/o female with PMH of CAD s/p stents, non hodgkin lymphoma in remission, lung cancer in remission, carotid endarterectomy, CHF and COPD on 2L NC prn presenting for shortness of breath x 2 days. Has been using 2L NC more frequently at home. + increased productive cough. Denies fever, hemoptysis , congestion, vomiting or diarrhea. Cardiologist Dang    In the ED, vitals BP: 180/74 HR: 97 RR: 28 Spo2 97% on bipap initially and then weaned to 2 L of o2 via NC. Labs significant for hgb 9.6 (baseline 9-10), MCV 85, glucose 267, trop 0.06 (previously 0.03-0.05) BNP 2864 (previously 1k), VBG pH 7.34, Pco2 50, hco3 27. EKG: ischemic changes - biphasic t waves in V1-3 and TWI in leads I, aVL, V5 and V6. CXR: unchanges from prior in 01/2021);  Patient given solumedrol 125, levaquin and albuterol in ED. Patient currently admitted to SDU for further management. Called to evaluate, on NIV overnight, this am on NC, feels better, still coughing      PAST MEDICAL & SURGICAL HISTORY:  MI (myocardial infarction)  s/p 5 stents    HTN (hypertension)    High cholesterol    Heart failure    Non Hodgkin&#x27;s lymphoma  in remission. Follows with Dr Hernandez    PVD (peripheral vascular disease)    Thrombocytopenia    CKD (chronic kidney disease)    Osteoarthritis    Diabetes  on insulin    Coronary artery disease involving native heart without angina pectoris, unspecified vessel or lesion type  s/p 5 stents    History of cholecystectomy    H/O cardiac catheterization  5 STENTS    H/O carotid endarterectomy  RIGHT        SOCIAL HX:   Smoking    ex    FAMILY HISTORY:  FH: CAD (coronary artery disease)  FATHER        REVIEW OF SYSTEMS see hpi      Allergies    ACE inhibitors (Unknown)  BENZALKONIUM (Rash)  BETADINE (Rash)  Ceclor (Unknown)  codeine (Unknown)  latex (Unknown)  penicillin (Unknown)  RUBBER GLOVES (Rash)  sulfonamides (Unknown)    Intolerances        ALBUTerol    90 MICROgram(s) HFA Inhaler 2 Puff(s) Inhalation every 4 hours  albuterol/ipratropium for Nebulization 3 milliLiter(s) Nebulizer every 20 minutes  amLODIPine   Tablet 10 milliGRAM(s) Oral daily  atorvastatin 20 milliGRAM(s) Oral at bedtime  budesonide 160 MICROgram(s)/formoterol 4.5 MICROgram(s) Inhaler 2 Puff(s) Inhalation two times a day  dextrose 5%. 1000 milliLiter(s) IV Continuous <Continuous>  dextrose 5%. 1000 milliLiter(s) IV Continuous <Continuous>  dextrose 50% Injectable 25 Gram(s) IV Push once  dextrose 50% Injectable 12.5 Gram(s) IV Push once  dextrose 50% Injectable 25 Gram(s) IV Push once  dextrose Oral Gel 15 Gram(s) Oral once PRN  donepezil 10 milliGRAM(s) Oral at bedtime  furosemide    Tablet 20 milliGRAM(s) Oral daily  glucagon  Injectable 1 milliGRAM(s) IntraMuscular once  heparin   Injectable 5000 Unit(s) SubCutaneous every 12 hours  insulin glargine Injectable (LANTUS) 15 Unit(s) SubCutaneous at bedtime  insulin lispro (ADMELOG) corrective regimen sliding scale   SubCutaneous three times a day before meals  insulin lispro Injectable (ADMELOG) 8 Unit(s) SubCutaneous three times a day before meals  isosorbide   mononitrate ER Tablet (IMDUR) 30 milliGRAM(s) Oral daily  levoFLOXacin IVPB 750 milliGRAM(s) IV Intermittent every 24 hours  losartan 100 milliGRAM(s) Oral daily  meclizine 25 milliGRAM(s) Oral three times a day PRN  methylPREDNISolone sodium succinate Injectable 60 milliGRAM(s) IV Push two times a day  montelukast 10 milliGRAM(s) Oral daily  pantoprazole    Tablet 40 milliGRAM(s) Oral before breakfast  tiotropium 18 MICROgram(s) Capsule 1 Capsule(s) Inhalation daily  : Home Meds:      PHYSICAL EXAM    ICU Vital Signs Last 24 Hrs  T(C): 37.9 (26 Apr 2022 16:00), Max: 37.9 (26 Apr 2022 16:00)  T(F): 100.2 (26 Apr 2022 16:00), Max: 100.2 (26 Apr 2022 16:00)  HR: 81 (26 Apr 2022 16:00) (81 - 97)  BP: 193/79 (26 Apr 2022 16:00) (180/74 - 193/79)  RR: 25 (26 Apr 2022 16:00) (25 - 28)  SpO2: 98% (26 Apr 2022 16:50) (94% - 98%)      General: ill looking  HEENT:  AQUILINO              Lymph Nodes: No cervical LN   Lungs: Bilateral wheezing  Cardiovascular: CHRIS 2.6  Abdomen: Soft, Positive BS  Extremities: No clubbing  Skin: Warm  Neurological: Non focal         LABS:                          9.6    6.95  )-----------( 157      ( 26 Apr 2022 15:48 )             29.6                                               04-26    139  |  104  |  17  ----------------------------<  267<H>  4.1   |  24  |  1.1    Ca    8.8      26 Apr 2022 15:48    TPro  5.8<L>  /  Alb  3.7  /  TBili  0.6  /  DBili  x   /  AST  32  /  ALT  45<H>  /  AlkPhos  61  04-26                                                 CARDIAC MARKERS ( 27 Apr 2022 00:30 )  x     / 0.05 ng/mL / x     / x     / x      CARDIAC MARKERS ( 26 Apr 2022 20:00 )  x     / 0.05 ng/mL / x     / x     / x      CARDIAC MARKERS ( 26 Apr 2022 15:48 )  x     / 0.06 ng/mL / x     / x     / x                                                LIVER FUNCTIONS - ( 26 Apr 2022 15:48 )  Alb: 3.7 g/dL / Pro: 5.8 g/dL / ALK PHOS: 61 U/L / ALT: 45 U/L / AST: 32 U/L / GGT: x                                                                                               MEDICATIONS  (STANDING):  ALBUTerol    90 MICROgram(s) HFA Inhaler 2 Puff(s) Inhalation every 4 hours  albuterol/ipratropium for Nebulization 3 milliLiter(s) Nebulizer every 20 minutes  amLODIPine   Tablet 10 milliGRAM(s) Oral daily  atorvastatin 20 milliGRAM(s) Oral at bedtime  budesonide 160 MICROgram(s)/formoterol 4.5 MICROgram(s) Inhaler 2 Puff(s) Inhalation two times a day  dextrose 5%. 1000 milliLiter(s) (100 mL/Hr) IV Continuous <Continuous>  dextrose 5%. 1000 milliLiter(s) (50 mL/Hr) IV Continuous <Continuous>  dextrose 50% Injectable 25 Gram(s) IV Push once  dextrose 50% Injectable 12.5 Gram(s) IV Push once  dextrose 50% Injectable 25 Gram(s) IV Push once  donepezil 10 milliGRAM(s) Oral at bedtime  furosemide    Tablet 20 milliGRAM(s) Oral daily  glucagon  Injectable 1 milliGRAM(s) IntraMuscular once  heparin   Injectable 5000 Unit(s) SubCutaneous every 12 hours  insulin glargine Injectable (LANTUS) 15 Unit(s) SubCutaneous at bedtime  insulin lispro (ADMELOG) corrective regimen sliding scale   SubCutaneous three times a day before meals  insulin lispro Injectable (ADMELOG) 8 Unit(s) SubCutaneous three times a day before meals  isosorbide   mononitrate ER Tablet (IMDUR) 30 milliGRAM(s) Oral daily  levoFLOXacin IVPB 750 milliGRAM(s) IV Intermittent every 24 hours  losartan 100 milliGRAM(s) Oral daily  methylPREDNISolone sodium succinate Injectable 60 milliGRAM(s) IV Push two times a day  montelukast 10 milliGRAM(s) Oral daily  pantoprazole    Tablet 40 milliGRAM(s) Oral before breakfast  tiotropium 18 MICROgram(s) Capsule 1 Capsule(s) Inhalation daily    MEDICATIONS  (PRN):  dextrose Oral Gel 15 Gram(s) Oral once PRN Blood Glucose LESS THAN 70 milliGRAM(s)/deciliter  meclizine 25 milliGRAM(s) Oral three times a day PRN Dizziness        
Date of Admission: 4/27/22    CHIEF COMPLAINT: shortness of breath    HISTORY OF PRESENT ILLNESS: 82yFemale with PMH below presented to the hospital for above CC, increased productive cough and using her inhalers and home O2 more than needed. She is being treated for COPD exacerbation. Has history of CAD s.p PCI x5. made mild troponins while in exacerbation    PAST MEDICAL & SURGICAL HISTORY:  MI (myocardial infarction)  s/p 5 stents    HTN (hypertension)    High cholesterol    Heart failure    Non Hodgkin&#x27;s lymphoma  in remission. Follows with Dr Hernandez    PVD (peripheral vascular disease)    Thrombocytopenia    CKD (chronic kidney disease)    Osteoarthritis    Diabetes  on insulin    Coronary artery disease involving native heart without angina pectoris, unspecified vessel or lesion type  s/p 5 stents    History of cholecystectomy    H/O cardiac catheterization  5 STENTS    H/O carotid endarterectomy  RIGHT        FAMILY HISTORY:  [ ] no pertinent family history of premature cardiovascular disease in first degree relatives.  Mother:   Father:   Siblings:     SOCIAL HISTORY:    [ ] Non-smoker  [ ] Smoker  [ ] Alcohol    Allergies    ACE inhibitors (Unknown)  BENZALKONIUM (Rash)  BETADINE (Rash)  Ceclor (Unknown)  codeine (Unknown)  latex (Unknown)  penicillin (Unknown)  RUBBER GLOVES (Rash)  sulfonamides (Unknown)    Intolerances    	    REVIEW OF SYSTEMS:  CONSTITUTIONAL: No fever, weight loss, or fatigue  CARDIOLOGY: see HPI  RESPIRATORY: see HPI  NEUROLOGICAL: NO weakness, no focal deficits to report.  ENDOCRINOLOGICAL: no recent change in diabetic medications.   GI: no BRBPR, no N,V,diarrhea.    PSYCHIATRY: normal mood and affect  HEENT: no nasal discharge, no ecchymosis  SKIN: no ecchymosis, no breakdown  MUSCULOSKELETAL: Full range of motion x4.     PHYSICAL EXAM:  T(C): 36.2 (04-27-22 @ 07:31), Max: 37.9 (04-26-22 @ 16:00)  HR: 85 (04-27-22 @ 07:31) (81 - 97)  BP: 163/70 (04-27-22 @ 07:31) (163/70 - 193/79)  RR: 24 (04-27-22 @ 07:31) (24 - 28)  SpO2: 100% (04-27-22 @ 07:31) (94% - 100%)  Wt(kg): --  I&O's Summary      General Appearance: well appearing, normal for age and gender. 	  Neck: normal JVP, no bruit.   Eyes: No xanthomalasia, Extra Ocular muscles intact.   Cardiovascular: regular rate and rhythm S1 S2, No JVD, No murmurs, No edema  Respiratory: Lungs clear to auscultation	  Psychiatry: Alert and oriented x 3, Mood & affect appropriate  Gastrointestinal:  Soft, Non-tender  Skin/Integumen: No rashes, No ecchymoses, No cyanosis	  Neurologic: Non-focal  Musculoskeletal/ extremities: Normal range of motion, No clubbing, cyanosis or edema  Vascular: Peripheral pulses palpable 2+ bilaterally    LABS:	 	                          8.8    4.17  )-----------( 119      ( 27 Apr 2022 07:00 )             26.5     04-27    140  |  105  |  20  ----------------------------<  344<H>  4.5   |  25  |  1.0    Ca    8.7      27 Apr 2022 07:00  Mg     1.8     04-27    TPro  5.6<L>  /  Alb  3.6  /  TBili  0.5  /  DBili  x   /  AST  20  /  ALT  36  /  AlkPhos  58  04-27    CARDIAC MARKERS ( 27 Apr 2022 07:00 )  x     / 0.04 ng/mL / x     / x     / x      CARDIAC MARKERS ( 27 Apr 2022 00:30 )  x     / 0.05 ng/mL / x     / x     / x      CARDIAC MARKERS ( 26 Apr 2022 20:00 )  x     / 0.05 ng/mL / x     / x     / x      CARDIAC MARKERS ( 26 Apr 2022 15:48 )  x     / 0.06 ng/mL / x     / x     / x              CARDIAC MARKERS:            TELEMETRY EVENTS: 	    ECG:  < from: 12 Lead ECG (04.26.22 @ 16:10) >  Diagnosis Line Normal sinus rhythm  Cannot rule out Inferior infarct , age undetermined  Cannot rule out Anterior infarct , age undetermined  ST & T wave abnormality, consider lateral ischemia  Abnormal ECG    < end of copied text >   	  RADIOLOGY:  OTHER: 	    PREVIOUS DIAGNOSTIC TESTING:    [x ] Echocardiogram: < from: Transthoracic Echocardiogram (02.20.20 @ 11:44) >   1. Left ventricular ejection fraction, by visual estimation, is 60 to 65%.   2. Spectral Doppler shows impaired relaxation pattern of left ventricular myocardial filling (Grade I diastolic dysfunction).   3. Mild mitral valve regurgitation.   4. Mild thickening and calcification of the anterior and posterior mitral valve leaflets.   5. Mitral annular calcification.   6. Sclerotic aortic valve with normal opening.   7. Estimated pulmonary artery systolic pressure is 61.6 mmHg assuming a right atrial pressure of 5 mmHg, which is consistent with severe pulmonary hypertension.    < end of copied text >    [ ]  Catheterization:  [ ] Stress Test:  	  	    Home Medications:  albuterol 90 mcg/inh inhalation aerosol: 2 puff(s) inhaled every 6 hours, As Needed (19 Jan 2021 18:27)  atorvastatin 20 mg oral tablet: 1 tab(s) orally once a day (17 Feb 2020 15:27)  donepezil 10 mg oral tablet: 1 tab(s) orally once a day (at bedtime) (17 Feb 2020 15:27)  Dulera 100 mcg-5 mcg/inh inhalation aerosol: 2 puff(s) inhaled 2 times a day (19 Jan 2021 18:37)  Flovent 44 mcg/inh inhalation aerosol with adapter:  (17 Feb 2020 15:27)  inositol 650 mg oral tablet: 2 tab(s) orally once a day (19 Jan 2021 18:29)  isosorbide mononitrate 30 mg oral tablet, extended release: 1 tab(s) orally once a day (in the morning) (17 Feb 2020 15:27)  Lasix 20 mg oral tablet: 1 tab(s) orally once a day (17 Feb 2020 15:27)  losartan 100 mg oral tablet: 1 tab(s) orally once a day (17 Feb 2020 15:27)  montelukast 10 mg oral tablet: 1 tab(s) orally once a day (17 Feb 2020 15:27)  NovoLOG 100 units/mL subcutaneous solution: 10,7,7  subcutaneous (17 Feb 2020 15:27)  omeprazole 40 mg oral delayed release capsule: 1 cap(s) orally once a day (26 Apr 2022 22:16)  Soliqua 100/33 subcutaneous solution: subcutaneous once a day (17 Feb 2020 15:27)  Spiriva 18 mcg inhalation capsule: 1 cap(s) inhaled once a day (19 Jan 2021 18:32)    MEDICATIONS  (STANDING):  ALBUTerol    90 MICROgram(s) HFA Inhaler 2 Puff(s) Inhalation every 4 hours  albuterol/ipratropium for Nebulization 3 milliLiter(s) Nebulizer every 20 minutes  amLODIPine   Tablet 10 milliGRAM(s) Oral daily  atorvastatin 20 milliGRAM(s) Oral at bedtime  budesonide 160 MICROgram(s)/formoterol 4.5 MICROgram(s) Inhaler 2 Puff(s) Inhalation two times a day  dextrose 5%. 1000 milliLiter(s) (50 mL/Hr) IV Continuous <Continuous>  dextrose 5%. 1000 milliLiter(s) (100 mL/Hr) IV Continuous <Continuous>  dextrose 50% Injectable 25 Gram(s) IV Push once  dextrose 50% Injectable 12.5 Gram(s) IV Push once  dextrose 50% Injectable 25 Gram(s) IV Push once  donepezil 10 milliGRAM(s) Oral at bedtime  furosemide    Tablet 20 milliGRAM(s) Oral daily  glucagon  Injectable 1 milliGRAM(s) IntraMuscular once  heparin   Injectable 5000 Unit(s) SubCutaneous every 12 hours  insulin glargine Injectable (LANTUS) 15 Unit(s) SubCutaneous at bedtime  insulin lispro (ADMELOG) corrective regimen sliding scale   SubCutaneous three times a day before meals  insulin lispro Injectable (ADMELOG) 8 Unit(s) SubCutaneous three times a day before meals  isosorbide   mononitrate ER Tablet (IMDUR) 30 milliGRAM(s) Oral daily  levoFLOXacin IVPB 750 milliGRAM(s) IV Intermittent every 24 hours  losartan 100 milliGRAM(s) Oral daily  methylPREDNISolone sodium succinate Injectable 60 milliGRAM(s) IV Push two times a day  montelukast 10 milliGRAM(s) Oral daily  pantoprazole    Tablet 40 milliGRAM(s) Oral before breakfast  tiotropium 18 MICROgram(s) Capsule 1 Capsule(s) Inhalation daily    MEDICATIONS  (PRN):  dextrose Oral Gel 15 Gram(s) Oral once PRN Blood Glucose LESS THAN 70 milliGRAM(s)/deciliter  meclizine 25 milliGRAM(s) Oral three times a day PRN Dizziness

## 2022-04-28 NOTE — PROGRESS NOTE ADULT - SUBJECTIVE AND OBJECTIVE BOX
VENKATESH RAMACHANDRAN  82y, Female  Allergy: ACE inhibitors (Unknown)  BENZALKONIUM (Rash)  BETADINE (Rash)  Ceclor (Unknown)  codeine (Unknown)  latex (Unknown)  penicillin (Unknown)  RUBBER GLOVES (Rash)  sulfonamides (Unknown)    Hospital Day: 2d    Patient seen and examined. No acute events overnight    PMH/PSH:  PAST MEDICAL & SURGICAL HISTORY:  MI (myocardial infarction)  s/p 5 stents    HTN (hypertension)    High cholesterol    Heart failure    Non Hodgkin&#x27;s lymphoma  in remission. Follows with Dr Hernandez    PVD (peripheral vascular disease)    Thrombocytopenia    CKD (chronic kidney disease)    Osteoarthritis    Diabetes  on insulin    Coronary artery disease involving native heart without angina pectoris, unspecified vessel or lesion type  s/p 5 stents    History of cholecystectomy    H/O cardiac catheterization  5 STENTS    H/O carotid endarterectomy  RIGHT        VITALS:  T(F): 97.8 (04-28-22 @ 09:42), Max: 98.2 (04-27-22 @ 15:29)  HR: 90 (04-28-22 @ 09:42)  BP: 178/68 (04-28-22 @ 09:42) (170/72 - 183/78)  RR: 20 (04-28-22 @ 09:42)  SpO2: 96% (04-28-22 @ 09:42)    TESTS & MEASUREMENTS:  Weight (Kg):                             9.2    6.83  )-----------( 163      ( 28 Apr 2022 06:10 )             28.6       04-28    142  |  103  |  33<H>  ----------------------------<  381<H>  4.1   |  28  |  1.3    Ca    8.7      28 Apr 2022 06:10  Mg     1.9     04-28    TPro  5.6<L>  /  Alb  3.6  /  TBili  0.4  /  DBili  x   /  AST  25  /  ALT  36  /  AlkPhos  59  04-28    LIVER FUNCTIONS - ( 28 Apr 2022 06:10 )  Alb: 3.6 g/dL / Pro: 5.6 g/dL / ALK PHOS: 59 U/L / ALT: 36 U/L / AST: 25 U/L / GGT: x           CARDIAC MARKERS ( 27 Apr 2022 07:00 )  x     / 0.04 ng/mL / x     / x     / x      CARDIAC MARKERS ( 27 Apr 2022 00:30 )  x     / 0.05 ng/mL / x     / x     / x      CARDIAC MARKERS ( 26 Apr 2022 20:00 )  x     / 0.05 ng/mL / x     / x     / x      CARDIAC MARKERS ( 26 Apr 2022 15:48 )  x     / 0.06 ng/mL / x     / x     / x            Culture - Blood (collected 04-27-22 @ 00:30)  Source: .Blood Blood  Preliminary Report (04-28-22 @ 10:00):    No growth to date.          RADIOLOGY & ADDITIONAL TESTS:    RECENT DIAGNOSTIC ORDERS:  Xray Chest 1 View- PORTABLE-Routine: AM   Indication: sob  Transport: Portable,  w/ Monitor (04-28-22 @ 10:24)  Culture - Sputum: Routine  Specimen Source: Sputum (04-27-22 @ 16:14)      MEDICATIONS:  MEDICATIONS  (STANDING):  albuterol/ipratropium for Nebulization 3 milliLiter(s) Nebulizer every 4 hours  amLODIPine   Tablet 10 milliGRAM(s) Oral daily  aspirin  chewable 81 milliGRAM(s) Oral daily  atorvastatin 20 milliGRAM(s) Oral at bedtime  budesonide 160 MICROgram(s)/formoterol 4.5 MICROgram(s) Inhaler 2 Puff(s) Inhalation two times a day  dextrose 5%. 1000 milliLiter(s) (50 mL/Hr) IV Continuous <Continuous>  dextrose 5%. 1000 milliLiter(s) (100 mL/Hr) IV Continuous <Continuous>  dextrose 50% Injectable 25 Gram(s) IV Push once  dextrose 50% Injectable 12.5 Gram(s) IV Push once  dextrose 50% Injectable 25 Gram(s) IV Push once  donepezil 10 milliGRAM(s) Oral at bedtime  furosemide    Tablet 20 milliGRAM(s) Oral daily  glucagon  Injectable 1 milliGRAM(s) IntraMuscular once  heparin   Injectable 5000 Unit(s) SubCutaneous every 12 hours  insulin glargine Injectable (LANTUS) 35 Unit(s) SubCutaneous at bedtime  insulin lispro (ADMELOG) corrective regimen sliding scale   SubCutaneous three times a day before meals  insulin lispro Injectable (ADMELOG) 15 Unit(s) SubCutaneous three times a day before meals  isosorbide   mononitrate ER Tablet (IMDUR) 30 milliGRAM(s) Oral daily  levoFLOXacin IVPB 750 milliGRAM(s) IV Intermittent every 24 hours  losartan 100 milliGRAM(s) Oral daily  methylPREDNISolone sodium succinate Injectable 60 milliGRAM(s) IV Push two times a day  montelukast 10 milliGRAM(s) Oral daily  pantoprazole    Tablet 40 milliGRAM(s) Oral before breakfast  tiotropium 18 MICROgram(s) Capsule 1 Capsule(s) Inhalation daily    MEDICATIONS  (PRN):  dextrose Oral Gel 15 Gram(s) Oral once PRN Blood Glucose LESS THAN 70 milliGRAM(s)/deciliter  meclizine 25 milliGRAM(s) Oral three times a day PRN Dizziness      HOME MEDICATIONS:  albuterol 90 mcg/inh inhalation aerosol (01-19)  atorvastatin 20 mg oral tablet (02-17)  donepezil 10 mg oral tablet (02-17)  Dulera 100 mcg-5 mcg/inh inhalation aerosol (01-19)  Flovent 44 mcg/inh inhalation aerosol with adapter (02-17)  inositol 650 mg oral tablet (01-19)  isosorbide mononitrate 30 mg oral tablet, extended release (02-17)  Lasix 20 mg oral tablet (02-17)  losartan 100 mg oral tablet (02-17)  montelukast 10 mg oral tablet (02-17)  NovoLOG 100 units/mL subcutaneous solution (02-17)  omeprazole 40 mg oral delayed release capsule (04-26)  Soliqua 100/33 subcutaneous solution (02-17)  Spiriva 18 mcg inhalation capsule (01-19)      REVIEW OF SYSTEMS:  All other review of systems is negative unless indicated above.     PHYSICAL EXAM:  PHYSICAL EXAM:  GENERAL: NAD, well-developed  HEAD:  Atraumatic, Normocephalic  NECK: Supple, No JVD  CHEST/LUNG: B/l expiratory wheezes  HEART: Regular rate and rhythm; No murmurs, rubs, or gallops  ABDOMEN: Soft, Nontender, Nondistended; Bowel sounds present  EXTREMITIES:  2+ Peripheral Pulses, No clubbing, cyanosis, or edema  SKIN: No rashes or lesions

## 2022-04-28 NOTE — PATIENT PROFILE ADULT - FALL HARM RISK - HARM RISK INTERVENTIONS

## 2022-04-28 NOTE — PROGRESS NOTE ADULT - ASSESSMENT
IMPRESSION:    COPD exacerbation improving  Lung ca on remission  NHL remission  COPD on home oxygen  CAD      PLAN:    CNS: Avoid CNS depressant    HEENT:  Oral care    PULMONARY:  HOB @ 45 degrees, aspiration precaution, solumedrol 60 q 12, Neb q 4, NIV at night, NC during day    CARDIOVASCULAR: avoid overload, lasix.    GI: GI prophylaxis                                          Feeding po    RENAL:  F/u  lytes.  Correct as needed. accurate I/O    INFECTIOUS DISEASE: abx, procal, sputum gram stain/ cx    HEMATOLOGICAL:  DVT prophylaxis. LE doppler    ENDOCRINE:  Follow up FS.  Insulin protocol if needed.    floor  GOC

## 2022-04-28 NOTE — PROGRESS NOTE ADULT - ASSESSMENT
83 y/o female with PMH of CAD s/p stents, non hodgkin lymphoma in remission, lung cancer in remission, carotid endarterectomy, CHF and COPD on 2L NC prn presenting for shortness of breath x 2 days.     #COPD exacerbation (on home O2 2L PRN)  #possible CAP  #Possible acute on chronic HFpEF  CXR (04/26): Left lower lung scarring with cardiomegaly. Posttraumatic change  Procalcitonin 0.10  - Cont solumedrol 60 BID + Duonebs q4h ATC and PRN  - Can DC abx after today's dose  - Sputum cx if able  - c/w with lasix 20 mg  - f/u repeat echo    #DM2  - Continue lantus/lispro 30-10-10-10  - ISS    #TWI on EKG  #Hx of CAD s/p PCI x5  #Troponemia  Troponins stable at 0.05  - Cardio eval pending  - Monitor on telemetry until echo    # HTN - uncontrolled on admission (227/95), now improved  #HLD  - Home meds: lasix 20,  losartan 100, norvasc 10  - Increase imdur 30 to 60  - Cont atorvastatin 20mg qD  # CKD - stable     # NHL: in remission  - patient follows with Dr Hernandez. 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  - s/p 5/5 fractions RT competed 7/12/19.   5/2021 SONIDO. Repeat CT showed stable findings , 11/21.   - Planned for repeat CT chest  in 6 months, May 2022.   - f/u with DR. Hernandez outpaitnet     #Vertigo  - c/w with meclizine 25 mg tid    #Dementia  - c/w donepezil 10 mg daily     DVT PPX heparin    #Progress Note Handoff  Pending (specify): Continue IV steroids  Family discussion: d/w pt regarding management of COPD exacerbation  Disposition: Home.

## 2022-04-28 NOTE — PROGRESS NOTE ADULT - SUBJECTIVE AND OBJECTIVE BOX
Over Night Events: events noted, afebrile, on NC, cardio reviewed    PHYSICAL EXAM    ICU Vital Signs Last 24 Hrs  T(C): 36.8 (27 Apr 2022 15:29), Max: 36.8 (27 Apr 2022 15:29)  T(F): 98.2 (27 Apr 2022 15:29), Max: 98.2 (27 Apr 2022 15:29)  HR: 75 (28 Apr 2022 03:00) (71 - 85)  BP: 183/78 (28 Apr 2022 03:00) (158/72 - 183/78)  RR: 20 (28 Apr 2022 03:00) (20 - 24)  SpO2: 99% (28 Apr 2022 03:00) (99% - 100%)      General: ill looking  HEENT: AQUILINO             Lymph Nodes: No cervical LN   Lungs: Bilateral wheezing  Cardiovascular: CHRIS 2/6  Abdomen: Soft, Positive BS  Extremities: No clubbing   Skin: Warm  Neurological: Non focal         LABS:                          9.2    6.83  )-----------( 163      ( 28 Apr 2022 06:10 )             28.6                                               04-27    140  |  105  |  20  ----------------------------<  344<H>  4.5   |  25  |  1.0    Ca    8.7      27 Apr 2022 07:00  Mg     1.8     04-27    TPro  5.6<L>  /  Alb  3.6  /  TBili  0.5  /  DBili  x   /  AST  20  /  ALT  36  /  AlkPhos  58  04-27                                                 CARDIAC MARKERS ( 27 Apr 2022 07:00 )  x     / 0.04 ng/mL / x     / x     / x      CARDIAC MARKERS ( 27 Apr 2022 00:30 )  x     / 0.05 ng/mL / x     / x     / x      CARDIAC MARKERS ( 26 Apr 2022 20:00 )  x     / 0.05 ng/mL / x     / x     / x      CARDIAC MARKERS ( 26 Apr 2022 15:48 )  x     / 0.06 ng/mL / x     / x     / x                                                LIVER FUNCTIONS - ( 27 Apr 2022 07:00 )  Alb: 3.6 g/dL / Pro: 5.6 g/dL / ALK PHOS: 58 U/L / ALT: 36 U/L / AST: 20 U/L / GGT: x                                                                                                                                       MEDICATIONS  (STANDING):  albuterol/ipratropium for Nebulization 3 milliLiter(s) Nebulizer every 4 hours  amLODIPine   Tablet 10 milliGRAM(s) Oral daily  aspirin  chewable 81 milliGRAM(s) Oral daily  atorvastatin 20 milliGRAM(s) Oral at bedtime  budesonide 160 MICROgram(s)/formoterol 4.5 MICROgram(s) Inhaler 2 Puff(s) Inhalation two times a day  dextrose 5%. 1000 milliLiter(s) (50 mL/Hr) IV Continuous <Continuous>  dextrose 5%. 1000 milliLiter(s) (100 mL/Hr) IV Continuous <Continuous>  dextrose 50% Injectable 25 Gram(s) IV Push once  dextrose 50% Injectable 12.5 Gram(s) IV Push once  dextrose 50% Injectable 25 Gram(s) IV Push once  donepezil 10 milliGRAM(s) Oral at bedtime  furosemide    Tablet 20 milliGRAM(s) Oral daily  glucagon  Injectable 1 milliGRAM(s) IntraMuscular once  heparin   Injectable 5000 Unit(s) SubCutaneous every 12 hours  insulin glargine Injectable (LANTUS) 35 Unit(s) SubCutaneous at bedtime  insulin lispro (ADMELOG) corrective regimen sliding scale   SubCutaneous three times a day before meals  insulin lispro Injectable (ADMELOG) 15 Unit(s) SubCutaneous three times a day before meals  isosorbide   mononitrate ER Tablet (IMDUR) 30 milliGRAM(s) Oral daily  levoFLOXacin IVPB 750 milliGRAM(s) IV Intermittent every 24 hours  losartan 100 milliGRAM(s) Oral daily  methylPREDNISolone sodium succinate Injectable 60 milliGRAM(s) IV Push two times a day  montelukast 10 milliGRAM(s) Oral daily  pantoprazole    Tablet 40 milliGRAM(s) Oral before breakfast  tiotropium 18 MICROgram(s) Capsule 1 Capsule(s) Inhalation daily    MEDICATIONS  (PRN):  dextrose Oral Gel 15 Gram(s) Oral once PRN Blood Glucose LESS THAN 70 milliGRAM(s)/deciliter  meclizine 25 milliGRAM(s) Oral three times a day PRN Dizziness

## 2022-04-29 NOTE — PROGRESS NOTE ADULT - SUBJECTIVE AND OBJECTIVE BOX
VENKATESH RAMACHANDRAN  82y  Female      Patient is a 82y old  Female who presents with a chief complaint of shortness of breath (28 Apr 2022 14:02)      INTERVAL HPI/OVERNIGHT EVENTS:      REVIEW OF SYSTEMS:  CONSTITUTIONAL: No fever, weight loss, or fatigue  EYES: No eye pain, visual disturbances, or discharge  ENMT:  No difficulty hearing, tinnitus, vertigo; No sinus or throat pain  NECK: No pain or stiffness  BREASTS: No pain, masses, or nipple discharge  RESPIRATORY: No cough, wheezing, chills or hemoptysis; No shortness of breath  CARDIOVASCULAR: No chest pain, palpitations, dizziness, or leg swelling  GASTROINTESTINAL: No abdominal or epigastric pain. No nausea, vomiting, or hematemesis; No diarrhea or constipation. No melena or hematochezia.  GENITOURINARY: No dysuria, frequency, hematuria, or incontinence  NEUROLOGICAL: No headaches, memory loss, loss of strength, numbness, or tremors  SKIN: No itching, burning, rashes, or lesions   LYMPH NODES: No enlarged glands  ENDOCRINE: No heat or cold intolerance; No hair loss  MUSCULOSKELETAL: No joint pain or swelling; No muscle, back, or extremity pain  PSYCHIATRIC: No depression, anxiety, mood swings, or difficulty sleeping  HEME/LYMPH: No easy bruising, or bleeding gums  ALLERY AND IMMUNOLOGIC: No hives or eczema  FAMILY HISTORY:  FH: CAD (coronary artery disease)  FATHER      T(C): 36.2 (04-29-22 @ 07:56), Max: 36.7 (04-28-22 @ 23:50)  HR: 89 (04-29-22 @ 07:56) (75 - 89)  BP: 179/66 (04-29-22 @ 08:29) (147/65 - 203/76)  RR: 18 (04-29-22 @ 08:11) (18 - 18)  SpO2: 97% (04-29-22 @ 08:11) (97% - 99%)  Wt(kg): --Vital Signs Last 24 Hrs  T(C): 36.2 (29 Apr 2022 07:56), Max: 36.7 (28 Apr 2022 23:50)  T(F): 97.2 (29 Apr 2022 07:56), Max: 98.1 (28 Apr 2022 23:50)  HR: 89 (29 Apr 2022 07:56) (75 - 89)  BP: 179/66 (29 Apr 2022 08:29) (147/65 - 203/76)  BP(mean): --  RR: 18 (29 Apr 2022 08:11) (18 - 18)  SpO2: 97% (29 Apr 2022 08:11) (97% - 99%)  ACE inhibitors (Unknown)  BENZALKONIUM (Rash)  BETADINE (Rash)  Ceclor (Unknown)  codeine (Unknown)  latex (Unknown)  penicillin (Unknown)  RUBBER GLOVES (Rash)  sulfonamides (Unknown)      PHYSICAL EXAM:  GENERAL: NAD, well-groomed, well-developed  HEAD:  Atraumatic, Normocephalic  EYES: EOMI, PERRLA, conjunctiva and sclera clear  ENMT: No tonsillar erythema, exudates, or enlargement; Moist mucous membranes, Good dentition, No lesions  NECK: Supple, No JVD, Normal thyroid  NERVOUS SYSTEM:  Alert & Oriented X3, Good concentration  CHEST/LUNG: B/l expiratory wheezing, No rales, rhonchi, , or rubs  HEART: Regular rate and rhythm; No murmurs, rubs, or gallops  ABDOMEN: Soft, Nontender, Nondistended; Bowel sounds present  EXTREMITIES:  2+ Peripheral Pulses, No clubbing, cyanosis, or edema  LYMPH: No lymphadenopathy noted  SKIN: No rashes or lesions    Consultant(s) Notes Reviewed:  [x ] YES  [ ] NO  Care Discussed with Consultants/Other Providers [ x] YES  [ ] NO    LABS:      RADIOLOGY & ADDITIONAL TESTS:    Imaging Personally Reviewed:  [ ] YES  [ ] NO  albuterol/ipratropium for Nebulization 3 milliLiter(s) Nebulizer every 4 hours  amLODIPine   Tablet 10 milliGRAM(s) Oral daily  aspirin  chewable 81 milliGRAM(s) Oral daily  atorvastatin 20 milliGRAM(s) Oral at bedtime  budesonide 160 MICROgram(s)/formoterol 4.5 MICROgram(s) Inhaler 2 Puff(s) Inhalation two times a day  dextrose 5%. 1000 milliLiter(s) IV Continuous <Continuous>  dextrose 5%. 1000 milliLiter(s) IV Continuous <Continuous>  dextrose 50% Injectable 25 Gram(s) IV Push once  dextrose 50% Injectable 12.5 Gram(s) IV Push once  dextrose 50% Injectable 25 Gram(s) IV Push once  dextrose Oral Gel 15 Gram(s) Oral once PRN  donepezil 10 milliGRAM(s) Oral at bedtime  furosemide    Tablet 20 milliGRAM(s) Oral daily  glucagon  Injectable 1 milliGRAM(s) IntraMuscular once  heparin   Injectable 5000 Unit(s) SubCutaneous every 12 hours  insulin glargine Injectable (LANTUS) 40 Unit(s) SubCutaneous at bedtime  insulin lispro (ADMELOG) corrective regimen sliding scale   SubCutaneous three times a day before meals  insulin lispro Injectable (ADMELOG) 15 Unit(s) SubCutaneous three times a day before meals  isosorbide   mononitrate ER Tablet (IMDUR) 60 milliGRAM(s) Oral daily  losartan 100 milliGRAM(s) Oral daily  meclizine 25 milliGRAM(s) Oral three times a day PRN  methylPREDNISolone sodium succinate Injectable 60 milliGRAM(s) IV Push two times a day  montelukast 10 milliGRAM(s) Oral daily  pantoprazole    Tablet 40 milliGRAM(s) Oral before breakfast  tiotropium 18 MICROgram(s) Capsule 1 Capsule(s) Inhalation daily      HEALTH ISSUES - PROBLEM Dx:

## 2022-04-29 NOTE — PROGRESS NOTE ADULT - ASSESSMENT
81 y/o female with PMH of CAD s/p stents, non hodgkin lymphoma in remission, lung cancer in remission, carotid endarterectomy, CHF and COPD on 2L NC prn presenting for shortness of breath x 2 days.     #COPD exacerbation (on home O2 2L PRN)  #possible CAP  #Possible acute on chronic HFpEF  CXR (04/26): Left lower lung scarring with cardiomegaly. Posttraumatic change  - Cont solumedrol 60 BID + Duonebs q4h ATC and PRN  - Cont levaquin 750mg qD x5d  - procal 0.1  - Sputum cx if able  - c/w with lasix 20 mg   - F/u TTE    #DM2  #Steroid induced hyperglycemia  - Continue lantus/ lispro 40-10-10-10 (lantus dose increased)  - ISS    #Type II MI   #Hx of CAD s/p PCI x5  #Troponemia  - TWI on EKG  - trops 0.06 > 0.05 > 0.05 > 0.04  - Cardio eval: c/w asa 81mg qd  - TTE  - if echo wnl, consider d/c tele     #HTN  #HLD  - Home meds: lasix 20, imdur 30, losartan 100, norvasc 10  - Cont atorvastatin 20mg qD  # CKD - stable     # NHL: in remission  - patient follows with Dr Hernandez. 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  - s/p 5/5 fractions RT competed 7/12/19.   5/2021 [facility]ID[place]. Repeat CT showed stable findings , 11/21.   - Planned for repeat CT chest  in 6 months, May 2022.   - f/u with DR. Hernandez outpaitnet     #Vertigo  - c/w with meclizine 25 mg tid    #Dementia  - c/w donepezil 10 mg daily     DVT ppx: lovenox  GI ppx: PPI  ambulate as tolerated  FULL CODE              83 y/o female with PMH of CAD s/p stents, non hodgkin lymphoma in remission, lung cancer in remission, carotid endarterectomy, CHF and COPD on 2L NC prn presenting for shortness of breath x 2 days.     #COPD exacerbation (on home O2 2L PRN)  #possible CAP  #Possible acute on chronic HFpEF  CXR (04/26): Left lower lung scarring with cardiomegaly. Posttraumatic change  - Cont solumedrol 60 BID + Duonebs q4h ATC and PRN  - DC levaquin  - procal 0.1  - Sputum cx if able  - c/w with lasix 20 mg   - F/u TTE    #DM2  #Steroid induced hyperglycemia  - Continue lantus/ lispro 40-10-10-10 (lantus dose increased)  - ISS    #Type II MI   #Hx of CAD s/p PCI x5  #Troponemia  Echo WNL, troponins stable  - TWI on EKG  - trops 0.06 > 0.05 > 0.05 > 0.04  - Cardio eval: c/w asa 81mg qd  - DC telemetry    #HTN  #HLD  - Home meds: lasix 20, imdur 30, losartan 100, norvasc 10  - Cont atorvastatin 20mg qD  # CKD - stable     # NHL: in remission  - patient follows with Dr Hernandez. 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  - s/p 5/5 fractions RT competed 7/12/19.   5/2021 S[place]ID[place]. Repeat CT showed stable findings , 11/21.   - Planned for repeat CT chest  in 6 months, May 2022.   - f/u with DR. Hernandez outpaitnet     #Vertigo  - c/w with meclizine 25 mg tid    #Dementia  - c/w donepezil 10 mg daily     DVT ppx: lovenox  GI ppx: PPI  ambulate as tolerated  FULL CODE              81 y/o female with PMH of CAD s/p stents, non hodgkin lymphoma in remission, lung cancer in remission, carotid endarterectomy, CHF and COPD on 2L NC prn presenting for shortness of breath x 2 days.     #COPD exacerbation (on home O2 2L PRN)  #possible CAP  #Possible acute on chronic HFpEF  CXR (04/26): Left lower lung scarring with cardiomegaly. Posttraumatic change  - Cont solumedrol 60 BID + Duonebs q4h ATC and PRN  - DC levaquin  - procal 0.1  - Sputum cx if able  - c/w with lasix 20 mg   - F/u TTE    #DM2  #Steroid induced hyperglycemia  - Continue lantus/ lispro 40-10-10-10 (lantus dose increased)  - ISS    #Type II MI   #Hx of CAD s/p PCI x5  #Troponemia  Echo WNL, troponins stable  - TWI on EKG  - trops 0.06 > 0.05 > 0.05 > 0.04  - Cardio eval: c/w asa 81mg qd  - DC telemetry    #HTN  #HLD  - Home meds: lasix 20, losartan 100, norvasc 10  - Increase imdur to 90mg qD  - Cont atorvastatin 20mg qD    # CKD - stable     # NHL: in remission  - patient follows with Dr Hernandez. 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  - s/p 5/5 fractions RT competed 7/12/19.   5/2021 S[facility]ID[place]. Repeat CT showed stable findings , 11/21.   - Planned for repeat CT chest  in 6 months, May 2022.   - f/u with DR. Hernandez outpaitnet     #Vertigo  - c/w with meclizine 25 mg tid    #Dementia  - c/w donepezil 10 mg daily     DVT ppx: lovenox  GI ppx: PPI  ambulate as tolerated  FULL CODE              83 y/o female with PMH of CAD s/p stents, non hodgkin lymphoma in remission, lung cancer in remission, carotid endarterectomy, CHF and COPD on 2L NC prn presenting for shortness of breath x 2 days.     #COPD exacerbation (on home O2 2L PRN)  #possible CAP  #Possible acute on chronic HFpEF  CXR (04/26): Left lower lung scarring with cardiomegaly. Posttraumatic change  - Cont solumedrol 60 BID + Duonebs q4h ATC and PRN  - DC levaquin  - procal 0.1  - Sputum cx if able  - c/w with lasix 20 mg   - F/u TTE    #DM2  #Steroid induced hyperglycemia  - Continue lantus/ lispro 40-10-10-10 (lantus dose increased)  - ISS    #Lesions to posterior neck  #Hx/o shingles  - Pt endorsing pain to posterior neck similar to past herpetic pain  - Acyclovir 800mg TID for 7 days      #Type II MI   #Hx of CAD s/p PCI x5  #Troponemia  Echo WNL, troponins stable  - TWI on EKG  - trops 0.06 > 0.05 > 0.05 > 0.04  - Cardio eval: c/w asa 81mg qd  - DC telemetry    #HTN  #HLD  - Home meds: lasix 20, losartan 100, norvasc 10  - Increase imdur to 90mg qD  - Cont atorvastatin 20mg qD    # CKD - stable     # NHL: in remission  - patient follows with Dr Hernandez. 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  - s/p 5/5 fractions RT competed 7/12/19.   5/2021 S[place]ID[place]. Repeat CT showed stable findings , 11/21.   - Planned for repeat CT chest  in 6 months, May 2022.   - f/u with DR. Hernandez outpaitnet     #Vertigo  - c/w with meclizine 25 mg tid    #Dementia  - c/w donepezil 10 mg daily     DVT ppx: lovenox  GI ppx: PPI  ambulate as tolerated  FULL CODE              81 y/o female with PMH of CAD s/p stents, non hodgkin lymphoma in remission, lung cancer in remission, carotid endarterectomy, CHF and COPD on 2L NC prn presenting for shortness of breath x 2 days.     #COPD exacerbation (on home O2 2L PRN)  #possible CAP  #Possible acute on chronic HFpEF  CXR (04/26): Left lower lung scarring with cardiomegaly. Posttraumatic change  - Cont solumedrol 60 BID + Duonebs q4h ATC and PRN  - DC levaquin  - procal 0.1  - Sputum cx if able  - c/w with lasix 20 mg   - F/u TTE    #DM2  #Steroid induced hyperglycemia  - Continue lantus/ lispro 40-10-10-10 (lantus dose increased)  - ISS    #Lesions to posterior neck  #Hx/o shingles  - Pt endorsing pain to posterior neck similar to past herpetic pain  - Acyclovir 800mg 5x/d for 7 days      #Type II MI   #Hx of CAD s/p PCI x5  #Troponemia  Echo WNL, troponins stable  - TWI on EKG  - trops 0.06 > 0.05 > 0.05 > 0.04  - Cardio eval: c/w asa 81mg qd  - DC telemetry    #HTN  #HLD  - Home meds: lasix 20, losartan 100, norvasc 10  - Increase imdur to 90mg qD  - Cont atorvastatin 20mg qD    # CKD - stable     # NHL: in remission  - patient follows with Dr Hernandez. 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  - s/p 5/5 fractions RT competed 7/12/19.   5/2021 S[place]ID[facility]. Repeat CT showed stable findings , 11/21.   - Planned for repeat CT chest  in 6 months, May 2022.   - f/u with DR. Hernandez outpaitnet     #Vertigo  - c/w with meclizine 25 mg tid    #Dementia  - c/w donepezil 10 mg daily     DVT ppx: lovenox  GI ppx: PPI  ambulate as tolerated  FULL CODE

## 2022-04-29 NOTE — DISCHARGE NOTE NURSING/CASE MANAGEMENT/SOCIAL WORK - MODE OF TRANSPORTATION
Wheelchair/Stroller Fusiform Excision Additional Text (Leave Blank If You Do Not Want): The margin was drawn around the clinically apparent lesion.  A fusiform shape was then drawn on the skin incorporating the lesion and margins.  Incisions were then made along these lines to the appropriate tissue plane and the lesion was extirpated.

## 2022-04-29 NOTE — DISCHARGE NOTE NURSING/CASE MANAGEMENT/SOCIAL WORK - PATIENT PORTAL LINK FT
You can access the FollowMyHealth Patient Portal offered by [facility] by registering at the following website: [email or web address]. By joining [employer]’s FollowMyHealth portal, you will also be able to view your health information using other applications (apps) compatible with our system.

## 2022-04-29 NOTE — PROGRESS NOTE ADULT - ATTENDING COMMENTS
Pt seen and examined. Currently being treated for acute COPD exacerbation with IV steroids and duonebs. Pt continues to have severe dyspnea on exertion. Will continue IV steroids at current dose. Procalcitonin 0.10, levaquin has been discontinued. OOB to chair today.    Pt endorses neck pain similar to when she had shingles. Some red lesions noted posterior right neck. Will start valacyclovir 1000mg TID x 7 days. Monitor lesions daily. Pulmonary Consult called in to Dr. Feldman's office.    Echo unremarkable, 64% EF, grade 2 diastolic dysfunction, mild pulmonary hypertension. Will dc telemetry.    #Progress Note Handoff  Pending (specify): Cont IV steroids  Family discussion: d/w pt regarding ongoing tx for COPD exacerbation  Disposition: Home Pt seen and examined. Currently being treated for acute COPD exacerbation with IV steroids and duonebs. Pt continues to have severe dyspnea on exertion. Will continue IV steroids at current dose. Procalcitonin 0.10, levaquin has been discontinued. OOB to chair today.     BP high, increased imdur dose from 60mg to 90mg qD.    Pt endorses neck pain similar to when she had shingles. Some red lesions noted posterior right neck. Will start valacyclovir 1000mg TID x 7 days. Monitor lesions daily. Pulmonary Consult called in to Dr. Feldman's office.    Echo unremarkable, 64% EF, grade 2 diastolic dysfunction, mild pulmonary hypertension. Will dc telemetry.    #Progress Note Handoff  Pending (specify): Cont IV steroids  Family discussion: d/w pt regarding ongoing tx for COPD exacerbation  Disposition: Home Pt seen and examined. Currently being treated for acute COPD exacerbation with IV steroids and duonebs. Pt continues to have severe dyspnea on exertion. Will continue IV steroids at current dose. Procalcitonin 0.10, levaquin has been discontinued. OOB to chair today. Pulmonary Consult called in to Dr. Feldman's office.    BP high, increased imdur dose from 60mg to 90mg qD.    Pt endorses neck pain similar to when she had shingles. Some red lesions noted posterior right neck. Will start acyclovir 800 5 times a day. Monitor lesions daily.     Echo unremarkable, 64% EF, grade 2 diastolic dysfunction, mild pulmonary hypertension. Will dc telemetry.    #Progress Note Handoff  Pending (specify): Cont IV steroids  Family discussion: d/w pt regarding ongoing tx for COPD exacerbation  Disposition: Home

## 2022-04-29 NOTE — DISCHARGE NOTE NURSING/CASE MANAGEMENT/SOCIAL WORK - NSDCPEFALRISK_GEN_ALL_CORE
For information on Fall & Injury Prevention, visit: [email or web address] OR  [email or web address] OR  https://www.cdc.gov/steadi/patient.html

## 2022-04-30 NOTE — PROGRESS NOTE ADULT - SUBJECTIVE AND OBJECTIVE BOX
VENKATESH RAMACHANDRAN  82y  Female      Patient is a 82y old  Female who presents with a chief complaint of shortness of breath.       INTERVAL HPI/OVERNIGHT EVENTS:      ******************************* REVIEW OF SYSTEMS:**********************************************    All other review of systems negative    *********************** VITALS ******************************************    T(F): 97.5 (04-30-22 @ 05:00)  HR: 82 (04-30-22 @ 05:00) (82 - 98)  BP: 143/69 (04-30-22 @ 05:00) (143/69 - 171/72)  RR: 18 (04-30-22 @ 05:00) (18 - 18)  SpO2: 97% (04-30-22 @ 05:00) (95% - 97%)            ******************************** PHYSICAL EXAM:**************************************************  GENERAL: NAD    PSYCH: no agitation, baseline mentation  HEENT:     NERVOUS SYSTEM:  Alert & Oriented X3,   PULMONARY: DORA, CTA    CARDIOVASCULAR: S1S2 RRR    GI: Soft, NT, ND; BS present.    EXTREMITIES:  2+ Peripheral Pulses, No clubbing, cyanosis, or edema    LYMPH: No lymphadenopathy noted    SKIN: No rashes or lesions      **************************** LABS *******************************************************                          11.0   9.54  )-----------( 217      ( 30 Apr 2022 11:00 )             34.3     04-30    141  |  98  |  35<H>  ----------------------------<  408<H>  3.6   |  27  |  1.5    Ca    9.2      30 Apr 2022 11:00  Mg     1.8     04-30    TPro  6.0  /  Alb  4.0  /  TBili  0.6  /  DBili  x   /  AST  23  /  ALT  33  /  AlkPhos  70  04-30          Lactate Trend        CAPILLARY BLOOD GLUCOSE      POCT Blood Glucose.: 320 mg/dL (30 Apr 2022 12:35)          **************************Active Medications *******************************************  ACE inhibitors (Unknown)  BENZALKONIUM (Rash)  BETADINE (Rash)  Ceclor (Unknown)  codeine (Unknown)  latex (Unknown)  penicillin (Unknown)  RUBBER GLOVES (Rash)  sulfonamides (Unknown)      acetaminophen     Tablet .. 650 milliGRAM(s) Oral every 6 hours PRN  acyclovir   Oral Tab/Cap 800 milliGRAM(s) Oral five times a day  albuterol/ipratropium for Nebulization 3 milliLiter(s) Nebulizer every 4 hours  amLODIPine   Tablet 10 milliGRAM(s) Oral daily  aspirin  chewable 81 milliGRAM(s) Oral daily  atorvastatin 20 milliGRAM(s) Oral at bedtime  budesonide 160 MICROgram(s)/formoterol 4.5 MICROgram(s) Inhaler 2 Puff(s) Inhalation two times a day  dextrose 5%. 1000 milliLiter(s) IV Continuous <Continuous>  dextrose 5%. 1000 milliLiter(s) IV Continuous <Continuous>  dextrose 50% Injectable 25 Gram(s) IV Push once  dextrose 50% Injectable 12.5 Gram(s) IV Push once  dextrose 50% Injectable 25 Gram(s) IV Push once  dextrose Oral Gel 15 Gram(s) Oral once PRN  donepezil 10 milliGRAM(s) Oral at bedtime  furosemide    Tablet 20 milliGRAM(s) Oral daily  glucagon  Injectable 1 milliGRAM(s) IntraMuscular once  heparin   Injectable 5000 Unit(s) SubCutaneous every 12 hours  insulin glargine Injectable (LANTUS) 40 Unit(s) SubCutaneous at bedtime  insulin lispro (ADMELOG) corrective regimen sliding scale   SubCutaneous three times a day before meals  insulin lispro Injectable (ADMELOG) 15 Unit(s) SubCutaneous three times a day before meals  isosorbide   mononitrate ER Tablet (IMDUR) 90 milliGRAM(s) Oral daily  losartan 100 milliGRAM(s) Oral daily  meclizine 25 milliGRAM(s) Oral three times a day PRN  methylPREDNISolone sodium succinate Injectable 60 milliGRAM(s) IV Push two times a day  montelukast 10 milliGRAM(s) Oral daily  pantoprazole    Tablet 40 milliGRAM(s) Oral before breakfast  tiotropium 18 MICROgram(s) Capsule 1 Capsule(s) Inhalation daily  traMADol 50 milliGRAM(s) Oral three times a day PRN      ***************************************************  RADIOLOGY & ADDITIONAL TESTS:    Imaging Personally Reviewed:  [ ] YES  [ ] NO    HEALTH ISSUES - PROBLEM Dx:

## 2022-04-30 NOTE — PROGRESS NOTE ADULT - ASSESSMENT
81 y/o female with PMH of CAD s/p stents, non hodgkin lymphoma in remission, lung cancer in remission, carotid endarterectomy, CHF and COPD on 2L NC prn presenting for shortness of breath x 2 days.     #COPD exacerbation (on home O2 2L PRN)  #possible CAP  #Possible acute on chronic HFpEF  CXR (04/26): Left lower lung scarring with cardiomegaly. Posttraumatic change  Procalcitonin 0.10  - On  solumedrol 60 BID + Duonebs q4h ATC and PRN    Will switch to PO Prednisone as wheezing cleared.   - c/w with lasix 20 mg  -  repeat echo >>>GII DD    #DM2  - Continue lantus/lispro 30-10-10-10  - ISS    #TWI on EKG  #Hx of CAD s/p PCI x5  #Troponemia  Troponins stable at 0.05  - Cardio eval pending  - Monitor on telemetry until echo    # HTN - uncontrolled on admission (227/95), now improved  #HLD  - Home meds: lasix 20,  losartan 100, norvasc 10  - Increase imdur 30 to 60  - Cont atorvastatin 20mg qD  # CKD - stable     # NHL: in remission  - patient follows with Dr Hernandez. 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  - s/p 5/5 fractions RT competed 7/12/19.   5/2021 SONIDO. Repeat CT showed stable findings , 11/21.   - Planned for repeat CT chest  in 6 months, May 2022.   - f/u with DR. Hernandez outpaitnet     #Vertigo  - c/w with meclizine 25 mg tid    #Dementia  - c/w donepezil 10 mg daily     DVT PPX heparin    #Progress Note Handoff  Pending (specify): PT  Family discussion: d/w pt regarding management of COPD exacerbation  Disposition: Home.

## 2022-05-01 NOTE — PHYSICAL THERAPY INITIAL EVALUATION ADULT - PERTINENT HX OF CURRENT PROBLEM, REHAB EVAL
Pt is an 81 y/o female with PMH of CAD s/p stents, non hodgkin lymphoma in remission, lung cancer in remission, carotid endarterectomy, CHF and COPD on 2L NC prn presenting for shortness of breath x 2 days. Has been using 2L NC more frequently at home.

## 2022-05-01 NOTE — PHYSICAL THERAPY INITIAL EVALUATION ADULT - IMPAIRMENTS CONTRIBUTING TO GAIT DEVIATIONS, PT EVAL
pt. unable to ambulate further due to dizziness and BLE weakness/impaired balance/decreased strength

## 2022-05-01 NOTE — PHYSICAL THERAPY INITIAL EVALUATION ADULT - LEVEL OF INDEPENDENCE: GAIT, REHAB EVAL
minimum assist (75% patients effort) Tarsorrhaphy Text: A tarsorrhaphy was performed using Frost sutures.

## 2022-05-01 NOTE — PHYSICAL THERAPY INITIAL EVALUATION ADULT - GENERAL OBSERVATIONS, REHAB EVAL
Pt. encountered alert and NAD, supine in bed (+)O2 NC 2L, agreeable to PT IE. Pt. left as found, supine in bed (+)alarms (+)Call bell in reach (+)O2 NC 2L, NAD

## 2022-05-01 NOTE — PROGRESS NOTE ADULT - SUBJECTIVE AND OBJECTIVE BOX
VENKATESH RAMACHANDRAN  82y  Female      Patient is a 82y old  Female who presents with a chief complaint of shortness of breath.       INTERVAL HPI/OVERNIGHT EVENTS:      ******************************* REVIEW OF SYSTEMS:**********************************************  All other review of systems negative    *********************** VITALS ******************************************    T(F): 97.6 (05-01-22 @ 05:00)  HR: 93 (05-01-22 @ 09:25) (76 - 93)  BP: 134/64 (05-01-22 @ 09:25) (134/64 - 196/83)  RR: 19 (05-01-22 @ 05:00) (18 - 19)  SpO2: 94% (05-01-22 @ 09:25) (94% - 95%)            ******************************** PHYSICAL EXAM:**************************************************  GENERAL: NAD    PSYCH: no agitation, baseline mentation  HEENT:     NERVOUS SYSTEM:  Alert & Oriented X3,     PULMONARY: DORA, CTA    CARDIOVASCULAR: S1S2 RRR    GI: Soft, NT, ND; BS present.    EXTREMITIES:  2+ Peripheral Pulses, No clubbing, cyanosis, or edema    LYMPH: No lymphadenopathy noted    SKIN: No rashes or lesions      **************************** LABS *******************************************************                          11.0   9.54  )-----------( 217      ( 30 Apr 2022 11:00 )             34.3     05-01    149<H>  |  101  |  39<H>  ----------------------------<  68<L>  2.9<L>   |  32  |  1.4    Ca    8.9      01 May 2022 07:18  Mg     1.8     05-01    TPro  5.6<L>  /  Alb  3.6  /  TBili  0.8  /  DBili  x   /  AST  28  /  ALT  32  /  AlkPhos  62  05-01          Lactate Trend        CAPILLARY BLOOD GLUCOSE      POCT Blood Glucose.: 465 mg/dL (01 May 2022 12:36)          **************************Active Medications *******************************************  ACE inhibitors (Unknown)  BENZALKONIUM (Rash)  BETADINE (Rash)  Ceclor (Unknown)  codeine (Unknown)  latex (Unknown)  penicillin (Unknown)  RUBBER GLOVES (Rash)  sulfonamides (Unknown)      acetaminophen     Tablet .. 650 milliGRAM(s) Oral every 6 hours PRN  acyclovir   Oral Tab/Cap 800 milliGRAM(s) Oral five times a day  albuterol/ipratropium for Nebulization 3 milliLiter(s) Nebulizer every 4 hours  amLODIPine   Tablet 10 milliGRAM(s) Oral daily  aspirin  chewable 81 milliGRAM(s) Oral daily  atorvastatin 20 milliGRAM(s) Oral at bedtime  budesonide 160 MICROgram(s)/formoterol 4.5 MICROgram(s) Inhaler 2 Puff(s) Inhalation two times a day  dextrose 5%. 1000 milliLiter(s) IV Continuous <Continuous>  dextrose 5%. 1000 milliLiter(s) IV Continuous <Continuous>  dextrose 50% Injectable 25 Gram(s) IV Push once  dextrose 50% Injectable 12.5 Gram(s) IV Push once  dextrose 50% Injectable 25 Gram(s) IV Push once  dextrose Oral Gel 15 Gram(s) Oral once PRN  donepezil 10 milliGRAM(s) Oral at bedtime  furosemide    Tablet 20 milliGRAM(s) Oral daily  glucagon  Injectable 1 milliGRAM(s) IntraMuscular once  heparin   Injectable 5000 Unit(s) SubCutaneous every 12 hours  insulin glargine Injectable (LANTUS) 40 Unit(s) SubCutaneous at bedtime  insulin lispro (ADMELOG) corrective regimen sliding scale   SubCutaneous three times a day before meals  insulin lispro Injectable (ADMELOG) 15 Unit(s) SubCutaneous three times a day before meals  isosorbide   mononitrate ER Tablet (IMDUR) 90 milliGRAM(s) Oral daily  losartan 100 milliGRAM(s) Oral daily  meclizine 25 milliGRAM(s) Oral three times a day PRN  montelukast 10 milliGRAM(s) Oral daily  pantoprazole    Tablet 40 milliGRAM(s) Oral before breakfast  predniSONE   Tablet 50 milliGRAM(s) Oral daily  tiotropium 18 MICROgram(s) Capsule 1 Capsule(s) Inhalation daily  traMADol 50 milliGRAM(s) Oral three times a day PRN      ***************************************************  RADIOLOGY & ADDITIONAL TESTS:    Imaging Personally Reviewed:  [ ] YES  [ ] NO    HEALTH ISSUES - PROBLEM Dx:

## 2022-05-01 NOTE — PROGRESS NOTE ADULT - ASSESSMENT
81 y/o female with PMH of CAD s/p stents, non hodgkin lymphoma in remission, lung cancer in remission, carotid endarterectomy, CHF and COPD on 2L NC prn presenting for shortness of breath x 2 days.     #COPD exacerbation (on home O2 2L PRN)  #possible CAP  #Possible acute on chronic HFpEF  CXR (04/26): Left lower lung scarring with cardiomegaly. Posttraumatic change  Procalcitonin 0.10  - On  solumedrol 60 BID + Duonebs q4h ATC and PRN    Will switch to PO Prednisone as wheezing cleared.   - c/w with lasix 20 mg  -  repeat echo >>>GII DD     Will keep K > 4.5   ( Currently 2.9 )    #DM2  - Continue lantus/lispro 30-10-10-10  - ISS    #TWI on EKG  #Hx of CAD s/p PCI x5  #Troponemia  Troponins stable at 0.05  - Cardio eval pending  - Monitor on telemetry until echo    # HTN - uncontrolled on admission (227/95), now improved  #HLD  - Home meds: lasix 20,  losartan 100, norvasc 10  - Increase imdur 30 to 60  - Cont atorvastatin 20mg qD  # CKD - stable     # NHL: in remission  - patient follows with Dr Hernandez. 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  - s/p 5/5 fractions RT competed 7/12/19.   5/2021 SONIDO. Repeat CT showed stable findings , 11/21.   - Planned for repeat CT chest  in 6 months, May 2022.   - f/u with DR. Hernandez outpaitnet     #Vertigo  - c/w with meclizine 25 mg tid    #Dementia  - c/w donepezil 10 mg daily     DVT PPX heparin    #Progress Note Handoff  Pending (specify): PT re assessment   Family discussion: d/w pt regarding management of COPD exacerbation  Disposition: Home vs STR

## 2022-05-01 NOTE — PHYSICAL THERAPY INITIAL EVALUATION ADULT - ADDITIONAL COMMENTS
Pt. reports she lives with her  and daughter in a private house with a stair lift. Pt. reports she uses a Rollator to walk at home without assistance.

## 2022-05-02 NOTE — DISCHARGE NOTE PROVIDER - NSDCFUADDAPPT_GEN_ALL_CORE_FT
APPTS ARE READY TO BE MADE: [x ] YES    Best Family or Patient Contact (if needed):    Additional Information about above appointments (if needed):    1: Cardiology  2: Pulmonology  3:     Other comments or requests:    APPTS ARE READY TO BE MADE: [x ] YES    Best Family or Patient Contact (if needed):    Additional Information about above appointments (if needed):    1: Cardiology  2: Pulmonology  3:     Other comments or requests:   Three attempts were made to reach patient, which have been unsuccessful. Three voicemails have been left 5/2, 5/3 and 5/4. Will await a call back from patient to coordinate follow up care. APPTS ARE READY TO BE MADE: [x ] YES    Best Family or Patient Contact (if needed):    Additional Information about above appointments (if needed):    1: Cardiology  2: Pulmonology  3:     Other comments or requests:   Patient has an appointment tomorrow 5/9/22 11:00am with Dr. Leong.  Patient has an appointment Friday 5/13/22 11:15am with Dr. Fernandes.  Patient will be calling Dr. Feldman, who is her pulmonary disease provider, tomorrow to schedule an appointment.

## 2022-05-02 NOTE — DISCHARGE NOTE PROVIDER - CARE PROVIDER_API CALL
Bebo Fernandes)  Geriatric Medicine; Internal Medicine  [address] [place] [address]  Phone: [phone]  Fax: [phone]  Established Patient  Follow Up Time: 1 week    Devin Carl)  Cardiovascular Disease; Internal Medicine  [facility] [place] [address]  Phone: [phone]  Fax: [phone]  Follow Up Time: 1 week    Izaiah Lombardi)  Critical Care Medicine; Internal Medicine; Pulmonary Disease; Sleep Medicine  [address] [place] [address]  Phone: [phone]  Fax: [phone]  Follow Up Time: 1 week   Bebo Fernandes)  Geriatric Medicine; Internal Medicine  [address] [place] [address]  Phone: [phone]  Fax: [phone]  Established Patient  Follow Up Time: 1 week    Izaiah Lombardi)  Critical Care Medicine; Internal Medicine; Pulmonary Disease; Sleep Medicine  [address] [place] [address]  Phone: [phone]  Fax: [phone]  Follow Up Time: 1 week    Jairo Leong)  Cardiovascular Disease; Interventional Cardiology  [address] [place] [address]  Phone: [phone]  Fax: [phone]  Established Patient  Follow Up Time: 1 week

## 2022-05-02 NOTE — DISCHARGE NOTE PROVIDER - HOSPITAL COURSE
HPI:  Pt is an 81 y/o female with PMH of CAD s/p stents, non hodgkin lymphoma in remission, lung cancer in remission, carotid endarterectomy, CHF and COPD on 2L NC prn presenting for shortness of breath x 2 days. Has been using 2L NC more frequently at home. + increased productive cough. Denies fever, hemoptysis , congestion, vomiting or diarrhea. Cardiologist Dang    In the ED, vitals BP: 180/74 HR: 97 RR: 28 Spo2 97% on bipap initially and then weaned to 2 L of o2 via NC. Labs significant for hgb 9.6 (baseline 9-10), MCV 85, glucose 267, trop 0.06 (previously 0.03-0.05) BNP 2864 (previously 1k), VBG pH 7.34, Pco2 50, hco3 27. EKG: ischemic changes - biphasic t waves in V1-3 and TWI in leads I, aVL, V5 and V6. CXR: unchanges from prior in 01/2021);  Patient given solumedrol 125, levaquin and albuterol in ED. Patient currently admitted to SDU for further management.  (26 Apr 2022 20:44)    Hospital course:  Patient was admitted for COPD vs Acute HFpEF exacerbation. s/p 4days of levaquin, IV lasix. Now on PO lasix 20 qd, volume overloaded on admission, now resolved. Concern for ACS, EKG showed ischemic changes and trops were elevated(later returned to baseline), no intervention done as per cardio, will follow OP.    Patient is medically stable and ready for discharge.

## 2022-05-02 NOTE — DISCHARGE NOTE PROVIDER - NSDCMRMEDTOKEN_GEN_ALL_CORE_FT
albuterol 90 mcg/inh inhalation aerosol: 2 puff(s) inhaled every 6 hours, As Needed  amLODIPine 10 mg oral tablet: 1 tab(s) orally once a day  aspirin 81 mg oral tablet, chewable: 1 tab(s) orally once a day  atorvastatin 20 mg oral tablet: 1 tab(s) orally once a day  donepezil 10 mg oral tablet: 1 tab(s) orally once a day (at bedtime)  Dulera 100 mcg-5 mcg/inh inhalation aerosol: 2 puff(s) inhaled 2 times a day  inositol 650 mg oral tablet: 2 tab(s) orally once a day  ipratropium-albuterol 0.5 mg-2.5 mg/3 mL inhalation solution: 3 milliliter(s) inhaled every 4 hours  isosorbide mononitrate 30 mg oral tablet, extended release: 1 tab(s) orally once a day (in the morning)  Lasix 20 mg oral tablet: 1 tab(s) orally once a day  losartan 100 mg oral tablet: 1 tab(s) orally once a day  meclizine 25 mg oral tablet: 1 tab(s) orally 3 times a day  montelukast 10 mg oral tablet: 1 tab(s) orally once a day  NovoLOG 100 units/mL subcutaneous solution: 10,7,7  subcutaneous  omeprazole 40 mg oral delayed release capsule: 1 cap(s) orally once a day  Soliqua 100/33 subcutaneous solution: subcutaneous once a day  Spiriva 18 mcg inhalation capsule: 1 cap(s) inhaled once a day   albuterol 90 mcg/inh inhalation aerosol: 2 puff(s) inhaled every 6 hours, As Needed  amLODIPine 10 mg oral tablet: 1 tab(s) orally once a day  aspirin 81 mg oral tablet, chewable: 1 tab(s) orally once a day  atorvastatin 20 mg oral tablet: 1 tab(s) orally once a day  donepezil 10 mg oral tablet: 1 tab(s) orally once a day (at bedtime)  Dulera 100 mcg-5 mcg/inh inhalation aerosol: 2 puff(s) inhaled 2 times a day  inositol 650 mg oral tablet: 2 tab(s) orally once a day  ipratropium-albuterol 0.5 mg-2.5 mg/3 mL inhalation solution: 3 milliliter(s) inhaled every 4 hours  isosorbide mononitrate 30 mg oral tablet, extended release: 3 tab(s) orally once a day  Lasix 20 mg oral tablet: 1 tab(s) orally once a day  losartan 100 mg oral tablet: 1 tab(s) orally once a day  meclizine 25 mg oral tablet: 1 tab(s) orally 3 times a day  montelukast 10 mg oral tablet: 1 tab(s) orally once a day  NovoLOG 100 units/mL subcutaneous solution: 10,7,7  subcutaneous  omeprazole 40 mg oral delayed release capsule: 1 cap(s) orally once a day  predniSONE 10 mg oral tablet: 3 tab(s) orally once a day for 3 days, then 2 tab once a day for 2 days  Soliqua 100/33 subcutaneous solution: 35 unit(s) subcutaneous once a day  Spiriva 18 mcg inhalation capsule: 1 cap(s) inhaled once a day

## 2022-05-02 NOTE — PROGRESS NOTE ADULT - SUBJECTIVE AND OBJECTIVE BOX
SUBJECTIVE:  HPI:  Pt is an 81 y/o female with PMH of CAD s/p stents, non hodgkin lymphoma in remission, lung cancer in remission, carotid endarterectomy, CHF and COPD on 2L NC prn presenting for shortness of breath x 2 days. Has been using 2L NC more frequently at home. + increased productive cough. Denies fever, hemoptysis , congestion, vomiting or diarrhea. Cardiologist Dang    In the ED, vitals BP: 180/74 HR: 97 RR: 28 Spo2 97% on bipap initially and then weaned to 2 L of o2 via NC. Labs significant for hgb 9.6 (baseline 9-10), MCV 85, glucose 267, trop 0.06 (previously 0.03-0.05) BNP 2864 (previously 1k), VBG pH 7.34, Pco2 50, hco3 27. EKG: ischemic changes - biphasic t waves in V1-3 and TWI in leads I, aVL, V5 and V6. CXR: unchanges from prior in 01/2021);  Patient given solumedrol 125, levaquin and albuterol in ED. Patient currently admitted to SDU for further management.  (26 Apr 2022 20:44)      Patient is a 82y old Female who presents with a chief complaint of shortness of breath (01 May 2022 14:34)    Currently admitted to medicine with the primary diagnosis of COPD exacerbation       Today is hospital day 6d.     PAST MEDICAL & SURGICAL HISTORY  MI (myocardial infarction)  s/p 5 stents    HTN (hypertension)    High cholesterol    Heart failure    Non Hodgkin&#x27;s lymphoma  in remission. Follows with Dr Hernandez    PVD (peripheral vascular disease)    Thrombocytopenia    CKD (chronic kidney disease)    Osteoarthritis    Diabetes  on insulin    Coronary artery disease involving native heart without angina pectoris, unspecified vessel or lesion type  s/p 5 stents    History of cholecystectomy    H/O cardiac catheterization  5 STENTS    H/O carotid endarterectomy  RIGHT        ALLERGIES:  ACE inhibitors (Unknown)  BENZALKONIUM (Rash)  BETADINE (Rash)  Ceclor (Unknown)  codeine (Unknown)  latex (Unknown)  penicillin (Unknown)  RUBBER GLOVES (Rash)  sulfonamides (Unknown)    MEDICATIONS:  ACTIVE MEDICATIONS  acetaminophen     Tablet .. 650 milliGRAM(s) Oral every 6 hours PRN  acyclovir   Oral Tab/Cap 800 milliGRAM(s) Oral five times a day  albuterol/ipratropium for Nebulization 3 milliLiter(s) Nebulizer every 4 hours  amLODIPine   Tablet 10 milliGRAM(s) Oral daily  aspirin  chewable 81 milliGRAM(s) Oral daily  atorvastatin 20 milliGRAM(s) Oral at bedtime  budesonide 160 MICROgram(s)/formoterol 4.5 MICROgram(s) Inhaler 2 Puff(s) Inhalation two times a day  dextrose 5%. 1000 milliLiter(s) IV Continuous <Continuous>  dextrose 5%. 1000 milliLiter(s) IV Continuous <Continuous>  dextrose 50% Injectable 25 Gram(s) IV Push once  dextrose 50% Injectable 12.5 Gram(s) IV Push once  dextrose 50% Injectable 25 Gram(s) IV Push once  dextrose Oral Gel 15 Gram(s) Oral once PRN  donepezil 10 milliGRAM(s) Oral at bedtime  furosemide    Tablet 20 milliGRAM(s) Oral daily  glucagon  Injectable 1 milliGRAM(s) IntraMuscular once  heparin   Injectable 5000 Unit(s) SubCutaneous every 12 hours  insulin glargine Injectable (LANTUS) 40 Unit(s) SubCutaneous at bedtime  insulin lispro (ADMELOG) corrective regimen sliding scale   SubCutaneous three times a day before meals  insulin lispro Injectable (ADMELOG) 15 Unit(s) SubCutaneous three times a day before meals  isosorbide   mononitrate ER Tablet (IMDUR) 90 milliGRAM(s) Oral daily  losartan 100 milliGRAM(s) Oral daily  meclizine 25 milliGRAM(s) Oral three times a day PRN  montelukast 10 milliGRAM(s) Oral daily  pantoprazole    Tablet 40 milliGRAM(s) Oral before breakfast  tiotropium 18 MICROgram(s) Capsule 1 Capsule(s) Inhalation daily  traMADol 50 milliGRAM(s) Oral three times a day PRN      VITALS:   T(F): 97  HR: 81  BP: 146/67  RR: 18  SpO2: 96%    LABS:                        11.0   9.54  )-----------( 217      ( 30 Apr 2022 11:00 )             34.3     05-01    149<H>  |  101  |  39<H>  ----------------------------<  68<L>  2.9<L>   |  32  |  1.4    Ca    8.9      01 May 2022 07:18  Mg     1.8     05-01    TPro  5.6<L>  /  Alb  3.6  /  TBili  0.8  /  DBili  x   /  AST  28  /  ALT  32  /  AlkPhos  62  05-01                      PHYSICAL EXAM:  GEN: SOB on minimal ambulation  LUNGS: Clear to auscultation bilaterally   HEART: S1/S2 present.    ABD: Soft, non-tender, non-distended.   EXT: No pedal edema, warm to touch, no discoloration  NEURO: AAOX3        A/P:    81 y/o female with PMH of CAD s/p stents, non hodgkin lymphoma in remission, lung cancer in remission, carotid endarterectomy, CHF and COPD on 2L NC prn presenting for shortness of breath x 2 days.     #COPD exacerbation (on home O2 2L PRN)  #possible CAP- ruled out  #Possible acute on chronic HFpEF  CXR (04/26): Left lower lung scarring with cardiomegaly. Posttraumatic change  Procalcitonin 0.10  - s/p  solumedrol 60 BID, Levaquin 4 day course  - c/w Duonebs q4h ATC and PRN, PO Prednisone   - c/w with lasix 20 mg  - repeat echo >>>GII DD    #DM2  - Continue lantus/lispro 30-10-10-10  - ISS    #TWI on EKG  #Hx of CAD s/p PCI x5  #Troponemia  - Troponins stable at 0.05 and downtrending  - No acute intervention as per cardio, f/u OP    #HTN - controlled  #HLD  - Home meds: lasix 20,  losartan 100, norvasc 10  - Increase imdur 30 to 60  - Cont atorvastatin 20mg qD    # CKD - stable     # NHL: in remission  - patient follows with Dr Hernandez. 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  - s/p 5/5 fractions RT competed 7/12/19.   5/2021 SONIDO. Repeat CT showed stable findings , 11/21.   - Planned for repeat CT chest  in 6 months, May 2022.   - f/u with DR. Hernandez outpaitnet     #Vertigo  - c/w with meclizine 25 mg tid    #Dementia  - c/w donepezil 10 mg daily     #Misc  -DVT prophylaxis: Heparin  -GI prophylaxis: Protonix  -Diet: DASH, Renal  -Code status: Full code  -Activity: IAT  -Dispo: Home    #Handoff: Discharge planning after labs wnl

## 2022-05-02 NOTE — DISCHARGE NOTE PROVIDER - NSDCCPCAREPLAN_GEN_ALL_CORE_FT
Verbal order from ED provider for liter fluid bolus started./      Sly Mccabe RN  10/14/21 [id number]
PRINCIPAL DISCHARGE DIAGNOSIS  Diagnosis: COPD exacerbation  Assessment and Plan of Treatment: Please make sure to take medication as prescribed and follow up with an pulmonologist within 1 week of discharge.  You have heart failure with preserved function. Please take medication as prescribed and follow up with cardiologist within 1 week of discharge  Chronic obstructive pulmonary disease (COPD) is a lung condition in which airflow from the lungs is limited. Causes include smoking, secondhand smoke exposure, genetics, or recurrent infections. Take all medicines (inhaled or pills) as directed by your health care provider. Avoid exposure to irritants such as smoke, chemicals, and fumes that aggravate your breathing.  If you are a smoker, the most important thing that you can do is stop smoking. Continuing to smoke will cause further lung damage and breathing trouble. Ask your health care provider for help with quitting smoking.  SEEK IMMEDIATE MEDICAL CARE IF YOU HAVE ANY OF THE FOLLOWING SYMPTOMS: shortness of breath at rest or when talking, bluish discoloration of lips, skin, fever, worsening cough, unexplained chest pain, or lightheadedness/dizziness.      SECONDARY DISCHARGE DIAGNOSES  Diagnosis: Chronic heart failure with preserved ejection fraction (HFpEF)  Assessment and Plan of Treatment: You have heart failure with preserved function. Please take medication as prescribed and follow up with cardiologist within 1 week of discharge.    Diagnosis: Elevated troponin  Assessment and Plan of Treatment:

## 2022-05-02 NOTE — DISCHARGE NOTE PROVIDER - PROVIDER TOKENS
PROVIDER:[TOKEN:[[id number]],FOLLOWUP:[1 week],ESTABLISHEDPATIENT:[T]],PROVIDER:[TOKEN:[[id number]],FOLLOWUP:[1 week]],PROVIDER:[TOKEN:[[id number]],FOLLOWUP:[1 week]] PROVIDER:[TOKEN:[[id number]],FOLLOWUP:[1 week],ESTABLISHEDPATIENT:[T]],PROVIDER:[TOKEN:[[id number]],FOLLOWUP:[1 week]],PROVIDER:[TOKEN:[[id number]],FOLLOWUP:[1 week],ESTABLISHEDPATIENT:[T]]

## 2022-05-02 NOTE — PROGRESS NOTE ADULT - ATTENDING COMMENTS
Patient is an 81 y/o female with PMH of CAD s/p stents, non hodgkin lymphoma in remission, lung cancer in remission, carotid endarterectomy, CHF and COPD on 2L NC prn presenting for shortness of breath x 2 days.     #COPD exacerbation (on home O2 2L PRN)  #no pneumonia   #Possible acute on chronic HFpEF  CXR (04/26): Left lower lung scarring with cardiomegaly. Posttraumatic change  Procalcitonin 0.10  - was initially tx. with IV steroids , now tapered to Prednisone 30 mg po once daily , continue nebs tx.  - c/w with lasix 20 mg  -  repeat echo >>>GII DD       #DM2  - Continue lantus/lispro  - ISS    #TWI on EKG  #Hx of CAD s/p PCI x5  #Troponemia  Troponins stable at 0.05  - Cardio outpatient f/up      # HTN   #HLD  - Home meds: lasix 20,  losartan 100, norvasc 10  - Increase imdur 30 to 60  - Cont atorvastatin 20mg qD  # CKD - stable     # NHL: in remission  - patient follows with Dr Hernandez. 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  - s/p 5/5 fractions RT competed 7/12/19.   5/2021 SONIDO. Repeat CT showed stable findings , 11/21.   - Planned for repeat CT chest  in 6 months, May 2022.   - f/u with DR. Hernandez outpatient     #Vertigo  - c/w with meclizine 25 mg tid    #Dementia  - c/w donepezil 10 mg daily     DVT PPX heparin    #Progress Note Handoff: Patient was medically optimized, remains weak, ambulated 5 feet with assistance, f/up Pulmonary team post d/c home.  Family discussion: will f/up with family if they are interested for pt. to be d/c to [facility] Disposition: Home___vs STR.    Total time spent to complete patient's bedside assessment, review medical chart, discuss medical plan of care with covering medical team was more than 35 minutes

## 2022-05-03 NOTE — PROGRESS NOTE ADULT - ATTENDING COMMENTS
Patient is an 83 y/o female with PMH of CAD s/p stents, non hodgkin lymphoma in remission, lung cancer in remission, carotid endarterectomy, CHF and COPD on 2L NC prn presenting for shortness of breath x 2 days.     #COPD exacerbation (on home O2 2L PRN)  #no pneumonia   #Possible acute on chronic HFpEF  CXR (04/26): Left lower lung scarring with cardiomegaly. Posttraumatic change  Procalcitonin 0.10  - was initially tx. with IV steroids , now tapered to Prednisone 30 mg po once daily x 5 days , continue nebs tx.  - c/w with lasix 20 mg  -  repeat echo >>>GII DD  -Patient was instructed to f/up with Pulmonary specialist post d/c home.       #DM2  - Continue lantus/lispro  - ISS    #TWI on EKG  #Hx of CAD s/p PCI x5  #Troponemia  Troponins stable at 0.05  - Cardio outpatient f/up      # HTN   #HLD  - Home meds: lasix 20,  losartan 100, norvasc 10  - Increased imdur 30 to 60  - Cont atorvastatin 20mg qD  # CKD - stable     # NHL: in remission  - patient follows with Dr Hernandez. 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  - s/p 5/5 fractions RT competed 7/12/19.   5/2021 SONIDO. Repeat CT showed stable findings , 11/21.   - Planned for repeat CT chest  in 6 months, May 2022.   - f/u with DR. Hernandez outpatient     #Vertigo  - c/w with meclizine 25 mg tid    #Dementia  - c/w donepezil 10 mg daily     DVT PPX heparin    #Progress Note Handoff: Patient was medically optimized, remains weak, ambulated 5 feet with assistance, f/up Pulmonary team post d/c home.  Family discussion: Patient verbalized good understanding of discharge instructions and agreed with discharge plan.  d/c plan: d/c home with home care today      Total time spent to complete patient's bedside assessment, review medical chart, discuss discharge  plan of care with covering medical team/case management was more than 35 minutes .

## 2022-05-03 NOTE — PROGRESS NOTE ADULT - PROVIDER SPECIALTY LIST ADULT
Hospitalist
Hospitalist
Internal Medicine
Pulmonology
Hospitalist
Internal Medicine

## 2022-05-03 NOTE — PROGRESS NOTE ADULT - SUBJECTIVE AND OBJECTIVE BOX
SUBJECTIVE:  HPI:  Pt is an 83 y/o female with PMH of CAD s/p stents, non hodgkin lymphoma in remission, lung cancer in remission, carotid endarterectomy, CHF and COPD on 2L NC prn presenting for shortness of breath x 2 days. Has been using 2L NC more frequently at home. + increased productive cough. Denies fever, hemoptysis , congestion, vomiting or diarrhea. Cardiologist Dang    In the ED, vitals BP: 180/74 HR: 97 RR: 28 Spo2 97% on bipap initially and then weaned to 2 L of o2 via NC. Labs significant for hgb 9.6 (baseline 9-10), MCV 85, glucose 267, trop 0.06 (previously 0.03-0.05) BNP 2864 (previously 1k), VBG pH 7.34, Pco2 50, hco3 27. EKG: ischemic changes - biphasic t waves in V1-3 and TWI in leads I, aVL, V5 and V6. CXR: unchanges from prior in 01/2021);  Patient given solumedrol 125, levaquin and albuterol in ED. Patient currently admitted to SDU for further management.  (26 Apr 2022 20:44)      Patient is a 82y old Female who presents with a chief complaint of shortness of breath (02 May 2022 10:32)    Currently admitted to medicine with the primary diagnosis of COPD exacerbation       Today is hospital day 7d.     PAST MEDICAL & SURGICAL HISTORY  MI (myocardial infarction)  s/p 5 stents    HTN (hypertension)    High cholesterol    Heart failure    Non Hodgkin&#x27;s lymphoma  in remission. Follows with Dr Hernandez    PVD (peripheral vascular disease)    Thrombocytopenia    CKD (chronic kidney disease)    Osteoarthritis    Diabetes  on insulin    Coronary artery disease involving native heart without angina pectoris, unspecified vessel or lesion type  s/p 5 stents    History of cholecystectomy    H/O cardiac catheterization  5 STENTS    H/O carotid endarterectomy  RIGHT        ALLERGIES:  ACE inhibitors (Unknown)  BENZALKONIUM (Rash)  BETADINE (Rash)  Ceclor (Unknown)  codeine (Unknown)  latex (Unknown)  penicillin (Unknown)  RUBBER GLOVES (Rash)  sulfonamides (Unknown)    MEDICATIONS:  ACTIVE MEDICATIONS  acetaminophen     Tablet .. 650 milliGRAM(s) Oral every 6 hours PRN  acyclovir   Oral Tab/Cap 800 milliGRAM(s) Oral five times a day  albuterol/ipratropium for Nebulization 3 milliLiter(s) Nebulizer every 4 hours  amLODIPine   Tablet 10 milliGRAM(s) Oral daily  aspirin  chewable 81 milliGRAM(s) Oral daily  atorvastatin 20 milliGRAM(s) Oral at bedtime  budesonide 160 MICROgram(s)/formoterol 4.5 MICROgram(s) Inhaler 2 Puff(s) Inhalation two times a day  dextrose 5%. 1000 milliLiter(s) IV Continuous <Continuous>  dextrose 5%. 1000 milliLiter(s) IV Continuous <Continuous>  dextrose 50% Injectable 25 Gram(s) IV Push once  dextrose 50% Injectable 12.5 Gram(s) IV Push once  dextrose 50% Injectable 25 Gram(s) IV Push once  dextrose Oral Gel 15 Gram(s) Oral once PRN  donepezil 10 milliGRAM(s) Oral at bedtime  furosemide    Tablet 20 milliGRAM(s) Oral daily  glucagon  Injectable 1 milliGRAM(s) IntraMuscular once  heparin   Injectable 5000 Unit(s) SubCutaneous every 12 hours  insulin glargine Injectable (LANTUS) 40 Unit(s) SubCutaneous at bedtime  insulin lispro (ADMELOG) corrective regimen sliding scale   SubCutaneous three times a day before meals  insulin lispro Injectable (ADMELOG) 15 Unit(s) SubCutaneous three times a day before meals  isosorbide   mononitrate ER Tablet (IMDUR) 90 milliGRAM(s) Oral daily  losartan 100 milliGRAM(s) Oral daily  meclizine 25 milliGRAM(s) Oral three times a day PRN  montelukast 10 milliGRAM(s) Oral daily  pantoprazole    Tablet 40 milliGRAM(s) Oral before breakfast  potassium chloride   Powder 40 milliEquivalent(s) Oral once  predniSONE   Tablet 30 milliGRAM(s) Oral daily  tiotropium 18 MICROgram(s) Capsule 1 Capsule(s) Inhalation daily  traMADol 50 milliGRAM(s) Oral three times a day PRN      VITALS:   T(F): 97.2  HR: 80  BP: 136/60  RR: 16  SpO2: 97%    LABS:                        11.2   10.90 )-----------( 173      ( 03 May 2022 08:11 )             33.4     05-03    144  |  100  |  48<H>  ----------------------------<  94  3.3<L>   |  29  |  1.5    Ca    9.0      03 May 2022 08:11  Mg     1.8     05-02    TPro  5.8<L>  /  Alb  3.8  /  TBili  0.9  /  DBili  x   /  AST  26  /  ALT  35  /  AlkPhos  70  05-02                      PHYSICAL EXAM:  GEN: SOB on minimal ambulation  LUNGS: Clear to auscultation bilaterally   HEART: S1/S2 present.    ABD: Soft, non-tender, non-distended.   EXT: No pedal edema, warm to touch, no discoloration  NEURO: AAOX3        A/P:    83 y/o female with PMH of CAD s/p stents, non hodgkin lymphoma in remission, lung cancer in remission, carotid endarterectomy, CHF and COPD on 2L NC prn presenting for shortness of breath x 2 days.     #COPD exacerbation (on home O2 2L PRN)- resolved  #possible CAP- ruled out  #Possible acute on chronic HFpEF  CXR (04/26): Left lower lung scarring with cardiomegaly. Posttraumatic change  Procalcitonin 0.10  - s/p  solumedrol 60 BID, Levaquin 4 day course  - c/w Duonebs q4h ATC and PRN, PO Prednisone   - c/w with lasix 20 mg  - repeat echo >>>GII DD    #DM2  - Continue lantus/lispro 30-10-10-10  - ISS    #TWI on EKG  #Hx of CAD s/p PCI x5  #Troponemia  - Troponins stable at 0.05 and downtrending  - No acute intervention as per cardio, f/u OP    #HTN - controlled  #HLD  - Home meds: lasix 20,  losartan 100, norvasc 10  - Increase imdur 30 to 60  - Cont atorvastatin 20mg qD    # CKD - stable     # NHL: in remission  - patient follows with Dr Hernandez. 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  - s/p 5/5 fractions RT competed 7/12/19.   5/2021 SONIDO. Repeat CT showed stable findings , 11/21.   - Planned for repeat CT chest  in 6 months, May 2022.   - f/u with DR. Hernandez outpaitnet     #Vertigo  - c/w with meclizine 25 mg tid    #Dementia  - c/w donepezil 10 mg daily     #Misc  -DVT prophylaxis: Heparin  -GI prophylaxis: Protonix  -Diet: DASH, Renal  -Code status: Full code  -Activity: IAT  -Dispo: Home    #Handoff: Discharge planning after labs wnl

## 2022-05-04 NOTE — CHART NOTE - NSCHARTNOTEFT_GEN_A_CORE
Left 2nd message for patient in regards to follow up care with callback information.
Left a message for patient in regards to follow up care with callback information.
Transfer Note: Downgrade    Transfer from: SDU  Transfer to: Med-tele       HPI:   Pt is an 81 y/o female with PMH of CAD s/p stents, non hodgkin lymphoma in remission, lung cancer in remission, carotid endarterectomy, CHF and COPD on 2L NC prn presenting for shortness of breath x 2 days. Has been using 2L NC more frequently at home. + increased productive cough. Denies fever, hemoptysis , congestion, vomiting or diarrhea. Cardiologist Dang    In the ED, vitals BP: 180/74 HR: 97 RR: 28 Spo2 97% on bipap initially and then weaned to 2 L of o2 via NC. Labs significant for hgb 9.6 (baseline 9-10), MCV 85, glucose 267, trop 0.06 (previously 0.03-0.05) BNP 2864 (previously 1k), VBG pH 7.34, Pco2 50, hco3 27. EKG: ischemic changes - biphasic t waves in V1-3 and TWI in leads I, aVL, V5 and V6. CXR: unchanges from prior in 01/2021);  Patient given solumedrol 125, levaquin and albuterol in ED. Patient currently admitted to SDU for further management.    Subjective 4/28:  Saw patient at bedside today. Patient says SOB has improved but still feels SOB when moving. Otherwise feels well and has no other complaints. Denies CP or wheezing.    Hospital course:   Pulm consulted for COPD. Patient admitted to stepdown. Pulm followed up and said to downgrade to medicine but to continue on solumedrol 60mg IV BID for now.   Patient had TWI and possible component of HFpEF? Cardio saw patient and said likely Type II MI but to restart baby aspirin and get echo.   Possible component of CAP too? Procal 0.1. Will continue levofloxacin course for now.    Vital Signs Last 24 Hrs  T(C): 36.6 (28 Apr 2022 09:42), Max: 36.8 (27 Apr 2022 15:29)  T(F): 97.8 (28 Apr 2022 09:42), Max: 98.2 (27 Apr 2022 15:29)  HR: 90 (28 Apr 2022 09:42) (71 - 95)  BP: 178/68 (28 Apr 2022 09:42) (158/72 - 183/78)  BP(mean): --  RR: 20 (28 Apr 2022 09:42) (20 - 22)  SpO2: 96% (28 Apr 2022 09:42) (96% - 100%)      Physical Exam:   General: NAD, awake and alert  Cardiac: RRR, no murmur, no rub, no gallop  Respiratory: Good air exchange bilaterally, no wheezes, no crackles  GI: Abdomen nondistended, nontender, bowel sounds present  Neuro: AAOx3, no tremors, no fasciculations     LABS:   CARDIAC MARKERS ( 27 Apr 2022 07:00 )  x     / 0.04 ng/mL / x     / x     / x      CARDIAC MARKERS ( 27 Apr 2022 00:30 )  x     / 0.05 ng/mL / x     / x     / x      CARDIAC MARKERS ( 26 Apr 2022 20:00 )  x     / 0.05 ng/mL / x     / x     / x      CARDIAC MARKERS ( 26 Apr 2022 15:48 )  x     / 0.06 ng/mL / x     / x     / x                                  9.2    6.83  )-----------( 163      ( 28 Apr 2022 06:10 )             28.6       04-28    142  |  103  |  33<H>  ----------------------------<  381<H>  4.1   |  28  |  1.3    Ca    8.7      28 Apr 2022 06:10  Mg     1.9     04-28    TPro  5.6<L>  /  Alb  3.6  /  TBili  0.4  /  DBili  x   /  AST  25  /  ALT  36  /  AlkPhos  59  04-28      ASSESSMENT & PLAN:  81 y/o female with PMH of CAD s/p stents, non hodgkin lymphoma in remission, lung cancer in remission, carotid endarterectomy, CHF and COPD on 2L NC prn presenting for shortness of breath x 2 days.     #COPD exacerbation (on home O2 2L PRN)  #possible CAP  #Possible acute on chronic HFpEF  CXR (04/26): Left lower lung scarring with cardiomegaly. Posttraumatic change  - Cont solumedrol 60 BID + Duonebs q4h ATC and PRN  - Cont levaquin 750mg qD x5d  - procal 0.1  - Sputum cx if able  - c/w with lasix 20 mg   - downgrade from stepdown to med-tele     #DM2  - Continue lantus/lispro 30-10-10-10  - ISS    #Type II MI   #Hx of CAD s/p PCI x5  #Troponemia  - TWI on EKG  - trops 0.06 > 0.05 > 0.05 > 0.04  - Cardio eval: c/w asa 81mg qd  - get echo   - if echo wnl, consider d/c tele     #HTN  #HLD  - Home meds: lasix 20, imdur 30, losartan 100, norvasc 10  - Cont atorvastatin 20mg qD  # CKD - stable     # NHL: in remission  - patient follows with Dr Hernandez. 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  - s/p 5/5 fractions RT competed 7/12/19.   5/2021 SONIDO. Repeat CT showed stable findings , 11/21.   - Planned for repeat CT chest  in 6 months, May 2022.   - f/u with DR. Hernandez outpaitnet     #Vertigo  - c/w with meclizine 25 mg tid    #Dementia  - c/w donepezil 10 mg daily     DVT ppx: lovenox  GI ppx: PPI  ambulate as tolerated  Dispo: downgrade to med-tele from SDU  FULL CODE     Follow up:   - wean O2   - f/u pulm tomorrow regarding steroid course  - get echo, f/u cardio after echo  - repeat CXR in am
Left a third message for patient in regards to follow up care with call back information.

## 2022-05-16 PROBLEM — E11.39 TYPE 2 DIABETES MELLITUS WITH OTHER OPHTHALMIC COMPLICATION: Status: ACTIVE | Noted: 2022-01-01

## 2022-05-16 PROBLEM — E11.9 TYPE 2 DIABETES MELLITUS: Status: ACTIVE | Noted: 2017-01-17

## 2022-05-16 PROBLEM — J44.9 CHRONIC OBSTRUCTIVE PULMONARY DISEASE, UNSPECIFIED COPD TYPE: Status: ACTIVE | Noted: 2022-01-01

## 2022-05-16 PROBLEM — I50.9 CONGESTIVE HEART FAILURE: Status: ACTIVE | Noted: 2022-01-01

## 2022-05-16 NOTE — COUNSELING
[de-identified] : Pt was informed about CN’s role/ [facility] program and overview of transitional care reviewed with patient. Pt educated on topics of importance such as compliance with prescribed medication regimen, COPD action plan and escalation process, adequate hydration, and proper diet. Pt encouraged calling CN with any issues, concerns or questions, also educated to notify CN if experiencing CP, SOB, cough, increased mucus production, increased use of rescue inhaler, fever, chills, fatigue, “chest cold symptoms” dizziness, lightheadedness, n/v/d/c, swelling to extremities and/or any signs of COPD exacerbation/flare as reviewed. Reassurance provided. Will continue to monitor\par \par

## 2022-05-16 NOTE — HISTORY OF PRESENT ILLNESS
[Home] : at home, [unfilled] , at the time of the visit. [Other Location: e.g. Home (Enter Location, City,State)___] : at [unfilled] [Other:____] : [unfilled] [Verbal consent obtained from patient] : the patient, [unfilled] [FreeTextEntry1] : follow up hospital discharge CHF / copd [de-identified] : Patient is a 81 y/o f enrolled in the [facility] with aPMH of COPD (on 02) CAD, and CHF  observed via tele health alert in NAD denies c/p, sob, cough and wt gain compliant with meds and PCP /cardio follow up, as per HC TEAGAN Monique VSS , PE normal.\par \par Hospital Course from [facility]\par Pt is an 81 y/o female with PMH of CAD s/p stents, non hodgkin lymphoma in remission, lung cancer in remission, carotid endarterectomy, CHF and COPD on 2L NC prn presenting for shortness of breath x 2 days. Has been using 2L NC more frequently at home. + increased productive cough. Denies fever, hemoptysis , congestion, vomiting or diarrhea. Cardiologist Dang\yonathan In the ED, vitals BP: 180/74 HR: 97 RR: 28 Spo2 97% on bipap initially and then weaned to 2 L of o2 via NC. Labs significant for hgb 9.6 (baseline 9-10), MCV 85, glucose 267, trop 0.06 (previously 0.03-0.05) BNP 2864 (previously 1k), VBG pH 7.34, Pco2 50, hco3 27. EKG: ischemic changes - biphasic t waves in V1-3 and TWI in leads I, aVL, V5 and V6. CXR: unchanges from prior in 01/2021); Patient given solumedrol 125, levaquin and albuterol in ED. Patient currently admitted to SDU for further management. (26 Apr 2022 20:44)\par Hospital course:\par Patient was admitted for COPD vs Acute HFpEF exacerbation. s/p 4days of levaquin, IV lasix. Now on PO lasix 20 qd, volume overloaded on admission, now resolved. Concern for ACS, EKG showed ischemic changes and trops were elevated(later returned to baseline), no intervention done as per cardio, will follow OP.\par Patient is medically stable and ready for discharge

## 2022-05-16 NOTE — PHYSICAL EXAM
[No Acute Distress] : no acute distress [Well-Appearing] : well-appearing [No Respiratory Distress] : no respiratory distress  [No Accessory Muscle Use] : no accessory muscle use [Non-distended] : non-distended [No Focal Deficits] : no focal deficits [Normal Affect] : the affect was normal [Alert and Oriented x3] : oriented to person, place, and time [Normal Insight/Judgement] : insight and judgment were intact

## 2022-05-16 NOTE — PLAN
[FreeTextEntry1] : COPd- c/w 02 supplementation, maintenance inhalers , albuterol prn, prednisone\par chf- low saltt diet , daily weights, c/w diuretic cardio follow up\par dm- low carb diet , bs monitoring , current meds\par HCS via NW\par pcp /cardio/pulmonary follow up \par

## 2022-05-16 NOTE — ASSESSMENT
[FreeTextEntry1] : Patient is a 81 y/o f enrolled in the [facility] with aPMH of COPD (on 02) CAD, and CHF  observed via tele health alert in NAD denies c/p, sob, cough and wt gain compliant with meds and PCP /cardio follow up, as per HC TEAGAN Moinque VSS , PE normal.

## 2022-07-19 NOTE — PHYSICAL THERAPY INITIAL EVALUATION ADULT - TRANSFER SKILLS, REHAB EVAL
PAST SURGICAL HISTORY:  Cataract, right eye     History of D&C     History of left hip replacement     
independent

## 2022-09-03 PROBLEM — D64.9 ANEMIA: Status: ACTIVE | Noted: 2021-01-11

## 2022-09-03 NOTE — REASON FOR VISIT
[Follow-Up Visit] : a follow-up visit for [Lymphoma] : lymphoma [Family Member] : family member [FreeTextEntry2] : Acquired hemolytic anemia; Non-Hodgkin's lymphoma in adult; Stage I adenocarcinoma of lung.

## 2022-09-03 NOTE — HISTORY OF PRESENT ILLNESS
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  She finished it approximately in the beginning of 06/2016.  She also received 4  doses of rituximab therapy.\par \par HISTORY OF PRESENT ILLNESS:  Ms. Blackmon is a very pleasant 85yearold female with multiple medical problems.  She initially was being treated for secondary autoimmune hemolytic anemia in the setting of indolent Bcell nonHodgkin lymphoma, NOS.  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  Her initial PET scan only demonstrated mild splenomegaly.  She required steroids as well, but then when the tapering was attempted, her anemia worsened, and at that point in time, rituximab was initiated. [de-identified] : January 17, 2017\yonathan Sanabria presents for followup today she hasn't seen you since July. She denies having recurrent illnesses. She denies having  fatigue she experienced before. She does have occasional headaches. Blood pressure today is elevated. Otherwise she has no complaints. \par \par 4/24/17\par She is doing well except  for trouble with sugars and BP. She is seeing appropriate specialists. No other complaints CBC from today is WNL.\par \par 7/26/17\par She is doing well. She has a cough with sputum production for now several months. CXR in July was clear. HAd a course of antibiotics that did not work. It may be her COPD. No bleedig, no B symptoms. Usual fatigue. \par \par 10/27/17\par She is doing well except for her diabetic neuropathy in her feet. States her A1C is 6. No bleeding. \par \par 1/25/18\par She is her for follow up for her NHL. She is doing well. No bleeding. No weight loss.  CBC from today is stable.\par \par 5/24/18\par She is doing well. She has fatigue. CBC was fine from today s visit.\par \par 9/17/18\par She is here for follow up. She may have a cold, fever 99, cough with green phlegm, not SOB. CBC showed a Hgb of 11 from today. She has no bleeding,normal stool and urine.\par \par 12/17/18\par Patient is here for a follow-up visit.  She is feeling well with no complaints.  Most recent CBC is stable, Hgb up to 11.4.  Patient denies fever, chills, nausea, vomiting.  She has received her flu vaccine this season.  \par \par 4/9/19\par She is here for follow up. She is doing well. 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 Duran.\par \par 5/20/19\par She is here for follow up. She had a PET/CT 4/18/19: Compared to 2/24/2016,focal FDG uptake (SUV 4.1; intensely avid on \par nonattenuation corrected images) coregistering with 1.8 x 1.6 cm left  lower lobe pulmonary nodule suspicious for biologic tumor activity\par In addition another new FDG avid right upper lobe 1.3 x 0.6 cm  groundglass nodule (SUV 2.1-FDG avid on attenuation corrected images) \par suspicious for biologic tumor activity. A 1 cm pretracheal lymph node is not FDG avid. No other sites of abnormal FDG uptake\par \par She underwent a CT FNA biopsy of Left  lobe nodule that showed 5/13/19:   POSITIVE FOR MALIGNANT CELLS. Non-small cell carcinoma, favor squamous cell carcinoma. Immunohistochemistry studies were performed at [facility] on block 1-C and the results support the diagnosis (positive P-40; negative- TTF1/Napsin).\par \par She feels well otherwise.\par \par 7/22/19\par She is here for follow up. She states she completed 5 fractions of radiation on July 12. She feels well except some fatigue. \par \par 10/2/19\par Patient is here for a follow-up visit with spouse.  She is feeling well with no new complaints.  Discussed findings from CT imaging reflecting slight improvement in LLL nodule post radiation and RUL nodule was stable.  Encouraged flu and pna vaccination with PCP.  \par CT Chest (9.30.19) IMPRESSION:  Decrease in size size of left lower lobe spiculated nodule measuring 1.5 x 0.7 cm previously measured 1.8 x 1.6 cm. Stable right upper lobe 1.3 x 0.6 cm groundglass nodule.\par \par 1/6/20\par Patient is here for a follow-up visit for autoimmune hemolytic anemia and hx of lymphoma with her .  She has had nausea and constipation/diarrhea over the last few weeks which has resolved.  She denies unintentional weightloss or b symptoms, although she reports slightly worsening of SOB with exertion.  Most recent CBC reviewed and is stable.  \par \par 6/15/2020\par She is here for follow up. She had a CTA in 2/2020 IMPRESSION: 1. No CTA evidence of aortic dissection. 2. No CT evidence of acute intrathoracic or abdominopelvic pathology. Symmetric perfusion to the kidneys. 3. 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 4. Decreased size of the left lower lobe spiculated nodule measuring 1.1 x 0.7 cm (previously 1.5 x 0.7 cm). 5. Unchanged right upper lobe 1.3 x 0.6 cm groundglass nodule/opacity. \par \par She is on oxygen now due to SOB. She has a cough as well that is chronic.  \par \par 9/21/2020: Patient presents for follow up of low-grade NHL with secondary autoimmune hemolytic anemia, s/p steroids and rituximab in 2016, and [id number] (stage IA2) NSCLC s/p RT in 7/2019. She had a CT chest in 8/14/2020, which demonstrated increased LLL opacity without discrete nodule, increased left-sided pleural effusion, and no LAD. Results were discussed with patient's pulmonologist, who agreed that results likely represented post-treatment effect and agreed with surveillance CT scans. Hemoglobin decreased to 9.9 with MCV of 79.7 on today's CBC. Patient denies any active bleeding and states that she does not take iron supplementation. Her breathing is at her baseline. Her only complaint is fatigue.\par \par 1/11/21\par She is here for follow up. Since last visit she remains anemic with microcytosis, Iron studies were normal last visit. She had a CT  Chest in 11/2020:  \par IMPRESSION: 1.  Since February 17, 2020, posttreatment changes again seen in the left lower lobe with decreased size of the left pleural effusion and left lower lobe subpleural opacity/rounded atelectasis. 2.  Unchanged right upper lobe groundglass nodule, which demonstrated suspicious FDG uptake on prior PET/CT. 3.  No CT evidence of an acute intrathoracic pathology.\par \par she feels well. She is on 2-3 L NC. Shit is baseline. \par \par 4/26/21\par She is here for follow up. Since last visit she had MR brain for vertigo in 1/2021: IMPRESSION: Unremarkable MRI of the brain. hgb today is just bellow 10.  She was diagnosed with BPV and feels much better now with exercises. SOB is stable and is on Oxygen. No new complaints.\par \par 8/9/21\par She is here for follow up. She had  CBC today that showed mild leukocytosis left shifted and hgb is now 11.4 up from 9.7. She had a CT chest in may of 2021 showed  Since 11/13/2020\par \par No current CT evidence of active intrathoracic disease. Stable post therapeutic changes left lower lobe with consolidative opacities and stable trace pleural fluid. Stable partial atelectasis right middle lobe. Stable emphysema. 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 She reports feeling fine, denies any bleeding , bruising , loss of appetite , lost a few pounds but reports it was intentional as pt was planing to loose weight since her lipid panel was abnormal . \par 11/2021 Last CT chest showed: There is no pleural effusion. There is no pneumothorax.  Emphysema. Stable approximate 1.8 cm groundglass nodule, right upper lobe. Stable near complete atelectasis right middle lobe. Stable post therapeutic changes left lower lobe with consolidative opacities and stable trace pleural fluid. Stable 4 mm solid nodule, posterior left upper lobe and left lower lobe (4/158)\par \par Pt reports no new symptoms, uses O2 as needed . \par \par 09/02/2022 \par accompanied by her daughter; reports new onset of RLE peripheral neuropathy - started on gabapentin with some relieve. She had a CT chest in May 2022 that showed stable 1.8 cm groundglass nodules there were some nonspecific postinflammatory changes in 6 months follow-up was recommended.  She does have her oxygen with her but was not wearing it..

## 2022-09-03 NOTE — PHYSICAL EXAM
[Ambulatory and capable of all self care but unable to carry out any work activities] : Status 2- Ambulatory and capable of all self care but unable to carry out any work activities. Up and about more than 50% of waking hours [Normal] : affect appropriate [de-identified] : on wheelchair.

## 2022-09-03 NOTE — ASSESSMENT
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  Elly has completed course of steroids as well as rituximab. Rituximab completed 04/08/2016, and she finished steroids approximately in early 06/2016.  She is in remission.\par \par - will continue to monitor.\par \par # [id number] stage IA2 SCC of lung left lower lobe.\par - s/p 5/5 fractions RT competed 07/12/2019.\par - 08/14/2020 Repeat CT chest showed increased left lower lobe opacity without discrete nodule, increased left-sided pleural effusion. Spoke with Dr. Feldman, who agreed that this likely represents post-treatment effect. 005/2021 repeat NC CT chest - SONIDO. 11/2021 Repeat CT showed stable findings. 05/22/2022 CT CHEST - since 05/12/2021 no significant changes.\par \par Plan \par - repeat CT chest is due on 5/2023 but given left upper lobe opacities it was recommended to have it done in November so we will consider with next visit depending on how she is doing clinically since it will be approximately 6 months from last CAT scan\par \par #  Anemia  is likely secondary to chronic inflammation due to her COPD as well as CKD she did have hemolytic anemia as well as iron deficiency anemia in the past\par -Hemoglobin today was 9.7 which is slightly lower than her baseline although was the same in April\par \par Plan\par -We will repeat iron studies and hemolysis panel but suspect this is from chronic disease and we will just monitor if the above parameters are normal\par \par # A history of steroid use. 03/20/2019 DEXA showed osteopenia in femoral neck with FRAX score risk of fracture of 2.9% in 10 years. 08/2016 DEXA scan in was normal. 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 \par - She currently denies any bleeding.  \par \par - will check iron studies.\par \par # Hypogammaglobulinemia.  \par - IgG in 2017 that was previously checked was decreased. She has been asymptomatic due to Rituxan. Repeat is showing near normal levels in past. \par \par - repeat CBC, CMP, haptoglobin, LDH, iron studies sent.\par \par F/U 12/2022 with CMP, haptoglobin, LDH and iron studies will likely order repeat CT chest at that point in time as well,

## 2022-09-03 NOTE — REVIEW OF SYSTEMS
[Fatigue] : fatigue [Cough] : cough [SOB on Exertion] : shortness of breath during exertion [Negative] : Heme/Lymph [Recent Change In Weight] : ~T no recent weight change [Shortness Of Breath] : no shortness of breath [Wheezing] : no wheezing [Anxiety] : no anxiety [Depression] : no depression [FreeTextEntry6] : SOB is chronic from COPD, stable, cough is stable and chronic. O2 via NC PRN. [de-identified] : Diabetic peripheral neuropathy.

## 2022-12-02 PROBLEM — D59.9 ACQUIRED HEMOLYTIC ANEMIA: Status: ACTIVE | Noted: 2017-10-27

## 2022-12-02 PROBLEM — C85.90 NON-HODGKIN'S LYMPHOMA IN ADULT: Status: ACTIVE | Noted: 2017-01-17

## 2022-12-02 PROBLEM — C34.32 PRIMARY CANCER OF LEFT LOWER LOBE OF LUNG: Status: ACTIVE | Noted: 2019-05-23

## 2022-12-02 NOTE — REVIEW OF SYSTEMS
[Fatigue] : fatigue [Cough] : cough [SOB on Exertion] : shortness of breath during exertion [Negative] : Heme/Lymph [Recent Change In Weight] : ~T no recent weight change [Shortness Of Breath] : no shortness of breath [Wheezing] : no wheezing [Anxiety] : no anxiety [Depression] : no depression [FreeTextEntry6] : SOB is chronic from COPD, stable, cough is stable and chronic. O2 via NC PRN. [de-identified] : Diabetic peripheral neuropathy.

## 2022-12-02 NOTE — ASSESSMENT
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  Elly has completed course of steroids as well as rituximab. Rituximab completed 04/08/2016, and she finished steroids approximately in early 06/2016. \par - She is in remission.\par \par # [id number] stage IA2 SCC of lung left lower lobe.\par - s/p 5/5 fractions RT competed 07/12/2019.\par - 08/14/2020 Repeat CT chest showed increased left lower lobe opacity without discrete nodule, increased left-sided pleural effusion. Spoke with Dr. Fedlman, who agreed that this likely represents post-treatment effect. \par - 05/2021 repeat NC CT chest - SONIDO. \par - 11/2021 Repeat CT showed stable findings. \par - 05/22/2022 CT CHEST - since 05/12/2021 no significant changes.\par - 12/01/2022 CXR Left basilar subsegmental atelectasis. Osseous structures unremarkable. Unremarkable cardiomediastinal silhouette. \par \par #  Anemia is likely secondary to chronic inflammation due to her COPD as well as CKD she did have hemolytic anemia as well as iron deficiency anemia in the past\par - Hemoglobin today was 9.7 which is slightly lower than her baseline although was the same in April.\par \par # History of steroid use. 03/20/2019 DEXA showed osteopenia in femoral neck with FRAX score risk of fracture of 2.9% in 10 years. 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 \par - denies any bleeding.  \par \par # Hypogammaglobulinemia.  \par - IgG in 2017 that was previously checked was decreased. She has been asymptomatic due to Rituxan. Repeat is showing near normal levels in past. \par \par 12/02/2022 recovering from bronchitis - CXR with Left basilar subsegmental atelectasis.\par \par PLAN:\par \par - continue calcium and vitamin D.\par \par - repeat CT chest - 05/2023.\par \par - Labs today: CBC, CMP, haptoglobin, LDH, iron studies sent.\par \par RTC  April 2023 with CBC, CMP, haptoglobin, LDH and iron studies will likely to order repeat CT chest at that point in time as well.

## 2022-12-02 NOTE — PHYSICAL EXAM
[Ambulatory and capable of all self care but unable to carry out any work activities] : Status 2- Ambulatory and capable of all self care but unable to carry out any work activities. Up and about more than 50% of waking hours [Normal] : affect appropriate [de-identified] : on wheelchair. [de-identified] : congested

## 2022-12-02 NOTE — HISTORY OF PRESENT ILLNESS
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  She finished it approximately in the beginning of 06/2016.  She also received 4  doses of rituximab therapy.\par \par HISTORY OF PRESENT ILLNESS:  Ms. Blackmon is a very pleasant 85yearold female with multiple medical problems.  She initially was being treated for secondary autoimmune hemolytic anemia in the setting of indolent Bcell nonHodgkin lymphoma, NOS.  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  Her initial PET scan only demonstrated mild splenomegaly.  She required steroids as well, but then when the tapering was attempted, her anemia worsened, and at that point in time, rituximab was initiated. [de-identified] : January 17, 2017\yonathan Sanabria presents for followup today she hasn't seen you since July. She denies having recurrent illnesses. She denies having  fatigue she experienced before. She does have occasional headaches. Blood pressure today is elevated. Otherwise she has no complaints. \par \par 4/24/17\par She is doing well except  for trouble with sugars and BP. She is seeing appropriate specialists. No other complaints CBC from today is WNL.\par \par 7/26/17\par She is doing well. She has a cough with sputum production for now several months. CXR in July was clear. HAd a course of antibiotics that did not work. It may be her COPD. No bleedig, no B symptoms. Usual fatigue. \par \par 10/27/17\par She is doing well except for her diabetic neuropathy in her feet. States her A1C is 6. No bleeding. \par \par 1/25/18\par She is her for follow up for her NHL. She is doing well. No bleeding. No weight loss.  CBC from today is stable.\par \par 5/24/18\par She is doing well. She has fatigue. CBC was fine from today s visit.\par \par 9/17/18\par She is here for follow up. She may have a cold, fever 99, cough with green phlegm, not SOB. CBC showed a Hgb of 11 from today. She has no bleeding,normal stool and urine.\par \par 12/17/18\par Patient is here for a follow-up visit.  She is feeling well with no complaints.  Most recent CBC is stable, Hgb up to 11.4.  Patient denies fever, chills, nausea, vomiting.  She has received her flu vaccine this season.  \par \par 4/9/19\par She is here for follow up. She is doing well. 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 Duran.\par \par 5/20/19\par She is here for follow up. She had a PET/CT 4/18/19: Compared to 2/24/2016,focal FDG uptake (SUV 4.1; intensely avid on \par nonattenuation corrected images) coregistering with 1.8 x 1.6 cm left  lower lobe pulmonary nodule suspicious for biologic tumor activity\par In addition another new FDG avid right upper lobe 1.3 x 0.6 cm  groundglass nodule (SUV 2.1-FDG avid on attenuation corrected images) \par suspicious for biologic tumor activity. A 1 cm pretracheal lymph node is not FDG avid. No other sites of abnormal FDG uptake\par \par She underwent a CT FNA biopsy of Left  lobe nodule that showed 5/13/19:   POSITIVE FOR MALIGNANT CELLS. Non-small cell carcinoma, favor squamous cell carcinoma. Immunohistochemistry studies were performed at [facility] on block 1-C and the results support the diagnosis (positive P-40; negative- TTF1/Napsin).\par \par She feels well otherwise.\par \par 7/22/19\par She is here for follow up. She states she completed 5 fractions of radiation on July 12. She feels well except some fatigue. \par \par 10/2/19\par Patient is here for a follow-up visit with spouse.  She is feeling well with no new complaints.  Discussed findings from CT imaging reflecting slight improvement in LLL nodule post radiation and RUL nodule was stable.  Encouraged flu and pna vaccination with PCP.  \par CT Chest (9.30.19) IMPRESSION:  Decrease in size size of left lower lobe spiculated nodule measuring 1.5 x 0.7 cm previously measured 1.8 x 1.6 cm. Stable right upper lobe 1.3 x 0.6 cm groundglass nodule.\par \par 1/6/20\par Patient is here for a follow-up visit for autoimmune hemolytic anemia and hx of lymphoma with her .  She has had nausea and constipation/diarrhea over the last few weeks which has resolved.  She denies unintentional weightloss or b symptoms, although she reports slightly worsening of SOB with exertion.  Most recent CBC reviewed and is stable.  \par \par 6/15/2020\par She is here for follow up. She had a CTA in 2/2020 IMPRESSION: 1. No CTA evidence of aortic dissection. 2. No CT evidence of acute intrathoracic or abdominopelvic pathology. Symmetric perfusion to the kidneys. 3. 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 4. Decreased size of the left lower lobe spiculated nodule measuring 1.1 x 0.7 cm (previously 1.5 x 0.7 cm). 5. Unchanged right upper lobe 1.3 x 0.6 cm groundglass nodule/opacity. \par \par She is on oxygen now due to SOB. She has a cough as well that is chronic.  \par \par 9/21/2020: Patient presents for follow up of low-grade NHL with secondary autoimmune hemolytic anemia, s/p steroids and rituximab in 2016, and [id number] (stage IA2) NSCLC s/p RT in 7/2019. She had a CT chest in 8/14/2020, which demonstrated increased LLL opacity without discrete nodule, increased left-sided pleural effusion, and no LAD. Results were discussed with patient's pulmonologist, who agreed that results likely represented post-treatment effect and agreed with surveillance CT scans. Hemoglobin decreased to 9.9 with MCV of 79.7 on today's CBC. Patient denies any active bleeding and states that she does not take iron supplementation. Her breathing is at her baseline. Her only complaint is fatigue.\par \par 1/11/21\par She is here for follow up. Since last visit she remains anemic with microcytosis, Iron studies were normal last visit. She had a CT  Chest in 11/2020:  \par IMPRESSION: 1.  Since February 17, 2020, posttreatment changes again seen in the left lower lobe with decreased size of the left pleural effusion and left lower lobe subpleural opacity/rounded atelectasis. 2.  Unchanged right upper lobe groundglass nodule, which demonstrated suspicious FDG uptake on prior PET/CT. 3.  No CT evidence of an acute intrathoracic pathology.\par \par she feels well. She is on 2-3 L NC. Shit is baseline. \par \par 4/26/21\par She is here for follow up. Since last visit she had MR brain for vertigo in 1/2021: IMPRESSION: Unremarkable MRI of the brain. hgb today is just bellow 10.  She was diagnosed with BPV and feels much better now with exercises. SOB is stable and is on Oxygen. No new complaints.\par \par 8/9/21\par She is here for follow up. She had  CBC today that showed mild leukocytosis left shifted and hgb is now 11.4 up from 9.7. She had a CT chest in may of 2021 showed  Since 11/13/2020\par \par No current CT evidence of active intrathoracic disease. Stable post therapeutic changes left lower lobe with consolidative opacities and stable trace pleural fluid. Stable partial atelectasis right middle lobe. Stable emphysema. 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 She reports feeling fine, denies any bleeding , bruising , loss of appetite , lost a few pounds but reports it was intentional as pt was planing to loose weight since her lipid panel was abnormal . \par 11/2021 Last CT chest showed: There is no pleural effusion. There is no pneumothorax.  Emphysema. Stable approximate 1.8 cm groundglass nodule, right upper lobe. Stable near complete atelectasis right middle lobe. Stable post therapeutic changes left lower lobe with consolidative opacities and stable trace pleural fluid. Stable 4 mm solid nodule, posterior left upper lobe and left lower lobe (4/158)\par \par Pt reports no new symptoms, uses O2 as needed . \par \par 09/02/2022 \par accompanied by her daughter; reports new onset of RLE peripheral neuropathy - started on gabapentin with some relieve. She had a CT chest in May 2022 that showed stable 1.8 cm groundglass nodules there were some nonspecific postinflammatory changes in 6 months follow-up was recommended.  She does have her oxygen with her but was not wearing it.. \par \par 12/02/2022\par accompanied by daughter; Reports was evaluated for cough and 12/01/2022 CXR Left basilar subsegmental atelectasis. Osseous structures unremarkable. Unremarkable cardiomediastinal silhouette. No other changes in chronic conditions or new complaints since the last visit.\par

## 2022-12-02 NOTE — END OF VISIT
[] : Fellow [FreeTextEntry3] : Elly returns for follow-up.  She continues to use oxygen mainly at night she sounds congested today and slightly has some bronchitis possibly viral.  Granted breathing is stable.  In regards to her lung cancer that was treated with radiation she is due for repeat CAT scan again in May.\par \par In regards to her anemia it is likely secondary to chronic disease we will repeat blood work today its been mild to moderate.  She had history of GI bleeding in the past we will repeat iron studies again.  We will also recheck hemolysis panel but has been in remission.\par \par She will see us back in April 2023 we will order blood work and CT chest at that point in time if remains stable then we will bring her back in 6 months

## 2022-12-25 NOTE — ED PROVIDER NOTE - OBJECTIVE STATEMENT
82 year old female past medical history of COPD on home 02 and multiple medical problems comes to emergency room for shortness of breath. patient daughter explains that she came over yesterday and was out in the cold and since has had increasing shortness of breath and pulse ox went down to 90 at home and brought her to emergency room.

## 2022-12-25 NOTE — ED PROVIDER NOTE - NS ED ATTENDING STATEMENT MOD
This was a shared visit with the REGLA. I reviewed and verified the documentation and independently performed the documented: I have personally provided the amount of critical care time documented below excluding time spent on separate procedures.

## 2022-12-25 NOTE — ED PROVIDER NOTE - ATTENDING APP SHARED VISIT CONTRIBUTION OF CARE
82yF COPD on 2-3L home O2 CAD CHF? p/w resp distress - pt c/o SOB since last night, with inc distress this morning.  SpO2 up to 96% today but pt quite tachypneic, grunting and in extremis on ED arrival, essentially unable to speak 2/2 resp distress.  No fevers or sick contacts.  +cough, wet sounding but nonproductive as per family at bedside.

## 2022-12-25 NOTE — ED PROVIDER NOTE - SECONDARY DIAGNOSIS.
Addended by: Jaswinder Matson on: 7/5/2019 02:03 PM     Modules accepted: Orders Acute respiratory failure with hypoxia Hyperglycemia Pneumonia Non-ST elevation MI (NSTEMI) Lactic acidosis

## 2022-12-25 NOTE — ED PROVIDER NOTE - PHYSICAL EXAMINATION
Physical Exam    Vital Signs: I have reviewed the initial vital signs.  Constitutional: elderly, severe distress   Eyes: Conjunctiva pink, Sclera clear, PERRLA, EOMI.  Cardiovascular: S1 and S2, Tachy regular rate, regular rhythm, well-perfused extremities, radial pulses equal and 2+  Respiratory: tachypneic with diffuse rhonchi and decrease breath sounds   Gastrointestinal: soft, non-tender abdomen, no pulsatile mass, normal bowl sounds  Musculoskeletal: supple neck, no lower extremity edema, no midline tenderness  Integumentary: warm, dry, no rash  Neurologic: awake, alert moving all extremities

## 2022-12-25 NOTE — H&P ADULT - ASSESSMENT
Assessment & Plan    82y Female  s/p     NEURO:  #dementia  -donepezil 10 mg   gabapentin      RESP:   #acute hypoxic respiratory failure 2/2 copd vs MI  -follows with pulmonologist Dr. Gaston lee   -montelukast 10  -solumedrol  -wean BIPAP as tolerated    CARDS:   #NSTEMI  -troponin 4.14 (baseline 0.05)  -follows with Dr. Munoz  -echo  -cardiology eval  -therapeutic lovenox  -trend troponin and bnp    #CHF  -isosorbide mononitrate 30 mg oral tablet,   -Lasix 20 mg oral tablet    #HTN  -losartan 100 mg oral tablet    GI/NUTR:   #GERD  -protonix  -NPO while on BIPAP      /RENAL:   #WOO, prerenal  -BUN/Cr- 43/1.5   -replete lytes as needed  -gentle hydration    HEME/ONC:   -place on therapeutic lovenox dose for nstemi    ID:  multilobar PNA  -wbc 23.29, can be from steroid use or from infection  -afebrile  -cont levaquin, she is allergic to cephalosporin  -b/l opacities can be from worsening CHF    ENDO:  DM1, poorly controlled  -glucose 550> 400  -insulin sliding scale      LINES/DRAINS:  PIV    Advanced Directives: Full Code    DISPO:    SDU

## 2022-12-25 NOTE — PATIENT PROFILE ADULT - FALL HARM RISK - HARM RISK INTERVENTIONS

## 2022-12-25 NOTE — H&P ADULT - HISTORY OF PRESENT ILLNESS
82F w/ pmhx of CHF, HLD, HTN, COPD on 3L home O2, DM1, dementia, GERD, MI in 1992, CAD with 5 cardiac stents who present with 1 month of worsening SOB. For past 1 month she has been having dry cough and SOB. She saw her pulmonologist Dr. Feldman who did CXR and showed it was normal at the time, had her on levaquin and then increased her dose which she completed last week. Yesterday she started having crushing chest pressure substernally. Denies fever during this month, nausea vomiting back pain abd pain urinary sx or diarrhea. No recent travel or sick contact. Her cardiologist is Dr. Munoz.

## 2022-12-25 NOTE — ED PROVIDER NOTE - CARE PLAN
Principal Discharge DX:	Non-ST elevation MI (NSTEMI)  Secondary Diagnosis:	Acute respiratory failure with hypoxia  Secondary Diagnosis:	Hyperglycemia   1 Principal Discharge DX:	Non-ST elevation MI (NSTEMI)  Secondary Diagnosis:	Acute respiratory failure with hypoxia  Secondary Diagnosis:	Hyperglycemia  Secondary Diagnosis:	Pneumonia   Principal Discharge DX:	Acute respiratory failure with hypoxia and hypercapnia  Secondary Diagnosis:	Non-ST elevation MI (NSTEMI)  Secondary Diagnosis:	Hyperglycemia  Secondary Diagnosis:	Pneumonia  Secondary Diagnosis:	Lactic acidosis

## 2022-12-25 NOTE — H&P ADULT - NSHPPHYSICALEXAM_GEN_ALL_CORE
SKIN: warm, dry  HEAD: Normocephalic  EYES: no conjunctival erythema  ENT: no nasal discharge, airway clear  NECK: full ROM  CARD: tachycardic regular  RESP: slight increased WOB, bibasilar rales, no wheezing  ABD: soft, non-distended, non-tender  EXT: moving all extremities spontaneously  NEURO: alert and oriented x3, grossly unremarkable  PSYCH: cooperative, appropriate

## 2022-12-25 NOTE — ED PROVIDER NOTE - CHIEF COMPLAINT
The patient is a 82y Female complaining of  The patient is a 82y Female complaining of shortness of breath

## 2022-12-25 NOTE — CONSULT NOTE ADULT - ASSESSMENT
Patient is a 82y old  Female with pmhx of CHF, HLD, HTN, COPD on 3L home O2, DM1, dementia, GERD, MI in 1992, CAD with 5 cardiac stents who presents to the ER for evaluation of 1 month of worsening SOB. For past 1 month she has been having dry cough and SOB. She saw her pulmonologist Dr. Feldman who did CXR and showed it was normal at the time, had her on levaquin and then increased her dose which she completed last week. Yesterday she started having crushing chest pressure substernally. Denies fever during this month, nausea vomiting back pain abd pain urinary sx or diarrhea. No recent travel or sick contact. On admission, she found to have hypoxia and Leukocytosis, and CXR shows Bibasilar opacities, increased from prior. She has started on Doxycycline and Levaquin . The ID consult requested to assist with further evaluation and antibiotic management.    # Pneumonia-  CXR as of 12/25 shows Bibasilar opacities, increased from prior.  # Leukocytosis  # Acute on chronic respiratory failure -on BIPAP    would recommend:    1. Please obtain Procalcitonin level and if less than <0.25 then d/s Abx  2. Continue Levaquin and Doxycycline for now  3. Supplemental oxygenation and Bronchodilator  as needed  4. Monitor WBC count  5. Aspiration precaution    will follow the patient with you and make further recommendation based on the clinical course and Lab results  Thank you for the opportunity to participate in  Ms. Blackmon's care      Attending Attestation:  Spent more than 65 minutes on total encounter, more than 50 % of the visit was spent counseling and/or coordinating care by the Attending physician.

## 2022-12-25 NOTE — ED PROVIDER NOTE - CLINICAL SUMMARY MEDICAL DECISION MAKING FREE TEXT BOX
82yF COPD CHF p/w resp distress since last night.  No fever or chest pain.  Pt in extremis on ED arrival, hypoxic to 80s but even more so with sig inc WOB, tripodding, grunting, tachypneic, unable to speak and w/ prolonged expiratory phase and rhonchi.  No fever or sig volume overload.  CXR with worsened b/l opacities.  EKG w/o active ischemia/arrhythmia.  Labs w/ severe acidosis pH 7.07 pCO2 70 and lactate 9, no WOO but +leukocytosis and hyperglycemia and markedly elevated troponin.  Pt treated with IV NS and empiric abx for suspected PNA and IV insulin for marked hyperglycemia w/o sig AGMA or ketosis.  VBG and lactate improved after ~1hr on BiPAP.  Troponin 4.14 concerning for NSTEMI ie missed cardiac event (likely >12hr ago) that precipitated resp distress, though no sig volume overload even after IV NS to suggest cardiogenic shock.  D/w ICU and will adm to SDU.

## 2022-12-25 NOTE — CONSULT NOTE ADULT - SUBJECTIVE AND OBJECTIVE BOX
Patient is a 82y old  Female who presents with a chief complaint of NSTEMI (25 Dec 2022 18:25)      INTERVAL HPI/OVERNIGHT EVENTS:        PAST MEDICAL & SURGICAL HISTORY:  MI (myocardial infarction), s/p 5 stents  HTN (hypertension)  High cholesterol  Heart failure  Non Hodgkin&#x27;s lymphoma  in remission. Follows with Dr Hernandez  PVD (peripheral vascular disease)  Thrombocytopenia  CKD (chronic kidney disease)  Osteoarthritis  Diabetes on insulin  Coronary artery disease involving native heart without angina pectoris, unspecified vessel or lesion type  History of cholecystectomy  H/O cardiac catheterization, 5 STENTS  H/O carotid endarterectomy  RIGHT          REVIEW OF SYSTEMS: Total of twelve systems have been reviewed with patient and found to be negative unless mentioned in HPI      SOCIAL HISTORY  Alcohol: Does not drink  Tobacco: Does not smoke  Illicit substance use: None      FAMILY HISTORY: Non contributory to the present illness        ALLERGIES: ACE inhibitors (Unknown)  BENZALKONIUM (Rash)  BETADINE (Rash)  Ceclor (Unknown), codeine (Unknown)  latex (Unknown)  penicillin (Unknown)  RUBBER GLOVES (Rash)  sulfonamides (Unknown)        Vital Signs Last 24 Hrs  T(C): 36.6 (25 Dec 2022 14:38), Max: 36.6 (25 Dec 2022 14:38)  T(F): 97.9 (25 Dec 2022 14:38), Max: 97.9 (25 Dec 2022 14:38)  HR: 100 (25 Dec 2022 14:08) (100 - 100)  BP: 177/77 (25 Dec 2022 16:55) (141/92 - 177/77)  BP(mean): --  RR: 22 (25 Dec 2022 16:55) (22 - 30)  SpO2: 98% (25 Dec 2022 16:55) (80% - 98%)    Parameters below as of 25 Dec 2022 16:55  Patient On (Oxygen Delivery Method): BiPAP/CPAP        PHYSICAL EXAM:  GENERAL: Not in distress   CHEST/LUNG:  Aire ntry bilaterally  HEART: s1 and s2 present  ABDOMEN:  Nontender and  Nondistended  EXTREMITIES: No pedal  edema  CNS: Awake and Alert      LABS:                        10.7   23.29 )-----------( 260      ( 25 Dec 2022 13:40 )             35.8       12-25    139  |  100  |  43<H>  ----------------------------<  549<HH>  5.4<H>   |  20  |  1.5    Ca    9.4      25 Dec 2022 13:40  Mg     2.0     12-25    TPro  6.0  /  Alb  3.9  /  TBili  0.3  /  DBili  x   /  AST  194<H>  /  ALT  44<H>  /  AlkPhos  69  12-25        CAPILLARY BLOOD GLUCOSE  POCT Blood Glucose.: 399 mg/dL (25 Dec 2022 19:54)  POCT Blood Glucose.: 406 mg/dL (25 Dec 2022 17:00)        MEDICATIONS  (STANDING):  albuterol/ipratropium for Nebulization 3 milliLiter(s) Nebulizer every 20 minutes  albuterol/ipratropium for Nebulization 3 milliLiter(s) Nebulizer every 6 hours  atorvastatin 20 milliGRAM(s) Oral at bedtime  donepezil 10 milliGRAM(s) Oral at bedtime  doxycycline IVPB      enoxaparin Injectable 60 milliGRAM(s) SubCutaneous every 12 hours  furosemide   Injectable 20 milliGRAM(s) IV Push daily  glucagon  Injectable 1 milliGRAM(s) IntraMuscular once  insulin lispro (ADMELOG) corrective regimen sliding scale   SubCutaneous at bedtime  losartan 100 milliGRAM(s) Oral daily  methylPREDNISolone sodium succinate Injectable 125 milliGRAM(s) IV Push daily  montelukast 10 milliGRAM(s) Oral daily    MEDICATIONS  (PRN):  dextrose Oral Gel 15 Gram(s) Oral once PRN Blood Glucose LESS THAN 70 milliGRAM(s)/deciliter        RADIOLOGY & ADDITIONAL TESTS:               Patient is a 82y old  Female with pmhx of CHF, HLD, HTN, COPD on 3L home O2, DM1, dementia, GERD, MI in 1992, CAD with 5 cardiac stents who presents to the ER for evaluation of 1 month of worsening SOB. For past 1 month she has been having dry cough and SOB. She saw her pulmonologist Dr. Feldman who did CXR and showed it was normal at the time, had her on levaquin and then increased her dose which she completed last week. Yesterday she started having crushing chest pressure substernally. Denies fever during this month, nausea vomiting back pain abd pain urinary sx or diarrhea. No recent travel or sick contact. On admission, she found to have hypoxia and Leukocytosis, and CXR shows Bibasilar opacities, increased from prior. She has started on Doxycycline and Levaquin . The ID consult requested to assist with further evaluation and antibiotic management.      REVIEW OF SYSTEMS: As per  HPI      PAST MEDICAL & SURGICAL HISTORY:  MI (myocardial infarction), s/p 5 stents  HTN (hypertension)  High cholesterol  Heart failure  Non Hodgkin&#x27;s lymphoma  in remission. Follows with Dr Hernandez  PVD (peripheral vascular disease)  Thrombocytopenia  CKD (chronic kidney disease)  Osteoarthritis  Diabetes on insulin  Coronary artery disease involving native heart without angina pectoris, unspecified vessel or lesion type  History of cholecystectomy  H/O cardiac catheterization, 5 STENTS  H/O Right carotid endarterectomy        SOCIAL HISTORY  Alcohol: Does not drink  Tobacco: Does not smoke  Illicit substance use: None      FAMILY HISTORY: Non contributory to the present illness        ALLERGIES: ACE inhibitors (Unknown)  BENZALKONIUM (Rash)  BETADINE (Rash)  Ceclor (Unknown), codeine (Unknown)  latex (Unknown)  penicillin (Unknown)  RUBBER GLOVES (Rash)  sulfonamides (Unknown)        PHYSICAL EXAM:  Vital Signs Last 24 Hrs  T(C): 36.6 (25 Dec 2022 14:38), Max: 36.6 (25 Dec 2022 14:38)  T(F): 97.9 (25 Dec 2022 14:38), Max: 97.9 (25 Dec 2022 14:38)  HR: 100 (25 Dec 2022 14:08) (100 - 100)  BP: 177/77 (25 Dec 2022 16:55) (141/92 - 177/77)  BP(mean): --  RR: 22 (25 Dec 2022 16:55) (22 - 30)  SpO2: 98% (25 Dec 2022 16:55) (80% - 98%)    Parameters below as of 25 Dec 2022 16:55  Patient On (Oxygen Delivery Method): BiPAP/CPAP          LABS:                        10.7   23.29 )-----------( 260      ( 25 Dec 2022 13:40 )             35.8       12-25    139  |  100  |  43<H>  ----------------------------<  549<HH>  5.4<H>   |  20  |  1.5    Ca    9.4      25 Dec 2022 13:40  Mg     2.0     12-25    TPro  6.0  /  Alb  3.9  /  TBili  0.3  /  DBili  x   /  AST  194<H>  /  ALT  44<H>  /  AlkPhos  69  12-25        CAPILLARY BLOOD GLUCOSE  POCT Blood Glucose.: 399 mg/dL (25 Dec 2022 19:54)  POCT Blood Glucose.: 406 mg/dL (25 Dec 2022 17:00)        MEDICATIONS  (STANDING):  albuterol/ipratropium for Nebulization 3 milliLiter(s) Nebulizer every 20 minutes  albuterol/ipratropium for Nebulization 3 milliLiter(s) Nebulizer every 6 hours  atorvastatin 20 milliGRAM(s) Oral at bedtime  donepezil 10 milliGRAM(s) Oral at bedtime  doxycycline IVPB      enoxaparin Injectable 60 milliGRAM(s) SubCutaneous every 12 hours  furosemide   Injectable 20 milliGRAM(s) IV Push daily  glucagon  Injectable 1 milliGRAM(s) IntraMuscular once  insulin lispro (ADMELOG) corrective regimen sliding scale   SubCutaneous at bedtime  losartan 100 milliGRAM(s) Oral daily  methylPREDNISolone sodium succinate Injectable 125 milliGRAM(s) IV Push daily  montelukast 10 milliGRAM(s) Oral daily    MEDICATIONS  (PRN):  dextrose Oral Gel 15 Gram(s) Oral once PRN Blood Glucose LESS THAN 70 milliGRAM(s)/deciliter        RADIOLOGY & ADDITIONAL TESTS:    < from: Xray Chest 1 View- PORTABLE-Urgent (12.25.22 @ 14:12) >    Bibasilar opacities, increased from prior.        MICROBIOLOGY DATA:    Flu With COVID-19 By JYOTHI (12.25.22 @ 13:40)   SARS-CoV-2 Result: NotDetec   Influenza A Result: NotDetec   Influenza B Result: NotDetec   Resp Syn Virus Result: NotDetec
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
## 2022-12-26 NOTE — CONSULT NOTE ADULT - ASSESSMENT
Patient with hx MI and 5or 6 stents. She is on home o2. Several weeks mcdowell. 2 days ago prolonged chest pain. No pain now. Patient had MI chf. She claim dye allergy. Asa if not allergic lasix . Follow lytes cbc. statin low dose beta. Probable cardiac cath when stable. Echo

## 2022-12-26 NOTE — CHART NOTE - NSCHARTNOTEFT_GEN_A_CORE
VENKATESH RAMACHANDRAN 82y Female  Chart reviewed and events noted.  Spoke with patient and her daughters via phone last night and this morning.  82 year old lady well know to me with COPD, home O2 and nodules was sent to ER for persistent SOB despite nebulizer treatments and additional Prednisone use. She had mild chest pain. No fever or chills. No URI symptoms. I felt this was unlikely a respiratory issue but more likely cardiac event based on my conversion with family.    Vital Signs Last 24 Hrs  T(F): 98 (26 Dec 2022 14:56), Max: 99.6 (26 Dec 2022 00:30)  HR: 112 (26 Dec 2022 14:56) (85 - 112)  BP: 120/58 (26 Dec 2022 14:56) (120/58 - 147/62)  BP(mean): 83 (26 Dec 2022 14:56) (83 - 93)  RR: 25 (26 Dec 2022 14:56) (18 - 48)  SpO2: 99% (26 Dec 2022 14:56) (98% - 100%)    Parameters below as of 26 Dec 2022 14:56  Patient On (Oxygen Delivery Method): nasal cannula  O2 Flow (L/min): 3    MEDICATIONS  (STANDING):  albuterol/ipratropium for Nebulization 3 milliLiter(s) Nebulizer every 6 hours  aspirin enteric coated 81 milliGRAM(s) Oral daily  atorvastatin 20 milliGRAM(s) Oral at bedtime  dextrose 5%. 1000 milliLiter(s) (100 mL/Hr) IV Continuous <Continuous>  dextrose 5%. 1000 milliLiter(s) (50 mL/Hr) IV Continuous <Continuous>  dextrose 50% Injectable 25 Gram(s) IV Push once  dextrose 50% Injectable 12.5 Gram(s) IV Push once  dextrose 50% Injectable 25 Gram(s) IV Push once  donepezil 10 milliGRAM(s) Oral at bedtime  doxycycline IVPB      doxycycline IVPB 100 milliGRAM(s) IV Intermittent every 12 hours  enoxaparin Injectable 60 milliGRAM(s) SubCutaneous every 12 hours  furosemide   Injectable 20 milliGRAM(s) IV Push daily  glucagon  Injectable 1 milliGRAM(s) IntraMuscular once  insulin glargine Injectable (LANTUS) 10 Unit(s) SubCutaneous at bedtime  insulin lispro (ADMELOG) corrective regimen sliding scale   SubCutaneous four times a day before meals  levoFLOXacin IVPB 750 milliGRAM(s) IV Intermittent every 24 hours  metoprolol succinate ER 12.5 milliGRAM(s) Oral daily  montelukast 10 milliGRAM(s) Oral daily    MEDICATIONS  (PRN):  dextrose Oral Gel 15 Gram(s) Oral once PRN Blood Glucose LESS THAN 70 milliGRAM(s)/deciliter    LABS:                        9.6    12.86 )-----------( 123      ( 26 Dec 2022 15:45 )             30.2     12-26    138  |  100  |  47<H>  ----------------------------<  408<H>  4.5   |  25  |  1.7<H>    Ca    9.1      26 Dec 2022 15:45  Mg     1.9     12-26    TPro  5.4<L>  /  Alb  3.5  /  TBili  0.4  /  DBili  x   /  AST  89<H>  /  ALT  38  /  AlkPhos  54  12-26    LIVER FUNCTIONS - ( 26 Dec 2022 15:45 )  Alb: 3.5 g/dL / Pro: 5.4 g/dL / ALK PHOS: 54 U/L / ALT: 38 U/L / AST: 89 U/L / GGT: x           BNP - over 11,000 & 7000    CARDIAC MARKERS ( 26 Dec 2022 11:51 )**********  x     / 2.71 ng/mL   CARDIAC MARKERS ( 25 Dec 2022 22:00 )**********  x     / 3.36 ng/mL   CARDIAC MARKERS ( 25 Dec 2022 13:40 )**********  x     / 4.14 ng/mL     Chest X-Ray: Small Bilateral pleural effusions with atelectasis     IMPRESSION & PLAN  NSTEMI with CHF in a patient with advanced COPD - O2 dependant   TREND the troponin levels  O2 and nebulizer treatments  D/C antibiotics    Lasix  Prednisone 20 mg X 3 days  Suleman with cardiac workup and testing  I will speak with family tomorrow morning  Call me if any issues

## 2022-12-26 NOTE — PROGRESS NOTE ADULT - SUBJECTIVE AND OBJECTIVE BOX
Patient seen and evaluated this am, chest pain has resolved, however pt is still SOB      T(F): 97.7 (12-26-22 @ 07:00), Max: 99.6 (12-26-22 @ 00:30)  HR: 85 (12-26-22 @ 09:00)  BP: 140/63 (12-26-22 @ 09:00)  RR: 22  SpO2: 99% (12-26-22 @ 09:00) (80% - 100%)    PHYSICAL EXAM:  GENERAL: NAD  HEAD:  Atraumatic, Normocephalic  EYES: EOMI, PERRLA, conjunctiva and sclera clear  NERVOUS SYSTEM:  Alert & Oriented X3, no focal deficits   CHEST/LUNG:  bilateral rales  HEART: Regular rate and rhythm; No murmurs, rubs, or gallops  ABDOMEN: Soft, Nontender, Nondistended; Bowel sounds present  EXTREMITIES:  2+ Peripheral Pulses, No clubbing, cyanosis, or edema    LABS  12-25    137  |  100  |  39<H>  ----------------------------<  433<H>  5.4<H>   |  22  |  1.5    Ca    9.5      25 Dec 2022 22:00  Mg     2.0     12-25    TPro  6.0  /  Alb  3.9  /  TBili  0.3  /  DBili  x   /  AST  194<H>  /  ALT  44<H>  /  AlkPhos  69  12-25                          10.7   23.29 )-----------( 260      ( 25 Dec 2022 13:40 )             35.8       CARDIAC ENZYMES    Troponin T, Serum: 3.36 ng/mL (12-25-22 @ 22:00)  Troponin T, Serum: 4.14 ng/mL (12-25-22 @ 13:40)    < from: 12 Lead ECG (04.26.22 @ 16:10) >    Diagnosis Line Normal sinus rhythm  Cannot rule out Inferior infarct , age undetermined  Cannot rule out Anterior infarct , age undetermined  ST & T wave abnormality, consider lateral ischemia  Abnormal ECG    < end of copied text >      RADIOLOGY  < from: Xray Chest 1 View- PORTABLE-Urgent (12.25.22 @ 14:12) >  Impression:    Bibasilar opacities, increased from prior.    < end of copied text >    MEDICATIONS  (STANDING):  albuterol/ipratropium for Nebulization 3 milliLiter(s) Nebulizer every 6 hours  aspirin enteric coated 81 milliGRAM(s) Oral daily  atorvastatin 20 milliGRAM(s) Oral at bedtime  donepezil 10 milliGRAM(s) Oral at bedtime  doxycycline IVPB 100 milliGRAM(s) IV Intermittent every 12 hours  enoxaparin Injectable 60 milliGRAM(s) SubCutaneous every 12 hours  furosemide   Injectable 20 milliGRAM(s) IV Push daily  insulin glargine Injectable (LANTUS) 10 Unit(s) SubCutaneous at bedtime  insulin lispro (ADMELOG) corrective regimen sliding scale   SubCutaneous at bedtime  methylPREDNISolone sodium succinate Injectable 125 milliGRAM(s) IV Push daily  metoprolol succinate ER 12.5 milliGRAM(s) Oral daily  montelukast 10 milliGRAM(s) Oral daily    MEDICATIONS  (PRN):  dextrose Oral Gel 15 Gram(s) Oral once PRN Blood Glucose LESS THAN 70 milliGRAM(s)/deciliter

## 2022-12-26 NOTE — PROGRESS NOTE ADULT - ASSESSMENT
82F w/ pmhx of HFpEF, , HLD, HTN, COPD on 3L home O2, DM1, dementia, GERD, CAD s/p stents presented to the ED with severe SOB    NSTEMI / acute on chronic HFpEF     - aspirin, Lipitor, metoprolol, Lovenox   - IV Lasix and negative fluid balance   - DC IV steroids   - check procal and if low DC antibiotics   - 2DECHO   - tele   - probable cardiac cath

## 2022-12-26 NOTE — CONSULT NOTE ADULT - SUBJECTIVE AND OBJECTIVE BOX
CARDIOLOGY CONSULT NOTE     CHIEF COMPLAINT/REASON FOR CONSULT:    HPI:  82F w/ pmhx of CHF, HLD, HTN, COPD on 3L home O2, DM1, dementia, GERD, MI in 1992, CAD with 5 cardiac stents who present with 1 month of worsening SOB. For past 1 month she has been having dry cough and SOB. She saw her pulmonologist Dr. Feldman who did CXR and showed it was normal at the time, had her on levaquin and then increased her dose which she completed last week. Yesterday she started having crushing chest pressure substernally. Denies fever during this month, nausea vomiting back pain abd pain urinary sx or diarrhea. No recent travel or sick contact. Her cardiologist is Dr. Munoz.  (25 Dec 2022 18:25)      PAST MEDICAL & SURGICAL HISTORY:  MI (myocardial infarction)  s/p 5 stents      HTN (hypertension)      High cholesterol      Heart failure      Non Hodgkin&#x27;s lymphoma  in remission. Follows with Dr Hernandez      PVD (peripheral vascular disease)      Thrombocytopenia      CKD (chronic kidney disease)      Osteoarthritis      Diabetes  on insulin      Coronary artery disease involving native heart without angina pectoris, unspecified vessel or lesion type  s/p 5 stents      History of cholecystectomy      H/O cardiac catheterization  5 STENTS      H/O carotid endarterectomy  RIGHT          Cardiac Risks:   [x ]HTN, [x] DM, [ ] Smoking, [x ] FH,  [ ] Lipids        MEDICATIONS:  MEDICATIONS  (STANDING):  albuterol/ipratropium for Nebulization 3 milliLiter(s) Nebulizer every 6 hours  atorvastatin 20 milliGRAM(s) Oral at bedtime  dextrose 5%. 1000 milliLiter(s) (100 mL/Hr) IV Continuous <Continuous>  dextrose 5%. 1000 milliLiter(s) (50 mL/Hr) IV Continuous <Continuous>  dextrose 50% Injectable 25 Gram(s) IV Push once  dextrose 50% Injectable 12.5 Gram(s) IV Push once  dextrose 50% Injectable 25 Gram(s) IV Push once  donepezil 10 milliGRAM(s) Oral at bedtime  doxycycline IVPB      doxycycline IVPB 100 milliGRAM(s) IV Intermittent every 12 hours  enoxaparin Injectable 60 milliGRAM(s) SubCutaneous every 12 hours  furosemide   Injectable 20 milliGRAM(s) IV Push daily  glucagon  Injectable 1 milliGRAM(s) IntraMuscular once  insulin lispro (ADMELOG) corrective regimen sliding scale   SubCutaneous at bedtime  methylPREDNISolone sodium succinate Injectable 125 milliGRAM(s) IV Push daily  montelukast 10 milliGRAM(s) Oral daily      FAMILY HISTORY:  FH: CAD (coronary artery disease)  FATHER        SOCIAL HISTORY:      [ ] Marital status    Allergies    ACE inhibitors (Unknown)  BENZALKONIUM (Rash)  BETADINE (Rash)  Ceclor (Unknown)  codeine (Unknown)  latex (Unknown)  penicillin (Unknown)  RUBBER GLOVES (Rash)  sulfonamides (Unknown)        	    REVIEW OF SYSTEMS:  CONSTITUTIONAL: No fever, weight loss, or fatigue  EYES: No eye pain, visual disturbances, or discharge  ENMT:  No difficulty hearing, tinnitus, vertigo; No sinus or throat pain  NECK: No pain or stiffness  RESPIRATORY: No cough, wheezing, chills or hemoptysis; No Shortness of Breath  CARDIOVASCULAR: No chest pain, palpitations, passing out, dizziness, or leg swelling  GASTROINTESTINAL: No abdominal or epigastric pain. No nausea, vomiting, or hematemesis; No diarrhea or constipation. No melena or hematochezia.  GENITOURINARY: No dysuria, frequency, hematuria, or incontinence  NEUROLOGICAL: No headaches, memory loss, loss of strength, numbness, or tremors  SKIN: No itching, burning, rashes, or lesions   	    PHYSICAL EXAM:  T(C): 37.2 (12-26-22 @ 02:30), Max: 37.6 (12-26-22 @ 00:30)  HR: 100 (12-26-22 @ 05:59) (85 - 100)  BP: 147/62 (12-26-22 @ 05:59) (120/60 - 177/77)  RR: 28 (12-26-22 @ 05:59) (18 - 30)  SpO2: 99% (12-26-22 @ 03:30) (80% - 100%)  Wt(kg): --  I&O's Summary    25 Dec 2022 07:01  -  26 Dec 2022 07:00  --------------------------------------------------------  IN: 0 mL / OUT: 180 mL / NET: -180 mL        Appearance: Normal	  Psychiatry: A & O x 3, Mood & affect appropriate  HEENT:   Normal oral mucosa, PERRL, EOMI	  Lymphatic: No lymphadenopathy  Cardiovascular: Normal S1 S2,RRR, No JVD, No murmurs  Respiratory: Lungs clear to auscultation	  Gastrointestinal:  Soft, Non-tender, + BS	  Skin: No rashes, No ecchymoses, No cyanosis	  Neurologic: Non-focal  Extremities: Normal range of motion, No clubbing, cyanosis or edema  Vascular: Peripheral pulses palpable 2+ bilaterally      ECG:  nsr ? inf lat mi	    	  LABS:	 	    CARDIAC MARKERS:          Serum Pro-Brain Natriuretic Peptide: 34984 pg/mL (12-25 @ 22:00)  Serum Pro-Brain Natriuretic Peptide: 7102 pg/mL (12-25 @ 13:40)                            10.7   23.29 )-----------( 260      ( 25 Dec 2022 13:40 )             35.8     12-25    137  |  100  |  39<H>  ----------------------------<  433<H>  5.4<H>   |  22  |  1.5    Ca    9.5      25 Dec 2022 22:00  Mg     2.0     12-25    TPro  6.0  /  Alb  3.9  /  TBili  0.3  /  DBili  x   /  AST  194<H>  /  ALT  44<H>  /  AlkPhos  69  12-25      proBNP: Serum Pro-Brain Natriuretic Peptide: 88822 pg/mL (12-25 @ 22:00)  Serum Pro-Brain Natriuretic Peptide: 7102 pg/mL (12-25 @ 13:40)

## 2022-12-27 NOTE — CHART NOTE - NSCHARTNOTEFT_GEN_A_CORE
PREOPERATIVE DAY OF PROCEDURE EVALUATION:  I have personally seen and examined the patient.  I agree with the history and physical which I have reviewed and noted any changes below.  (Signed electronically by __________)  12-27-22 @ 10:30      Cath Bleeding Risk: 15.1%  Prehydration: none d/t symptomatic CHF

## 2022-12-27 NOTE — PROGRESS NOTE ADULT - SUBJECTIVE AND OBJECTIVE BOX
Patient seen and evaluated this am, denies chest pain, still has some SOB      T(F): 96.4 (12-27-22 @ 07:01), Max: 98 (12-26-22 @ 14:56)  HR: 95 (12-27-22 @ 07:05)  BP: 149/70 (12-27-22 @ 07:05)  RR: 20  SpO2: 100% (12-27-22 @ 07:54) (96% - 100%)    PHYSICAL EXAM:  GENERAL: NAD  HEAD:  Atraumatic, Normocephalic  EYES: EOMI, PERRLA, conjunctiva and sclera clear  NERVOUS SYSTEM:  Alert & Oriented X3, no focal deficits   CHEST/LUNG:  bilateral rhonchi  HEART: Regular rate and rhythm; No murmurs, rubs, or gallops  ABDOMEN: Soft, Nontender, Nondistended; Bowel sounds present  EXTREMITIES:  2+ Peripheral Pulses, No clubbing, cyanosis, or edema    LABS  12-27    143  |  105  |  54<H>  ----------------------------<  132<H>  4.4   |  26  |  1.6<H>    Ca    9.4      27 Dec 2022 05:41  Mg     1.9     12-26    TPro  5.4<L>  /  Alb  3.5  /  TBili  0.4  /  DBili  x   /  AST  89<H>  /  ALT  38  /  AlkPhos  54  12-26                          9.4    11.80 )-----------( 129      ( 27 Dec 2022 05:41 )             29.4         PT/INR - ( 27 Dec 2022 05:41 )   PT: 12.70 sec;   INR: 1.11 ratio         PTT - ( 27 Dec 2022 05:41 )  PTT:27.0 sec    CARDIAC ENZYMES      Troponin T, Serum: 2.71 ng/mL (12-26-22 @ 11:51)  Troponin T, Serum: 3.36 ng/mL (12-25-22 @ 22:00)  Troponin T, Serum: 4.14 ng/mL (12-25-22 @ 13:40)    < from: 12 Lead ECG (12.26.22 @ 08:14) >  Diagnosis Line Normal sinus rhythm  Nonspecific ST-T changes    < end of copied text >  < from: TTE Echo Complete w/o Contrast w/ Doppler (12.26.22 @ 10:40) >  Summary:   1. LV Ejection Fraction by Baird's Method with a biplane EF of 78 %.   2. Normal left atrial size.   3. Mild mitral annular calcification.   4. Mild mitral valve regurgitation.   5. Mild tricuspid regurgitation.   6. Normal trileaflet aortic valvewith normal opening.   7. Mild pulmonic valve regurgitation.   8. Estimated pulmonary artery systolic pressure is 73.8 mmHg assuming a   right atrial pressure of 3 mmHg, which is consistent with severe   pulmonary hypertension.   9. There is mild aortic root calcification.    < end of copied text >    Culture Results:   No growth to date. (12-25-22)  Culture Results:   No growth to date. (12-25-22)    RADIOLOGY  < from: Xray Chest 1 View- PORTABLE-Urgent (12.25.22 @ 14:12) >  Impression:    Bibasilar opacities, increased from prior.    < end of copied text >    MEDICATIONS  (STANDING):  albuterol/ipratropium for Nebulization 3 milliLiter(s) Nebulizer every 6 hours  aspirin enteric coated 81 milliGRAM(s) Oral daily  atorvastatin 20 milliGRAM(s) Oral at bedtime  donepezil 10 milliGRAM(s) Oral at bedtime  doxycycline IVPB 100 milliGRAM(s) IV Intermittent every 12 hours      enoxaparin Injectable 60 milliGRAM(s) SubCutaneous every 12 hours  furosemide   Injectable 20 milliGRAM(s) IV Push daily  insulin glargine Injectable (LANTUS) 10 Unit(s) SubCutaneous at bedtime  insulin lispro (ADMELOG) corrective regimen sliding scale   SubCutaneous four times a day before meals  levoFLOXacin IVPB 750 milliGRAM(s) IV Intermittent every 24 hours  metoprolol succinate ER 12.5 milliGRAM(s) Oral daily  montelukast 10 milliGRAM(s) Oral daily    MEDICATIONS  (PRN):  dextrose Oral Gel 15 Gram(s) Oral once PRN Blood Glucose LESS THAN 70 milliGRAM(s)/deciliter

## 2022-12-27 NOTE — PROGRESS NOTE ADULT - SUBJECTIVE AND OBJECTIVE BOX
VENKATESH RAMACHANDRAN  82y, Female  Allergy: ACE inhibitors (Unknown)  BENZALKONIUM (Rash)  BETADINE (Rash)  Ceclor (Unknown)  codeine (Unknown)  latex (Unknown)  penicillin (Unknown)  RUBBER GLOVES (Rash)  shellfish (Unknown)  sulfonamides (Unknown)      LOS  2d    CHIEF COMPLAINT: NSTEMI (27 Dec 2022 09:04)      INTERVAL EVENTS/HPI  - No acute events overnight  - T(F): , Max: 98 (12-26-22 @ 14:56)  - planned for cardiac cath today   - WBC Count: 11.80 (12-27-22 @ 05:41)  WBC Count: 12.86 (12-26-22 @ 15:45)     - Creatinine, Serum: 1.6 (12-27-22 @ 05:41)  Creatinine, Serum: 1.7 (12-26-22 @ 15:45)       ROS  General: Denies rigors, nightsweats  HEENT: Denies headache, rhinorrhea, sore throat, eye pain  CV: Denies CP, palpitations  PULM: Denies wheezing, hemoptysis  GI: Denies hematemesis, hematochezia, melena  : Denies discharge, hematuria  MSK: Denies arthralgias, myalgias  SKIN: Denies rash, lesions  NEURO: Denies paresthesias, weakness  PSYCH: Denies depression, anxiety    VITALS:  T(F): 96.4, Max: 98 (12-26-22 @ 14:56)  HR: 95  BP: 149/70  RR: 23Vital Signs Last 24 Hrs  T(C): 35.8 (27 Dec 2022 07:01), Max: 36.7 (26 Dec 2022 14:56)  T(F): 96.4 (27 Dec 2022 07:01), Max: 98 (26 Dec 2022 14:56)  HR: 95 (27 Dec 2022 07:05) (95 - 112)  BP: 149/70 (27 Dec 2022 07:05) (120/58 - 155/73)  BP(mean): 101 (27 Dec 2022 07:05) (83 - 105)  RR: 23 (27 Dec 2022 07:05) (20 - 26)  SpO2: 100% (27 Dec 2022 07:54) (96% - 100%)    Parameters below as of 27 Dec 2022 07:54  Patient On (Oxygen Delivery Method): nasal cannula  O2 Flow (L/min): 2      PHYSICAL EXAM:  Gen: NAD, resting in bed  HEENT: Normocephalic, atraumatic  Neck: supple, no lymphadenopathy  CV: Regular rate & regular rhythm  Lungs: decreased BS at bases, no fremitus  Abdomen: Soft, BS present  Ext: Warm, well perfused  Neuro: non focal, awake  Skin: no rash, no erythema  Lines: no phlebitis    FH: Non-contributory  Social Hx: Non-contributory    TESTS & MEASUREMENTS:                        9.4    11.80 )-----------( 129      ( 27 Dec 2022 05:41 )             29.4     12-27    143  |  105  |  54<H>  ----------------------------<  132<H>  4.4   |  26  |  1.6<H>    Ca    9.4      27 Dec 2022 05:41  Mg     1.9     12-26    TPro  5.4<L>  /  Alb  3.5  /  TBili  0.4  /  DBili  x   /  AST  89<H>  /  ALT  38  /  AlkPhos  54  12-26      LIVER FUNCTIONS - ( 26 Dec 2022 15:45 )  Alb: 3.5 g/dL / Pro: 5.4 g/dL / ALK PHOS: 54 U/L / ALT: 38 U/L / AST: 89 U/L / GGT: x               Culture - Blood (collected 12-25-22 @ 14:55)  Source: .Blood Blood  Preliminary Report (12-27-22 @ 02:02):    No growth to date.    Culture - Blood (collected 12-25-22 @ 14:55)  Source: .Blood Blood  Preliminary Report (12-27-22 @ 02:02):    No growth to date.        Blood Gas Venous - Lactate: 4.70 mmol/L (12-25-22 @ 14:55)  Blood Gas Venous - Lactate: 9.30 mmol/L (12-25-22 @ 13:26)      INFECTIOUS DISEASES TESTING  COVID-19 PCR: NotDetec (12-26-22 @ 15:20)  Procalcitonin, Serum: 0.25 (12-26-22 @ 11:51)  Procalcitonin, Serum: 0.10 (04-27-22 @ 00:30)  Rapid RVP Result: NotDetec (04-26-22 @ 15:48)      INFLAMMATORY MARKERS      RADIOLOGY & ADDITIONAL TESTS:  I have personally reviewed the last available Chest xray  CXR  Xray Chest 1 View- PORTABLE-Urgent:   ACC: [id number] EXAM:  XR CHEST PORTABLE URGENT 1V                          PROCEDURE DATE:  12/25/2022          INTERPRETATION:  Clinical History / Reason for exam: Shortness of breath    Comparison : Chest radiograph December 1, 2022.    Technique/Positioning: Single AP view of the chest.    Findings:    Support devices: None.    Cardiac/mediastinum/hilum: Unchanged    Lung parenchyma/Pleura: Bibasilar opacities, increased from prior. No   pneumothorax.    Skeleton/soft tissues: Unchanged.    Impression:    Bibasilar opacities, increased from prior.        --- End of Report ---            ELLIOT LANDAU MD; Attending Radiologist  This document has been electronically signed. Dec 25 2022  3:03PM (12-25-22 @ 14:12)      CT      CARDIOLOGY TESTING  12 Lead ECG:   Ventricular Rate 99 BPM    Atrial Rate 99 BPM    P-R Interval 142 ms    QRS Duration 74 ms    Q-T Interval 366 ms    QTC Calculation(Bazett) 469 ms    P Axis 49 degrees    R Axis 23 degrees    T Axis 241 degrees    Diagnosis Line Normal sinus rhythm  Nonspecific ST-T changes    Confirmed by JONATHAN NO MD ([id number]) on 12/26/2022 10:46:33 AM (12-26-22 @ 08:14)  12 Lead ECG:   Ventricular Rate 98 BPM    Atrial Rate 98 BPM    P-R Interval 142 ms    QRS Duration 78 ms    Q-T Interval 330 ms    QTC Calculation(Bazett) 421 ms    P Axis 40 degrees    R Axis 7 degrees    T Axis 113 degrees    Diagnosis Line Normal sinus rhythm  Inferior infarct , age undetermined  Anterolateral infarct , age undetermined  Abnormal ECG    Confirmed by JONATHAN NO MD [id number]) on 12/26/2022 10:46:12 AM (12-25-22 @ 14:38)      MEDICATIONS  albuterol/ipratropium for Nebulization 3 Nebulizer every 6 hours  aspirin enteric coated 81 Oral daily  atorvastatin 20 Oral at bedtime  dextrose 5%. 1000 IV Continuous <Continuous>  dextrose 5%. 1000 IV Continuous <Continuous>  dextrose 50% Injectable 25 IV Push once  dextrose 50% Injectable 12.5 IV Push once  dextrose 50% Injectable 25 IV Push once  donepezil 10 Oral at bedtime  doxycycline IVPB     doxycycline IVPB 100 IV Intermittent every 12 hours  enoxaparin Injectable 60 SubCutaneous every 12 hours  furosemide   Injectable 20 IV Push daily  glucagon  Injectable 1 IntraMuscular once  insulin glargine Injectable (LANTUS) 10 SubCutaneous at bedtime  insulin lispro (ADMELOG) corrective regimen sliding scale  SubCutaneous four times a day before meals  levoFLOXacin IVPB 750 IV Intermittent every 24 hours  metoprolol succinate ER 12.5 Oral daily  montelukast 10 Oral daily      WEIGHT  Weight (kg): 63 (12-25-22 @ 19:13)  Creatinine, Serum: 1.6 mg/dL (12-27-22 @ 05:41)  Creatinine, Serum: 1.7 mg/dL (12-26-22 @ 15:45)  Creatinine, Serum: 1.6 mg/dL (12-26-22 @ 11:51)      ANTIBIOTICS:  doxycycline IVPB      doxycycline IVPB 100 milliGRAM(s) IV Intermittent every 12 hours  levoFLOXacin IVPB 750 milliGRAM(s) IV Intermittent every 24 hours      All available historical records have been reviewed       VENKATESH RAMACHANDRAN  82y, Female  Allergy: ACE inhibitors (Unknown)  BENZALKONIUM (Rash)  BETADINE (Rash)  Ceclor (Unknown)  codeine (Unknown)  latex (Unknown)  penicillin (Unknown)  RUBBER GLOVES (Rash)  shellfish (Unknown)  sulfonamides (Unknown)      LOS  2d    CHIEF COMPLAINT: NSTEMI (27 Dec 2022 09:04)      INTERVAL EVENTS/HPI  - No acute events overnight  - T(F): , Max: 98 (12-26-22 @ 14:56)  - planned for cardiac cath today   - remains dyspneic - on 2-3 L NC  - WBC Count: 11.80 (12-27-22 @ 05:41)  WBC Count: 12.86 (12-26-22 @ 15:45)     - Creatinine, Serum: 1.6 (12-27-22 @ 05:41)  Creatinine, Serum: 1.7 (12-26-22 @ 15:45)       ROS  General: Denies rigors, nightsweats  HEENT: Denies headache, rhinorrhea, sore throat, eye pain  CV: Denies CP, palpitations  PULM: Denies wheezing, hemoptysis  GI: Denies hematemesis, hematochezia, melena  : Denies discharge, hematuria  MSK: Denies arthralgias, myalgias  SKIN: Denies rash, lesions  NEURO: Denies paresthesias, weakness  PSYCH: Denies depression, anxiety    VITALS:  T(F): 96.4, Max: 98 (12-26-22 @ 14:56)  HR: 95  BP: 149/70  RR: 23Vital Signs Last 24 Hrs  T(C): 35.8 (27 Dec 2022 07:01), Max: 36.7 (26 Dec 2022 14:56)  T(F): 96.4 (27 Dec 2022 07:01), Max: 98 (26 Dec 2022 14:56)  HR: 95 (27 Dec 2022 07:05) (95 - 112)  BP: 149/70 (27 Dec 2022 07:05) (120/58 - 155/73)  BP(mean): 101 (27 Dec 2022 07:05) (83 - 105)  RR: 23 (27 Dec 2022 07:05) (20 - 26)  SpO2: 100% (27 Dec 2022 07:54) (96% - 100%)    Parameters below as of 27 Dec 2022 07:54  Patient On (Oxygen Delivery Method): nasal cannula  O2 Flow (L/min): 2      PHYSICAL EXAM:  Gen: NAD, resting in bed  HEENT: Normocephalic, atraumatic  Neck: supple, no lymphadenopathy  CV: Regular rate & regular rhythm  Lungs: decreased BS at bases, no fremitus  Abdomen: Soft, BS present  Ext: Warm, well perfused  Neuro: non focal, awake  Skin: no rash, no erythema  Lines: no phlebitis    FH: Non-contributory  Social Hx: Non-contributory    TESTS & MEASUREMENTS:                        9.4    11.80 )-----------( 129      ( 27 Dec 2022 05:41 )             29.4     12-27    143  |  105  |  54<H>  ----------------------------<  132<H>  4.4   |  26  |  1.6<H>    Ca    9.4      27 Dec 2022 05:41  Mg     1.9     12-26    TPro  5.4<L>  /  Alb  3.5  /  TBili  0.4  /  DBili  x   /  AST  89<H>  /  ALT  38  /  AlkPhos  54  12-26      LIVER FUNCTIONS - ( 26 Dec 2022 15:45 )  Alb: 3.5 g/dL / Pro: 5.4 g/dL / ALK PHOS: 54 U/L / ALT: 38 U/L / AST: 89 U/L / GGT: x               Culture - Blood (collected 12-25-22 @ 14:55)  Source: .Blood Blood  Preliminary Report (12-27-22 @ 02:02):    No growth to date.    Culture - Blood (collected 12-25-22 @ 14:55)  Source: .Blood Blood  Preliminary Report (12-27-22 @ 02:02):    No growth to date.        Blood Gas Venous - Lactate: 4.70 mmol/L (12-25-22 @ 14:55)  Blood Gas Venous - Lactate: 9.30 mmol/L (12-25-22 @ 13:26)      INFECTIOUS DISEASES TESTING  COVID-19 PCR: NotDetec (12-26-22 @ 15:20)  Procalcitonin, Serum: 0.25 (12-26-22 @ 11:51)  Procalcitonin, Serum: 0.10 (04-27-22 @ 00:30)  Rapid RVP Result: NotDetec (04-26-22 @ 15:48)      INFLAMMATORY MARKERS      RADIOLOGY & ADDITIONAL TESTS:  I have personally reviewed the last available Chest xray  CXR  Xray Chest 1 View- PORTABLE-Urgent:   ACC: [id number] EXAM:  XR CHEST PORTABLE URGENT 1V                          PROCEDURE DATE:  12/25/2022          INTERPRETATION:  Clinical History / Reason for exam: Shortness of breath    Comparison : Chest radiograph December 1, 2022.    Technique/Positioning: Single AP view of the chest.    Findings:    Support devices: None.    Cardiac/mediastinum/hilum: Unchanged    Lung parenchyma/Pleura: Bibasilar opacities, increased from prior. No   pneumothorax.    Skeleton/soft tissues: Unchanged.    Impression:    Bibasilar opacities, increased from prior.        --- End of Report ---            ELLIOT LANDAU MD; Attending Radiologist  This document has been electronically signed. Dec 25 2022  3:03PM (12-25-22 @ 14:12)      CT      CARDIOLOGY TESTING  12 Lead ECG:   Ventricular Rate 99 BPM    Atrial Rate 99 BPM    P-R Interval 142 ms    QRS Duration 74 ms    Q-T Interval 366 ms    QTC Calculation(Bazett) 469 ms    P Axis 49 degrees    R Axis 23 degrees    T Axis 241 degrees    Diagnosis Line Normal sinus rhythm  Nonspecific ST-T changes    Confirmed by JONATHAN NO MD ([id number]) on 12/26/2022 10:46:33 AM (12-26-22 @ 08:14)  12 Lead ECG:   Ventricular Rate 98 BPM    Atrial Rate 98 BPM    P-R Interval 142 ms    QRS Duration 78 ms    Q-T Interval 330 ms    QTC Calculation(Bazett) 421 ms    P Axis 40 degrees    R Axis 7 degrees    T Axis 113 degrees    Diagnosis Line Normal sinus rhythm  Inferior infarct , age undetermined  Anterolateral infarct , age undetermined  Abnormal ECG    Confirmed by JONATHAN NO MD ([id number]) on 12/26/2022 10:46:12 AM (12-25-22 @ 14:38)      MEDICATIONS  albuterol/ipratropium for Nebulization 3 Nebulizer every 6 hours  aspirin enteric coated 81 Oral daily  atorvastatin 20 Oral at bedtime  dextrose 5%. 1000 IV Continuous <Continuous>  dextrose 5%. 1000 IV Continuous <Continuous>  dextrose 50% Injectable 25 IV Push once  dextrose 50% Injectable 12.5 IV Push once  dextrose 50% Injectable 25 IV Push once  donepezil 10 Oral at bedtime  doxycycline IVPB     doxycycline IVPB 100 IV Intermittent every 12 hours  enoxaparin Injectable 60 SubCutaneous every 12 hours  furosemide   Injectable 20 IV Push daily  glucagon  Injectable 1 IntraMuscular once  insulin glargine Injectable (LANTUS) 10 SubCutaneous at bedtime  insulin lispro (ADMELOG) corrective regimen sliding scale  SubCutaneous four times a day before meals  levoFLOXacin IVPB 750 IV Intermittent every 24 hours  metoprolol succinate ER 12.5 Oral daily  montelukast 10 Oral daily      WEIGHT  Weight (kg): 63 (12-25-22 @ 19:13)  Creatinine, Serum: 1.6 mg/dL (12-27-22 @ 05:41)  Creatinine, Serum: 1.7 mg/dL (12-26-22 @ 15:45)  Creatinine, Serum: 1.6 mg/dL (12-26-22 @ 11:51)      ANTIBIOTICS:  doxycycline IVPB      doxycycline IVPB 100 milliGRAM(s) IV Intermittent every 12 hours  levoFLOXacin IVPB 750 milliGRAM(s) IV Intermittent every 24 hours      All available historical records have been reviewed

## 2022-12-27 NOTE — PROGRESS NOTE ADULT - ASSESSMENT
82F w/ pmhx of HFpEF, , HLD, HTN, COPD on 3L home O2, DM1, dementia, GERD, CAD s/p stents presented to the ED with severe SOB    NSTEMI / acute on chronic HFpEF     - aspirin, Lipitor, metoprolol, Lovenox   - IV Lasix and negative fluid balance   - check repeat procal and continue Levaquinand doxy as per ID   - NPO for  cardiac cath today

## 2022-12-27 NOTE — PROGRESS NOTE ADULT - ASSESSMENT
Patient is a 82y old  Female with pmhx of CHF, HLD, HTN, COPD on 3L home O2, DM1, dementia, GERD, MI in 1992, CAD with 5 cardiac stents who presents to the ER for evaluation of 1 month of worsening SOB. For past 1 month she has been having dry cough and SOB. She saw her pulmonologist Dr. Feldman who did CXR and showed it was normal at the time, had her on levaquin and then increased her dose which she completed last week. Yesterday she started having crushing chest pressure substernally. Denies fever during this month, nausea vomiting back pain abd pain urinary sx or diarrhea. No recent travel or sick contact. On admission, she found to have hypoxia and Leukocytosis, and CXR shows Bibasilar opacities, increased from prior. She has started on Doxycycline and Levaquin . The ID consult requested to assist with further evaluation and antibiotic management.    # Pneumonia-  CXR as of 12/25 shows Bibasilar opacities, increased from prior.  # Leukocytosis  - Procalcitonin, Serum: 0.25(12.26.22 @ 11:51)  # Acute on chronic respiratory failure -on BIPAP    Recommendations   - repeat procalcitonin to trend   - continue levofloxacin for now   - ok to continue doxycycline for anti-inflammatory effect  - trend WBC     Please call or message on Microsoft Teams if with any questions.  Spectra [phone]

## 2022-12-27 NOTE — PROGRESS NOTE ADULT - SUBJECTIVE AND OBJECTIVE BOX
Patient is a 82y old  Female who presents with a chief complaint of NSTEMI (26 Dec 2022 10:13)      INTERVAL HPI/OVERNIGHT EVENTS:   No overnight events   Afebrile, hemodynamically stable     ICU Vital Signs Last 24 Hrs  T(C): 35.8 (27 Dec 2022 07:01), Max: 36.7 (26 Dec 2022 14:56)  T(F): 96.4 (27 Dec 2022 07:01), Max: 98 (26 Dec 2022 14:56)  HR: 95 (27 Dec 2022 07:05) (95 - 112)  BP: 149/70 (27 Dec 2022 07:05) (120/58 - 155/73)  BP(mean): 101 (27 Dec 2022 07:05) (83 - 105)  ABP: --  ABP(mean): --  RR: 23 (27 Dec 2022 07:05) (20 - 26)  SpO2: 100% (27 Dec 2022 07:54) (96% - 100%)    O2 Parameters below as of 27 Dec 2022 07:54  Patient On (Oxygen Delivery Method): nasal cannula  O2 Flow (L/min): 2        I&O's Summary    26 Dec 2022 07:01  -  27 Dec 2022 07:00  --------------------------------------------------------  IN: 250 mL / OUT: 0 mL / NET: 250 mL    27 Dec 2022 07:01  -  27 Dec 2022 09:04  --------------------------------------------------------  IN: 0 mL / OUT: 400 mL / NET: -400 mL          LABS:                        9.4    11.80 )-----------( 129      ( 27 Dec 2022 05:41 )             29.4     12-27    143  |  105  |  54<H>  ----------------------------<  132<H>  4.4   |  26  |  1.6<H>    Ca    9.4      27 Dec 2022 05:41  Mg     1.9     12-26    TPro  5.4<L>  /  Alb  3.5  /  TBili  0.4  /  DBili  x   /  AST  89<H>  /  ALT  38  /  AlkPhos  54  12-26    PT/INR - ( 27 Dec 2022 05:41 )   PT: 12.70 sec;   INR: 1.11 ratio         PTT - ( 27 Dec 2022 05:41 )  PTT:27.0 sec    CAPILLARY BLOOD GLUCOSE      POCT Blood Glucose.: 152 mg/dL (27 Dec 2022 07:23)  POCT Blood Glucose.: 145 mg/dL (27 Dec 2022 03:32)  POCT Blood Glucose.: 428 mg/dL (26 Dec 2022 21:22)  POCT Blood Glucose.: 410 mg/dL (26 Dec 2022 16:56)  POCT Blood Glucose.: 337 mg/dL (26 Dec 2022 11:40)        RADIOLOGY & ADDITIONAL TESTS:    Consultant(s) Notes Reviewed:  [x ] YES  [ ] NO    MEDICATIONS  (STANDING):  albuterol/ipratropium for Nebulization 3 milliLiter(s) Nebulizer every 6 hours  aspirin enteric coated 81 milliGRAM(s) Oral daily  atorvastatin 20 milliGRAM(s) Oral at bedtime  dextrose 5%. 1000 milliLiter(s) (100 mL/Hr) IV Continuous <Continuous>  dextrose 5%. 1000 milliLiter(s) (50 mL/Hr) IV Continuous <Continuous>  dextrose 50% Injectable 25 Gram(s) IV Push once  dextrose 50% Injectable 12.5 Gram(s) IV Push once  dextrose 50% Injectable 25 Gram(s) IV Push once  donepezil 10 milliGRAM(s) Oral at bedtime  doxycycline IVPB      doxycycline IVPB 100 milliGRAM(s) IV Intermittent every 12 hours  enoxaparin Injectable 60 milliGRAM(s) SubCutaneous every 12 hours  furosemide   Injectable 20 milliGRAM(s) IV Push daily  glucagon  Injectable 1 milliGRAM(s) IntraMuscular once  insulin glargine Injectable (LANTUS) 10 Unit(s) SubCutaneous at bedtime  insulin lispro (ADMELOG) corrective regimen sliding scale   SubCutaneous four times a day before meals  levoFLOXacin IVPB 750 milliGRAM(s) IV Intermittent every 24 hours  metoprolol succinate ER 12.5 milliGRAM(s) Oral daily  montelukast 10 milliGRAM(s) Oral daily    MEDICATIONS  (PRN):  dextrose Oral Gel 15 Gram(s) Oral once PRN Blood Glucose LESS THAN 70 milliGRAM(s)/deciliter      PHYSICAL EXAM:  GENERAL:   HEAD:  Atraumatic, Normocephalic  EYES: EOMI, PERRLA, conjunctiva and sclera clear  NECK: Supple, No JVD, Normal thyroid, no enlarged nodes  NERVOUS SYSTEM:  Alert & Awake.   CHEST/LUNG: B/L good air entry; No rales, rhonchi, or wheezing  HEART: S1S2 normal, no S3, Regular rate and rhythm  ABDOMEN: Soft, Nontender, Nondistended  EXTREMITIES:  2+ Peripheral Pulses, No clubbing, cyanosis, or edema  SKIN: No rashes or lesions    Care Discussed with Consultants/Other Providers [ x] YES  [ ] NO

## 2022-12-27 NOTE — CHART NOTE - NSCHARTNOTEFT_GEN_A_CORE
PRE-OP DIAGNOSIS:    NSTEMI    PROCEDURE:     [x] Coronary Angiogram     [x] LHC     [] LVG     [] RHC     [] Intervention (see below)         PHYSICIAN:  Dr Leong    ASSISTANT: Dr DAVID Brito     PROCEDURE DESCRIPTION:     Consent:      [x] Patient     [] Family Member     []  Used        Anesthesia:     [] General     [x] Sedation     [x] Local        Access & Closure:     [x] 6 Fr Right Radial Artery (TR band closure)     [] Fr Femoral Artery     [] Fr Femoral Vein     [] Fr Brachial Vein       IV Contrast: 40 mL        Intervention: None      Implants: None       FINDINGS:     Coronary Dominance: Right      LM: No disease    LAD: Prox LAD calcified 80% stenosis, mid LAD 90% calcified ISR  D1 mild disease    CX: dCx 70% stenosis  OM1 Mild disease small vessel  OM2 mild disease     RCA: Patent stents in prox and mid RCA  RPDA mild disease       LVEDP: 30 mmHg        ESTIMATED BLOOD LOSS: < 10 mL        CONDITION:     [x] Good     [] Fair     [] Critical        SPECIMEN REMOVED: N/A       POST-OP DIAGNOSIS:      [x] 2 Vessel Coronary Artery Disease: LAD, Cx  [x] CHF exacerbation        PLAN OF CARE:     [x] Transfer to cardio stepdown unit    [x] Return for Staged Procedure possible Rotational Atherectomy on Thursday     [x] Medications: Aspirin 81 mg qd, Plavix 75mg qd, Lipitor 40mg qd, Toprol 25mg qd. I/V Lasix 40mg BID for now, switch to Bumex if not making adequate urine on lasix.     [] IV Fluids: No I/V fluids due to CHF exacerbation

## 2022-12-28 NOTE — PROGRESS NOTE ADULT - ATTENDING COMMENTS
CAD s/p PCI  Severe COPD / Pulm HTN    NSTEMI    No chest pain.  Hemodynamics stable.  Plan for PCI LAD tomorrow.    - Cont DAPT / Metoprolol  - PCI as planned

## 2022-12-28 NOTE — CONSULT NOTE ADULT - SUBJECTIVE AND OBJECTIVE BOX
NEPHROLOGY CONSULTATION NOTE    82 year old lady well know to me with COPD, home O2 and nodules was sent to ER for persistent SOB despite nebulizer treatments and additional Prednisone use. She had mild chest pain. Found to have NSTEMI with acute on chronic HF s/p cardiac cath.  Patient seen at bedside, still complaining of orthopnea.  VS mildly elevated BP, saturating well on 3 L NC.    PAST MEDICAL & SURGICAL HISTORY:  MI (myocardial infarction)  s/p 5 stents      HTN (hypertension)      High cholesterol      Heart failure      Non Hodgkin&#x27;s lymphoma  in remission. Follows with Dr Hernandez      PVD (peripheral vascular disease)      Thrombocytopenia      CKD (chronic kidney disease)      Osteoarthritis      Diabetes  on insulin      Coronary artery disease involving native heart without angina pectoris, unspecified vessel or lesion type  s/p 5 stents      History of cholecystectomy      H/O cardiac catheterization  5 STENTS      H/O carotid endarterectomy  RIGHT        Allergies:  ACE inhibitors (Unknown)  BENZALKONIUM (Rash)  BETADINE (Rash)  Ceclor (Unknown)  codeine (Unknown)  latex (Unknown)  penicillin (Unknown)  RUBBER GLOVES (Rash)  shellfish (Unknown)  sulfonamides (Unknown)    Home Medications Reviewed  Hospital Medications:   MEDICATIONS  (STANDING):  albuterol/ipratropium for Nebulization 3 milliLiter(s) Nebulizer every 6 hours  aspirin enteric coated 81 milliGRAM(s) Oral daily  atorvastatin 40 milliGRAM(s) Oral at bedtime  clopidogrel Tablet 75 milliGRAM(s) Oral daily  dextrose 5%. 1000 milliLiter(s) (100 mL/Hr) IV Continuous <Continuous>  dextrose 5%. 1000 milliLiter(s) (50 mL/Hr) IV Continuous <Continuous>  dextrose 50% Injectable 25 Gram(s) IV Push once  dextrose 50% Injectable 12.5 Gram(s) IV Push once  dextrose 50% Injectable 25 Gram(s) IV Push once  donepezil 10 milliGRAM(s) Oral at bedtime  doxycycline IVPB      doxycycline IVPB 100 milliGRAM(s) IV Intermittent every 12 hours  furosemide   Injectable 40 milliGRAM(s) IV Push two times a day  glucagon  Injectable 1 milliGRAM(s) IntraMuscular once  insulin glargine Injectable (LANTUS) 10 Unit(s) SubCutaneous at bedtime  insulin lispro (ADMELOG) corrective regimen sliding scale   SubCutaneous four times a day before meals  levoFLOXacin IVPB 750 milliGRAM(s) IV Intermittent every 24 hours  metoprolol succinate ER 25 milliGRAM(s) Oral daily  montelukast 10 milliGRAM(s) Oral daily    SOCIAL HISTORY:  Denies ETOH,Smoking,   FAMILY HISTORY:  FH: CAD (coronary artery disease)  FATHER        REVIEW OF SYSTEMS:  CONSTITUTIONAL: No weakness, fevers or chills  EYES/ENT: No visual changes;  No vertigo or throat pain   NECK: No pain or stiffness  RESPIRATORY: No cough, wheezing, hemoptysis; No shortness of breath  CARDIOVASCULAR: No chest pain or palpitations.  GASTROINTESTINAL: No abdominal or epigastric pain. No nausea, vomiting, or hematemesis; No diarrhea or constipation. No melena or hematochezia.  GENITOURINARY: No dysuria, frequency, foamy urine, urinary urgency, incontinence or hematuria  NEUROLOGICAL: No numbness or weakness  SKIN: No itching, burning, rashes, or lesions   VASCULAR: No bilateral lower extremity edema.   All other review of systems is negative unless indicated above.    VITALS:  Vital Signs Last 24 Hrs  T(C): 36.4 (28 Dec 2022 08:18), Max: 36.4 (28 Dec 2022 04:10)  T(F): 97.6 (28 Dec 2022 08:18), Max: 97.6 (28 Dec 2022 08:18)  HR: 96 (28 Dec 2022 08:18) (86 - 96)  BP: 145/58 (28 Dec 2022 08:18) (145/58 - 167/71)  BP(mean): 84 (28 Dec 2022 08:18) (84 - 102)  RR: 25 (28 Dec 2022 08:18) (16 - 25)  SpO2: 100% (28 Dec 2022 08:18) (98% - 100%)    Parameters below as of 28 Dec 2022 08:18  Patient On (Oxygen Delivery Method): nasal cannula  O2 Flow (L/min): 3      12-27 @ 07:01  -  12-28 @ 07:00  --------------------------------------------------------  IN: 410 mL / OUT: 1800 mL / NET: -1390 mL    12-28 @ 07:01  -  12-28 @ 10:56  --------------------------------------------------------  IN: 160 mL / OUT: 350 mL / NET: -190 mL        PHYSICAL EXAM:  Constitutional: NAD  HEENT: anicteric sclera, oropharynx clear, MMM  Neck: No JVD  Respiratory: CTAB, no wheezes, rales or rhonchi  Cardiovascular: S1, S2, RRR  Gastrointestinal: BS+, soft, NT/ND  Extremities: No cyanosis or clubbing. No peripheral edema  Neurological: A/O x 3, no focal deficits  Psychiatric: Normal mood, normal affect  : No CVA tenderness. No stockton.   Skin: No rashes  Vascular Access:    LABS:  12-28    144  |  100  |  52<H>  ----------------------------<  116<H>  4.1   |  28  |  1.5    Ca    9.4      28 Dec 2022 06:11  Mg     1.9     12-28    TPro  5.8<L>  /  Alb  3.9  /  TBili  1.0  /  DBili      /  AST  50<H>  /  ALT  33  /  AlkPhos  67  12-28    Creatinine Trend: 1.5 <--, 1.6 <--, 1.7 <--, 1.6 <--, 1.5 <--, 1.5 <--                        10.7   11.26 )-----------( 148      ( 28 Dec 2022 06:11 )             32.6     Urine Studies:              RADIOLOGY & ADDITIONAL STUDIES:                 NEPHROLOGY CONSULTATION NOTE    82 year old lady well know to me with COPD, home O2 and nodules was sent to ER for persistent SOB despite nebulizer treatments and additional Prednisone use. She had mild chest pain. Found to have NSTEMI with acute on chronic HF s/p cardiac cath.  Patient seen at bedside, still complaining of orthopnea.  VS mildly elevated BP, saturating well on 3 L NC.    PAST MEDICAL & SURGICAL HISTORY:  MI (myocardial infarction)  s/p 5 stents      HTN (hypertension)      High cholesterol      Heart failure      Non Hodgkin&#x27;s lymphoma  in remission. Follows with Dr Hernandez      PVD (peripheral vascular disease)      Thrombocytopenia      CKD (chronic kidney disease)      Osteoarthritis      Diabetes  on insulin      Coronary artery disease involving native heart without angina pectoris, unspecified vessel or lesion type  s/p 5 stents      History of cholecystectomy      H/O cardiac catheterization  5 STENTS      H/O carotid endarterectomy  RIGHT        Allergies:  ACE inhibitors (Unknown)  BENZALKONIUM (Rash)  BETADINE (Rash)  Ceclor (Unknown)  codeine (Unknown)  latex (Unknown)  penicillin (Unknown)  RUBBER GLOVES (Rash)  shellfish (Unknown)  sulfonamides (Unknown)    Home Medications Reviewed  Hospital Medications:   MEDICATIONS  (STANDING):  albuterol/ipratropium for Nebulization 3 milliLiter(s) Nebulizer every 6 hours  aspirin enteric coated 81 milliGRAM(s) Oral daily  atorvastatin 40 milliGRAM(s) Oral at bedtime  clopidogrel Tablet 75 milliGRAM(s) Oral daily  donepezil 10 milliGRAM(s) Oral at bedtime  doxycycline IVPB 100 milliGRAM(s) IV Intermittent every 12 hours  furosemide   Injectable 40 milliGRAM(s) IV Push two times a day  glucagon  Injectable 1 milliGRAM(s) IntraMuscular once  insulin glargine Injectable (LANTUS) 10 Unit(s) SubCutaneous at bedtime  insulin lispro (ADMELOG) corrective regimen sliding scale   SubCutaneous four times a day before meals  levoFLOXacin IVPB 750 milliGRAM(s) IV Intermittent every 24 hours  metoprolol succinate ER 25 milliGRAM(s) Oral daily  montelukast 10 milliGRAM(s) Oral daily    SOCIAL HISTORY:  Denies ETOH,Smoking,   FAMILY HISTORY:  FH: CAD (coronary artery disease)  FATHER        REVIEW OF SYSTEMS:  CONSTITUTIONAL: No weakness, fevers or chills  EYES/ENT: No visual changes;  No vertigo or throat pain   NECK: No pain or stiffness  RESPIRATORY: No cough, wheezing, hemoptysis; No shortness of breath  CARDIOVASCULAR: No chest pain or palpitations.  GASTROINTESTINAL: No abdominal or epigastric pain. No nausea, vomiting, or hematemesis; No diarrhea or constipation. No melena or hematochezia.  GENITOURINARY: No dysuria, frequency, foamy urine, urinary urgency, incontinence or hematuria  NEUROLOGICAL: No numbness or weakness  SKIN: No itching, burning, rashes, or lesions   VASCULAR: No bilateral lower extremity edema.   All other review of systems is negative unless indicated above.    VITALS:  Vital Signs Last 24 Hrs  T(C): 36.4 (28 Dec 2022 08:18), Max: 36.4 (28 Dec 2022 04:10)  T(F): 97.6 (28 Dec 2022 08:18), Max: 97.6 (28 Dec 2022 08:18)  HR: 96 (28 Dec 2022 08:18) (86 - 96)  BP: 145/58 (28 Dec 2022 08:18) (145/58 - 167/71)  BP(mean): 84 (28 Dec 2022 08:18) (84 - 102)  RR: 25 (28 Dec 2022 08:18) (16 - 25)  SpO2: 100% (28 Dec 2022 08:18) (98% - 100%)    Parameters below as of 28 Dec 2022 08:18  Patient On (Oxygen Delivery Method): nasal cannula  O2 Flow (L/min): 3      12-27 @ 07:01 - 12-28 @ 07:00  --------------------------------------------------------  IN: 410 mL / OUT: 1800 mL / NET: -1390 mL    12-28 @ 07:01 - 12-28 @ 10:56  --------------------------------------------------------  IN: 160 mL / OUT: 350 mL / NET: -190 mL        PHYSICAL EXAM:  Constitutional: NAD  HEENT: anicteric sclera, oropharynx clear, MMM  Neck: No JVD  Respiratory: CTAB, no wheezes, rales or rhonchi  Cardiovascular: S1, S2, RRR  Gastrointestinal: BS+, soft, NT/ND  Extremities: No cyanosis or clubbing. No peripheral edema  Neurological: A/O x 3, no focal deficits  Psychiatric: Normal mood, normal affect  : No CVA tenderness. No stockton.   Skin: No rashes  Vascular Access:    LABS:  12-28    144  |  100  |  52<H>  ----------------------------<  116<H>  4.1   |  28  |  1.5    Ca    9.4      28 Dec 2022 06:11  Mg     1.9     12-28    TPro  5.8<L>  /  Alb  3.9  /  TBili  1.0  /  DBili      /  AST  50<H>  /  ALT  33  /  AlkPhos  67  12-28    Creatinine Trend: 1.5 <--, 1.6 <--, 1.7 <--, 1.6 <--, 1.5 <--, 1.5 <--                        10.7   11.26 )-----------( 148      ( 28 Dec 2022 06:11 )             32.6     Urine Studies:              RADIOLOGY & ADDITIONAL STUDIES:

## 2022-12-28 NOTE — MEDICAL STUDENT PROGRESS NOTE(EDUCATION) - NS MD HP STUD ASPLAN PLAN FT
83 yo F w/ pmhx of CHF, HLD, HTN, COPD on 3L home O2, DM1, dementia, GERD, MI in 1992, CAD w cardiac stents presented to the ED on 12/25 with progressive SOB of breath for the last month. On 12/26 was admitted to 4T after experiencing NSTEMI and acute on chronic HFpEF     #shortness of breath secondary to PNA  -progressive SOB for 1 month   -initial CXR on 12/25 showed bibasilar opacities  -repeat CXR today shows improvement  -WBC trending down 12.86 > 11.8 > 11.26  -procal .20  -c/w levaquin and doxycycline    #NSTEMI acute on chronic HFpEF   - troponin trending down 4.1 > 3.36 > 2.71  - Pro-BNP uptrending 7102 --> 92398,   - TTE on 12/26 showed EF of 78%  - cath done 12/27 showed 2 vessel CAD: stenosis 80% of  prox LAD,  90 % of mid LAD, 70% of dCx  - cw aspirin 81mg, lipitor, metoprolol, lovenox  - c/w IV lasix 40 mg IV BID, switch to Bumex if urine output is not adequate  - going for rotational atherectomy on 12/29    #nephro consult for contrast risk stratification  -CKD 3b  -Creatinine 1.5 at baseline  -patient at risk for JUDIT  -no IVF pre and post procedure given volume overload and pulmonary edema requiring oxygen  -continue to monitor BMP and strict i/os  -electrolytes reviewed  -will follow

## 2022-12-28 NOTE — PROGRESS NOTE ADULT - SUBJECTIVE AND OBJECTIVE BOX
HPI:  82F w/ pmhx of CHF, HLD, HTN, COPD on 3L home O2, DM1, dementia, GERD, MI in 1992, CAD with 5 cardiac stents who present with 1 month of worsening SOB. For past 1 month she has been having dry cough and SOB. She saw her pulmonologist Dr. Feldman who did CXR and showed it was normal at the time, had her on levaquin and then increased her dose which she completed last week. Yesterday she started having crushing chest pressure substernally. Denies fever during this month, nausea vomiting back pain abd pain urinary sx or diarrhea. No recent travel or sick contact. Her cardiologist is Dr. Munoz.  (25 Dec 2022 18:25)      INTERVAL HISTORY:  Laying comfortable in bed. No complaints    PAST MEDICAL & SURGICAL HISTORY  MI (myocardial infarction)  s/p 5 stents    HTN (hypertension)    High cholesterol    Heart failure    Non Hodgkin&#x27;s lymphoma  in remission. Follows with Dr Hernandez    PVD (peripheral vascular disease)    Thrombocytopenia    CKD (chronic kidney disease)    Osteoarthritis    Diabetes  on insulin    Coronary artery disease involving native heart without angina pectoris, unspecified vessel or lesion type  s/p 5 stents    History of cholecystectomy    H/O cardiac catheterization  5 STENTS    H/O carotid endarterectomy  RIGHT        ALLERGIES:  ACE inhibitors (Unknown)  BENZALKONIUM (Rash)  BETADINE (Rash)  Ceclor (Unknown)  codeine (Unknown)  latex (Unknown)  penicillin (Unknown)  RUBBER GLOVES (Rash)  shellfish (Unknown)  sulfonamides (Unknown)      MEDICATIONS:  MEDICATIONS  (STANDING):  albuterol/ipratropium for Nebulization 3 milliLiter(s) Nebulizer every 6 hours  aspirin enteric coated 81 milliGRAM(s) Oral daily  atorvastatin 40 milliGRAM(s) Oral at bedtime  calcium gluconate IVPB 2 Gram(s) IV Intermittent once  clopidogrel Tablet 75 milliGRAM(s) Oral daily  dextrose 5%. 1000 milliLiter(s) (50 mL/Hr) IV Continuous <Continuous>  dextrose 5%. 1000 milliLiter(s) (100 mL/Hr) IV Continuous <Continuous>  dextrose 50% Injectable 25 Gram(s) IV Push once  dextrose 50% Injectable 12.5 Gram(s) IV Push once  dextrose 50% Injectable 25 Gram(s) IV Push once  diphenhydrAMINE Injectable 50 milliGRAM(s) IV Push once  donepezil 10 milliGRAM(s) Oral at bedtime  doxycycline IVPB      doxycycline IVPB 100 milliGRAM(s) IV Intermittent every 12 hours  furosemide   Injectable 40 milliGRAM(s) IV Push two times a day  glucagon  Injectable 1 milliGRAM(s) IntraMuscular once  hydrocortisone sodium succinate IVPB 200 milliGRAM(s) IV Intermittent once  insulin glargine Injectable (LANTUS) 10 Unit(s) SubCutaneous at bedtime  insulin lispro (ADMELOG) corrective regimen sliding scale   SubCutaneous four times a day before meals  levoFLOXacin IVPB 750 milliGRAM(s) IV Intermittent every 24 hours  metoprolol succinate ER 25 milliGRAM(s) Oral daily  montelukast 10 milliGRAM(s) Oral daily  sodium chloride 0.9% with potassium chloride 20 mEq/L 1000 milliLiter(s) (50 mL/Hr) IV Continuous <Continuous>    MEDICATIONS  (PRN):  dextrose Oral Gel 15 Gram(s) Oral once PRN Blood Glucose LESS THAN 70 milliGRAM(s)/deciliter      HOME MEDICATIONS:  Home Medications:  albuterol 90 mcg/inh inhalation aerosol: 2 puff(s) inhaled every 6 hours, As Needed (25 Dec 2022 14:24)  atorvastatin 20 mg oral tablet: 1 tab(s) orally once a day (25 Dec 2022 14:24)  donepezil 10 mg oral tablet: 1 tab(s) orally once a day (at bedtime) (25 Dec 2022 14:24)  gabapentin: orally 2 times a day (25 Dec 2022 14:24)  inositol 650 mg oral tablet: 2 tab(s) orally once a day (25 Dec 2022 14:24)  Lasix 20 mg oral tablet: 1 tab(s) orally once a day (25 Dec 2022 14:24)  losartan 100 mg oral tablet: 1 tab(s) orally once a day (25 Dec 2022 14:24)  montelukast 10 mg oral tablet: 1 tab(s) orally once a day (25 Dec 2022 14:24)  NovoLOG 100 units/mL subcutaneous solution: 10,7,7  subcutaneous (25 Dec 2022 14:24)  omeprazole 40 mg oral delayed release capsule: 1 cap(s) orally once a day (25 Dec 2022 14:24)  omeprazole 40 mg oral delayed release capsule: 1 cap(s) orally once a day (25 Dec 2022 14:24)  Soliqua 100/33 subcutaneous solution: 35 unit(s) subcutaneous once a day (25 Dec 2022 14:24)  Spiriva 18 mcg inhalation capsule: 1 cap(s) inhaled once a day (25 Dec 2022 14:24)        OBJECTIVE:  ICU Vital Signs Last 24 Hrs  T(C): 36.1 (29 Dec 2022 08:02), Max: 37.1 (28 Dec 2022 16:00)  T(F): 96.9 (29 Dec 2022 08:02), Max: 98.7 (28 Dec 2022 16:00)  HR: 97 (29 Dec 2022 08:02) (88 - 97)  BP: 146/67 (29 Dec 2022 08:02) (132/61 - 146/67)  BP(mean): 97 (29 Dec 2022 08:02) (88 - 112)  ABP: --  ABP(mean): --  RR: 19 (29 Dec 2022 08:02) (15 - 19)  SpO2: 99% (29 Dec 2022 08:02) (96% - 99%)    O2 Parameters below as of 29 Dec 2022 04:00  Patient On (Oxygen Delivery Method): nasal cannula  O2 Flow (L/min): 2          Adult Advanced Hemodynamics Last 24 Hrs  CVP(mm Hg): --  CVP(cm H2O): --  CO: --  CI: --  PA: --  PA(mean): --  PCWP: --  SVR: --  SVRI: --  PVR: --  PVRI: --  I&O's Summary    28 Dec 2022 07:01  -  29 Dec 2022 07:00  --------------------------------------------------------  IN: 990 mL / OUT: 1150 mL / NET: -160 mL      Daily     Daily     PHYSICAL EXAM:  NEURO: patient is awake , alert and oriented  GEN: Not in acute distress  NECK: no thyroid enlargement, no JVD  LUNGS: Rales appreciated b/l . Comfortable on 3L NC  CARDIOVASCULAR: S1/S2 present, RRR , no murmurs or rubs, no carotid bruits,  + PP bilaterally  ABD: Soft, non-tender, non-distended, +BS  EXT: No DOMO  SKIN: Intact  ACCESS Site:    LABS:                        10.7   10.19 )-----------( 134      ( 29 Dec 2022 07:00 )             32.4     12-29    138  |  94<L>  |  55<H>  ----------------------------<  364<H>  4.3   |  31  |  1.6<H>    Ca    8.9      29 Dec 2022 07:00  Mg     1.8     12-29    TPro  5.4<L>  /  Alb  3.6  /  TBili  1.2  /  DBili  x   /  AST  36  /  ALT  28  /  AlkPhos  66  12-29              Troponin trend:            RADIOLOGY:  -CXR:  -TTE:  < from: TTE Echo Complete w/o Contrast w/ Doppler (12.26.22 @ 10:40) >   1. LV Ejection Fraction by Baird's Method with a biplane EF of 78 %.   2. Normal left atrial size.   3. Mild mitral annular calcification.   4. Mild mitral valve regurgitation.   5. Mild tricuspid regurgitation.   6. Normal trileaflet aortic valvewith normal opening.   7. Mild pulmonic valve regurgitation.   8. Estimated pulmonary artery systolic pressure is 73.8 mmHg assuming a   right atrial pressure of 3 mmHg, which is consistent with severe   pulmonary hypertension.   9. There is mild aortic root calcification.    < end of copied text >    -STRESS TEST:  -CATHETERIZATION:    ECG:    TELEMETRY EVENTS:

## 2022-12-28 NOTE — MEDICAL STUDENT PROGRESS NOTE(EDUCATION) - SUBJECTIVE AND OBJECTIVE BOX
Hospital Day:  3d    Subjective:    Patient is a 82y old  Female who presents with a chief complaint of NSTEMI: acute respiratory failure (28 Dec 2022 10:56)      Admitted to medicine for a primary diagnosis of shortness of breath     INTERVAL HPI AND OVERNIGHT EVENTS:  Patient was examined and seen at bedside. This morning she is experiencing shortness of breath. No issues or overnight events. Patient is eating    Past Medical Hx:   MI (myocardial infarction)    HTN (hypertension)    High cholesterol    Heart failure    Non Hodgkin&#x27;s lymphoma    PVD (peripheral vascular disease)    Thrombocytopenia    CKD (chronic kidney disease)    Osteoarthritis    Diabetes    Coronary artery disease involving native heart without angina pectoris, unspecified vessel or lesion type      Past Sx:  History of cholecystectomy    H/O cardiac catheterization    H/O carotid endarterectomy      Allergies:  ACE inhibitors (Unknown)  BENZALKONIUM (Rash)  BETADINE (Rash)  Ceclor (Unknown)  codeine (Unknown)  latex (Unknown)  penicillin (Unknown)  RUBBER GLOVES (Rash)  shellfish (Unknown)  sulfonamides (Unknown)    Current Meds:   Standng Meds:  albuterol/ipratropium for Nebulization 3 milliLiter(s) Nebulizer every 6 hours  aspirin enteric coated 81 milliGRAM(s) Oral daily  atorvastatin 40 milliGRAM(s) Oral at bedtime  clopidogrel Tablet 75 milliGRAM(s) Oral daily  dextrose 5%. 1000 milliLiter(s) (100 mL/Hr) IV Continuous <Continuous>  dextrose 5%. 1000 milliLiter(s) (50 mL/Hr) IV Continuous <Continuous>  dextrose 50% Injectable 25 Gram(s) IV Push once  dextrose 50% Injectable 12.5 Gram(s) IV Push once  dextrose 50% Injectable 25 Gram(s) IV Push once  donepezil 10 milliGRAM(s) Oral at bedtime  doxycycline IVPB      doxycycline IVPB 100 milliGRAM(s) IV Intermittent every 12 hours  furosemide   Injectable 40 milliGRAM(s) IV Push two times a day  glucagon  Injectable 1 milliGRAM(s) IntraMuscular once  insulin glargine Injectable (LANTUS) 10 Unit(s) SubCutaneous at bedtime  insulin lispro (ADMELOG) corrective regimen sliding scale   SubCutaneous four times a day before meals  levoFLOXacin IVPB 750 milliGRAM(s) IV Intermittent every 24 hours  metoprolol succinate ER 25 milliGRAM(s) Oral daily  montelukast 10 milliGRAM(s) Oral daily    PRN Meds:  dextrose Oral Gel 15 Gram(s) Oral once PRN Blood Glucose LESS THAN 70 milliGRAM(s)/deciliter    HOME MEDICATIONS:  albuterol 90 mcg/inh inhalation aerosol: 2 puff(s) inhaled every 6 hours, As Needed  atorvastatin 20 mg oral tablet: 1 tab(s) orally once a day  donepezil 10 mg oral tablet: 1 tab(s) orally once a day (at bedtime)  gabapentin: orally 2 times a day  inositol 650 mg oral tablet: 2 tab(s) orally once a day  Lasix 20 mg oral tablet: 1 tab(s) orally once a day  losartan 100 mg oral tablet: 1 tab(s) orally once a day  montelukast 10 mg oral tablet: 1 tab(s) orally once a day  NovoLOG 100 units/mL subcutaneous solution: 10,7,7  subcutaneous  omeprazole 40 mg oral delayed release capsule: 1 cap(s) orally once a day  omeprazole 40 mg oral delayed release capsule: 1 cap(s) orally once a day  Soliqua 100/33 subcutaneous solution: 35 unit(s) subcutaneous once a day  Spiriva 18 mcg inhalation capsule: 1 cap(s) inhaled once a day      Vital Signs:   T(F): 97.6 (12-28-22 @ 08:18), Max: 97.6 (12-28-22 @ 08:18)  HR: 96 (12-28-22 @ 08:18) (86 - 96)  BP: 145/58 (12-28-22 @ 08:18) (145/58 - 167/71)  RR: 25 (12-28-22 @ 08:18) (16 - 25)  SpO2: 100% (12-28-22 @ 08:18) (98% - 100%)      12-27-22 @ 07:01  -  12-28-22 @ 07:00  --------------------------------------------------------  IN: 410 mL / OUT: 1800 mL / NET: -1390 mL    12-28-22 @ 07:01  -  12-28-22 @ 11:05  --------------------------------------------------------  IN: 160 mL / OUT: 350 mL / NET: -190 mL        Physical Exam:   GENERAL: NAD  HEENT: NCAT  CHEST/LUNG: crackles heard bilaterally   HEART: Regular rate and rhythm; s1 s2 appreciated, No murmurs, rubs, or gallops  ABDOMEN: Soft, Nontender, Nondistended; Bowel sounds present  EXTREMITIES: No LE edema b/l  SKIN: no rashes, no new lesions  NERVOUS SYSTEM:  Alert & Oriented X3  LINES/CATHETERS:        Labs:                         10.7   11.26 )-----------( 148      ( 28 Dec 2022 06:11 )             32.6     Neutrophil% 79.1, Lymphocyte% 12.5, Monocyte% 6.1, Bands% 1.4 12-28-22 @ 06:11    28 Dec 2022 06:11    144    |  100    |  52     ----------------------------<  116    4.1     |  28     |  1.5      Ca    9.4        28 Dec 2022 06:11  Mg     1.9       28 Dec 2022 06:11    TPro  5.8    /  Alb  3.9    /  TBili  1.0    /  DBili  x      /  AST  50     /  ALT  33     /  AlkPhos  67     28 Dec 2022 06:11            Serum Pro-Brain Natriuretic Peptide: 06114 pg/mL (12-25-22 @ 22:00)  Serum Pro-Brain Natriuretic Peptide: 7102 pg/mL (12-25-22 @ 13:40)    Troponin 2.71, CKMB --, CK -- 12-26-22 @ 11:51  Troponin 3.36, CKMB --, CK -- 12-25-22 @ 22:00  Troponin 4.14, CKMB --, CK -- 12-25-22 @ 13:40              Culture - Blood (collected 12-26-22 @ 11:51)  Source: .Blood None  Preliminary Report (12-27-22 @ 22:02):    No growth to date.    Culture - Blood (collected 12-26-22 @ 11:51)  Source: .Blood None  Preliminary Report (12-27-22 @ 22:01):    No growth to date.    Culture - Blood (collected 12-25-22 @ 14:55)  Source: .Blood Blood  Preliminary Report (12-27-22 @ 02:02):    No growth to date.    Culture - Blood (collected 12-25-22 @ 14:55)  Source: .Blood Blood  Preliminary Report (12-27-22 @ 02:02):    No growth to date.         Hospital Day:  3d    Subjective:    Patient is a 82y old  Female who presents with a chief complaint of NSTEMI: acute respiratory failure (28 Dec 2022 10:56)      Admitted to medicine for a primary diagnosis of shortness of breath     INTERVAL HPI AND OVERNIGHT EVENTS:  Patient was examined and seen at bedside. This morning she is experiencing shortness of breath. NSVT reported overnight by nurse. Patient is eating    Past Medical Hx:   MI (myocardial infarction)    HTN (hypertension)    High cholesterol    Heart failure    Non Hodgkin&#x27;s lymphoma    PVD (peripheral vascular disease)    Thrombocytopenia    CKD (chronic kidney disease)    Osteoarthritis    Diabetes    Coronary artery disease involving native heart without angina pectoris, unspecified vessel or lesion type      Past Sx:  History of cholecystectomy    H/O cardiac catheterization    H/O carotid endarterectomy      Allergies:  ACE inhibitors (Unknown)  BENZALKONIUM (Rash)  BETADINE (Rash)  Ceclor (Unknown)  codeine (Unknown)  latex (Unknown)  penicillin (Unknown)  RUBBER GLOVES (Rash)  shellfish (Unknown)  sulfonamides (Unknown)    Current Meds:   Standng Meds:  albuterol/ipratropium for Nebulization 3 milliLiter(s) Nebulizer every 6 hours  aspirin enteric coated 81 milliGRAM(s) Oral daily  atorvastatin 40 milliGRAM(s) Oral at bedtime  clopidogrel Tablet 75 milliGRAM(s) Oral daily  dextrose 5%. 1000 milliLiter(s) (100 mL/Hr) IV Continuous <Continuous>  dextrose 5%. 1000 milliLiter(s) (50 mL/Hr) IV Continuous <Continuous>  dextrose 50% Injectable 25 Gram(s) IV Push once  dextrose 50% Injectable 12.5 Gram(s) IV Push once  dextrose 50% Injectable 25 Gram(s) IV Push once  donepezil 10 milliGRAM(s) Oral at bedtime  doxycycline IVPB      doxycycline IVPB 100 milliGRAM(s) IV Intermittent every 12 hours  furosemide   Injectable 40 milliGRAM(s) IV Push two times a day  glucagon  Injectable 1 milliGRAM(s) IntraMuscular once  insulin glargine Injectable (LANTUS) 10 Unit(s) SubCutaneous at bedtime  insulin lispro (ADMELOG) corrective regimen sliding scale   SubCutaneous four times a day before meals  levoFLOXacin IVPB 750 milliGRAM(s) IV Intermittent every 24 hours  metoprolol succinate ER 25 milliGRAM(s) Oral daily  montelukast 10 milliGRAM(s) Oral daily    PRN Meds:  dextrose Oral Gel 15 Gram(s) Oral once PRN Blood Glucose LESS THAN 70 milliGRAM(s)/deciliter    HOME MEDICATIONS:  albuterol 90 mcg/inh inhalation aerosol: 2 puff(s) inhaled every 6 hours, As Needed  atorvastatin 20 mg oral tablet: 1 tab(s) orally once a day  donepezil 10 mg oral tablet: 1 tab(s) orally once a day (at bedtime)  gabapentin: orally 2 times a day  inositol 650 mg oral tablet: 2 tab(s) orally once a day  Lasix 20 mg oral tablet: 1 tab(s) orally once a day  losartan 100 mg oral tablet: 1 tab(s) orally once a day  montelukast 10 mg oral tablet: 1 tab(s) orally once a day  NovoLOG 100 units/mL subcutaneous solution: 10,7,7  subcutaneous  omeprazole 40 mg oral delayed release capsule: 1 cap(s) orally once a day  omeprazole 40 mg oral delayed release capsule: 1 cap(s) orally once a day  Soliqua 100/33 subcutaneous solution: 35 unit(s) subcutaneous once a day  Spiriva 18 mcg inhalation capsule: 1 cap(s) inhaled once a day      Vital Signs:   T(F): 97.6 (12-28-22 @ 08:18), Max: 97.6 (12-28-22 @ 08:18)  HR: 96 (12-28-22 @ 08:18) (86 - 96)  BP: 145/58 (12-28-22 @ 08:18) (145/58 - 167/71)  RR: 25 (12-28-22 @ 08:18) (16 - 25)  SpO2: 100% (12-28-22 @ 08:18) (98% - 100%)      12-27-22 @ 07:01  -  12-28-22 @ 07:00  --------------------------------------------------------  IN: 410 mL / OUT: 1800 mL / NET: -1390 mL    12-28-22 @ 07:01  -  12-28-22 @ 11:05  --------------------------------------------------------  IN: 160 mL / OUT: 350 mL / NET: -190 mL        Physical Exam:   GENERAL: NAD  HEENT: NCAT  CHEST/LUNG: crackles heard bilaterally   HEART: Regular rate and rhythm; s1 s2 appreciated, No murmurs, rubs, or gallops  ABDOMEN: Soft, Nontender, Nondistended; Bowel sounds present  EXTREMITIES: No LE edema b/l  SKIN: no rashes, no new lesions  NERVOUS SYSTEM:  Alert & Oriented X3  LINES/CATHETERS:        Labs:                         10.7   11.26 )-----------( 148      ( 28 Dec 2022 06:11 )             32.6     Neutrophil% 79.1, Lymphocyte% 12.5, Monocyte% 6.1, Bands% 1.4 12-28-22 @ 06:11    28 Dec 2022 06:11    144    |  100    |  52     ----------------------------<  116    4.1     |  28     |  1.5      Ca    9.4        28 Dec 2022 06:11  Mg     1.9       28 Dec 2022 06:11    TPro  5.8    /  Alb  3.9    /  TBili  1.0    /  DBili  x      /  AST  50     /  ALT  33     /  AlkPhos  67     28 Dec 2022 06:11            Serum Pro-Brain Natriuretic Peptide: 25946 pg/mL (12-25-22 @ 22:00)  Serum Pro-Brain Natriuretic Peptide: 7102 pg/mL (12-25-22 @ 13:40)    Troponin 2.71, CKMB --, CK -- 12-26-22 @ 11:51  Troponin 3.36, CKMB --, CK -- 12-25-22 @ 22:00  Troponin 4.14, CKMB --, CK -- 12-25-22 @ 13:40              Culture - Blood (collected 12-26-22 @ 11:51)  Source: .Blood None  Preliminary Report (12-27-22 @ 22:02):    No growth to date.    Culture - Blood (collected 12-26-22 @ 11:51)  Source: .Blood None  Preliminary Report (12-27-22 @ 22:01):    No growth to date.    Culture - Blood (collected 12-25-22 @ 14:55)  Source: .Blood Blood  Preliminary Report (12-27-22 @ 02:02):    No growth to date.    Culture - Blood (collected 12-25-22 @ 14:55)  Source: .Blood Blood  Preliminary Report (12-27-22 @ 02:02):    No growth to date.

## 2022-12-28 NOTE — PROGRESS NOTE ADULT - ASSESSMENT
82F w/ pmhx of HFpEF, , HLD, HTN, COPD on 3L home O2, DM1, dementia, GERD, CAD s/p stents presented to the ED with severe SOB    NSTEMI  2V CAD (LAD, Lcx)  Severe Pulm HTN  Hx COPD on 3L NC  Hx HTN, DM, CAD    PLAN:    CNS: Avoid sedation    HEENT: Oral care    PULMONARY:  HOB @ 45 degrees. Cont 3L NC. Cont Nebs    CARDIOVASCULAR:  - Trop peak 4.1  - Cath shows 2V Cad. Will need staged PCI to LAD with arthrectomy 12/29  - Elevated LVEDP 30. Will Cont Lasix 40mg Iv BID  - Cont Aspirin and plavix  - Cont Atorvastatin and metoprolol  -  Echo shows Normal LV with severe Pulm HTN    GI: GI prophylaxis.  Feeding     RENAL:  Follow up lytes.  Correct as needed. Renal consult appreciated.     INFECTIOUS DISEASE: Cont IV abx for total 5 days    HEMATOLOGICAL:  DVT prophylaxis.    ENDOCRINE:  Follow up FS.  Insulin protocol if needed.    MUSCULOSKELETAL:    Stepdown monitoring

## 2022-12-29 NOTE — DISCHARGE NOTE NURSING/CASE MANAGEMENT/SOCIAL WORK - NSDCPEFALRISK_GEN_ALL_CORE
For information on Fall & Injury Prevention, visit: [email or web address] OR  [email or web address] OR  https://www.cdc.gov/steadi/patient.html

## 2022-12-29 NOTE — CHART NOTE - NSCHARTNOTEFT_GEN_A_CORE
Patient given 30 day supply of ( Aspirin 81 mg daily and Plavix 75 mg daily ) to take at home, pack given to daughter

## 2022-12-29 NOTE — PROGRESS NOTE ADULT - ATTENDING COMMENTS
CAD s/p PCI  Severe COPD / Pulm HTN    NSTEMI    s/p PCI LAD without complication.  Hemodynamics stable.    - Cont DAPT / current cardiac Rx.

## 2022-12-29 NOTE — CHART NOTE - NSCHARTNOTEFT_GEN_A_CORE
PREOPERATIVE DAY OF PROCEDURE EVALUATION:  I have personally seen and examined the patient.  I agree with the history and physical which I have reviewed and noted any changes below.  (Signed electronically by __________)  12-29-22 @ 11:17      Cath Bleeding Risk: 7.9%  Prehydration: none d/t symptomatic CHF.

## 2022-12-29 NOTE — PROGRESS NOTE ADULT - ASSESSMENT
83 yo F w/ pmhx of CHF, HLD, HTN, COPD on 3L home O2, DM1, dementia, GERD, MI in 1992, CAD w cardiac stents presented to the ED on 12/25 with progressive SOB of breath for the last month. On 12/26 was admitted to 4T after experiencing NSTEMI and acute on chronic HFpEF - scheduled for mid LA atherectomy today due to 2 vessel CAD.     #shortness of breath secondary to PNA  -progressive SOB for 1 month   -initial CXR on 12/25 showed bibasilar opacities  -repeat CXR 12/28 shows improvement  -WBC trending down 11.26 > 10.19  -c/w levaquin and doxycycline    #NSTEMI acute on chronic HFpEF   - troponin trending down 4.1 > 3.36 > 2.71  - Pro-BNP uptrending 7102 --> 85640,   - TTE on 12/26 showed EF of 78%  - cath done 12/27 showed 2 vessel CAD: stenosis 80% of  prox LAD,  90 % of mid LAD, 70% of dCx  -LVEDP 30mmhg  - cw aspirin 81mg, lipitor, metoprolol, lovenox  - c/w IV lasix 40 mg IV BID, switch to Bumex if urine output is not adequate  - mid LAD atherectomy today    #nephro consult for contrast risk stratification  -CKD 3b  -Creatinine 1.6 (1.5 at baseline)  -urine output 1150, net -160  -patient at risk for JUDIT  -no IVF pre and post procedure given volume overload and pulmonary edema requiring oxygen  -continue to monitor BMP and strict i/os  -electrolytes reviewed  -will follow

## 2022-12-29 NOTE — CHART NOTE - NSCHARTNOTEFT_GEN_A_CORE
PRE-OP DIAGNOSIS:    NSTEMI    PROCEDURE:     [x] Coronary Angiogram     [x] LHC     [] LVG     [] RHC     [] Intervention (see below)         PHYSICIAN:  Dr Carroll Floyd    ASSISTANT: Dr DAVID Brito     PROCEDURE DESCRIPTION:     Consent:      [x] Patient     [] Family Member     []  Used        Anesthesia:     [] General     [x] Sedation     [x] Local        Access & Closure:     [] Fr Right Radial Artery       [x] 6 Fr Right Femoral Artery (Manual Compression)     [] Fr Femoral Vein     [] Fr Brachial Vein       IV Contrast: 75 mL        Intervention: s/p successful IVUS guided rotational atherectomy and balloon angioplasty with KHUSHBU X 1 to prox LAD 90% stenosis extending into mid LAD      Implants: Kvng [place]on 3.5 X 30 to prox LAD        FINDINGS:     Coronary Dominance: Right      LM: No disease    LAD: Prox LAD calcified 90% stenosis, mid LAD 90% calcified ISR  D1 mild disease    CX: dCx 70% stenosis  OM1 Mild disease small vessel  OM2 mild disease     RCA: Patent stents from prior cath, not injected       LVEDP: 25 mmHg        ESTIMATED BLOOD LOSS: < 10 mL        CONDITION:     [x] Good     [] Fair     [] Critical        SPECIMEN REMOVED: N/A       POST-OP DIAGNOSIS:      [x] 2 Vessel Coronary Artery Disease: LAD s/p intervention and Cx       PLAN OF CARE:     [x] Return to In-patient bed     [x] Medications: Aspirin 81 mg qd, Plavix 75mg qd, Lipitor 40mg qd, Toprol 25mg qd.     [x] IV Fluids: IV Fluids:  NS @ 50cc/hr for 4 hours PRE-OP DIAGNOSIS:    NSTEMI    PROCEDURE:     [x] Coronary Angiogram     [x] LHC     [] LVG     [] RHC     [] Intervention (see below)         PHYSICIAN:  Dr Carroll Floyd    ASSISTANT: Dr DAVID Brito     PROCEDURE DESCRIPTION:     Consent:      [x] Patient     [] Family Member     []  Used        Anesthesia:     [] General     [x] Sedation     [x] Local        Access & Closure:     [] Fr Right Radial Artery       [x] 6 Fr Right Femoral Artery (Manual Compression)     [] Fr Femoral Vein     [] Fr Brachial Vein       IV Contrast: 75 mL        Intervention: s/p successful IVUS guided rotational atherectomy and balloon angioplasty with KHUSHBU X 1 to prox LAD 90% stenosis extending into mid LAD      Implants: Kvng [place] 3.5 X 30 to prox LAD        FINDINGS:     Coronary Dominance: Right      LM: No disease    LAD: Prox LAD calcified 90% stenosis, mid LAD 90% calcified stenosis  D1 mild disease    CX: dCx 70% stenosis  OM1 Mild disease small vessel  OM2 mild disease     RCA: Patent stents from prior cath, not injected       LVEDP: 25 mmHg        ESTIMATED BLOOD LOSS: < 10 mL        CONDITION:     [x] Good     [] Fair     [] Critical        SPECIMEN REMOVED: N/A       POST-OP DIAGNOSIS:      [x] 2 Vessel Coronary Artery Disease: LAD s/p intervention and Cx       PLAN OF CARE:     [x] Return to In-patient bed     [x] Medications: Aspirin 81 mg qd, Plavix 75mg qd, Lipitor 40mg qd, Toprol 25mg qd.     [x] IV Fluids: IV Fluids:  NS @ 50cc/hr for 4 hours

## 2022-12-29 NOTE — PROGRESS NOTE ADULT - SUBJECTIVE AND OBJECTIVE BOX
Hospital Day:  4d    Subjective:    Patient is a 82y old  Female who presents with a chief complaint of NSTEMI (28 Dec 2022 10:56)      Admitted to medicine for a primary diagnosis of NSTEMI    INTERVAL HPI AND OVERNIGHT EVENTS:  Patient was examined and seen at bedside. This morning she is resting comfortably in bed and reports no issues or overnight events. NPO d/t rotational atherectomy today. bowel movement this morning.     Past Medical Hx:   MI (myocardial infarction)    HTN (hypertension)    High cholesterol    Heart failure    Non Hodgkin&#x27;s lymphoma    PVD (peripheral vascular disease)    Thrombocytopenia    CKD (chronic kidney disease)    Osteoarthritis    Diabetes    Coronary artery disease involving native heart without angina pectoris, unspecified vessel or lesion type      Past Sx:  History of cholecystectomy    H/O cardiac catheterization    H/O carotid endarterectomy      Allergies:  ACE inhibitors (Unknown)  BENZALKONIUM (Rash)  BETADINE (Rash)  Ceclor (Unknown)  codeine (Unknown)  latex (Unknown)  penicillin (Unknown)  RUBBER GLOVES (Rash)  shellfish (Unknown)  sulfonamides (Unknown)    Current Meds:   Stand Meds:  albuterol/ipratropium for Nebulization 3 milliLiter(s) Nebulizer every 6 hours  aspirin enteric coated 81 milliGRAM(s) Oral daily  atorvastatin 40 milliGRAM(s) Oral at bedtime  clopidogrel Tablet 75 milliGRAM(s) Oral daily  dextrose 5%. 1000 milliLiter(s) (50 mL/Hr) IV Continuous <Continuous>  dextrose 5%. 1000 milliLiter(s) (100 mL/Hr) IV Continuous <Continuous>  dextrose 50% Injectable 25 Gram(s) IV Push once  dextrose 50% Injectable 12.5 Gram(s) IV Push once  dextrose 50% Injectable 25 Gram(s) IV Push once  donepezil 10 milliGRAM(s) Oral at bedtime  doxycycline IVPB      doxycycline IVPB 100 milliGRAM(s) IV Intermittent every 12 hours  furosemide   Injectable 40 milliGRAM(s) IV Push two times a day  glucagon  Injectable 1 milliGRAM(s) IntraMuscular once  insulin glargine Injectable (LANTUS) 10 Unit(s) SubCutaneous at bedtime  insulin lispro (ADMELOG) corrective regimen sliding scale   SubCutaneous four times a day before meals  levoFLOXacin IVPB 750 milliGRAM(s) IV Intermittent every 24 hours  metoprolol succinate ER 25 milliGRAM(s) Oral daily  montelukast 10 milliGRAM(s) Oral daily    PRN Meds:  dextrose Oral Gel 15 Gram(s) Oral once PRN Blood Glucose LESS THAN 70 milliGRAM(s)/deciliter    HOME MEDICATIONS:  albuterol 90 mcg/inh inhalation aerosol: 2 puff(s) inhaled every 6 hours, As Needed  atorvastatin 20 mg oral tablet: 1 tab(s) orally once a day  donepezil 10 mg oral tablet: 1 tab(s) orally once a day (at bedtime)  gabapentin: orally 2 times a day  inositol 650 mg oral tablet: 2 tab(s) orally once a day  Lasix 20 mg oral tablet: 1 tab(s) orally once a day  losartan 100 mg oral tablet: 1 tab(s) orally once a day  montelukast 10 mg oral tablet: 1 tab(s) orally once a day  NovoLOG 100 units/mL subcutaneous solution: 10,7,7  subcutaneous  omeprazole 40 mg oral delayed release capsule: 1 cap(s) orally once a day  omeprazole 40 mg oral delayed release capsule: 1 cap(s) orally once a day  Soliqua 100/33 subcutaneous solution: 35 unit(s) subcutaneous once a day  Spiriva 18 mcg inhalation capsule: 1 cap(s) inhaled once a day      Vital Signs:   T(F): 96.9 (12-29-22 @ 08:02), Max: 98.7 (12-28-22 @ 16:00)  HR: 97 (12-29-22 @ 08:02) (88 - 98)  BP: 146/67 (12-29-22 @ 08:02) (132/61 - 146/67)  RR: 19 (12-29-22 @ 08:02) (15 - 22)  SpO2: 99% (12-29-22 @ 08:02) (96% - 99%)      12-28-22 @ 07:01  -  12-29-22 @ 07:00  --------------------------------------------------------  IN: 990 mL / OUT: 1150 mL / NET: -160 mL        Physical Exam:   GENERAL: NAD  HEENT: NCAT  CHEST/LUNG: improved crackles   HEART: Regular rate and rhythm; s1 s2 appreciated, No murmurs, rubs, or gallops  ABDOMEN: Soft, Nontender, Nondistended; Bowel sounds present  EXTREMITIES: No LE edema b/l  SKIN: no rashes, no new lesions  NERVOUS SYSTEM:  Alert & Oriented X3  LINES/CATHETERS:        Labs:                         10.7   10.19 )-----------( 134      ( 29 Dec 2022 07:00 )             32.4     Neutophil% 81.6, Lymphocyte% 11.0, Monocyte% 5.0, Bands% 1.5 12-29-22 @ 07:00    29 Dec 2022 07:00    138    |  94     |  55     ----------------------------<  364    4.3     |  31     |  1.6      Ca    8.9        29 Dec 2022 07:00  Mg     1.8       29 Dec 2022 07:00    TPro  5.4    /  Alb  3.6    /  TBili  1.2    /  DBili  x      /  AST  36     /  ALT  28     /  AlkPhos  66     29 Dec 2022 07:00            Serum Pro-Brain Natriuretic Peptide: 46584 pg/mL (12-25-22 @ 22:00)  Serum Pro-Brain Natriuretic Peptide: 7102 pg/mL (12-25-22 @ 13:40)    Troponin 2.71, CKMB --, CK -- 12-26-22 @ 11:51  Troponin 3.36, CKMB --, CK -- 12-25-22 @ 22:00  Troponin 4.14, CKMB --, CK -- 12-25-22 @ 13:40              Culture - Blood (collected 12-26-22 @ 11:51)  Source: .Blood None  Preliminary Report (12-27-22 @ 22:02):    No growth to date.    Culture - Blood (collected 12-26-22 @ 11:51)  Source: .Blood None  Preliminary Report (12-27-22 @ 22:01):    No growth to date.    Culture - Blood (collected 12-25-22 @ 14:55)  Source: .Blood Blood  Preliminary Report (12-27-22 @ 02:02):    No growth to date.    Culture - Blood (collected 12-25-22 @ 14:55)  Source: .Blood Blood  Preliminary Report (12-27-22 @ 02:02):    No growth to date.

## 2022-12-30 NOTE — PROGRESS NOTE ADULT - SUBJECTIVE AND OBJECTIVE BOX
Hospital Day:  5d    Subjective:    Patient is a 82y old  Female who presents with a chief complaint of NSTEMI (30 Dec 2022 10:18)      Admitted to medicine for a primary diagnosis of shortness of breath d/t PNA --> NSTEMI    INTERVAL HPI AND OVERNIGHT EVENTS:  Patient was examined and seen at bedside. This morning she is resting in bed, no overnight events. She is eating well.     Past Medical Hx:   MI (myocardial infarction)    HTN (hypertension)    High cholesterol    Heart failure    Non Hodgkin&#x27;s lymphoma    PVD (peripheral vascular disease)    Thrombocytopenia    CKD (chronic kidney disease)    Osteoarthritis    Diabetes    Coronary artery disease involving native heart without angina pectoris, unspecified vessel or lesion type      Past Sx:  History of cholecystectomy    H/O cardiac catheterization    H/O carotid endarterectomy      Allergies:  ACE inhibitors (Unknown)  BENZALKONIUM (Rash)  BETADINE (Rash)  Ceclor (Unknown)  codeine (Unknown)  contrast media (iodine-based) (Hives)  latex (Unknown)  penicillin (Unknown)  RUBBER GLOVES (Rash)  shellfish (Unknown)  sulfonamides (Unknown)    Current Meds:   Stand Meds:  albuterol/ipratropium for Nebulization 3 milliLiter(s) Nebulizer every 6 hours  aspirin enteric coated 81 milliGRAM(s) Oral daily  atorvastatin 40 milliGRAM(s) Oral at bedtime  clopidogrel Tablet 75 milliGRAM(s) Oral daily  dextrose 5%. 1000 milliLiter(s) (50 mL/Hr) IV Continuous <Continuous>  dextrose 5%. 1000 milliLiter(s) (100 mL/Hr) IV Continuous <Continuous>  dextrose 50% Injectable 25 Gram(s) IV Push once  dextrose 50% Injectable 12.5 Gram(s) IV Push once  dextrose 50% Injectable 25 Gram(s) IV Push once  donepezil 10 milliGRAM(s) Oral at bedtime  doxycycline IVPB      doxycycline IVPB 100 milliGRAM(s) IV Intermittent every 12 hours  furosemide   Injectable 40 milliGRAM(s) IV Push two times a day  glucagon  Injectable 1 milliGRAM(s) IntraMuscular once  insulin glargine Injectable (LANTUS) 10 Unit(s) SubCutaneous at bedtime  insulin lispro (ADMELOG) corrective regimen sliding scale   SubCutaneous four times a day before meals  levoFLOXacin IVPB 750 milliGRAM(s) IV Intermittent every 24 hours  metoprolol succinate ER 25 milliGRAM(s) Oral daily  montelukast 10 milliGRAM(s) Oral daily    PRN Meds:  dextrose Oral Gel 15 Gram(s) Oral once PRN Blood Glucose LESS THAN 70 milliGRAM(s)/deciliter    HOME MEDICATIONS:  albuterol 90 mcg/inh inhalation aerosol: 2 puff(s) inhaled every 6 hours, As Needed  atorvastatin 20 mg oral tablet: 1 tab(s) orally once a day  donepezil 10 mg oral tablet: 1 tab(s) orally once a day (at bedtime)  gabapentin: orally 2 times a day  inositol 650 mg oral tablet: 2 tab(s) orally once a day  Lasix 20 mg oral tablet: 1 tab(s) orally once a day  losartan 100 mg oral tablet: 1 tab(s) orally once a day  montelukast 10 mg oral tablet: 1 tab(s) orally once a day  NovoLOG 100 units/mL subcutaneous solution: 10,7,7  subcutaneous  omeprazole 40 mg oral delayed release capsule: 1 cap(s) orally once a day  omeprazole 40 mg oral delayed release capsule: 1 cap(s) orally once a day  Soliqua 100/33 subcutaneous solution: 35 unit(s) subcutaneous once a day  Spiriva 18 mcg inhalation capsule: 1 cap(s) inhaled once a day      Vital Signs:   T(F): 97.1 (12-30-22 @ 08:00), Max: 97.7 (12-30-22 @ 04:00)  HR: 84 (12-30-22 @ 10:00) (80 - 100)  BP: 151/63 (12-30-22 @ 10:00) (114/55 - 172/72)  RR: 21 (12-30-22 @ 10:00) (17 - 27)  SpO2: 98% (12-30-22 @ 10:00) (94% - 100%)      12-29-22 @ 07:01  -  12-30-22 @ 07:00  --------------------------------------------------------  IN: 710 mL / OUT: 1200 mL / NET: -490 mL    12-30-22 @ 07:01  -  12-30-22 @ 10:48  --------------------------------------------------------  IN: 0 mL / OUT: 150 mL / NET: -150 mL        Physical Exam:   GENERAL: NAD  HEENT: NCAT  CHEST/LUNG: CTAB  HEART: Regular rate and rhythm; s1 s2 appreciated, No murmurs, rubs, or gallops  ABDOMEN: Soft, Nontender, Nondistended; Bowel sounds present  EXTREMITIES: No LE edema b/l  SKIN: no rashes, no new lesions  NERVOUS SYSTEM:  Alert & Oriented X3  LINES/CATHETERS:        Labs:                         10.4   11.40 )-----------( 150      ( 30 Dec 2022 05:17 )             32.1     Neutophil% 80.5, Lymphocyte% 10.4, Monocyte% 6.8, Bands% 1.6 12-30-22 @ 05:17    30 Dec 2022 05:17    142    |  99     |  64     ----------------------------<  252    4.0     |  31     |  1.7      Ca    9.0        30 Dec 2022 05:17  Mg     1.8       30 Dec 2022 05:17    TPro  5.3    /  Alb  3.5    /  TBili  0.8    /  DBili  x      /  AST  32     /  ALT  23     /  AlkPhos  67     30 Dec 2022 05:17            Serum Pro-Brain Natriuretic Peptide: 93505 pg/mL (12-25-22 @ 22:00)  Serum Pro-Brain Natriuretic Peptide: 7102 pg/mL (12-25-22 @ 13:40)    Troponin 2.71, CKMB --, CK -- 12-26-22 @ 11:51              Culture - Blood (collected 12-26-22 @ 11:51)  Source: .Blood None  Preliminary Report (12-27-22 @ 22:02):    No growth to date.    Culture - Blood (collected 12-26-22 @ 11:51)  Source: .Blood None  Preliminary Report (12-27-22 @ 22:01):    No growth to date.    Culture - Blood (collected 12-25-22 @ 14:55)  Source: .Blood Blood  Preliminary Report (12-27-22 @ 02:02):    No growth to date.    Culture - Blood (collected 12-25-22 @ 14:55)  Source: .Blood Blood  Preliminary Report (12-27-22 @ 02:02):    No growth to date.

## 2022-12-30 NOTE — PROGRESS NOTE ADULT - ATTENDING COMMENTS
CAD s/p PCI  Severe COPD / Pulm HTN  Chronic Diastolic CHF    NSTEMI    s/p PCI LAD without complication  No chest pain.  Hemodynamics stable.  Negative 490cc  No groin hematoma.    - Cont DAPT / Metoprolol  - Change Lasix to PO  - OOB / Ambulate

## 2022-12-30 NOTE — PROGRESS NOTE ADULT - ASSESSMENT
83 yo F w/ pmhx of CHF, HLD, HTN, COPD on 3L home O2, DM1, dementia, GERD, MI in 1992, CAD w cardiac stents presented to the ED on 12/25 with progressive SOB of breath for the last month. On 12/26 was admitted to T after experiencing NSTEMI and acute on chronic HFpEF - successful mid LA atherectomy of prox-mid LAD 12/29     #shortness of breath secondary to PNA  -progressive SOB for 1 month   -initial CXR on 12/25 showed bibasilar opacities  -repeat CXR 12/28 shows improvement  -WBC uptrending 11.26 > 10.19 > 11.4  -c/w levaquin and doxycycline    #NSTEMI d/t 2 vessel CAD    - cath done 12/27 stenosis 90% of  prox LAD,  90 % of mid LAD, 70% of dCx  -LVEDP 25mmhg  -atherectomy and balloon angioplasty w KHUSHBU x 1 to prox LAD 90% stenosis extending into mid LAD 12/29  - cw aspirin 81mg, plavix 75mg, lipitor 40mg, metoprolol 25mg  - change lasix 40mg to PO once daily    #nephro consult for contrast risk stratification  -CKD 3b  -Creatinine uptrending 1.7 (1.5 at baseline)  -urine output 1200, net -490  -patient at risk for JUDIT  -no IVF pre and post procedure given volume overload and pulmonary edema requiring oxygen  -continue to monitor BMP and strict i/os  -electrolytes reviewed  -will follow     #DM  -POCT glucose uncontrolled (400 this am)  -given 10 units of lispro, increased from previous 5 units  -c/w 10 unit LANUTS at bedtime  -continue to monitor fingersticks and adjust sliding scale      #misc  -pending PT consult         83 yo F w/ pmhx of CHF, HLD, HTN, COPD on 3L home O2, DM1, dementia, GERD, MI in 1992, CAD w cardiac stents presented to the ED on 12/25 with progressive SOB of breath for the last month. On 12/26 was admitted to T after experiencing NSTEMI and acute on chronic HFpEF - successful mid LA atherectomy of prox-mid LAD 12/29     #shortness of breath secondary to PNA  -progressive SOB for 1 month   -initial CXR on 12/25 showed bibasilar opacities  -repeat CXR 12/28 shows improvement  -WBC uptrending 11.26 > 10.19 > 11.4  -c/w levaquin and doxycycline    #NSTEMI d/t 2 vessel CAD    - cath done 12/27 stenosis 90% of  prox LAD,  90 % of mid LAD, 70% of dCx  -LVEDP 25mmhg  -atherectomy and balloon angioplasty w KHUSHBU x 1 to prox LAD 90% stenosis extending into mid LAD 12/29  - cw aspirin 81mg, plavix 75mg, lipitor 40mg, metoprolol 25mg  - d/c lasix is light of suspected hypovolemia , f/u bmp     #nephro consult for contrast risk stratification  -CKD 3b  -Creatinine uptrending 1.7 (1.5 at baseline)  -urine output 1200, net -490  -patient at risk for JUDIT  -no IVF pre and post procedure given volume overload and pulmonary edema requiring oxygen  -continue to monitor BMP and strict i/os  -electrolytes reviewed  -will follow     #DM  - POCT glucose uncontrolled (400 this am)  - Pt on Soliqua 35 units at home   - Started on Lantus 18 at bedtime and lispro 6 TID   -continue to monitor fingersticks and ISS    #misc  -pending PT consult  -activity OOBTC

## 2022-12-30 NOTE — PROGRESS NOTE ADULT - SUBJECTIVE AND OBJECTIVE BOX
Cardiology Follow up    VENKATESH RAMACHANDRAN   82y Female  PAST MEDICAL & SURGICAL HISTORY:  MI (myocardial infarction)  s/p 5 stents    HTN (hypertension)    High cholesterol    Heart failure    Non Hodgkin&#x27;s lymphoma  in remission. Follows with Dr Hernandez    PVD (peripheral vascular disease)    Thrombocytopenia    CKD (chronic kidney disease)    Osteoarthritis    Diabetes  on insulin    Coronary artery disease involving native heart without angina pectoris, unspecified vessel or lesion type  s/p 5 stents    History of cholecystectomy    H/O cardiac catheterization  5 STENTS    H/O carotid endarterectomy  RIGHT           HPI:  82F w/ pmhx of CHF, HLD, HTN, COPD on 3L home O2, DM1, dementia, GERD, MI in 1992, CAD with 5 cardiac stents who present with 1 month of worsening SOB. For past 1 month she has been having dry cough and SOB. She saw her pulmonologist Dr. Feldman who did CXR and showed it was normal at the time, had her on levaquin and then increased her dose which she completed last week. Yesterday she started having crushing chest pressure substernally. Denies fever during this month, nausea vomiting back pain abd pain urinary sx or diarrhea. No recent travel or sick contact. Her cardiologist is Dr. Munoz.  (25 Dec 2022 18:25)    Allergies    ACE inhibitors (Unknown)  BENZALKONIUM (Rash)  BETADINE (Rash)  Ceclor (Unknown)  codeine (Unknown)  contrast media (iodine-based) (Hives)  latex (Unknown)  penicillin (Unknown)  RUBBER GLOVES (Rash)  shellfish (Unknown)  sulfonamides (Unknown)    Intolerances      Patient seen and examined at bedside. No acute events overnight.  Patient c/o feeling tired.  Pt on bedrest overnight no symptoms.  Denies CP, SOB, palpitations, or dizziness  No events on telemetry overnight    Vital Signs Last 24 Hrs  T(C): 36.2 (30 Dec 2022 08:00), Max: 36.5 (30 Dec 2022 04:00)  T(F): 97.1 (30 Dec 2022 08:00), Max: 97.7 (30 Dec 2022 04:00)  HR: 84 (30 Dec 2022 10:00) (80 - 100)  BP: 151/63 (30 Dec 2022 10:00) (114/55 - 172/72)  BP(mean): 91 (30 Dec 2022 10:00) (77 - 103)  RR: 21 (30 Dec 2022 10:00) (17 - 27)  SpO2: 98% (30 Dec 2022 10:00) (94% - 100%)    Parameters below as of 30 Dec 2022 06:00  Patient On (Oxygen Delivery Method): nasal cannula  O2 Flow (L/min): 2      MEDICATIONS  (STANDING):  albuterol/ipratropium for Nebulization 3 milliLiter(s) Nebulizer every 6 hours  aspirin enteric coated 81 milliGRAM(s) Oral daily  atorvastatin 40 milliGRAM(s) Oral at bedtime  clopidogrel Tablet 75 milliGRAM(s) Oral daily  dextrose 5%. 1000 milliLiter(s) (50 mL/Hr) IV Continuous <Continuous>  dextrose 5%. 1000 milliLiter(s) (100 mL/Hr) IV Continuous <Continuous>  dextrose 50% Injectable 25 Gram(s) IV Push once  dextrose 50% Injectable 12.5 Gram(s) IV Push once  dextrose 50% Injectable 25 Gram(s) IV Push once  donepezil 10 milliGRAM(s) Oral at bedtime  doxycycline IVPB      doxycycline IVPB 100 milliGRAM(s) IV Intermittent every 12 hours  furosemide   Injectable 40 milliGRAM(s) IV Push two times a day  glucagon  Injectable 1 milliGRAM(s) IntraMuscular once  insulin glargine Injectable (LANTUS) 10 Unit(s) SubCutaneous at bedtime  insulin lispro (ADMELOG) corrective regimen sliding scale   SubCutaneous four times a day before meals  insulin lispro Injectable (ADMELOG). 10 Unit(s) SubCutaneous once  levoFLOXacin IVPB 750 milliGRAM(s) IV Intermittent every 24 hours  metoprolol succinate ER 25 milliGRAM(s) Oral daily  montelukast 10 milliGRAM(s) Oral daily    MEDICATIONS  (PRN):  dextrose Oral Gel 15 Gram(s) Oral once PRN Blood Glucose LESS THAN 70 milliGRAM(s)/deciliter      REVIEW OF SYSTEMS:          All negative except as mentioned in HPI    PHYSICAL EXAM:           CONSTITUTIONAL: Well-developed; well-nourished; in no acute distress  	SKIN: warm, dry  	HEAD: Normocephalic; atraumatic  	EYES: PERRL.  	ENT: No nasal discharge, airway clear, mucous membranes moist  	NECK: Supple; non tender.  	CARD: +S1, +S2, no murmurs, gallops, or rubs. Regular rate and rhythm    	RESP: No wheezes, rales or rhonchi. CTA B/L  	ABD: soft ntnd, + BS x 4 quadrants  	EXT: moves all extremities,  no clubbing, cyanosis or edema  	NEURO: Alert and oriented x3, no focal deficits          PSYCH: Cooperative, appropriate          VASCULAR:  + Rad / + PTs / +  DPs          EXTREMITY:             Right Groin:  Dressing D/C/I, pressure dressing removed, access site soft, no hematoma, no pain, + pulses, no sign of infection, no numbness  	               ECG:                         EKG 12-30-22 0805  SR with PAC  Nonspecific ST and T wave abnormality   Vent rate 83 bpm                                                                                                2D ECHO:     TTE Echo Complete w/o Contrast w/ Doppler (12.26.22 @ 10:40)   Summary:   1. LV Ejection Fraction by Baird's Method with a biplane EF of 78 %.   2. Normal left atrial size.   3. Mild mitral annular calcification.   4. Mild mitral valve regurgitation.   5. Mild tricuspid regurgitation.   6. Normal trileaflet aortic valvewith normal opening.   7. Mild pulmonic valve regurgitation.   8. Estimated pulmonary artery systolic pressure is 73.8 mmHg assuming a   right atrial pressure of 3 mmHg, which is consistent with severe   pulmonary hypertension.   9. There is mild aortic root calcification.    PHYSICIAN INTERPRETATION:  Left Ventricle: The left ventricle was not well visualized. The left   ventricular internal cavity size is normal. Left ventricular wall   thickness is normal.  Left Atrium: Normal left atrial size.  Mitral Valve: There is mild mitral annular calcification. Mild mitral   valve regurgitation is seen.  Tricuspid Valve: Mild tricuspid regurgitation is visualized. Estimated   pulmonary artery systolic pressure is 73.8 mmHg assuming a right atrial   pressure of 3 mmHg, which is consistent with severe pulmonary   hypertension.  Aortic Valve: Normal trileaflet aortic valve with normal opening.  Pulmonic Valve: Mild pulmonic valve regurgitation.  Aorta: There is mild aortic root calcification.          LABS:                        10.4   11.40 )-----------( 150      ( 30 Dec 2022 05:17 )             32.1     12-30    142  |  99  |  64<HH>  ----------------------------<  252<H>  4.0   |  31  |  1.7<H>    Ca    9.0      30 Dec 2022 05:17  Mg     1.8     12-30    TPro  5.3<L>  /  Alb  3.5  /  TBili  0.8  /  DBili  x   /  AST  32  /  ALT  23  /  AlkPhos  67  12-30        Magnesium, Serum: 1.8 mg/dL [1.8 - 2.4] (12-30-22 @ 05:17)  LIVER FUNCTIONS - ( 30 Dec 2022 05:17 )  Alb: 3.5 g/dL / Pro: 5.3 g/dL / ALK PHOS: 67 U/L / ALT: 23 U/L / AST: 32 U/L / GGT: x             A/P:  I discussed the case with Cardiologist Dr. Leong and recommend the following:    S/P PCI:      Intervention: s/p successful IVUS guided rotational atherectomy and balloon angioplasty with KHUSHBU X 1 to prox LAD 90% stenosis extending into mid LAD    Implants: [place] 3.5 X 30 to prox LAD        FINDINGS:     Coronary Dominance: Right    LM: No disease    LAD: Prox LAD calcified 90% stenosis, mid LAD 90% calcified stenosis  D1 mild disease    CX: dCx 70% stenosis  OM1 Mild disease small vessel  OM2 mild disease     RCA: Patent stents from prior cath, not injected     LVEDP: 25 mmHg       POST-OP DIAGNOSIS:      [x] 2 Vessel Coronary Artery Disease: LAD s/p intervention and Cx        - PT OOB to chair with assistance. Increase activity as tolerated  - Change Lasix 40mg to PO once daily                     	         Continue DAPT ( Aspirin 81 mg PO Daily and Plavix 75 mg po daily ),B-Blocker, Statin Therapy                   No ACEi/ARB at this time due to elevated Cr and allergy to ACEi                   Patient given 30 day supply of ( Aspirin 81 mg daily and Plavix 75 mg daily ) to take at home                   Patient agreeing to take DAPT for at least one year or as directed by cardiologist                    Pt given instructions on importance of taking antiplatelet medication or risk acute stent thrombosis/death                   Post cath instructions, access site care and activity restrictions reviewed with patient                     Discussed with patient to return to hospital if experience chest pain, shortness breath, dizziness and site bleeding                   Aggressive risk factor modification, diet counseling, smoking cessation discussed with patient                       Benefits of Cardiac Rehab discussed with patient, All documents sent to [facility]. Patient instructed to call and make first                               appointment after first f/u visit with Cardiologist                    Continue CCU care and monitoring

## 2022-12-31 NOTE — PROGRESS NOTE ADULT - ATTENDING COMMENTS
CAD s/p PCI  Severe COPD / Pulm HTN  Chronic Diastolic CHF    NSTEMI  s/p PCI LAD without complication    Nauseous / weak.  No chest pain.  Hemodynamics stable.    - Cont DAPT / Metoprolol  - Change Lasix to PO  - OOB / Ambulate / PT

## 2022-12-31 NOTE — PROGRESS NOTE ADULT - SUBJECTIVE AND OBJECTIVE BOX
Hospital Day:  5d    Subjective:    Patient is a 82y old  Female who presents with a chief complaint of NSTEMI (30 Dec 2022 10:18)      Admitted to medicine for a primary diagnosis of shortness of breath d/t PNA --> NSTEMI    INTERVAL HPI AND OVERNIGHT EVENTS:  Patient was examined and seen at bedside. This morning she is resting in bed, no overnight events. She is nauseated and lethargic. Not eating or drinking well.     Past Medical Hx:   MI (myocardial infarction)    HTN (hypertension)    High cholesterol    Heart failure    Non Hodgkin&#x27;s lymphoma    PVD (peripheral vascular disease)    Thrombocytopenia    CKD (chronic kidney disease)    Osteoarthritis    Diabetes    Coronary artery disease involving native heart without angina pectoris, unspecified vessel or lesion type      Past Sx:  History of cholecystectomy    H/O cardiac catheterization    H/O carotid endarterectomy      Allergies:  ACE inhibitors (Unknown)  BENZALKONIUM (Rash)  BETADINE (Rash)  Ceclor (Unknown)  codeine (Unknown)  contrast media (iodine-based) (Hives)  latex (Unknown)  penicillin (Unknown)  RUBBER GLOVES (Rash)  shellfish (Unknown)  sulfonamides (Unknown)    Current Meds:   Standng Meds:  albuterol/ipratropium for Nebulization 3 milliLiter(s) Nebulizer every 6 hours  aspirin enteric coated 81 milliGRAM(s) Oral daily  atorvastatin 40 milliGRAM(s) Oral at bedtime  clopidogrel Tablet 75 milliGRAM(s) Oral daily  dextrose 5%. 1000 milliLiter(s) (50 mL/Hr) IV Continuous <Continuous>  dextrose 5%. 1000 milliLiter(s) (100 mL/Hr) IV Continuous <Continuous>  dextrose 50% Injectable 25 Gram(s) IV Push once  dextrose 50% Injectable 12.5 Gram(s) IV Push once  dextrose 50% Injectable 25 Gram(s) IV Push once  donepezil 10 milliGRAM(s) Oral at bedtime  doxycycline IVPB      doxycycline IVPB 100 milliGRAM(s) IV Intermittent every 12 hours  furosemide   Injectable 40 milliGRAM(s) IV Push two times a day  glucagon  Injectable 1 milliGRAM(s) IntraMuscular once  insulin glargine Injectable (LANTUS) 10 Unit(s) SubCutaneous at bedtime  insulin lispro (ADMELOG) corrective regimen sliding scale   SubCutaneous four times a day before meals  levoFLOXacin IVPB 750 milliGRAM(s) IV Intermittent every 24 hours  metoprolol succinate ER 25 milliGRAM(s) Oral daily  montelukast 10 milliGRAM(s) Oral daily    PRN Meds:  dextrose Oral Gel 15 Gram(s) Oral once PRN Blood Glucose LESS THAN 70 milliGRAM(s)/deciliter    HOME MEDICATIONS:  albuterol 90 mcg/inh inhalation aerosol: 2 puff(s) inhaled every 6 hours, As Needed  atorvastatin 20 mg oral tablet: 1 tab(s) orally once a day  donepezil 10 mg oral tablet: 1 tab(s) orally once a day (at bedtime)  gabapentin: orally 2 times a day  inositol 650 mg oral tablet: 2 tab(s) orally once a day  Lasix 20 mg oral tablet: 1 tab(s) orally once a day  losartan 100 mg oral tablet: 1 tab(s) orally once a day  montelukast 10 mg oral tablet: 1 tab(s) orally once a day  NovoLOG 100 units/mL subcutaneous solution: 10,7,7  subcutaneous  omeprazole 40 mg oral delayed release capsule: 1 cap(s) orally once a day  omeprazole 40 mg oral delayed release capsule: 1 cap(s) orally once a day  Soliqua 100/33 subcutaneous solution: 35 unit(s) subcutaneous once a day  Spiriva 18 mcg inhalation capsule: 1 cap(s) inhaled once a day      Vital Signs:   T(F): 97.1 (12-30-22 @ 08:00), Max: 97.7 (12-30-22 @ 04:00)  HR: 84 (12-30-22 @ 10:00) (80 - 100)  BP: 151/63 (12-30-22 @ 10:00) (114/55 - 172/72)  RR: 21 (12-30-22 @ 10:00) (17 - 27)  SpO2: 98% (12-30-22 @ 10:00) (94% - 100%)      12-29-22 @ 07:01  -  12-30-22 @ 07:00  --------------------------------------------------------  IN: 710 mL / OUT: 1200 mL / NET: -490 mL    12-30-22 @ 07:01  -  12-30-22 @ 10:48  --------------------------------------------------------  IN: 0 mL / OUT: 150 mL / NET: -150 mL        Physical Exam:   GENERAL: NAD  HEENT: NCAT  CHEST/LUNG: CTAB  HEART: Regular rate and rhythm; s1 s2 appreciated, No murmurs, rubs, or gallops  ABDOMEN: Soft, Nontender, Nondistended; Bowel sounds present  EXTREMITIES: No LE edema b/l  SKIN: no rashes, no new lesions  NERVOUS SYSTEM:  Alert & Oriented X3  LINES/CATHETERS:        Labs:                         10.4   11.40 )-----------( 150      ( 30 Dec 2022 05:17 )             32.1     Neutophil% 80.5, Lymphocyte% 10.4, Monocyte% 6.8, Bands% 1.6 12-30-22 @ 05:17    30 Dec 2022 05:17    142    |  99     |  64     ----------------------------<  252    4.0     |  31     |  1.7      Ca    9.0        30 Dec 2022 05:17  Mg     1.8       30 Dec 2022 05:17    TPro  5.3    /  Alb  3.5    /  TBili  0.8    /  DBili  x      /  AST  32     /  ALT  23     /  AlkPhos  67     30 Dec 2022 05:17            Serum Pro-Brain Natriuretic Peptide: 76411 pg/mL (12-25-22 @ 22:00)  Serum Pro-Brain Natriuretic Peptide: 7102 pg/mL (12-25-22 @ 13:40)    Troponin 2.71, CKMB --, CK -- 12-26-22 @ 11:51              Culture - Blood (collected 12-26-22 @ 11:51)  Source: .Blood None  Preliminary Report (12-27-22 @ 22:02):    No growth to date.    Culture - Blood (collected 12-26-22 @ 11:51)  Source: .Blood None  Preliminary Report (12-27-22 @ 22:01):    No growth to date.    Culture - Blood (collected 12-25-22 @ 14:55)  Source: .Blood Blood  Preliminary Report (12-27-22 @ 02:02):    No growth to date.    Culture - Blood (collected 12-25-22 @ 14:55)  Source: .Blood Blood  Preliminary Report (12-27-22 @ 02:02):    No growth to date.

## 2022-12-31 NOTE — PHYSICAL THERAPY INITIAL EVALUATION ADULT - SPECIFY REASON(S)
PT evaluation on hold. Patient c/o nausea and dizziness with fatigue. Patient encountered siting in chair. States she needed 2 person transfers bed to chair. Nurse aware.

## 2022-12-31 NOTE — PROGRESS NOTE ADULT - SUBJECTIVE AND OBJECTIVE BOX
Cardiology Follow up s/p PCI LAD    VENKATESH RAMACHANDRAN   82y Female  PAST MEDICAL & SURGICAL HISTORY:  MI (myocardial infarction)  s/p 5 stents      HTN (hypertension)      High cholesterol      Heart failure      Non Hodgkin&#x27;s lymphoma  in remission. Follows with Dr Hernandez      PVD (peripheral vascular disease)      Thrombocytopenia      CKD (chronic kidney disease)      Osteoarthritis      Diabetes  on insulin      Coronary artery disease involving native heart without angina pectoris, unspecified vessel or lesion type  s/p 5 stents      History of cholecystectomy      H/O cardiac catheterization  5 STENTS      H/O carotid endarterectomy  RIGHT           HPI:  82F w/ pmhx of CHF, HLD, HTN, COPD on 3L home O2, DM1, dementia, GERD, MI in 1992, CAD with 5 cardiac stents who present with 1 month of worsening SOB. For past 1 month she has been having dry cough and SOB. She saw her pulmonologist Dr. Feldman who did CXR and showed it was normal at the time, had her on levaquin and then increased her dose which she completed last week. Yesterday she started having crushing chest pressure substernally. Denies fever during this month, nausea vomiting back pain abd pain urinary sx or diarrhea. No recent travel or sick contact. Her cardiologist is Dr. Munoz.  (25 Dec 2022 18:25)    Allergies    ACE inhibitors (Unknown)  BENZALKONIUM (Rash)  BETADINE (Rash)  Ceclor (Unknown)  codeine (Unknown)  contrast media (iodine-based) (Hives)  latex (Unknown)  penicillin (Unknown)  RUBBER GLOVES (Rash)  shellfish (Unknown)  sulfonamides (Unknown)    Intolerances      Pt seen and examined at bedside, ptc/o nausea after breakfast, now resolving  Denies CP, SOB, palpitations, or dizziness  NSVT on telemetry overnight    Vital Signs Last 24 Hrs  T(C): 36.3 (31 Dec 2022 08:00), Max: 36.7 (30 Dec 2022 20:00)  T(F): 97.3 (31 Dec 2022 08:00), Max: 98.1 (30 Dec 2022 20:00)  HR: 80 (31 Dec 2022 08:00) (80 - 98)  BP: 120/56 (31 Dec 2022 08:00) (97/50 - 164/71)  BP(mean): 81 (31 Dec 2022 08:00) (70 - 102)  RR: 18 (31 Dec 2022 08:00) (17 - 23)  SpO2: 99% (31 Dec 2022 08:00) (92% - 99%)    Parameters below as of 31 Dec 2022 08:00  Patient On (Oxygen Delivery Method): nasal cannula  O2 Flow (L/min): 2      MEDICATIONS  (STANDING):  albuterol/ipratropium for Nebulization 3 milliLiter(s) Nebulizer every 6 hours  aspirin enteric coated 81 milliGRAM(s) Oral daily  atorvastatin 40 milliGRAM(s) Oral at bedtime  clopidogrel Tablet 75 milliGRAM(s) Oral daily  dextrose 5%. 1000 milliLiter(s) (50 mL/Hr) IV Continuous <Continuous>  dextrose 5%. 1000 milliLiter(s) (100 mL/Hr) IV Continuous <Continuous>  dextrose 50% Injectable 25 Gram(s) IV Push once  dextrose 50% Injectable 12.5 Gram(s) IV Push once  dextrose 50% Injectable 25 Gram(s) IV Push once  donepezil 10 milliGRAM(s) Oral at bedtime  doxycycline IVPB      doxycycline IVPB 100 milliGRAM(s) IV Intermittent every 12 hours  glucagon  Injectable 1 milliGRAM(s) IntraMuscular once  insulin glargine Injectable (LANTUS) 18 Unit(s) SubCutaneous at bedtime  insulin lispro (ADMELOG) corrective regimen sliding scale   SubCutaneous three times a day before meals  insulin lispro Injectable (ADMELOG) 6 Unit(s) SubCutaneous three times a day before meals  levoFLOXacin IVPB 750 milliGRAM(s) IV Intermittent every 24 hours  metoprolol succinate ER 50 milliGRAM(s) Oral daily  montelukast 10 milliGRAM(s) Oral daily    MEDICATIONS  (PRN):  dextrose Oral Gel 15 Gram(s) Oral once PRN Blood Glucose LESS THAN 70 milliGRAM(s)/deciliter      REVIEW OF SYSTEMS:          CONSTITUTIONAL: No weakness, fevers or chills          EYES/ENT: No visual changes;  No vertigo or throat pain           NECK: No pain or stiffness          RESPIRATORY: No cough, wheezing, hemoptysis          CARDIOVASCULAR: no pain, no ROJAS, no palpitations           GASTROINTESTINAL: No abdominal or epigastric pain. + nausea after breakfast           GENITOURINARY: No dysuria, frequency or hematuria          NEUROLOGICAL: No numbness or weakness          SKIN: No itching, rashes    PHYSICAL EXAM:           CONSTITUTIONAL: Well-developed; well-nourished; in no acute distress  	SKIN: warm, dry  	HEAD: Normocephalic; atraumatic  	EYES: PERRL.  	ENT: No nasal discharge, airway clear, mucous membranes moist  	NECK: Supple; non tender.  	CARD: +S1, +S2, no murmurs, gallops, or rubs. (Regular) rate and rhythm    	RESP: + b/l exp wheeze  	ABD: soft ntnd, + BS x 4 quadrants  	EXT: moves all extremities,  no clubbing, cyanosis or edema  	NEURO: Alert and oriented x3, no focal deficits          PSYCH: Cooperative, appropriate          VASCULAR:  + Rad / + PTs / + DPs          EXTREMITY:             Right Groin:  Dressing removed, access site soft, + pulses, no hematoma, no pain, no numbness, no signs and symptoms of infection              ECG: P                                                                                                                2D ECHO:   ACC: [id number] EXAM:  ECHO TTE WO CON COMP W DOPP                          PROCEDURE DATE:  12/26/2022          INTERPRETATION:   [place] [address]                Phone: [phone].   TRANSTHORACIC ECHOCARDIOGRAM REPORT        Patient Name:   VENKATESH RAMACHANDRAN Accession #: [id number]  Medical Rec #:  [id number]    Height:      70.0 in 177.8 cm  YOB: 1940    Weight:      138.0 lb 62.60 kg  Patient Age:    82 years     BSA:         1.78 m²  Patient Gender: F            BP:          144/65 mmHg      Date of Exam:        12/26/2022 10:40:56 AM  Referring Physician: [id number] ED UNASSIGNED  Sonographer:         [facility]  Reading Physician:   Sigifredo De Santiago M.D.    Procedure:   2D Echo/Doppler/Color Doppler Complete.  Indications: I21.3 - ST Elevation STEMI Myocardial Infarction of   unspecified  Diagnosis:   I21.3 - ST Elevation STEMI Myocardial Infarction of   unspecified        Summary:   1. LV Ejection Fraction by Baird's Method with a biplane EF of 78 %.   2. Normal left atrial size.   3. Mild mitral annular calcification.   4. Mild mitral valve regurgitation.   5. Mild tricuspid regurgitation.   6. Normal trileaflet aortic valvewith normal opening.   7. Mild pulmonic valve regurgitation.   8. Estimated pulmonary artery systolic pressure is 73.8 mmHg assuming a   right atrial pressure of 3 mmHg, which is consistent with severe   pulmonary hypertension.   9. There is mild aortic root calcification.        LABS:                        10.7   10.65 )-----------( 141      ( 30 Dec 2022 18:41 )             32.8     12-30    138  |  95<L>  |  66<HH>  ----------------------------<  241<H>  3.6   |  24  |  1.8<H>    Ca    8.4      30 Dec 2022 18:41  Mg     1.7     12-30    TPro  5.3<L>  /  Alb  3.5  /  TBili  0.8  /  DBili  x   /  AST  32  /  ALT  23  /  AlkPhos  67  12-30        Magnesium, Serum: 1.7 mg/dL *L* [1.8 - 2.4] (12-30-22 @ 18:41)  LIVER FUNCTIONS - ( 30 Dec 2022 05:17 )  Alb: 3.5 g/dL / Pro: 5.3 g/dL / ALK PHOS: 67 U/L / ALT: 23 U/L / AST: 32 U/L / GGT: x             A/P:  I discussed the case with Cardiologist Dr. Leong  and recommend the following:    S/P PCI pLAD KHUSHBU x 1      Intervention: s/p successful IVUS guided rotational atherectomy and balloon angioplasty with KHUSHBU X 1 to prox LAD 90% stenosis extending into mid LAD    Implants: Kvng [place] 3.5 X 30 to prox LAD        FINDINGS:     Coronary Dominance: Right    LM: No disease    LAD: Prox LAD calcified 90% stenosis, mid LAD 90% calcified stenosis  D1 mild disease    CX: dCx 70% stenosis  OM1 Mild disease small vessel  OM2 mild disease     RCA: Patent stents from prior cath, not injected     LVEDP: 25 mmHg       POST-OP DIAGNOSIS:      [x] 2 Vessel Coronary Artery Disease: LAD s/p intervention and Cx      	     Continue DAPT (  Aspirin 81 mg PO Daily and Plavix 75 mg po daily )),  B-Blocker, Statin Therapy                          No ACEi/ARB at this time due to elevated Cr and allergy to ACEi                   DVT/GI prophylaxis                   Patient given 30 day supply of (EC  Aspirin 81 mg daily and Plavix 75 mg daily ) to take at home                   f/u 11am lab results                   PT eval                    OOB-CH, ambulate with assistance                    monitor access site/pulses                   Patient agreeing to take DAPT for at least one year or as directed by cardiologist                    Pt given instructions on importance of taking antiplatelet medication or risk acute stent thrombosis/death                   Post cath instructions, access site care and activity restrictions reviewed with patient                      Benefits of Cardiac Rehab discussed with patient and all documents sent to [facility]                   Patient instructed to call Cardiac Rehab and make first appointment after first f/u visit with Cardiologist                    Discussed with patient to return to hospital if experience chest pain, shortness breath, dizziness and site bleeding                   Aggressive risk factor modification, diet counseling, smoking cessation discussed with patient                       Care as per CCU team                        Discharge instructions as follows: (once stable for discharge)                   - Continue medical regimen as prescribed to prevent chest pain                   - Continue dual anti-platelet therapy, beta blocker, statin                   - If you are diabetic and taking medication containing Metformin, do not take them for 48 hours after the procedure                   - Instructed to call 911 if chest pain, shortness of breath or bleeding from access site                   - No heavy lifting >10lbs x 1 week                   - No driving x 24 hours                   - No baths, swimming pools x 1 week, may shower                   - Low sodium low fat low cholesterol diet                   - Follow-up with Cardiologist in 1-2 weeks after discharge

## 2022-12-31 NOTE — PROGRESS NOTE ADULT - ASSESSMENT
81 yo F w/ pmhx of CHF, HLD, HTN, COPD on 3L home O2, DM1, dementia, GERD, MI in 1992, CAD w cardiac stents presented to the ED on 12/25 with progressive SOB of breath for the last month. On 12/26 was admitted to T after experiencing NSTEMI and acute on chronic HFpEF - successful mid LA atherectomy of prox-mid LAD 12/29     #shortness of breath secondary to PNA  -progressive SOB for 1 month   -initial CXR on 12/25 showed bibasilar opacities  -repeat CXR 12/28 shows improvement  -WBC uptrending 11.26 > 10.19 > 11.4  -c/w levaquin and doxycycline    #NSTEMI d/t 2 vessel CAD    - cath done 12/27 stenosis 90% of  prox LAD,  90 % of mid LAD, 70% of dCx  -LVEDP 25mmhg  -atherectomy and balloon angioplasty w KHUSHBU x 1 to prox LAD 90% stenosis extending into mid LAD 12/29  - cw aspirin 81mg, plavix 75mg, lipitor 40mg, metoprolol 25mg  - d/c lasix is light of suspected hypovolemia , cont to hold diuretic  - f/u bmp    #nephro consult for contrast risk stratification  -CKD 3b  -Creatinine uptrending 1.7 (1.5 at baseline)  -urine output 1200, net -490  -patient at risk for JUDIT  -no IVF pre and post procedure given volume overload and pulmonary edema requiring oxygen  -continue to monitor BMP and strict i/os  -electrolytes reviewed  -will follow     #DM  - POCT glucose uncontrolled (400 this am)  - Pt on Soliqua 35 units at home   - Started on Lantus 18 at bedtime and lispro 6 TID   -continue to monitor fingersticks and ISS    #misc  -pending PT consult  -activity OOBTC          81 yo F w/ pmhx of CHF, HLD, HTN, COPD on 3L home O2, DM1, dementia, GERD, MI in 1992, CAD w cardiac stents presented to the ED on 12/25 with progressive SOB of breath for the last month. On 12/26 was admitted to T after experiencing NSTEMI and acute on chronic HFpEF - successful mid LA atherectomy of prox-mid LAD 12/29     #shortness of breath secondary to PNA  -progressive SOB for 1 month   -initial CXR on 12/25 showed bibasilar opacities  -repeat CXR 12/28 shows improvement  -WBC uptrending 11.26 > 10.19 > 11.4  -c/w levaquin and doxycycline    #NSTEMI d/t 2 vessel CAD    - cath done 12/27 stenosis 90% of  prox LAD,  90 % of mid LAD, 70% of dCx  -LVEDP 25mmhg  -atherectomy and balloon angioplasty w KHUSHBU x 1 to prox LAD 90% stenosis extending into mid LAD 12/29  - cw aspirin 81mg, plavix 75mg, lipitor 40mg, metoprolol 25mg  - d/c lasix is light of suspected hypovolemia , cont to hold diuretic  - f/u bmp    #nephro consult for contrast risk stratification  -CKD 3b  -Creatinine uptrending 1.7 (1.5 at baseline)  -urine output 1200, net -490  -patient at risk for JUDIT  -no IVF pre and post procedure given volume overload and pulmonary edema requiring oxygen  -continue to monitor BMP and strict i/os  -electrolytes reviewed  -will follow     #DM  - POCT glucose uncontrolled (400 this am)  - Pt on Soliqua 35 units at home   - Started on Lantus 18 at bedtime and lispro 6 TID   -continue to monitor fingersticks and ISS    #misc  -pending PT eval  -activity OOBTC

## 2023-01-01 LAB
ALBUMIN SERPL ELPH-MCNC: 1.6 G/DL — LOW (ref 3.5–5.2)
ALBUMIN SERPL ELPH-MCNC: 1.6 G/DL — LOW (ref 3.5–5.2)
ALBUMIN SERPL ELPH-MCNC: 1.7 G/DL — LOW (ref 3.5–5.2)
ALBUMIN SERPL ELPH-MCNC: 1.9 G/DL — LOW (ref 3.5–5.2)
ALBUMIN SERPL ELPH-MCNC: 2.1 G/DL — LOW (ref 3.5–5.2)
ALBUMIN SERPL ELPH-MCNC: 2.6 G/DL — LOW (ref 3.5–5.2)
ALBUMIN SERPL ELPH-MCNC: 2.8 G/DL — LOW (ref 3.5–5.2)
ALBUMIN SERPL ELPH-MCNC: 2.9 G/DL — LOW (ref 3.5–5.2)
ALBUMIN SERPL ELPH-MCNC: 3.2 G/DL — LOW (ref 3.5–5.2)
ALBUMIN SERPL ELPH-MCNC: 3.4 G/DL — LOW (ref 3.5–5.2)
ALP SERPL-CCNC: 107 U/L — SIGNIFICANT CHANGE UP (ref 30–115)
ALP SERPL-CCNC: 112 U/L — SIGNIFICANT CHANGE UP (ref 30–115)
ALP SERPL-CCNC: 135 U/L — HIGH (ref 30–115)
ALP SERPL-CCNC: 136 U/L — HIGH (ref 30–115)
ALP SERPL-CCNC: 138 U/L — HIGH (ref 30–115)
ALP SERPL-CCNC: 67 U/L — SIGNIFICANT CHANGE UP (ref 30–115)
ALP SERPL-CCNC: 75 U/L — SIGNIFICANT CHANGE UP (ref 30–115)
ALP SERPL-CCNC: 80 U/L — SIGNIFICANT CHANGE UP (ref 30–115)
ALP SERPL-CCNC: 89 U/L — SIGNIFICANT CHANGE UP (ref 30–115)
ALP SERPL-CCNC: 96 U/L — SIGNIFICANT CHANGE UP (ref 30–115)
ALT FLD-CCNC: 17 U/L — SIGNIFICANT CHANGE UP (ref 0–41)
ALT FLD-CCNC: 18 U/L — SIGNIFICANT CHANGE UP (ref 0–41)
ALT FLD-CCNC: 20 U/L — SIGNIFICANT CHANGE UP (ref 0–41)
ALT FLD-CCNC: 21 U/L — SIGNIFICANT CHANGE UP (ref 0–41)
ALT FLD-CCNC: 23 U/L — SIGNIFICANT CHANGE UP (ref 0–41)
ALT FLD-CCNC: 40 U/L — SIGNIFICANT CHANGE UP (ref 0–41)
AMYLASE P1 CFR SERPL: 119 U/L — HIGH (ref 25–115)
ANION GAP SERPL CALC-SCNC: 11 MMOL/L — SIGNIFICANT CHANGE UP (ref 7–14)
ANION GAP SERPL CALC-SCNC: 12 MMOL/L — SIGNIFICANT CHANGE UP (ref 7–14)
ANION GAP SERPL CALC-SCNC: 12 MMOL/L — SIGNIFICANT CHANGE UP (ref 7–14)
ANION GAP SERPL CALC-SCNC: 13 MMOL/L — SIGNIFICANT CHANGE UP (ref 7–14)
ANION GAP SERPL CALC-SCNC: 14 MMOL/L — SIGNIFICANT CHANGE UP (ref 7–14)
ANION GAP SERPL CALC-SCNC: 15 MMOL/L — HIGH (ref 7–14)
ANION GAP SERPL CALC-SCNC: 16 MMOL/L — HIGH (ref 7–14)
APPEARANCE UR: CLEAR — SIGNIFICANT CHANGE UP
APTT BLD: 38 SEC — SIGNIFICANT CHANGE UP (ref 27–39.2)
AST SERPL-CCNC: 143 U/L — HIGH (ref 0–41)
AST SERPL-CCNC: 32 U/L — SIGNIFICANT CHANGE UP (ref 0–41)
AST SERPL-CCNC: 38 U/L — SIGNIFICANT CHANGE UP (ref 0–41)
AST SERPL-CCNC: 40 U/L — SIGNIFICANT CHANGE UP (ref 0–41)
AST SERPL-CCNC: 43 U/L — HIGH (ref 0–41)
AST SERPL-CCNC: 44 U/L — HIGH (ref 0–41)
AST SERPL-CCNC: 48 U/L — HIGH (ref 0–41)
AST SERPL-CCNC: 50 U/L — HIGH (ref 0–41)
AST SERPL-CCNC: 65 U/L — HIGH (ref 0–41)
AST SERPL-CCNC: 85 U/L — HIGH (ref 0–41)
BACTERIA # UR AUTO: NEGATIVE — SIGNIFICANT CHANGE UP
BASE EXCESS BLDA CALC-SCNC: -15 MMOL/L — LOW (ref -2–3)
BASE EXCESS BLDA CALC-SCNC: -18.9 MMOL/L — LOW (ref -2–3)
BASE EXCESS BLDA CALC-SCNC: -19.2 MMOL/L — LOW (ref -2–3)
BASOPHILS # BLD AUTO: 0.01 K/UL — SIGNIFICANT CHANGE UP (ref 0–0.2)
BASOPHILS # BLD AUTO: 0.02 K/UL — SIGNIFICANT CHANGE UP (ref 0–0.2)
BASOPHILS # BLD AUTO: 0.04 K/UL — SIGNIFICANT CHANGE UP (ref 0–0.2)
BASOPHILS # BLD AUTO: 0.04 K/UL — SIGNIFICANT CHANGE UP (ref 0–0.2)
BASOPHILS # BLD AUTO: 0.06 K/UL — SIGNIFICANT CHANGE UP (ref 0–0.2)
BASOPHILS # BLD AUTO: 0.08 K/UL — SIGNIFICANT CHANGE UP (ref 0–0.2)
BASOPHILS # BLD AUTO: 0.1 K/UL — SIGNIFICANT CHANGE UP (ref 0–0.2)
BASOPHILS NFR BLD AUTO: 0 % — SIGNIFICANT CHANGE UP (ref 0–1)
BASOPHILS NFR BLD AUTO: 0.1 % — SIGNIFICANT CHANGE UP (ref 0–1)
BASOPHILS NFR BLD AUTO: 0.2 % — SIGNIFICANT CHANGE UP (ref 0–1)
BASOPHILS NFR BLD AUTO: 0.3 % — SIGNIFICANT CHANGE UP (ref 0–1)
BASOPHILS NFR BLD AUTO: 0.3 % — SIGNIFICANT CHANGE UP (ref 0–1)
BASOPHILS NFR BLD AUTO: 0.5 % — SIGNIFICANT CHANGE UP (ref 0–1)
BASOPHILS NFR BLD AUTO: 0.6 % — SIGNIFICANT CHANGE UP (ref 0–1)
BASOPHILS NFR BLD AUTO: 0.9 % — SIGNIFICANT CHANGE UP (ref 0–1)
BILIRUB SERPL-MCNC: 0.5 MG/DL — SIGNIFICANT CHANGE UP (ref 0.2–1.2)
BILIRUB SERPL-MCNC: 0.5 MG/DL — SIGNIFICANT CHANGE UP (ref 0.2–1.2)
BILIRUB SERPL-MCNC: 0.6 MG/DL — SIGNIFICANT CHANGE UP (ref 0.2–1.2)
BILIRUB SERPL-MCNC: 0.6 MG/DL — SIGNIFICANT CHANGE UP (ref 0.2–1.2)
BILIRUB SERPL-MCNC: 0.7 MG/DL — SIGNIFICANT CHANGE UP (ref 0.2–1.2)
BILIRUB SERPL-MCNC: 0.7 MG/DL — SIGNIFICANT CHANGE UP (ref 0.2–1.2)
BILIRUB SERPL-MCNC: 0.8 MG/DL — SIGNIFICANT CHANGE UP (ref 0.2–1.2)
BILIRUB SERPL-MCNC: 0.8 MG/DL — SIGNIFICANT CHANGE UP (ref 0.2–1.2)
BILIRUB SERPL-MCNC: 0.9 MG/DL — SIGNIFICANT CHANGE UP (ref 0.2–1.2)
BILIRUB SERPL-MCNC: 0.9 MG/DL — SIGNIFICANT CHANGE UP (ref 0.2–1.2)
BILIRUB UR-MCNC: NEGATIVE — SIGNIFICANT CHANGE UP
BLD GP AB SCN SERPL QL: SIGNIFICANT CHANGE UP
BUN SERPL-MCNC: 105 MG/DL — CRITICAL HIGH (ref 10–20)
BUN SERPL-MCNC: 107 MG/DL — CRITICAL HIGH (ref 10–20)
BUN SERPL-MCNC: 111 MG/DL — CRITICAL HIGH (ref 10–20)
BUN SERPL-MCNC: 70 MG/DL — CRITICAL HIGH (ref 10–20)
BUN SERPL-MCNC: 71 MG/DL — CRITICAL HIGH (ref 10–20)
BUN SERPL-MCNC: 74 MG/DL — CRITICAL HIGH (ref 10–20)
BUN SERPL-MCNC: 83 MG/DL — CRITICAL HIGH (ref 10–20)
BUN SERPL-MCNC: 94 MG/DL — CRITICAL HIGH (ref 10–20)
BUN SERPL-MCNC: 95 MG/DL — CRITICAL HIGH (ref 10–20)
BUN SERPL-MCNC: 97 MG/DL — CRITICAL HIGH (ref 10–20)
C DIFF BY PCR RESULT: SIGNIFICANT CHANGE UP
CALCIUM SERPL-MCNC: 7.3 MG/DL — LOW (ref 8.4–10.5)
CALCIUM SERPL-MCNC: 7.6 MG/DL — LOW (ref 8.4–10.5)
CALCIUM SERPL-MCNC: 7.6 MG/DL — LOW (ref 8.4–10.5)
CALCIUM SERPL-MCNC: 7.8 MG/DL — LOW (ref 8.4–10.5)
CALCIUM SERPL-MCNC: 8.1 MG/DL — LOW (ref 8.4–10.4)
CALCIUM SERPL-MCNC: 8.3 MG/DL — LOW (ref 8.4–10.5)
CALCIUM SERPL-MCNC: 8.3 MG/DL — LOW (ref 8.4–10.5)
CALCIUM SERPL-MCNC: 8.4 MG/DL — SIGNIFICANT CHANGE UP (ref 8.4–10.4)
CALCIUM SERPL-MCNC: 8.4 MG/DL — SIGNIFICANT CHANGE UP (ref 8.4–10.5)
CALCIUM SERPL-MCNC: 8.4 MG/DL — SIGNIFICANT CHANGE UP (ref 8.4–10.5)
CALCIUM SERPL-MCNC: 8.8 MG/DL — SIGNIFICANT CHANGE UP (ref 8.4–10.5)
CALCIUM SERPL-MCNC: 8.8 MG/DL — SIGNIFICANT CHANGE UP (ref 8.4–10.5)
CHLORIDE SERPL-SCNC: 101 MMOL/L — SIGNIFICANT CHANGE UP (ref 98–110)
CHLORIDE SERPL-SCNC: 101 MMOL/L — SIGNIFICANT CHANGE UP (ref 98–110)
CHLORIDE SERPL-SCNC: 103 MMOL/L — SIGNIFICANT CHANGE UP (ref 98–110)
CHLORIDE SERPL-SCNC: 107 MMOL/L — SIGNIFICANT CHANGE UP (ref 98–110)
CHLORIDE SERPL-SCNC: 90 MMOL/L — LOW (ref 98–110)
CHLORIDE SERPL-SCNC: 91 MMOL/L — LOW (ref 98–110)
CHLORIDE SERPL-SCNC: 92 MMOL/L — LOW (ref 98–110)
CHLORIDE SERPL-SCNC: 92 MMOL/L — LOW (ref 98–110)
CHLORIDE SERPL-SCNC: 93 MMOL/L — LOW (ref 98–110)
CHLORIDE SERPL-SCNC: 93 MMOL/L — LOW (ref 98–110)
CHLORIDE SERPL-SCNC: 96 MMOL/L — LOW (ref 98–110)
CHLORIDE SERPL-SCNC: 97 MMOL/L — LOW (ref 98–110)
CO2 SERPL-SCNC: 15 MMOL/L — LOW (ref 17–32)
CO2 SERPL-SCNC: 19 MMOL/L — SIGNIFICANT CHANGE UP (ref 17–32)
CO2 SERPL-SCNC: 21 MMOL/L — SIGNIFICANT CHANGE UP (ref 17–32)
CO2 SERPL-SCNC: 22 MMOL/L — SIGNIFICANT CHANGE UP (ref 17–32)
CO2 SERPL-SCNC: 25 MMOL/L — SIGNIFICANT CHANGE UP (ref 17–32)
CO2 SERPL-SCNC: 26 MMOL/L — SIGNIFICANT CHANGE UP (ref 17–32)
CO2 SERPL-SCNC: 29 MMOL/L — SIGNIFICANT CHANGE UP (ref 17–32)
COLOR SPEC: YELLOW — SIGNIFICANT CHANGE UP
CREAT ?TM UR-MCNC: 49 MG/DL — SIGNIFICANT CHANGE UP
CREAT SERPL-MCNC: 1.4 MG/DL — SIGNIFICANT CHANGE UP (ref 0.7–1.5)
CREAT SERPL-MCNC: 1.5 MG/DL — SIGNIFICANT CHANGE UP (ref 0.7–1.5)
CREAT SERPL-MCNC: 1.6 MG/DL — HIGH (ref 0.7–1.5)
CREAT SERPL-MCNC: 1.7 MG/DL — HIGH (ref 0.7–1.5)
CREAT SERPL-MCNC: 1.8 MG/DL — HIGH (ref 0.7–1.5)
CREAT SERPL-MCNC: 1.8 MG/DL — HIGH (ref 0.7–1.5)
CREAT SERPL-MCNC: 1.9 MG/DL — HIGH (ref 0.7–1.5)
CREAT SERPL-MCNC: 2 MG/DL — HIGH (ref 0.7–1.5)
CREAT SERPL-MCNC: 2.1 MG/DL — HIGH (ref 0.7–1.5)
CREAT SERPL-MCNC: 2.4 MG/DL — HIGH (ref 0.7–1.5)
CULTURE RESULTS: SIGNIFICANT CHANGE UP
CULTURE RESULTS: SIGNIFICANT CHANGE UP
DIFF PNL FLD: NEGATIVE — SIGNIFICANT CHANGE UP
EGFR: 20 ML/MIN/1.73M2 — LOW
EGFR: 23 ML/MIN/1.73M2 — LOW
EGFR: 24 ML/MIN/1.73M2 — LOW
EGFR: 26 ML/MIN/1.73M2 — LOW
EGFR: 28 ML/MIN/1.73M2 — LOW
EGFR: 28 ML/MIN/1.73M2 — LOW
EGFR: 30 ML/MIN/1.73M2 — LOW
EGFR: 32 ML/MIN/1.73M2 — LOW
EGFR: 34 ML/MIN/1.73M2 — LOW
EGFR: 37 ML/MIN/1.73M2 — LOW
EOSINOPHIL # BLD AUTO: 0.03 K/UL — SIGNIFICANT CHANGE UP (ref 0–0.7)
EOSINOPHIL # BLD AUTO: 0.04 K/UL — SIGNIFICANT CHANGE UP (ref 0–0.7)
EOSINOPHIL # BLD AUTO: 0.05 K/UL — SIGNIFICANT CHANGE UP (ref 0–0.7)
EOSINOPHIL # BLD AUTO: 0.05 K/UL — SIGNIFICANT CHANGE UP (ref 0–0.7)
EOSINOPHIL # BLD AUTO: 0.06 K/UL — SIGNIFICANT CHANGE UP (ref 0–0.7)
EOSINOPHIL # BLD AUTO: 0.07 K/UL — SIGNIFICANT CHANGE UP (ref 0–0.7)
EOSINOPHIL # BLD AUTO: 0.07 K/UL — SIGNIFICANT CHANGE UP (ref 0–0.7)
EOSINOPHIL # BLD AUTO: 0.08 K/UL — SIGNIFICANT CHANGE UP (ref 0–0.7)
EOSINOPHIL # BLD AUTO: 0.09 K/UL — SIGNIFICANT CHANGE UP (ref 0–0.7)
EOSINOPHIL # BLD AUTO: 0.11 K/UL — SIGNIFICANT CHANGE UP (ref 0–0.7)
EOSINOPHIL # BLD AUTO: 0.11 K/UL — SIGNIFICANT CHANGE UP (ref 0–0.7)
EOSINOPHIL NFR BLD AUTO: 0.1 % — SIGNIFICANT CHANGE UP (ref 0–8)
EOSINOPHIL NFR BLD AUTO: 0.2 % — SIGNIFICANT CHANGE UP (ref 0–8)
EOSINOPHIL NFR BLD AUTO: 0.5 % — SIGNIFICANT CHANGE UP (ref 0–8)
EOSINOPHIL NFR BLD AUTO: 0.5 % — SIGNIFICANT CHANGE UP (ref 0–8)
EOSINOPHIL NFR BLD AUTO: 0.7 % — SIGNIFICANT CHANGE UP (ref 0–8)
EOSINOPHIL NFR BLD AUTO: 0.8 % — SIGNIFICANT CHANGE UP (ref 0–8)
EOSINOPHIL NFR BLD AUTO: 0.8 % — SIGNIFICANT CHANGE UP (ref 0–8)
EOSINOPHIL NFR BLD AUTO: 1 % — SIGNIFICANT CHANGE UP (ref 0–8)
EPI CELLS # UR: 0 /HPF — SIGNIFICANT CHANGE UP (ref 0–5)
GAS PNL BLDA: SIGNIFICANT CHANGE UP
GLUCOSE BLDC GLUCOMTR-MCNC: 110 MG/DL — HIGH (ref 70–99)
GLUCOSE BLDC GLUCOMTR-MCNC: 111 MG/DL — HIGH (ref 70–99)
GLUCOSE BLDC GLUCOMTR-MCNC: 116 MG/DL — HIGH (ref 70–99)
GLUCOSE BLDC GLUCOMTR-MCNC: 119 MG/DL — HIGH (ref 70–99)
GLUCOSE BLDC GLUCOMTR-MCNC: 122 MG/DL — HIGH (ref 70–99)
GLUCOSE BLDC GLUCOMTR-MCNC: 126 MG/DL — HIGH (ref 70–99)
GLUCOSE BLDC GLUCOMTR-MCNC: 133 MG/DL — HIGH (ref 70–99)
GLUCOSE BLDC GLUCOMTR-MCNC: 152 MG/DL — HIGH (ref 70–99)
GLUCOSE BLDC GLUCOMTR-MCNC: 162 MG/DL — HIGH (ref 70–99)
GLUCOSE BLDC GLUCOMTR-MCNC: 164 MG/DL — HIGH (ref 70–99)
GLUCOSE BLDC GLUCOMTR-MCNC: 164 MG/DL — HIGH (ref 70–99)
GLUCOSE BLDC GLUCOMTR-MCNC: 170 MG/DL — HIGH (ref 70–99)
GLUCOSE BLDC GLUCOMTR-MCNC: 176 MG/DL — HIGH (ref 70–99)
GLUCOSE BLDC GLUCOMTR-MCNC: 186 MG/DL — HIGH (ref 70–99)
GLUCOSE BLDC GLUCOMTR-MCNC: 209 MG/DL — HIGH (ref 70–99)
GLUCOSE BLDC GLUCOMTR-MCNC: 225 MG/DL — HIGH (ref 70–99)
GLUCOSE BLDC GLUCOMTR-MCNC: 228 MG/DL — HIGH (ref 70–99)
GLUCOSE BLDC GLUCOMTR-MCNC: 232 MG/DL — HIGH (ref 70–99)
GLUCOSE BLDC GLUCOMTR-MCNC: 235 MG/DL — HIGH (ref 70–99)
GLUCOSE BLDC GLUCOMTR-MCNC: 238 MG/DL — HIGH (ref 70–99)
GLUCOSE BLDC GLUCOMTR-MCNC: 256 MG/DL — HIGH (ref 70–99)
GLUCOSE BLDC GLUCOMTR-MCNC: 258 MG/DL — HIGH (ref 70–99)
GLUCOSE BLDC GLUCOMTR-MCNC: 276 MG/DL — HIGH (ref 70–99)
GLUCOSE BLDC GLUCOMTR-MCNC: 290 MG/DL — HIGH (ref 70–99)
GLUCOSE BLDC GLUCOMTR-MCNC: 293 MG/DL — HIGH (ref 70–99)
GLUCOSE BLDC GLUCOMTR-MCNC: 294 MG/DL — HIGH (ref 70–99)
GLUCOSE BLDC GLUCOMTR-MCNC: 296 MG/DL — HIGH (ref 70–99)
GLUCOSE BLDC GLUCOMTR-MCNC: 305 MG/DL — HIGH (ref 70–99)
GLUCOSE BLDC GLUCOMTR-MCNC: 313 MG/DL — HIGH (ref 70–99)
GLUCOSE BLDC GLUCOMTR-MCNC: 326 MG/DL — HIGH (ref 70–99)
GLUCOSE BLDC GLUCOMTR-MCNC: 358 MG/DL — HIGH (ref 70–99)
GLUCOSE BLDC GLUCOMTR-MCNC: 367 MG/DL — HIGH (ref 70–99)
GLUCOSE BLDC GLUCOMTR-MCNC: 390 MG/DL — HIGH (ref 70–99)
GLUCOSE BLDC GLUCOMTR-MCNC: 60 MG/DL — LOW (ref 70–99)
GLUCOSE BLDC GLUCOMTR-MCNC: 64 MG/DL — LOW (ref 70–99)
GLUCOSE BLDC GLUCOMTR-MCNC: 70 MG/DL — SIGNIFICANT CHANGE UP (ref 70–99)
GLUCOSE BLDC GLUCOMTR-MCNC: 79 MG/DL — SIGNIFICANT CHANGE UP (ref 70–99)
GLUCOSE BLDC GLUCOMTR-MCNC: 79 MG/DL — SIGNIFICANT CHANGE UP (ref 70–99)
GLUCOSE BLDC GLUCOMTR-MCNC: 89 MG/DL — SIGNIFICANT CHANGE UP (ref 70–99)
GLUCOSE BLDC GLUCOMTR-MCNC: 90 MG/DL — SIGNIFICANT CHANGE UP (ref 70–99)
GLUCOSE BLDC GLUCOMTR-MCNC: 90 MG/DL — SIGNIFICANT CHANGE UP (ref 70–99)
GLUCOSE BLDC GLUCOMTR-MCNC: 93 MG/DL — SIGNIFICANT CHANGE UP (ref 70–99)
GLUCOSE BLDC GLUCOMTR-MCNC: 99 MG/DL — SIGNIFICANT CHANGE UP (ref 70–99)
GLUCOSE SERPL-MCNC: 119 MG/DL — HIGH (ref 70–99)
GLUCOSE SERPL-MCNC: 145 MG/DL — HIGH (ref 70–99)
GLUCOSE SERPL-MCNC: 148 MG/DL — HIGH (ref 70–99)
GLUCOSE SERPL-MCNC: 178 MG/DL — HIGH (ref 70–99)
GLUCOSE SERPL-MCNC: 198 MG/DL — HIGH (ref 70–99)
GLUCOSE SERPL-MCNC: 200 MG/DL — HIGH (ref 70–99)
GLUCOSE SERPL-MCNC: 208 MG/DL — HIGH (ref 70–99)
GLUCOSE SERPL-MCNC: 277 MG/DL — HIGH (ref 70–99)
GLUCOSE SERPL-MCNC: 284 MG/DL — HIGH (ref 70–99)
GLUCOSE SERPL-MCNC: 52 MG/DL — LOW (ref 70–99)
GLUCOSE SERPL-MCNC: 53 MG/DL — LOW (ref 70–99)
GLUCOSE SERPL-MCNC: 94 MG/DL — SIGNIFICANT CHANGE UP (ref 70–99)
GLUCOSE UR QL: NEGATIVE — SIGNIFICANT CHANGE UP
HCO3 BLDA-SCNC: 10 MMOL/L — CRITICAL LOW (ref 21–28)
HCO3 BLDA-SCNC: 10 MMOL/L — CRITICAL LOW (ref 21–28)
HCO3 BLDA-SCNC: 14 MMOL/L — LOW (ref 21–28)
HCT VFR BLD CALC: 20.1 % — LOW (ref 37–47)
HCT VFR BLD CALC: 21.3 % — LOW (ref 37–47)
HCT VFR BLD CALC: 22.4 % — LOW (ref 37–47)
HCT VFR BLD CALC: 23.7 % — LOW (ref 37–47)
HCT VFR BLD CALC: 24.1 % — LOW (ref 37–47)
HCT VFR BLD CALC: 24.2 % — LOW (ref 37–47)
HCT VFR BLD CALC: 24.4 % — LOW (ref 37–47)
HCT VFR BLD CALC: 24.6 % — SIGNIFICANT CHANGE UP (ref 37–47)
HCT VFR BLD CALC: 24.8 % — LOW (ref 37–47)
HCT VFR BLD CALC: 29.7 % — LOW (ref 37–47)
HCT VFR BLD CALC: 30.5 % — LOW (ref 37–47)
HCT VFR BLD CALC: 30.6 % — LOW (ref 37–47)
HCT VFR BLD CALC: 30.9 % — LOW (ref 37–47)
HCT VFR BLD CALC: 31.8 % — LOW (ref 37–47)
HCT VFR BLD CALC: 33.5 % — LOW (ref 37–47)
HCT VFR BLD CALC: 36.8 % — LOW (ref 37–47)
HEPARIN-PF4 AB RESULT: <0.6 U/ML — SIGNIFICANT CHANGE UP (ref 0–0.9)
HGB BLD-MCNC: 10.1 G/DL — LOW (ref 12–16)
HGB BLD-MCNC: 10.4 G/DL — LOW (ref 12–16)
HGB BLD-MCNC: 10.5 G/DL — LOW (ref 12–16)
HGB BLD-MCNC: 10.8 G/DL — LOW (ref 12–16)
HGB BLD-MCNC: 11.2 G/DL — LOW (ref 12–16)
HGB BLD-MCNC: 12.9 G/DL — SIGNIFICANT CHANGE UP (ref 12–16)
HGB BLD-MCNC: 6.9 G/DL — CRITICAL LOW (ref 12–16)
HGB BLD-MCNC: 7.2 G/DL — LOW (ref 12–16)
HGB BLD-MCNC: 7.5 G/DL — LOW (ref 12–16)
HGB BLD-MCNC: 7.8 G/DL — SIGNIFICANT CHANGE UP (ref 12–16)
HGB BLD-MCNC: 7.9 G/DL — LOW (ref 12–16)
HGB BLD-MCNC: 8.1 G/DL — LOW (ref 12–16)
HGB BLD-MCNC: 8.2 G/DL — LOW (ref 12–16)
HGB BLD-MCNC: 8.2 G/DL — LOW (ref 12–16)
HGB BLD-MCNC: 8.4 G/DL — LOW (ref 12–16)
HGB BLD-MCNC: 9.9 G/DL — LOW (ref 12–16)
HOROWITZ INDEX BLDA+IHG-RTO: 100 — SIGNIFICANT CHANGE UP
HOROWITZ INDEX BLDA+IHG-RTO: 100 — SIGNIFICANT CHANGE UP
HYALINE CASTS # UR AUTO: 0 /LPF — SIGNIFICANT CHANGE UP (ref 0–7)
IMM GRANULOCYTES NFR BLD AUTO: 0.3 % — SIGNIFICANT CHANGE UP (ref 0.1–0.3)
IMM GRANULOCYTES NFR BLD AUTO: 0.7 % — HIGH (ref 0.1–0.3)
IMM GRANULOCYTES NFR BLD AUTO: 1.2 % — HIGH (ref 0.1–0.3)
IMM GRANULOCYTES NFR BLD AUTO: 1.3 % — HIGH (ref 0.1–0.3)
IMM GRANULOCYTES NFR BLD AUTO: 1.5 % — HIGH (ref 0.1–0.3)
IMM GRANULOCYTES NFR BLD AUTO: 1.7 % — HIGH (ref 0.1–0.3)
IMM GRANULOCYTES NFR BLD AUTO: 1.8 % — HIGH (ref 0.1–0.3)
IMM GRANULOCYTES NFR BLD AUTO: 1.8 % — HIGH (ref 0.1–0.3)
IMM GRANULOCYTES NFR BLD AUTO: 3.2 % — HIGH (ref 0.1–0.3)
IMM GRANULOCYTES NFR BLD AUTO: 3.7 % — SIGNIFICANT CHANGE UP (ref 0.1–0.3)
IMM GRANULOCYTES NFR BLD AUTO: 7.1 % — HIGH (ref 0.1–0.3)
INR BLD: 1.78 RATIO — HIGH (ref 0.65–1.3)
KETONES UR-MCNC: NEGATIVE — SIGNIFICANT CHANGE UP
LACTATE SERPL-SCNC: 12.5 MMOL/L — CRITICAL HIGH (ref 0.7–2)
LACTATE SERPL-SCNC: 20.6 MMOL/L — CRITICAL HIGH (ref 0.7–2)
LACTATE SERPL-SCNC: 4.2 MMOL/L — CRITICAL HIGH (ref 0.7–2)
LEUKOCYTE ESTERASE UR-ACNC: ABNORMAL
LIDOCAIN IGE QN: 7 U/L — SIGNIFICANT CHANGE UP (ref 7–60)
LYMPHOCYTES # BLD AUTO: 0.78 K/UL — LOW (ref 1.2–3.4)
LYMPHOCYTES # BLD AUTO: 0.8 K/UL — SIGNIFICANT CHANGE UP (ref 1.2–3.4)
LYMPHOCYTES # BLD AUTO: 1.47 K/UL — SIGNIFICANT CHANGE UP (ref 1.2–3.4)
LYMPHOCYTES # BLD AUTO: 1.56 K/UL — SIGNIFICANT CHANGE UP (ref 1.2–3.4)
LYMPHOCYTES # BLD AUTO: 1.69 K/UL — SIGNIFICANT CHANGE UP (ref 1.2–3.4)
LYMPHOCYTES # BLD AUTO: 1.94 K/UL — SIGNIFICANT CHANGE UP (ref 1.2–3.4)
LYMPHOCYTES # BLD AUTO: 1.98 K/UL — SIGNIFICANT CHANGE UP (ref 1.2–3.4)
LYMPHOCYTES # BLD AUTO: 11.3 % — LOW (ref 20.5–51.1)
LYMPHOCYTES # BLD AUTO: 11.6 % — LOW (ref 20.5–51.1)
LYMPHOCYTES # BLD AUTO: 12.7 % — LOW (ref 20.5–51.1)
LYMPHOCYTES # BLD AUTO: 13.2 % — LOW (ref 20.5–51.1)
LYMPHOCYTES # BLD AUTO: 13.3 % — LOW (ref 20.5–51.1)
LYMPHOCYTES # BLD AUTO: 13.5 % — LOW (ref 20.5–51.1)
LYMPHOCYTES # BLD AUTO: 14.5 % — LOW (ref 20.5–51.1)
LYMPHOCYTES # BLD AUTO: 2.1 K/UL — SIGNIFICANT CHANGE UP (ref 1.2–3.4)
LYMPHOCYTES # BLD AUTO: 2.14 K/UL — SIGNIFICANT CHANGE UP (ref 1.2–3.4)
LYMPHOCYTES # BLD AUTO: 2.68 K/UL — SIGNIFICANT CHANGE UP (ref 1.2–3.4)
LYMPHOCYTES # BLD AUTO: 3.35 K/UL — SIGNIFICANT CHANGE UP (ref 1.2–3.4)
LYMPHOCYTES # BLD AUTO: 6.3 % — SIGNIFICANT CHANGE UP (ref 20.5–51.1)
LYMPHOCYTES # BLD AUTO: 7 % — LOW (ref 20.5–51.1)
LYMPHOCYTES # BLD AUTO: 9 % — LOW (ref 20.5–51.1)
LYMPHOCYTES # BLD AUTO: 9.4 % — LOW (ref 20.5–51.1)
MAGNESIUM SERPL-MCNC: 1.9 MG/DL — SIGNIFICANT CHANGE UP (ref 1.8–2.4)
MAGNESIUM SERPL-MCNC: 2 MG/DL — SIGNIFICANT CHANGE UP (ref 1.8–2.4)
MAGNESIUM SERPL-MCNC: 2.1 MG/DL — SIGNIFICANT CHANGE UP (ref 1.8–2.4)
MAGNESIUM SERPL-MCNC: 2.1 MG/DL — SIGNIFICANT CHANGE UP (ref 1.8–2.4)
MAGNESIUM SERPL-MCNC: 2.3 MG/DL — SIGNIFICANT CHANGE UP (ref 1.8–2.4)
MAGNESIUM SERPL-MCNC: 2.3 MG/DL — SIGNIFICANT CHANGE UP (ref 1.8–2.4)
MCHC RBC-ENTMCNC: 26.3 PG — LOW (ref 27–31)
MCHC RBC-ENTMCNC: 26.3 PG — LOW (ref 27–31)
MCHC RBC-ENTMCNC: 26.4 PG — LOW (ref 27–31)
MCHC RBC-ENTMCNC: 26.5 PG — LOW (ref 27–31)
MCHC RBC-ENTMCNC: 26.5 PG — LOW (ref 27–31)
MCHC RBC-ENTMCNC: 26.6 PG — LOW (ref 27–31)
MCHC RBC-ENTMCNC: 26.6 PG — SIGNIFICANT CHANGE UP (ref 27–31)
MCHC RBC-ENTMCNC: 26.7 PG — LOW (ref 27–31)
MCHC RBC-ENTMCNC: 26.8 PG — LOW (ref 27–31)
MCHC RBC-ENTMCNC: 26.8 PG — LOW (ref 27–31)
MCHC RBC-ENTMCNC: 26.9 PG — LOW (ref 27–31)
MCHC RBC-ENTMCNC: 26.9 PG — LOW (ref 27–31)
MCHC RBC-ENTMCNC: 27.2 PG — SIGNIFICANT CHANGE UP (ref 27–31)
MCHC RBC-ENTMCNC: 31.4 PG — HIGH (ref 27–31)
MCHC RBC-ENTMCNC: 31.7 G/DL — SIGNIFICANT CHANGE UP (ref 32–37)
MCHC RBC-ENTMCNC: 32.4 G/DL — SIGNIFICANT CHANGE UP (ref 32–37)
MCHC RBC-ENTMCNC: 32.7 G/DL — SIGNIFICANT CHANGE UP (ref 32–37)
MCHC RBC-ENTMCNC: 32.7 G/DL — SIGNIFICANT CHANGE UP (ref 32–37)
MCHC RBC-ENTMCNC: 33.3 G/DL — SIGNIFICANT CHANGE UP (ref 32–37)
MCHC RBC-ENTMCNC: 33.4 G/DL — SIGNIFICANT CHANGE UP (ref 32–37)
MCHC RBC-ENTMCNC: 33.5 G/DL — SIGNIFICANT CHANGE UP (ref 32–37)
MCHC RBC-ENTMCNC: 33.8 G/DL — SIGNIFICANT CHANGE UP (ref 32–37)
MCHC RBC-ENTMCNC: 33.9 G/DL — SIGNIFICANT CHANGE UP (ref 32–37)
MCHC RBC-ENTMCNC: 34 G/DL — SIGNIFICANT CHANGE UP (ref 32–37)
MCHC RBC-ENTMCNC: 34.3 G/DL — SIGNIFICANT CHANGE UP (ref 32–37)
MCHC RBC-ENTMCNC: 34.4 G/DL — SIGNIFICANT CHANGE UP (ref 32–37)
MCHC RBC-ENTMCNC: 35.1 G/DL — SIGNIFICANT CHANGE UP (ref 32–37)
MCHC RBC-ENTMCNC: 35.4 G/DL — SIGNIFICANT CHANGE UP (ref 32–37)
MCV RBC AUTO: 75.9 FL — LOW (ref 81–99)
MCV RBC AUTO: 77.6 FL — LOW (ref 81–99)
MCV RBC AUTO: 77.7 FL — LOW (ref 81–99)
MCV RBC AUTO: 78.6 FL — LOW (ref 81–99)
MCV RBC AUTO: 78.6 FL — LOW (ref 81–99)
MCV RBC AUTO: 78.8 FL — LOW (ref 81–99)
MCV RBC AUTO: 79.1 FL — LOW (ref 81–99)
MCV RBC AUTO: 79.2 FL — LOW (ref 81–99)
MCV RBC AUTO: 79.4 FL — LOW (ref 81–99)
MCV RBC AUTO: 79.5 FL — LOW (ref 81–99)
MCV RBC AUTO: 79.8 FL — LOW (ref 81–99)
MCV RBC AUTO: 80.5 FL — LOW (ref 81–99)
MCV RBC AUTO: 81.1 FL — SIGNIFICANT CHANGE UP (ref 81–99)
MCV RBC AUTO: 81.2 FL — SIGNIFICANT CHANGE UP (ref 81–99)
MCV RBC AUTO: 84 FL — SIGNIFICANT CHANGE UP (ref 81–99)
MCV RBC AUTO: 89.5 FL — SIGNIFICANT CHANGE UP (ref 81–99)
MONOCYTES # BLD AUTO: 0.36 K/UL — SIGNIFICANT CHANGE UP (ref 0.1–0.6)
MONOCYTES # BLD AUTO: 0.9 K/UL — SIGNIFICANT CHANGE UP (ref 0.1–0.6)
MONOCYTES # BLD AUTO: 0.97 K/UL — HIGH (ref 0.1–0.6)
MONOCYTES # BLD AUTO: 1.14 K/UL — HIGH (ref 0.1–0.6)
MONOCYTES # BLD AUTO: 1.19 K/UL — HIGH (ref 0.1–0.6)
MONOCYTES # BLD AUTO: 1.39 K/UL — HIGH (ref 0.1–0.6)
MONOCYTES # BLD AUTO: 1.4 K/UL — HIGH (ref 0.1–0.6)
MONOCYTES # BLD AUTO: 1.44 K/UL — HIGH (ref 0.1–0.6)
MONOCYTES # BLD AUTO: 1.8 K/UL — HIGH (ref 0.1–0.6)
MONOCYTES # BLD AUTO: 1.8 K/UL — HIGH (ref 0.1–0.6)
MONOCYTES # BLD AUTO: 2.36 K/UL — HIGH (ref 0.1–0.6)
MONOCYTES NFR BLD AUTO: 11.4 % — HIGH (ref 1.7–9.3)
MONOCYTES NFR BLD AUTO: 5.2 % — SIGNIFICANT CHANGE UP (ref 1.7–9.3)
MONOCYTES NFR BLD AUTO: 6.2 % — SIGNIFICANT CHANGE UP (ref 1.7–9.3)
MONOCYTES NFR BLD AUTO: 6.6 % — SIGNIFICANT CHANGE UP (ref 1.7–9.3)
MONOCYTES NFR BLD AUTO: 6.8 % — SIGNIFICANT CHANGE UP (ref 1.7–9.3)
MONOCYTES NFR BLD AUTO: 6.9 % — SIGNIFICANT CHANGE UP (ref 1.7–9.3)
MONOCYTES NFR BLD AUTO: 7.5 % — SIGNIFICANT CHANGE UP (ref 1.7–9.3)
MONOCYTES NFR BLD AUTO: 8.2 % — SIGNIFICANT CHANGE UP (ref 1.7–9.3)
MONOCYTES NFR BLD AUTO: 8.5 % — SIGNIFICANT CHANGE UP (ref 1.7–9.3)
MONOCYTES NFR BLD AUTO: 8.6 % — SIGNIFICANT CHANGE UP (ref 1.7–9.3)
MONOCYTES NFR BLD AUTO: 9.7 % — HIGH (ref 1.7–9.3)
MRSA PCR RESULT.: NEGATIVE — SIGNIFICANT CHANGE UP
NEUTROPHILS # BLD AUTO: 10.7 K/UL — SIGNIFICANT CHANGE UP (ref 1.4–6.5)
NEUTROPHILS # BLD AUTO: 11.45 K/UL — HIGH (ref 1.4–6.5)
NEUTROPHILS # BLD AUTO: 12.13 K/UL — HIGH (ref 1.4–6.5)
NEUTROPHILS # BLD AUTO: 12.79 K/UL — HIGH (ref 1.4–6.5)
NEUTROPHILS # BLD AUTO: 17.43 K/UL — HIGH (ref 1.4–6.5)
NEUTROPHILS # BLD AUTO: 18.41 K/UL — HIGH (ref 1.4–6.5)
NEUTROPHILS # BLD AUTO: 18.59 K/UL — HIGH (ref 1.4–6.5)
NEUTROPHILS # BLD AUTO: 20.71 K/UL — HIGH (ref 1.4–6.5)
NEUTROPHILS # BLD AUTO: 5.61 K/UL — SIGNIFICANT CHANGE UP (ref 1.4–6.5)
NEUTROPHILS # BLD AUTO: 8.97 K/UL — HIGH (ref 1.4–6.5)
NEUTROPHILS # BLD AUTO: 9.9 K/UL — HIGH (ref 1.4–6.5)
NEUTROPHILS NFR BLD AUTO: 72.4 % — SIGNIFICANT CHANGE UP (ref 42.2–75.2)
NEUTROPHILS NFR BLD AUTO: 74.3 % — SIGNIFICANT CHANGE UP (ref 42.2–75.2)
NEUTROPHILS NFR BLD AUTO: 75.9 % — HIGH (ref 42.2–75.2)
NEUTROPHILS NFR BLD AUTO: 76 % — HIGH (ref 42.2–75.2)
NEUTROPHILS NFR BLD AUTO: 76.5 % — HIGH (ref 42.2–75.2)
NEUTROPHILS NFR BLD AUTO: 78.5 % — HIGH (ref 42.2–75.2)
NEUTROPHILS NFR BLD AUTO: 78.9 % — HIGH (ref 42.2–75.2)
NEUTROPHILS NFR BLD AUTO: 80.4 % — HIGH (ref 42.2–75.2)
NEUTROPHILS NFR BLD AUTO: 81.3 % — HIGH (ref 42.2–75.2)
NEUTROPHILS NFR BLD AUTO: 82.4 % — SIGNIFICANT CHANGE UP (ref 42.2–75.2)
NEUTROPHILS NFR BLD AUTO: 83 % — HIGH (ref 42.2–75.2)
NITRITE UR-MCNC: NEGATIVE — SIGNIFICANT CHANGE UP
NRBC # BLD: 0 /100 WBCS — SIGNIFICANT CHANGE UP (ref 0–0)
NRBC # BLD: 1 /100 WBCS — HIGH (ref 0–0)
NRBC # BLD: 2 /100 WBCS — HIGH (ref 0–0)
NRBC # BLD: 2 /100 WBCS — SIGNIFICANT CHANGE UP (ref 0–0)
OSMOLALITY UR: 447 MOS/KG — SIGNIFICANT CHANGE UP (ref 50–1200)
PCO2 BLDA: 34 MMHG — SIGNIFICANT CHANGE UP (ref 25–48)
PCO2 BLDA: 35 MMHG — SIGNIFICANT CHANGE UP (ref 25–48)
PCO2 BLDA: 41 MMHG — SIGNIFICANT CHANGE UP (ref 25–48)
PF4 HEPARIN CMPLX AB SER-ACNC: NEGATIVE — SIGNIFICANT CHANGE UP
PH BLDA: 7.07 — CRITICAL LOW (ref 7.35–7.45)
PH BLDA: 7.08 — CRITICAL LOW (ref 7.35–7.45)
PH BLDA: 7.13 — CRITICAL LOW (ref 7.35–7.45)
PH UR: 5 — SIGNIFICANT CHANGE UP (ref 5–8)
PLATELET # BLD AUTO: 100 K/UL — LOW (ref 130–400)
PLATELET # BLD AUTO: 101 K/UL — LOW (ref 130–400)
PLATELET # BLD AUTO: 102 K/UL — LOW (ref 130–400)
PLATELET # BLD AUTO: 127 K/UL — LOW (ref 130–400)
PLATELET # BLD AUTO: 137 K/UL — SIGNIFICANT CHANGE UP (ref 130–400)
PLATELET # BLD AUTO: 143 K/UL — SIGNIFICANT CHANGE UP (ref 130–400)
PLATELET # BLD AUTO: 144 K/UL — SIGNIFICANT CHANGE UP (ref 130–400)
PLATELET # BLD AUTO: 147 K/UL — SIGNIFICANT CHANGE UP (ref 130–400)
PLATELET # BLD AUTO: 151 K/UL — SIGNIFICANT CHANGE UP (ref 130–400)
PLATELET # BLD AUTO: 153 K/UL — SIGNIFICANT CHANGE UP (ref 130–400)
PLATELET # BLD AUTO: 184 K/UL — SIGNIFICANT CHANGE UP (ref 130–400)
PLATELET # BLD AUTO: 248 K/UL — SIGNIFICANT CHANGE UP (ref 130–400)
PLATELET # BLD AUTO: 68 K/UL — LOW (ref 130–400)
PLATELET # BLD AUTO: 73 K/UL — LOW (ref 130–400)
PLATELET # BLD AUTO: 77 K/UL — LOW (ref 130–400)
PLATELET # BLD AUTO: 99 K/UL — LOW (ref 130–400)
PO2 BLDA: 149 MMHG — HIGH (ref 83–108)
PO2 BLDA: 89 MMHG — SIGNIFICANT CHANGE UP (ref 83–108)
PO2 BLDA: 90 MMHG — SIGNIFICANT CHANGE UP (ref 83–108)
POTASSIUM SERPL-MCNC: 4 MMOL/L — SIGNIFICANT CHANGE UP (ref 3.5–5)
POTASSIUM SERPL-MCNC: 4 MMOL/L — SIGNIFICANT CHANGE UP (ref 3.5–5)
POTASSIUM SERPL-MCNC: 4.2 MMOL/L — SIGNIFICANT CHANGE UP (ref 3.5–5)
POTASSIUM SERPL-MCNC: 4.4 MMOL/L — SIGNIFICANT CHANGE UP (ref 3.5–5)
POTASSIUM SERPL-MCNC: 4.4 MMOL/L — SIGNIFICANT CHANGE UP (ref 3.5–5)
POTASSIUM SERPL-MCNC: 4.6 MMOL/L — SIGNIFICANT CHANGE UP (ref 3.5–5)
POTASSIUM SERPL-MCNC: 4.7 MMOL/L — SIGNIFICANT CHANGE UP (ref 3.5–5)
POTASSIUM SERPL-SCNC: 4 MMOL/L — SIGNIFICANT CHANGE UP (ref 3.5–5)
POTASSIUM SERPL-SCNC: 4 MMOL/L — SIGNIFICANT CHANGE UP (ref 3.5–5)
POTASSIUM SERPL-SCNC: 4.2 MMOL/L — SIGNIFICANT CHANGE UP (ref 3.5–5)
POTASSIUM SERPL-SCNC: 4.4 MMOL/L — SIGNIFICANT CHANGE UP (ref 3.5–5)
POTASSIUM SERPL-SCNC: 4.4 MMOL/L — SIGNIFICANT CHANGE UP (ref 3.5–5)
POTASSIUM SERPL-SCNC: 4.6 MMOL/L — SIGNIFICANT CHANGE UP (ref 3.5–5)
POTASSIUM SERPL-SCNC: 4.7 MMOL/L — SIGNIFICANT CHANGE UP (ref 3.5–5)
PROCALCITONIN SERPL-MCNC: 1.34 NG/ML — HIGH (ref 0.02–0.1)
PROT SERPL-MCNC: 3.2 G/DL — LOW (ref 6–8)
PROT SERPL-MCNC: 3.3 G/DL — LOW (ref 6–8)
PROT SERPL-MCNC: 3.3 G/DL — LOW (ref 6–8)
PROT SERPL-MCNC: 3.6 G/DL — LOW (ref 6–8)
PROT SERPL-MCNC: 3.6 G/DL — LOW (ref 6–8)
PROT SERPL-MCNC: 4.2 G/DL — LOW (ref 6–8)
PROT SERPL-MCNC: 4.4 G/DL — LOW (ref 6–8)
PROT SERPL-MCNC: 4.7 G/DL — LOW (ref 6–8)
PROT SERPL-MCNC: 4.8 G/DL — LOW (ref 6–8)
PROT SERPL-MCNC: 5 G/DL — LOW (ref 6–8)
PROT UR-MCNC: NEGATIVE — SIGNIFICANT CHANGE UP
PROTHROM AB SERPL-ACNC: 20.6 SEC — HIGH (ref 9.95–12.87)
RAPID RVP RESULT: DETECTED
RBC # BLD: 2.59 M/UL — LOW (ref 4.2–5.4)
RBC # BLD: 2.71 M/UL — LOW (ref 4.2–5.4)
RBC # BLD: 2.82 M/UL — LOW (ref 4.2–5.4)
RBC # BLD: 2.93 M/UL — SIGNIFICANT CHANGE UP (ref 4.2–5.4)
RBC # BLD: 2.98 M/UL — LOW (ref 4.2–5.4)
RBC # BLD: 3.02 M/UL — LOW (ref 4.2–5.4)
RBC # BLD: 3.06 M/UL — LOW (ref 4.2–5.4)
RBC # BLD: 3.08 M/UL — LOW (ref 4.2–5.4)
RBC # BLD: 3.14 M/UL — LOW (ref 4.2–5.4)
RBC # BLD: 3.75 M/UL — LOW (ref 4.2–5.4)
RBC # BLD: 3.77 M/UL — LOW (ref 4.2–5.4)
RBC # BLD: 3.92 M/UL — LOW (ref 4.2–5.4)
RBC # BLD: 3.93 M/UL — LOW (ref 4.2–5.4)
RBC # BLD: 4.02 M/UL — LOW (ref 4.2–5.4)
RBC # BLD: 4.11 M/UL — LOW (ref 4.2–5.4)
RBC # BLD: 4.25 M/UL — SIGNIFICANT CHANGE UP (ref 4.2–5.4)
RBC # FLD: 12.3 % — SIGNIFICANT CHANGE UP (ref 11.5–14.5)
RBC # FLD: 16.1 % — HIGH (ref 11.5–14.5)
RBC # FLD: 16.2 % — HIGH (ref 11.5–14.5)
RBC # FLD: 16.3 % — HIGH (ref 11.5–14.5)
RBC # FLD: 16.4 % — HIGH (ref 11.5–14.5)
RBC # FLD: 16.5 % — HIGH (ref 11.5–14.5)
RBC # FLD: 16.6 % — HIGH (ref 11.5–14.5)
RBC # FLD: 16.6 % — HIGH (ref 11.5–14.5)
RBC # FLD: 16.7 % — HIGH (ref 11.5–14.5)
RBC # FLD: 16.8 % — HIGH (ref 11.5–14.5)
RBC # FLD: 17.2 % — HIGH (ref 11.5–14.5)
RBC # FLD: 17.3 % — SIGNIFICANT CHANGE UP (ref 11.5–14.5)
RBC CASTS # UR COMP ASSIST: 1 /HPF — SIGNIFICANT CHANGE UP (ref 0–4)
RV+EV RNA SPEC QL NAA+PROBE: DETECTED
SAO2 % BLDA: 96.1 % — SIGNIFICANT CHANGE UP (ref 94–98)
SAO2 % BLDA: 96.6 % — SIGNIFICANT CHANGE UP (ref 94–98)
SAO2 % BLDA: 98.3 % — HIGH (ref 94–98)
SARS-COV-2 RNA SPEC QL NAA+PROBE: SIGNIFICANT CHANGE UP
SODIUM SERPL-SCNC: 128 MMOL/L — LOW (ref 135–146)
SODIUM SERPL-SCNC: 130 MMOL/L — LOW (ref 135–146)
SODIUM SERPL-SCNC: 131 MMOL/L — LOW (ref 135–146)
SODIUM SERPL-SCNC: 133 MMOL/L — LOW (ref 135–146)
SODIUM SERPL-SCNC: 133 MMOL/L — LOW (ref 135–146)
SODIUM SERPL-SCNC: 134 MMOL/L — LOW (ref 135–146)
SODIUM SERPL-SCNC: 134 MMOL/L — LOW (ref 135–146)
SODIUM SERPL-SCNC: 135 MMOL/L — SIGNIFICANT CHANGE UP (ref 135–146)
SODIUM SERPL-SCNC: 136 MMOL/L — SIGNIFICANT CHANGE UP (ref 135–146)
SODIUM SERPL-SCNC: 138 MMOL/L — SIGNIFICANT CHANGE UP (ref 135–146)
SODIUM UR-SCNC: 23 MMOL/L — SIGNIFICANT CHANGE UP
SP GR SPEC: 1.02 — SIGNIFICANT CHANGE UP (ref 1.01–1.03)
SPECIMEN SOURCE: SIGNIFICANT CHANGE UP
SPECIMEN SOURCE: SIGNIFICANT CHANGE UP
UROBILINOGEN FLD QL: SIGNIFICANT CHANGE UP
UUN UR-MCNC: 820 MG/DL — SIGNIFICANT CHANGE UP
WBC # BLD: 11.81 K/UL — HIGH (ref 4.8–10.8)
WBC # BLD: 12.37 K/UL — HIGH (ref 4.8–10.8)
WBC # BLD: 13.01 K/UL — SIGNIFICANT CHANGE UP (ref 4.8–10.8)
WBC # BLD: 13.35 K/UL — HIGH (ref 4.8–10.8)
WBC # BLD: 15.82 K/UL — HIGH (ref 4.8–10.8)
WBC # BLD: 15.84 K/UL — HIGH (ref 4.8–10.8)
WBC # BLD: 16.31 K/UL — HIGH (ref 4.8–10.8)
WBC # BLD: 17.64 K/UL — HIGH (ref 4.8–10.8)
WBC # BLD: 21.01 K/UL — HIGH (ref 4.8–10.8)
WBC # BLD: 22.75 K/UL — HIGH (ref 4.8–10.8)
WBC # BLD: 23.11 K/UL — HIGH (ref 4.8–10.8)
WBC # BLD: 23.83 K/UL — HIGH (ref 4.8–10.8)
WBC # BLD: 24.25 K/UL — HIGH (ref 4.8–10.8)
WBC # BLD: 26.3 K/UL — HIGH (ref 4.8–10.8)
WBC # BLD: 26.58 K/UL — HIGH (ref 4.8–10.8)
WBC # BLD: 6.9 K/UL — SIGNIFICANT CHANGE UP (ref 4.8–10.8)
WBC # FLD AUTO: 11.81 K/UL — HIGH (ref 4.8–10.8)
WBC # FLD AUTO: 12.37 K/UL — HIGH (ref 4.8–10.8)
WBC # FLD AUTO: 13.01 K/UL — SIGNIFICANT CHANGE UP (ref 4.8–10.8)
WBC # FLD AUTO: 13.35 K/UL — HIGH (ref 4.8–10.8)
WBC # FLD AUTO: 15.82 K/UL — HIGH (ref 4.8–10.8)
WBC # FLD AUTO: 15.84 K/UL — HIGH (ref 4.8–10.8)
WBC # FLD AUTO: 16.31 K/UL — HIGH (ref 4.8–10.8)
WBC # FLD AUTO: 17.64 K/UL — HIGH (ref 4.8–10.8)
WBC # FLD AUTO: 21.01 K/UL — HIGH (ref 4.8–10.8)
WBC # FLD AUTO: 22.75 K/UL — HIGH (ref 4.8–10.8)
WBC # FLD AUTO: 23.11 K/UL — HIGH (ref 4.8–10.8)
WBC # FLD AUTO: 23.83 K/UL — HIGH (ref 4.8–10.8)
WBC # FLD AUTO: 24.25 K/UL — HIGH (ref 4.8–10.8)
WBC # FLD AUTO: 26.3 K/UL — HIGH (ref 4.8–10.8)
WBC # FLD AUTO: 26.58 K/UL — HIGH (ref 4.8–10.8)
WBC # FLD AUTO: 6.9 K/UL — SIGNIFICANT CHANGE UP (ref 4.8–10.8)
WBC UR QL: 27 /HPF — HIGH (ref 0–5)

## 2023-01-01 PROCEDURE — 99233 SBSQ HOSP IP/OBS HIGH 50: CPT

## 2023-01-01 PROCEDURE — 51702 INSERT TEMP BLADDER CATH: CPT

## 2023-01-01 PROCEDURE — 99223 1ST HOSP IP/OBS HIGH 75: CPT

## 2023-01-01 PROCEDURE — 74176 CT ABD & PELVIS W/O CONTRAST: CPT | Mod: 26

## 2023-01-01 PROCEDURE — 99232 SBSQ HOSP IP/OBS MODERATE 35: CPT

## 2023-01-01 PROCEDURE — 99221 1ST HOSP IP/OBS SF/LOW 40: CPT

## 2023-01-01 PROCEDURE — 74018 RADEX ABDOMEN 1 VIEW: CPT | Mod: 26

## 2023-01-01 PROCEDURE — 71045 X-RAY EXAM CHEST 1 VIEW: CPT | Mod: 26

## 2023-01-01 PROCEDURE — 93010 ELECTROCARDIOGRAM REPORT: CPT | Mod: 76

## 2023-01-01 PROCEDURE — 99291 CRITICAL CARE FIRST HOUR: CPT

## 2023-01-01 PROCEDURE — 93970 EXTREMITY STUDY: CPT | Mod: 26

## 2023-01-01 RX ORDER — SODIUM CHLORIDE 9 MG/ML
1000 INJECTION, SOLUTION INTRAVENOUS
Refills: 0 | Status: DISCONTINUED | OUTPATIENT
Start: 2023-01-01 | End: 2023-01-01

## 2023-01-01 RX ORDER — SODIUM CHLORIDE 9 MG/ML
1000 INJECTION, SOLUTION INTRAVENOUS ONCE
Refills: 0 | Status: COMPLETED | OUTPATIENT
Start: 2023-01-01 | End: 2023-01-01

## 2023-01-01 RX ORDER — KETAMINE HYDROCHLORIDE 100 MG/ML
0.25 INJECTION INTRAMUSCULAR; INTRAVENOUS
Qty: 1000 | Refills: 0 | Status: DISCONTINUED | OUTPATIENT
Start: 2023-01-01 | End: 2023-01-01

## 2023-01-01 RX ORDER — SODIUM BICARBONATE 1 MEQ/ML
1 SYRINGE (ML) INTRAVENOUS
Qty: 150 | Refills: 0 | Status: DISCONTINUED | OUTPATIENT
Start: 2023-01-01 | End: 2023-01-01

## 2023-01-01 RX ORDER — VANCOMYCIN HCL 1 G
125 VIAL (EA) INTRAVENOUS EVERY 6 HOURS
Refills: 0 | Status: DISCONTINUED | OUTPATIENT
Start: 2023-01-01 | End: 2023-01-01

## 2023-01-01 RX ORDER — ASPIRIN/CALCIUM CARB/MAGNESIUM 324 MG
81 TABLET ORAL
Refills: 0 | Status: DISCONTINUED | OUTPATIENT
Start: 2023-01-01 | End: 2023-01-01

## 2023-01-01 RX ORDER — POLYETHYLENE GLYCOL 3350 17 G/17G
17 POWDER, FOR SOLUTION ORAL DAILY
Refills: 0 | Status: DISCONTINUED | OUTPATIENT
Start: 2023-01-01 | End: 2023-01-01

## 2023-01-01 RX ORDER — ACETAMINOPHEN 500 MG
650 TABLET ORAL ONCE
Refills: 0 | Status: COMPLETED | OUTPATIENT
Start: 2023-01-01 | End: 2023-01-01

## 2023-01-01 RX ORDER — SODIUM CHLORIDE 9 MG/ML
500 INJECTION, SOLUTION INTRAVENOUS ONCE
Refills: 0 | Status: COMPLETED | OUTPATIENT
Start: 2023-01-01 | End: 2023-01-01

## 2023-01-01 RX ORDER — MEROPENEM 1 G/30ML
500 INJECTION INTRAVENOUS EVERY 12 HOURS
Refills: 0 | Status: DISCONTINUED | OUTPATIENT
Start: 2023-01-01 | End: 2023-01-01

## 2023-01-01 RX ORDER — GABAPENTIN 400 MG/1
100 CAPSULE ORAL AT BEDTIME
Refills: 0 | Status: DISCONTINUED | OUTPATIENT
Start: 2023-01-01 | End: 2023-01-01

## 2023-01-01 RX ORDER — FUROSEMIDE 40 MG
60 TABLET ORAL ONCE
Refills: 0 | Status: COMPLETED | OUTPATIENT
Start: 2023-01-01 | End: 2023-01-01

## 2023-01-01 RX ORDER — CIPROFLOXACIN LACTATE 400MG/40ML
200 VIAL (ML) INTRAVENOUS EVERY 12 HOURS
Refills: 0 | Status: DISCONTINUED | OUTPATIENT
Start: 2023-01-01 | End: 2023-01-01

## 2023-01-01 RX ORDER — CIPROFLOXACIN LACTATE 400MG/40ML
250 VIAL (ML) INTRAVENOUS EVERY 24 HOURS
Refills: 0 | Status: DISCONTINUED | OUTPATIENT
Start: 2023-01-01 | End: 2023-01-01

## 2023-01-01 RX ORDER — DRONABINOL 2.5 MG
5 CAPSULE ORAL
Refills: 0 | Status: DISCONTINUED | OUTPATIENT
Start: 2023-01-01 | End: 2023-01-01

## 2023-01-01 RX ORDER — MULTIVIT-MIN/FERROUS GLUCONATE 9 MG/15 ML
1 LIQUID (ML) ORAL DAILY
Refills: 0 | Status: DISCONTINUED | OUTPATIENT
Start: 2023-01-01 | End: 2023-01-01

## 2023-01-01 RX ORDER — VASOPRESSIN 20 [USP'U]/ML
0.04 INJECTION INTRAVENOUS
Qty: 40 | Refills: 0 | Status: DISCONTINUED | OUTPATIENT
Start: 2023-01-01 | End: 2023-01-01

## 2023-01-01 RX ORDER — PHENYLEPHRINE HYDROCHLORIDE 10 MG/ML
0.1 INJECTION INTRAVENOUS
Qty: 160 | Refills: 0 | Status: DISCONTINUED | OUTPATIENT
Start: 2023-01-01 | End: 2023-01-01

## 2023-01-01 RX ORDER — ONDANSETRON 8 MG/1
4 TABLET, FILM COATED ORAL THREE TIMES A DAY
Refills: 0 | Status: DISCONTINUED | OUTPATIENT
Start: 2023-01-01 | End: 2023-01-01

## 2023-01-01 RX ORDER — ONDANSETRON 8 MG/1
4 TABLET, FILM COATED ORAL EVERY 8 HOURS
Refills: 0 | Status: DISCONTINUED | OUTPATIENT
Start: 2023-01-01 | End: 2023-01-01

## 2023-01-01 RX ORDER — PANTOPRAZOLE SODIUM 20 MG/1
8 TABLET, DELAYED RELEASE ORAL
Qty: 80 | Refills: 0 | Status: DISCONTINUED | OUTPATIENT
Start: 2023-01-01 | End: 2023-01-01

## 2023-01-01 RX ORDER — METOCLOPRAMIDE HCL 10 MG
10 TABLET ORAL EVERY 8 HOURS
Refills: 0 | Status: DISCONTINUED | OUTPATIENT
Start: 2023-01-01 | End: 2023-01-01

## 2023-01-01 RX ORDER — SODIUM BICARBONATE 1 MEQ/ML
0.14 SYRINGE (ML) INTRAVENOUS
Qty: 150 | Refills: 0 | Status: DISCONTINUED | OUTPATIENT
Start: 2023-01-01 | End: 2023-01-01

## 2023-01-01 RX ORDER — CHLORHEXIDINE GLUCONATE 213 G/1000ML
1 SOLUTION TOPICAL DAILY
Refills: 0 | Status: DISCONTINUED | OUTPATIENT
Start: 2023-01-01 | End: 2023-01-01

## 2023-01-01 RX ORDER — INSULIN GLARGINE 100 [IU]/ML
22 INJECTION, SOLUTION SUBCUTANEOUS AT BEDTIME
Refills: 0 | Status: DISCONTINUED | OUTPATIENT
Start: 2023-01-01 | End: 2023-01-01

## 2023-01-01 RX ORDER — NOREPINEPHRINE BITARTRATE/D5W 8 MG/250ML
0.05 PLASTIC BAG, INJECTION (ML) INTRAVENOUS
Qty: 8 | Refills: 0 | Status: DISCONTINUED | OUTPATIENT
Start: 2023-01-01 | End: 2023-01-01

## 2023-01-01 RX ORDER — INSULIN GLARGINE 100 [IU]/ML
5 INJECTION, SOLUTION SUBCUTANEOUS ONCE
Refills: 0 | Status: COMPLETED | OUTPATIENT
Start: 2023-01-01 | End: 2023-01-01

## 2023-01-01 RX ORDER — SODIUM CHLORIDE 9 MG/ML
500 INJECTION INTRAMUSCULAR; INTRAVENOUS; SUBCUTANEOUS ONCE
Refills: 0 | Status: COMPLETED | OUTPATIENT
Start: 2023-01-01 | End: 2023-01-01

## 2023-01-01 RX ORDER — DEXTROSE 50 % IN WATER 50 %
25 SYRINGE (ML) INTRAVENOUS ONCE
Refills: 0 | Status: DISCONTINUED | OUTPATIENT
Start: 2023-01-01 | End: 2023-01-01

## 2023-01-01 RX ORDER — INSULIN GLARGINE 100 [IU]/ML
26 INJECTION, SOLUTION SUBCUTANEOUS AT BEDTIME
Refills: 0 | Status: DISCONTINUED | OUTPATIENT
Start: 2023-01-01 | End: 2023-01-01

## 2023-01-01 RX ORDER — ZINC OXIDE 200 MG/G
1 OINTMENT TOPICAL DAILY
Refills: 0 | Status: DISCONTINUED | OUTPATIENT
Start: 2023-01-01 | End: 2023-01-01

## 2023-01-01 RX ORDER — CEFTRIAXONE 500 MG/1
1000 INJECTION, POWDER, FOR SOLUTION INTRAMUSCULAR; INTRAVENOUS EVERY 24 HOURS
Refills: 0 | Status: DISCONTINUED | OUTPATIENT
Start: 2023-01-01 | End: 2023-01-01

## 2023-01-01 RX ORDER — CHLORHEXIDINE GLUCONATE 213 G/1000ML
15 SOLUTION TOPICAL EVERY 12 HOURS
Refills: 0 | Status: DISCONTINUED | OUTPATIENT
Start: 2023-01-01 | End: 2023-01-01

## 2023-01-01 RX ORDER — PANTOPRAZOLE SODIUM 20 MG/1
40 TABLET, DELAYED RELEASE ORAL
Refills: 0 | Status: DISCONTINUED | OUTPATIENT
Start: 2023-01-01 | End: 2023-01-01

## 2023-01-01 RX ORDER — SODIUM BICARBONATE 1 MEQ/ML
50 SYRINGE (ML) INTRAVENOUS ONCE
Refills: 0 | Status: COMPLETED | OUTPATIENT
Start: 2023-01-01 | End: 2023-01-01

## 2023-01-01 RX ORDER — FUROSEMIDE 40 MG
40 TABLET ORAL ONCE
Refills: 0 | Status: COMPLETED | OUTPATIENT
Start: 2023-01-01 | End: 2023-01-01

## 2023-01-01 RX ORDER — MIDAZOLAM HYDROCHLORIDE 1 MG/ML
0.02 INJECTION, SOLUTION INTRAMUSCULAR; INTRAVENOUS
Qty: 100 | Refills: 0 | Status: DISCONTINUED | OUTPATIENT
Start: 2023-01-01 | End: 2023-01-01

## 2023-01-01 RX ORDER — METRONIDAZOLE 500 MG
500 TABLET ORAL EVERY 8 HOURS
Refills: 0 | Status: DISCONTINUED | OUTPATIENT
Start: 2023-01-01 | End: 2023-01-01

## 2023-01-01 RX ORDER — NOREPINEPHRINE BITARTRATE/D5W 8 MG/250ML
0.05 PLASTIC BAG, INJECTION (ML) INTRAVENOUS
Qty: 16 | Refills: 0 | Status: DISCONTINUED | OUTPATIENT
Start: 2023-01-01 | End: 2023-01-01

## 2023-01-01 RX ORDER — ACETAMINOPHEN 500 MG
1000 TABLET ORAL ONCE
Refills: 0 | Status: COMPLETED | OUTPATIENT
Start: 2023-01-01 | End: 2023-01-01

## 2023-01-01 RX ORDER — CEFEPIME 1 G/1
INJECTION, POWDER, FOR SOLUTION INTRAMUSCULAR; INTRAVENOUS
Refills: 0 | Status: DISCONTINUED | OUTPATIENT
Start: 2023-01-01 | End: 2023-01-01

## 2023-01-01 RX ORDER — SODIUM CHLORIDE 9 MG/ML
250 INJECTION INTRAMUSCULAR; INTRAVENOUS; SUBCUTANEOUS ONCE
Refills: 0 | Status: COMPLETED | OUTPATIENT
Start: 2023-01-01 | End: 2023-01-01

## 2023-01-01 RX ORDER — CIPROFLOXACIN LACTATE 400MG/40ML
VIAL (ML) INTRAVENOUS
Refills: 0 | Status: DISCONTINUED | OUTPATIENT
Start: 2023-01-01 | End: 2023-01-01

## 2023-01-01 RX ORDER — CIPROFLOXACIN LACTATE 400MG/40ML
200 VIAL (ML) INTRAVENOUS ONCE
Refills: 0 | Status: COMPLETED | OUTPATIENT
Start: 2023-01-01 | End: 2023-01-01

## 2023-01-01 RX ORDER — DEXMEDETOMIDINE HYDROCHLORIDE IN 0.9% SODIUM CHLORIDE 4 UG/ML
0.05 INJECTION INTRAVENOUS
Qty: 400 | Refills: 0 | Status: DISCONTINUED | OUTPATIENT
Start: 2023-01-01 | End: 2023-01-01

## 2023-01-01 RX ORDER — SODIUM CHLORIDE 9 MG/ML
2000 INJECTION, SOLUTION INTRAVENOUS ONCE
Refills: 0 | Status: DISCONTINUED | OUTPATIENT
Start: 2023-01-01 | End: 2023-01-01

## 2023-01-01 RX ORDER — METOCLOPRAMIDE HCL 10 MG
5 TABLET ORAL EVERY 8 HOURS
Refills: 0 | Status: DISCONTINUED | OUTPATIENT
Start: 2023-01-01 | End: 2023-01-01

## 2023-01-01 RX ORDER — HEPARIN SODIUM 5000 [USP'U]/ML
5000 INJECTION INTRAVENOUS; SUBCUTANEOUS EVERY 12 HOURS
Refills: 0 | Status: DISCONTINUED | OUTPATIENT
Start: 2023-01-01 | End: 2023-01-01

## 2023-01-01 RX ORDER — FUROSEMIDE 40 MG
20 TABLET ORAL DAILY
Refills: 0 | Status: DISCONTINUED | OUTPATIENT
Start: 2023-01-01 | End: 2023-01-01

## 2023-01-01 RX ORDER — MIDAZOLAM HYDROCHLORIDE 1 MG/ML
4 INJECTION, SOLUTION INTRAMUSCULAR; INTRAVENOUS ONCE
Refills: 0 | Status: DISCONTINUED | OUTPATIENT
Start: 2023-01-01 | End: 2023-01-01

## 2023-01-01 RX ORDER — INSULIN GLARGINE 100 [IU]/ML
18 INJECTION, SOLUTION SUBCUTANEOUS
Refills: 0 | Status: DISCONTINUED | OUTPATIENT
Start: 2023-01-01 | End: 2023-01-01

## 2023-01-01 RX ORDER — SUCRALFATE 1 G
1 TABLET ORAL
Refills: 0 | Status: DISCONTINUED | OUTPATIENT
Start: 2023-01-01 | End: 2023-01-01

## 2023-01-01 RX ORDER — HYDROCORTISONE 20 MG
100 TABLET ORAL EVERY 8 HOURS
Refills: 0 | Status: DISCONTINUED | OUTPATIENT
Start: 2023-01-01 | End: 2023-01-01

## 2023-01-01 RX ORDER — NOREPINEPHRINE BITARTRATE/D5W 8 MG/250ML
0.05 PLASTIC BAG, INJECTION (ML) INTRAVENOUS
Qty: 32 | Refills: 0 | Status: DISCONTINUED | OUTPATIENT
Start: 2023-01-01 | End: 2023-01-01

## 2023-01-01 RX ORDER — SODIUM CHLORIDE 9 MG/ML
250 INJECTION INTRAMUSCULAR; INTRAVENOUS; SUBCUTANEOUS ONCE
Refills: 0 | Status: DISCONTINUED | OUTPATIENT
Start: 2023-01-01 | End: 2023-01-01

## 2023-01-01 RX ORDER — METRONIDAZOLE 500 MG
TABLET ORAL
Refills: 0 | Status: DISCONTINUED | OUTPATIENT
Start: 2023-01-01 | End: 2023-01-01

## 2023-01-01 RX ORDER — VANCOMYCIN HCL 1 G
750 VIAL (EA) INTRAVENOUS ONCE
Refills: 0 | Status: COMPLETED | OUTPATIENT
Start: 2023-01-01 | End: 2023-01-01

## 2023-01-01 RX ADMIN — MONTELUKAST 10 MILLIGRAM(S): 4 TABLET, CHEWABLE ORAL at 11:51

## 2023-01-01 RX ADMIN — ATORVASTATIN CALCIUM 40 MILLIGRAM(S): 80 TABLET, FILM COATED ORAL at 22:48

## 2023-01-01 RX ADMIN — ZINC OXIDE 1 APPLICATION(S): 200 OINTMENT TOPICAL at 11:57

## 2023-01-01 RX ADMIN — HEPARIN SODIUM 5000 UNIT(S): 5000 INJECTION INTRAVENOUS; SUBCUTANEOUS at 06:08

## 2023-01-01 RX ADMIN — Medication 5 MILLIGRAM(S): at 18:47

## 2023-01-01 RX ADMIN — VASOPRESSIN 6 UNIT(S)/MIN: 20 INJECTION INTRAVENOUS at 10:27

## 2023-01-01 RX ADMIN — Medication 3 MILLILITER(S): at 14:12

## 2023-01-01 RX ADMIN — INSULIN GLARGINE 26 UNIT(S): 100 INJECTION, SOLUTION SUBCUTANEOUS at 21:54

## 2023-01-01 RX ADMIN — Medication 100 MILLIGRAM(S): at 18:49

## 2023-01-01 RX ADMIN — SODIUM CHLORIDE 500 MILLILITER(S): 9 INJECTION INTRAMUSCULAR; INTRAVENOUS; SUBCUTANEOUS at 21:08

## 2023-01-01 RX ADMIN — Medication 250 MILLIGRAM(S): at 12:02

## 2023-01-01 RX ADMIN — Medication 3 MILLILITER(S): at 20:02

## 2023-01-01 RX ADMIN — ONDANSETRON 4 MILLIGRAM(S): 8 TABLET, FILM COATED ORAL at 06:09

## 2023-01-01 RX ADMIN — SODIUM CHLORIDE 75 MILLILITER(S): 9 INJECTION, SOLUTION INTRAVENOUS at 15:25

## 2023-01-01 RX ADMIN — SODIUM CHLORIDE 1000 MILLILITER(S): 9 INJECTION, SOLUTION INTRAVENOUS at 15:33

## 2023-01-01 RX ADMIN — Medication 250 MILLIGRAM(S): at 06:08

## 2023-01-01 RX ADMIN — MIDAZOLAM HYDROCHLORIDE 4 MILLIGRAM(S): 1 INJECTION, SOLUTION INTRAMUSCULAR; INTRAVENOUS at 06:47

## 2023-01-01 RX ADMIN — CHLORHEXIDINE GLUCONATE 1 APPLICATION(S): 213 SOLUTION TOPICAL at 13:11

## 2023-01-01 RX ADMIN — Medication 3 MILLILITER(S): at 20:17

## 2023-01-01 RX ADMIN — Medication 8: at 08:19

## 2023-01-01 RX ADMIN — Medication 40 MILLIGRAM(S): at 11:38

## 2023-01-01 RX ADMIN — MEROPENEM 100 MILLIGRAM(S): 1 INJECTION INTRAVENOUS at 12:01

## 2023-01-01 RX ADMIN — ZINC OXIDE 1 APPLICATION(S): 200 OINTMENT TOPICAL at 12:59

## 2023-01-01 RX ADMIN — INSULIN GLARGINE 22 UNIT(S): 100 INJECTION, SOLUTION SUBCUTANEOUS at 21:21

## 2023-01-01 RX ADMIN — Medication 100 MILLIGRAM(S): at 06:04

## 2023-01-01 RX ADMIN — Medication 100 MILLIGRAM(S): at 10:30

## 2023-01-01 RX ADMIN — SODIUM CHLORIDE 2000 MILLILITER(S): 9 INJECTION INTRAMUSCULAR; INTRAVENOUS; SUBCUTANEOUS at 14:03

## 2023-01-01 RX ADMIN — DONEPEZIL HYDROCHLORIDE 10 MILLIGRAM(S): 10 TABLET, FILM COATED ORAL at 21:21

## 2023-01-01 RX ADMIN — DONEPEZIL HYDROCHLORIDE 10 MILLIGRAM(S): 10 TABLET, FILM COATED ORAL at 21:22

## 2023-01-01 RX ADMIN — Medication 100 MILLIGRAM(S): at 18:38

## 2023-01-01 RX ADMIN — ATORVASTATIN CALCIUM 40 MILLIGRAM(S): 80 TABLET, FILM COATED ORAL at 21:21

## 2023-01-01 RX ADMIN — Medication 3 MILLILITER(S): at 08:05

## 2023-01-01 RX ADMIN — PANTOPRAZOLE SODIUM 10 MG/HR: 20 TABLET, DELAYED RELEASE ORAL at 09:00

## 2023-01-01 RX ADMIN — Medication 1 TABLET(S): at 12:06

## 2023-01-01 RX ADMIN — HEPARIN SODIUM 5000 UNIT(S): 5000 INJECTION INTRAVENOUS; SUBCUTANEOUS at 05:34

## 2023-01-01 RX ADMIN — Medication 100 MILLIGRAM(S): at 05:51

## 2023-01-01 RX ADMIN — MONTELUKAST 10 MILLIGRAM(S): 4 TABLET, CHEWABLE ORAL at 15:04

## 2023-01-01 RX ADMIN — CLOPIDOGREL BISULFATE 75 MILLIGRAM(S): 75 TABLET, FILM COATED ORAL at 11:38

## 2023-01-01 RX ADMIN — Medication 100 MILLIGRAM(S): at 17:07

## 2023-01-01 RX ADMIN — Medication 6 UNIT(S): at 17:44

## 2023-01-01 RX ADMIN — HEPARIN SODIUM 5000 UNIT(S): 5000 INJECTION INTRAVENOUS; SUBCUTANEOUS at 17:37

## 2023-01-01 RX ADMIN — Medication 50 MILLIGRAM(S): at 05:50

## 2023-01-01 RX ADMIN — CHLORHEXIDINE GLUCONATE 1 APPLICATION(S): 213 SOLUTION TOPICAL at 11:52

## 2023-01-01 RX ADMIN — Medication 60 MILLIGRAM(S): at 02:06

## 2023-01-01 RX ADMIN — CLOPIDOGREL BISULFATE 75 MILLIGRAM(S): 75 TABLET, FILM COATED ORAL at 12:41

## 2023-01-01 RX ADMIN — Medication 50 MILLIEQUIVALENT(S): at 12:01

## 2023-01-01 RX ADMIN — Medication 10: at 12:17

## 2023-01-01 RX ADMIN — Medication 400 MILLIGRAM(S): at 19:35

## 2023-01-01 RX ADMIN — Medication 6: at 12:40

## 2023-01-01 RX ADMIN — DEXMEDETOMIDINE HYDROCHLORIDE IN 0.9% SODIUM CHLORIDE 0.79 MICROGRAM(S)/KG/HR: 4 INJECTION INTRAVENOUS at 10:23

## 2023-01-01 RX ADMIN — POLYETHYLENE GLYCOL 3350 17 GRAM(S): 17 POWDER, FOR SOLUTION ORAL at 12:06

## 2023-01-01 RX ADMIN — HEPARIN SODIUM 5000 UNIT(S): 5000 INJECTION INTRAVENOUS; SUBCUTANEOUS at 05:45

## 2023-01-01 RX ADMIN — Medication 81 MILLIGRAM(S): at 12:40

## 2023-01-01 RX ADMIN — Medication 81 MILLIGRAM(S): at 11:38

## 2023-01-01 RX ADMIN — Medication 81 MILLIGRAM(S): at 12:14

## 2023-01-01 RX ADMIN — ATORVASTATIN CALCIUM 40 MILLIGRAM(S): 80 TABLET, FILM COATED ORAL at 22:24

## 2023-01-01 RX ADMIN — Medication 50 MILLIGRAM(S): at 06:08

## 2023-01-01 RX ADMIN — ONDANSETRON 4 MILLIGRAM(S): 8 TABLET, FILM COATED ORAL at 13:21

## 2023-01-01 RX ADMIN — MONTELUKAST 10 MILLIGRAM(S): 4 TABLET, CHEWABLE ORAL at 12:42

## 2023-01-01 RX ADMIN — INSULIN GLARGINE 26 UNIT(S): 100 INJECTION, SOLUTION SUBCUTANEOUS at 21:30

## 2023-01-01 RX ADMIN — Medication 6 UNIT(S): at 12:16

## 2023-01-01 RX ADMIN — CHLORHEXIDINE GLUCONATE 1 APPLICATION(S): 213 SOLUTION TOPICAL at 11:40

## 2023-01-01 RX ADMIN — Medication 6: at 08:41

## 2023-01-01 RX ADMIN — Medication 3 MILLILITER(S): at 07:39

## 2023-01-01 RX ADMIN — CLOPIDOGREL BISULFATE 75 MILLIGRAM(S): 75 TABLET, FILM COATED ORAL at 11:48

## 2023-01-01 RX ADMIN — MIDAZOLAM HYDROCHLORIDE 1.26 MG/KG/HR: 1 INJECTION, SOLUTION INTRAMUSCULAR; INTRAVENOUS at 10:26

## 2023-01-01 RX ADMIN — ATORVASTATIN CALCIUM 40 MILLIGRAM(S): 80 TABLET, FILM COATED ORAL at 00:07

## 2023-01-01 RX ADMIN — Medication 100 MILLIGRAM(S): at 06:49

## 2023-01-01 RX ADMIN — PANTOPRAZOLE SODIUM 40 MILLIGRAM(S): 20 TABLET, DELAYED RELEASE ORAL at 10:30

## 2023-01-01 RX ADMIN — PHENYLEPHRINE HYDROCHLORIDE 1.18 MICROGRAM(S)/KG/MIN: 10 INJECTION INTRAVENOUS at 10:23

## 2023-01-01 RX ADMIN — Medication 6 UNIT(S): at 08:10

## 2023-01-01 RX ADMIN — Medication 5 MILLIGRAM(S): at 17:36

## 2023-01-01 RX ADMIN — Medication 1 GRAM(S): at 17:02

## 2023-01-01 RX ADMIN — DONEPEZIL HYDROCHLORIDE 10 MILLIGRAM(S): 10 TABLET, FILM COATED ORAL at 00:08

## 2023-01-01 RX ADMIN — ATORVASTATIN CALCIUM 40 MILLIGRAM(S): 80 TABLET, FILM COATED ORAL at 21:46

## 2023-01-01 RX ADMIN — CLOPIDOGREL BISULFATE 75 MILLIGRAM(S): 75 TABLET, FILM COATED ORAL at 11:16

## 2023-01-01 RX ADMIN — Medication 6 UNIT(S): at 07:53

## 2023-01-01 RX ADMIN — INSULIN GLARGINE 18 UNIT(S): 100 INJECTION, SOLUTION SUBCUTANEOUS at 11:58

## 2023-01-01 RX ADMIN — CLOPIDOGREL BISULFATE 75 MILLIGRAM(S): 75 TABLET, FILM COATED ORAL at 15:04

## 2023-01-01 RX ADMIN — ATORVASTATIN CALCIUM 40 MILLIGRAM(S): 80 TABLET, FILM COATED ORAL at 22:08

## 2023-01-01 RX ADMIN — Medication 20 MILLIGRAM(S): at 05:33

## 2023-01-01 RX ADMIN — Medication 3 MILLILITER(S): at 09:14

## 2023-01-01 RX ADMIN — DONEPEZIL HYDROCHLORIDE 10 MILLIGRAM(S): 10 TABLET, FILM COATED ORAL at 22:24

## 2023-01-01 RX ADMIN — Medication 6 UNIT(S): at 12:18

## 2023-01-01 RX ADMIN — Medication 5 MILLIGRAM(S): at 12:19

## 2023-01-01 RX ADMIN — INSULIN GLARGINE 18 UNIT(S): 100 INJECTION, SOLUTION SUBCUTANEOUS at 22:07

## 2023-01-01 RX ADMIN — MONTELUKAST 10 MILLIGRAM(S): 4 TABLET, CHEWABLE ORAL at 12:06

## 2023-01-01 RX ADMIN — Medication 3 MILLILITER(S): at 21:14

## 2023-01-01 RX ADMIN — Medication 81 MILLIGRAM(S): at 11:48

## 2023-01-01 RX ADMIN — PANTOPRAZOLE SODIUM 10 MG/HR: 20 TABLET, DELAYED RELEASE ORAL at 12:39

## 2023-01-01 RX ADMIN — Medication 4: at 17:56

## 2023-01-01 RX ADMIN — Medication 50 MILLIGRAM(S): at 08:33

## 2023-01-01 RX ADMIN — Medication 3 MILLILITER(S): at 07:49

## 2023-01-01 RX ADMIN — Medication 2: at 17:43

## 2023-01-01 RX ADMIN — Medication 81 MILLIGRAM(S): at 15:05

## 2023-01-01 RX ADMIN — ATORVASTATIN CALCIUM 40 MILLIGRAM(S): 80 TABLET, FILM COATED ORAL at 21:58

## 2023-01-01 RX ADMIN — GABAPENTIN 100 MILLIGRAM(S): 400 CAPSULE ORAL at 00:08

## 2023-01-01 RX ADMIN — Medication 50 MILLIGRAM(S): at 05:45

## 2023-01-01 RX ADMIN — SODIUM CHLORIDE 1000 MILLILITER(S): 9 INJECTION, SOLUTION INTRAVENOUS at 16:15

## 2023-01-01 RX ADMIN — Medication 1000 MILLIGRAM(S): at 19:50

## 2023-01-01 RX ADMIN — Medication 5 MILLIGRAM(S): at 20:22

## 2023-01-01 RX ADMIN — ONDANSETRON 4 MILLIGRAM(S): 8 TABLET, FILM COATED ORAL at 05:08

## 2023-01-01 RX ADMIN — CHLORHEXIDINE GLUCONATE 1 APPLICATION(S): 213 SOLUTION TOPICAL at 11:48

## 2023-01-01 RX ADMIN — Medication 6 UNIT(S): at 08:41

## 2023-01-01 RX ADMIN — Medication 60 MEQ/KG/HR: at 12:27

## 2023-01-01 RX ADMIN — SODIUM CHLORIDE 75 MILLILITER(S): 9 INJECTION, SOLUTION INTRAVENOUS at 11:27

## 2023-01-01 RX ADMIN — POLYETHYLENE GLYCOL 3350 17 GRAM(S): 17 POWDER, FOR SOLUTION ORAL at 12:41

## 2023-01-01 RX ADMIN — Medication 8: at 08:09

## 2023-01-01 RX ADMIN — ONDANSETRON 4 MILLIGRAM(S): 8 TABLET, FILM COATED ORAL at 06:14

## 2023-01-01 RX ADMIN — Medication 5 MILLIGRAM(S): at 05:32

## 2023-01-01 RX ADMIN — Medication 650 MILLIGRAM(S): at 15:00

## 2023-01-01 RX ADMIN — Medication 6 UNIT(S): at 08:48

## 2023-01-01 RX ADMIN — Medication 81 MILLIGRAM(S): at 11:51

## 2023-01-01 RX ADMIN — Medication 5 MILLIGRAM(S): at 06:57

## 2023-01-01 RX ADMIN — PANTOPRAZOLE SODIUM 10 MG/HR: 20 TABLET, DELAYED RELEASE ORAL at 10:26

## 2023-01-01 RX ADMIN — CLOPIDOGREL BISULFATE 75 MILLIGRAM(S): 75 TABLET, FILM COATED ORAL at 12:06

## 2023-01-01 RX ADMIN — Medication 3 MILLILITER(S): at 14:09

## 2023-01-01 RX ADMIN — Medication 10: at 12:06

## 2023-01-01 RX ADMIN — HEPARIN SODIUM 5000 UNIT(S): 5000 INJECTION INTRAVENOUS; SUBCUTANEOUS at 20:22

## 2023-01-01 RX ADMIN — Medication 3 MILLILITER(S): at 13:19

## 2023-01-01 RX ADMIN — ONDANSETRON 4 MILLIGRAM(S): 8 TABLET, FILM COATED ORAL at 22:07

## 2023-01-01 RX ADMIN — DONEPEZIL HYDROCHLORIDE 10 MILLIGRAM(S): 10 TABLET, FILM COATED ORAL at 22:48

## 2023-01-01 RX ADMIN — Medication 100 MILLIGRAM(S): at 05:44

## 2023-01-01 RX ADMIN — Medication 100 MILLIGRAM(S): at 22:33

## 2023-01-01 RX ADMIN — ZINC OXIDE 1 APPLICATION(S): 200 OINTMENT TOPICAL at 15:05

## 2023-01-01 RX ADMIN — Medication 100 MILLIGRAM(S): at 09:37

## 2023-01-01 RX ADMIN — MONTELUKAST 10 MILLIGRAM(S): 4 TABLET, CHEWABLE ORAL at 11:38

## 2023-01-01 RX ADMIN — Medication 2.95 MICROGRAM(S)/KG/MIN: at 10:27

## 2023-01-01 RX ADMIN — Medication 81 MILLIGRAM(S): at 12:05

## 2023-01-01 RX ADMIN — Medication 1 GRAM(S): at 05:03

## 2023-01-01 RX ADMIN — Medication 3 MILLILITER(S): at 13:03

## 2023-01-01 RX ADMIN — Medication 3 MILLILITER(S): at 20:45

## 2023-01-01 RX ADMIN — DONEPEZIL HYDROCHLORIDE 10 MILLIGRAM(S): 10 TABLET, FILM COATED ORAL at 22:28

## 2023-01-01 RX ADMIN — Medication 6 UNIT(S): at 08:20

## 2023-01-01 RX ADMIN — Medication 1 GRAM(S): at 18:47

## 2023-01-01 RX ADMIN — Medication 1 GRAM(S): at 06:48

## 2023-01-01 RX ADMIN — Medication 50 MILLIGRAM(S): at 05:33

## 2023-01-01 RX ADMIN — CLOPIDOGREL BISULFATE 75 MILLIGRAM(S): 75 TABLET, FILM COATED ORAL at 12:14

## 2023-01-01 RX ADMIN — HEPARIN SODIUM 5000 UNIT(S): 5000 INJECTION INTRAVENOUS; SUBCUTANEOUS at 05:27

## 2023-01-01 RX ADMIN — MONTELUKAST 10 MILLIGRAM(S): 4 TABLET, CHEWABLE ORAL at 12:14

## 2023-01-01 RX ADMIN — Medication 3 MILLILITER(S): at 13:29

## 2023-01-01 RX ADMIN — Medication 81 MILLIGRAM(S): at 11:16

## 2023-01-01 RX ADMIN — Medication 3 MILLILITER(S): at 13:14

## 2023-01-01 RX ADMIN — Medication 3 MILLILITER(S): at 09:04

## 2023-01-01 RX ADMIN — ONDANSETRON 4 MILLIGRAM(S): 8 TABLET, FILM COATED ORAL at 05:53

## 2023-01-01 RX ADMIN — Medication 6 UNIT(S): at 17:56

## 2023-01-01 RX ADMIN — Medication 5 MILLIGRAM(S): at 09:02

## 2023-01-01 RX ADMIN — Medication 5 MILLIGRAM(S): at 06:09

## 2023-01-01 RX ADMIN — Medication 3 MILLILITER(S): at 15:24

## 2023-01-01 RX ADMIN — MONTELUKAST 10 MILLIGRAM(S): 4 TABLET, CHEWABLE ORAL at 11:16

## 2023-01-01 RX ADMIN — Medication 4: at 08:36

## 2023-01-01 RX ADMIN — CHLORHEXIDINE GLUCONATE 15 MILLILITER(S): 213 SOLUTION TOPICAL at 17:02

## 2023-01-01 RX ADMIN — Medication 5 MILLIGRAM(S): at 21:17

## 2023-01-01 RX ADMIN — CLOPIDOGREL BISULFATE 75 MILLIGRAM(S): 75 TABLET, FILM COATED ORAL at 11:51

## 2023-01-01 RX ADMIN — Medication 6 UNIT(S): at 11:44

## 2023-01-01 RX ADMIN — Medication 8: at 07:53

## 2023-01-01 RX ADMIN — DONEPEZIL HYDROCHLORIDE 10 MILLIGRAM(S): 10 TABLET, FILM COATED ORAL at 21:46

## 2023-01-01 RX ADMIN — SODIUM CHLORIDE 75 MILLILITER(S): 9 INJECTION, SOLUTION INTRAVENOUS at 10:26

## 2023-01-01 RX ADMIN — ONDANSETRON 4 MILLIGRAM(S): 8 TABLET, FILM COATED ORAL at 22:24

## 2023-01-01 RX ADMIN — MONTELUKAST 10 MILLIGRAM(S): 4 TABLET, CHEWABLE ORAL at 12:41

## 2023-01-01 RX ADMIN — Medication 100 MILLIGRAM(S): at 05:59

## 2023-01-01 RX ADMIN — SODIUM CHLORIDE 500 MILLILITER(S): 9 INJECTION, SOLUTION INTRAVENOUS at 18:32

## 2023-01-01 RX ADMIN — Medication 2: at 16:14

## 2023-01-01 RX ADMIN — Medication 6: at 12:50

## 2023-01-01 RX ADMIN — SODIUM CHLORIDE 500 MILLILITER(S): 9 INJECTION, SOLUTION INTRAVENOUS at 16:18

## 2023-01-01 RX ADMIN — Medication 100 MILLIGRAM(S): at 13:56

## 2023-01-01 RX ADMIN — Medication 40 MILLIGRAM(S): at 09:37

## 2023-01-01 RX ADMIN — Medication 81 MILLIGRAM(S): at 12:28

## 2023-01-01 RX ADMIN — Medication 50 MILLIGRAM(S): at 05:27

## 2023-01-01 RX ADMIN — MONTELUKAST 10 MILLIGRAM(S): 4 TABLET, CHEWABLE ORAL at 12:05

## 2023-01-01 RX ADMIN — HEPARIN SODIUM 5000 UNIT(S): 5000 INJECTION INTRAVENOUS; SUBCUTANEOUS at 17:46

## 2023-01-01 RX ADMIN — ONDANSETRON 4 MILLIGRAM(S): 8 TABLET, FILM COATED ORAL at 14:04

## 2023-01-01 RX ADMIN — Medication 100 MILLIGRAM(S): at 06:50

## 2023-01-01 RX ADMIN — CHLORHEXIDINE GLUCONATE 1 APPLICATION(S): 213 SOLUTION TOPICAL at 12:41

## 2023-01-01 RX ADMIN — CHLORHEXIDINE GLUCONATE 1 APPLICATION(S): 213 SOLUTION TOPICAL at 12:10

## 2023-01-01 RX ADMIN — Medication 5 MILLIGRAM(S): at 09:12

## 2023-01-01 RX ADMIN — Medication 3 MILLILITER(S): at 20:29

## 2023-01-01 RX ADMIN — INSULIN GLARGINE 18 UNIT(S): 100 INJECTION, SOLUTION SUBCUTANEOUS at 22:49

## 2023-01-01 RX ADMIN — Medication 100 MILLIGRAM(S): at 13:21

## 2023-01-01 RX ADMIN — INSULIN GLARGINE 26 UNIT(S): 100 INJECTION, SOLUTION SUBCUTANEOUS at 21:58

## 2023-01-01 RX ADMIN — Medication 6 UNIT(S): at 12:51

## 2023-01-01 RX ADMIN — Medication 100 MILLIGRAM(S): at 13:03

## 2023-01-01 RX ADMIN — CHLORHEXIDINE GLUCONATE 1 APPLICATION(S): 213 SOLUTION TOPICAL at 12:15

## 2023-01-01 RX ADMIN — Medication 3 MILLILITER(S): at 20:43

## 2023-01-01 RX ADMIN — ONDANSETRON 4 MILLIGRAM(S): 8 TABLET, FILM COATED ORAL at 22:48

## 2023-01-01 RX ADMIN — MEROPENEM 100 MILLIGRAM(S): 1 INJECTION INTRAVENOUS at 17:49

## 2023-01-01 RX ADMIN — Medication 650 MILLIGRAM(S): at 14:00

## 2023-01-01 RX ADMIN — Medication 250 MILLIGRAM(S): at 05:12

## 2023-01-01 RX ADMIN — Medication 6 UNIT(S): at 08:36

## 2023-01-01 RX ADMIN — Medication 3 MILLILITER(S): at 19:38

## 2023-01-01 RX ADMIN — CHLORHEXIDINE GLUCONATE 15 MILLILITER(S): 213 SOLUTION TOPICAL at 06:47

## 2023-01-01 RX ADMIN — SODIUM CHLORIDE 500 MILLILITER(S): 9 INJECTION, SOLUTION INTRAVENOUS at 10:27

## 2023-01-01 RX ADMIN — Medication 50 MILLIGRAM(S): at 05:59

## 2023-01-01 RX ADMIN — POLYETHYLENE GLYCOL 3350 17 GRAM(S): 17 POWDER, FOR SOLUTION ORAL at 12:42

## 2023-01-01 RX ADMIN — CLOPIDOGREL BISULFATE 75 MILLIGRAM(S): 75 TABLET, FILM COATED ORAL at 12:05

## 2023-01-01 RX ADMIN — DONEPEZIL HYDROCHLORIDE 10 MILLIGRAM(S): 10 TABLET, FILM COATED ORAL at 21:58

## 2023-01-01 RX ADMIN — SODIUM CHLORIDE 1000 MILLILITER(S): 9 INJECTION, SOLUTION INTRAVENOUS at 05:40

## 2023-01-01 RX ADMIN — Medication 1 GRAM(S): at 17:36

## 2023-01-01 RX ADMIN — Medication 6 UNIT(S): at 12:07

## 2023-01-01 RX ADMIN — Medication 100 MILLIGRAM(S): at 18:11

## 2023-01-01 RX ADMIN — Medication 2: at 12:30

## 2023-01-01 NOTE — PROGRESS NOTE ADULT - ATTENDING COMMENTS
CAD s/p PCI  Severe COPD / Pulm HTN  Chronic Diastolic CHF    NSTEMI  s/p PCI LAD without complication    Complains of cough associated with nausea.  Weak,  Difficult to clarify symptoms.  CXR relatively clear.  Daughters present today.  She uses O2 at night and prn at home.    No chest pain.  Hemodynamics stable.    - Cont DAPT / Metoprolol  - Cont PO Lasix  - Obtain viral panel  - OOB / PT evaluation

## 2023-01-01 NOTE — PROGRESS NOTE ADULT - SUBJECTIVE AND OBJECTIVE BOX
Outpt cardiologist:    HPI:  82F w/ pmhx of CHF, HLD, HTN, COPD on 3L home O2, DM1, dementia, GERD, MI in 1992, CAD with 5 cardiac stents who present with 1 month of worsening SOB. For past 1 month she has been having dry cough and SOB. She saw her pulmonologist Dr. Feldman who did CXR and showed it was normal at the time, had her on levaquin and then increased her dose which she completed last week. Yesterday she started having crushing chest pressure substernally. Denies fever during this month, nausea vomiting back pain abd pain urinary sx or diarrhea. No recent travel or sick contact. Her cardiologist is Dr. Munoz.  (25 Dec 2022 18:25)      ---  Cardiology Fellow notes:        PAST MEDICAL & SURGICAL HISTORY  MI (myocardial infarction)  s/p 5 stents    HTN (hypertension)    High cholesterol    Heart failure    Non Hodgkin&#x27;s lymphoma  in remission. Follows with Dr Hernandez    PVD (peripheral vascular disease)    Thrombocytopenia    CKD (chronic kidney disease)    Osteoarthritis    Diabetes  on insulin    Coronary artery disease involving native heart without angina pectoris, unspecified vessel or lesion type  s/p 5 stents    History of cholecystectomy    H/O cardiac catheterization  5 STENTS    H/O carotid endarterectomy  RIGHT        FAMILY HISTORY:  FAMILY HISTORY:  FH: CAD (coronary artery disease)  FATHER        SOCIAL HISTORY:  Social History:  former smoker (25 Dec 2022 18:25)      ALLERGIES:  ACE inhibitors (Unknown)  BENZALKONIUM (Rash)  BETADINE (Rash)  Ceclor (Unknown)  codeine (Unknown)  contrast media (iodine-based) (Hives)  latex (Unknown)  penicillin (Unknown)  RUBBER GLOVES (Rash)  shellfish (Unknown)  sulfonamides (Unknown)      MEDICATIONS:  albuterol/ipratropium for Nebulization 3 milliLiter(s) Nebulizer every 6 hours  aspirin enteric coated 81 milliGRAM(s) Oral daily  atorvastatin 40 milliGRAM(s) Oral at bedtime  clopidogrel Tablet 75 milliGRAM(s) Oral daily  dextrose 5%. 1000 milliLiter(s) (50 mL/Hr) IV Continuous <Continuous>  dextrose 5%. 1000 milliLiter(s) (100 mL/Hr) IV Continuous <Continuous>  dextrose 50% Injectable 25 Gram(s) IV Push once  dextrose 50% Injectable 12.5 Gram(s) IV Push once  dextrose 50% Injectable 25 Gram(s) IV Push once  donepezil 10 milliGRAM(s) Oral at bedtime  doxycycline IVPB      doxycycline IVPB 100 milliGRAM(s) IV Intermittent every 12 hours  glucagon  Injectable 1 milliGRAM(s) IntraMuscular once  insulin glargine Injectable (LANTUS) 18 Unit(s) SubCutaneous at bedtime  insulin lispro (ADMELOG) corrective regimen sliding scale   SubCutaneous three times a day before meals  insulin lispro Injectable (ADMELOG) 6 Unit(s) SubCutaneous three times a day before meals  levoFLOXacin IVPB 750 milliGRAM(s) IV Intermittent every 24 hours  metoprolol succinate ER 50 milliGRAM(s) Oral daily  montelukast 10 milliGRAM(s) Oral daily    PRN:  aluminum hydroxide/magnesium hydroxide/simethicone Suspension 30 milliLiter(s) Oral every 4 hours PRN  dextrose Oral Gel 15 Gram(s) Oral once PRN      HOME MEDICATIONS:  Home Medications:  albuterol 90 mcg/inh inhalation aerosol: 2 puff(s) inhaled every 6 hours, As Needed (25 Dec 2022 14:24)  atorvastatin 20 mg oral tablet: 1 tab(s) orally once a day (25 Dec 2022 14:24)  donepezil 10 mg oral tablet: 1 tab(s) orally once a day (at bedtime) (25 Dec 2022 14:24)  gabapentin: orally 2 times a day (25 Dec 2022 14:24)  inositol 650 mg oral tablet: 2 tab(s) orally once a day (25 Dec 2022 14:24)  Lasix 20 mg oral tablet: 1 tab(s) orally once a day (25 Dec 2022 14:24)  losartan 100 mg oral tablet: 1 tab(s) orally once a day (25 Dec 2022 14:24)  montelukast 10 mg oral tablet: 1 tab(s) orally once a day (25 Dec 2022 14:24)  NovoLOG 100 units/mL subcutaneous solution: 10,7,7  subcutaneous (25 Dec 2022 14:24)  omeprazole 40 mg oral delayed release capsule: 1 cap(s) orally once a day (25 Dec 2022 14:24)  omeprazole 40 mg oral delayed release capsule: 1 cap(s) orally once a day (25 Dec 2022 14:24)  Soliqua 100/33 subcutaneous solution: 35 unit(s) subcutaneous once a day (25 Dec 2022 14:24)  Spiriva 18 mcg inhalation capsule: 1 cap(s) inhaled once a day (25 Dec 2022 14:24)      VITALS:   T(F): 97.6 (01-01 @ 07:27), Max: 98.1 (12-30 @ 20:00)  HR: 65 (01-01 @ 08:10) (65 - 100)  BP: 167/70 (01-01 @ 07:27) (97/50 - 172/72)  BP(mean): 101 (01-01 @ 07:27) (70 - 103)  RR: 20 (01-01 @ 08:10) (15 - 27)  SpO2: 98% (01-01 @ 08:10) (92% - 100%)    I&O's Summary    31 Dec 2022 07:01  -  01 Jan 2023 07:00  --------------------------------------------------------  IN: 320 mL / OUT: 400 mL / NET: -80 mL        REVIEW OF SYSTEMS:  CONSTITUTIONAL: No weakness, fevers or chills  HEENT: No visual changes, neck/ear pain  RESPIRATORY: No cough, sob  CARDIOVASCULAR: See HPI  GASTROINTESTINAL: No abdominal pain. No nausea, vomiting, diarrhea   GENITOURINARY: No dysuria, frequency or hematuria  NEUROLOGICAL: No new focal deficits  SKIN: No new rashes    PHYSICAL EXAM:  General: Not in distress.  Non-toxic appearing.   HEENT: EOMI  Cardio: regular, S1, S2, no murmur  Pulm: B/L BS.  No wheezing / crackles / rales  Abdomen: Soft, non-tender, non-distended. Normoactive bowel sounds  Extremities: No edema b/l le  Neuro: A&O x3. No focal deficits    LABS:                        9.9    11.81 )-----------( 137      ( 01 Jan 2023 06:40 )             30.6     01-01    134<L>  |  93<L>  |  70<HH>  ----------------------------<  284<H>  4.4   |  29  |  1.7<H>    Ca    8.8      01 Jan 2023 06:40  Mg     2.3     01-01    TPro  5.0<L>  /  Alb  3.2<L>  /  TBili  0.9  /  DBili  x   /  AST  32  /  ALT  20  /  AlkPhos  67  01-01              Troponin trend:        COVID-19 PCR: NotDetec (26 Dec 2022 15:20)      RADIOLOGY:  -CXR:    < from: Xray Chest 1 View-PORTABLE IMMEDIATE (Xray Chest 1 View-PORTABLE IMMEDIATE .) (12.31.22 @ 14:33) >  Impression:    Unchanged bibasilar opacities.    < end of copied text >  -TTE:  < from: TTE Echo Complete w/o Contrast w/ Doppler (12.26.22 @ 10:40) >    Summary:   1. LV Ejection Fraction by Baird's Method with a biplane EF of 78 %.   2. Normal left atrial size.   3. Mild mitral annular calcification.   4. Mild mitral valve regurgitation.   5. Mild tricuspid regurgitation.   6. Normal trileaflet aortic valvewith normal opening.   7. Mild pulmonic valve regurgitation.   8. Estimated pulmonary artery systolic pressure is 73.8 mmHg assuming a   right atrial pressure of 3 mmHg, which is consistent with severe   pulmonary hypertension.   9. There is mild aortic root calcification.    < end of copied text >    -CATHETERIZATION:  Intervention: s/p successful IVUS guided rotational atherectomy and balloon angioplasty with KHUSHBU X 1 to prox LAD 90% stenosis extending into mid LAD      Implants: [place] 3.5 X 30 to prox LAD        FINDINGS:     Coronary Dominance: Right      LM: No disease    LAD: Prox LAD calcified 90% stenosis, mid LAD 90% calcified stenosis  D1 mild disease    CX: dCx 70% stenosis  OM1 Mild disease small vessel  OM2 mild disease     RCA: Patent stents from prior cath, not injected       LVEDP: 25 mmHg   -OTHER:     Outpt cardiologist:    HPI:  82F w/ pmhx of CHF, HLD, HTN, COPD on 3L home O2, DM1, dementia, GERD, MI in 1992, CAD with 5 cardiac stents who present with 1 month of worsening SOB. For past 1 month she has been having dry cough and SOB. She saw her pulmonologist Dr. Feldman who did CXR and showed it was normal at the time, had her on levaquin and then increased her dose which she completed last week. Yesterday she started having crushing chest pressure substernally. Denies fever during this month, nausea vomiting back pain abd pain urinary sx or diarrhea. No recent travel or sick contact.  (25 Dec 2022 18:25)      ---  Cardiology Fellow notes:    PT denies angina, dyspnea, or syncope. She endorses some nausea with sputum production since yesterday. Was not able to work with PT.  Afebrile.    PAST MEDICAL & SURGICAL HISTORY  MI (myocardial infarction)  s/p 5 stents    HTN (hypertension)    High cholesterol    Heart failure    Non Hodgkin&#x27;s lymphoma  in remission. Follows with Dr Hernandez    PVD (peripheral vascular disease)    Thrombocytopenia    CKD (chronic kidney disease)    Osteoarthritis    Diabetes  on insulin    Coronary artery disease involving native heart without angina pectoris, unspecified vessel or lesion type  s/p 5 stents    History of cholecystectomy    H/O cardiac catheterization  5 STENTS    H/O carotid endarterectomy  RIGHT        FAMILY HISTORY:  FAMILY HISTORY:  FH: CAD (coronary artery disease)  FATHER        SOCIAL HISTORY:  Social History:  former smoker (25 Dec 2022 18:25)      ALLERGIES:  ACE inhibitors (Unknown)  BENZALKONIUM (Rash)  BETADINE (Rash)  Ceclor (Unknown)  codeine (Unknown)  contrast media (iodine-based) (Hives)  latex (Unknown)  penicillin (Unknown)  RUBBER GLOVES (Rash)  shellfish (Unknown)  sulfonamides (Unknown)      MEDICATIONS:  albuterol/ipratropium for Nebulization 3 milliLiter(s) Nebulizer every 6 hours  aspirin enteric coated 81 milliGRAM(s) Oral daily  atorvastatin 40 milliGRAM(s) Oral at bedtime  clopidogrel Tablet 75 milliGRAM(s) Oral daily  dextrose 5%. 1000 milliLiter(s) (50 mL/Hr) IV Continuous <Continuous>  dextrose 5%. 1000 milliLiter(s) (100 mL/Hr) IV Continuous <Continuous>  dextrose 50% Injectable 25 Gram(s) IV Push once  dextrose 50% Injectable 12.5 Gram(s) IV Push once  dextrose 50% Injectable 25 Gram(s) IV Push once  donepezil 10 milliGRAM(s) Oral at bedtime  doxycycline IVPB      doxycycline IVPB 100 milliGRAM(s) IV Intermittent every 12 hours  glucagon  Injectable 1 milliGRAM(s) IntraMuscular once  insulin glargine Injectable (LANTUS) 18 Unit(s) SubCutaneous at bedtime  insulin lispro (ADMELOG) corrective regimen sliding scale   SubCutaneous three times a day before meals  insulin lispro Injectable (ADMELOG) 6 Unit(s) SubCutaneous three times a day before meals  levoFLOXacin IVPB 750 milliGRAM(s) IV Intermittent every 24 hours  metoprolol succinate ER 50 milliGRAM(s) Oral daily  montelukast 10 milliGRAM(s) Oral daily    PRN:  aluminum hydroxide/magnesium hydroxide/simethicone Suspension 30 milliLiter(s) Oral every 4 hours PRN  dextrose Oral Gel 15 Gram(s) Oral once PRN      HOME MEDICATIONS:  Home Medications:  albuterol 90 mcg/inh inhalation aerosol: 2 puff(s) inhaled every 6 hours, As Needed (25 Dec 2022 14:24)  atorvastatin 20 mg oral tablet: 1 tab(s) orally once a day (25 Dec 2022 14:24)  donepezil 10 mg oral tablet: 1 tab(s) orally once a day (at bedtime) (25 Dec 2022 14:24)  gabapentin: orally 2 times a day (25 Dec 2022 14:24)  inositol 650 mg oral tablet: 2 tab(s) orally once a day (25 Dec 2022 14:24)  Lasix 20 mg oral tablet: 1 tab(s) orally once a day (25 Dec 2022 14:24)  losartan 100 mg oral tablet: 1 tab(s) orally once a day (25 Dec 2022 14:24)  montelukast 10 mg oral tablet: 1 tab(s) orally once a day (25 Dec 2022 14:24)  NovoLOG 100 units/mL subcutaneous solution: 10,7,7  subcutaneous (25 Dec 2022 14:24)  omeprazole 40 mg oral delayed release capsule: 1 cap(s) orally once a day (25 Dec 2022 14:24)  omeprazole 40 mg oral delayed release capsule: 1 cap(s) orally once a day (25 Dec 2022 14:24)  Soliqua 100/33 subcutaneous solution: 35 unit(s) subcutaneous once a day (25 Dec 2022 14:24)  Spiriva 18 mcg inhalation capsule: 1 cap(s) inhaled once a day (25 Dec 2022 14:24)      VITALS:   T(F): 97.6 (01-01 @ 07:27), Max: 98.1 (12-30 @ 20:00)  HR: 65 (01-01 @ 08:10) (65 - 100)  BP: 167/70 (01-01 @ 07:27) (97/50 - 172/72)  BP(mean): 101 (01-01 @ 07:27) (70 - 103)  RR: 20 (01-01 @ 08:10) (15 - 27)  SpO2: 98% (01-01 @ 08:10) (92% - 100%)    I&O's Summary    31 Dec 2022 07:01  -  01 Jan 2023 07:00  --------------------------------------------------------  IN: 320 mL / OUT: 400 mL / NET: -80 mL        REVIEW OF SYSTEMS:  CONSTITUTIONAL: No weakness, fevers or chills  HEENT: No visual changes, neck/ear pain  RESPIRATORY: No cough, sob  CARDIOVASCULAR: See HPI  GASTROINTESTINAL: No abdominal pain. No nausea, vomiting, diarrhea   GENITOURINARY: No dysuria, frequency or hematuria  NEUROLOGICAL: No new focal deficits  SKIN: No new rashes    PHYSICAL EXAM:  General: Not in distress.  Non-toxic appearing.   HEENT: EOMI  Cardio: regular, S1, S2, no murmur  Pulm: B/L BS.  No wheezing / crackles / rales  Abdomen: Soft, non-tender, non-distended. Normoactive bowel sounds  Extremities: No edema b/l le  Neuro: A&O x3. No focal deficits    LABS:                        9.9    11.81 )-----------( 137      ( 01 Jan 2023 06:40 )             30.6     01-01    134<L>  |  93<L>  |  70<HH>  ----------------------------<  284<H>  4.4   |  29  |  1.7<H>    Ca    8.8      01 Jan 2023 06:40  Mg     2.3     01-01    TPro  5.0<L>  /  Alb  3.2<L>  /  TBili  0.9  /  DBili  x   /  AST  32  /  ALT  20  /  AlkPhos  67  01-01              Troponin trend:        COVID-19 PCR: NotDetec (26 Dec 2022 15:20)      RADIOLOGY:  -CXR:    < from: Xray Chest 1 View-PORTABLE IMMEDIATE (Xray Chest 1 View-PORTABLE IMMEDIATE .) (12.31.22 @ 14:33) >  Impression:    Unchanged bibasilar opacities.    < end of copied text >  -TTE:  < from: TTE Echo Complete w/o Contrast w/ Doppler (12.26.22 @ 10:40) >    Summary:   1. LV Ejection Fraction by Baird's Method with a biplane EF of 78 %.   2. Normal left atrial size.   3. Mild mitral annular calcification.   4. Mild mitral valve regurgitation.   5. Mild tricuspid regurgitation.   6. Normal trileaflet aortic valvewith normal opening.   7. Mild pulmonic valve regurgitation.   8. Estimated pulmonary artery systolic pressure is 73.8 mmHg assuming a   right atrial pressure of 3 mmHg, which is consistent with severe   pulmonary hypertension.   9. There is mild aortic root calcification.    < end of copied text >    -CATHETERIZATION:  Intervention: s/p successful IVUS guided rotational atherectomy and balloon angioplasty with KHUSHBU X 1 to prox LAD 90% stenosis extending into mid LAD      Implants: [place] 3.5 X 30 to prox LAD        FINDINGS:     Coronary Dominance: Right      LM: No disease    LAD: Prox LAD calcified 90% stenosis, mid LAD 90% calcified stenosis  D1 mild disease    CX: dCx 70% stenosis  OM1 Mild disease small vessel  OM2 mild disease     RCA: Patent stents from prior cath, not injected       LVEDP: 25 mmHg   -OTHER:

## 2023-01-01 NOTE — PROGRESS NOTE ADULT - ASSESSMENT
#CAD (2-vessel disease)  #HTN/DLD  CATH: 90% prix and mid LAD s/p rota atherectomy, balloon angioplasty with KHUSHBU X 1 to prox LAD 90% stenosis extending into mid LAD  Echo noted. preserved EF. Severe pulmonary hypretension  DAPT with ASA/Plavix.   Lipitor, Toprol 25mg QD.   Kidney function stable (Cr 1.7)    CKD 3B  - Kidney function stable (Cr 1.7)  - Slowed urine output over las 24 hours (320/400)  -continue to monitor BMP and strict i/os  -electrolytes reviewed    #DM  - POCT glucose uncontrolled (400 this am)  - Pt on Soliqua 35 units at home   - Started on Lantus 18 at bedtime and lispro 6 TID   -continue to monitor fingersticks and ISS    Misc  -PT eval    #CAD (2-vessel disease)  #HTN/DLD  - CATH: 90% prix and mid LAD s/p rota atherectomy, balloon angioplasty with KHUSHBU X 1 to prox LAD 90% stenosis extending into mid LAD  - Echo noted. preserved EF. Severe pulmonary hypretension  - DAPT with ASA/Plavix.   - Lipitor, Toprol 25mg QD.   - Kidney function stable (Cr 1.7)  - No evidence of PNA on CXR, no wbc, afebrile. WIll send RVP to r/o viral PNA.   - PT today    $CKD 3B  - Kidney function stable (Cr 1.7)  - Slowed urine output over las 24 hours (320/400)  -continue to monitor BMP and strict i/os  -electrolytes reviewed    #DM  - POCT glucose uncontrolled (400 this am)  - Pt on Soliqua 35 units at home   - Started on Lantus 18 at bedtime and lispro 6 TID   -continue to monitor fingersticks and ISS    Misc  -PT eval

## 2023-01-02 NOTE — PROGRESS NOTE ADULT - ASSESSMENT
# CAD (2-vessel disease)  # Severe COPD / Pulm HTN  # Chronic Diastolic CHF  # HTN/DLD  # CKD 3B  - euvolemic on exam   - Kidney function stable (Cr 1.6)  - s/p CATH: 90% prix and mid LAD s/p rota atherectomy, balloon angioplasty with KHUSHBU X 1 to prox LAD 90% stenosis extending into mid LAD  - Echo: preserved EF. Severe pulmonary hypertension  - DAPT with ASA/Plavix.   - Lipitor, Toprol 25mg QD.   - continue to monitor fingersticks and ISS  - PT eval     - possible downgrade to 3C # CAD (2-vessel disease)  # Severe COPD / Pulm HTN  # Chronic Diastolic CHF  # HTN/DLD  # CKD 3B  - euvolemic on exam   - Kidney function stable (Cr 1.6)  - s/p CATH: 90% prix and mid LAD s/p rota atherectomy, balloon angioplasty with KHUSHBU X 1 to prox LAD 90% stenosis extending into mid LAD  - Echo: preserved EF. Severe pulmonary hypertension  - DAPT with ASA/Plavix.   - Lipitor, Toprol 25mg QD.   - continue to monitor fingersticks and ISS  - PT eval     - downgrade to Medicine

## 2023-01-02 NOTE — PROGRESS NOTE ADULT - ATTENDING COMMENTS
CAD s/p PCI  Severe COPD / Pulm HTN  Chronic Diastolic CHF    NSTEMI  s/p PCI LAD without complication    Weak  Persistent cough  Poor appetite    Leukocytosis.  Afebrile.  RVP positive (rhino / enterovirus)    Hemodynamics / renal function stable.  Blood sugars labile.    - Supportive care  - Cont DAPT / Metoprolol  - Cont PO Lasix  - OOB / PT evaluation  - Transfer to Medicine Telemetry

## 2023-01-02 NOTE — PROGRESS NOTE ADULT - SUBJECTIVE AND OBJECTIVE BOX
Cardiology Follow up    VENKATESH RAMACHANDRAN   82y Female  PAST MEDICAL & SURGICAL HISTORY:  MI (myocardial infarction)  s/p 5 stents      HTN (hypertension)      High cholesterol      Heart failure      Non Hodgkin&#x27;s lymphoma  in remission. Follows with Dr Hernandez      PVD (peripheral vascular disease)      Thrombocytopenia      CKD (chronic kidney disease)      Osteoarthritis      Diabetes  on insulin      Coronary artery disease involving native heart without angina pectoris, unspecified vessel or lesion type  s/p 5 stents      History of cholecystectomy      H/O cardiac catheterization  5 STENTS      H/O carotid endarterectomy  RIGHT           HPI:  82F w/ pmhx of CHF, HLD, HTN, COPD on 3L home O2, DM1, dementia, GERD, MI in 1992, CAD with 5 cardiac stents who present with 1 month of worsening SOB. For past 1 month she has been having dry cough and SOB. She saw her pulmonologist Dr. Feldman who did CXR and showed it was normal at the time, had her on levaquin and then increased her dose which she completed last week. Yesterday she started having crushing chest pressure substernally. Denies fever during this month, nausea vomiting back pain abd pain urinary sx or diarrhea. No recent travel or sick contact. Her cardiologist is Dr. Munoz.  (25 Dec 2022 18:25)    Allergies    ACE inhibitors (Unknown)  BENZALKONIUM (Rash)  BETADINE (Rash)  Ceclor (Unknown)  codeine (Unknown)  contrast media (iodine-based) (Hives)  latex (Unknown)  penicillin (Unknown)  RUBBER GLOVES (Rash)  shellfish (Unknown)  sulfonamides (Unknown)    Intolerances      Patient seen and examined at bedside. No acute events overnight.  Patient without complaints. States breathing has improved.   Denies CP, SOB, palpitations, or dizziness  No events on telemetry overnight    Vital Signs Last 24 Hrs  T(C): 36.1 (02 Jan 2023 08:00), Max: 36.8 (02 Jan 2023 00:00)  T(F): 97 (02 Jan 2023 08:00), Max: 98.3 (02 Jan 2023 00:00)  HR: 86 (02 Jan 2023 08:00) (75 - 86)  BP: 149/66 (02 Jan 2023 08:00) (99/46 - 163/64)  BP(mean): 95 (02 Jan 2023 08:00) (66 - 95)  RR: 17 (02 Jan 2023 08:00) (17 - 31)  SpO2: 98% (02 Jan 2023 08:00) (97% - 98%)    Parameters below as of 02 Jan 2023 08:00  Patient On (Oxygen Delivery Method): nasal cannula  O2 Flow (L/min): 2      MEDICATIONS  (STANDING):  albuterol/ipratropium for Nebulization 3 milliLiter(s) Nebulizer every 6 hours  aspirin enteric coated 81 milliGRAM(s) Oral daily  atorvastatin 40 milliGRAM(s) Oral at bedtime  clopidogrel Tablet 75 milliGRAM(s) Oral daily  dextrose 5%. 1000 milliLiter(s) (100 mL/Hr) IV Continuous <Continuous>  dextrose 5%. 1000 milliLiter(s) (50 mL/Hr) IV Continuous <Continuous>  dextrose 50% Injectable 25 Gram(s) IV Push once  dextrose 50% Injectable 12.5 Gram(s) IV Push once  dextrose 50% Injectable 25 Gram(s) IV Push once  donepezil 10 milliGRAM(s) Oral at bedtime  doxycycline IVPB      doxycycline IVPB 100 milliGRAM(s) IV Intermittent every 12 hours  glucagon  Injectable 1 milliGRAM(s) IntraMuscular once  insulin glargine Injectable (LANTUS) 18 Unit(s) SubCutaneous at bedtime  insulin lispro (ADMELOG) corrective regimen sliding scale   SubCutaneous three times a day before meals  insulin lispro Injectable (ADMELOG) 6 Unit(s) SubCutaneous three times a day before meals  levoFLOXacin IVPB 750 milliGRAM(s) IV Intermittent every 24 hours  metoprolol succinate ER 50 milliGRAM(s) Oral daily  montelukast 10 milliGRAM(s) Oral daily    MEDICATIONS  (PRN):  aluminum hydroxide/magnesium hydroxide/simethicone Suspension 30 milliLiter(s) Oral every 4 hours PRN Dyspepsia  dextrose Oral Gel 15 Gram(s) Oral once PRN Blood Glucose LESS THAN 70 milliGRAM(s)/deciliter  ondansetron Injectable 4 milliGRAM(s) IV Push every 8 hours PRN Nausea and/or Vomiting      REVIEW OF SYSTEMS:          All negative except as mentioned in HPI    PHYSICAL EXAM:           CONSTITUTIONAL: Well-developed; well-nourished; in no acute distress  	SKIN: warm, dry  	HEAD: Normocephalic; atraumatic  	EYES: PERRL.  	ENT: No nasal discharge, airway clear, mucous membranes moist  	NECK: Supple; non tender.  	CARD: +S1, +S2, no murmurs, gallops, or rubs. Regular rate and rhythm    	RESP: Crackles in lower lobes BL L.R  	ABD: soft ntnd, + BS x 4 quadrants  	EXT: moves all extremities,  no clubbing, cyanosis or edema  	NEURO: Alert and oriented x3, no focal deficits          PSYCH: Cooperative, appropriate          VASCULAR:  + Rad / + PTs / + DPs          EXTREMITY:             Right Groin: access site soft, no hematoma, no pain, + pulses, no sign of infection, no numbness  	               ECG: < from: 12 Lead ECG (12.30.22 @ 08:05) >    Ventricular Rate 83 BPM    Atrial Rate 83 BPM    P-R Interval 130 ms    QRS Duration 90 ms    Q-T Interval 378 ms    QTC Calculation(Bazett) 444 ms    P Axis 49 degrees    R Axis 67 degrees    T Axis 181 degrees    Diagnosis Line Sinus rhythm with Premature atrial complexes  Nonspecific ST and T wave abnormality  Abnormal ECG                                                                                                                2D ECHO:  `< from: TTE Echo Complete w/o Contrast w/ Doppler (12.26.22 @ 10:40) >    Summary:   1. LV Ejection Fraction by Baird's Method with a biplane EF of 78 %.   2. Normal left atrial size.   3. Mild mitral annular calcification.   4. Mild mitral valve regurgitation.   5. Mild tricuspid regurgitation.   6. Normal trileaflet aortic valvewith normal opening.   7. Mild pulmonic valve regurgitation.   8. Estimated pulmonary artery systolic pressure is 73.8 mmHg assuming a   right atrial pressure of 3 mmHg, which is consistent with severe   pulmonary hypertension.   9. There is mild aortic root calcification.    LABS:                        10.4   13.35 )-----------( 147      ( 02 Jan 2023 05:35 )             31.8     01-02    135  |  96<L>  |  74<HH>  ----------------------------<  178<H>  4.2   |  26  |  1.6<H>    Ca    8.8      02 Jan 2023 05:35  Mg     2.3     01-02    TPro  4.8<L>  /  Alb  3.4<L>  /  TBili  0.8  /  DBili  x   /  AST  38  /  ALT  21  /  AlkPhos  75  01-02        Magnesium, Serum: 2.3 mg/dL [1.8 - 2.4] (01-02-23 @ 05:35)  LIVER FUNCTIONS - ( 02 Jan 2023 05:35 )  Alb: 3.4 g/dL / Pro: 4.8 g/dL / ALK PHOS: 75 U/L / ALT: 21 U/L / AST: 38 U/L / GGT: x                 A/P:  I discussed the case with Cardiologist Dr. Leong  and recommend the following:      A/P:  I discussed the case with Cardiologist Dr. Leong  and recommend the following:    S/P PCI pLAD KHUSHUB x 1      Intervention: s/p successful IVUS guided rotational atherectomy and balloon angioplasty with KHUSHBU X 1 to prox LAD 90% stenosis extending into mid LAD    Implants: Kvng [place] 3.5 X 30 to prox LAD        FINDINGS:     Coronary Dominance: Right    LM: No disease  LAD: Prox LAD calcified 90% stenosis, mid LAD 90% calcified stenosis  D1 mild disease  CX: dCx 70% stenosis  OM1 Mild disease small vessel  OM2 mild disease   RCA: Patent stents from prior cath, not injected    LVEDP: 25 mmHg   POST-OP DIAGNOSIS:    [x] 2 Vessel Coronary Artery Disease: LAD s/p intervention and Cx      	     Continue DAPT (  Aspirin 81 mg PO Daily and Plavix 75 mg po daily )),  B-Blocker, Statin Therapy                    No ACEi/ARB at this time due to elevated Cr and allergy to ACEi                   DVT/GI prophylaxis                   Patient given 30 day supply of (EC  Aspirin 81 mg daily and Plavix 75 mg daily ) to take at home                   f/u 11am lab results                   PT eval                    O-, ambulate with assistance                    monitor access site/pulses                   Patient agreeing to take DAPT for at least one year or as directed by cardiologist                    Pt given instructions on importance of taking antiplatelet medication or risk acute stent thrombosis/death                   Post cath instructions, access site care and activity restrictions reviewed with patient                      Benefits of Cardiac Rehab discussed with patient and all documents sent to [facility]                   Patient instructed to call Cardiac Rehab and make first appointment after first f/u visit with Cardiologist                    Discussed with patient to return to hospital if experience chest pain, shortness breath, dizziness and site bleeding                   Aggressive risk factor modification, diet counseling, smoking cessation discussed with patient                       Care as per CCU team                      Discharge instructions as follows: (once stable for discharge)                   - Continue medical regimen as prescribed to prevent chest pain                   - Continue dual anti-platelet therapy, beta blocker, statin                   - If you are diabetic and taking medication containing Metformin, do not take them for 48 hours after the procedure                   - Instructed to call 911 if chest pain, shortness of breath or bleeding from access site                   - No heavy lifting >10lbs x 1 week                   - No driving x 24 hours                   - No baths, swimming pools x 1 week, may shower                   - Low sodium low fat low cholesterol diet                   - Follow-up with Cardiologist in 1-2 weeks after discharge

## 2023-01-02 NOTE — PROGRESS NOTE ADULT - SUBJECTIVE AND OBJECTIVE BOX
HPI:  82F w/ pmhx of CHF, HLD, HTN, COPD on 3L home O2, DM1, dementia, GERD, MI in 1992, CAD with 5 cardiac stents who present with 1 month of worsening SOB. For past 1 month she has been having dry cough and SOB. She saw her pulmonologist Dr. Feldman who did CXR and showed it was normal at the time, had her on levaquin and then increased her dose which she completed last week. Yesterday she started having crushing chest pressure substernally. Denies fever during this month, nausea vomiting back pain abd pain urinary sx or diarrhea. No recent travel or sick contact. Her cardiologist is Dr. Munoz.  (25 Dec 2022 18:25)    PAST MEDICAL & SURGICAL HISTORY  MI (myocardial infarction)  s/p 5 stents    HTN (hypertension)    High cholesterol    Heart failure    Non Hodgkin&#x27;s lymphoma  in remission. Follows with Dr Hernandez    PVD (peripheral vascular disease)    Thrombocytopenia    CKD (chronic kidney disease)    Osteoarthritis    Diabetes  on insulin    Coronary artery disease involving native heart without angina pectoris, unspecified vessel or lesion type  s/p 5 stents    History of cholecystectomy    H/O cardiac catheterization  5 STENTS    H/O carotid endarterectomy  RIGHT        FAMILY HISTORY:  FAMILY HISTORY:  FH: CAD (coronary artery disease)  FATHER        SOCIAL HISTORY:  Social History:  former smoker (25 Dec 2022 18:25)      ALLERGIES:  ACE inhibitors (Unknown)  BENZALKONIUM (Rash)  BETADINE (Rash)  Ceclor (Unknown)  codeine (Unknown)  contrast media (iodine-based) (Hives)  latex (Unknown)  penicillin (Unknown)  RUBBER GLOVES (Rash)  shellfish (Unknown)  sulfonamides (Unknown)      MEDICATIONS:  albuterol/ipratropium for Nebulization 3 milliLiter(s) Nebulizer every 6 hours  aspirin enteric coated 81 milliGRAM(s) Oral daily  atorvastatin 40 milliGRAM(s) Oral at bedtime  chlorhexidine 2% Cloths 1 Application(s) Topical daily  clopidogrel Tablet 75 milliGRAM(s) Oral daily  dextrose 5%. 1000 milliLiter(s) (100 mL/Hr) IV Continuous <Continuous>  dextrose 5%. 1000 milliLiter(s) (50 mL/Hr) IV Continuous <Continuous>  dextrose 50% Injectable 25 Gram(s) IV Push once  dextrose 50% Injectable 12.5 Gram(s) IV Push once  dextrose 50% Injectable 25 Gram(s) IV Push once  donepezil 10 milliGRAM(s) Oral at bedtime  doxycycline IVPB      doxycycline IVPB 100 milliGRAM(s) IV Intermittent every 12 hours  glucagon  Injectable 1 milliGRAM(s) IntraMuscular once  insulin glargine Injectable (LANTUS) 22 Unit(s) SubCutaneous at bedtime  insulin lispro (ADMELOG) corrective regimen sliding scale   SubCutaneous three times a day before meals  insulin lispro Injectable (ADMELOG) 6 Unit(s) SubCutaneous three times a day before meals  levoFLOXacin IVPB 750 milliGRAM(s) IV Intermittent every 24 hours  metoprolol succinate ER 50 milliGRAM(s) Oral daily  montelukast 10 milliGRAM(s) Oral daily    PRN:  aluminum hydroxide/magnesium hydroxide/simethicone Suspension 30 milliLiter(s) Oral every 4 hours PRN  dextrose Oral Gel 15 Gram(s) Oral once PRN  ondansetron Injectable 4 milliGRAM(s) IV Push every 8 hours PRN      HOME MEDICATIONS:  Home Medications:  albuterol 90 mcg/inh inhalation aerosol: 2 puff(s) inhaled every 6 hours, As Needed (25 Dec 2022 14:24)  atorvastatin 20 mg oral tablet: 1 tab(s) orally once a day (25 Dec 2022 14:24)  donepezil 10 mg oral tablet: 1 tab(s) orally once a day (at bedtime) (25 Dec 2022 14:24)  gabapentin: orally 2 times a day (25 Dec 2022 14:24)  inositol 650 mg oral tablet: 2 tab(s) orally once a day (25 Dec 2022 14:24)  Lasix 20 mg oral tablet: 1 tab(s) orally once a day (25 Dec 2022 14:24)  losartan 100 mg oral tablet: 1 tab(s) orally once a day (25 Dec 2022 14:24)  montelukast 10 mg oral tablet: 1 tab(s) orally once a day (25 Dec 2022 14:24)  NovoLOG 100 units/mL subcutaneous solution: 10,7,7  subcutaneous (25 Dec 2022 14:24)  omeprazole 40 mg oral delayed release capsule: 1 cap(s) orally once a day (25 Dec 2022 14:24)  omeprazole 40 mg oral delayed release capsule: 1 cap(s) orally once a day (25 Dec 2022 14:24)  Soliqua 100/33 subcutaneous solution: 35 unit(s) subcutaneous once a day (25 Dec 2022 14:24)  Spiriva 18 mcg inhalation capsule: 1 cap(s) inhaled once a day (25 Dec 2022 14:24)      VITALS:   T(F): 98.2 (01-02 @ 12:00), Max: 98.3 (01-02 @ 00:00)  HR: 79 (01-02 @ 16:00) (65 - 98)  BP: 120/56 (01-02 @ 16:00) (97/50 - 167/70)  BP(mean): 80 (01-02 @ 16:00) (66 - 103)  RR: 19 (01-02 @ 16:00) (16 - 31)  SpO2: 98% (01-02 @ 16:00) (92% - 99%)    I&O's Summary    01 Jan 2023 07:01  -  02 Jan 2023 07:00  --------------------------------------------------------  IN: 340 mL / OUT: 300 mL / NET: 40 mL    02 Jan 2023 07:01  -  02 Jan 2023 16:10  --------------------------------------------------------  IN: 300 mL / OUT: 400 mL / NET: -100 mL        REVIEW OF SYSTEMS:  CONSTITUTIONAL: No weakness, fevers or chills  HEENT: No visual changes, neck/ear pain  RESPIRATORY: No cough, sob  CARDIOVASCULAR: See HPI  GASTROINTESTINAL: No abdominal pain. No nausea, vomiting, diarrhea   GENITOURINARY: No dysuria, frequency or hematuria  NEUROLOGICAL: No new focal deficits  SKIN: No new rashes    PHYSICAL EXAM:  General: Not in distress.  Non-toxic appearing.   HEENT: EOMI  Cardio: regular, S1, S2, no murmur  Pulm: B/L BS.  No wheezing / crackles / rales  Abdomen: Soft, non-tender, non-distended. Normoactive bowel sounds  Extremities: No edema b/l le  Neuro: A&O x3. No focal deficits    LABS:                        10.4   13.35 )-----------( 147      ( 02 Jan 2023 05:35 )             31.8     01-02    135  |  96<L>  |  74<HH>  ----------------------------<  178<H>  4.2   |  26  |  1.6<H>    Ca    8.8      02 Jan 2023 05:35  Mg     2.3     01-02    TPro  4.8<L>  /  Alb  3.4<L>  /  TBili  0.8  /  DBili  x   /  AST  38  /  ALT  21  /  AlkPhos  75  01-02      SARS-CoV-2: NotDetec (01 Jan 2023 13:49)  COVID-19 PCR: NotDetec (26 Dec 2022 15:20)      RADIOLOGY:  Echo Complete w/o Contrast w/ Doppler (12.26.22):    1. LV Ejection Fraction by Baird's Method with a biplane EF of 78 %.   2. Normal left atrial size.   3. Mild mitral annular calcification.   4. Mild mitral valve regurgitation.   5. Mild tricuspid regurgitation.   6. Normal trileaflet aortic valvewith normal opening.   7. Mild pulmonic valve regurgitation.   8. Estimated pulmonary artery systolic pressure is 73.8 mmHg assuming a right atrial pressure of 3 mmHg, which is consistent with severe pulmonary hypertension.

## 2023-01-02 NOTE — CHART NOTE - NSCHARTNOTEFT_GEN_A_CORE
Transfer Note    Transfer from: 4T  Transfer to:  3C  Accepting physician:    Hospital Course:   82F w/ pmhx of CHF, HLD, HTN, COPD on 3L home O2, DM1, dementia, GERD, MI in 1992, CAD with 5 cardiac stents who present with 1 month of worsening SOB. For past 1 month she has been having dry cough and SOB. She saw her pulmonologist Dr. Feldman who did CXR and showed it was normal at the time, had her on levaquin and then increased her dose which she completed last week. Day prior to admission she started having crushing chest pressure substernally. Denies fever during this month, nausea vomiting back pain abd pain urinary sx or diarrhea. No recent travel or sick contact. Her cardiologist is Dr. Munoz. She was admitted for SOB secondary to PNA     On 12/26 was admitted to  after experiencing NSTEMI with trops peaking at 4.1. Cath done 12/27 stenosis 90% of  prox LAD,  90 % of mid LAD, 70% of dCx, LVEDP 25mmhg. S/p atherectomy and balloon angioplasty w KHUSHBU x 1 to prox LAD 90% stenosis extending into mid LAD 12/29.  Patient has been complaining of weakness and nausea for the last 3 days w decreased PO intake. She has been unable to work with PT. She has been afebrile and CXR showing no evidence of PNA, Rapid panel came back positive for Entero/Rhinovirus.     Today she is resting comfortably in bed, Remains afebrile, denies angina, dypsnea, syncope. She is still experiencing nausea nd sputm production. She will need PT and increased activity. Stable for downgrade     Interim Events:       MEDICATIONS:  STANDING MEDICATIONS  albuterol/ipratropium for Nebulization 3 milliLiter(s) Nebulizer every 6 hours  aspirin enteric coated 81 milliGRAM(s) Oral daily  atorvastatin 40 milliGRAM(s) Oral at bedtime  chlorhexidine 2% Cloths 1 Application(s) Topical daily  clopidogrel Tablet 75 milliGRAM(s) Oral daily  dextrose 5%. 1000 milliLiter(s) IV Continuous <Continuous>  dextrose 5%. 1000 milliLiter(s) IV Continuous <Continuous>  dextrose 50% Injectable 25 Gram(s) IV Push once  dextrose 50% Injectable 12.5 Gram(s) IV Push once  dextrose 50% Injectable 25 Gram(s) IV Push once  donepezil 10 milliGRAM(s) Oral at bedtime  doxycycline IVPB      doxycycline IVPB 100 milliGRAM(s) IV Intermittent every 12 hours  glucagon  Injectable 1 milliGRAM(s) IntraMuscular once  insulin glargine Injectable (LANTUS) 18 Unit(s) SubCutaneous at bedtime  insulin lispro (ADMELOG) corrective regimen sliding scale   SubCutaneous three times a day before meals  insulin lispro Injectable (ADMELOG) 6 Unit(s) SubCutaneous three times a day before meals  levoFLOXacin IVPB 750 milliGRAM(s) IV Intermittent every 24 hours  metoprolol succinate ER 50 milliGRAM(s) Oral daily  montelukast 10 milliGRAM(s) Oral daily    PRN MEDICATIONS  aluminum hydroxide/magnesium hydroxide/simethicone Suspension 30 milliLiter(s) Oral every 4 hours PRN  dextrose Oral Gel 15 Gram(s) Oral once PRN  ondansetron Injectable 4 milliGRAM(s) IV Push every 8 hours PRN      VITAL SIGNS: Last 24 Hours  T(C): 36.1 (02 Jan 2023 08:00), Max: 36.8 (02 Jan 2023 00:00)  T(F): 97 (02 Jan 2023 08:00), Max: 98.3 (02 Jan 2023 00:00)  HR: 86 (02 Jan 2023 08:00) (75 - 86)  BP: 149/66 (02 Jan 2023 08:00) (99/46 - 163/64)  BP(mean): 95 (02 Jan 2023 08:00) (66 - 95)  RR: 17 (02 Jan 2023 08:00) (17 - 31)  SpO2: 98% (02 Jan 2023 08:00) (97% - 98%)    LABS:                        10.4   13.35 )-----------( 147      ( 02 Jan 2023 05:35 )             31.8     01-02    135  |  96<L>  |  74<HH>  ----------------------------<  178<H>  4.2   |  26  |  1.6<H>    Ca    8.8      02 Jan 2023 05:35  Mg     2.3     01-02    TPro  4.8<L>  /  Alb  3.4<L>  /  TBili  0.8  /  DBili  x   /  AST  38  /  ALT  21  /  AlkPhos  75  01-02        ABG - ( 31 Dec 2022 16:40 )  pH, Arterial: 7.50  pH, Blood: x     /  pCO2: 39    /  pO2: 127   / HCO3: 30    / Base Excess: 6.7   /  SaO2: 97.5            RADIOLOGY:    ASSESSMENT & PLAN:   #CAD (2-vessel disease)  #HTN/DLD  - CATH: 90% prix and mid LAD s/p rota atherectomy, balloon angioplasty with KHUSHBU X 1 to prox LAD 90% stenosis extending into mid LAD  - Echo noted. preserved EF. Severe pulmonary hypretension  - DAPT with ASA/Plavix.   - Lipitor, Toprol 25mg QD.   - Kidney function stable (Cr 1.7)  - No evidence of PNA on CXR, no wbc, afebrile. WIll send RVP to r/o viral PNA.   - PT today    CKD 3B  - Kidney function stable (Cr 1.7)  - Slowed urine output over las 24 hours (320/400)  -continue to monitor BMP and strict i/os  -electrolytes reviewed    #DM  - POCT glucose uncontrolled (400 this am)  - Pt on Soliqua 35 units at home   - Started on Lantus 18 at bedtime and lispro 6 TID   -continue to monitor fingersticks and ISS    Misc  -PT eval       For Follow-Up: PT , monitor FS, monitor BMP

## 2023-01-03 NOTE — PROGRESS NOTE ADULT - SUBJECTIVE AND OBJECTIVE BOX
Cardiology Follow up    VENKATESH RAMACHANDRAN   82y Female  PAST MEDICAL & SURGICAL HISTORY:  MI (myocardial infarction)  s/p 5 stents      HTN (hypertension)      High cholesterol      Heart failure      Non Hodgkin&#x27;s lymphoma  in remission. Follows with Dr Hernandez      PVD (peripheral vascular disease)      Thrombocytopenia      CKD (chronic kidney disease)      Osteoarthritis      Diabetes  on insulin      Coronary artery disease involving native heart without angina pectoris, unspecified vessel or lesion type  s/p 5 stents      History of cholecystectomy      H/O cardiac catheterization  5 STENTS      H/O carotid endarterectomy  RIGHT           HPI:  82F w/ pmhx of CHF, HLD, HTN, COPD on 3L home O2, DM1, dementia, GERD, MI in 1992, CAD with 5 cardiac stents who present with 1 month of worsening SOB. For past 1 month she has been having dry cough and SOB. She saw her pulmonologist Dr. Feldman who did CXR and showed it was normal at the time, had her on levaquin and then increased her dose which she completed last week. Yesterday she started having crushing chest pressure substernally. Denies fever during this month, nausea vomiting back pain abd pain urinary sx or diarrhea. No recent travel or sick contact. Her cardiologist is Dr. Munoz.  (25 Dec 2022 18:25)    Allergies    ACE inhibitors (Unknown)  BENZALKONIUM (Rash)  BETADINE (Rash)  Ceclor (Unknown)  codeine (Unknown)  contrast media (iodine-based) (Hives)  latex (Unknown)  penicillin (Unknown)  RUBBER GLOVES (Rash)  shellfish (Unknown)  sulfonamides (Unknown)    Intolerances      Patient seen and examined at bedside.   Patient with c/o nausea this morning, with 1 episode of vomiting. Patient endorses unsure of last BM   Denies CP, SOB, palpitations, or dizziness however with increase in O2 requirement to 3L + RVP with enterovirus  Episodes of 3b NSVT overnight    Vital Signs Last 24 Hrs  T(C): 36.9 (03 Jan 2023 08:14), Max: 37.2 (03 Jan 2023 00:00)  T(F): 98.5 (03 Jan 2023 08:14), Max: 99 (03 Jan 2023 00:00)  HR: 81 (03 Jan 2023 08:14) (69 - 81)  BP: 157/68 (03 Jan 2023 08:14) (115/46 - 157/68)  BP(mean): 96 (03 Jan 2023 04:00) (67 - 96)  RR: 18 (03 Jan 2023 08:14) (18 - 22)  SpO2: 99% (03 Jan 2023 08:14) (98% - 100%)    Parameters below as of 02 Jan 2023 20:00  Patient On (Oxygen Delivery Method): nasal cannula        MEDICATIONS  (STANDING):  albuterol/ipratropium for Nebulization 3 milliLiter(s) Nebulizer every 6 hours  aspirin enteric coated 81 milliGRAM(s) Oral daily  atorvastatin 40 milliGRAM(s) Oral at bedtime  chlorhexidine 2% Cloths 1 Application(s) Topical daily  clopidogrel Tablet 75 milliGRAM(s) Oral daily  dextrose 5%. 1000 milliLiter(s) (50 mL/Hr) IV Continuous <Continuous>  dextrose 5%. 1000 milliLiter(s) (100 mL/Hr) IV Continuous <Continuous>  dextrose 50% Injectable 25 Gram(s) IV Push once  dextrose 50% Injectable 12.5 Gram(s) IV Push once  dextrose 50% Injectable 25 Gram(s) IV Push once  donepezil 10 milliGRAM(s) Oral at bedtime  doxycycline IVPB      doxycycline IVPB 100 milliGRAM(s) IV Intermittent every 12 hours  glucagon  Injectable 1 milliGRAM(s) IntraMuscular once  insulin glargine Injectable (LANTUS) 22 Unit(s) SubCutaneous at bedtime  insulin lispro (ADMELOG) corrective regimen sliding scale   SubCutaneous three times a day before meals  insulin lispro Injectable (ADMELOG) 6 Unit(s) SubCutaneous three times a day before meals  levoFLOXacin IVPB 750 milliGRAM(s) IV Intermittent every 24 hours  metoprolol succinate ER 50 milliGRAM(s) Oral daily  montelukast 10 milliGRAM(s) Oral daily    MEDICATIONS  (PRN):  aluminum hydroxide/magnesium hydroxide/simethicone Suspension 30 milliLiter(s) Oral every 4 hours PRN Dyspepsia  dextrose Oral Gel 15 Gram(s) Oral once PRN Blood Glucose LESS THAN 70 milliGRAM(s)/deciliter  ondansetron Injectable 4 milliGRAM(s) IV Push every 8 hours PRN Nausea and/or Vomiting  ondansetron Injectable 4 milliGRAM(s) IV Push three times a day PRN Nausea and/or Vomiting      REVIEW OF SYSTEMS:          All negative except as mentioned in HPI    PHYSICAL EXAM:           CONSTITUTIONAL: Well-developed; well-nourished; in no acute distress  	SKIN: warm, dry  	HEAD: Normocephalic; atraumatic  	EYES: PERRL.  	ENT: No nasal discharge, airway clear, mucous membranes moist  	NECK: Supple; non tender.  	CARD: +S1, +S2, no murmurs, gallops, or rubs. Regular rate and rhythm    	RESP: Crackles throughout, lower > upper  	ABD: soft ntnd distended, + BS x 4 quadrants  	EXT: moves all extremities,  no clubbing, cyanosis or edema  	NEURO: Alert and oriented x3, no focal deficits          PSYCH: Cooperative, appropriate          VASCULAR:  + Rad / + PTs / + DPs          EXTREMITY:             Right Groin:   access site soft, no hematoma, no pain, + pulses, no sign of infection, no numbness  	               ECG: `< from: 12 Lead ECG (12.30.22 @ 08:05) >    Ventricular Rate 83 BPM    Atrial Rate 83 BPM    P-R Interval 130 ms    QRS Duration 90 ms    Q-T Interval 378 ms    QTC Calculation(Bazett) 444 ms    P Axis 49 degrees    R Axis 67 degrees    T Axis 181 degrees    Diagnosis Line Sinus rhythm with Premature atrial complexes  Nonspecific ST and T wave abnormality  Abnormal ECG                                                                                                                        2D ECHO: < from: TTE Echo Complete w/o Contrast w/ Doppler (12.26.22 @ 10:40) >    Summary:   1. LV Ejection Fraction by Baird's Method with a biplane EF of 78 %.   2. Normal left atrial size.   3. Mild mitral annular calcification.   4. Mild mitral valve regurgitation.   5. Mild tricuspid regurgitation.   6. Normal trileaflet aortic valvewith normal opening.   7. Mild pulmonic valve regurgitation.   8. Estimated pulmonary artery systolic pressure is 73.8 mmHg assuming a   right atrial pressure of 3 mmHg, which is consistent with severe   pulmonary hypertension.   9. There is mild aortic root calcification.            LABS:                        10.1   15.84 )-----------( 143      ( 03 Jan 2023 05:57 )             29.7     01-03    130<L>  |  92<L>  |  71<HH>  ----------------------------<  200<H>  4.6   |  25  |  1.5    Ca    8.4      03 Jan 2023 05:57  Mg     2.1     01-03    TPro  4.7<L>  /  Alb  2.9<L>  /  TBili  0.7  /  DBili  x   /  AST  50<H>  /  ALT  21  /  AlkPhos  80  01-03        Magnesium, Serum: 2.1 mg/dL [1.8 - 2.4] (01-03-23 @ 05:57)  LIVER FUNCTIONS - ( 03 Jan 2023 05:57 )  Alb: 2.9 g/dL / Pro: 4.7 g/dL / ALK PHOS: 80 U/L / ALT: 21 U/L / AST: 50 U/L / GGT: x                            A/P:  I discussed the case with Cardiologist Dr. Leong  and recommend the following:    S/P PCI pLAD KHUSHBU x 1      Intervention: s/p successful IVUS guided rotational atherectomy and balloon angioplasty with KHUSHBU X 1 to prox LAD 90% stenosis extending into mid LAD    Implants: [place] 3.5 X 30 to prox LAD        FINDINGS:     Coronary Dominance: Right    LM: No disease  LAD: Prox LAD calcified 90% stenosis, mid LAD 90% calcified stenosis  D1 mild disease  CX: dCx 70% stenosis  OM1 Mild disease small vessel  OM2 mild disease   RCA: Patent stents from prior cath, not injected    LVEDP: 25 mmHg   POST-OP DIAGNOSIS:    [x] 2 Vessel Coronary Artery Disease: LAD s/p intervention and Cx      	     Continue DAPT (  Aspirin 81 mg PO Daily and Plavix 75 mg po daily )),  B-Blocker, Statin Therapy                    No ACEi/ARB at this time due to elevated Cr and allergy to ACEi                   DVT/GI prophylaxis                   Patient given 30 day supply of (EC  Aspirin 81 mg daily and Plavix 75 mg daily ) to take at home                                      PT leif GUZMAN-, ambulate with assistance                    monitor access site/pulses                   Patient agreeing to take DAPT for at least one year or as directed by cardiologist                    Pt given instructions on importance of taking antiplatelet medication or risk acute stent thrombosis/death                   Post cath instructions, access site care and activity restrictions reviewed with patient                      Benefits of Cardiac Rehab discussed with patient and all documents sent to Cardiac Rehab Center                   Patient instructed to call Cardiac Rehab and make first appointment after first f/u visit with Cardiologist                    Discussed with patient to return to hospital if experience chest pain, shortness breath, dizziness and site bleeding                   Aggressive risk factor modification, diet counseling, smoking cessation discussed with patient                       Care as per CCU team

## 2023-01-03 NOTE — PROGRESS NOTE ADULT - ASSESSMENT
83 yo F w/ pmhx of CHF, HLD, HTN, COPD on 3L home O2, DM1, dementia, GERD, MI in 1992, CAD w cardiac stents presented to the ED on 12/25 with progressive SOB of breath for the last month. On 12/26 was admitted to  after experiencing NSTEMI and acute on chronic HFpEF - scheduled for mid LA atherectomy today due to 2 vessel CAD.       #NSTEMI d/t chronic diastolic CHF  -s/p PCI LAD without complication  - s/p CATH: 90% prix and mid LAD s/p rota atherectomy, balloon angioplasty with KHUSHBU X 1 to prox LAD 90% stenosis extending into mid LAD  - Echo: preserved EF. Severe pulmonary hypertension  - DAPT with ASA/Plavix.   - Lipitor, Toprol 25mg QD.       #Leukocytosis.    -Afebrile.  -RVP positive (rhino / enterovirus)  -vomit x1 this am  -zofran PRN  -continue to monitor for weakness, nausea, cough    #DM  -Blood sugars labile.  - continue to monitor fingersticks and ISS    #CKD 3B  - hemodynamics and Kidney function stable (Cr 1.6)    #PT  -transfer training; balance training; gait training; strengthening; bed mobility training    #misc  - c/w Supportive care    - downgrade to medicine telemetry

## 2023-01-03 NOTE — SWALLOW BEDSIDE ASSESSMENT ADULT - SLP GENERAL OBSERVATIONS
Pt received alert. +NC. in NAD. Accompanied by daughter at bedside
pt received alert. +NC. in NAD. family reports occasional coughing w/thin liquids.

## 2023-01-03 NOTE — PROGRESS NOTE ADULT - SUBJECTIVE AND OBJECTIVE BOX
Hospital Day:  9d    Subjective:    Patient is a 82y old  Female who presents with a chief complaint of NSTEMI (03 Jan 2023 09:16)      Admitted to medicine for a primary diagnosis of NSTEMI, 2 vessel CAD    INTERVAL HPI AND OVERNIGHT EVENTS:  Patient was examined and seen at bedside. This morning she is lethargic and nauseous. She was not able to tolerate breakfast, vomited x 1.     Past Medical Hx:   MI (myocardial infarction)    HTN (hypertension)    High cholesterol    Heart failure    Non Hodgkin&#x27;s lymphoma    PVD (peripheral vascular disease)    Thrombocytopenia    CKD (chronic kidney disease)    Osteoarthritis    Diabetes    Coronary artery disease involving native heart without angina pectoris, unspecified vessel or lesion type      Past Sx:  History of cholecystectomy    H/O cardiac catheterization    H/O carotid endarterectomy      Allergies:  ACE inhibitors (Unknown)  BENZALKONIUM (Rash)  BETADINE (Rash)  Ceclor (Unknown)  codeine (Unknown)  contrast media (iodine-based) (Hives)  latex (Unknown)  penicillin (Unknown)  RUBBER GLOVES (Rash)  shellfish (Unknown)  sulfonamides (Unknown)    Current Meds:   Standng Meds:  albuterol/ipratropium for Nebulization 3 milliLiter(s) Nebulizer every 6 hours  aspirin enteric coated 81 milliGRAM(s) Oral daily  atorvastatin 40 milliGRAM(s) Oral at bedtime  chlorhexidine 2% Cloths 1 Application(s) Topical daily  clopidogrel Tablet 75 milliGRAM(s) Oral daily  dextrose 5%. 1000 milliLiter(s) (50 mL/Hr) IV Continuous <Continuous>  dextrose 5%. 1000 milliLiter(s) (100 mL/Hr) IV Continuous <Continuous>  dextrose 50% Injectable 25 Gram(s) IV Push once  dextrose 50% Injectable 12.5 Gram(s) IV Push once  dextrose 50% Injectable 25 Gram(s) IV Push once  donepezil 10 milliGRAM(s) Oral at bedtime  doxycycline IVPB      doxycycline IVPB 100 milliGRAM(s) IV Intermittent every 12 hours  glucagon  Injectable 1 milliGRAM(s) IntraMuscular once  insulin glargine Injectable (LANTUS) 26 Unit(s) SubCutaneous at bedtime  insulin glargine Injectable (LANTUS) 5 Unit(s) SubCutaneous once  insulin lispro (ADMELOG) corrective regimen sliding scale   SubCutaneous three times a day before meals  insulin lispro Injectable (ADMELOG) 6 Unit(s) SubCutaneous three times a day before meals  levoFLOXacin IVPB 750 milliGRAM(s) IV Intermittent every 24 hours  metoclopramide Injectable 10 milliGRAM(s) IV Push every 8 hours  metoprolol succinate ER 50 milliGRAM(s) Oral daily  montelukast 10 milliGRAM(s) Oral daily    PRN Meds:  aluminum hydroxide/magnesium hydroxide/simethicone Suspension 30 milliLiter(s) Oral every 4 hours PRN Dyspepsia  dextrose Oral Gel 15 Gram(s) Oral once PRN Blood Glucose LESS THAN 70 milliGRAM(s)/deciliter    HOME MEDICATIONS:  albuterol 90 mcg/inh inhalation aerosol: 2 puff(s) inhaled every 6 hours, As Needed  atorvastatin 20 mg oral tablet: 1 tab(s) orally once a day  donepezil 10 mg oral tablet: 1 tab(s) orally once a day (at bedtime)  gabapentin: orally 2 times a day  inositol 650 mg oral tablet: 2 tab(s) orally once a day  Lasix 20 mg oral tablet: 1 tab(s) orally once a day  losartan 100 mg oral tablet: 1 tab(s) orally once a day  montelukast 10 mg oral tablet: 1 tab(s) orally once a day  NovoLOG 100 units/mL subcutaneous solution: 10,7,7  subcutaneous  omeprazole 40 mg oral delayed release capsule: 1 cap(s) orally once a day  omeprazole 40 mg oral delayed release capsule: 1 cap(s) orally once a day  Soliqua 100/33 subcutaneous solution: 35 unit(s) subcutaneous once a day  Spiriva 18 mcg inhalation capsule: 1 cap(s) inhaled once a day      Vital Signs:   T(F): 98.5 (01-03-23 @ 08:14), Max: 99 (01-03-23 @ 00:00)  HR: 81 (01-03-23 @ 08:14) (69 - 81)  BP: 157/68 (01-03-23 @ 08:14) (115/46 - 157/68)  RR: 18 (01-03-23 @ 08:14) (18 - 22)  SpO2: 99% (01-03-23 @ 08:14) (98% - 100%)      01-02-23 @ 07:01  -  01-03-23 @ 07:00  --------------------------------------------------------  IN: 900 mL / OUT: 400 mL / NET: 500 mL        Physical Exam:   GENERAL: NAD  HEENT: NCAT  CHEST/LUNG: bilateral congestion  HEART: Regular rate and rhythm; s1 s2 appreciated, No murmurs, rubs, or gallops  ABDOMEN: Soft, Nontender, Nondistended; Bowel sounds present  EXTREMITIES: No LE edema b/l  SKIN: no rashes, no new lesions  NERVOUS SYSTEM:  Alert & Oriented X3  LINES/CATHETERS:        Labs:                         10.1   15.84 )-----------( 143      ( 03 Jan 2023 05:57 )             29.7     Neutophil% 76.5, Lymphocyte% 13.5, Monocyte% 7.5, Bands% 1.5 01-03-23 @ 05:57    03 Jan 2023 05:57    130    |  92     |  71     ----------------------------<  200    4.6     |  25     |  1.5      Ca    8.4        03 Jan 2023 05:57  Mg     2.1       03 Jan 2023 05:57    TPro  4.7    /  Alb  2.9    /  TBili  0.7    /  DBili  x      /  AST  50     /  ALT  21     /  AlkPhos  80     03 Jan 2023 05:57            Serum Pro-Brain Natriuretic Peptide: 23490 pg/mL (12-25-22 @ 22:00)  Serum Pro-Brain Natriuretic Peptide: 7102 pg/mL (12-25-22 @ 13:40)

## 2023-01-03 NOTE — CONSULT NOTE ADULT - SUBJECTIVE AND OBJECTIVE BOX
HPI:  82F w/ pmhx of CHF, HLD, HTN, COPD on 3L home O2, DM1, dementia, GERD, MI in 1992, CAD with 5 cardiac stents who present with 1 month of worsening SOB. For past 1 month she has been having dry cough and SOB. She saw her pulmonologist Dr. Feldman who did CXR and showed it was normal at the time, had her on levaquin and then increased her dose which she completed last week. Yesterday she started having crushing chest pressure substernally. Denies fever during this month, nausea vomiting back pain abd pain urinary sx or diarrhea. No recent travel or sick contact. Her cardiologist is Dr. Munzo.  (25 Dec 2022 18:25)      PAST MEDICAL & SURGICAL HISTORY  MI (myocardial infarction)  s/p 5 stents    HTN (hypertension)    High cholesterol    Heart failure    Non Hodgkin&#x27;s lymphoma  in remission. Follows with Dr Hernandez    PVD (peripheral vascular disease)    Thrombocytopenia    CKD (chronic kidney disease)    Osteoarthritis    Diabetes  on insulin    Coronary artery disease involving native heart without angina pectoris, unspecified vessel or lesion type  s/p 5 stents    History of cholecystectomy    H/O cardiac catheterization  5 STENTS    H/O carotid endarterectomy  RIGHT      FAMILY HISTORY:  FAMILY HISTORY:  FH: CAD (coronary artery disease)  FATHER      SOCIAL HISTORY:  [  ] Non-smoker  [  ] Smoker  [  ] Alcohol use:  [  ] Drug use:     ALLERGIES:  ACE inhibitors (Unknown)  BENZALKONIUM (Rash)  BETADINE (Rash)  Ceclor (Unknown)  codeine (Unknown)  contrast media (iodine-based) (Hives)  latex (Unknown)  penicillin (Unknown)  RUBBER GLOVES (Rash)  shellfish (Unknown)  sulfonamides (Unknown)      MEDICATIONS:  MEDICATIONS  (STANDING):  albuterol/ipratropium for Nebulization 3 milliLiter(s) Nebulizer every 6 hours  aspirin enteric coated 81 milliGRAM(s) Oral daily  atorvastatin 40 milliGRAM(s) Oral at bedtime  chlorhexidine 2% Cloths 1 Application(s) Topical daily  clopidogrel Tablet 75 milliGRAM(s) Oral daily  dextrose 5%. 1000 milliLiter(s) (50 mL/Hr) IV Continuous <Continuous>  dextrose 5%. 1000 milliLiter(s) (100 mL/Hr) IV Continuous <Continuous>  dextrose 50% Injectable 25 Gram(s) IV Push once  dextrose 50% Injectable 12.5 Gram(s) IV Push once  dextrose 50% Injectable 25 Gram(s) IV Push once  donepezil 10 milliGRAM(s) Oral at bedtime  doxycycline IVPB      doxycycline IVPB 100 milliGRAM(s) IV Intermittent every 12 hours  glucagon  Injectable 1 milliGRAM(s) IntraMuscular once  insulin glargine Injectable (LANTUS) 26 Unit(s) SubCutaneous at bedtime  insulin glargine Injectable (LANTUS) 5 Unit(s) SubCutaneous once  insulin lispro (ADMELOG) corrective regimen sliding scale   SubCutaneous three times a day before meals  insulin lispro Injectable (ADMELOG) 6 Unit(s) SubCutaneous three times a day before meals  levoFLOXacin IVPB 750 milliGRAM(s) IV Intermittent every 24 hours  metoclopramide Injectable 10 milliGRAM(s) IV Push every 8 hours  metoprolol succinate ER 50 milliGRAM(s) Oral daily  montelukast 10 milliGRAM(s) Oral daily    MEDICATIONS  (PRN):  aluminum hydroxide/magnesium hydroxide/simethicone Suspension 30 milliLiter(s) Oral every 4 hours PRN Dyspepsia  dextrose Oral Gel 15 Gram(s) Oral once PRN Blood Glucose LESS THAN 70 milliGRAM(s)/deciliter      HOME MEDICATIONS:  Home Medications:  albuterol 90 mcg/inh inhalation aerosol: 2 puff(s) inhaled every 6 hours, As Needed (25 Dec 2022 14:24)  atorvastatin 20 mg oral tablet: 1 tab(s) orally once a day (25 Dec 2022 14:24)  donepezil 10 mg oral tablet: 1 tab(s) orally once a day (at bedtime) (25 Dec 2022 14:24)  gabapentin: orally 2 times a day (25 Dec 2022 14:24)  inositol 650 mg oral tablet: 2 tab(s) orally once a day (25 Dec 2022 14:24)  Lasix 20 mg oral tablet: 1 tab(s) orally once a day (25 Dec 2022 14:24)  losartan 100 mg oral tablet: 1 tab(s) orally once a day (25 Dec 2022 14:24)  montelukast 10 mg oral tablet: 1 tab(s) orally once a day (25 Dec 2022 14:24)  NovoLOG 100 units/mL subcutaneous solution: 10,7,7  subcutaneous (25 Dec 2022 14:24)  omeprazole 40 mg oral delayed release capsule: 1 cap(s) orally once a day (25 Dec 2022 14:24)  omeprazole 40 mg oral delayed release capsule: 1 cap(s) orally once a day (25 Dec 2022 14:24)  Soliqua 100/33 subcutaneous solution: 35 unit(s) subcutaneous once a day (25 Dec 2022 14:24)  Spiriva 18 mcg inhalation capsule: 1 cap(s) inhaled once a day (25 Dec 2022 14:24)      VITALS:   T(F): 98.5 (01-03 @ 08:14), Max: 99 (01-03 @ 00:00)  HR: 81 (01-03 @ 08:14) (65 - 86)  BP: 157/68 (01-03 @ 08:14) (99/46 - 167/70)  BP(mean): 96 (01-03 @ 04:00) (66 - 103)  RR: 18 (01-03 @ 08:14) (16 - 31)  SpO2: 99% (01-03 @ 08:14) (93% - 100%)    I&O's Summary    02 Jan 2023 07:01  -  03 Jan 2023 07:00  --------------------------------------------------------  IN: 900 mL / OUT: 400 mL / NET: 500 mL      PHYSICAL EXAM:  GENERAL: Patient is awake , alert and oriented,  not in acute distress  EYES: No proptosis, no lid lag  NECK: No thyroid enlargement, no palpable nodules , no bruit  LUNGS: Clear to auscultation bilaterally   CARDIOVASCULAR: S1/S2 present, RRR , no murmurs or rubs  ABD: Soft, non-tender, non-distended, +BS  EXT: No DOMO  SKIN: No abdominal striae  NEURO: No tremors, DTR 2+    LABS:                        10.1   15.84 )-----------( 143      ( 03 Jan 2023 05:57 )             29.7     01-03    130<L>  |  92<L>  |  71<HH>  ----------------------------<  200<H>  4.6   |  25  |  1.5    Ca    8.4      03 Jan 2023 05:57  Mg     2.1     01-03    TPro  4.7<L>  /  Alb  2.9<L>  /  TBili  0.7  /  DBili  x   /  AST  50<H>  /  ALT  21  /  AlkPhos  80  01-03              POCT Blood Glucose.: 358 mg/dL (01-03-23 @ 11:12)  POCT Blood Glucose.: 293 mg/dL (01-03-23 @ 07:56)  POCT Blood Glucose.: 176 mg/dL (01-03-23 @ 03:10)  POCT Blood Glucose.: 209 mg/dL (01-02-23 @ 20:53)  POCT Blood Glucose.: 89 mg/dL (01-02-23 @ 17:10)  POCT Blood Glucose.: 60 mg/dL (01-02-23 @ 16:51)  POCT Blood Glucose.: 367 mg/dL (01-02-23 @ 11:50)  POCT Blood Glucose.: 294 mg/dL (01-02-23 @ 07:55)  POCT Blood Glucose.: 110 mg/dL (01-01-23 @ 21:40)  POCT Blood Glucose.: 170 mg/dL (01-01-23 @ 16:50)  POCT Blood Glucose.: 256 mg/dL (01-01-23 @ 11:40)  POCT Blood Glucose.: 326 mg/dL (01-01-23 @ 07:26)  POCT Blood Glucose.: 243 mg/dL (12-31-22 @ 21:56)  POCT Blood Glucose.: 78 mg/dL (12-31-22 @ 17:17)  POCT Blood Glucose.: 80 mg/dL (12-31-22 @ 15:56)

## 2023-01-03 NOTE — SWALLOW BEDSIDE ASSESSMENT ADULT - NS SPL SWALLOW CLINIC TRIAL FT
Suspected pharyngeal dysphagia with consecutive sips of thin liquids. +toleration for soft & bite sized with thin liquids via controlled sips without overt s/s of penetration/aspiration.
+toleration  pt prefers soft consistency

## 2023-01-03 NOTE — PROGRESS NOTE ADULT - ATTENDING COMMENTS
Patient seen and examined independently on [facility] earlier today.     PAST MEDICAL & SURGICAL HISTORY:  MI (myocardial infarction)  s/p 5 stents    HTN (hypertension)  High cholesterol  Heart failure  Non Hodgkin's lymphoma  in remission. Follows with Dr Hernandez  PVD (peripheral vascular disease)  Thrombocytopenia  CKD (chronic kidney disease)  Osteoarthritis  Diabetes on insulin  Coronary artery disease involving native heart without angina pectoris, unspecified vessel or lesion type  s/p 5 stents  History of cholecystectomy  H/O cardiac catheterization 5 STENTS  H/O carotid endarterectomy RIGHT      Vitals:  T(F): 97.5 (01-03-23 @ 16:49)  HR: 78 (01-03-23 @ 16:49)  BP: 138/63 (01-03-23 @ 16:49)  RR: 19 (01-03-23 @ 16:49)  SpO2: 99% (01-03-23 @ 16:49)    TESTS & MEASUREMENTS:                        10.1   15.84 )-----------( 143      ( 03 Jan 2023 05:57 )             29.7       01-03    130<L>  |  92<L>  |  71<HH>  ----------------------------<  200<H>  4.6   |  25  |  1.5    Ca    8.4      03 Jan 2023 05:57  Mg     2.1     01-03    TPro  4.7<L>  /  Alb  2.9<L>  /  TBili  0.7  /  DBili  x   /  AST  50<H>  /  ALT  21  /  AlkPhos  80  01-03    LIVER FUNCTIONS - ( 03 Jan 2023 05:57 )  Alb: 2.9 g/dL / Pro: 4.7 g/dL / ALK PHOS: 80 U/L / ALT: 21 U/L / AST: 50 U/L / GGT: x                 In summary:  HEALTH ISSUES - PROBLEM Dx: Patient seen and examined independently on [facility] earlier today.     PAST MEDICAL & SURGICAL HISTORY:  MI (myocardial infarction)  s/p 5 stents    HTN (hypertension)  High cholesterol  Non Hodgkin's lymphoma  in remission. Follows with Dr Hernandez  PVD (peripheral vascular disease)  Thrombocytopenia  CKD (chronic kidney disease)  Osteoarthritis  Diabetes on insulin  Coronary artery disease involving native heart without angina pectoris, unspecified vessel or lesion type  s/p 5 stents  History of cholecystectomy  H/O cardiac catheterization 5 STENTS  H/O carotid endarterectomy RIGHT      Vitals:  T(F): 97.5 (01-03-23 @ 16:49)  HR: 78 (01-03-23 @ 16:49)  BP: 138/63 (01-03-23 @ 16:49)  RR: 19 (01-03-23 @ 16:49)  SpO2: 99% (01-03-23 @ 16:49)    TESTS & MEASUREMENTS:                        10.1   15.84 )-----------( 143      ( 03 Jan 2023 05:57 )             29.7       01-03    130<L>  |  92<L>  |  71<HH>  ----------------------------<  200<H>  4.6   |  25  |  1.5    Ca    8.4      03 Jan 2023 05:57  Mg     2.1     01-03    TPro  4.7<L>  /  Alb  2.9<L>  /  TBili  0.7  /  DBili  x   /  AST  50<H>  /  ALT  21  /  AlkPhos  80  01-03    LIVER FUNCTIONS - ( 03 Jan 2023 05:57 )  Alb: 2.9 g/dL / Pro: 4.7 g/dL / ALK PHOS: 80 U/L / ALT: 21 U/L / AST: 50 U/L / GGT: x           In summary:  HEALTH ISSUES - PROBLEM Dx:    .. NSTEMI, 2Vx CAD s/p stenting (KHUSHBU into proximal LAD)   .. COPD, on therapy   .. RVP + on supportive therapy (nausea and poor appetite)  .. Weakness/ debility   .. suspected pneumonia on therapy   .. Uncontrolled Type-2 diabetes mellitus on insulin     PLAN  .. Please refer to Endocrine note re: transition to outpatient medication   .. DVT prophylaxis   .. Encourage ambulation   .. Prokinetic therapy PRN   .. Finish antibiotics therapy (bacterial suprainfection suspected) - may switch to oral        Progress Note Handoff  Pending:  improved oral intake and ability to get out of bed  Disposition: unclear / pending finalized PT eval (as per PT: Sub-acute Rehab; Rehab facility vs Home with assist pending progress in PT).

## 2023-01-03 NOTE — SWALLOW BEDSIDE ASSESSMENT ADULT - SWALLOW EVAL: RECOMMENDED DIET
soft & bite sized, thins via controlled cup sips
Soft & bite sized with thins via controlled cup sips

## 2023-01-03 NOTE — PHYSICAL THERAPY INITIAL EVALUATION ADULT - PERTINENT HX OF CURRENT PROBLEM, REHAB EVAL
82F w/ pmhx of CHF, HLD, HTN, COPD on 3L home O2, DM1, dementia, GERD, MI in 1992, CAD with 5 cardiac stents who present with 1 month of worsening SOB. For past 1 month she has been having dry cough and SOB. She saw her pulmonologist Dr. Feldman who did CXR and showed it was normal at the time, had her on levaquin and then increased her dose which she completed last week. Yesterday she started having crushing chest pressure substernally. Denies fever during this month, nausea vomiting back pain abd pain urinary sx or diarrhea. No recent travel or sick contact.

## 2023-01-03 NOTE — SWALLOW BEDSIDE ASSESSMENT ADULT - SLP PERTINENT HISTORY OF CURRENT PROBLEM
82F w/ pmhx of CHF, HLD, HTN, COPD on 3L home O2, DM1, dementia, GERD, MI in 1992, CAD with 5 cardiac stents who present with 1 month of worsening SOB. For past 1 month she has been having dry cough and SOB. She saw her pulmonologist Dr. Feldman who did CXR and showed it was normal at the time, had her on levaquin and then increased her dose which she completed last week.
81 yo Female w/ pmhx of CHF, HLD, HTN, COPD on 3L home O2, DM1, dementia, GERD, MI in 1992, CAD with 5 cardiac stents who present with 1 month of worsening SOB. For past 1 month she has been having dry cough and SOB. She saw her pulmonologist Dr. Feldman who did CXR and showed it was normal at the time, had her on levaquin and then increased her dose which she completed last week.

## 2023-01-03 NOTE — SWALLOW BEDSIDE ASSESSMENT ADULT - COMMENTS
pt received alert. +NC. in NAD. Pt. reports occasional coughing w/thin liquids.  F/u CXR: bibasilar opacities.
pt received alert. +NC. in NAD. family reports occasional coughing w/thin liquids.

## 2023-01-03 NOTE — PHYSICAL THERAPY INITIAL EVALUATION ADULT - LEVEL OF INDEPENDENCE: SIT/STAND, REHAB EVAL
Pt's feet unable to reach to the ground upon sitting at the EOB unable to perform transfers 2* safety concerns./unable to perform

## 2023-01-03 NOTE — CONSULT NOTE ADULT - ASSESSMENT
- insulin glargine Injectable (LANTUS) 26 Unit(s) SubCutaneous at bedtime  insulin glargine Injectable (LANTUS) 5 Unit(s) SubCutaneous once  insulin lispro (ADMELOG) corrective regimen sliding scale   SubCutaneous three times a day before meals  insulin lispro Injectable (ADMELOG) 6 Unit(s) SubCutaneous three times a day before meals    NovoLOG 100 units/mL subcutaneous solution: 10,7,7  subcutaneous (25 Dec 2022 14:24)  Soliqua 100/33 subcutaneous solution: 35 unit(s) subcutaneous once a day (25 Dec 2022 14:24)    #DM2 uncontrolled   - Hba1c 9.4  - Last 3 glucose reading 176 ( 3 am ) 293 (6 am ) , 358 11 am   - yesterday had bouts of hypoglycemia in the afternoon   - lantus increased from 22 to 26  - lispro 6 units TID  - SS +2     *THIS IS AN INCOMPLETE NOTE. PENDING EVALUATION BY ATTENDING*  - insulin glargine Injectable (LANTUS) 26 Unit(s) SubCutaneous at bedtime  insulin glargine Injectable (LANTUS) 5 Unit(s) SubCutaneous once  insulin lispro (ADMELOG) corrective regimen sliding scale   SubCutaneous three times a day before meals  insulin lispro Injectable (ADMELOG) 6 Unit(s) SubCutaneous three times a day before meals    NovoLOG 100 units/mL subcutaneous solution: 10,7,7  subcutaneous (25 Dec 2022 14:24)  Soliqua 100/33 subcutaneous solution: 35 unit(s) subcutaneous once a day (25 Dec 2022 14:24)    #DM2 uncontrolled   - Hba1c 9.4  - Last 3 glucose reading 176 ( 3 am ) 293 (6 am ) , 358 11 am   - yesterday had bouts of hypoglycemia in the afternoon   - lantus increased from 22 to 26  - lispro 6 units TID  - SS +2

## 2023-01-03 NOTE — SWALLOW BEDSIDE ASSESSMENT ADULT - SWALLOW EVAL: DIAGNOSIS
+toleration of soft & bite sized, thins w/no overt s/s of aspiration/penetration. pt prefers soft consistency
Suspected pharyngeal dysphagia with consecutive sips of thin liquids. +toleration for soft & bite sized with thin liquids via controlled sips without overt s/s of penetration/aspiration.

## 2023-01-03 NOTE — PHYSICAL THERAPY INITIAL EVALUATION ADULT - GENERAL OBSERVATIONS, REHAB EVAL
Pt encountered in the bed, c/o nausea, & feeling lousy, + O2 @ 2.5l/min via NC, Primafit, BP cuff, tele, pulse oxi, agreeable for b/s PT with encouragement, spouse present at b/s. Pt c/o dizziness upon sitting at EOB /67mmHg HR 85bpm, SPO2 99%.  Pt left in long seated position in bed mode all needs within reach. TEAGAN Weiss made aware.

## 2023-01-04 NOTE — PROGRESS NOTE ADULT - ASSESSMENT
83 yo F w/ pmhx of CHF, HLD, HTN, COPD on 3L home O2, DM1, dementia, GERD, MI in 1992, CAD w cardiac stents presented to the ED on 12/25 with progressive SOB of breath for the last month. On 12/26 was admitted to [facility] after experiencing NSTEMI and acute on chronic HFpEF - scheduled for mid LA atherectomy today due to 2 vessel CAD.     #NSTEMI d/t chronic diastolic CHF  -s/p PCI LAD without complication  - s/p CATH: 90% prix and mid LAD s/p rota atherectomy, balloon angioplasty with KHUSHBU X 1 to prox LAD 90% stenosis extending into mid LAD  - Echo: preserved EF. Severe pulmonary hypertension  - DAPT with ASA/Plavix.   - Lipitor, Toprol 25mg QD.     #Leukocytosis.    -Afebrile.  -RVP positive (rhino / enterovirus)  -vomit x1 this am  -zofran PRN    #DM  -Blood sugars labile.  - continue to monitor fingersticks and ISS    #CKD 3B  - hemodynamics and Kidney function stable (Cr 1.6)    # Poor PO intake  >> added Marinol and Ensure/    #misc  - c/w Supportive care    #Progress Note Handoff  Pending (specify): Trending up Leucocytosis/  Poor po intake   Family discussion:  Disposition: Home_with HCS

## 2023-01-04 NOTE — PROGRESS NOTE ADULT - SUBJECTIVE AND OBJECTIVE BOX
Patient is a 82y old  Female who presents with a chief complaint of NSTEMI (03 Jan 2023 11:36)      INTERVAL HPI/OVERNIGHT EVENTS:  None     FH: CAD (coronary artery disease)  FATHER      T(C): 36.7 (01-04-23 @ 05:49), Max: 37 (01-03-23 @ 21:31)  HR: 83 (01-04-23 @ 05:49) (77 - 83)  BP: 143/65 (01-04-23 @ 05:49) (130/59 - 143/65)  RR: 18 (01-04-23 @ 05:49) (18 - 19)  SpO2: 96% (01-03-23 @ 21:31) (96% - 99%)  Wt(kg): --Vital Signs Last 24 Hrs  T(C): 36.7 (04 Jan 2023 05:49), Max: 37 (03 Jan 2023 21:31)  T(F): 98.1 (04 Jan 2023 05:49), Max: 98.6 (03 Jan 2023 21:31)  HR: 83 (04 Jan 2023 05:49) (77 - 83)  BP: 143/65 (04 Jan 2023 05:49) (130/59 - 143/65)  BP(mean): --  RR: 18 (04 Jan 2023 05:49) (18 - 19)  SpO2: 96% (03 Jan 2023 21:31) (96% - 99%)    Parameters below as of 03 Jan 2023 21:31  Patient On (Oxygen Delivery Method): nasal cannula        PHYSICAL EXAM:  GENERAL: NAD, well-groomed, well-developed  HEAD:  Atraumatic, Normocephalic  EYES: EOMI, PERRLA, conjunctiva and sclera clear  ENMT: No tonsillar erythema, exudates, or enlargement; Moist mucous membranes, Good dentition, No lesions  NECK: Supple, No JVD, Normal thyroid  NERVOUS SYSTEM:  Alert & Oriented X3, Good concentration; Motor Strength 5/5 B/L upper and lower extremities; DTRs 2+ intact and symmetric  CHEST/LUNG: Clear to percussion bilaterally; No rales, rhonchi, wheezing, or rubs  HEART: Regular rate and rhythm; No murmurs, rubs, or gallops  ABDOMEN: Soft, Nontender, Nondistended; Bowel sounds present  EXTREMITIES:  2+ Peripheral Pulses, No clubbing, cyanosis, or edema  LYMPH: No lymphadenopathy noted  SKIN: No rashes or lesions      albuterol/ipratropium for Nebulization 3 milliLiter(s) Nebulizer every 6 hours  aluminum hydroxide/magnesium hydroxide/simethicone Suspension 30 milliLiter(s) Oral every 4 hours PRN  aspirin enteric coated 81 milliGRAM(s) Oral daily  atorvastatin 40 milliGRAM(s) Oral at bedtime  chlorhexidine 2% Cloths 1 Application(s) Topical daily  clopidogrel Tablet 75 milliGRAM(s) Oral daily  dextrose 5%. 1000 milliLiter(s) IV Continuous <Continuous>  dextrose 5%. 1000 milliLiter(s) IV Continuous <Continuous>  dextrose 50% Injectable 25 Gram(s) IV Push once  dextrose 50% Injectable 12.5 Gram(s) IV Push once  dextrose 50% Injectable 25 Gram(s) IV Push once  dextrose Oral Gel 15 Gram(s) Oral once PRN  donepezil 10 milliGRAM(s) Oral at bedtime  glucagon  Injectable 1 milliGRAM(s) IntraMuscular once  heparin   Injectable 5000 Unit(s) SubCutaneous every 12 hours  insulin glargine Injectable (LANTUS) 26 Unit(s) SubCutaneous at bedtime  insulin lispro (ADMELOG) corrective regimen sliding scale   SubCutaneous three times a day before meals  insulin lispro Injectable (ADMELOG) 6 Unit(s) SubCutaneous three times a day before meals  metoclopramide Injectable 5 milliGRAM(s) IV Push every 8 hours PRN  metoprolol succinate ER 50 milliGRAM(s) Oral daily  montelukast 10 milliGRAM(s) Oral daily  polyethylene glycol 3350 17 Gram(s) Oral daily

## 2023-01-04 NOTE — PROGRESS NOTE ADULT - SUBJECTIVE AND OBJECTIVE BOX
Cardiology Follow up s/p PCI KHUSHBU    VENKATESH RAMACHANDRAN   82y Female  PAST MEDICAL & SURGICAL HISTORY:    MI (myocardial infarction)  s/p 5 stents    HTN (hypertension)    High cholesterol    Heart failure    Non Hodgkin&#x27;s lymphoma  in remission. Follows with Dr Hernandez    PVD (peripheral vascular disease)    Thrombocytopenia    CKD (chronic kidney disease)    Osteoarthritis    Diabetes  on insulin    Coronary artery disease involving native heart without angina pectoris, unspecified vessel or lesion type  s/p 5 stents    History of cholecystectomy    H/O cardiac catheterization  5 STENTS    H/O carotid endarterectomy  RIGHT         HPI:  82F w/ pmhx of CHF, HLD, HTN, COPD on 3L home O2, DM1, dementia, GERD, MI in 1992, CAD with 5 cardiac stents who present with 1 month of worsening SOB. For past 1 month she has been having dry cough and SOB. She saw her pulmonologist Dr. Feldman who did CXR and showed it was normal at the time, had her on levaquin and then increased her dose which she completed last week. Yesterday she started having crushing chest pressure substernally. Denies fever during this month, nausea vomiting back pain abd pain urinary sx or diarrhea. No recent travel or sick contact. Her cardiologist is Dr. Munoz.  (25 Dec 2022 18:25)    Allergies    ACE inhibitors (Unknown)  BENZALKONIUM (Rash)  BETADINE (Rash)  Ceclor (Unknown)  codeine (Unknown)  contrast media (iodine-based) (Hives)  latex (Unknown)  penicillin (Unknown)  RUBBER GLOVES (Rash)  shellfish (Unknown)  sulfonamides (Unknown)    Intolerances    Patient seen and examined at bedside. No acute events overnight.  Patient without complaints.   Denies CP, SOB, palpitations, or dizziness      Vital Signs Last 24 Hrs  T(C): 36.7 (04 Jan 2023 05:49), Max: 37 (03 Jan 2023 21:31)  T(F): 98.1 (04 Jan 2023 05:49), Max: 98.6 (03 Jan 2023 21:31)  HR: 83 (04 Jan 2023 05:49) (77 - 83)  BP: 143/65 (04 Jan 2023 05:49) (130/59 - 143/65)  BP(mean): --  RR: 18 (04 Jan 2023 05:49) (18 - 19)  SpO2: 96% (03 Jan 2023 21:31) (96% - 99%)    Parameters below as of 03 Jan 2023 21:31  Patient On (Oxygen Delivery Method): nasal cannula      MEDICATIONS  (STANDING):  albuterol/ipratropium for Nebulization 3 milliLiter(s) Nebulizer every 6 hours  aspirin enteric coated 81 milliGRAM(s) Oral daily  atorvastatin 40 milliGRAM(s) Oral at bedtime  chlorhexidine 2% Cloths 1 Application(s) Topical daily  clopidogrel Tablet 75 milliGRAM(s) Oral daily  dextrose 5%. 1000 milliLiter(s) (50 mL/Hr) IV Continuous <Continuous>  dextrose 5%. 1000 milliLiter(s) (100 mL/Hr) IV Continuous <Continuous>  dextrose 50% Injectable 25 Gram(s) IV Push once  dextrose 50% Injectable 12.5 Gram(s) IV Push once  dextrose 50% Injectable 25 Gram(s) IV Push once  donepezil 10 milliGRAM(s) Oral at bedtime  dronabinol 5 milliGRAM(s) Oral two times a day before meals  glucagon  Injectable 1 milliGRAM(s) IntraMuscular once  heparin   Injectable 5000 Unit(s) SubCutaneous every 12 hours  insulin glargine Injectable (LANTUS) 26 Unit(s) SubCutaneous at bedtime  insulin lispro (ADMELOG) corrective regimen sliding scale   SubCutaneous three times a day before meals  insulin lispro Injectable (ADMELOG) 6 Unit(s) SubCutaneous three times a day before meals  metoprolol succinate ER 50 milliGRAM(s) Oral daily  montelukast 10 milliGRAM(s) Oral daily  polyethylene glycol 3350 17 Gram(s) Oral daily    MEDICATIONS  (PRN):  aluminum hydroxide/magnesium hydroxide/simethicone Suspension 30 milliLiter(s) Oral every 4 hours PRN Dyspepsia  dextrose Oral Gel 15 Gram(s) Oral once PRN Blood Glucose LESS THAN 70 milliGRAM(s)/deciliter  metoclopramide Injectable 5 milliGRAM(s) IV Push every 8 hours PRN nausea      REVIEW OF SYSTEMS:          All negative except as mentioned in HPI    PHYSICAL EXAM:           CONSTITUTIONAL: Well-developed; well-nourished; in no acute distress  	SKIN: warm, dry  	HEAD: Normocephalic; atraumatic  	EYES: PERRL.  	ENT: No nasal discharge, airway clear, mucous membranes moist  	NECK: Supple; non tender.  	CARD: +S1, +S2, no murmurs, gallops, or rubs. Regular rate and rhythm    	RESP: No wheezes, rales or rhonchi. CTA B/L  	ABD: soft ntnd, + BS x 4 quadrants  	EXT: moves all extremities,  no clubbing, cyanosis or edema  	NEURO: Alert and oriented x3, no focal deficits          PSYCH: Cooperative, appropriate          VASCULAR:  + Rad / + PTs / +  DPs          EXTREMITY:             Right Groin: access site soft, no hematoma, no pain, + pulses, no sign of infection, no numbness  	              ECG:   < from: 12 Lead ECG (12.30.22 @ 08:05) >  Ventricular Rate 83 BPM    Atrial Rate 83 BPM    P-R Interval 130 ms    QRS Duration 90 ms    Q-T Interval 378 ms    QTC Calculation(Bazett) 444 ms    P Axis 49 degrees    R Axis 67 degrees    T Axis 181 degrees    Diagnosis Line Sinus rhythm with Premature atrial complexes  Nonspecific ST and T wave abnormality  Abnormal ECG    Confirmed by cami leiva ([id number]) on 12/30/2022 2:01:35 PM                                                                                                                2D ECHO:  < from: TTE Echo Complete w/o Contrast w/ Doppler (12.26.22 @ 10:40) >  Summary:   1. LV Ejection Fraction by Baird's Method with a biplane EF of 78 %.   2. Normal left atrial size.   3. Mild mitral annular calcification.   4. Mild mitral valve regurgitation.   5. Mild tricuspid regurgitation.   6. Normal trileaflet aortic valvewith normal opening.   7. Mild pulmonic valve regurgitation.   8. Estimated pulmonary artery systolic pressure is 73.8 mmHg assuming a   right atrial pressure of 3 mmHg, which is consistent with severe   pulmonary hypertension.   9. There is mild aortic root calcification.    LABS:                        10.5   23.11 )-----------( 153      ( 04 Jan 2023 05:04 )             30.9     01-04    131<L>  |  92<L>  |  71<HH>  ----------------------------<  145<H>  4.0   |  25  |  1.4    Ca    8.3<L>      04 Jan 2023 05:04  Mg     2.0     01-04    TPro  4.4<L>  /  Alb  2.8<L>  /  TBili  0.9  /  DBili  x   /  AST  40  /  ALT  18  /  AlkPhos  89  01-04    Magnesium, Serum: 2.0 mg/dL [1.8 - 2.4] (01-04-23 @ 05:04)  LIVER FUNCTIONS - ( 04 Jan 2023 05:04 )  Alb: 2.8 g/dL / Pro: 4.4 g/dL / ALK PHOS: 89 U/L / ALT: 18 U/L / AST: 40 U/L / GGT: x             A/P:  I discussed the case with Cardiologist Dr. Leong and recommend the following:    S/P PCI pLAD KHUSHBU x 1      Intervention: s/p successful IVUS guided rotational atherectomy and balloon angioplasty with KHUSHBU X 1 to prox LAD 90% stenosis extending into mid LAD    Implants: [place] 3.5 X 30 to prox LAD        	     Continue DAPT (  Aspirin 81 mg PO Daily and Plavix 75 mg po daily ), B-Blocker, Statin Therapy                    No ACEi/ARB at this time due to elevated Cr and allergy to ACEi                   Keep K = 4, Mg = 2                   PT / OOB to chair, ambulate with assistance                   DVT/GI prophylaxis                   Patient given 30 day supply of ( Aspirin 81 mg daily and Plavix 75 mg daily ) to take at home                   Monitor access site/pulses                   Patient agreeing to take DAPT for at least one year or as directed by cardiologist                    Pt given instructions on importance of taking antiplatelet medication or risk acute stent thrombosis/death                   Post cath instructions, access site care and activity restrictions reviewed with patient                      Benefits of Cardiac Rehab discussed with patient and all documents sent to [facility]                   Patient instructed to call Cardiac Rehab and make first appointment after first f/u visit with Cardiologist                    Discussed with patient to return to hospital if experience chest pain, shortness breath, dizziness and site bleeding                   Aggressive risk factor modification, diet counseling, smoking cessation discussed with patient

## 2023-01-04 NOTE — PROGRESS NOTE ADULT - ASSESSMENT
81 yo F w/ pmhx of CHF, HLD, HTN, COPD on 3L home O2, DM1, dementia, GERD, MI in 1992, CAD w cardiac stents presented to the ED on 12/25 with progressive SOB of breath for the last month. On 12/26 was admitted to  after experiencing NSTEMI and acute on chronic HFpEF - scheduled for mid LA atherectomy today due to 2 vessel CAD.       #NSTEMI d/t chronic diastolic CHF  -s/p PCI LAD without complication  - s/p CATH: 90% prix and mid LAD s/p rota atherectomy, balloon angioplasty with KHUSHBU X 1 to prox LAD 90% stenosis extending into mid LAD  - Echo: preserved EF. Severe pulmonary hypertension  - DAPT with ASA/Plavix.   - Lipitor, Toprol 25mg QD.       #Leukocytosis.    -Afebrile.  -RVP positive (rhino / enterovirus)  -vomit x1 this am  -zofran PRN  -continue to monitor for weakness, nausea, cough    #DM  -Blood sugars labile.  - continue to monitor fingersticks and ISS    #CKD 3B  - hemodynamics and Kidney function stable (Cr 1.6)    #PT  -transfer training; balance training; gait training; strengthening; bed mobility training    #misc  - c/w Supportive care    - downgrade to medicine telemetry

## 2023-01-04 NOTE — PROGRESS NOTE ADULT - SUBJECTIVE AND OBJECTIVE BOX
VENKATESH RAMACHANDRAN  82y  Female      Patient is a 82y old  Female who presents with a chief complaint of NSTEMi.      INTERVAL HPI/OVERNIGHT EVENTS:      ******************************* REVIEW OF SYSTEMS:**********************************************  All other review of systems negative    *********************** VITALS ******************************************    T(F): 97 (01-04-23 @ 13:30)  HR: 94 (01-04-23 @ 13:30) (77 - 94)  BP: 135/62 (01-04-23 @ 13:30) (130/59 - 143/65)  RR: 18 (01-04-23 @ 13:30) (18 - 18)  SpO2: 93% (01-04-23 @ 13:30) (93% - 96%)            ******************************** PHYSICAL EXAM:**************************************************  GENERAL: NAD    PSYCH: no agitation, baseline mentation  HEENT:     NERVOUS SYSTEM:  Alert & Oriented X3,   PULMONARY: DORA, CTA    CARDIOVASCULAR: S1S2 RRR    GI: Soft, NT, ND; BS present.    EXTREMITIES:  2+ Peripheral Pulses, No clubbing, cyanosis, or edema    LYMPH: No lymphadenopathy noted    SKIN: No rashes or lesions      **************************** LABS *******************************************************                          10.5   23.11 )-----------( 153      ( 04 Jan 2023 05:04 )             30.9     01-04    131<L>  |  92<L>  |  71<HH>  ----------------------------<  145<H>  4.0   |  25  |  1.4    Ca    8.3<L>      04 Jan 2023 05:04  Mg     2.0     01-04    TPro  4.4<L>  /  Alb  2.8<L>  /  TBili  0.9  /  DBili  x   /  AST  40  /  ALT  18  /  AlkPhos  89  01-04          Lactate Trend        CAPILLARY BLOOD GLUCOSE      POCT Blood Glucose.: 133 mg/dL (04 Jan 2023 11:29)          **************************Active Medications *******************************************  ACE inhibitors (Unknown)  BENZALKONIUM (Rash)  BETADINE (Rash)  Ceclor (Unknown)  codeine (Unknown)  contrast media (iodine-based) (Hives)  latex (Unknown)  penicillin (Unknown)  RUBBER GLOVES (Rash)  shellfish (Unknown)  sulfonamides (Unknown)      albuterol/ipratropium for Nebulization 3 milliLiter(s) Nebulizer every 6 hours  aluminum hydroxide/magnesium hydroxide/simethicone Suspension 30 milliLiter(s) Oral every 4 hours PRN  aspirin enteric coated 81 milliGRAM(s) Oral daily  atorvastatin 40 milliGRAM(s) Oral at bedtime  chlorhexidine 2% Cloths 1 Application(s) Topical daily  clopidogrel Tablet 75 milliGRAM(s) Oral daily  dextrose 5%. 1000 milliLiter(s) IV Continuous <Continuous>  dextrose 5%. 1000 milliLiter(s) IV Continuous <Continuous>  dextrose 50% Injectable 25 Gram(s) IV Push once  dextrose 50% Injectable 12.5 Gram(s) IV Push once  dextrose 50% Injectable 25 Gram(s) IV Push once  dextrose Oral Gel 15 Gram(s) Oral once PRN  donepezil 10 milliGRAM(s) Oral at bedtime  dronabinol 5 milliGRAM(s) Oral two times a day before meals  glucagon  Injectable 1 milliGRAM(s) IntraMuscular once  heparin   Injectable 5000 Unit(s) SubCutaneous every 12 hours  insulin glargine Injectable (LANTUS) 26 Unit(s) SubCutaneous at bedtime  insulin lispro (ADMELOG) corrective regimen sliding scale   SubCutaneous three times a day before meals  insulin lispro Injectable (ADMELOG) 6 Unit(s) SubCutaneous three times a day before meals  metoclopramide Injectable 5 milliGRAM(s) IV Push every 8 hours PRN  metoprolol succinate ER 50 milliGRAM(s) Oral daily  montelukast 10 milliGRAM(s) Oral daily  polyethylene glycol 3350 17 Gram(s) Oral daily      ***************************************************  RADIOLOGY & ADDITIONAL TESTS:    Imaging Personally Reviewed:  [ ] YES  [ ] NO    HEALTH ISSUES - PROBLEM Dx:

## 2023-01-05 NOTE — PROGRESS NOTE ADULT - ASSESSMENT
83 yo F w/ pmhx of CHF, HLD, HTN, COPD on 3L home O2, DM1, dementia, GERD, MI in 1992, CAD w cardiac stents presented to the ED on 12/25 with progressive SOB of breath for the last month. On 12/26 was admitted to  after experiencing NSTEMI and acute on chronic HFpEF - scheduled for mid LA atherectomy today due to 2 vessel CAD.     #NSTEMI d/t chronic diastolic CHF  -s/p PCI LAD without complication  - s/p CATH: 90% prix and mid LAD s/p rota atherectomy, balloon angioplasty with KHUSHBU X 1 to prox LAD 90% stenosis extending into mid LAD  - Echo: preserved EF. Severe pulmonary hypertension  - DAPT with ASA/Plavix.   - Lipitor, Toprol 25mg QD.     #Leukocytosis.   - WBC trending up   -Afebrile.  -RVP positive (rhino / enterovirus)  -vomit x1 this am  -zofran PRN  -continue to monitor for weakness, nausea, cough  - fu Chest xray, repeat blood cultures, UA    #DM  -Blood sugars labile.  - continue to monitor fingersticks and ISS    #CKD 3B  - hemodynamics and Kidney function stable (Cr 1.6)    #PT  -transfer training; balance training; gait training; strengthening; bed mobility training    #misc  - c/w Supportive care  - downgrade to medicine telemetry

## 2023-01-05 NOTE — PROGRESS NOTE ADULT - SUBJECTIVE AND OBJECTIVE BOX
Reason for Endocrinology Consult: Diabetes    HPI: 82y Female      PAST MEDICAL & SURGICAL HISTORY:  MI (myocardial infarction)  s/p 5 stents      HTN (hypertension)      High cholesterol      Heart failure      Non Hodgkin&#x27;s lymphoma  in remission. Follows with Dr Hernandez      PVD (peripheral vascular disease)      Thrombocytopenia      CKD (chronic kidney disease)      Osteoarthritis      Diabetes  on insulin      Coronary artery disease involving native heart without angina pectoris, unspecified vessel or lesion type  s/p 5 stents      History of cholecystectomy      H/O cardiac catheterization  5 STENTS      H/O carotid endarterectomy  RIGHT        FAMILY HISTORY:  FH: CAD (coronary artery disease)  FATHER        SH:  Smoking  Etoh:  Recreational Drugs:    Home Medications:  albuterol 90 mcg/inh inhalation aerosol: 2 puff(s) inhaled every 6 hours, As Needed (25 Dec 2022 14:24)  atorvastatin 20 mg oral tablet: 1 tab(s) orally once a day (25 Dec 2022 14:24)  donepezil 10 mg oral tablet: 1 tab(s) orally once a day (at bedtime) (25 Dec 2022 14:24)  gabapentin: orally 2 times a day (25 Dec 2022 14:24)  inositol 650 mg oral tablet: 2 tab(s) orally once a day (25 Dec 2022 14:24)  Lasix 20 mg oral tablet: 1 tab(s) orally once a day (25 Dec 2022 14:24)  losartan 100 mg oral tablet: 1 tab(s) orally once a day (25 Dec 2022 14:24)  montelukast 10 mg oral tablet: 1 tab(s) orally once a day (25 Dec 2022 14:24)  NovoLOG 100 units/mL subcutaneous solution: 10,7,7  subcutaneous (25 Dec 2022 14:24)  omeprazole 40 mg oral delayed release capsule: 1 cap(s) orally once a day (25 Dec 2022 14:24)  omeprazole 40 mg oral delayed release capsule: 1 cap(s) orally once a day (25 Dec 2022 14:24)  Soliqua 100/33 subcutaneous solution: 35 unit(s) subcutaneous once a day (25 Dec 2022 14:24)  Spiriva 18 mcg inhalation capsule: 1 cap(s) inhaled once a day (25 Dec 2022 14:24)      Current (Non-Endocrine) Meds:  albuterol/ipratropium for Nebulization 3 milliLiter(s) Nebulizer every 6 hours  aluminum hydroxide/magnesium hydroxide/simethicone Suspension 30 milliLiter(s) Oral every 4 hours PRN  aspirin enteric coated 81 milliGRAM(s) Oral daily  chlorhexidine 2% Cloths 1 Application(s) Topical daily  clopidogrel Tablet 75 milliGRAM(s) Oral daily  dextrose 5%. 1000 milliLiter(s) IV Continuous <Continuous>  dextrose 5%. 1000 milliLiter(s) IV Continuous <Continuous>  donepezil 10 milliGRAM(s) Oral at bedtime  dronabinol 5 milliGRAM(s) Oral two times a day before meals  furosemide    Tablet 20 milliGRAM(s) Oral daily  heparin   Injectable 5000 Unit(s) SubCutaneous every 12 hours  metoclopramide Injectable 5 milliGRAM(s) IV Push every 8 hours PRN  metoprolol succinate ER 50 milliGRAM(s) Oral daily  montelukast 10 milliGRAM(s) Oral daily  polyethylene glycol 3350 17 Gram(s) Oral daily  zinc oxide 40% Paste 1 Application(s) Topical daily      Current Endocrine Meds:   atorvastatin 40 milliGRAM(s) Oral at bedtime  dextrose 50% Injectable 25 Gram(s) IV Push once  dextrose 50% Injectable 12.5 Gram(s) IV Push once  dextrose 50% Injectable 25 Gram(s) IV Push once  dextrose Oral Gel 15 Gram(s) Oral once PRN  glucagon  Injectable 1 milliGRAM(s) IntraMuscular once  insulin glargine Injectable (LANTUS) 26 Unit(s) SubCutaneous at bedtime  insulin lispro (ADMELOG) corrective regimen sliding scale   SubCutaneous three times a day before meals  insulin lispro Injectable (ADMELOG) 6 Unit(s) SubCutaneous three times a day before meals      Allergies:  ACE inhibitors (Unknown)  BENZALKONIUM (Rash)  BETADINE (Rash)  Ceclor (Unknown)  codeine (Unknown)  contrast media (iodine-based) (Hives)  latex (Unknown)  penicillin (Unknown)  RUBBER GLOVES (Rash)  shellfish (Unknown)  sulfonamides (Unknown)      ROS:  Denies the following except as indicated.    General: weight loss/weight gain, decreased appetite, fatigue, fever  Eyes: blurry vision, double vision  ENT: neck swelling, dysphagia, voice changes   CV: palpitations, SOB, chest pain, cough  GI: nausea, vomiting, diarrhea, constipation, abdominal pain  : nocturia,  polyuria, dysuria  Endo: decreased libido, heat/cold intolerance, jitteriness  MSK: arthralgias, myalgias  Skin: rash, dryness, diaphoresis  Neuro: pedal numbness,pedal paresthesias, pedal pain        Vital Signs Last 24 Hrs  T(C): 35.2 (2023 14:00), Max: 36.7 (2023 05:30)  T(F): 95.3 (2023 14:00), Max: 98 (2023 05:30)  HR: 83 (2023 14:00) (82 - 86)  BP: 118/58 (2023 14:00) (115/57 - 141/62)  BP(mean): --  RR: 18 (2023 14:00) (18 - 18)  SpO2: 98% (2023 14:00) (96% - 99%)    Parameters below as of 2023 14:00  Patient On (Oxygen Delivery Method): nasal cannula  O2 Flow (L/min): 2    Constitutional: WN/WD in NAD.   Neck: no thyromegaly or palpable thyroid nodules   Respiratory: lungs CTAB.  Cardiovascular: regular rate and rhythm, normal S1 and S2, no audible murmurs  GI: soft, NT/ND, no masses/HSM appreciated.  Ext: no edema, no ulcers, pedal pulses palpable bilaterally  Neurology: no tremor, monofilament sensation intact in feet  Psychiatric: A&O x 3, normal affect/mood.        LABS:                        11.2   26.58 )-----------( 151      ( 2023 06:01 )             33.5     01-05    133<L>  |  97<L>  |  83<HH>  ----------------------------<  208<H>  4.2   |  22  |  1.5    Ca    8.4      2023 06:01  Mg     2.0     01-04    TPro  4.4<L>  /  Alb  2.8<L>  /  TBili  0.9  /  DBili  x   /  AST  40  /  ALT  18  /  AlkPhos  89  01-04      Urinalysis Basic - ( 2023 12:40 )    Color: Yellow / Appearance: Clear / S.018 / pH: x  Gluc: x / Ketone: Negative  / Bili: Negative / Urobili: <2 mg/dL   Blood: x / Protein: Negative / Nitrite: Negative   Leuk Esterase: Large / RBC: 1 /HPF / WBC 27 /HPF   Sq Epi: x / Non Sq Epi: 0 /HPF / Bacteria: Negative                             Thyroid Stimulating Hormone, Serum: 0.52 ( @ 07:00)          RADIOLOGY & ADDITIONAL STUDIES:    A/P:82yFemale

## 2023-01-05 NOTE — PROGRESS NOTE ADULT - SUBJECTIVE AND OBJECTIVE BOX
Cardiology Follow up s/p PCI KHUSHBU    VENKATESH RAMACHANDRAN   82y Female  PAST MEDICAL & SURGICAL HISTORY:  MI (myocardial infarction)  s/p 5 stents    HTN (hypertension)    High cholesterol    Heart failure    Non Hodgkin&#x27;s lymphoma  in remission. Follows with Dr Hernandez    PVD (peripheral vascular disease)    Thrombocytopenia    CKD (chronic kidney disease)    Osteoarthritis    Diabetes  on insulin    Coronary artery disease involving native heart without angina pectoris, unspecified vessel or lesion type  s/p 5 stents    History of cholecystectomy    H/O cardiac catheterization  5 STENTS    H/O carotid endarterectomy  RIGHT           HPI:  82F w/ pmhx of CHF, HLD, HTN, COPD on 3L home O2, DM1, dementia, GERD, MI in 1992, CAD with 5 cardiac stents who present with 1 month of worsening SOB. For past 1 month she has been having dry cough and SOB. She saw her pulmonologist Dr. Feldman who did CXR and showed it was normal at the time, had her on levaquin and then increased her dose which she completed last week. Yesterday she started having crushing chest pressure substernally. Denies fever during this month, nausea vomiting back pain abd pain urinary sx or diarrhea. No recent travel or sick contact. Her cardiologist is Dr. Munoz.  (25 Dec 2022 18:25)    Allergies    ACE inhibitors (Unknown)  BENZALKONIUM (Rash)  BETADINE (Rash)  Ceclor (Unknown)  codeine (Unknown)  contrast media (iodine-based) (Hives)  latex (Unknown)  penicillin (Unknown)  RUBBER GLOVES (Rash)  shellfish (Unknown)  sulfonamides (Unknown)    Intolerances    Patient seen and examined at bedside. No acute events overnight.  Patient reported poor appetite and feeling nausea and vomited x1 in AM. Pt OOB to chair with assistance  Denies CP, SOB, palpitations    Vital Signs Last 24 Hrs  T(C): 36.7 (05 Jan 2023 05:30), Max: 36.7 (05 Jan 2023 05:30)  T(F): 98 (05 Jan 2023 05:30), Max: 98 (05 Jan 2023 05:30)  HR: 82 (05 Jan 2023 11:59) (82 - 86)  BP: 132/59 (05 Jan 2023 11:59) (115/57 - 141/62)  BP(mean): --  RR: 18 (05 Jan 2023 05:30) (18 - 18)  SpO2: 96% (05 Jan 2023 08:05) (96% - 99%)    Parameters below as of 05 Jan 2023 08:05  Patient On (Oxygen Delivery Method): room air    MEDICATIONS  (STANDING):  albuterol/ipratropium for Nebulization 3 milliLiter(s) Nebulizer every 6 hours  aspirin enteric coated 81 milliGRAM(s) Oral daily  atorvastatin 40 milliGRAM(s) Oral at bedtime  chlorhexidine 2% Cloths 1 Application(s) Topical daily  clopidogrel Tablet 75 milliGRAM(s) Oral daily  dextrose 5%. 1000 milliLiter(s) (50 mL/Hr) IV Continuous <Continuous>  dextrose 5%. 1000 milliLiter(s) (100 mL/Hr) IV Continuous <Continuous>  dextrose 50% Injectable 25 Gram(s) IV Push once  dextrose 50% Injectable 12.5 Gram(s) IV Push once  dextrose 50% Injectable 25 Gram(s) IV Push once  donepezil 10 milliGRAM(s) Oral at bedtime  dronabinol 5 milliGRAM(s) Oral two times a day before meals  furosemide    Tablet 20 milliGRAM(s) Oral daily  glucagon  Injectable 1 milliGRAM(s) IntraMuscular once  heparin   Injectable 5000 Unit(s) SubCutaneous every 12 hours  insulin glargine Injectable (LANTUS) 26 Unit(s) SubCutaneous at bedtime  insulin lispro (ADMELOG) corrective regimen sliding scale   SubCutaneous three times a day before meals  insulin lispro Injectable (ADMELOG) 6 Unit(s) SubCutaneous three times a day before meals  metoprolol succinate ER 50 milliGRAM(s) Oral daily  montelukast 10 milliGRAM(s) Oral daily  polyethylene glycol 3350 17 Gram(s) Oral daily    MEDICATIONS  (PRN):  aluminum hydroxide/magnesium hydroxide/simethicone Suspension 30 milliLiter(s) Oral every 4 hours PRN Dyspepsia  dextrose Oral Gel 15 Gram(s) Oral once PRN Blood Glucose LESS THAN 70 milliGRAM(s)/deciliter  metoclopramide Injectable 5 milliGRAM(s) IV Push every 8 hours PRN nausea      REVIEW OF SYSTEMS:          All negative except as mentioned in HPI    PHYSICAL EXAM:           CONSTITUTIONAL: Well-developed; poor appetite; in no acute distress  	SKIN: warm, dry  	HEAD: Normocephalic; atraumatic  	EYES: PERRL.  	ENT: No nasal discharge, airway clear, mucous membranes moist  	NECK: Supple; non tender.  	CARD: +S1, +S2, no murmurs, gallops, or rubs.   	RESP: No wheezes, rales. + Rhonchi B/L, productive cough   	ABD: soft ntnd, + BS x 4 quadrants  	EXT: moves all extremities,  no clubbing, cyanosis or edema  	NEURO: Alert and oriented x3, no focal deficits          PSYCH: Cooperative, appropriate          VASCULAR:  + Rad / + PTs / +  DPs          EXTREMITY:              Right Groin: access site soft, no hematoma, no pain, + pulses, no sign of infection, no numbness                ECG:   < from: 12 Lead ECG (12.30.22 @ 08:05) >  Ventricular Rate 83 BPM    Atrial Rate 83 BPM    P-R Interval 130 ms    QRS Duration 90 ms    Q-T Interval 378 ms    QTC Calculation(Bazett) 444 ms    P Axis 49 degrees    R Axis 67 degrees    T Axis 181 degrees    Diagnosis Line Sinus rhythm with Premature atrial complexes  Nonspecific ST and T wave abnormality  Abnormal ECG    Confirmed by cami leiva ([id number]) on 12/30/2022 2:01:35 PM                                                                                           2D ECHO:  < from: TTE Echo Complete w/o Contrast w/ Doppler (12.26.22 @ 10:40) >  Summary:   1. LV Ejection Fraction by Baird's Method with a biplane EF of 78 %.   2. Normal left atrial size.   3. Mild mitral annular calcification.   4. Mild mitral valve regurgitation.   5. Mild tricuspid regurgitation.   6. Normal trileaflet aortic valve with normal opening.   7. Mild pulmonic valve regurgitation.   8. Estimated pulmonary artery systolic pressure is 73.8 mmHg assuming a   right atrial pressure of 3 mmHg, which is consistent with severe   pulmonary hypertension.   9. There is mild aortic root calcification.    LABS:                        11.2   26.58 )-----------( 151      ( 05 Jan 2023 06:01 )             33.5     01-05    133<L>  |  97<L>  |  83<HH>  ----------------------------<  208<H>  4.2   |  22  |  1.5    Ca    8.4      05 Jan 2023 06:01  Mg     2.0     01-04    TPro  4.4<L>  /  Alb  2.8<L>  /  TBili  0.9  /  DBili  x   /  AST  40  /  ALT  18  /  AlkPhos  89  01-04    LIVER FUNCTIONS - ( 04 Jan 2023 05:04 )  Alb: 2.8 g/dL / Pro: 4.4 g/dL / ALK PHOS: 89 U/L / ALT: 18 U/L / AST: 40 U/L / GGT: x           < from: Xray Chest 1 View- PORTABLE-Routine (Xray Chest 1 View- PORTABLE-Routine .) (01.05.23 @ 09:39) >    ACC: [id number] EXAM:  XR CHEST PORTABLE ROUTINE 1V                          PROCEDURE DATE:  01/05/2023      INTERPRETATION:  CLINICAL INDICATION:  Leukocytosis, rule out   consolidation    COMPARISON: Chest radiograph dated 12/31/2023    TECHNIQUE: Frontal radiograph the chest. There are low lung volumes.    FINDINGS:    Support devices: None.    Cardiac/mediastinum/hilum: Stable.    Lung parenchyma/Pleura: Stable bibasilar opacities. No pneumothorax.    Skeleton/soft tissues: Stable.    IMPRESSION:    Stable bibasilar opacities.    MARIO SIMON MD; Resident Radiologist  This document has been electronically signed.  ARMINDA PARTIDA MD; Attending Interventional Radiologist  This document has been electronically signed. Jan 5 2023 10:55AM      A/P:  I discussed the case with Cardiologist Dr. Leong and recommend the following:    S/P PCI pLAD KHUSHBU x 1      Intervention: s/p successful IVUS guided rotational atherectomy and balloon angioplasty with KHUSHBU X 1 to prox LAD 90% stenosis extending into mid LAD    Implants: [place] Success 3.5 X 30 to prox LAD                       F/U Chest X-Ray, repeat blood cultures, UA  	     Continue DAPT (  Aspirin 81 mg PO Daily and Plavix 75 mg po daily ), Lasix, B-Blocker, Statin Therapy                    No ACEi/ARB at this time due to elevated Cr and allergy to ACEi                   Keep K = 4, Mg = 2                   PT / OOB to chair, ambulate with assistance                   GI prophylaxis                   Patient given 30 day supply of ( Aspirin 81 mg daily and Plavix 75 mg daily ) to take at home                   Monitor access site/pulses                   Patient agreeing to take DAPT for at least one year or as directed by cardiologist                    Pt given instructions on importance of taking antiplatelet medication or risk acute stent thrombosis/death                   Post cath instructions, access site care and activity restrictions reviewed with patient                      Benefits of Cardiac Rehab discussed with patient and all documents sent to [facility]                   Patient instructed to call Cardiac Rehab and make first appointment after first f/u visit with Cardiologist                    Discussed with patient to return to hospital if experience chest pain, shortness breath, dizziness and site bleeding                   Aggressive risk factor modification, diet counseling, smoking cessation discussed with patient                     F/U with Cardiologist Dr. Leong in 2 weeks as OP. Patient advised to call and make an appointment

## 2023-01-05 NOTE — PROGRESS NOTE ADULT - SUBJECTIVE AND OBJECTIVE BOX
Patient is a 82y old  Female who presents with a chief complaint of NSTEMI (04 Jan 2023 18:15)      INTERVAL HPI/OVERNIGHT EVENTS:  None      FAMILY HISTORY:  FH: CAD (coronary artery disease)  FATHER      T(C): 36.7 (01-05-23 @ 05:30), Max: 36.7 (01-05-23 @ 05:30)  HR: 86 (01-05-23 @ 05:30) (86 - 94)  BP: 115/57 (01-05-23 @ 05:30) (115/57 - 141/62)  RR: 18 (01-05-23 @ 05:30) (18 - 18)  SpO2: 96% (01-05-23 @ 08:05) (93% - 99%)  Wt(kg): --Vital Signs Last 24 Hrs  T(C): 36.7 (05 Jan 2023 05:30), Max: 36.7 (05 Jan 2023 05:30)  T(F): 98 (05 Jan 2023 05:30), Max: 98 (05 Jan 2023 05:30)  HR: 86 (05 Jan 2023 05:30) (86 - 94)  BP: 115/57 (05 Jan 2023 05:30) (115/57 - 141/62)  BP(mean): --  RR: 18 (05 Jan 2023 05:30) (18 - 18)  SpO2: 96% (05 Jan 2023 08:05) (93% - 99%)    Parameters below as of 05 Jan 2023 08:05  Patient On (Oxygen Delivery Method): room air        PHYSICAL EXAM:  GENERAL: fatigued  HEAD:  Atraumatic, Normocephalic  EYES: EOMI, PERRLA, conjunctiva and sclera clear  ENMT: No tonsillar erythema, exudates, or enlargement; Moist mucous membranes, Good dentition, No lesions  NECK: Supple, No JVD, Normal thyroid  NERVOUS SYSTEM:  Alert & Oriented X3  CHEST/LUNG: No rales, rhonchi, wheezing, or rubs  HEART: Regular rate and rhythm; No murmurs, rubs, or gallops  ABDOMEN: Soft, Nontender, Nondistended; Bowel sounds present  EXTREMITIES:  2+ Peripheral Pulses, No clubbing, cyanosis, or edema  LYMPH: No lymphadenopathy noted  SKIN: No rashes or lesions      albuterol/ipratropium for Nebulization 3 milliLiter(s) Nebulizer every 6 hours  aluminum hydroxide/magnesium hydroxide/simethicone Suspension 30 milliLiter(s) Oral every 4 hours PRN  aspirin enteric coated 81 milliGRAM(s) Oral daily  atorvastatin 40 milliGRAM(s) Oral at bedtime  chlorhexidine 2% Cloths 1 Application(s) Topical daily  clopidogrel Tablet 75 milliGRAM(s) Oral daily  dextrose 5%. 1000 milliLiter(s) IV Continuous <Continuous>  dextrose 5%. 1000 milliLiter(s) IV Continuous <Continuous>  dextrose 50% Injectable 25 Gram(s) IV Push once  dextrose 50% Injectable 25 Gram(s) IV Push once  dextrose 50% Injectable 12.5 Gram(s) IV Push once  dextrose Oral Gel 15 Gram(s) Oral once PRN  donepezil 10 milliGRAM(s) Oral at bedtime  dronabinol 5 milliGRAM(s) Oral two times a day before meals  glucagon  Injectable 1 milliGRAM(s) IntraMuscular once  heparin   Injectable 5000 Unit(s) SubCutaneous every 12 hours  insulin glargine Injectable (LANTUS) 26 Unit(s) SubCutaneous at bedtime  insulin lispro (ADMELOG) corrective regimen sliding scale   SubCutaneous three times a day before meals  insulin lispro Injectable (ADMELOG) 6 Unit(s) SubCutaneous three times a day before meals  metoclopramide Injectable 5 milliGRAM(s) IV Push every 8 hours PRN  metoprolol succinate ER 50 milliGRAM(s) Oral daily  montelukast 10 milliGRAM(s) Oral daily  polyethylene glycol 3350 17 Gram(s) Oral daily

## 2023-01-06 NOTE — PROGRESS NOTE ADULT - SUBJECTIVE AND OBJECTIVE BOX
VENKATESH RAMACHANDRAN  82y  Female      Patient is a 82y old  Female who presents with a chief complaint of NSTEMI       INTERVAL HPI/OVERNIGHT EVENTS:      ******************************* REVIEW OF SYSTEMS:**********************************************    All other review of systems negative    *********************** VITALS ******************************************    T(F): 97.4 (23 @ 12:55)  HR: 91 (23 @ 12:55) (73 - 93)  BP: 119/58 (23 @ 12:55) (119/58 - 123/53)  RR: 20 (23 @ 12:55) (18 - 20)  SpO2: 100% (23 @ 12:55) (100% - 100%)            ******************************** PHYSICAL EXAM:**************************************************  GENERAL: NAD    PSYCH: no agitation, baseline mentation  HEENT:     NERVOUS SYSTEM:  Alert & Oriented X3,   PULMONARY: DORA, CTA    CARDIOVASCULAR: S1S2 RRR    GI: Soft, NT, distended with  BS present.    EXTREMITIES:  2+ Peripheral Pulses, No clubbing, cyanosis, or edema    LYMPH: No lymphadenopathy noted    SKIN: No rashes or lesions      **************************** LABS *******************************************************                          10.8   26.30 )-----------( 184      ( 2023 07:17 )             30.5         128<L>  |  90<L>  |  94<HH>  ----------------------------<  148<H>  4.0   |  25  |  1.8<H>    Ca    8.4      2023 07:17    TPro  4.2<L>  /  Alb  2.6<L>  /  TBili  0.7  /  DBili  x   /  AST  43<H>  /  ALT  20  /  AlkPhos  136<H>      ABG - ( 2023 14:31 )  pH, Arterial: 7.47  pH, Blood: x     /  pCO2: 28    /  pO2: 158   / HCO3: 20    / Base Excess: -2.4  /  SaO2: 97.8              Urinalysis Basic - ( 2023 12:40 )    Color: Yellow / Appearance: Clear / S.018 / pH: x  Gluc: x / Ketone: Negative  / Bili: Negative / Urobili: <2 mg/dL   Blood: x / Protein: Negative / Nitrite: Negative   Leuk Esterase: Large / RBC: 1 /HPF / WBC 27 /HPF   Sq Epi: x / Non Sq Epi: 0 /HPF / Bacteria: Negative        Lactate Trend        CAPILLARY BLOOD GLUCOSE      POCT Blood Glucose.: 296 mg/dL (2023 13:15)          **************************Active Medications *******************************************  ACE inhibitors (Unknown)  BENZALKONIUM (Rash)  BETADINE (Rash)  Ceclor (Unknown)  codeine (Unknown)  contrast media (iodine-based) (Hives)  latex (Unknown)  penicillin (Unknown)  RUBBER GLOVES (Rash)  shellfish (Unknown)  sulfonamides (Unknown)      albuterol/ipratropium for Nebulization 3 milliLiter(s) Nebulizer every 6 hours  aluminum hydroxide/magnesium hydroxide/simethicone Suspension 30 milliLiter(s) Oral every 4 hours PRN  aspirin enteric coated 81 milliGRAM(s) Oral daily  atorvastatin 40 milliGRAM(s) Oral at bedtime  chlorhexidine 2% Cloths 1 Application(s) Topical daily  ciprofloxacin     Tablet 250 milliGRAM(s) Oral every 24 hours  clopidogrel Tablet 75 milliGRAM(s) Oral daily  dextrose 5%. 1000 milliLiter(s) IV Continuous <Continuous>  dextrose 5%. 1000 milliLiter(s) IV Continuous <Continuous>  dextrose 50% Injectable 25 Gram(s) IV Push once  dextrose 50% Injectable 12.5 Gram(s) IV Push once  dextrose 50% Injectable 25 Gram(s) IV Push once  dextrose Oral Gel 15 Gram(s) Oral once PRN  donepezil 10 milliGRAM(s) Oral at bedtime  dronabinol 5 milliGRAM(s) Oral two times a day before meals  furosemide    Tablet 20 milliGRAM(s) Oral daily  glucagon  Injectable 1 milliGRAM(s) IntraMuscular once  heparin   Injectable 5000 Unit(s) SubCutaneous every 12 hours  insulin glargine Injectable (LANTUS) 18 Unit(s) SubCutaneous two times a day  insulin lispro (ADMELOG) corrective regimen sliding scale   SubCutaneous three times a day before meals  insulin lispro Injectable (ADMELOG) 6 Unit(s) SubCutaneous three times a day before meals  lactated ringers. 1000 milliLiter(s) IV Continuous <Continuous>  metoclopramide Injectable 5 milliGRAM(s) IV Push every 8 hours PRN  metoprolol succinate ER 50 milliGRAM(s) Oral daily  montelukast 10 milliGRAM(s) Oral daily  polyethylene glycol 3350 17 Gram(s) Oral daily  zinc oxide 40% Paste 1 Application(s) Topical daily      ***************************************************  RADIOLOGY & ADDITIONAL TESTS:    Imaging Personally Reviewed:  [ ] YES  [ ] NO    HEALTH ISSUES - PROBLEM Dx:

## 2023-01-06 NOTE — CONSULT NOTE ADULT - ASSESSMENT
Pt is a 81yo Female w PMH of CHF, HLD, HTN, COPD on 3L home O2, DM1, dementia, GERD, MI in 1992, CAD w cardiac stents presented to the ED on 12/25 with progressive SOB of breath for the last month. On 12/26 was admitted to  after experiencing NSTEMI and acute on chronic HFpEF  s/p IVUS guided rotational atherectomy and balloon angioplasty with KHUSHBU X 1 to prox LAD 90% stenosis extending into mid LAD. Urology called for difficult Pena catheter placement.   Pt's daughter at bedside reports mom had no urinary problems at home.   CT A/P findings: Severe urinary bladder distention measuring up to 18.1 cm in the craniocaudal dimension. No evidence of hydronephrosis   In sterile technique 14 Fr Pena catheter placed with no difficulty on placement. 10 cc Pena catheter balloon inflated. + return of yellow urine.        A: Urinary retention with CT A/P findings: Severe urinary bladder distention measuring up to 18.1 cm in the craniocaudal dimension. No evidence of hydronephrosis   WOO  NSTEMI  acute on chronic HFpEF    s/p IVUS guided rotational atherectomy and balloon angioplasty with KHUSHBU X 1 to prox LAD 90% stenosis extending into mid LAD      Plan:  Cont. Pena catheter drain to gravity   Strict Monitoring of I&O  F/U Urine cx   Monitor for postobstructive diuresis, replace electrolytes as needed.    Monitor for decompression hematuria.  Start Flomax if no contraindications  Monitor BUN/Cr  F/U Nephrology recommendations   F/U with Dr. Reddy  as an outpatient for further management, Pt. will need UDS and Cystoscopy when medically stable.   Cont. current medical management as per primary medical team.

## 2023-01-06 NOTE — PROGRESS NOTE ADULT - ASSESSMENT
83 yo F w/ pmhx of CHF, HLD, HTN, COPD on 3L home O2, DM1, dementia, GERD, MI in 1992, CAD w cardiac stents presented to the ED on 12/25 with progressive SOB of breath for the last month. On 12/26 was admitted to T after experiencing NSTEMI and acute on chronic HFpEF - scheduled for mid LA atherectomy today due to 2 vessel CAD.     #NSTEMI d/t chronic diastolic CHF  -s/p PCI LAD without complication  - s/p CATH: 90% prix and mid LAD s/p rota atherectomy, balloon angioplasty with KHUSHBU X 1 to prox LAD 90% stenosis extending into mid LAD  - Echo: preserved EF. Severe pulmonary hypertension  - DAPT with ASA/Plavix.   - Lipitor, Toprol 25mg QD.     #Leukocytosis.   - WBC trending up   -Afebrile.  -RVP positive (rhino / enterovirus)  -vomit x1 this am  -zofran PRN  -continue to monitor for weakness, nausea, cough  - Blood ctx NGTD  - Chest Xray stable  - fu urine ctx  - ciprofloxacin 250 q24 - renally adjusted  - LR 75 ml/hr    #DM  -Blood sugars labile.  - continue to monitor fingersticks and ISS    #CKD 3B  - hemodynamics and Kidney function stable (Cr 1.6)    #PT  -transfer training; balance training; gait training; strengthening; bed mobility training    #misc  - c/w Supportive care  - downgrade to medicine telemetry     81 yo F w/ pmhx of CHF, HLD, HTN, COPD on 3L home O2, DM1, dementia, GERD, MI in 1992, CAD w cardiac stents presented to the ED on 12/25 with progressive SOB of breath for the last month. On 12/26 was admitted to 4T after experiencing NSTEMI and acute on chronic HFpEF - scheduled for mid LA atherectomy today due to 2 vessel CAD.     #NSTEMI d/t chronic diastolic CHF  -s/p PCI LAD without complication  - s/p CATH: 90% prix and mid LAD s/p rota atherectomy, balloon angioplasty with KHUSHBU X 1 to prox LAD 90% stenosis extending into mid LAD  - Echo: preserved EF. Severe pulmonary hypertension  - DAPT with ASA/Plavix.   - Lipitor, Toprol 25mg QD.     #Leukocytosis.   - WBC trending up   -Afebrile.  -RVP positive (rhino / enterovirus)  -vomit x1 this am  -zofran PRN  -continue to monitor for weakness, nausea, cough  - Blood ctx NGTD  - Chest Xray stable  - fu urine ctx  - fu c diff  - ciprofloxacin 250 q24 - renally adjusted  - vanc  Q6  - LR 75 ml/hr  - ABG Lactate 4, pCO2 28, pH 7.47, bicarb 20  - New abdominal distention -> fu CTAP and surgery consult    #DM  -Blood sugars labile.  - continue to monitor fingersticks and ISS    #CKD 3B  - hemodynamics and Kidney function stable (Cr 1.6)    #PT  -transfer training; balance training; gait training; strengthening; bed mobility training    #misc  - c/w Supportive care  - downgrade to medicine telemetry

## 2023-01-06 NOTE — CONSULT NOTE ADULT - NS ATTEND AMEND GEN_ALL_CORE FT
seen on Jan 6th  urinary retention  WOO due to retention  continue stockton catheter  watch for post-obstructive diuresis  watch for hematuria  pt will require prolonged catheterization due to severely distended bladder requiring more time to rest before normal function  outpt follow up for trial of void

## 2023-01-06 NOTE — CONSULT NOTE ADULT - ASSESSMENT
ASSESSMENT:  82yF w/ multiple medical comorbidities and a recent NSTEMI s/p coronary angioplasty on ASA/plavix whom the surgical service was consulted for persistent abdominal pain.   Patient is currently hemodynamically stable  Afebrile with diffuse abdominal tenderness without peritonitis  leukocytosis of 26K, ABG 7.47/28/20/ BE -2.4.   Physical exam findings, imaging, and labs as documented above.   Unlikely SBO  Possibly pancolitis    PLAN:  - NPO w/ IVF resuscitation  - Will further evaluate with a CT scan. Patient is unfortunately allergic to contrast  - Serial abdominal exam and trend lactate  - stool studies  - continue abx  - Consult GI    Above plan discussed with Attending Surgeon Dr. Waldrop, patient, patient family, and Primary team  01-06-23 @ 16:36

## 2023-01-06 NOTE — PROGRESS NOTE ADULT - ASSESSMENT
81 yo F w/ pmhx of CHF, HLD, HTN, COPD on 3L home O2, DM1, dementia, GERD, MI in 1992, CAD w cardiac stents presented to the ED on 12/25 with progressive SOB of breath for the last month. On 12/26 was admitted to T after experiencing NSTEMI and acute on chronic HFpEF - scheduled for mid LA atherectomy today due to 2 vessel CAD.     #NSTEMI d/t chronic diastolic CHF  -s/p PCI LAD without complication  - s/p CATH: 90% prix and mid LAD s/p rota atherectomy, balloon angioplasty with KHUSHBU X 1 to prox LAD 90% stenosis extending into mid LAD  - Echo: preserved EF. Severe pulmonary hypertension  - DAPT with ASA/Plavix.   - Lipitor, Toprol 25mg QD.     #Leukocytosis with bowel distension    - would r/o C diff colitis  - Start on PO Vanco, Surgery input    send Urine cx     #DM  -Blood sugars labile.  - continue to monitor fingersticks and ISS    #CKD 3B  - hemodynamics and Kidney function stable (Cr 1.6)    # Poor PO intake  >> added Marinol and Ensure/    #misc  - c/w Supportive care    #Progress Note Handoff  Pending (specify): Trending up Leucocytosis/  Poor po intake   Family discussion: d/w    Disposition: Home_with HCS    83 yo F w/ pmhx of CHF, HLD, HTN, COPD on 3L home O2, DM1, dementia, GERD, MI in 1992, CAD w cardiac stents presented to the ED on 12/25 with progressive SOB of breath for the last month. On 12/26 was admitted to T after experiencing NSTEMI and acute on chronic HFpEF - scheduled for mid LA atherectomy today due to 2 vessel CAD.     #NSTEMI d/t chronic diastolic CHF  -s/p PCI LAD without complication  - s/p CATH: 90% prix and mid LAD s/p rota atherectomy, balloon angioplasty with KHUSHBU X 1 to prox LAD 90% stenosis extending into mid LAD  - Echo: preserved EF. Severe pulmonary hypertension  - DAPT with ASA/Plavix.   - Lipitor, Toprol 25mg QD.     #Leukocytosis with bowel distension    - would r/o C diff colitis  - Start on PO Vanco, Surgery input    send Urine cx     # [place] stool / Melena 01/06/23     Will trend CBC closely.     Pt on DAPT for recent PCI with KHUSHBU.    #DM  -Blood sugars labile.  - continue to monitor fingersticks and ISS    # Acute Kidney Injury on CKD 3B  - Cr 1.6 >>> 1.8 today  - IV Hydration , trend Cr and Lactate.     # Poor PO intake  >> added Marinol and Ensure/    #misc  - c/w Supportive care    #Progress Note Handoff  Pending (specify): Trending up Leucocytosis/  Poor po intake   Family discussion: d/w    Disposition: Home_with HCS

## 2023-01-06 NOTE — CONSULT NOTE ADULT - SUBJECTIVE AND OBJECTIVE BOX
Pt is a 83yo Female w PMH of CHF, HLD, HTN, COPD on 3L home O2, DM1, dementia, GERD, MI in 1992, CAD w cardiac stents presented to the ED on 12/25 with progressive SOB of breath for the last month. On 12/26 was admitted to  after experiencing NSTEMI and acute on chronic HFpEF  s/p IVUS guided rotational atherectomy and balloon angioplasty with KHUSHBU X 1 to prox LAD 90% stenosis extending into mid LAD. Urology called for difficult Pena catheter placement.   Pt's daughter at bedside reports mom had no urinary problems at home.   CT A/P findings: Severe urinary bladder distention measuring up to 18.1 cm in the craniocaudal dimension. No evidence of hydronephrosis   In sterile technique 14 Fr Pena catheter placed with no difficulty on placement. 10 cc Pena catheter balloon inflated. + return of yellow urine.        PAST MEDICAL & SURGICAL HISTORY:  MI (myocardial infarction)  s/p 5 stents      HTN (hypertension)      High cholesterol      Heart failure      Non Hodgkin&#x27;s lymphoma  in remission. Follows with Dr Hernandez      PVD (peripheral vascular disease)      Thrombocytopenia      CKD (chronic kidney disease)      Osteoarthritis      Diabetes  on insulin      Coronary artery disease involving native heart without angina pectoris, unspecified vessel or lesion type  s/p 5 stents      History of cholecystectomy      H/O cardiac catheterization  5 STENTS      H/O carotid endarterectomy  RIGHT          MEDICATIONS  (STANDING):  albuterol/ipratropium for Nebulization 3 milliLiter(s) Nebulizer every 6 hours  aspirin enteric coated 81 milliGRAM(s) Oral daily  atorvastatin 40 milliGRAM(s) Oral at bedtime  chlorhexidine 2% Cloths 1 Application(s) Topical daily  ciprofloxacin     Tablet 250 milliGRAM(s) Oral every 24 hours  clopidogrel Tablet 75 milliGRAM(s) Oral daily  dextrose 5%. 1000 milliLiter(s) (50 mL/Hr) IV Continuous <Continuous>  dextrose 5%. 1000 milliLiter(s) (100 mL/Hr) IV Continuous <Continuous>  dextrose 50% Injectable 25 Gram(s) IV Push once  dextrose 50% Injectable 12.5 Gram(s) IV Push once  dextrose 50% Injectable 25 Gram(s) IV Push once  donepezil 10 milliGRAM(s) Oral at bedtime  dronabinol 5 milliGRAM(s) Oral two times a day before meals  glucagon  Injectable 1 milliGRAM(s) IntraMuscular once  heparin   Injectable 5000 Unit(s) SubCutaneous every 12 hours  insulin glargine Injectable (LANTUS) 18 Unit(s) SubCutaneous two times a day  insulin lispro (ADMELOG) corrective regimen sliding scale   SubCutaneous three times a day before meals  insulin lispro Injectable (ADMELOG) 6 Unit(s) SubCutaneous three times a day before meals  lactated ringers. 1000 milliLiter(s) (75 mL/Hr) IV Continuous <Continuous>  metoprolol succinate ER 50 milliGRAM(s) Oral daily  montelukast 10 milliGRAM(s) Oral daily  ondansetron Injectable 4 milliGRAM(s) IV Push every 8 hours  polyethylene glycol 3350 17 Gram(s) Oral daily  zinc oxide 40% Paste 1 Application(s) Topical daily    MEDICATIONS  (PRN):  aluminum hydroxide/magnesium hydroxide/simethicone Suspension 30 milliLiter(s) Oral every 4 hours PRN Dyspepsia  dextrose Oral Gel 15 Gram(s) Oral once PRN Blood Glucose LESS THAN 70 milliGRAM(s)/deciliter      Allergies    ACE inhibitors (Unknown)  BENZALKONIUM (Rash)  BETADINE (Rash)  Ceclor (Unknown)  codeine (Unknown)  contrast media (iodine-based) (Hives)  latex (Unknown)  penicillin (Unknown)  RUBBER GLOVES (Rash)  shellfish (Unknown)  sulfonamides (Unknown)     SOCIAL HISTORY: No illicit drug use    FAMILY HISTORY:  FH: CAD (coronary artery disease)  FATHER        REVIEW OF SYSTEMS      [x ] Due to altered mental status , subjective information were not able to be obtained from patient. History was obtained, to the extent possible, from review of the chart and collateral sources of information as well as family who is at bedside.     Vital Signs Last 24 Hrs  T(C): 36.1 (06 Jan 2023 20:04), Max: 36.3 (06 Jan 2023 12:55)  T(F): 96.9 (06 Jan 2023 20:04), Max: 97.4 (06 Jan 2023 12:55)  HR: 99 (06 Jan 2023 20:04) (91 - 99)  BP: 130/62 (06 Jan 2023 20:04) (119/58 - 133/62)  RR: 19 (06 Jan 2023 20:04) (18 - 20)  SpO2: 100% (06 Jan 2023 20:04) (100% - 100%)    Parameters below as of 06 Jan 2023 20:04  Patient On (Oxygen Delivery Method): nasal cannula       PHYSICAL EXAM:    GEN: NAD, awake and alert.  SKIN: Good color, non diaphoretic.  RESP:  labored breathing on NC  + accessory muscles.  CARDIO: +S1/S2  ABDO: distended , + TTP over suprapubic area,  palpable bladder.  BACK: No CVAT B/L  : able to visualise urethral meatus, In sterile technique 14 Fr Pena catheter placed , 10 cc Pena catheter balloon inflated. + return of yellow urine   EXT: GOMEZ x 4        LABS:                        10.8   26.30 )-----------( 184      ( 06 Jan 2023 07:17 )             30.5     01-06    128<L>  |  90<L>  |  94<HH>  ----------------------------<  148<H>  4.0   |  25  |  1.8<H>    Ca    8.4      06 Jan 2023 07:17    TPro  4.2<L>  /  Alb  2.6<L>  /  TBili  0.7  /  DBili  x   /  AST  43<H>  /  ALT  20  /  AlkPhos  136<H>  01-06       Urinalysis (01.05.23 @ 12:40)    pH Urine: 5.0    Glucose Qualitative, Urine: Negative    Blood, Urine: Negative    Color: Yellow    Urine Appearance: Clear    Bilirubin: Negative    Ketone - Urine: Negative    Specific Gravity: 1.018    Protein, Urine: Negative    Urobilinogen: <2 mg/dL    Nitrite: Negative    Leukocyte Esterase Concentration: Large    Urine Microscopic-Add On (NC) (01.05.23 @ 12:40)    Red Blood Cell - Urine: 1 /HPF    White Blood Cell - Urine: 27 /HPF    Hyaline Casts: 0 /LPF    Bacteria: Negative    Epithelial Cells: 0 /HPF         RADIOLOGY & ADDITIONAL STUDIES:  < from: CT Abdomen and Pelvis No Cont (01.06.23 @ 18:54) >    ACC: [id number] EXAM:  CT ABDOMEN AND PELVIS                          PROCEDURE DATE:  01/06/2023          INTERPRETATION:  CLINICAL HISTORY / REASON FOR EXAM: Abdominal distention.    TECHNIQUE: Contiguous axial CT images were obtained from the lower chest   to the pubic symphysis without intravenous contrast. Oral contrast was   not administered. Reformatted images in the coronal and sagittal planes   were acquired.    COMPARISON CT: CT abdomen and pelvis from May 29, 2012    OTHER STUDIES USEDFOR CORRELATION: None.      FINDINGS:    LOWER CHEST: Coronary arterial stents. Left basilar atelectasis and   partially visualized peribronchial thickening..    HEPATOBILIARY: Post cholecystectomy.    SPLEEN: Unremarkable.    PANCREAS: Atrophic pancreas.    ADRENAL GLANDS: Unremarkable.    KIDNEYS: No evidence of hydronephrosis. Perinephric fat stranding.    ABDOMINOPELVIC NODES: Unremarkable.    PELVIC ORGANS: Severe urinary bladder distention measuring up to 18.1 cm   in the craniocaudal dimension.    PERITONEUM/MESENTERY/BOWEL: Moderate distention of the stomach with   liquid content. Sigmoid diverticulosis. No bowel obstruction, ascites or   intraperitoneal free air. Normal caliber appendix.    BONES/SOFT TISSUES: Diffuse osteopenia. Degenerative changes of the   thoracolumbar spine. Metallic foreign body/surgical clips within the   right groin. Subacute appearing deformities of left posterior ribs #7 and   #8. Right lower abdominal wall subcutaneous emphysema, likely secondary   to injections.    VASCULAR: Calcified atherosclerotic disease within the abdominal aorta.      IMPRESSION:    Severe urinary bladder distention, suggesting urinary retention. Consider   Pena catheter placement if patient is unable to spontaneously void.    Moderate gastric distention with fluid contents. If clinically indicated,   consider enteric tube decompression.      Spoke with NATASHA ROCHE on 1/6/2023 7:07 PM with readback.    --- End of Report ---        JANET LEVY MD; Attending Radiologist  This document has been electronically signed. Jan 6 2023  7:08PM    < end of copied text >

## 2023-01-06 NOTE — PROGRESS NOTE ADULT - SUBJECTIVE AND OBJECTIVE BOX
Patient is a 82y old  Female who presents with a chief complaint of NSTEMI (05 Jan 2023 20:31)      INTERVAL HPI/OVERNIGHT EVENTS:  None    FAMILY HISTORY:  FH: CAD (coronary artery disease)  FATHER      T(C): 36.1 (01-06-23 @ 05:07), Max: 36.3 (01-05-23 @ 21:28)  HR: 93 (01-06-23 @ 05:07) (73 - 93)  BP: 120/52 (01-06-23 @ 05:07) (118/58 - 132/59)  RR: 18 (01-06-23 @ 05:07) (18 - 18)  SpO2: 100% (01-05-23 @ 21:28) (98% - 100%)  Wt(kg): --Vital Signs Last 24 Hrs  T(C): 36.1 (06 Jan 2023 05:07), Max: 36.3 (05 Jan 2023 21:28)  T(F): 97 (06 Jan 2023 05:07), Max: 97.4 (05 Jan 2023 21:28)  HR: 93 (06 Jan 2023 05:07) (73 - 93)  BP: 120/52 (06 Jan 2023 05:07) (118/58 - 132/59)  BP(mean): --  RR: 18 (06 Jan 2023 05:07) (18 - 18)  SpO2: 100% (05 Jan 2023 21:28) (98% - 100%)    Parameters below as of 05 Jan 2023 21:28  Patient On (Oxygen Delivery Method): nasal cannula        PHYSICAL EXAM:  GENERAL: fatigued  HEAD:  Atraumatic, Normocephalic  EYES: EOMI, PERRLA, conjunctiva and sclera clear  ENMT: No tonsillar erythema, exudates, or enlargement; Moist mucous membranes, Good dentition, No lesions  NECK: Supple, No JVD, Normal thyroid  NERVOUS SYSTEM:  Alert & Oriented X3, Good concentration; Motor Strength 5/5 B/L upper and lower extremities; DTRs 2+ intact and symmetric  CHEST/LUNG: Clear to percussion bilaterally; No rales, rhonchi, wheezing, or rubs  HEART: Regular rate and rhythm; No murmurs, rubs, or gallops  ABDOMEN: Soft, Nontender, Nondistended; Bowel sounds present  EXTREMITIES:  2+ Peripheral Pulses, No clubbing, cyanosis, or edema  LYMPH: No lymphadenopathy noted  SKIN: No rashes or lesions      albuterol/ipratropium for Nebulization 3 milliLiter(s) Nebulizer every 6 hours  aluminum hydroxide/magnesium hydroxide/simethicone Suspension 30 milliLiter(s) Oral every 4 hours PRN  aspirin enteric coated 81 milliGRAM(s) Oral daily  atorvastatin 40 milliGRAM(s) Oral at bedtime  chlorhexidine 2% Cloths 1 Application(s) Topical daily  clopidogrel Tablet 75 milliGRAM(s) Oral daily  dextrose 5%. 1000 milliLiter(s) IV Continuous <Continuous>  dextrose 5%. 1000 milliLiter(s) IV Continuous <Continuous>  dextrose 50% Injectable 25 Gram(s) IV Push once  dextrose 50% Injectable 12.5 Gram(s) IV Push once  dextrose 50% Injectable 25 Gram(s) IV Push once  dextrose Oral Gel 15 Gram(s) Oral once PRN  donepezil 10 milliGRAM(s) Oral at bedtime  dronabinol 5 milliGRAM(s) Oral two times a day before meals  furosemide    Tablet 20 milliGRAM(s) Oral daily  glucagon  Injectable 1 milliGRAM(s) IntraMuscular once  heparin   Injectable 5000 Unit(s) SubCutaneous every 12 hours  insulin glargine Injectable (LANTUS) 26 Unit(s) SubCutaneous at bedtime  insulin lispro (ADMELOG) corrective regimen sliding scale   SubCutaneous three times a day before meals  insulin lispro Injectable (ADMELOG) 6 Unit(s) SubCutaneous three times a day before meals  metoclopramide Injectable 5 milliGRAM(s) IV Push every 8 hours PRN  metoprolol succinate ER 50 milliGRAM(s) Oral daily  montelukast 10 milliGRAM(s) Oral daily  polyethylene glycol 3350 17 Gram(s) Oral daily  zinc oxide 40% Paste 1 Application(s) Topical daily

## 2023-01-06 NOTE — PROGRESS NOTE ADULT - SUBJECTIVE AND OBJECTIVE BOX
Cardiology Follow up s/p PCI    VENKATESH RAMACHANDRAN   82y Female  PAST MEDICAL & SURGICAL HISTORY:    MI (myocardial infarction)  s/p 5 stents  HTN (hypertension)  High cholesterol  Heart failure  Non Hodgkin&#x27;s lymphoma  in remission. Follows with Dr Hernandez  PVD (peripheral vascular disease)  Thrombocytopenia  CKD (chronic kidney disease)  Osteoarthritis  Diabetes  on insulin  Coronary artery disease involving native heart without angina pectoris, unspecified vessel or lesion type  s/p 5 stents  History of cholecystectomy  H/O cardiac catheterization  5 STENTS  H/O carotid endarterectomy  RIGHT           HPI:  82F w/ pmhx of CHF, HLD, HTN, COPD on 3L home O2, DM1, dementia, GERD, MI in 1992, CAD with 5 cardiac stents who present with 1 month of worsening SOB. For past 1 month she has been having dry cough and SOB. She saw her pulmonologist Dr. Feldman who did CXR and showed it was normal at the time, had her on levaquin and then increased her dose which she completed last week. Yesterday she started having crushing chest pressure substernally. Denies fever during this month, nausea vomiting back pain abd pain urinary sx or diarrhea. No recent travel or sick contact. Her cardiologist is Dr. Munoz.  (25 Dec 2022 18:25)    Allergies    ACE inhibitors (Unknown)  BENZALKONIUM (Rash)  BETADINE (Rash)  Ceclor (Unknown)  codeine (Unknown)  contrast media (iodine-based) (Hives)  latex (Unknown)  penicillin (Unknown)  RUBBER GLOVES (Rash)  shellfish (Unknown)  sulfonamides (Unknown)    Intolerances    Patient seen and examined at bedside. No acute events overnight.  Patient reported feeling nausea in AM and poor appetite.   Denies CP, SOB, palpitations, or dizziness    Vital Signs Last 24 Hrs  T(C): 36.1 (06 Jan 2023 05:07), Max: 36.3 (05 Jan 2023 21:28)  T(F): 97 (06 Jan 2023 05:07), Max: 97.4 (05 Jan 2023 21:28)  HR: 93 (06 Jan 2023 05:07) (73 - 93)  BP: 120/52 (06 Jan 2023 05:07) (118/58 - 132/59)  BP(mean): --  RR: 18 (06 Jan 2023 05:07) (18 - 18)  SpO2: 100% (05 Jan 2023 21:28) (98% - 100%)    Parameters below as of 05 Jan 2023 21:28  Patient On (Oxygen Delivery Method): nasal cannula    MEDICATIONS  (STANDING):  albuterol/ipratropium for Nebulization 3 milliLiter(s) Nebulizer every 6 hours  aspirin enteric coated 81 milliGRAM(s) Oral daily  atorvastatin 40 milliGRAM(s) Oral at bedtime  chlorhexidine 2% Cloths 1 Application(s) Topical daily  clopidogrel Tablet 75 milliGRAM(s) Oral daily  dextrose 5%. 1000 milliLiter(s) (100 mL/Hr) IV Continuous <Continuous>  dextrose 5%. 1000 milliLiter(s) (50 mL/Hr) IV Continuous <Continuous>  dextrose 50% Injectable 25 Gram(s) IV Push once  dextrose 50% Injectable 12.5 Gram(s) IV Push once  dextrose 50% Injectable 25 Gram(s) IV Push once  donepezil 10 milliGRAM(s) Oral at bedtime  dronabinol 5 milliGRAM(s) Oral two times a day before meals  furosemide    Tablet 20 milliGRAM(s) Oral daily  glucagon  Injectable 1 milliGRAM(s) IntraMuscular once  heparin   Injectable 5000 Unit(s) SubCutaneous every 12 hours  insulin glargine Injectable (LANTUS) 26 Unit(s) SubCutaneous at bedtime  insulin lispro (ADMELOG) corrective regimen sliding scale   SubCutaneous three times a day before meals  insulin lispro Injectable (ADMELOG) 6 Unit(s) SubCutaneous three times a day before meals  lactated ringers. 1000 milliLiter(s) (75 mL/Hr) IV Continuous <Continuous>  metoprolol succinate ER 50 milliGRAM(s) Oral daily  montelukast 10 milliGRAM(s) Oral daily  polyethylene glycol 3350 17 Gram(s) Oral daily  zinc oxide 40% Paste 1 Application(s) Topical daily    MEDICATIONS  (PRN):  aluminum hydroxide/magnesium hydroxide/simethicone Suspension 30 milliLiter(s) Oral every 4 hours PRN Dyspepsia  dextrose Oral Gel 15 Gram(s) Oral once PRN Blood Glucose LESS THAN 70 milliGRAM(s)/deciliter  metoclopramide Injectable 5 milliGRAM(s) IV Push every 8 hours PRN nausea      REVIEW OF SYSTEMS:          All negative except as mentioned in HPI    PHYSICAL EXAM:           CONSTITUTIONAL: Well-developed; well-nourished; in no acute distress  	SKIN: warm, dry  	HEAD: Normocephalic; atraumatic  	EYES: PERRL.  	ENT: No nasal discharge, airway clear, mucous membranes moist  	NECK: Supple; non tender.  	CARD: +S1, +S2, no murmurs, gallops, or rubs. Regular rate and rhythm    	RESP: No wheezes, rales. + Rhonchi B/L, productive cough  	ABD: soft ntnd, + BS x 4 quadrants  	EXT: moves all extremities,  no clubbing, cyanosis or edema  	NEURO: Alert and oriented x3, no focal deficits          PSYCH: Cooperative, appropriate          VASCULAR:  + Rad / + PTs / +  DPs          EXTREMITY:             Right Groin: access site soft, no hematoma, no pain, + pulses, no sign of infection, no numbness  	               ECG:   < from: 12 Lead ECG (12.30.22 @ 08:05) >  Ventricular Rate 83 BPM    Atrial Rate 83 BPM    P-R Interval 130 ms    QRS Duration 90 ms    Q-T Interval 378 ms    QTC Calculation(Bazett) 444 ms    P Axis 49 degrees    R Axis 67 degrees    T Axis 181 degrees    Diagnosis Line Sinus rhythm with Premature atrial complexes  Nonspecific ST and T wave abnormality  Abnormal ECG    Confirmed by cami leiva ([id number]) on 12/30/2022 2:01:35 PM                                                                                                               2D ECHO:  < from: TTE Echo Complete w/o Contrast w/ Doppler (12.26.22 @ 10:40) >  Summary:   1. LV Ejection Fraction by Baird's Method with a biplane EF of 78 %.   2. Normal left atrial size.   3. Mild mitral annular calcification.   4. Mild mitral valve regurgitation.   5. Mild tricuspid regurgitation.   6. Normal trileaflet aortic valvewith normal opening.   7. Mild pulmonic valve regurgitation.   8. Estimated pulmonary artery systolic pressure is 73.8 mmHg assuming a   right atrial pressure of 3 mmHg, which is consistent with severe   pulmonary hypertension.   9. There is mild aortic root calcification.    LABS:                        10.8   26.30 )-----------( 184      ( 06 Jan 2023 07:17 )             30.5     01-06    128<L>  |  90<L>  |  94<HH>  ----------------------------<  148<H>  4.0   |  25  |  1.8<H>    Ca    8.4      06 Jan 2023 07:17    TPro  4.2<L>  /  Alb  2.6<L>  /  TBili  0.7  /  DBili  x   /  AST  43<H>  /  ALT  20  /  AlkPhos  136<H>  01-06    LIVER FUNCTIONS - ( 06 Jan 2023 07:17 )  Alb: 2.6 g/dL / Pro: 4.2 g/dL / ALK PHOS: 136 U/L / ALT: 20 U/L / AST: 43 U/L / GGT: x           A/P:  I discussed the case with Cardiologist Dr. Leong and recommend the following:    S/P PCI pLAD KHUSHBU x 1      Intervention: s/p successful IVUS guided rotational atherectomy and balloon angioplasty with KHUSHBU X 1 to prox LAD 90% stenosis extending into mid LAD    Implants: Kvng [place] 3.5 X 30 to prox LAD        	     Continue DAPT (  Aspirin 81 mg PO Daily and Plavix 75 mg po daily ), Lasix, B-Blocker, Statin Therapy                    No ACEi/ARB at this time due to elevated Cr and allergy to ACEi                   Keep K = 4, Mg = 2                   PT / OOB to chair, ambulate with assistance                   GI prophylaxis                   Patient given 30 day supply of ( Aspirin 81 mg daily and Plavix 75 mg daily ) to take at home                   Patient agreeing to take DAPT for at least one year or as directed by cardiologist                    Pt given instructions on importance of taking antiplatelet medication or risk acute stent thrombosis/death                   Post cath instructions, access site care and activity restrictions reviewed with patient                      Benefits of Cardiac Rehab discussed with patient and all documents sent to [facility]                   Patient instructed to call Cardiac Rehab and make first appointment after first f/u visit with Cardiologist                    Discussed with patient to return to hospital if experience chest pain, shortness breath, dizziness and site bleeding                   Aggressive risk factor modification, diet counseling, smoking cessation discussed with patient                     F/U with Cardiologist Dr. Leong in 2 weeks as OP. Patient advised to call and make an appointment

## 2023-01-06 NOTE — CONSULT NOTE ADULT - SUBJECTIVE AND OBJECTIVE BOX
GENERAL SURGERY CONSULT NOTE    Patient: VENKATESH RAMACHANDRAN , 82y (38)Female   MRN: [id number]  Location: [address]  Visit: 22 Inpatient  Date: 23 @ 16:36    HPI:  82F w/ pmhx of CHF, HLD, HTN, COPD on 3L home O2, DM1, dementia, GERD, MI in , CAD with 5 cardiac stents who present with 1 month of worsening SOB. For past 1 month she has been having dry cough and SOB. She saw her pulmonologist Dr. Feldman who did CXR and showed it was normal at the time, had her on levaquin and then increased her dose which she completed last week. Yesterday she started having crushing chest pressure substernally. Denies fever during this month, nausea vomiting back pain abd pain urinary sx or diarrhea. No recent travel or sick contact. Her cardiologist is Dr. Munoz.  (25 Dec 2022 18:25)    The surgical service was consulted for persistent abdominal pain and distention. Upon encounter, patient reports abdominal pain for about a week following her recent procedure. It is mostly diffuse and has not changed in character. This is associated with loose dark BM. She has been tolerating regular diet otherwise and passing flatus. Denies any fever, chills, chest pain.    PAST MEDICAL & SURGICAL HISTORY:  MI (myocardial infarction)  s/p 5 stents  HTN (hypertension)  High cholesterol  Heart failure  Non Hodgkin&#x27;s lymphoma  in remission. Follows with Dr Hernandez  PVD (peripheral vascular disease)  Thrombocytopenia  CKD (chronic kidney disease)  Osteoarthritis  Diabetes  on insulin  Coronary artery disease involving native heart without angina pectoris, unspecified vessel or lesion type  s/p 5 stents  History of cholecystectomy  H/O cardiac catheterization  5 STENTS  H/O carotid endarterectomy  RIGHT    Home Medications:  albuterol 90 mcg/inh inhalation aerosol: 2 puff(s) inhaled every 6 hours, As Needed (25 Dec 2022 14:24)  atorvastatin 20 mg oral tablet: 1 tab(s) orally once a day (25 Dec 2022 14:24)  donepezil 10 mg oral tablet: 1 tab(s) orally once a day (at bedtime) (25 Dec 2022 14:24)  gabapentin: orally 2 times a day (25 Dec 2022 14:24)  inositol 650 mg oral tablet: 2 tab(s) orally once a day (25 Dec 2022 14:24)  Lasix 20 mg oral tablet: 1 tab(s) orally once a day (25 Dec 2022 14:24)  losartan 100 mg oral tablet: 1 tab(s) orally once a day (25 Dec 2022 14:24)  montelukast 10 mg oral tablet: 1 tab(s) orally once a day (25 Dec 2022 14:24)  NovoLOG 100 units/mL subcutaneous solution: 10,7,7  subcutaneous (25 Dec 2022 14:24)  omeprazole 40 mg oral delayed release capsule: 1 cap(s) orally once a day (25 Dec 2022 14:24)  omeprazole 40 mg oral delayed release capsule: 1 cap(s) orally once a day (25 Dec 2022 14:24)  Soliqua 100/33 subcutaneous solution: 35 unit(s) subcutaneous once a day (25 Dec 2022 14:24)  Spiriva 18 mcg inhalation capsule: 1 cap(s) inhaled once a day (25 Dec 2022 14:24)        VITALS:  T(F): 96.4 (23 @ 14:51), Max: 97.4 (23 @ 21:28)  HR: 97 (23 @ 14:51) (73 - 97)  BP: 133/62 (23 @ 14:51) (119/58 - 133/62)  RR: 20 (23 @ 14:51) (18 - 20)  SpO2: 100% (23 @ 12:55) (100% - 100%)    PHYSICAL EXAM:  General: Asleep but easily awoken  HEENT: NCAT. on supplemental O2   Cardiac: Reg rate  Respiratory: No use of accessary muscles  Abdomen: Softly distended. Diffusely tender without guarding or rigidity  Skin: Warm/dry, normal color    MEDICATIONS  (STANDING):  albuterol/ipratropium for Nebulization 3 milliLiter(s) Nebulizer every 6 hours  aspirin enteric coated 81 milliGRAM(s) Oral daily  atorvastatin 40 milliGRAM(s) Oral at bedtime  chlorhexidine 2% Cloths 1 Application(s) Topical daily  ciprofloxacin     Tablet 250 milliGRAM(s) Oral every 24 hours  clopidogrel Tablet 75 milliGRAM(s) Oral daily  dextrose 5%. 1000 milliLiter(s) (100 mL/Hr) IV Continuous <Continuous>  dextrose 5%. 1000 milliLiter(s) (50 mL/Hr) IV Continuous <Continuous>  dextrose 50% Injectable 25 Gram(s) IV Push once  dextrose 50% Injectable 12.5 Gram(s) IV Push once  dextrose 50% Injectable 25 Gram(s) IV Push once  donepezil 10 milliGRAM(s) Oral at bedtime  dronabinol 5 milliGRAM(s) Oral two times a day before meals  glucagon  Injectable 1 milliGRAM(s) IntraMuscular once  heparin   Injectable 5000 Unit(s) SubCutaneous every 12 hours  insulin glargine Injectable (LANTUS) 18 Unit(s) SubCutaneous two times a day  insulin lispro (ADMELOG) corrective regimen sliding scale   SubCutaneous three times a day before meals  insulin lispro Injectable (ADMELOG) 6 Unit(s) SubCutaneous three times a day before meals  lactated ringers. 1000 milliLiter(s) (75 mL/Hr) IV Continuous <Continuous>  metoprolol succinate ER 50 milliGRAM(s) Oral daily  montelukast 10 milliGRAM(s) Oral daily  ondansetron Injectable 4 milliGRAM(s) IV Push every 8 hours  polyethylene glycol 3350 17 Gram(s) Oral daily  vancomycin    Solution 125 milliGRAM(s) Oral every 6 hours  zinc oxide 40% Paste 1 Application(s) Topical daily    MEDICATIONS  (PRN):  aluminum hydroxide/magnesium hydroxide/simethicone Suspension 30 milliLiter(s) Oral every 4 hours PRN Dyspepsia  dextrose Oral Gel 15 Gram(s) Oral once PRN Blood Glucose LESS THAN 70 milliGRAM(s)/deciliter      LAB/STUDIES:                        10.8   26.30 )-----------( 184      ( 2023 07:17 )             30.5     01-06    128<L>  |  90<L>  |  94<HH>  ----------------------------<  148<H>  4.0   |  25  |  1.8<H>    Ca    8.4      2023 07:17    TPro  4.2<L>  /  Alb  2.6<L>  /  TBili  0.7  /  DBili  x   /  AST  43<H>  /  ALT  20  /  AlkPhos  136<H>        LIVER FUNCTIONS - ( 2023 07:17 )  Alb: 2.6 g/dL / Pro: 4.2 g/dL / ALK PHOS: 136 U/L / ALT: 20 U/L / AST: 43 U/L / GGT: x           Urinalysis Basic - ( 2023 12:40 )    Color: Yellow / Appearance: Clear / S.018 / pH: x  Gluc: x / Ketone: Negative  / Bili: Negative / Urobili: <2 mg/dL   Blood: x / Protein: Negative / Nitrite: Negative   Leuk Esterase: Large / RBC: 1 /HPF / WBC 27 /HPF   Sq Epi: x / Non Sq Epi: 0 /HPF / Bacteria: Negative    ABG - ( 2023 14:31 )  pH, Arterial: 7.47  pH, Blood: x     /  pCO2: 28    /  pO2: 158   / HCO3: 20    / Base Excess: -2.4  /  SaO2: 97.8      IMAGING:

## 2023-01-07 NOTE — PROGRESS NOTE ADULT - ASSESSMENT
82yF w/ multiple medical comorbidities and a recent NSTEMI s/p coronary angioplasty on ASA/plavix whom the surgical service was consulted for persistent abdominal pain.   Patient is currently hemodynamically stable  Afebrile with diffuse abdominal tenderness without peritonitis  leukocytosis of 26K, ABG 7.47/28/20/ BE -2.4.   Physical exam findings, imaging, and labs as documented above.   Unlikely SBO  Abdominal tenderness likely secondary to ileus. Abdominal pain and distension due to severe bladder distension - stockton placed with 1400cc return of urine.     PLAN:  - NPO w/ IVF resuscitation  - Will further evaluate with a CT scan - urinary distension and gastric distension.   - Serial abdominal exam and trend lactate  - stool studies  - continue abx  - Consult GI  - Recall surgery as needed  82yF w/ multiple medical comorbidities and a recent NSTEMI s/p coronary angioplasty on ASA/plavix whom the surgical service was consulted for persistent abdominal pain.   Patient is currently hemodynamically stable  Afebrile with diffuse abdominal tenderness without peritonitis  leukocytosis of 26K, ABG 7.47/28/20/ BE -2.4.   Physical exam findings, imaging, and labs as documented above.   Unlikely SBO  Abdominal tenderness likely secondary to ileus. Abdominal pain and distension due to severe bladder distension - stockton placed with 1400cc return of urine.     PLAN:  - NPO w/ IVF resuscitation  - Serial abdominal exam and trend lactate  - stool studies  - continue abx  - Consult GI  - monitor vitals  - monitor labs - replete as needed  - Recall surgery as needed  82yF w/ multiple medical comorbidities and a recent NSTEMI s/p coronary angioplasty on ASA/plavix whom the surgical service was consulted for persistent abdominal pain.   Patient is currently hemodynamically stable  Afebrile with diffuse abdominal tenderness without peritonitis  leukocytosis of 26K, ABG 7.47/28/20/ BE -2.4.   Physical exam findings, imaging, and labs as documented above.   Unlikely SBO  Abdominal tenderness likely secondary to ileus. Abdominal pain and distension due to severe bladder distension - stockton placed with 1400cc return of urine.     PLAN:  - NPO w/ IVF resuscitation  - Serial abdominal exam and trend lactate  - Stool studies  - Continue abx  - Monitor vitals  - Monitor labs  - Consult GI  - NGT (Robin Tavarez) if needed for prolonged nausea  - Prokinetic agents  - No acute surgical intervention  - Recall surgery as needed     [phone]

## 2023-01-07 NOTE — CHART NOTE - NSCHARTNOTEFT_GEN_A_CORE
transfer 3a -> MICU for acute GI bleed (melena)    82 year old female with pmhx CAD/MI s/p 5 stents, CHFpEF, COPD (3L home O2), DM1, GERD, HLD, ?dementia presents for worsening SOB [id number] with x2 days of crushing substernal chest pressure. Found to have NSTEMI and PNA and CHF exacerbation. Pt treated with IV abx and IV diuretics. On 12/29/22 Underwent catheterization with KHUSHBU X 1 to prox LAD 90% stenosis extending into mid LAD. Was managed in CCU and then downgraded to floors. On floors pt developed abdominal distension, epigastric pain, and melena with acute drop in Hb on 1/7/23. CT on 1/6/23 for abdominal distension revealed urinary retention and stockton was placed (1.4L u/o). Pt was evaluated by GI on 1/7/23 and recommended MICU eval for high risk patient with recent (<1 month) stent placement requiring continuing DAPT. Pt evaluated and recommended ICU upgrade per crit care team. A&P below.    Follow up:  -CBC/ Blood pressure monitoring  -ensure pt does not need further prbc/ keep Hb above 8  -possible CVC as pt has poor IV access/ no peripheral forearm access/ has bilateral midline  -GI follow up for possible intervention if conservative therapy fails    # Melena  # acute anemia secondary to suspected upper GI bleed  -on DAPT for NSTEMI/PCI  -transfuse prbc PRN keep Hb >8  -2u prbc ordered/ pending blood bank release  -monitor cbc BID  -active t&s/ pt has Antibody hx  -NPO for now  -PPI gtt  -IVF  -GI following/ may need endoscopy if pt fails conservative therapy    # NSTEMI  # S/P PCI pLAD KHUSHBU x 1   # HO MI/ CAD s/p multiple stents  # HO CHFpEF  -follows Dr. Leong  -s/p successful IVUS guided rotational atherectomy and balloon angioplasty with KHUSHBU X 1 to prox LAD 90% stenosis extending into mid LAD  -Continue DAPT (  Aspirin 81 mg PO Daily and Plavix 75 mg po daily ), B-Blocker, Statin Therapy   -No ACEi/ARB at this time due to elevated Cr and allergy to ACEi  -DVT/GI prophylaxis -> dvt ppx on hold  -BB held for now d/t GI bleed/ resume when appropriate  -Keep K = 4, Mg = 2  -monitor access site/pulses                     # abd distenstion  # urinary retention  # ?ileus  -CT with severe urinary retention  -s/p stockton with 1.4L u/o  -c/w stockton/ TOV when appropriate  -surgery team following for ileus    # Leukocytosis/ persistent  # PNA/ treated  -ruled out C diff colitis  -completed course of IV abx for PNA  -trend wbc/ fever curve  -ID following    # DM  -Blood sugars labile.  - continue to monitor fingersticks and ISS  -endo following/ increased lantus 18 to BID    # Acute Kidney Injury on CKD 3B  -IV Hydration , trend Cr and Lactate.   -possibly prerenal secondary to GI bleed    # Poor PO intake  -added Marinol and Ensure  -keep NPO for now d/t GI bleed

## 2023-01-07 NOTE — CONSULT NOTE ADULT - SUBJECTIVE AND OBJECTIVE BOX
Gastroenterology Consultation:    Patient is a 82y old  Female who presents with a chief complaint of NSTEMI (07 Jan 2023 12:22)    Admitted on: 12-25-22      HPI:  82F w/ pmhx of CHF, HLD, HTN, COPD on 3L home O2, DM1, dementia, GERD, MI in 1992, CAD with 5 cardiac stents who present with 1 month of worsening SOB. For past 1 month she has been having dry cough and SOB. She saw her pulmonologist Dr. Feldman who did CXR and showed it was normal at the time, had her on levaquin and then increased her dose which she completed last week. Yesterday she started having crushing chest pressure substernally. Denies fever during this month, nausea vomiting back pain abd pain urinary sx or diarrhea. No recent travel or sick contact. Her cardiologist is Dr. Munoz.  (25 Dec 2022 18:25)        Prior EGD:    Prior Colonoscopy:      PAST MEDICAL & SURGICAL HISTORY:  MI (myocardial infarction)  s/p 5 stents      HTN (hypertension)      High cholesterol      Heart failure      Non Hodgkin&#x27;s lymphoma  in remission. Follows with Dr Hernandez      PVD (peripheral vascular disease)      Thrombocytopenia      CKD (chronic kidney disease)      Osteoarthritis      Diabetes  on insulin      Coronary artery disease involving native heart without angina pectoris, unspecified vessel or lesion type  s/p 5 stents      History of cholecystectomy      H/O cardiac catheterization  5 STENTS      H/O carotid endarterectomy  RIGHT            FAMILY HISTORY:  FH: CAD (coronary artery disease)  FATHER        Social History:  Tobacco:  Alcohol:  Drugs:    Home Medications:  albuterol 90 mcg/inh inhalation aerosol: 2 puff(s) inhaled every 6 hours, As Needed (25 Dec 2022 14:24)  atorvastatin 20 mg oral tablet: 1 tab(s) orally once a day (25 Dec 2022 14:24)  donepezil 10 mg oral tablet: 1 tab(s) orally once a day (at bedtime) (25 Dec 2022 14:24)  gabapentin: orally 2 times a day (25 Dec 2022 14:24)  inositol 650 mg oral tablet: 2 tab(s) orally once a day (25 Dec 2022 14:24)  Lasix 20 mg oral tablet: 1 tab(s) orally once a day (25 Dec 2022 14:24)  losartan 100 mg oral tablet: 1 tab(s) orally once a day (25 Dec 2022 14:24)  montelukast 10 mg oral tablet: 1 tab(s) orally once a day (25 Dec 2022 14:24)  NovoLOG 100 units/mL subcutaneous solution: 10,7,7  subcutaneous (25 Dec 2022 14:24)  omeprazole 40 mg oral delayed release capsule: 1 cap(s) orally once a day (25 Dec 2022 14:24)  omeprazole 40 mg oral delayed release capsule: 1 cap(s) orally once a day (25 Dec 2022 14:24)  Soliqua 100/33 subcutaneous solution: 35 unit(s) subcutaneous once a day (25 Dec 2022 14:24)  Spiriva 18 mcg inhalation capsule: 1 cap(s) inhaled once a day (25 Dec 2022 14:24)        MEDICATIONS  (STANDING):  albuterol/ipratropium for Nebulization 3 milliLiter(s) Nebulizer every 6 hours  aspirin enteric coated 81 milliGRAM(s) Oral daily  atorvastatin 40 milliGRAM(s) Oral at bedtime  chlorhexidine 2% Cloths 1 Application(s) Topical daily  ciprofloxacin     Tablet 250 milliGRAM(s) Oral every 24 hours  clopidogrel Tablet 75 milliGRAM(s) Oral daily  dextrose 5%. 1000 milliLiter(s) (100 mL/Hr) IV Continuous <Continuous>  dextrose 5%. 1000 milliLiter(s) (50 mL/Hr) IV Continuous <Continuous>  dextrose 50% Injectable 25 Gram(s) IV Push once  dextrose 50% Injectable 12.5 Gram(s) IV Push once  dextrose 50% Injectable 25 Gram(s) IV Push once  donepezil 10 milliGRAM(s) Oral at bedtime  dronabinol 5 milliGRAM(s) Oral two times a day before meals  glucagon  Injectable 1 milliGRAM(s) IntraMuscular once  insulin glargine Injectable (LANTUS) 18 Unit(s) SubCutaneous two times a day  insulin lispro (ADMELOG) corrective regimen sliding scale   SubCutaneous three times a day before meals  insulin lispro Injectable (ADMELOG) 6 Unit(s) SubCutaneous three times a day before meals  lactated ringers. 1000 milliLiter(s) (75 mL/Hr) IV Continuous <Continuous>  montelukast 10 milliGRAM(s) Oral daily  ondansetron Injectable 4 milliGRAM(s) IV Push every 8 hours  pantoprazole Infusion 8 mG/Hr (10 mL/Hr) IV Continuous <Continuous>  polyethylene glycol 3350 17 Gram(s) Oral daily  zinc oxide 40% Paste 1 Application(s) Topical daily    MEDICATIONS  (PRN):  aluminum hydroxide/magnesium hydroxide/simethicone Suspension 30 milliLiter(s) Oral every 4 hours PRN Dyspepsia  dextrose Oral Gel 15 Gram(s) Oral once PRN Blood Glucose LESS THAN 70 milliGRAM(s)/deciliter      Allergies  ACE inhibitors (Unknown)  BENZALKONIUM (Rash)  BETADINE (Rash)  Ceclor (Unknown)  codeine (Unknown)  contrast media (iodine-based) (Hives)  latex (Unknown)  penicillin (Unknown)  RUBBER GLOVES (Rash)  shellfish (Unknown)  sulfonamides (Unknown)      Review of Systems:   Constitutional:  No Fever, No Chills  ENT/Mouth:  No Hearing Changes,  No Difficulty Swallowing  Eyes:  No Eye Pain, No Vision Changes  Cardiovascular:  No Chest Pain, No Palpitations  Respiratory:  No Cough, No Dyspnea  Gastrointestinal:  As described in HPI  Musculoskeletal:  No Joint Swelling, No Back Pain  Skin:  No Skin Lesions, No Jaundice  Neuro:  No Syncope, No Dizziness  Heme/Lymph:  No Bruising, No Bleeding.          Physical Examination:  T(C): 35.8 (01-07-23 @ 05:57), Max: 36.1 (01-06-23 @ 20:04)  HR: 81 (01-07-23 @ 14:52) (80 - 99)  BP: 114/53 (01-07-23 @ 14:52) (84/35 - 130/62)  RR: 18 (01-07-23 @ 14:52) (18 - 19)  SpO2: 100% (01-07-23 @ 10:05) (100% - 100%)      01-06-23 @ 07:01  -  01-07-23 @ 07:00  --------------------------------------------------------  IN: 0 mL / OUT: 1400 mL / NET: -1400 mL    01-07-23 @ 07:01  -  01-07-23 @ 15:31  --------------------------------------------------------  IN: 0 mL / OUT: 520 mL / NET: -520 mL          GENERAL: AAOx3, no acute distress.  HEAD:  Atraumatic, Normocephalic  EYES: conjunctiva and sclera clear  NECK: Supple, no JVD or thyromegaly  CHEST/LUNG: Clear to auscultation bilaterally; No wheeze, rhonchi, or rales  HEART: Regular rate and rhythm; normal S1, S2, No murmurs.  ABDOMEN: Soft, nontender, nondistended; Bowel sounds present  NEUROLOGY: No asterixis or tremor.   SKIN: Intact, no jaundice        Data:                        6.9    21.01 )-----------( 127      ( 07 Jan 2023 11:59 )             20.1     Hgb Trend:  6.9  01-07-23 @ 11:59  8.4  01-07-23 @ 07:40  10.8  01-06-23 @ 07:17  11.2  01-05-23 @ 06:01        01-07    130<L>  |  91<L>  |  111<HH>  ----------------------------<  277<H>  4.7   |  25  |  1.9<H>    Ca    8.1<L>      07 Jan 2023 07:40    TPro  3.6<L>  /  Alb  2.1<L>  /  TBili  0.6  /  DBili  x   /  AST  48<H>  /  ALT  21  /  AlkPhos  112  01-07    Liver panel trend:  TBili 0.6   /   AST 48   /   ALT 21   /   AlkP 112   /   Tptn 3.6   /   Alb 2.1    /   DBili --      01-07  TBili 0.7   /   AST 43   /   ALT 20   /   AlkP 136   /   Tptn 4.2   /   Alb 2.6    /   DBili --      01-06  TBili 0.9   /   AST 40   /   ALT 18   /   AlkP 89   /   Tptn 4.4   /   Alb 2.8    /   DBili --      01-04  TBili 0.7   /   AST 50   /   ALT 21   /   AlkP 80   /   Tptn 4.7   /   Alb 2.9    /   DBili --      01-03  TBili 0.8   /   AST 38   /   ALT 21   /   AlkP 75   /   Tptn 4.8   /   Alb 3.4    /   DBili --      01-02  TBili 0.9   /   AST 32   /   ALT 20   /   AlkP 67   /   Tptn 5.0   /   Alb 3.2    /   DBili --      01-01  TBili 0.8   /   AST 34   /   ALT 22   /   AlkP 72   /   Tptn 5.2   /   Alb 3.6    /   DBili --      12-31  TBili 0.8   /   AST 32   /   ALT 23   /   AlkP 67   /   Tptn 5.3   /   Alb 3.5    /   DBili --      12-30  TBili 1.2   /   AST 36   /   ALT 28   /   AlkP 66   /   Tptn 5.4   /   Alb 3.6    /   DBili --      12-29      PT/INR - ( 07 Jan 2023 11:59 )   PT: 20.60 sec;   INR: 1.78 ratio         PTT - ( 07 Jan 2023 11:59 )  PTT:38.0 sec    Culture - Blood (collected 05 Jan 2023 11:44)  Source: .Blood None  Preliminary Report (06 Jan 2023 19:02):    No growth to date.          Radiology:  CT Abdomen and Pelvis No Cont:   ACC: [id number] EXAM:  CT ABDOMEN AND PELVIS                          PROCEDURE DATE:  01/06/2023          INTERPRETATION:  CLINICAL HISTORY / REASON FOR EXAM: Abdominal distention.    TECHNIQUE: Contiguous axial CT images were obtained from the lower chest   to the pubic symphysis without intravenous contrast. Oral contrast was   not administered. Reformatted images in the coronal and sagittal planes   were acquired.    COMPARISON CT: CT abdomen and pelvis from May 29, 2012    OTHER STUDIES USEDFOR CORRELATION: None.      FINDINGS:    LOWER CHEST: Coronary arterial stents. Left basilar atelectasis and   partially visualized peribronchial thickening..    HEPATOBILIARY: Post cholecystectomy.    SPLEEN: Unremarkable.    PANCREAS: Atrophic pancreas.    ADRENAL GLANDS: Unremarkable.    KIDNEYS: No evidence of hydronephrosis. Perinephric fat stranding.    ABDOMINOPELVIC NODES: Unremarkable.    PELVIC ORGANS: Severe urinary bladder distention measuring up to 18.1 cm   in the craniocaudal dimension.    PERITONEUM/MESENTERY/BOWEL: Moderate distention of the stomach with   liquid content. Sigmoid diverticulosis. No bowel obstruction, ascites or   intraperitoneal free air. Normal caliber appendix.    BONES/SOFT TISSUES: Diffuse osteopenia. Degenerative changes of the   thoracolumbar spine. Metallic foreign body/surgical clips within the   right groin. Subacute appearing deformities of left posterior ribs #7 and   #8. Right lower abdominal wall subcutaneous emphysema, likely secondary   to injections.    VASCULAR: Calcified atherosclerotic disease within the abdominal aorta.      IMPRESSION:    Severe urinary bladder distention, suggesting urinary retention. Consider   Pena catheter placement if patient is unable to spontaneously void.    Moderate gastric distention with fluid contents. If clinically indicated,   consider enteric tube decompression.      Spoke with NATASHA ROCHE on 1/6/2023 7:07 PM with readback.    --- End of Report ---            JANET LEVY MD; Attending Radiologist  This document has been electronically signed. Jan 6 2023  7:08PM (01-06-23 @ 18:54)       Gastroenterology Consultation:    Patient is a 82y old  Female who presents with a chief complaint of NSTEMI (07 Jan 2023 12:22)    Admitted on: 12-25-22      HPI:  82F w/ pmhx of CHF, HLD, HTN, COPD on 3L home O2, DM1, dementia, GERD, MI in 1992, CAD with 5 cardiac stents who present with 1 month of worsening SOB. For past 1 month she has been having dry cough and SOB. She saw her pulmonologist Dr. Feldman who did CXR and showed it was normal at the time, had her on levaquin and then increased her dose which she completed last week. Yesterday she started having crushing chest pressure substernally. Denies fever during this month, nausea vomiting back pain abd pain urinary sx or diarrhea. No recent travel or sick contact. Her cardiologist is Dr. Munoz.  (25 Dec 2022 18:25)        Prior EGD:  no records available   Prior Colonoscopy:  no records available     PAST MEDICAL & SURGICAL HISTORY:  MI (myocardial infarction)  s/p 5 stents      HTN (hypertension)      High cholesterol      Heart failure      Non Hodgkin&#x27;s lymphoma  in remission. Follows with Dr Hernandez      PVD (peripheral vascular disease)      Thrombocytopenia      CKD (chronic kidney disease)      Osteoarthritis      Diabetes  on insulin      Coronary artery disease involving native heart without angina pectoris, unspecified vessel or lesion type  s/p 5 stents      History of cholecystectomy      H/O cardiac catheterization  5 STENTS      H/O carotid endarterectomy  RIGHT            FAMILY HISTORY:  FH: CAD (coronary artery disease)  FATHER  No FH of GI cancers       Social History:  Tobacco:  denies   Alcohol: denies   Drugs: denies     Home Medications:  albuterol 90 mcg/inh inhalation aerosol: 2 puff(s) inhaled every 6 hours, As Needed (25 Dec 2022 14:24)  atorvastatin 20 mg oral tablet: 1 tab(s) orally once a day (25 Dec 2022 14:24)  donepezil 10 mg oral tablet: 1 tab(s) orally once a day (at bedtime) (25 Dec 2022 14:24)  gabapentin: orally 2 times a day (25 Dec 2022 14:24)  inositol 650 mg oral tablet: 2 tab(s) orally once a day (25 Dec 2022 14:24)  Lasix 20 mg oral tablet: 1 tab(s) orally once a day (25 Dec 2022 14:24)  losartan 100 mg oral tablet: 1 tab(s) orally once a day (25 Dec 2022 14:24)  montelukast 10 mg oral tablet: 1 tab(s) orally once a day (25 Dec 2022 14:24)  NovoLOG 100 units/mL subcutaneous solution: 10,7,7  subcutaneous (25 Dec 2022 14:24)  omeprazole 40 mg oral delayed release capsule: 1 cap(s) orally once a day (25 Dec 2022 14:24)  omeprazole 40 mg oral delayed release capsule: 1 cap(s) orally once a day (25 Dec 2022 14:24)  Soliqua 100/33 subcutaneous solution: 35 unit(s) subcutaneous once a day (25 Dec 2022 14:24)  Spiriva 18 mcg inhalation capsule: 1 cap(s) inhaled once a day (25 Dec 2022 14:24)        MEDICATIONS  (STANDING):  albuterol/ipratropium for Nebulization 3 milliLiter(s) Nebulizer every 6 hours  aspirin enteric coated 81 milliGRAM(s) Oral daily  atorvastatin 40 milliGRAM(s) Oral at bedtime  chlorhexidine 2% Cloths 1 Application(s) Topical daily  ciprofloxacin     Tablet 250 milliGRAM(s) Oral every 24 hours  clopidogrel Tablet 75 milliGRAM(s) Oral daily  dextrose 5%. 1000 milliLiter(s) (100 mL/Hr) IV Continuous <Continuous>  dextrose 5%. 1000 milliLiter(s) (50 mL/Hr) IV Continuous <Continuous>  dextrose 50% Injectable 25 Gram(s) IV Push once  dextrose 50% Injectable 12.5 Gram(s) IV Push once  dextrose 50% Injectable 25 Gram(s) IV Push once  donepezil 10 milliGRAM(s) Oral at bedtime  dronabinol 5 milliGRAM(s) Oral two times a day before meals  glucagon  Injectable 1 milliGRAM(s) IntraMuscular once  insulin glargine Injectable (LANTUS) 18 Unit(s) SubCutaneous two times a day  insulin lispro (ADMELOG) corrective regimen sliding scale   SubCutaneous three times a day before meals  insulin lispro Injectable (ADMELOG) 6 Unit(s) SubCutaneous three times a day before meals  lactated ringers. 1000 milliLiter(s) (75 mL/Hr) IV Continuous <Continuous>  montelukast 10 milliGRAM(s) Oral daily  ondansetron Injectable 4 milliGRAM(s) IV Push every 8 hours  pantoprazole Infusion 8 mG/Hr (10 mL/Hr) IV Continuous <Continuous>  polyethylene glycol 3350 17 Gram(s) Oral daily  zinc oxide 40% Paste 1 Application(s) Topical daily    MEDICATIONS  (PRN):  aluminum hydroxide/magnesium hydroxide/simethicone Suspension 30 milliLiter(s) Oral every 4 hours PRN Dyspepsia  dextrose Oral Gel 15 Gram(s) Oral once PRN Blood Glucose LESS THAN 70 milliGRAM(s)/deciliter      Allergies  ACE inhibitors (Unknown)  BENZALKONIUM (Rash)  BETADINE (Rash)  Ceclor (Unknown)  codeine (Unknown)  contrast media (iodine-based) (Hives)  latex (Unknown)  penicillin (Unknown)  RUBBER GLOVES (Rash)  shellfish (Unknown)  sulfonamides (Unknown)      Review of Systems:   Constitutional:  No Fever, No Chills  ENT/Mouth:  No Hearing Changes,  No Difficulty Swallowing  Eyes:  No Eye Pain, No Vision Changes  Cardiovascular:  No Chest Pain, No Palpitations  Respiratory:  No Cough, No Dyspnea  Gastrointestinal:  As described in HPI  Musculoskeletal:  No Joint Swelling, No Back Pain  Skin:  No Skin Lesions, No Jaundice  Neuro:  No Syncope, No Dizziness  Heme/Lymph:  No Bruising, No Bleeding.          Physical Examination:  T(C): 35.8 (01-07-23 @ 05:57), Max: 36.1 (01-06-23 @ 20:04)  HR: 81 (01-07-23 @ 14:52) (80 - 99)  BP: 114/53 (01-07-23 @ 14:52) (84/35 - 130/62)  RR: 18 (01-07-23 @ 14:52) (18 - 19)  SpO2: 100% (01-07-23 @ 10:05) (100% - 100%)      01-06-23 @ 07:01  -  01-07-23 @ 07:00  --------------------------------------------------------  IN: 0 mL / OUT: 1400 mL / NET: -1400 mL    01-07-23 @ 07:01  -  01-07-23 @ 15:31  --------------------------------------------------------  IN: 0 mL / OUT: 520 mL / NET: -520 mL          GENERAL: AAOx1, no acute distress.  HEAD:  Atraumatic, Normocephalic  EYES: conjunctiva and sclera clear  NECK: Supple, no JVD or thyromegaly  CHEST/LUNG: Clear to auscultation bilaterally; No wheeze, rhonchi, or rales  HEART: Regular rate and rhythm; normal S1, S2, No murmurs.  ABDOMEN: Soft, nontender, nondistended; Bowel sounds present  NEUROLOGY: No asterixis or tremor.   SKIN: Intact, no jaundice        Data:                        6.9    21.01 )-----------( 127      ( 07 Jan 2023 11:59 )             20.1     Hgb Trend:  6.9  01-07-23 @ 11:59  8.4  01-07-23 @ 07:40  10.8  01-06-23 @ 07:17  11.2  01-05-23 @ 06:01        01-07    130<L>  |  91<L>  |  111<HH>  ----------------------------<  277<H>  4.7   |  25  |  1.9<H>    Ca    8.1<L>      07 Jan 2023 07:40    TPro  3.6<L>  /  Alb  2.1<L>  /  TBili  0.6  /  DBili  x   /  AST  48<H>  /  ALT  21  /  AlkPhos  112  01-07    Liver panel trend:  TBili 0.6   /   AST 48   /   ALT 21   /   AlkP 112   /   Tptn 3.6   /   Alb 2.1    /   DBili --      01-07  TBili 0.7   /   AST 43   /   ALT 20   /   AlkP 136   /   Tptn 4.2   /   Alb 2.6    /   DBili --      01-06  TBili 0.9   /   AST 40   /   ALT 18   /   AlkP 89   /   Tptn 4.4   /   Alb 2.8    /   DBili --      01-04  TBili 0.7   /   AST 50   /   ALT 21   /   AlkP 80   /   Tptn 4.7   /   Alb 2.9    /   DBili --      01-03  TBili 0.8   /   AST 38   /   ALT 21   /   AlkP 75   /   Tptn 4.8   /   Alb 3.4    /   DBili --      01-02  TBili 0.9   /   AST 32   /   ALT 20   /   AlkP 67   /   Tptn 5.0   /   Alb 3.2    /   DBili --      01-01  TBili 0.8   /   AST 34   /   ALT 22   /   AlkP 72   /   Tptn 5.2   /   Alb 3.6    /   DBili --      12-31  TBili 0.8   /   AST 32   /   ALT 23   /   AlkP 67   /   Tptn 5.3   /   Alb 3.5    /   DBili --      12-30  TBili 1.2   /   AST 36   /   ALT 28   /   AlkP 66   /   Tptn 5.4   /   Alb 3.6    /   DBili --      12-29      PT/INR - ( 07 Jan 2023 11:59 )   PT: 20.60 sec;   INR: 1.78 ratio         PTT - ( 07 Jan 2023 11:59 )  PTT:38.0 sec    Culture - Blood (collected 05 Jan 2023 11:44)  Source: .Blood None  Preliminary Report (06 Jan 2023 19:02):    No growth to date.          Radiology:  CT Abdomen and Pelvis No Cont:   ACC: [id number] EXAM:  CT ABDOMEN AND PELVIS                          PROCEDURE DATE:  01/06/2023          INTERPRETATION:  CLINICAL HISTORY / REASON FOR EXAM: Abdominal distention.    TECHNIQUE: Contiguous axial CT images were obtained from the lower chest   to the pubic symphysis without intravenous contrast. Oral contrast was   not administered. Reformatted images in the coronal and sagittal planes   were acquired.    COMPARISON CT: CT abdomen and pelvis from May 29, 2012    OTHER STUDIES USEDFOR CORRELATION: None.      FINDINGS:    LOWER CHEST: Coronary arterial stents. Left basilar atelectasis and   partially visualized peribronchial thickening..    HEPATOBILIARY: Post cholecystectomy.    SPLEEN: Unremarkable.    PANCREAS: Atrophic pancreas.    ADRENAL GLANDS: Unremarkable.    KIDNEYS: No evidence of hydronephrosis. Perinephric fat stranding.    ABDOMINOPELVIC NODES: Unremarkable.    PELVIC ORGANS: Severe urinary bladder distention measuring up to 18.1 cm   in the craniocaudal dimension.    PERITONEUM/MESENTERY/BOWEL: Moderate distention of the stomach with   liquid content. Sigmoid diverticulosis. No bowel obstruction, ascites or   intraperitoneal free air. Normal caliber appendix.    BONES/SOFT TISSUES: Diffuse osteopenia. Degenerative changes of the   thoracolumbar spine. Metallic foreign body/surgical clips within the   right groin. Subacute appearing deformities of left posterior ribs #7 and   #8. Right lower abdominal wall subcutaneous emphysema, likely secondary   to injections.    VASCULAR: Calcified atherosclerotic disease within the abdominal aorta.      IMPRESSION:    Severe urinary bladder distention, suggesting urinary retention. Consider   Pena catheter placement if patient is unable to spontaneously void.    Moderate gastric distention with fluid contents. If clinically indicated,   consider enteric tube decompression.      Spoke with NATASHA ROCHE on 1/6/2023 7:07 PM with readback.    --- End of Report ---            JANET LEVY MD; Attending Radiologist  This document has been electronically signed. Jan 6 2023  7:08PM (01-06-23 @ 18:54)

## 2023-01-07 NOTE — PROGRESS NOTE ADULT - SUBJECTIVE AND OBJECTIVE BOX
JERRICAERICDEVYN WILKESCE  82y  Female      Patient is a 82y old  Female who presents with a chief complaint of NSTEMI.      INTERVAL HPI/OVERNIGHT EVENTS:      ******************************* REVIEW OF SYSTEMS:*********************************************    All other review of systems negative    *********************** VITALS ******************************************    T(F): 96.4 (23 @ 05:57)  HR: 80 (23 @ 11:56) (80 - 99)  BP: 92/54 (23 @ 11:56) (84/35 - 133/62)  RR: 18 (23 @ 05:57) (18 - 20)  SpO2: 100% (23 @ 10:05) (100% - 100%)    23 @ 07:01  -  23 @ 07:00  --------------------------------------------------------  IN: 0 mL / OUT: 1400 mL / NET: -1400 mL            23 @ 07:01  -  23 @ 07:00  --------------------------------------------------------  IN: 0 mL / OUT: 1400 mL / NET: -1400 mL        ******************************** PHYSICAL EXAM:**************************************************  GENERAL: NAD    PSYCH: no agitation, baseline mentation  HEENT:     NERVOUS SYSTEM:  Alert & Oriented X3,  PULMONARY: DORA, CTA    CARDIOVASCULAR: S1S2 RRR    GI: Soft, NT, ND; BS present.    EXTREMITIES:  2+ Peripheral Pulses, No clubbing, cyanosis, or edema    LYMPH: No lymphadenopathy noted    SKIN: No rashes or lesions      **************************** LABS *******************************************************                          8.4    23.83 )-----------( 144      ( 2023 07:40 )             24.4         130<L>  |  91<L>  |  111<HH>  ----------------------------<  277<H>  4.7   |  25  |  1.9<H>    Ca    8.1<L>      2023 07:40    TPro  3.6<L>  /  Alb  2.1<L>  /  TBili  0.6  /  DBili  x   /  AST  48<H>  /  ALT  21  /  AlkPhos  112      ABG - ( 2023 14:31 )  pH, Arterial: 7.47  pH, Blood: x     /  pCO2: 28    /  pO2: 158   / HCO3: 20    / Base Excess: -2.4  /  SaO2: 97.8              Urinalysis Basic - ( 2023 12:40 )    Color: Yellow / Appearance: Clear / S.018 / pH: x  Gluc: x / Ketone: Negative  / Bili: Negative / Urobili: <2 mg/dL   Blood: x / Protein: Negative / Nitrite: Negative   Leuk Esterase: Large / RBC: 1 /HPF / WBC 27 /HPF   Sq Epi: x / Non Sq Epi: 0 /HPF / Bacteria: Negative        Lactate Trend        CAPILLARY BLOOD GLUCOSE      POCT Blood Glucose.: 228 mg/dL (2023 11:54)          **************************Active Medications *******************************************  ACE inhibitors (Unknown)  BENZALKONIUM (Rash)  BETADINE (Rash)  Ceclor (Unknown)  codeine (Unknown)  contrast media (iodine-based) (Hives)  latex (Unknown)  penicillin (Unknown)  RUBBER GLOVES (Rash)  shellfish (Unknown)  sulfonamides (Unknown)      albuterol/ipratropium for Nebulization 3 milliLiter(s) Nebulizer every 6 hours  aluminum hydroxide/magnesium hydroxide/simethicone Suspension 30 milliLiter(s) Oral every 4 hours PRN  aspirin enteric coated 81 milliGRAM(s) Oral daily  atorvastatin 40 milliGRAM(s) Oral at bedtime  chlorhexidine 2% Cloths 1 Application(s) Topical daily  ciprofloxacin     Tablet 250 milliGRAM(s) Oral every 24 hours  clopidogrel Tablet 75 milliGRAM(s) Oral daily  dextrose 5%. 1000 milliLiter(s) IV Continuous <Continuous>  dextrose 5%. 1000 milliLiter(s) IV Continuous <Continuous>  dextrose 50% Injectable 25 Gram(s) IV Push once  dextrose 50% Injectable 12.5 Gram(s) IV Push once  dextrose 50% Injectable 25 Gram(s) IV Push once  dextrose Oral Gel 15 Gram(s) Oral once PRN  donepezil 10 milliGRAM(s) Oral at bedtime  dronabinol 5 milliGRAM(s) Oral two times a day before meals  glucagon  Injectable 1 milliGRAM(s) IntraMuscular once  insulin glargine Injectable (LANTUS) 18 Unit(s) SubCutaneous two times a day  insulin lispro (ADMELOG) corrective regimen sliding scale   SubCutaneous three times a day before meals  insulin lispro Injectable (ADMELOG) 6 Unit(s) SubCutaneous three times a day before meals  lactated ringers. 1000 milliLiter(s) IV Continuous <Continuous>  montelukast 10 milliGRAM(s) Oral daily  ondansetron Injectable 4 milliGRAM(s) IV Push every 8 hours  pantoprazole Infusion 8 mG/Hr IV Continuous <Continuous>  polyethylene glycol 3350 17 Gram(s) Oral daily  zinc oxide 40% Paste 1 Application(s) Topical daily      ***************************************************  RADIOLOGY & ADDITIONAL TESTS:    Imaging Personally Reviewed:  [ ] YES  [ ] NO    HEALTH ISSUES - PROBLEM Dx:

## 2023-01-07 NOTE — PROGRESS NOTE ADULT - SUBJECTIVE AND OBJECTIVE BOX
Cardiology Follow up NSTEMI s/p PCI pLAD on 12/29    VENKATESH RAMACHANDRAN   82y Female  PAST MEDICAL & SURGICAL HISTORY:  MI (myocardial infarction)  s/p 5 stents      HTN (hypertension)      High cholesterol      Heart failure      Non Hodgkin&#x27;s lymphoma  in remission. Follows with Dr Hernandez      PVD (peripheral vascular disease)      Thrombocytopenia      CKD (chronic kidney disease)      Osteoarthritis      Diabetes  on insulin      Coronary artery disease involving native heart without angina pectoris, unspecified vessel or lesion type  s/p 5 stents      History of cholecystectomy      H/O cardiac catheterization  5 STENTS      H/O carotid endarterectomy  RIGHT           HPI:  82F w/ pmhx of CHF, HLD, HTN, COPD on 3L home O2, DM1, dementia, GERD, MI in 1992, CAD with 5 cardiac stents who present with 1 month of worsening SOB. For past 1 month she has been having dry cough and SOB. She saw her pulmonologist Dr. Feldman who did CXR and showed it was normal at the time, had her on levaquin and then increased her dose which she completed last week. Yesterday she started having crushing chest pressure substernally. Denies fever during this month, nausea vomiting back pain abd pain urinary sx or diarrhea. No recent travel or sick contact. Her cardiologist is Dr. Munoz.  (25 Dec 2022 18:25)    Allergies    ACE inhibitors (Unknown)  BENZALKONIUM (Rash)  BETADINE (Rash)  Ceclor (Unknown)  codeine (Unknown)  contrast media (iodine-based) (Hives)  latex (Unknown)  penicillin (Unknown)  RUBBER GLOVES (Rash)  shellfish (Unknown)  sulfonamides (Unknown)    Intolerances      Patient reports nausea, poor appetite and abdominal discomfort  Denies CP, SOB, palpitations, or dizziness    Vital Signs Last 24 Hrs  T(C): 35.8 (07 Jan 2023 05:57), Max: 36.3 (06 Jan 2023 12:55)  T(F): 96.4 (07 Jan 2023 05:57), Max: 97.4 (06 Jan 2023 12:55)  HR: 90 (07 Jan 2023 05:57) (90 - 99)  BP: 120/57 (07 Jan 2023 05:57) (119/58 - 133/62)  BP(mean): --  RR: 18 (07 Jan 2023 05:57) (18 - 20)  SpO2: 100% (07 Jan 2023 08:00) (100% - 100%)    Parameters below as of 07 Jan 2023 08:00  Patient On (Oxygen Delivery Method): nasal cannula  O2 Flow (L/min): 2      MEDICATIONS  (STANDING):  albuterol/ipratropium for Nebulization 3 milliLiter(s) Nebulizer every 6 hours  aspirin enteric coated 81 milliGRAM(s) Oral daily  atorvastatin 40 milliGRAM(s) Oral at bedtime  chlorhexidine 2% Cloths 1 Application(s) Topical daily  ciprofloxacin     Tablet 250 milliGRAM(s) Oral every 24 hours  clopidogrel Tablet 75 milliGRAM(s) Oral daily  dextrose 5%. 1000 milliLiter(s) (100 mL/Hr) IV Continuous <Continuous>  dextrose 5%. 1000 milliLiter(s) (50 mL/Hr) IV Continuous <Continuous>  dextrose 50% Injectable 25 Gram(s) IV Push once  dextrose 50% Injectable 12.5 Gram(s) IV Push once  dextrose 50% Injectable 25 Gram(s) IV Push once  donepezil 10 milliGRAM(s) Oral at bedtime  dronabinol 5 milliGRAM(s) Oral two times a day before meals  glucagon  Injectable 1 milliGRAM(s) IntraMuscular once  heparin   Injectable 5000 Unit(s) SubCutaneous every 12 hours  insulin glargine Injectable (LANTUS) 18 Unit(s) SubCutaneous two times a day  insulin lispro (ADMELOG) corrective regimen sliding scale   SubCutaneous three times a day before meals  insulin lispro Injectable (ADMELOG) 6 Unit(s) SubCutaneous three times a day before meals  lactated ringers. 1000 milliLiter(s) (75 mL/Hr) IV Continuous <Continuous>  metoprolol succinate ER 50 milliGRAM(s) Oral daily  montelukast 10 milliGRAM(s) Oral daily  ondansetron Injectable 4 milliGRAM(s) IV Push every 8 hours  polyethylene glycol 3350 17 Gram(s) Oral daily  zinc oxide 40% Paste 1 Application(s) Topical daily    MEDICATIONS  (PRN):  aluminum hydroxide/magnesium hydroxide/simethicone Suspension 30 milliLiter(s) Oral every 4 hours PRN Dyspepsia  dextrose Oral Gel 15 Gram(s) Oral once PRN Blood Glucose LESS THAN 70 milliGRAM(s)/deciliter      REVIEW OF SYSTEMS:          CONSTITUTIONAL: + weakness,           EYES/ENT: No visual changes;  No vertigo or throat pain           NECK: No pain or stiffness          RESPIRATORY: No cough, wheezing, hemoptysis          CARDIOVASCULAR: no pain, no ROJAS, no palpitations           GASTROINTESTINAL: + abdominal. + nausea, no vomiting, or hematemesis;           GENITOURINARY: No dysuria, frequency or hematuria          NEUROLOGICAL: No numbness or weakness          SKIN: No itching, rashes    PHYSICAL EXAM:           CONSTITUTIONAL: weak and frail  	SKIN: warm, dry  	HEAD: Normocephalic; atraumatic  	EYES: PERRL.  	ENT: No nasal discharge, airway clear, mucous membranes moist  	NECK: Supple; non tender.  	CARD: +S1, +S2, no murmurs, gallops, or rubs. (Regular) rate and rhythm    	RESP: decreased b/l bases  	ABD: soft and distended, + BS x 4 quadrants, + pain upon palpation   	EXT: moves all extremities,  no clubbing, cyanosis or edema  	NEURO: Alert and oriented x3, no focal deficits          PSYCH: Cooperative, appropriate          VASCULAR:  + Rad / + PTs / + DPs          EXTREMITY:             Right Groin:  Dressing removed, access site soft, + pulses, no hematoma, no pain, no numbness, no signs and symptoms of infection                                                                                                                          2D ECHO:   ACC: [id number] EXAM:  ECHO TTE WO CON COMP W DOPP                          PROCEDURE DATE:  12/26/2022          INTERPRETATION:   [place] [address]                Phone: [phone].   TRANSTHORACIC ECHOCARDIOGRAM REPORT        Patient Name:   VENKATESH RAMACHANDRAN Accession #: [id number]  Medical Rec #:  [id number]    Height:      70.0 in 177.8 cm  YOB: 1940    Weight:      138.0 lb 62.60 kg  Patient Age:    82 years     BSA:         1.78 m²  Patient Gender: F            BP:          144/65 mmHg      Date of Exam:        12/26/2022 10:40:56 AM  Referring Physician: [id number] ED UNASSIGNED  Sonographer:         EDNA  Reading Physician:   Sigifredo De Santiago M.D.    Procedure:   2D Echo/Doppler/Color Doppler Complete.  Indications: I21.3 - ST Elevation STEMI Myocardial Infarction of   unspecified  Diagnosis:   I21.3 - ST Elevation STEMI Myocardial Infarction of   unspecified        Summary:   1. LV Ejection Fraction by Baird's Method with a biplane EF of 78 %.   2. Normal left atrial size.   3. Mild mitral annular calcification.   4. Mild mitral valve regurgitation.   5. Mild tricuspid regurgitation.   6. Normal trileaflet aortic valvewith normal opening.   7. Mild pulmonic valve regurgitation.   8. Estimated pulmonary artery systolic pressure is 73.8 mmHg assuming a   right atrial pressure of 3 mmHg, which is consistent with severe   pulmonary hypertension.   9. There is mild aortic root calcification.      ACC: [id number] EXAM:  CT ABDOMEN AND PELVIS                          PROCEDURE DATE:  01/06/2023          INTERPRETATION:  CLINICAL HISTORY / REASON FOR EXAM: Abdominal distention.    TECHNIQUE: Contiguous axial CT images were obtained from the lower chest   to the pubic symphysis without intravenous contrast. Oral contrast was   not administered. Reformatted images in the coronal and sagittal planes   were acquired.    COMPARISON CT: CT abdomen and pelvis from May 29, 2012    OTHER STUDIES USEDFOR CORRELATION: None.      FINDINGS:    LOWER CHEST: Coronary arterial stents. Left basilar atelectasis and   partially visualized peribronchial thickening..    HEPATOBILIARY: Post cholecystectomy.    SPLEEN: Unremarkable.    PANCREAS: Atrophic pancreas.    ADRENAL GLANDS: Unremarkable.    KIDNEYS: No evidence of hydronephrosis. Perinephric fat stranding.    ABDOMINOPELVIC NODES: Unremarkable.    PELVIC ORGANS: Severe urinary bladder distention measuring up to 18.1 cm   in the craniocaudal dimension.    PERITONEUM/MESENTERY/BOWEL: Moderate distention of the stomach with   liquid content. Sigmoid diverticulosis. No bowel obstruction, ascites or   intraperitoneal free air. Normal caliber appendix.    BONES/SOFT TISSUES: Diffuse osteopenia. Degenerative changes of the   thoracolumbar spine. Metallic foreign body/surgical clips within the   right groin. Subacute appearing deformities of left posterior ribs #7 and   #8. Right lower abdominal wall subcutaneous emphysema, likely secondary   to injections.    VASCULAR: Calcified atherosclerotic disease within the abdominal aorta.      IMPRESSION:    Severe urinary bladder distention, suggesting urinary retention. Consider   Pena catheter placement if patient is unable to spontaneously void.    Moderate gastric distention with fluid contents. If clinically indicated,   consider enteric tube decompression.        LABS:                        8.4    23.83 )-----------( 144      ( 07 Jan 2023 07:40 )             24.4     01-07    130<L>  |  91<L>  |  111<HH>  ----------------------------<  277<H>  4.7   |  25  |  1.9<H>    Ca    8.1<L>      07 Jan 2023 07:40    TPro  3.6<L>  /  Alb  2.1<L>  /  TBili  0.6  /  DBili  x   /  AST  48<H>  /  ALT  21  /  AlkPhos  112  01-07        LIVER FUNCTIONS - ( 07 Jan 2023 07:40 )  Alb: 2.1 g/dL / Pro: 3.6 g/dL / ALK PHOS: 112 U/L / ALT: 21 U/L / AST: 48 U/L / GGT: x             A/P:  I discussed the case with Cardiologist    and recommend the following:    NSTEMI  S/P PCI pLAD KHUSHBU x 1      Intervention: s/p successful IVUS guided rotational atherectomy and balloon angioplasty with KHUSHBU X 1 to prox LAD 90% stenosis extending into mid LAD    Implants: [place] 3.5 X 30 to prox LAD        	     Continue DAPT (  Aspirin 81 mg PO Daily and Plavix 75 mg po daily ), B-Blocker, Statin Therapy                    No ACEi/ARB at this time due to elevated Cr and allergy to ACEi                   DVT/GI prophylaxis                   Trend Hgb/Cr                   Keep K = 4, Mg = 2                   PT / OOB to chair, ambulate with assistance                    monitor access site/pulses                   Patient agreeing to take DAPT for at least one year or as directed by cardiologist                    Pt given instructions on importance of taking antiplatelet medication or risk acute stent thrombosis/death                   Post cath instructions, access site care and activity restrictions reviewed with patient                      Benefits of Cardiac Rehab discussed with patient and all documents sent to [facility]hab [facility]                   Patient instructed to call Cardiac Rehab and make first appointment after first f/u visit with Cardiologist                    Discussed with patient to return to hospital if experience chest pain, shortness breath, dizziness and site bleeding                   Aggressive risk factor modification, diet counseling, smoking cessation discussed with patient                                                        Cardiology Follow up NSTEMI s/p PCI pLAD on 12/29    VENKATESH RAMACHANDRAN   82y Female  PAST MEDICAL & SURGICAL HISTORY:  MI (myocardial infarction)  s/p 5 stents      HTN (hypertension)      High cholesterol      Heart failure      Non Hodgkin&#x27;s lymphoma  in remission. Follows with Dr Hernandez      PVD (peripheral vascular disease)      Thrombocytopenia      CKD (chronic kidney disease)      Osteoarthritis      Diabetes  on insulin      Coronary artery disease involving native heart without angina pectoris, unspecified vessel or lesion type  s/p 5 stents      History of cholecystectomy      H/O cardiac catheterization  5 STENTS      H/O carotid endarterectomy  RIGHT           HPI:  82F w/ pmhx of CHF, HLD, HTN, COPD on 3L home O2, DM1, dementia, GERD, MI in 1992, CAD with 5 cardiac stents who present with 1 month of worsening SOB. For past 1 month she has been having dry cough and SOB. She saw her pulmonologist Dr. Feldman who did CXR and showed it was normal at the time, had her on levaquin and then increased her dose which she completed last week. Yesterday she started having crushing chest pressure substernally. Denies fever during this month, nausea vomiting back pain abd pain urinary sx or diarrhea. No recent travel or sick contact. Her cardiologist is Dr. Munoz.  (25 Dec 2022 18:25)    Allergies    ACE inhibitors (Unknown)  BENZALKONIUM (Rash)  BETADINE (Rash)  Ceclor (Unknown)  codeine (Unknown)  contrast media (iodine-based) (Hives)  latex (Unknown)  penicillin (Unknown)  RUBBER GLOVES (Rash)  shellfish (Unknown)  sulfonamides (Unknown)    Intolerances      Patient reports nausea, poor appetite and abdominal discomfort  Denies CP, SOB, palpitations, or dizziness    Vital Signs Last 24 Hrs  T(C): 35.8 (07 Jan 2023 05:57), Max: 36.3 (06 Jan 2023 12:55)  T(F): 96.4 (07 Jan 2023 05:57), Max: 97.4 (06 Jan 2023 12:55)  HR: 90 (07 Jan 2023 05:57) (90 - 99)  BP: 120/57 (07 Jan 2023 05:57) (119/58 - 133/62)  BP(mean): --  RR: 18 (07 Jan 2023 05:57) (18 - 20)  SpO2: 100% (07 Jan 2023 08:00) (100% - 100%)    Parameters below as of 07 Jan 2023 08:00  Patient On (Oxygen Delivery Method): nasal cannula  O2 Flow (L/min): 2      MEDICATIONS  (STANDING):  albuterol/ipratropium for Nebulization 3 milliLiter(s) Nebulizer every 6 hours  aspirin enteric coated 81 milliGRAM(s) Oral daily  atorvastatin 40 milliGRAM(s) Oral at bedtime  chlorhexidine 2% Cloths 1 Application(s) Topical daily  ciprofloxacin     Tablet 250 milliGRAM(s) Oral every 24 hours  clopidogrel Tablet 75 milliGRAM(s) Oral daily  dextrose 5%. 1000 milliLiter(s) (100 mL/Hr) IV Continuous <Continuous>  dextrose 5%. 1000 milliLiter(s) (50 mL/Hr) IV Continuous <Continuous>  dextrose 50% Injectable 25 Gram(s) IV Push once  dextrose 50% Injectable 12.5 Gram(s) IV Push once  dextrose 50% Injectable 25 Gram(s) IV Push once  donepezil 10 milliGRAM(s) Oral at bedtime  dronabinol 5 milliGRAM(s) Oral two times a day before meals  glucagon  Injectable 1 milliGRAM(s) IntraMuscular once  heparin   Injectable 5000 Unit(s) SubCutaneous every 12 hours  insulin glargine Injectable (LANTUS) 18 Unit(s) SubCutaneous two times a day  insulin lispro (ADMELOG) corrective regimen sliding scale   SubCutaneous three times a day before meals  insulin lispro Injectable (ADMELOG) 6 Unit(s) SubCutaneous three times a day before meals  lactated ringers. 1000 milliLiter(s) (75 mL/Hr) IV Continuous <Continuous>  metoprolol succinate ER 50 milliGRAM(s) Oral daily  montelukast 10 milliGRAM(s) Oral daily  ondansetron Injectable 4 milliGRAM(s) IV Push every 8 hours  polyethylene glycol 3350 17 Gram(s) Oral daily  zinc oxide 40% Paste 1 Application(s) Topical daily    MEDICATIONS  (PRN):  aluminum hydroxide/magnesium hydroxide/simethicone Suspension 30 milliLiter(s) Oral every 4 hours PRN Dyspepsia  dextrose Oral Gel 15 Gram(s) Oral once PRN Blood Glucose LESS THAN 70 milliGRAM(s)/deciliter      REVIEW OF SYSTEMS:          CONSTITUTIONAL: + weakness,           EYES/ENT: No visual changes;  No vertigo or throat pain           NECK: No pain or stiffness          RESPIRATORY: No cough, wheezing, hemoptysis          CARDIOVASCULAR: no pain, no ROJAS, no palpitations           GASTROINTESTINAL: + abdominal. + nausea, no vomiting, or hematemesis;           GENITOURINARY: No dysuria, frequency or hematuria          NEUROLOGICAL: No numbness or weakness          SKIN: No itching, rashes    PHYSICAL EXAM:           CONSTITUTIONAL: weak and frail  	SKIN: warm, dry  	HEAD: Normocephalic; atraumatic  	EYES: PERRL.  	ENT: No nasal discharge, airway clear, mucous membranes moist  	NECK: Supple; non tender.  	CARD: +S1, +S2, no murmurs, gallops, or rubs. (Regular) rate and rhythm    	RESP: decreased b/l bases  	ABD: soft and distended, + BS x 4 quadrants, + pain upon palpation   	EXT: moves all extremities,  no clubbing, cyanosis or edema  	NEURO: Alert and oriented x3, no focal deficits          PSYCH: Cooperative, appropriate          VASCULAR:  + Rad / + PTs / + DPs          EXTREMITY:             Right Groin:  Dressing removed, access site soft, + pulses, no hematoma, no pain, no numbness, no signs and symptoms of infection                                                                                                                          2D ECHO:   ACC: [id number] EXAM:  ECHO TTE WO CON COMP W DOPP                          PROCEDURE DATE:  12/26/2022          INTERPRETATION:   [place] [address]                Phone: [phone].   TRANSTHORACIC ECHOCARDIOGRAM REPORT        Patient Name:   VENKATESH RAMACHANDRAN Accession #: [id number]  Medical Rec #:  [id number]    Height:      70.0 in 177.8 cm  YOB: 1940    Weight:      138.0 lb 62.60 kg  Patient Age:    82 years     BSA:         1.78 m²  Patient Gender: F            BP:          144/65 mmHg      Date of Exam:        12/26/2022 10:40:56 AM  Referring Physician: [id number] ED UNASSIGNED  Sonographer:         EDNA  Reading Physician:   Sigifredo De Santiago M.D.    Procedure:   2D Echo/Doppler/Color Doppler Complete.  Indications: I21.3 - ST Elevation STEMI Myocardial Infarction of   unspecified  Diagnosis:   I21.3 - ST Elevation STEMI Myocardial Infarction of   unspecified        Summary:   1. LV Ejection Fraction by Baird's Method with a biplane EF of 78 %.   2. Normal left atrial size.   3. Mild mitral annular calcification.   4. Mild mitral valve regurgitation.   5. Mild tricuspid regurgitation.   6. Normal trileaflet aortic valvewith normal opening.   7. Mild pulmonic valve regurgitation.   8. Estimated pulmonary artery systolic pressure is 73.8 mmHg assuming a   right atrial pressure of 3 mmHg, which is consistent with severe   pulmonary hypertension.   9. There is mild aortic root calcification.      ACC: [id number] EXAM:  CT ABDOMEN AND PELVIS                          PROCEDURE DATE:  01/06/2023          INTERPRETATION:  CLINICAL HISTORY / REASON FOR EXAM: Abdominal distention.    TECHNIQUE: Contiguous axial CT images were obtained from the lower chest   to the pubic symphysis without intravenous contrast. Oral contrast was   not administered. Reformatted images in the coronal and sagittal planes   were acquired.    COMPARISON CT: CT abdomen and pelvis from May 29, 2012    OTHER STUDIES USEDFOR CORRELATION: None.      FINDINGS:    LOWER CHEST: Coronary arterial stents. Left basilar atelectasis and   partially visualized peribronchial thickening..    HEPATOBILIARY: Post cholecystectomy.    SPLEEN: Unremarkable.    PANCREAS: Atrophic pancreas.    ADRENAL GLANDS: Unremarkable.    KIDNEYS: No evidence of hydronephrosis. Perinephric fat stranding.    ABDOMINOPELVIC NODES: Unremarkable.    PELVIC ORGANS: Severe urinary bladder distention measuring up to 18.1 cm   in the craniocaudal dimension.    PERITONEUM/MESENTERY/BOWEL: Moderate distention of the stomach with   liquid content. Sigmoid diverticulosis. No bowel obstruction, ascites or   intraperitoneal free air. Normal caliber appendix.    BONES/SOFT TISSUES: Diffuse osteopenia. Degenerative changes of the   thoracolumbar spine. Metallic foreign body/surgical clips within the   right groin. Subacute appearing deformities of left posterior ribs #7 and   #8. Right lower abdominal wall subcutaneous emphysema, likely secondary   to injections.    VASCULAR: Calcified atherosclerotic disease within the abdominal aorta.      IMPRESSION:    Severe urinary bladder distention, suggesting urinary retention. Consider   Pena catheter placement if patient is unable to spontaneously void.    Moderate gastric distention with fluid contents. If clinically indicated,   consider enteric tube decompression.        LABS:                        8.4    23.83 )-----------( 144      ( 07 Jan 2023 07:40 )             24.4     01-07    130<L>  |  91<L>  |  111<HH>  ----------------------------<  277<H>  4.7   |  25  |  1.9<H>    Ca    8.1<L>      07 Jan 2023 07:40    TPro  3.6<L>  /  Alb  2.1<L>  /  TBili  0.6  /  DBili  x   /  AST  48<H>  /  ALT  21  /  AlkPhos  112  01-07        LIVER FUNCTIONS - ( 07 Jan 2023 07:40 )  Alb: 2.1 g/dL / Pro: 3.6 g/dL / ALK PHOS: 112 U/L / ALT: 21 U/L / AST: 48 U/L / GGT: x             A/P:  I discussed the case with Cardiologist Dr. Leong  and recommend the following:    NSTEMI  S/P PCI pLAD KHUSHBU x 1      Intervention: s/p successful IVUS guided rotational atherectomy and balloon angioplasty with KHUSHBU X 1 to prox LAD 90% stenosis extending into mid LAD    Implants: [place] 3.5 X 30 to prox LAD        	     Continue DAPT (  Aspirin 81 mg PO Daily and Plavix 75 mg po daily ), B-Blocker, Statin Therapy                    No ACEi/ARB at this time due to elevated Cr and allergy to ACEi                   DVT/GI prophylaxis                   Trend Hgb/Cr                   obtain stool for guiac, GI c/s for documented black tarry stools and drop in Hgb                   Keep K = 4, Mg = 2                   PT / OOB to chair, ambulate with assistance                    monitor access site/pulses                   Patient agreeing to take DAPT for at least one year or as directed by cardiologist                    Pt given instructions on importance of taking antiplatelet medication or risk acute stent thrombosis/death                   Post cath instructions, access site care and activity restrictions reviewed with patient                      Benefits of Cardiac Rehab discussed with patient and all documents sent to [facility] Rehab Center                   Patient instructed to call Cardiac Rehab and make first appointment after first f/u visit with Cardiologist                    Discussed with patient to return to hospital if experience chest pain, shortness breath, dizziness and site bleeding                   Aggressive risk factor modification, diet counseling, smoking cessation discussed with patient

## 2023-01-07 NOTE — PROGRESS NOTE ADULT - ASSESSMENT
83 yo F w/ pmhx of CHF, HLD, HTN, COPD on 3L home O2, DM1, dementia, GERD, MI in 1992, CAD w cardiac stents presented to the ED on 12/25 with progressive SOB of breath for the last month. On 12/26 was admitted to [facility] after experiencing NSTEMI and acute on chronic HFpEF - scheduled for mid LA atherectomy today due to 2 vessel CAD.     # Acute blood loss anemia from possible UGI bleed      Having  Black Tarry stool / Melena since 01/06/23     Hb droped 11 > 10 > 8.4 > pending another from today      Pt on DAPT for recent PCI with KHUSHBU.     F/U GI      Would transfuse given recent CAD/PCI to keep hb > 10     IVF      CBC Q12 ,IV/ ? PPI drip     #NSTEMI     Hx of Chronic Diastolic CHF / HFpEF   -s/p PCI LAD without complication  - s/p CATH: 90% prix and mid LAD s/p rota atherectomy, balloon angioplasty with KHUSHBU X 1 to prox LAD 90% stenosis extending into mid LAD  - Echo: preserved EF. Severe pulmonary hypertension  - DAPT with ASA/Plavix.   - Lipitor, Toprol 25mg QD.     #Leukocytosis with bowel distension    - ruled out  C diff colitis  -  Surgery input noted >> possible Ileus > also with current possible UGI bleed > keep NPO     c/w IVF     send Urine cx     #DM  -Blood sugars labile.  - continue to monitor fingersticks and ISS    # Acute Kidney Injury on CKD 3B  - Cr 1.6 >>> 1.8 today  - IV Hydration , trend Cr and Lactate.     # Poor PO intake  >> added Marinol and Ensure/    #misc  - c/w Supportive care    #Progress Note Handoff  Pending (specify): ? Active GI bleed / Ileus/   Family discussion: d/w    Disposition: Home_with [facility]

## 2023-01-07 NOTE — CONSULT NOTE ADULT - ASSESSMENT
82 year old female with pmhx CAD/MI s/p 5 stents, CHFpEF, COPD (3L home O2), DM1, GERD, HLD, ?dementia presents for worsening SOB for one month with x2 days of crushing substernal chest pressure. Found to have NSTEMI and PNA and CHF exacerbation. Pt treated with IV abx and IV diuretics. On 12/29/22 Underwent catheterization with KHUSHBU X 1 to prox LAD 90% stenosis extending into mid LAD. Was managed in CCU and then downgraded to floors. On floors pt developed abdominal distension, epigastric pain, and melena with acute drop in Hb on 1/7/23.    # Melena and anemia R/O PUD, AVMs or other vascular lesions.   - Recent ACS with cardiac stent 12/29.   - on DAPTs  - hemoglobin dropped to 6.9   - hypotension/tachycardia. stable VS after resuscitation    PLAN  PPI infusion   transfuse to keep hemoglobin > 8   2 large IVs"  MICU monitoring   Discussed the case with Dr Ji, the risk of endoscopic intervention at this point may outweighs the benefit. as patient didn't have any further episodes of melena during the day. Will continue with the plan above and will consider EGD after optimization of the cardiopulmonary status or in urgently if indicated. will monitor closely

## 2023-01-07 NOTE — CONSULT NOTE ADULT - ATTENDING COMMENTS
suspected UGIB on DAPT for very recent acute coronary event. Hx of cardiopulmonary disease which confers a substantial risk to any intervention. Will discuss with pulm and cardio the risk and room for optimization prior to an EGD. PPI for now, NPO.
avoid tight diabetes control, avoid complicated insulin regimens. try stopping lispro, try glargine 14 units twice daily, when she goes home, can resume her usual injections.
CKD 3 stable  Pt at moderate risk for contrast induced nephropathy  Recs as above
as above. 82F w/ pmhx of CHF, HLD, HTN, COPD on 3L home O2, DM1, dementia, GERD, MI in 1992, CAD with 5 cardiac stents; admitted for NSTEMI s/p cath x 2. on DAPT. Severe Pulm HTN. called for abdominal pain/distension; CT reviewed; distended stomach, unclear etiology. no other bowel dilated or free air. large urinary bladder, possible source of pain and distension; reportedly having bowel function. Rec stockton catheter. and if N/V then NG decompression.

## 2023-01-07 NOTE — PROGRESS NOTE ADULT - SUBJECTIVE AND OBJECTIVE BOX
GENERAL SURGERY PROGRESS NOTE    Patient: VENKATESH RAMACHANDRAN , 82y (38)Female   MRN: [id number]  Location: [address]  Visit: 22 Inpatient  Date: 23 @ 02:59    Procedure/Dx/Injuries: r/o SBO    Events of past 24 hours: Surgery consulted for persistent abdominal pain and distension and associated with a loose dark BM. Patient tolerating regular diet and is passing flatus and having bowel movements. Urology called for difficult stockton placement - placement of a 14F stockton catheter with return of 1400cc of urine.     PAST MEDICAL & SURGICAL HISTORY:  MI (myocardial infarction)  s/p 5 stents      HTN (hypertension)      High cholesterol      Heart failure      Non Hodgkin&#x27;s lymphoma  in remission. Follows with Dr Hernandez      PVD (peripheral vascular disease)      Thrombocytopenia      CKD (chronic kidney disease)      Osteoarthritis      Diabetes  on insulin      Coronary artery disease involving native heart without angina pectoris, unspecified vessel or lesion type  s/p 5 stents      History of cholecystectomy      H/O cardiac catheterization  5 STENTS      H/O carotid endarterectomy  RIGHT          Vitals:   T(F): 96.9 (23 @ 20:04), Max: 97.4 (23 @ 12:55)  HR: 99 (23 @ 20:04)  BP: 130/62 (23 @ 20:04)  RR: 19 (23 @ 20:04)  SpO2: 100% (23 @ 20:04)      Diet, Consistent Carbohydrate w/Evening Snack:   Supplement Feeding Modality:  Oral  Ensure Plus High Protein Cans or Servings Per Day:  1       Frequency:  Three Times a day      Fluids: lactated ringers.: Solution, 1000 milliLiter(s) infuse at 75 mL/Hr      I & O's:    PHYSICAL EXAM:  General: Asleep but easily awoken  HEENT: NCAT. on supplemental O2   Cardiac: Reg rate  Respiratory: No use of accessary muscles  Abdomen: Softly distended. Diffusely tender without guarding or rigidity  Skin: Warm/dry, normal color      MEDICATIONS  (STANDING):  albuterol/ipratropium for Nebulization 3 milliLiter(s) Nebulizer every 6 hours  aspirin enteric coated 81 milliGRAM(s) Oral daily  atorvastatin 40 milliGRAM(s) Oral at bedtime  chlorhexidine 2% Cloths 1 Application(s) Topical daily  ciprofloxacin     Tablet 250 milliGRAM(s) Oral every 24 hours  clopidogrel Tablet 75 milliGRAM(s) Oral daily  dextrose 5%. 1000 milliLiter(s) (100 mL/Hr) IV Continuous <Continuous>  dextrose 5%. 1000 milliLiter(s) (50 mL/Hr) IV Continuous <Continuous>  dextrose 50% Injectable 25 Gram(s) IV Push once  dextrose 50% Injectable 12.5 Gram(s) IV Push once  dextrose 50% Injectable 25 Gram(s) IV Push once  donepezil 10 milliGRAM(s) Oral at bedtime  dronabinol 5 milliGRAM(s) Oral two times a day before meals  glucagon  Injectable 1 milliGRAM(s) IntraMuscular once  heparin   Injectable 5000 Unit(s) SubCutaneous every 12 hours  insulin glargine Injectable (LANTUS) 18 Unit(s) SubCutaneous two times a day  insulin lispro (ADMELOG) corrective regimen sliding scale   SubCutaneous three times a day before meals  insulin lispro Injectable (ADMELOG) 6 Unit(s) SubCutaneous three times a day before meals  lactated ringers. 1000 milliLiter(s) (75 mL/Hr) IV Continuous <Continuous>  metoprolol succinate ER 50 milliGRAM(s) Oral daily  montelukast 10 milliGRAM(s) Oral daily  ondansetron Injectable 4 milliGRAM(s) IV Push every 8 hours  polyethylene glycol 3350 17 Gram(s) Oral daily  zinc oxide 40% Paste 1 Application(s) Topical daily    MEDICATIONS  (PRN):  aluminum hydroxide/magnesium hydroxide/simethicone Suspension 30 milliLiter(s) Oral every 4 hours PRN Dyspepsia  dextrose Oral Gel 15 Gram(s) Oral once PRN Blood Glucose LESS THAN 70 milliGRAM(s)/deciliter      DVT PROPHYLAXIS: heparin   Injectable 5000 Unit(s) SubCutaneous every 12 hours    GI PROPHYLAXIS:   ANTICOAGULATION:   ANTIBIOTICS:  ciprofloxacin     Tablet 250 milliGRAM(s)        Isolation Precautions:     Isolation Type: CONTACT;  Indication for Isolation: Clostridium difficile    Additional Instructions:  Contact Isolation (23 @ 14:13)      LAB/STUDIES:  Labs:  CAPILLARY BLOOD GLUCOSE      POCT Blood Glucose.: 290 mg/dL (2023 22:39)  POCT Blood Glucose.: 235 mg/dL (2023 17:36)  POCT Blood Glucose.: 296 mg/dL (2023 13:15)  POCT Blood Glucose.: 258 mg/dL (2023 11:43)  POCT Blood Glucose.: 186 mg/dL (2023 07:45)                          10.8   26.30 )-----------( 184      ( 2023 07:17 )             30.5       Auto Neutrophil %: 78.9 % (23 @ 07:17)  Auto Immature Granulocyte %: 1.2 % (23 @ 07:17)        128<L>  |  90<L>  |  94<HH>  ----------------------------<  148<H>  4.0   |  25  |  1.8<H>      Calcium, Total Serum: 8.4 mg/dL (23 @ 07:17)      LFTs:             4.2  | 0.7  | 43       ------------------[136     ( 2023 07:17 )  2.6  | x    | 20          Lipase:x      Amylase:x         Blood Gas Arterial, Lactate: 4.60 mmol/L (23 @ 14:31)    ABG - ( 2023 14:31 )  pH: 7.47  /  pCO2: 28    /  pO2: 158   / HCO3: 20    / Base Excess: -2.4  /  SaO2: 97.8            ABG - ( 31 Dec 2022 16:40 )  pH: 7.50  /  pCO2: 39    /  pO2: 127   / HCO3: 30    / Base Excess: 6.7   /  SaO2: 97.5              Coags:            Urinalysis Basic - ( 2023 12:40 )    Color: Yellow / Appearance: Clear / S.018 / pH: x  Gluc: x / Ketone: Negative  / Bili: Negative / Urobili: <2 mg/dL   Blood: x / Protein: Negative / Nitrite: Negative   Leuk Esterase: Large / RBC: 1 /HPF / WBC 27 /HPF   Sq Epi: x / Non Sq Epi: 0 /HPF / Bacteria: Negative        Culture - Blood (collected 2023 11:44)  Source: .Blood None  Preliminary Report (2023 19:02):    No growth to date.      IMAGING:   < from: CT Abdomen and Pelvis No Cont (23 @ 18:54) >  Severe urinary bladder distention, suggesting urinary retention. Consider   Stockton catheter placement if patient is unable to spontaneously void.    Moderate gastric distention with fluid contents. If clinically indicated,   consider enteric tube decompression.

## 2023-01-08 NOTE — CHART NOTE - NSCHARTNOTEFT_GEN_A_CORE
PT called to inform medicine team that when we want PT to see patient again we need to place a new PT consult.

## 2023-01-08 NOTE — CONSULT NOTE ADULT - SUBJECTIVE AND OBJECTIVE BOX
Patient is a 82y old  Female who presents with a chief complaint of NSTEMI (07 Jan 2023 15:30)      HPI:  82F w/ pmhx of CHF, HLD, HTN, COPD on 3L home O2, DM1, dementia, GERD, MI in 1992, CAD with 5 cardiac stents who present with 1 month of worsening SOB. For past 1 month she has been having dry cough and SOB. She saw her pulmonologist Dr. Feldman who did CXR and showed it was normal at the time, had her on levaquin and then increased her dose which she completed last week. Yesterday she started having crushing chest pressure substernally. Denies fever during this month, nausea vomiting back pain abd pain urinary sx or diarrhea. No recent travel or sick contact. Her cardiologist is Dr. Munoz.  (25 Dec 2022 18:25)      PAST MEDICAL & SURGICAL HISTORY:  MI (myocardial infarction)  s/p 5 stents      HTN (hypertension)      High cholesterol      Heart failure      Non Hodgkin&#x27;s lymphoma  in remission. Follows with Dr Hernandez      PVD (peripheral vascular disease)      Thrombocytopenia      CKD (chronic kidney disease)      Osteoarthritis      Diabetes  on insulin      Coronary artery disease involving native heart without angina pectoris, unspecified vessel or lesion type  s/p 5 stents      History of cholecystectomy      H/O cardiac catheterization  5 STENTS      H/O carotid endarterectomy  RIGHT          SOCIAL HX:   Smoking                         ETOH                            Other    FAMILY HISTORY:  FH: CAD (coronary artery disease)  FATHER    :  No known cardiovacular family hisotry     Review Of Systems:     All ROS are negative except per HPI       Allergies    ACE inhibitors (Unknown)  BENZALKONIUM (Rash)  BETADINE (Rash)  Ceclor (Unknown)  codeine (Unknown)  contrast media (iodine-based) (Hives)  latex (Unknown)  penicillin (Unknown)  RUBBER GLOVES (Rash)  shellfish (Unknown)  sulfonamides (Unknown)    Intolerances          PHYSICAL EXAM    ICU Vital Signs Last 24 Hrs  T(C): 36.3 (08 Jan 2023 02:00), Max: 36.7 (07 Jan 2023 20:29)  T(F): 97.3 (08 Jan 2023 02:00), Max: 98 (07 Jan 2023 20:29)  HR: 72 (08 Jan 2023 06:00) (72 - 91)  BP: 107/32 (08 Jan 2023 06:00) (84/35 - 114/53)  BP(mean): 51 (08 Jan 2023 06:00) (51 - 72)  ABP: --  ABP(mean): --  RR: 24 (08 Jan 2023 06:00) (16 - 38)  SpO2: 100% (08 Jan 2023 06:00) (95% - 100%)    O2 Parameters below as of 08 Jan 2023 04:00  Patient On (Oxygen Delivery Method): BiPAP/CPAP    O2 Concentration (%): 40        CONSTITUTIONAL:  In NAD    ENT:   Airway patent,     CARDIAC:   Normal rate,   Regular rhythm.    No edema      Vascular:   normal systolic impulse  no bruits    RESPIRATORY:   No wheezing  Bilateral BS   Not tachypneic,  No use of accessory muscles    GASTROINTESTINAL:  Abdomen soft,   Non-tender,   No guarding,   + BS    NEUROLOGICAL:   Alert and oriented         01-07-23 @ 07:01  -  01-08-23 @ 07:00  --------------------------------------------------------  IN:    Lactated Ringers: 300 mL    Pantoprazole: 50 mL  Total IN: 350 mL    OUT:    Indwelling Catheter - Urethral (mL): 715 mL  Total OUT: 715 mL    Total NET: -365 mL          LABS:                          8.2    15.82 )-----------( 73       ( 08 Jan 2023 04:45 )             24.1                                               01-08    133<L>  |  101  |  107<HH>  ----------------------------<  52<L>  4.4   |  19  |  1.8<H>    Ca    7.8<L>      08 Jan 2023 04:45  Mg     1.9     01-08    TPro  3.2<L>  /  Alb  1.7<L>  /  TBili  0.8  /  DBili  x   /  AST  44<H>  /  ALT  17  /  AlkPhos  96  01-08      PT/INR - ( 07 Jan 2023 11:59 )   PT: 20.60 sec;   INR: 1.78 ratio         PTT - ( 07 Jan 2023 11:59 )  PTT:38.0 sec                                                                                     LIVER FUNCTIONS - ( 08 Jan 2023 04:45 )  Alb: 1.7 g/dL / Pro: 3.2 g/dL / ALK PHOS: 96 U/L / ALT: 17 U/L / AST: 44 U/L / GGT: x                                                  Culture - Blood (collected 05 Jan 2023 11:44)  Source: .Blood None  Preliminary Report (06 Jan 2023 19:02):    No growth to date.                                                                                       ABG - ( 06 Jan 2023 14:31 )  pH, Arterial: 7.47  pH, Blood: x     /  pCO2: 28    /  pO2: 158   / HCO3: 20    / Base Excess: -2.4  /  SaO2: 97.8                  MEDICATIONS  (STANDING):  albuterol/ipratropium for Nebulization 3 milliLiter(s) Nebulizer every 6 hours  aspirin enteric coated 81 milliGRAM(s) Oral daily  atorvastatin 40 milliGRAM(s) Oral at bedtime  chlorhexidine 2% Cloths 1 Application(s) Topical daily  ciprofloxacin     Tablet 250 milliGRAM(s) Oral every 24 hours  clopidogrel Tablet 75 milliGRAM(s) Oral daily  dextrose 5%. 1000 milliLiter(s) (100 mL/Hr) IV Continuous <Continuous>  dextrose 5%. 1000 milliLiter(s) (50 mL/Hr) IV Continuous <Continuous>  dextrose 50% Injectable 25 Gram(s) IV Push once  dextrose 50% Injectable 12.5 Gram(s) IV Push once  dextrose 50% Injectable 25 Gram(s) IV Push once  donepezil 10 milliGRAM(s) Oral at bedtime  dronabinol 5 milliGRAM(s) Oral two times a day before meals  glucagon  Injectable 1 milliGRAM(s) IntraMuscular once  insulin glargine Injectable (LANTUS) 18 Unit(s) SubCutaneous two times a day  insulin lispro (ADMELOG) corrective regimen sliding scale   SubCutaneous three times a day before meals  insulin lispro Injectable (ADMELOG) 6 Unit(s) SubCutaneous three times a day before meals  montelukast 10 milliGRAM(s) Oral daily  ondansetron Injectable 4 milliGRAM(s) IV Push every 8 hours  pantoprazole Infusion 8 mG/Hr (10 mL/Hr) IV Continuous <Continuous>  polyethylene glycol 3350 17 Gram(s) Oral daily  sucralfate 1 Gram(s) Oral two times a day  zinc oxide 40% Paste 1 Application(s) Topical daily    MEDICATIONS  (PRN):  aluminum hydroxide/magnesium hydroxide/simethicone Suspension 30 milliLiter(s) Oral every 4 hours PRN Dyspepsia  dextrose Oral Gel 15 Gram(s) Oral once PRN Blood Glucose LESS THAN 70 milliGRAM(s)/deciliter         Patient is a 82y old  Female who presents with a chief complaint of NSTEMI (07 Jan 2023 15:30)      HPI:  82F w/ pmhx of CHF, HLD, HTN, COPD on 3L home O2, DM1, dementia, GERD, MI in 1992, CAD with 5 cardiac stents who present with 1 month of worsening SOB. For past 1 month she has been having dry cough and SOB. She saw her pulmonologist Dr. Feldman who did CXR and showed it was normal at the time, had her on levaquin and then increased her dose which she completed last week. Yesterday she started having crushing chest pressure substernally. Denies fever during this month, nausea vomiting back pain abd pain urinary sx or diarrhea. No recent travel or sick contact. Her cardiologist is Dr. Munoz.  (25 Dec 2022 18:25)      PAST MEDICAL & SURGICAL HISTORY:  MI (myocardial infarction)  s/p 5 stents      HTN (hypertension)      High cholesterol      Heart failure      Non Hodgkin&#x27;s lymphoma  in remission. Follows with Dr Hernandez      PVD (peripheral vascular disease)      Thrombocytopenia      CKD (chronic kidney disease)      Osteoarthritis      Diabetes  on insulin      Coronary artery disease involving native heart without angina pectoris, unspecified vessel or lesion type  s/p 5 stents      History of cholecystectomy      H/O cardiac catheterization  5 STENTS      H/O carotid endarterectomy  RIGHT          FAMILY HISTORY:  FH: CAD (coronary artery disease)  FATHER    :  No known cardiovacular family hisotry     Review Of Systems:     All ROS are negative except per HPI       Allergies    ACE inhibitors (Unknown)  BENZALKONIUM (Rash)  BETADINE (Rash)  Ceclor (Unknown)  codeine (Unknown)  contrast media (iodine-based) (Hives)  latex (Unknown)  penicillin (Unknown)  RUBBER GLOVES (Rash)  shellfish (Unknown)  sulfonamides (Unknown)    Intolerances          PHYSICAL EXAM    ICU Vital Signs Last 24 Hrs  T(C): 36.3 (08 Jan 2023 02:00), Max: 36.7 (07 Jan 2023 20:29)  T(F): 97.3 (08 Jan 2023 02:00), Max: 98 (07 Jan 2023 20:29)  HR: 72 (08 Jan 2023 06:00) (72 - 91)  BP: 107/32 (08 Jan 2023 06:00) (84/35 - 114/53)  BP(mean): 51 (08 Jan 2023 06:00) (51 - 72)  ABP: --  ABP(mean): --  RR: 24 (08 Jan 2023 06:00) (16 - 38)  SpO2: 100% (08 Jan 2023 06:00) (95% - 100%)    O2 Parameters below as of 08 Jan 2023 04:00  Patient On (Oxygen Delivery Method): BiPAP/CPAP    O2 Concentration (%): 40        CONSTITUTIONAL:  In NAD    ENT:   Airway patent,     CARDIAC:   Normal rate,   Regular rhythm.    No edema      Vascular:   normal systolic impulse  no bruits    RESPIRATORY:   No wheezing  Bilateral BS   Not tachypneic,  No use of accessory muscles    GASTROINTESTINAL:  Abdomen soft,   Non-tender,   No guarding,   + BS    NEUROLOGICAL:   Alert and oriented         01-07-23 @ 07:01  -  01-08-23 @ 07:00  --------------------------------------------------------  IN:    Lactated Ringers: 300 mL    Pantoprazole: 50 mL  Total IN: 350 mL    OUT:    Indwelling Catheter - Urethral (mL): 715 mL  Total OUT: 715 mL    Total NET: -365 mL          LABS:                          8.2    15.82 )-----------( 73       ( 08 Jan 2023 04:45 )             24.1                                               01-08    133<L>  |  101  |  107<HH>  ----------------------------<  52<L>  4.4   |  19  |  1.8<H>    Ca    7.8<L>      08 Jan 2023 04:45  Mg     1.9     01-08    TPro  3.2<L>  /  Alb  1.7<L>  /  TBili  0.8  /  DBili  x   /  AST  44<H>  /  ALT  17  /  AlkPhos  96  01-08      PT/INR - ( 07 Jan 2023 11:59 )   PT: 20.60 sec;   INR: 1.78 ratio         PTT - ( 07 Jan 2023 11:59 )  PTT:38.0 sec                                                                                     LIVER FUNCTIONS - ( 08 Jan 2023 04:45 )  Alb: 1.7 g/dL / Pro: 3.2 g/dL / ALK PHOS: 96 U/L / ALT: 17 U/L / AST: 44 U/L / GGT: x                                                  Culture - Blood (collected 05 Jan 2023 11:44)  Source: .Blood None  Preliminary Report (06 Jan 2023 19:02):    No growth to date.                                                                                       ABG - ( 06 Jan 2023 14:31 )  pH, Arterial: 7.47  pH, Blood: x     /  pCO2: 28    /  pO2: 158   / HCO3: 20    / Base Excess: -2.4  /  SaO2: 97.8                  MEDICATIONS  (STANDING):  albuterol/ipratropium for Nebulization 3 milliLiter(s) Nebulizer every 6 hours  aspirin enteric coated 81 milliGRAM(s) Oral daily  atorvastatin 40 milliGRAM(s) Oral at bedtime  chlorhexidine 2% Cloths 1 Application(s) Topical daily  ciprofloxacin     Tablet 250 milliGRAM(s) Oral every 24 hours  clopidogrel Tablet 75 milliGRAM(s) Oral daily  dextrose 5%. 1000 milliLiter(s) (100 mL/Hr) IV Continuous <Continuous>  dextrose 5%. 1000 milliLiter(s) (50 mL/Hr) IV Continuous <Continuous>  dextrose 50% Injectable 25 Gram(s) IV Push once  dextrose 50% Injectable 12.5 Gram(s) IV Push once  dextrose 50% Injectable 25 Gram(s) IV Push once  donepezil 10 milliGRAM(s) Oral at bedtime  dronabinol 5 milliGRAM(s) Oral two times a day before meals  glucagon  Injectable 1 milliGRAM(s) IntraMuscular once  insulin glargine Injectable (LANTUS) 18 Unit(s) SubCutaneous two times a day  insulin lispro (ADMELOG) corrective regimen sliding scale   SubCutaneous three times a day before meals  insulin lispro Injectable (ADMELOG) 6 Unit(s) SubCutaneous three times a day before meals  montelukast 10 milliGRAM(s) Oral daily  ondansetron Injectable 4 milliGRAM(s) IV Push every 8 hours  pantoprazole Infusion 8 mG/Hr (10 mL/Hr) IV Continuous <Continuous>  polyethylene glycol 3350 17 Gram(s) Oral daily  sucralfate 1 Gram(s) Oral two times a day  zinc oxide 40% Paste 1 Application(s) Topical daily    MEDICATIONS  (PRN):  aluminum hydroxide/magnesium hydroxide/simethicone Suspension 30 milliLiter(s) Oral every 4 hours PRN Dyspepsia  dextrose Oral Gel 15 Gram(s) Oral once PRN Blood Glucose LESS THAN 70 milliGRAM(s)/deciliter         Patient is a 82y old  Female who presents with a chief complaint of NSTEMI (07 Jan 2023 15:30)      HPI:  82F w/ pmhx of CHF, HLD, HTN, COPD on 3L home O2, DM1, dementia, GERD, MI in 1992, CAD with 5 cardiac stents who present with 1 month of worsening SOB. For past 1 month she has been having dry cough and SOB. She saw her pulmonologist Dr. Feldman who did CXR and showed it was normal at the time, had her on levaquin and then increased her dose which she completed last week. Yesterday she started having crushing chest pressure substernally. Denies fever during this month, nausea vomiting back pain abd pain urinary sx or diarrhea. No recent travel or sick contact. Her cardiologist is Dr. Munoz.  (25 Dec 2022 18:25)  Found to have NSTEMI and PNA and CHF exacerbation. Pt treated with IV abx and IV diuretics. On 12/29/22 Underwent catheterization with [facility] X 1 to prox LAD 90% stenosis extending into mid LAD.    She was transferred from CCU to floor. She was then transferred back to the CCU with apparent UGIB.      PAST MEDICAL & SURGICAL HISTORY:  MI (myocardial infarction)  s/p 5 stents      HTN (hypertension)      High cholesterol      Heart failure      Non Hodgkin&#x27;s lymphoma  in remission. Follows with Dr Hernandez      PVD (peripheral vascular disease)      Thrombocytopenia      CKD (chronic kidney disease)      Osteoarthritis      Diabetes  on insulin      Coronary artery disease involving native heart without angina pectoris, unspecified vessel or lesion type  s/p 5 stents      History of cholecystectomy      H/O cardiac catheterization  5 STENTS      H/O carotid endarterectomy  RIGHT          FAMILY HISTORY:  FH: CAD (coronary artery disease)  FATHER    :  No known cardiovacular family hisotry     Review Of Systems:     All ROS are negative except per HPI       Allergies    ACE inhibitors (Unknown)  BENZALKONIUM (Rash)  BETADINE (Rash)  Ceclor (Unknown)  codeine (Unknown)  contrast media (iodine-based) (Hives)  latex (Unknown)  penicillin (Unknown)  RUBBER GLOVES (Rash)  shellfish (Unknown)  sulfonamides (Unknown)    Intolerances          PHYSICAL EXAM    ICU Vital Signs Last 24 Hrs  T(C): 36.3 (08 Jan 2023 02:00), Max: 36.7 (07 Jan 2023 20:29)  T(F): 97.3 (08 Jan 2023 02:00), Max: 98 (07 Jan 2023 20:29)  HR: 72 (08 Jan 2023 06:00) (72 - 91)  BP: 107/32 (08 Jan 2023 06:00) (84/35 - 114/53)  BP(mean): 51 (08 Jan 2023 06:00) (51 - 72)  ABP: --  ABP(mean): --  RR: 24 (08 Jan 2023 06:00) (16 - 38)  SpO2: 100% (08 Jan 2023 06:00) (95% - 100%)    O2 Parameters below as of 08 Jan 2023 04:00  Patient On (Oxygen Delivery Method): BiPAP/CPAP    O2 Concentration (%): 40        CONSTITUTIONAL:  In NAD    ENT:   Airway patent,     CARDIAC:   Normal rate,   Regular rhythm.    No edema      Vascular:   normal systolic impulse  no bruits    RESPIRATORY:   No wheezing  Bilateral BS   Not tachypneic,  No use of accessory muscles    GASTROINTESTINAL:  Abdomen soft,   Non-tender,   No guarding,   + BS    NEUROLOGICAL:   Alert and oriented         01-07-23 @ 07:01  -  01-08-23 @ 07:00  --------------------------------------------------------  IN:    Lactated Ringers: 300 mL    Pantoprazole: 50 mL  Total IN: 350 mL    OUT:    Indwelling Catheter - Urethral (mL): 715 mL  Total OUT: 715 mL    Total NET: -365 mL          LABS:                          8.2    15.82 )-----------( 73       ( 08 Jan 2023 04:45 )             24.1                                               01-08    133<L>  |  101  |  107<HH>  ----------------------------<  52<L>  4.4   |  19  |  1.8<H>    Ca    7.8<L>      08 Jan 2023 04:45  Mg     1.9     01-08    TPro  3.2<L>  /  Alb  1.7<L>  /  TBili  0.8  /  DBili  x   /  AST  44<H>  /  ALT  17  /  AlkPhos  96  01-08      PT/INR - ( 07 Jan 2023 11:59 )   PT: 20.60 sec;   INR: 1.78 ratio         PTT - ( 07 Jan 2023 11:59 )  PTT:38.0 sec                                                                                     LIVER FUNCTIONS - ( 08 Jan 2023 04:45 )  Alb: 1.7 g/dL / Pro: 3.2 g/dL / ALK PHOS: 96 U/L / ALT: 17 U/L / AST: 44 U/L / GGT: x                                                  Culture - Blood (collected 05 Jan 2023 11:44)  Source: .Blood None  Preliminary Report (06 Jan 2023 19:02):    No growth to date.                                                                                       ABG - ( 06 Jan 2023 14:31 )  pH, Arterial: 7.47  pH, Blood: x     /  pCO2: 28    /  pO2: 158   / HCO3: 20    / Base Excess: -2.4  /  SaO2: 97.8                  MEDICATIONS  (STANDING):  albuterol/ipratropium for Nebulization 3 milliLiter(s) Nebulizer every 6 hours  aspirin enteric coated 81 milliGRAM(s) Oral daily  atorvastatin 40 milliGRAM(s) Oral at bedtime  chlorhexidine 2% Cloths 1 Application(s) Topical daily  ciprofloxacin     Tablet 250 milliGRAM(s) Oral every 24 hours  clopidogrel Tablet 75 milliGRAM(s) Oral daily  dextrose 5%. 1000 milliLiter(s) (100 mL/Hr) IV Continuous <Continuous>  dextrose 5%. 1000 milliLiter(s) (50 mL/Hr) IV Continuous <Continuous>  dextrose 50% Injectable 25 Gram(s) IV Push once  dextrose 50% Injectable 12.5 Gram(s) IV Push once  dextrose 50% Injectable 25 Gram(s) IV Push once  donepezil 10 milliGRAM(s) Oral at bedtime  dronabinol 5 milliGRAM(s) Oral two times a day before meals  glucagon  Injectable 1 milliGRAM(s) IntraMuscular once  insulin glargine Injectable (LANTUS) 18 Unit(s) SubCutaneous two times a day  insulin lispro (ADMELOG) corrective regimen sliding scale   SubCutaneous three times a day before meals  insulin lispro Injectable (ADMELOG) 6 Unit(s) SubCutaneous three times a day before meals  montelukast 10 milliGRAM(s) Oral daily  ondansetron Injectable 4 milliGRAM(s) IV Push every 8 hours  pantoprazole Infusion 8 mG/Hr (10 mL/Hr) IV Continuous <Continuous>  polyethylene glycol 3350 17 Gram(s) Oral daily  sucralfate 1 Gram(s) Oral two times a day  zinc oxide 40% Paste 1 Application(s) Topical daily    MEDICATIONS  (PRN):  aluminum hydroxide/magnesium hydroxide/simethicone Suspension 30 milliLiter(s) Oral every 4 hours PRN Dyspepsia  dextrose Oral Gel 15 Gram(s) Oral once PRN Blood Glucose LESS THAN 70 milliGRAM(s)/deciliter

## 2023-01-08 NOTE — CONSULT NOTE ADULT - PROVIDER SPECIALTY LIST ADULT
Gastroenterology
Cardiology
Surgery
Infectious Disease
Nephrology
Urology
Cardiology
Critical Care
Endocrinology

## 2023-01-08 NOTE — CONSULT NOTE ADULT - ASSESSMENT
IMPRESSION:      PLAN:    CNS:    HEENT: Oral care    PULMONARY:  HOB @ 45 degrees.  Aspiration precautions     CARDIOVASCULAR:    GI: GI prophylaxis.  Feeding.  Bowel regimen     RENAL:  Follow up lytes.  Correct as needed    INFECTIOUS DISEASE: Follow up cultures    HEMATOLOGICAL:  DVT prophylaxis.    ENDOCRINE:  Follow up FS.  Insulin protocol if needed.    MUSCULOSKELETAL:         IMPRESSION:    Melena / UGIB   Hemorrhagic shock improved  Recent NTEMI s/p PCI on DAPT (cannot hold)  Ileus   WOO  HFpEF      PLAN:    CNS:    HEENT: Oral care    PULMONARY:  HOB @ 45 degrees.  Aspiration precautions     CARDIOVASCULAR:    GI: GI prophylaxis.  NPO. Prokinetic      RENAL:  Follow up lytes.  Correct as needed    INFECTIOUS DISEASE: Follow up cultures    HEMATOLOGICAL:  DVT prophylaxis.    ENDOCRINE:  Follow up FS.  Insulin protocol if needed.    MUSCULOSKELETAL:    MICU     IMPRESSION:    Melena / UGIB   Hemorrhagic shock improved  Recent NTEMI s/p PCI on DAPT (cannot hold)  Ileus   WOO  HFpEF    PLAN:    CNS: Avoid depressants    HEENT: Oral care    PULMONARY:  HOB @ 45 degrees.  Aspiration precautions . Nebs PRn. CXR, VBG. NIV PRN    CARDIOVASCULAR: Goal directed fluid resuscitation. CHEETAH. Avoid volume overload. Strict I's and O's. Target MAP>65. C/w DAPT    GI: GI prophylaxis.  PPI. NPO. Prokinetic.    RENAL:  Follow up lytes.  Correct as needed    INFECTIOUS DISEASE: Follow up cultures    HEMATOLOGICAL:  DVT prophylaxis.    ENDOCRINE:  Follow up FS.  Insulin protocol if needed.    MUSCULOSKELETAL:    MICU     IMPRESSION:    Melena / UGIB   Hemorrhagic shock improved  Recent NTEMI s/p PCI on DAPT (cannot hold)  Ileus   WOO  HFpEF    PLAN:    CNS: Avoid depressants    HEENT: Oral care    PULMONARY:  HOB @ 45 degrees.  Aspiration precautions . Nebs PRn. CXR, VBG. NIV PRN    CARDIOVASCULAR: Goal directed fluid resuscitation. CHEETAH. Avoid volume overload. Strict I's and O's. Target MAP>65. C/w DAPT    GI: GI prophylaxis.  PPI. NPO. Prokinetic.  GI f/u    RENAL:  Follow up lytes.  Correct as needed    INFECTIOUS DISEASE: Follow up cultures    HEMATOLOGICAL:  DVT prophylaxis.   serial HH,   keep Hgb >8    ENDOCRINE:  Follow up FS.  Insulin protocol if needed.    MUSCULOSKELETAL:    MICU

## 2023-01-08 NOTE — PROGRESS NOTE ADULT - SUBJECTIVE AND OBJECTIVE BOX
Gastroenterology progress note:     Patient is a 82y old  Female who presents with a chief complaint of NSTEMI (08 Jan 2023 07:18)       Admitted on: 12-25-22    We are following the patient for melena    small dark bm in AM   remains hemodynamically stable       PAST MEDICAL & SURGICAL HISTORY:  MI (myocardial infarction)  s/p 5 stents      HTN (hypertension)      High cholesterol      Heart failure      Non Hodgkin&#x27;s lymphoma  in remission. Follows with Dr Hernandez      PVD (peripheral vascular disease)      Thrombocytopenia      CKD (chronic kidney disease)      Osteoarthritis      Diabetes  on insulin      Coronary artery disease involving native heart without angina pectoris, unspecified vessel or lesion type  s/p 5 stents      History of cholecystectomy      H/O cardiac catheterization  5 STENTS      H/O carotid endarterectomy  RIGHT          MEDICATIONS  (STANDING):  albuterol/ipratropium for Nebulization 3 milliLiter(s) Nebulizer every 6 hours  aspirin enteric coated 81 milliGRAM(s) Oral daily  atorvastatin 40 milliGRAM(s) Oral at bedtime  chlorhexidine 2% Cloths 1 Application(s) Topical daily  clopidogrel Tablet 75 milliGRAM(s) Oral daily  dextrose 5%. 1000 milliLiter(s) (50 mL/Hr) IV Continuous <Continuous>  dextrose 5%. 1000 milliLiter(s) (100 mL/Hr) IV Continuous <Continuous>  dextrose 50% Injectable 25 Gram(s) IV Push once  dextrose 50% Injectable 12.5 Gram(s) IV Push once  dextrose 50% Injectable 25 Gram(s) IV Push once  donepezil 10 milliGRAM(s) Oral at bedtime  dronabinol 5 milliGRAM(s) Oral two times a day before meals  glucagon  Injectable 1 milliGRAM(s) IntraMuscular once  insulin glargine Injectable (LANTUS) 18 Unit(s) SubCutaneous two times a day  insulin lispro (ADMELOG) corrective regimen sliding scale   SubCutaneous three times a day before meals  insulin lispro Injectable (ADMELOG) 6 Unit(s) SubCutaneous three times a day before meals  montelukast 10 milliGRAM(s) Oral daily  ondansetron Injectable 4 milliGRAM(s) IV Push every 8 hours  pantoprazole Infusion 8 mG/Hr (10 mL/Hr) IV Continuous <Continuous>  polyethylene glycol 3350 17 Gram(s) Oral daily  sucralfate 1 Gram(s) Oral two times a day  zinc oxide 40% Paste 1 Application(s) Topical daily    MEDICATIONS  (PRN):  aluminum hydroxide/magnesium hydroxide/simethicone Suspension 30 milliLiter(s) Oral every 4 hours PRN Dyspepsia  dextrose Oral Gel 15 Gram(s) Oral once PRN Blood Glucose LESS THAN 70 milliGRAM(s)/deciliter      Allergies  ACE inhibitors (Unknown)  BENZALKONIUM (Rash)  BETADINE (Rash)  Ceclor (Unknown)  codeine (Unknown)  contrast media (iodine-based) (Hives)  latex (Unknown)  penicillin (Unknown)  RUBBER GLOVES (Rash)  shellfish (Unknown)  sulfonamides (Unknown)      Review of Systems:   Cardiovascular:  No Chest Pain, No Palpitations  Respiratory:  No Cough, No Dyspnea  Gastrointestinal:  As described in HPI  Skin:  No Skin Lesions, No Jaundice  Neuro:  No Syncope, No Dizziness    Physical Examination:  T(C): 35.2 (01-08-23 @ 12:00), Max: 36.7 (01-07-23 @ 20:29)  HR: 88 (01-08-23 @ 12:00) (72 - 88)  BP: 125/45 (01-08-23 @ 12:00) (100/40 - 131/42)  RR: 26 (01-08-23 @ 12:00) (16 - 38)  SpO2: 98% (01-08-23 @ 12:00) (95% - 100%)      01-07-23 @ 07:01  -  01-08-23 @ 07:00  --------------------------------------------------------  IN: 360 mL / OUT: 755 mL / NET: -395 mL    01-08-23 @ 07:01  -  01-08-23 @ 13:55  --------------------------------------------------------  IN: 40 mL / OUT: 180 mL / NET: -140 mL        GENERAL: AAOx1, no acute distress.  HEAD:  Atraumatic, Normocephalic  EYES: conjunctiva and sclera clear  NECK: Supple, no JVD or thyromegaly  CHEST/LUNG: Clear to auscultation bilaterally; No wheeze, rhonchi, or rales  HEART: Regular rate and rhythm; normal S1, S2, No murmurs.  ABDOMEN: Soft, nontender, nondistended; Bowel sounds present  NEUROLOGY: No asterixis or tremor.   SKIN: Intact, no jaundice     Data:                        8.2    15.82 )-----------( 73       ( 08 Jan 2023 04:45 )             24.1     Hgb trend:  8.2  01-08-23 @ 04:45  8.2  01-07-23 @ 20:00  6.9  01-07-23 @ 11:59  8.4  01-07-23 @ 07:40  10.8  01-06-23 @ 07:17        01-08    133<L>  |  101  |  107<HH>  ----------------------------<  52<L>  4.4   |  19  |  1.8<H>    Ca    7.8<L>      08 Jan 2023 04:45  Mg     1.9     01-08    TPro  3.2<L>  /  Alb  1.7<L>  /  TBili  0.8  /  DBili  x   /  AST  44<H>  /  ALT  17  /  AlkPhos  96  01-08    Liver panel trend:  TBili 0.8   /   AST 44   /   ALT 17   /   AlkP 96   /   Tptn 3.2   /   Alb 1.7    /   DBili --      01-08  TBili 0.6   /   AST 48   /   ALT 21   /   AlkP 112   /   Tptn 3.6   /   Alb 2.1    /   DBili --      01-07  TBili 0.7   /   AST 43   /   ALT 20   /   AlkP 136   /   Tptn 4.2   /   Alb 2.6    /   DBili --      01-06  TBili 0.9   /   AST 40   /   ALT 18   /   AlkP 89   /   Tptn 4.4   /   Alb 2.8    /   DBili --      01-04  TBili 0.7   /   AST 50   /   ALT 21   /   AlkP 80   /   Tptn 4.7   /   Alb 2.9    /   DBili --      01-03  TBili 0.8   /   AST 38   /   ALT 21   /   AlkP 75   /   Tptn 4.8   /   Alb 3.4    /   DBili --      01-02  TBili 0.9   /   AST 32   /   ALT 20   /   AlkP 67   /   Tptn 5.0   /   Alb 3.2    /   DBili --      01-01  TBili 0.8   /   AST 34   /   ALT 22   /   AlkP 72   /   Tptn 5.2   /   Alb 3.6    /   DBili --      12-31  TBili 0.8   /   AST 32   /   ALT 23   /   AlkP 67   /   Tptn 5.3   /   Alb 3.5    /   DBili --      12-30      PT/INR - ( 07 Jan 2023 11:59 )   PT: 20.60 sec;   INR: 1.78 ratio         PTT - ( 07 Jan 2023 11:59 )  PTT:38.0 sec       Radiology:

## 2023-01-08 NOTE — CONSULT NOTE ADULT - CONSULT REQUESTED DATE/TIME
26-Dec-2022 07:19
06-Jan-2023 22:39
08-Jan-2023 14:04
08-Jan-2023 07:18
25-Dec-2022 21:57
28-Dec-2022 10:56
07-Jan-2023 15:30
03-Jan-2023 11:36
06-Jan-2023 16:36

## 2023-01-08 NOTE — CONSULT NOTE ADULT - ASSESSMENT
Patient post stent to LAD for NStemi with complicated hospital course including recent probable UGI bleed.     Patient is on ASA and plavix . No recent bleeding.      1. Take ASA to every other day and continue plavix and fu H//H.     2. Agree no need for urgent endoscopy at this time.  Want meds on board form GI sucralfate and PPI as well.     3. Keep HOB 30 degrees and DVT prophylaxis with SCDs if cannot go oob to chair.      4. Pulmonary follow up as well.

## 2023-01-08 NOTE — PROGRESS NOTE ADULT - ASSESSMENT
82 year old female with pmhx CAD/MI s/p 5 stents, CHFpEF, COPD (3L home O2), DM1, GERD, HLD, ?dementia presents for worsening SOB for one month with x2 days of crushing substernal chest pressure. Found to have NSTEMI and PNA and CHF exacerbation. Pt treated with IV abx and IV diuretics. On 12/29/22 Underwent catheterization with KHUSHBU X 1 to prox LAD 90% stenosis extending into mid LAD. Was managed in CCU and then downgraded to floors. On floors pt developed abdominal distension, epigastric pain, and melena with acute drop in Hb on 1/7/23.    # Melena and anemia R/O PUD, AVMs or other vascular lesions.   - Recent ACS with cardiac stent 12/29.   - on DAPTs  - hemoglobin dropped to 6.9. stable after transfusion   - hypotension/tachycardia. stable VS after resuscitation    PLAN  PPI infusion   transfuse to keep hemoglobin > 8   2 large IVs"  MICU monitoring   Discussed the case with Dr Ji, the risk of endoscopic intervention at this point may outweighs the benefit. as patient didn't have any further episodes of melena during the day. Will continue with the plan above and will consider EGD after optimization of the cardiopulmonary status or in urgently if indicated. will monitor closely

## 2023-01-08 NOTE — CONSULT NOTE ADULT - CONSULT REASON
Anemia with recent stent
Miriam
Uncontrolled Diabetes
risk stratification for contrast
Abdominal pain
difficult Pena catheter placement, Urinary retention
Pneumonia
melena / GIB / Shock
mi

## 2023-01-08 NOTE — CONSULT NOTE ADULT - SUBJECTIVE AND OBJECTIVE BOX
CHIEF COMPLAINT:Patient is a 82y old  Female who presents with a chief complaint of NSTEMI (2023 13:54)      HISTORY OF PRESENT ILLNESS:   HPI:  82F w/ pmhx of CHF, HLD, HTN, COPD on 3L home O2, DM1, dementia, GERD, MI in , CAD with Patient presented to [facility] with sob and viral URI and NStemi. Was transferred to [facility] for cath and eventually was stabilized and had rotational atherectomy and stent of LAD . She saw her pulmonologist Dr. Feldman who did CXR and showed it was normal at the time, had her on Levaquin and then increased her dose which she completed last week.   She has had a prolonged hospital course with URI and failure to thrive as well as recent anemia and probable Upper GI bleed with drop in HGB to 6.9. Received 1 uPRBC transferred to ICU.   PAST MEDICAL & SURGICAL HISTORY:  MI (myocardial infarction)        HTN (hypertension)      High cholesterol      Heart failure      Non Hodgkin&#x27;s lymphoma  in remission. Follows with Dr Hernandez      PVD (peripheral vascular disease)      Thrombocytopenia      CKD (chronic kidney disease)      Osteoarthritis      Diabetes  on insulin      Coronary artery disease involving native heart without angina pectoris, unspecified vessel or lesion type  s/p 5 stents      History of cholecystectomy      H/O cardiac catheterization  5 STENTS      H/O carotid endarterectomy  RIGHT        FAMILY HISTORY:  FH: CAD (coronary artery disease)  FATHER      Allergies    ACE inhibitors (Unknown)  BENZALKONIUM (Rash)  BETADINE (Rash)  Ceclor (Unknown)  codeine (Unknown)  contrast media (iodine-based) (Hives)  latex (Unknown)  penicillin (Unknown)  RUBBER GLOVES (Rash)  shellfish (Unknown)  sulfonamides (Unknown)    Intolerances    	  Home Medications:  albuterol 90 mcg/inh inhalation aerosol: 2 puff(s) inhaled every 6 hours, As Needed (25 Dec 2022 14:24)  atorvastatin 20 mg oral tablet: 1 tab(s) orally once a day (25 Dec 2022 14:24)  donepezil 10 mg oral tablet: 1 tab(s) orally once a day (at bedtime) (25 Dec 2022 14:24)  gabapentin: orally 2 times a day (25 Dec 2022 14:24)  inositol 650 mg oral tablet: 2 tab(s) orally once a day (25 Dec 2022 14:24)  Lasix 20 mg oral tablet: 1 tab(s) orally once a day (25 Dec 2022 14:24)  losartan 100 mg oral tablet: 1 tab(s) orally once a day (25 Dec 2022 14:24)  montelukast 10 mg oral tablet: 1 tab(s) orally once a day (25 Dec 2022 14:24)  NovoLOG 100 units/mL subcutaneous solution: 10,7,7  subcutaneous (25 Dec 2022 14:24)  omeprazole 40 mg oral delayed release capsule: 1 cap(s) orally once a day (25 Dec 2022 14:24)  omeprazole 40 mg oral delayed release capsule: 1 cap(s) orally once a day (25 Dec 2022 14:24)  Soliqua 100/33 subcutaneous solution: 35 unit(s) subcutaneous once a day (25 Dec 2022 14:24)  Spiriva 18 mcg inhalation capsule: 1 cap(s) inhaled once a day (25 Dec 2022 14:24)    MEDICATIONS  (STANDING):  albuterol/ipratropium for Nebulization 3 milliLiter(s) Nebulizer every 6 hours  aspirin enteric coated 81 milliGRAM(s) Oral daily  atorvastatin 40 milliGRAM(s) Oral at bedtime  chlorhexidine 2% Cloths 1 Application(s) Topical daily  clopidogrel Tablet 75 milliGRAM(s) Oral daily  dextrose 5%. 1000 milliLiter(s) (50 mL/Hr) IV Continuous <Continuous>  dextrose 5%. 1000 milliLiter(s) (100 mL/Hr) IV Continuous <Continuous>  dextrose 50% Injectable 25 Gram(s) IV Push once  dextrose 50% Injectable 12.5 Gram(s) IV Push once  dextrose 50% Injectable 25 Gram(s) IV Push once  donepezil 10 milliGRAM(s) Oral at bedtime  dronabinol 5 milliGRAM(s) Oral two times a day before meals  glucagon  Injectable 1 milliGRAM(s) IntraMuscular once  insulin glargine Injectable (LANTUS) 18 Unit(s) SubCutaneous two times a day  insulin lispro (ADMELOG) corrective regimen sliding scale   SubCutaneous three times a day before meals  insulin lispro Injectable (ADMELOG) 6 Unit(s) SubCutaneous three times a day before meals  montelukast 10 milliGRAM(s) Oral daily  ondansetron Injectable 4 milliGRAM(s) IV Push every 8 hours  pantoprazole Infusion 8 mG/Hr (10 mL/Hr) IV Continuous <Continuous>  polyethylene glycol 3350 17 Gram(s) Oral daily  sucralfate 1 Gram(s) Oral two times a day  zinc oxide 40% Paste 1 Application(s) Topical daily    MEDICATIONS  (PRN):  aluminum hydroxide/magnesium hydroxide/simethicone Suspension 30 milliLiter(s) Oral every 4 hours PRN Dyspepsia  dextrose Oral Gel 15 Gram(s) Oral once PRN Blood Glucose LESS THAN 70 milliGRAM(s)/deciliter        SOCIAL HISTORY:    [ ] Non-smoker  [ ] Smoker  [ ] Alcohol      REVIEW OF SYSTEMS:    PHYSICAL EXAM:  T(C): 35.2 (23 @ 12:00), Max: 36.7 (23 @ 20:29)  HR: 88 (23 @ 12:00) (72 - 88)  BP: 125/45 (23 @ 12:00) (100/40 - 131/42)  RR: 26 (23 @ 12:00) (16 - 38)  SpO2: 98% (23 @ 12:00) (95% - 100%)  Wt(kg): --  I&O's Summary    2023 07:01  -  2023 07:00  --------------------------------------------------------  IN: 360 mL / OUT: 755 mL / NET: -395 mL    2023 07:01  -  2023 14:04  --------------------------------------------------------  IN: 40 mL / OUT: 180 mL / NET: -140 mL      Daily     Daily Weight in k (2023 02:00)    General Appearance: Normal	  Cardiovascular: Normal S1 S2, No JVD, No murmurs, No edema  Respiratory: Lungs clear to auscultation	  Psychiatry: A & O x 3, Mood & affect appropriate  Gastrointestinal:  Soft, Non-tender  Skin: No rashes, No ecchymoses, No cyanosis	  Neurologic: Non-focal  Extremities: Normal range of motion, No clubbing, cyanosis or edema  Vascular: Peripheral pulses palpable 2+ bilaterally        LABS:	 	                        8.2    15.82 )-----------( 73       ( 2023 04:45 )             24.1         133<L>  |  101  |  107<HH>  ----------------------------<  52<L>  4.4   |  19  |  1.8<H>      130<L>  |  91<L>  |  111<HH>  ----------------------------<  277<H>  4.7   |  25  |  1.9<H>    Ca    7.8<L>      2023 04:45  Ca    8.1<L>      2023 07:40  Mg     1.9         TPro  3.2<L>  /  Alb  1.7<L>  /  TBili  0.8  /  DBili  x   /  AST  44<H>  /  ALT  17  /  AlkPhos  96    TPro  3.6<L>  /  Alb  2.1<L>  /  TBili  0.6  /  DBili  x   /  AST  48<H>  /  ALT  21  /  AlkPhos  112        proBNP:   Lipid Profile:   HgA1c:   TSH:       CARDIAC MARKERS:            TELEMETRY EVENTS: 	    ECG:  	  RADIOLOGY:  	    PREVIOUS DIAGNOSTIC TESTING:    [ ] Echocardiogram:  [ ]  Catheterization:  [ ] Stress Test:

## 2023-01-09 NOTE — PROGRESS NOTE ADULT - SUBJECTIVE AND OBJECTIVE BOX
Gastroenterology progress note:     Patient is a 82y old  Female who presents with a chief complaint of NSTEMI (09 Jan 2023 10:50)    Admitted on: 12-25-22    We are following the patient for melena      No melena since yesterday, remains hemodynamically stable     PAST MEDICAL & SURGICAL HISTORY:  MI (myocardial infarction)  s/p 5 stents      HTN (hypertension)      High cholesterol      Heart failure      Non Hodgkin&#x27;s lymphoma  in remission. Follows with Dr Hernandez      PVD (peripheral vascular disease)      Thrombocytopenia      CKD (chronic kidney disease)      Osteoarthritis      Diabetes  on insulin      Coronary artery disease involving native heart without angina pectoris, unspecified vessel or lesion type  s/p 5 stents      History of cholecystectomy      H/O cardiac catheterization  5 STENTS      H/O carotid endarterectomy  RIGHT          MEDICATIONS  (STANDING):  albuterol/ipratropium for Nebulization 3 milliLiter(s) Nebulizer every 6 hours  atorvastatin 40 milliGRAM(s) Oral at bedtime  chlorhexidine 2% Cloths 1 Application(s) Topical daily  ciprofloxacin   IVPB      ciprofloxacin   IVPB 200 milliGRAM(s) IV Intermittent every 12 hours  clopidogrel Tablet 75 milliGRAM(s) Oral daily  dextrose 5%. 1000 milliLiter(s) (50 mL/Hr) IV Continuous <Continuous>  dextrose 5%. 1000 milliLiter(s) (100 mL/Hr) IV Continuous <Continuous>  dextrose 50% Injectable 25 Gram(s) IV Push once  dextrose 50% Injectable 12.5 Gram(s) IV Push once  dextrose 50% Injectable 25 Gram(s) IV Push once  donepezil 10 milliGRAM(s) Oral at bedtime  dronabinol 5 milliGRAM(s) Oral two times a day before meals  glucagon  Injectable 1 milliGRAM(s) IntraMuscular once  insulin glargine Injectable (LANTUS) 18 Unit(s) SubCutaneous two times a day  insulin lispro (ADMELOG) corrective regimen sliding scale   SubCutaneous three times a day before meals  insulin lispro Injectable (ADMELOG) 6 Unit(s) SubCutaneous three times a day before meals  metroNIDAZOLE  IVPB 500 milliGRAM(s) IV Intermittent every 8 hours  montelukast 10 milliGRAM(s) Oral daily  norepinephrine Infusion 0.05 MICROgram(s)/kG/Min (5.91 mL/Hr) IV Continuous <Continuous>  pantoprazole Infusion 8 mG/Hr (10 mL/Hr) IV Continuous <Continuous>  polyethylene glycol 3350 17 Gram(s) Oral daily  sucralfate 1 Gram(s) Oral two times a day  zinc oxide 40% Paste 1 Application(s) Topical daily    MEDICATIONS  (PRN):  aluminum hydroxide/magnesium hydroxide/simethicone Suspension 30 milliLiter(s) Oral every 4 hours PRN Dyspepsia  dextrose Oral Gel 15 Gram(s) Oral once PRN Blood Glucose LESS THAN 70 milliGRAM(s)/deciliter  ondansetron Injectable 4 milliGRAM(s) IV Push every 8 hours PRN Nausea and/or Vomiting      Allergies  ACE inhibitors (Unknown)  BENZALKONIUM (Rash)  BETADINE (Rash)  Ceclor (Unknown)  codeine (Unknown)  contrast media (iodine-based) (Hives)  latex (Unknown)  penicillin (Unknown)  RUBBER GLOVES (Rash)  shellfish (Unknown)  sulfonamides (Unknown)      Review of Systems:   Cardiovascular:  No Chest Pain, No Palpitations  Respiratory:  No Cough, No Dyspnea  Gastrointestinal:  As described in HPI  Skin:  No Skin Lesions, No Jaundice  Neuro:  No Syncope, No Dizziness    Physical Examination:  T(C): 36 (01-09-23 @ 08:00), Max: 37.7 (01-08-23 @ 20:00)  HR: 116 (01-09-23 @ 11:00) (88 - 120)  BP: 160/58 (01-09-23 @ 11:00) (76/58 - 161/67)  RR: 33 (01-09-23 @ 11:00) (26 - 55)  SpO2: 99% (01-09-23 @ 11:00) (96% - 100%)      01-08-23 @ 07:01  -  01-09-23 @ 07:00  --------------------------------------------------------  IN: 473 mL / OUT: 800 mL / NET: -327 mL    01-09-23 @ 07:01  -  01-09-23 @ 11:31  --------------------------------------------------------  IN: 230 mL / OUT: 100 mL / NET: 130 mL        GENERAL: AAOx1,  HEAD:  Atraumatic, Normocephalic  EYES: conjunctiva and sclera clear  NECK: Supple, no JVD or thyromegaly  CHEST/LUNG: Clear to auscultation bilaterally; No wheeze, rhonchi, or rales  HEART: Regular rate and rhythm; normal S1, S2, No murmurs.  ABDOMEN: Soft, epigastric tenderness, nondistended  NEUROLOGY: No asterixis or tremor.   SKIN: Intact, no jaundice     Data:                        7.5    16.31 )-----------( 77       ( 09 Jan 2023 04:42 )             22.4     Hgb trend:  7.5  01-09-23 @ 04:42  12.9  01-09-23 @ 01:20  8.1  01-08-23 @ 20:24  8.2  01-08-23 @ 04:45  8.2  01-07-23 @ 20:00  6.9  01-07-23 @ 11:59  8.4  01-07-23 @ 07:40        01-09    134<L>  |  101  |  105<HH>  ----------------------------<  94  4.2   |  22  |  2.1<H>    Ca    7.6<L>      09 Jan 2023 04:42  Mg     2.0     01-09    TPro  3.3<L>  /  Alb  1.6<L>  /  TBili  0.5  /  DBili  x   /  AST  65<H>  /  ALT  20  /  AlkPhos  107  01-09    Liver panel trend:  TBili 0.5   /   AST 65   /   ALT 20   /   AlkP 107   /   Tptn 3.3   /   Alb 1.6    /   DBili --      01-09  TBili 0.8   /   AST 44   /   ALT 17   /   AlkP 96   /   Tptn 3.2   /   Alb 1.7    /   DBili --      01-08  TBili 0.6   /   AST 48   /   ALT 21   /   AlkP 112   /   Tptn 3.6   /   Alb 2.1    /   DBili --      01-07  TBili 0.7   /   AST 43   /   ALT 20   /   AlkP 136   /   Tptn 4.2   /   Alb 2.6    /   DBili --      01-06  TBili 0.9   /   AST 40   /   ALT 18   /   AlkP 89   /   Tptn 4.4   /   Alb 2.8    /   DBili --      01-04  TBili 0.7   /   AST 50   /   ALT 21   /   AlkP 80   /   Tptn 4.7   /   Alb 2.9    /   DBili --      01-03  TBili 0.8   /   AST 38   /   ALT 21   /   AlkP 75   /   Tptn 4.8   /   Alb 3.4    /   DBili --      01-02  TBili 0.9   /   AST 32   /   ALT 20   /   AlkP 67   /   Tptn 5.0   /   Alb 3.2    /   DBili --      01-01  TBili 0.8   /   AST 34   /   ALT 22   /   AlkP 72   /   Tptn 5.2   /   Alb 3.6    /   DBili --      12-31      PT/INR - ( 07 Jan 2023 11:59 )   PT: 20.60 sec;   INR: 1.78 ratio         PTT - ( 07 Jan 2023 11:59 )  PTT:38.0 sec    Culture - Urine (collected 07 Jan 2023 00:15)  Source: Clean Catch Clean Catch (Midstream)  Final Report (08 Jan 2023 16:06):    <10,000 CFU/mL Normal Urogenital Cici         Radiology:

## 2023-01-09 NOTE — DIETITIAN INITIAL EVALUATION ADULT - NSFNSGIIOFT_GEN_A_CORE
Weight above using lowest weight recorded in-house from 12/30/2022.     I&O's Detail    08 Jan 2023 07:01  -  09 Jan 2023 07:00  --------------------------------------------------------  IN:    Norepinephrine: 3 mL    Pantoprazole: 220 mL    Sodium Chloride 0.9% Bolus: 250 mL  Total IN: 473 mL    OUT:    Indwelling Catheter - Urethral (mL): 800 mL  Total OUT: 800 mL    Total NET: -327 mL

## 2023-01-09 NOTE — DIETITIAN INITIAL EVALUATION ADULT - SIGNS/SYMPTOMS
<50% tray intake for >5 days and edema masking weight loss previously had poor tray intake, now NPO day 3

## 2023-01-09 NOTE — DIETITIAN INITIAL EVALUATION ADULT - ENERGY INTAKE
Documented to have 26-50% tray intake on 03-Jan-2023 18:00; no other documentation in flowsheets. NPO since 1/7.

## 2023-01-09 NOTE — DIETITIAN INITIAL EVALUATION ADULT - PERTINENT MEDS FT
MEDICATIONS  (STANDING):  albuterol/ipratropium for Nebulization 3 milliLiter(s) Nebulizer every 6 hours  atorvastatin 40 milliGRAM(s) Oral at bedtime  chlorhexidine 2% Cloths 1 Application(s) Topical daily  ciprofloxacin   IVPB      ciprofloxacin   IVPB 200 milliGRAM(s) IV Intermittent once  ciprofloxacin   IVPB 200 milliGRAM(s) IV Intermittent every 12 hours  clopidogrel Tablet 75 milliGRAM(s) Oral daily  dextrose 5%. 1000 milliLiter(s) (50 mL/Hr) IV Continuous <Continuous>  dextrose 5%. 1000 milliLiter(s) (100 mL/Hr) IV Continuous <Continuous>  dextrose 50% Injectable 25 Gram(s) IV Push once  dextrose 50% Injectable 12.5 Gram(s) IV Push once  dextrose 50% Injectable 25 Gram(s) IV Push once  donepezil 10 milliGRAM(s) Oral at bedtime  *dronabinol 5 milliGRAM(s) Oral two times a day before meals  glucagon  Injectable 1 milliGRAM(s) IntraMuscular once  insulin glargine Injectable (LANTUS) 18 Unit(s) SubCutaneous two times a day  insulin lispro (ADMELOG) corrective regimen sliding scale   SubCutaneous three times a day before meals  insulin lispro Injectable (ADMELOG) 6 Unit(s) SubCutaneous three times a day before meals  metroNIDAZOLE  IVPB 500 milliGRAM(s) IV Intermittent every 8 hours  metroNIDAZOLE  IVPB      montelukast 10 milliGRAM(s) Oral daily  norepinephrine Infusion 0.05 MICROgram(s)/kG/Min (5.91 mL/Hr) IV Continuous <Continuous>  pantoprazole Infusion 8 mG/Hr (10 mL/Hr) IV Continuous <Continuous>  polyethylene glycol 3350 17 Gram(s) Oral daily  sucralfate 1 Gram(s) Oral two times a day  zinc oxide 40% Paste 1 Application(s) Topical daily    MEDICATIONS  (PRN):  aluminum hydroxide/magnesium hydroxide/simethicone Suspension 30 milliLiter(s) Oral every 4 hours PRN Dyspepsia  dextrose Oral Gel 15 Gram(s) Oral once PRN Blood Glucose LESS THAN 70 milliGRAM(s)/deciliter  ondansetron Injectable 4 milliGRAM(s) IV Push every 8 hours PRN Nausea and/or Vomiting

## 2023-01-09 NOTE — PROGRESS NOTE ADULT - ASSESSMENT
IMPRESSION:    Melena / UGIB possibel on PPI drip  Recent NTEMI s/p PCI on DAPT (cannot hold)  Sepsis abd pain  Ileus   WOO  HFpEF    PLAN:    CNS: Avoid depressants    HEENT: Oral care    PULMONARY:  HOB @ 45 degrees.  Aspiration precautions . Nebs PRN, keep Sao2 88 to 92%    CARDIOVASCULAR: IV laSIX x 1, dapt    GI: GI prophylaxis.  PPI. NPO.  GI f/u    RENAL:  Follow up lytes.  Correct as needed    INFECTIOUS DISEASE: Follow up cultures, procal, start ABX    HEMATOLOGICAL:  DVT prophylaxis.   serial HH,       ENDOCRINE:  Follow up FS.  Insulin protocol if needed.    MUSCULOSKELETAL:    MICU    R IJ TLC 1/8

## 2023-01-09 NOTE — DIETITIAN INITIAL EVALUATION ADULT - PERTINENT LABORATORY DATA
01-09    134<L>  |  101  |  105<HH>  ----------------------------<  94  4.2   |  22  |  2.1<H>    Ca    7.6<L>      09 Jan 2023 04:42  Mg     2.0     01-09    TPro  3.3<L>  /  Alb  1.6<L>  /  TBili  0.5  /  DBili  x   /  AST  65<H>  /  ALT  20  /  AlkPhos  107  01-09    CAPILLARY BLOOD GLUCOSE  POCT Blood Glucose.: 116 mg/dL (09 Jan 2023 08:10)  POCT Blood Glucose.: 111 mg/dL (09 Jan 2023 05:50)  POCT Blood Glucose.: 99 mg/dL (08 Jan 2023 23:11)  POCT Blood Glucose.: 93 mg/dL (08 Jan 2023 11:41)  POCT Blood Glucose.: 90 mg/dL (08 Jan 2023 11:24)    A1C with Estimated Average Glucose Result: 9.4 % (12-27-22 @ 05:41)  A1C with Estimated Average Glucose Result: 9.4 % (12-26-22 @ 11:51)  A1C with Estimated Average Glucose Result: 9.2 % (04-27-22 @ 07:00)

## 2023-01-09 NOTE — PROGRESS NOTE ADULT - SUBJECTIVE AND OBJECTIVE BOX
VENKATESH RAMACHANDRAN 82y Female  MRN#: [id number]   Hospital Day: 15d    SUBJECTIVE  Patient is a 82y old Female who presents with a chief complaint of NSTEMI (09 Jan 2023 11:29)  Currently admitted to medicine with the primary diagnosis of Acute respiratory failure with hypoxia and hypercapnia      INTERVAL HPI AND OVERNIGHT EVENTS:  Patient was examined and seen at bedside. This morning she is resting comfortably in bed and reports no issues or overnight events.    OBJECTIVE  PAST MEDICAL & SURGICAL HISTORY  MI (myocardial infarction)  s/p 5 stents    HTN (hypertension)    High cholesterol    Heart failure    Non Hodgkin&#x27;s lymphoma  in remission. Follows with Dr Hernandez    PVD (peripheral vascular disease)    Thrombocytopenia    CKD (chronic kidney disease)    Osteoarthritis    Diabetes  on insulin    Coronary artery disease involving native heart without angina pectoris, unspecified vessel or lesion type  s/p 5 stents    History of cholecystectomy    H/O cardiac catheterization  5 STENTS    H/O carotid endarterectomy  RIGHT      ALLERGIES:  ACE inhibitors (Unknown)  BENZALKONIUM (Rash)  BETADINE (Rash)  Ceclor (Unknown)  codeine (Unknown)  contrast media (iodine-based) (Hives)  latex (Unknown)  penicillin (Unknown)  RUBBER GLOVES (Rash)  shellfish (Unknown)  sulfonamides (Unknown)    MEDICATIONS:  STANDING MEDICATIONS  albuterol/ipratropium for Nebulization 3 milliLiter(s) Nebulizer every 6 hours  atorvastatin 40 milliGRAM(s) Oral at bedtime  chlorhexidine 2% Cloths 1 Application(s) Topical daily  ciprofloxacin   IVPB      ciprofloxacin   IVPB 200 milliGRAM(s) IV Intermittent every 12 hours  clopidogrel Tablet 75 milliGRAM(s) Oral daily  dextrose 5%. 1000 milliLiter(s) IV Continuous <Continuous>  dextrose 5%. 1000 milliLiter(s) IV Continuous <Continuous>  dextrose 50% Injectable 25 Gram(s) IV Push once  dextrose 50% Injectable 12.5 Gram(s) IV Push once  dextrose 50% Injectable 25 Gram(s) IV Push once  donepezil 10 milliGRAM(s) Oral at bedtime  glucagon  Injectable 1 milliGRAM(s) IntraMuscular once  insulin glargine Injectable (LANTUS) 18 Unit(s) SubCutaneous two times a day  insulin lispro (ADMELOG) corrective regimen sliding scale   SubCutaneous three times a day before meals  insulin lispro Injectable (ADMELOG) 6 Unit(s) SubCutaneous three times a day before meals  metroNIDAZOLE  IVPB 500 milliGRAM(s) IV Intermittent every 8 hours  montelukast 10 milliGRAM(s) Oral daily  multivitamin/minerals 1 Tablet(s) Oral daily  norepinephrine Infusion 0.05 MICROgram(s)/kG/Min IV Continuous <Continuous>  pantoprazole Infusion 8 mG/Hr IV Continuous <Continuous>  polyethylene glycol 3350 17 Gram(s) Oral daily  sucralfate 1 Gram(s) Oral two times a day  zinc oxide 40% Paste 1 Application(s) Topical daily    PRN MEDICATIONS  aluminum hydroxide/magnesium hydroxide/simethicone Suspension 30 milliLiter(s) Oral every 4 hours PRN  dextrose Oral Gel 15 Gram(s) Oral once PRN  ondansetron Injectable 4 milliGRAM(s) IV Push every 8 hours PRN      VITAL SIGNS: Last 24 Hours  T(C): 35.9 (09 Jan 2023 12:00), Max: 37.7 (08 Jan 2023 20:00)  T(F): 96.6 (09 Jan 2023 12:00), Max: 99.9 (08 Jan 2023 20:00)  HR: 110 (09 Jan 2023 12:00) (94 - 120)  BP: 168/51 (09 Jan 2023 12:00) (76/58 - 168/51)  BP(mean): 78 (09 Jan 2023 12:00) (45 - 93)  RR: 31 (09 Jan 2023 12:00) (26 - 55)  SpO2: 76% (09 Jan 2023 12:00) (76% - 100%)    LABS:                        7.5    16.31 )-----------( 77       ( 09 Jan 2023 04:42 )             22.4     01-09    134<L>  |  101  |  105<HH>  ----------------------------<  94  4.2   |  22  |  2.1<H>    Ca    7.6<L>      09 Jan 2023 04:42  Mg     2.0     01-09    TPro  3.3<L>  /  Alb  1.6<L>  /  TBili  0.5  /  DBili  x   /  AST  65<H>  /  ALT  20  /  AlkPhos  107  01-09        ABG - ( 08 Jan 2023 15:51 )  pH, Arterial: 7.42  pH, Blood: x     /  pCO2: 32    /  pO2: 63    / HCO3: 21    / Base Excess: -3.2  /  SaO2: 92.8                  Culture - Urine (collected 07 Jan 2023 00:15)  Source: Clean Catch Clean Catch (Midstream)  Final Report (08 Jan 2023 16:06):    <10,000 CFU/mL Normal Urogenital Cici          RADIOLOGY:      PHYSICAL EXAM:  CONSTITUTIONAL: lethargic, obeys basic commands  PULMONARY: decreased air entry bilaterally  CARDIOVASCULAR: Regular rate and rhythm; no audible murmurs  GASTROINTESTINAL: Soft, tender on superficial palpation, mildly distended; bowel sounds present  MUSCULOSKELETAL: 2+ peripheral pulses; no LLE  NEUROLOGY: non-focal  SKIN: No rashes or lesions; warm and dry

## 2023-01-09 NOTE — DIETITIAN INITIAL EVALUATION ADULT - ALTERNATE MEANS OF NUTRITION
Consider enteral nutrition if pt's PO intake remains poor. Review choice of appetite stimulant - Marinol vs mirtazapine instead of dronabinol (causes drowsiness)?

## 2023-01-09 NOTE — PROGRESS NOTE ADULT - ASSESSMENT
83 yo F w/ pmhx of CHF, HLD, HTN, COPD on 3L home O2, DM1, dementia, GERD, MI in 1992, CAD w cardiac stents presented to the ED on 12/25 with progressive SOB of breath for the last month. On 12/26 was admitted to T after experiencing NSTEMI and acute on chronic HFpEF.  On 12/29/22 Underwent catheterization with KHUSHBU X 1 to prox LAD 90% stenosis extending into mid LAD. Was managed in CCU and then downgraded to floors. On floors pt developed abdominal distension, epigastric pain, and melena with acute drop in Hb on 1/7/23. CT on 1/6/23 for abdominal distension revealed urinary retention and stockton was placed (1.4L u/o). Pt was evaluated by GI on 1/7/23 and recommended MICU eval for high risk patient with recent (<1 month) stent placement requiring continuing DAPT.      # Acute blood loss anemia from possible UGI bleed- Hb stable  - Having  Black Tarry stool / Melena since 01/06/23, no episodes overnight (1/9)  - Pt on DAPT for recent PCI with KHUSHBU.  - Hb droped 11 > 10 > 8.4   - S/o one unit PRBC on 1/7  - GI following  - on PPI drip    #NSTEMI   #Hx of Chronic Diastolic CHF / HFpEF   - s/p PCI LAD without complication  - s/p CATH: 90% prix and mid LAD s/p rota atherectomy, balloon angioplasty with KHUSHBU X 1 to prox LAD 90% stenosis extending into mid LAD  - TTE 12/26: 1. LV Ejection Fraction by Baird's Method with a biplane EF of 78 %. 2. Normal left atrial size. 3. Mild mitral annular calcification. 4. Mild mitral valve regurgitation. 5. Mild tricuspid regurgitation. 6. Normal trileaflet aortic valvewith normal opening. 7. Mild pulmonic valve regurgitation.  8. Estimated pulmonary artery systolic pressure is 73.8 mmHg assuming a right atrial pressure of 3 mmHg, which is consistent with severe   pulmonary hypertension.   9. There is mild aortic root calcification.  - DAPT with ASA/Plavix.   - C/w lipitor    #Leukocytosis with bowel distension    - ruled out  C diff colitis  -  Surgery input noted >> possible Ileus > also with current possible UGI bleed > keep NPO  - C/w IVF   - send Urine cx   - KUB 1/9: Stable nonspecific bowel gas pattern. Moderate stool.       #DM  -Blood sugars labile.  - continue to monitor fingersticks and ISS    # Acute Kidney Injury on CKD 3B  - Cr 1.6 >>> 1.8 today  - IV Hydration , trend Cr and Lactate.     # Poor PO intake  >> added Marinol and Ensure/    #misc  - c/w Supportive care    #Progress Note Handoff  Pending (specify): ? Active GI bleed / Ileus/   Family discussion: d/w    Disposition: Home_with HCS    83 yo F w/ pmhx of CHF, HLD, HTN, COPD on 3L home O2, DM1, dementia, GERD, MI in 1992, CAD w cardiac stents presented to the ED on 12/25 with progressive SOB of breath for the last month. On 12/26 was admitted to T after experiencing NSTEMI and acute on chronic HFpEF.  On 12/29/22 Underwent catheterization with KHUSHBU X 1 to prox LAD 90% stenosis extending into mid LAD. Was managed in CCU and then downgraded to floors. On floors pt developed abdominal distension, epigastric pain, and melena with acute drop in Hb on 1/7/23. CT on 1/6/23 for abdominal distension revealed urinary retention and stockton was placed (1.4L u/o). Pt was evaluated by GI on 1/7/23 and recommended MICU eval for high risk patient with recent (<1 month) stent placement requiring continuing DAPT.    # Acute blood loss anemia from possible UGI bleed- Hb stable  - Having  Black Tarry stool / Melena since 01/06/23, no episodes overnight (1/9)  - Pt on DAPT for recent PCI with KHUSHBU.  - Hb droped 11 > 10 > 8.4   - S/p one unit PRBC on 1/7  - GI following  - on PPI drip    #Thrombocytopenia  - HIT ordered    #NSTEMI   #Hx of Chronic Diastolic CHF / HFpEF   - s/p PCI LAD without complication  - s/p CATH: 90% prix and mid LAD s/p rota atherectomy, balloon angioplasty with KHUSHBU X 1 to prox LAD 90% stenosis extending into mid LAD  - TTE 12/26: 1. LV Ejection Fraction by Baird's Method with a biplane EF of 78 %. 2. Normal left atrial size. 3. Mild mitral annular calcification. 4. Mild mitral valve regurgitation. 5. Mild tricuspid regurgitation. 6. Normal trileaflet aortic valvewith normal opening. 7. Mild pulmonic valve regurgitation.  8. Estimated pulmonary artery systolic pressure is 73.8 mmHg assuming a right atrial pressure of 3 mmHg, which is consistent with severe   pulmonary hypertension.   9. There is mild aortic root calcification.  - DAPT with ASA/Plavix.   - C/w lipitor    #Leukocytosis with bowel distension    - ruled out  C diff colitis  -  Surgery input noted >> possible Ileus > also with current possible UGI bleed > keep NPO  - C/w IVF   - Cultures NGTD  - 1/9: KUB showing stable nonspecific bowel gas pattern with moderate stool, started on cefepime + Flagyl for suspicion of intraabdominal infection,  procal ordered too    #DM  -Blood sugars labile.  - continue to monitor fingersticks and ISS    # Acute Kidney Injury on CKD 3B  - Cr 1.6 >>> 1.8 today  - IV Hydration , trend Cr and Lactate.     # Poor PO intake  >> added Marinol and Ensure/    #misc  - c/w Supportive care    #Progress Note Handoff  Pending (specify): ? Active GI bleed / Ileus/   Family discussion: d/w    Disposition: Home_with HCS    81 yo F w/ pmhx of CHF, HLD, HTN, COPD on 3L home O2, DM1, dementia, GERD, MI in 1992, CAD w cardiac stents presented to the ED on 12/25 with progressive SOB of breath for the last month. On 12/26 was admitted to T after experiencing NSTEMI and acute on chronic HFpEF.  On 12/29/22 Underwent catheterization with KHUSHBU X 1 to prox LAD 90% stenosis extending into mid LAD. Was managed in CCU and then downgraded to floors. On floors pt developed abdominal distension, epigastric pain, and melena with acute drop in Hb on 1/7/23. CT on 1/6/23 for abdominal distension revealed urinary retention and stockton was placed (1.4L u/o). Pt was evaluated by GI on 1/7/23 and recommended MICU eval for high risk patient with recent (<1 month) stent placement requiring continuing DAPT.    # Acute blood loss anemia from possible UGI bleed- Hb stable  - Having  Black Tarry stool / Melena since 01/06/23, no episodes overnight (1/9)  - Pt on DAPT for recent PCI with KHUSHBU.  - Hb droped 11 > 10 > 8.4   - S/p one unit PRBC on 1/7  - GI following  - on PPI drip    #Thrombocytopenia  - HIT ordered    #NSTEMI   #Hx of Chronic Diastolic CHF / HFpEF   - s/p PCI LAD without complication  - s/p CATH: 90% prix and mid LAD s/p rota atherectomy, balloon angioplasty with KHUSHBU X 1 to prox LAD 90% stenosis extending into mid LAD  - TTE 12/26: 1. LV Ejection Fraction by Baird's Method with a biplane EF of 78 %. 2. Normal left atrial size. 3. Mild mitral annular calcification. 4. Mild mitral valve regurgitation. 5. Mild tricuspid regurgitation. 6. Normal trileaflet aortic valvewith normal opening. 7. Mild pulmonic valve regurgitation.  8. Estimated pulmonary artery systolic pressure is 73.8 mmHg assuming a right atrial pressure of 3 mmHg, which is consistent with severe   pulmonary hypertension.   9. There is mild aortic root calcification.  - DAPT with ASA/Plavix.   - C/w lipitor    #Leukocytosis with bowel distension    - ruled out  C diff colitis  -  Surgery input noted >> possible Ileus > also with current possible UGI bleed > keep NPO  - C/w IVF   - Cultures NGTD  - 1/9: KUB showing stable nonspecific bowel gas pattern with moderate stool, started on cefepime + Flagyl for suspicion of intraabdominal infection,  procal ordered too    #DM  -Blood sugars labile.  - continue to monitor fingersticks and ISS    # Acute Kidney Injury on CKD 3B  - Cr 1.6 >>> 1.8 today  - IV Hydration , trend Cr and Lactate.        81 yo F w/ pmhx of CHF, HLD, HTN, COPD on 3L home O2, DM1, dementia, GERD, MI in 1992, CAD w cardiac stents presented to the ED on 12/25 with progressive SOB of breath for the last month. On 12/26 was admitted to T after experiencing NSTEMI and acute on chronic HFpEF.  On 12/29/22 Underwent catheterization with KHUSHBU X 1 to prox LAD 90% stenosis extending into mid LAD. Was managed in CCU and then downgraded to floors. On floors pt developed abdominal distension, epigastric pain, and melena with acute drop in Hb on 1/7/23. CT on 1/6/23 for abdominal distension revealed urinary retention and stockton was placed (1.4L u/o). Pt was evaluated by GI on 1/7/23 and recommended MICU eval for high risk patient with recent (<1 month) stent placement requiring continuing DAPT.    # Acute blood loss anemia from possible UGI bleed- Hb stable  - Having  Black Tarry stool / Melena since 01/06/23, no episodes overnight (1/9)  - Pt on DAPT for recent PCI with KHUSHBU.  - Hb droped 11 > 10 > 8.4   - S/p one unit PRBC on 1/7  - GI following  - on PPI drip    #Thrombocytopenia  - HIT ordered    #NSTEMI   #Hx of Chronic Diastolic CHF / HFpEF   - s/p PCI LAD without complication  - s/p CATH: 90% prix and mid LAD s/p rota atherectomy, balloon angioplasty with KHUSHBU X 1 to prox LAD 90% stenosis extending into mid LAD  - TTE 12/26: 1. LV Ejection Fraction by Baird's Method with a biplane EF of 78 %. 2. Normal left atrial size. 3. Mild mitral annular calcification. 4. Mild mitral valve regurgitation. 5. Mild tricuspid regurgitation. 6. Normal trileaflet aortic valvewith normal opening. 7. Mild pulmonic valve regurgitation.  8. Estimated pulmonary artery systolic pressure is 73.8 mmHg assuming a right atrial pressure of 3 mmHg, which is consistent with severe   pulmonary hypertension.   9. There is mild aortic root calcification.  - DAPT with ASA/Plavix.   - C/w lipitor  - beta blocker on hold    #Leukocytosis with bowel distension    - ruled out  C diff colitis  -  Surgery input noted >> possible Ileus > also with current possible UGI bleed > keep NPO  - C/w IVF   - Cultures NGTD  - 1/9: KUB showing stable nonspecific bowel gas pattern with moderate stool, started on cefepime + Flagyl for suspicion of intraabdominal infection,  procal ordered too    #DM  -Blood sugars labile.  - continue to monitor fingersticks and ISS    # Acute Kidney Injury on CKD 3B  - Cr 2.1 today  - given one dose lasix for suspicion of cardiorenal element

## 2023-01-09 NOTE — PHARMACOTHERAPY INTERVENTION NOTE - COMMENTS
cefepime 2g IV daily, pt allergic to clive, d/w team, will start cipro
evaluate ondansetron 4mg IV q8h ATC, d/w team, changed to prn

## 2023-01-09 NOTE — PROGRESS NOTE ADULT - SUBJECTIVE AND OBJECTIVE BOX
Over Night Events: events noted, off levophed, pn PPI drip, no bloody BM, sp GI    PHYSICAL EXAM    ICU Vital Signs Last 24 Hrs  T(C): 36 (09 Jan 2023 08:00), Max: 37.7 (08 Jan 2023 20:00)  T(F): 96.8 (09 Jan 2023 08:00), Max: 99.9 (08 Jan 2023 20:00)  HR: 116 (09 Jan 2023 08:00) (74 - 116)  BP: 161/67 (09 Jan 2023 08:00) (76/58 - 161/67)  BP(mean): 92 (09 Jan 2023 08:00) (38 - 93)  RR: 30 (09 Jan 2023 08:00) (18 - 55)  SpO2: 100% (09 Jan 2023 08:00) (96% - 100%)    O2 Parameters below as of 09 Jan 2023 08:00  Patient On (Oxygen Delivery Method): nasal cannula  O2 Flow (L/min): 3          General: ILL looking  Lungs: Bilateral BS  Cardio: CHRIS 2.6  Abdomen: Soft, Positive BS  Extremities: No clubbing   Skin: Warm  Neurological: Non focal/ lethargic       01-08-23 @ 07:01  -  01-09-23 @ 07:00  --------------------------------------------------------  IN:    Norepinephrine: 3 mL    Pantoprazole: 220 mL    Sodium Chloride 0.9% Bolus: 250 mL  Total IN: 473 mL    OUT:    Indwelling Catheter - Urethral (mL): 800 mL  Total OUT: 800 mL    Total NET: -327 mL      01-09-23 @ 07:01  -  01-09-23 @ 08:30  --------------------------------------------------------  IN:    Pantoprazole: 10 mL  Total IN: 10 mL    OUT:    Indwelling Catheter - Urethral (mL): 40 mL    Norepinephrine: 0 mL  Total OUT: 40 mL    Total NET: -30 mL          LABS:                          7.5    16.31 )-----------( 77       ( 09 Jan 2023 04:42 )             22.4                                               01-09    134<L>  |  101  |  105<HH>  ----------------------------<  94  4.2   |  22  |  2.1<H>    Ca    7.6<L>      09 Jan 2023 04:42  Mg     2.0     01-09    TPro  3.3<L>  /  Alb  1.6<L>  /  TBili  0.5  /  DBili  x   /  AST  65<H>  /  ALT  20  /  AlkPhos  107  01-09      PT/INR - ( 07 Jan 2023 11:59 )   PT: 20.60 sec;   INR: 1.78 ratio         PTT - ( 07 Jan 2023 11:59 )  PTT:38.0 sec                                                                                     LIVER FUNCTIONS - ( 09 Jan 2023 04:42 )  Alb: 1.6 g/dL / Pro: 3.3 g/dL / ALK PHOS: 107 U/L / ALT: 20 U/L / AST: 65 U/L / GGT: x                                                  Culture - Urine (collected 07 Jan 2023 00:15)  Source: Clean Catch Clean Catch (Midstream)  Final Report (08 Jan 2023 16:06):    <10,000 CFU/mL Normal Urogenital Cici                                                                                       ABG - ( 08 Jan 2023 15:51 )  pH, Arterial: 7.42  pH, Blood: x     /  pCO2: 32    /  pO2: 63    / HCO3: 21    / Base Excess: -3.2  /  SaO2: 92.8                MEDICATIONS  (STANDING):  albuterol/ipratropium for Nebulization 3 milliLiter(s) Nebulizer every 6 hours  atorvastatin 40 milliGRAM(s) Oral at bedtime  chlorhexidine 2% Cloths 1 Application(s) Topical daily  clopidogrel Tablet 75 milliGRAM(s) Oral daily  dextrose 5%. 1000 milliLiter(s) (50 mL/Hr) IV Continuous <Continuous>  dextrose 5%. 1000 milliLiter(s) (100 mL/Hr) IV Continuous <Continuous>  dextrose 50% Injectable 25 Gram(s) IV Push once  dextrose 50% Injectable 12.5 Gram(s) IV Push once  dextrose 50% Injectable 25 Gram(s) IV Push once  donepezil 10 milliGRAM(s) Oral at bedtime  dronabinol 5 milliGRAM(s) Oral two times a day before meals  glucagon  Injectable 1 milliGRAM(s) IntraMuscular once  insulin glargine Injectable (LANTUS) 18 Unit(s) SubCutaneous two times a day  insulin lispro (ADMELOG) corrective regimen sliding scale   SubCutaneous three times a day before meals  insulin lispro Injectable (ADMELOG) 6 Unit(s) SubCutaneous three times a day before meals  montelukast 10 milliGRAM(s) Oral daily  norepinephrine Infusion 0.05 MICROgram(s)/kG/Min (5.91 mL/Hr) IV Continuous <Continuous>  ondansetron Injectable 4 milliGRAM(s) IV Push every 8 hours  pantoprazole Infusion 8 mG/Hr (10 mL/Hr) IV Continuous <Continuous>  polyethylene glycol 3350 17 Gram(s) Oral daily  sucralfate 1 Gram(s) Oral two times a day  zinc oxide 40% Paste 1 Application(s) Topical daily    MEDICATIONS  (PRN):  aluminum hydroxide/magnesium hydroxide/simethicone Suspension 30 milliLiter(s) Oral every 4 hours PRN Dyspepsia  dextrose Oral Gel 15 Gram(s) Oral once PRN Blood Glucose LESS THAN 70 milliGRAM(s)/deciliter      CXR reviewed

## 2023-01-09 NOTE — DIETITIAN INITIAL EVALUATION ADULT - OTHER INFO
Pt presented with 1-month hx of dry cough and SOB, found to have PNA, NSTEMI and acute on chronic HFpEF during admission.  S/p successful IVUS guided rotational atherectomy and balloon angioplasty with KHUSHBU X 1 to prox LAD 90% stenosis extending into mid LAD 12/29/2022. S/e S+S 1/3 with SLP's recommendation for Soft & bite sized with thins via controlled cup sips. Pt developed persistent abdominal pain and abdominal distension; pt was evaluated by Surgery 1/6 - unlikely SBO, likely ileus. Had melena with acute drop in Hb on 1/7/23, pt subsequently upgraded to ICU. Currently NPO and on PPI drip.

## 2023-01-09 NOTE — DIETITIAN INITIAL EVALUATION ADULT - ORAL INTAKE PTA/DIET HISTORY
Limited to chart review at this time as pt lethargic on RD visit. Per EMR, pt reported her height was 4'11" in previous admissions.

## 2023-01-09 NOTE — PROGRESS NOTE ADULT - ASSESSMENT
82 year old female with pmhx CAD/MI s/p 5 stents, CHFpEF, COPD (3L home O2), DM1, GERD, HLD, ?dementia presents for worsening SOB for one month with x2 days of crushing substernal chest pressure. Found to have NSTEMI and PNA and CHF exacerbation. Pt treated with IV abx and IV diuretics. On 12/29/22 Underwent catheterization with KHUSHBU X 1 to prox LAD 90% stenosis extending into mid LAD. Was managed in CCU and then downgraded to floors. On floors pt developed abdominal distension, epigastric pain, and melena with acute drop in Hb on 1/7/23.    # Melena and anemia R/O PUD, AVMs or other vascular lesions.   - Recent ACS with cardiac stent 12/29.   - on DAPTs  - hemoglobin dropped to 6.9. stable after transfusion   - hypotension/tachycardia. stable VS after resuscitation  - epigastric tenderness in AM     PLAN  PPI infusion   transfuse to keep hemoglobin > 8   2 large IVs"  MICU monitoring   KUB  Will continue with conservative management.  Discussed the case with Dr Ji: the risk of endoscopic intervention at this point may outweighs the benefit. as patient didn't have any further episodes of melena during the day. Will continue with the plan above and will consider EGD after optimization of the cardiopulmonary status or in urgently if indicated. will monitor closely

## 2023-01-09 NOTE — PROGRESS NOTE ADULT - SUBJECTIVE AND OBJECTIVE BOX
Cardiology Follow up s/p PCI KHUSHBU    VENKATESH RAMACHANDRAN   82y Female  PAST MEDICAL & SURGICAL HISTORY:    MI (myocardial infarction)  s/p 5 stents  HTN (hypertension)  High cholesterol  Heart failure  Non Hodgkin&#x27;s lymphoma  in remission. Follows with Dr Hernandez  PVD (peripheral vascular disease)  Thrombocytopenia  CKD (chronic kidney disease)  Osteoarthritis  Diabetes  on insulin  Coronary artery disease involving native heart without angina pectoris, unspecified vessel or lesion type  s/p 5 stents  History of cholecystectomy  H/O cardiac catheterization  5 STENTS  H/O carotid endarterectomy  RIGHT           HPI:  82F w/ pmhx of CHF, HLD, HTN, COPD on 3L home O2, DM1, dementia, GERD, MI in 1992, CAD with 5 cardiac stents who present with 1 month of worsening SOB. For past 1 month she has been having dry cough and SOB. She saw her pulmonologist Dr. Feldman who did CXR and showed it was normal at the time, had her on levaquin and then increased her dose which she completed last week. Yesterday she started having crushing chest pressure substernally. Denies fever during this month, nausea vomiting back pain abd pain urinary sx or diarrhea. No recent travel or sick contact. Her cardiologist is Dr. Munoz.  (25 Dec 2022 18:25)    Allergies    ACE inhibitors (Unknown)  BENZALKONIUM (Rash)  BETADINE (Rash)  Ceclor (Unknown)  codeine (Unknown)  contrast media (iodine-based) (Hives)  latex (Unknown)  penicillin (Unknown)  RUBBER GLOVES (Rash)  shellfish (Unknown)  sulfonamides (Unknown)    Intolerances    Patient seen and examined at bedside. No acute events overnight.  Patient without complaints. Denies CP, SOB, palpitations, or dizziness  No events on telemetry overnight    Vital Signs Last 24 Hrs  T(C): 36 (09 Jan 2023 08:00), Max: 37.7 (08 Jan 2023 20:00)  T(F): 96.8 (09 Jan 2023 08:00), Max: 99.9 (08 Jan 2023 20:00)  HR: 110 (09 Jan 2023 10:00) (86 - 120)  BP: 152/67 (09 Jan 2023 10:00) (76/58 - 161/67)  BP(mean): 93 (09 Jan 2023 10:00) (38 - 93)  RR: 26 (09 Jan 2023 10:00) (24 - 55)  SpO2: 100% (09 Jan 2023 10:00) (96% - 100%)    Parameters below as of 09 Jan 2023 10:00  Patient On (Oxygen Delivery Method): nasal cannula  O2 Flow (L/min): 3      MEDICATIONS  (STANDING):  albuterol/ipratropium for Nebulization 3 milliLiter(s) Nebulizer every 6 hours  atorvastatin 40 milliGRAM(s) Oral at bedtime  chlorhexidine 2% Cloths 1 Application(s) Topical daily  ciprofloxacin   IVPB      ciprofloxacin   IVPB 200 milliGRAM(s) IV Intermittent once  ciprofloxacin   IVPB 200 milliGRAM(s) IV Intermittent every 12 hours  clopidogrel Tablet 75 milliGRAM(s) Oral daily  dextrose 5%. 1000 milliLiter(s) (50 mL/Hr) IV Continuous <Continuous>  dextrose 5%. 1000 milliLiter(s) (100 mL/Hr) IV Continuous <Continuous>  dextrose 50% Injectable 25 Gram(s) IV Push once  dextrose 50% Injectable 12.5 Gram(s) IV Push once  dextrose 50% Injectable 25 Gram(s) IV Push once  donepezil 10 milliGRAM(s) Oral at bedtime  dronabinol 5 milliGRAM(s) Oral two times a day before meals  glucagon  Injectable 1 milliGRAM(s) IntraMuscular once  insulin glargine Injectable (LANTUS) 18 Unit(s) SubCutaneous two times a day  insulin lispro (ADMELOG) corrective regimen sliding scale   SubCutaneous three times a day before meals  insulin lispro Injectable (ADMELOG) 6 Unit(s) SubCutaneous three times a day before meals  metroNIDAZOLE  IVPB 500 milliGRAM(s) IV Intermittent every 8 hours  montelukast 10 milliGRAM(s) Oral daily  pantoprazole Infusion 8 mG/Hr (10 mL/Hr) IV Continuous <Continuous>  polyethylene glycol 3350 17 Gram(s) Oral daily  sucralfate 1 Gram(s) Oral two times a day  zinc oxide 40% Paste 1 Application(s) Topical daily    MEDICATIONS  (PRN):  aluminum hydroxide/magnesium hydroxide/simethicone Suspension 30 milliLiter(s) Oral every 4 hours PRN Dyspepsia  dextrose Oral Gel 15 Gram(s) Oral once PRN Blood Glucose LESS THAN 70 milliGRAM(s)/deciliter  ondansetron Injectable 4 milliGRAM(s) IV Push every 8 hours PRN Nausea and/or Vomiting      REVIEW OF SYSTEMS:          All negative except as mentioned in HPI    PHYSICAL EXAM:           CONSTITUTIONAL: Well-developed; well-nourished; weak, tired   	SKIN: warm, dry  	HEAD: Normocephalic; atraumatic  	EYES: PERRL.  	ENT: No nasal discharge, airway clear, mucous membranes moist. Right IJ TLC D/C/I   	NECK: Supple; non tender.  	CARD: +S1, +S2, no murmurs, gallops, or rubs. ST / Regular rate and rhythm    	RESP: No wheezes, rales. Rhonchi B/L  	ABD: soft ntnd, + BS x 4 quadrants              Uro: Pena to gravity   	EXT: moves all extremities,  no clubbing, cyanosis or edema  	NEURO: Alert and oriented x2, no focal deficits          PSYCH: Cooperative, appropriate          VASCULAR:  + Rad / + PTs / +  DPs          EXTREMITY:              Right Groin: access site soft, no hematoma, no pain, + pulses, no sign of infection, no numbness  	            ECG:   < from: 12 Lead ECG (12.30.22 @ 08:05) >    Ventricular Rate 83 BPM    Atrial Rate 83 BPM    P-R Interval 130 ms    QRS Duration 90 ms    Q-T Interval 378 ms    QTC Calculation(Bazett) 444 ms    P Axis 49 degrees    R Axis 67 degrees    T Axis 181 degrees    Diagnosis Line Sinus rhythm with Premature atrial complexes  Nonspecific ST and T wave abnormality  Abnormal ECG    Confirmed by cami leiva ([id number]) on 12/30/2022 2:01:35 PM                                                                                  2D ECHO:  < from: TTE Echo Complete w/o Contrast w/ Doppler (12.26.22 @ 10:40) >  Summary:   1. LV Ejection Fraction by Baird's Method with a biplane EF of 78 %.   2. Normal left atrial size.   3. Mild mitral annular calcification.   4. Mild mitral valve regurgitation.   5. Mild tricuspid regurgitation.   6. Normal trileaflet aortic valvewith normal opening.   7. Mild pulmonic valve regurgitation.   8. Estimated pulmonary artery systolic pressure is 73.8 mmHg assuming a   right atrial pressure of 3 mmHg, which is consistent with severe   pulmonary hypertension.   9. There is mild aortic root calcification.    LABS:                        7.5    16.31 )-----------( 77       ( 09 Jan 2023 04:42 )             22.4     01-09    134<L>  |  101  |  105<HH>  ----------------------------<  94  4.2   |  22  |  2.1<H>    Ca    7.6<L>      09 Jan 2023 04:42  Mg     2.0     01-09    TPro  3.3<L>  /  Alb  1.6<L>  /  TBili  0.5  /  DBili  x   /  AST  65<H>  /  ALT  20  /  AlkPhos  107  01-09    Magnesium, Serum: 2.0 mg/dL [1.8 - 2.4] (01-09-23 @ 04:42)  LIVER FUNCTIONS - ( 09 Jan 2023 04:42 )  Alb: 1.6 g/dL / Pro: 3.3 g/dL / ALK PHOS: 107 U/L / ALT: 20 U/L / AST: 65 U/L / GGT: x             A/P:  I discussed the case with Cardiologist Dr. Leong and recommend the following:    S/P PCI pLAD KHUSHBU x 1   Intervention: s/p successful IVUS guided rotational atherectomy and balloon angioplasty with KHUSHBU X 1 to prox LAD 90% stenosis extending into mid LAD  Implants: [place] 3.5 X 30 to prox LAD     FINDINGS:     Coronary Dominance: Right    LM: No disease  LAD: Prox LAD calcified 90% stenosis, mid LAD 90% calcified stenosis  D1 mild disease  CX: dCx 70% stenosis  OM1 Mild disease small vessel  OM2 mild disease   RCA: Patent stents from prior cath, not injected    LVEDP: 25 mmHg   POST-OP DIAGNOSIS:    [x] 2 Vessel Coronary Artery Disease: LAD s/p intervention and Cx                      Care as per ICU team                   ACEi/ARB due to Allergy and elevated Cr                   Consider B-Blocker use if B/P is stable   	     Continue DAPT, Statin Therapy, PPI, Maalox                    Monitor Cr, Hg, Plt                   Strict I/O, Daily weight                    Take ASA to every other day and continue Plavix daily; no recent bleeding, monitor                    DVT prophylaxis with SCDs if cannot go OOB to chair                   HOB > 45 degrees                    Keep MAP  > 70                   Oral care                    Monitor in ICU

## 2023-01-09 NOTE — DIETITIAN INITIAL EVALUATION ADULT - NUTRITIONGOAL OUTCOME1
pt will meet >50% estimated needs in 4 days. RD to follow as per high risk protocol.  Monitor diet order, kcal intake, body composition, NFPE, labs  (lytes, BG, renal indices)

## 2023-01-10 NOTE — PROVIDER CONTACT NOTE (OTHER) - ASSESSMENT
Patient tachypneic, tachycardic HR 160s, overbreathing and increasing lethargy. Pt unable to cough up copious secretions despite frequent nasopharyngeal suctioning. RT placed patient on BIPAP with no improvement in o2 sat. Pt still using accessory muscles and tachycardic after being placed on BIPAP.

## 2023-01-10 NOTE — PROCEDURE NOTE - NSINDICATIONS_GEN_A_CORE
respiratory distress
critical patient/monitoring purposes
urinry obstruction or retention
venous access
critical illness

## 2023-01-10 NOTE — PROCEDURE NOTE - NSPROCNAME_GEN_A_CORE
Midline Insertion
Tracheal Intubation
Urinary Device Placement
Arterial Puncture/Cannulation
Central Line Insertion

## 2023-01-10 NOTE — PROGRESS NOTE ADULT - ASSESSMENT
81 yo F w/ pmhx of CHF, HLD, HTN, COPD on 3L home O2, DM1, dementia, GERD, MI in 1992, CAD w cardiac stents presented to the ED on 12/25 with progressive SOB of breath for the last month. On 12/26 was admitted to T after experiencing NSTEMI and acute on chronic HFpEF.  On 12/29/22 Underwent catheterization with KHUSHBU X 1 to prox LAD 90% stenosis extending into mid LAD. Was managed in CCU and then downgraded to floors. On floors pt developed abdominal distension, epigastric pain, and melena with acute drop in Hb on 1/7/23. CT on 1/6/23 for abdominal distension revealed urinary retention and stock[place] was placed (1.4L u/o). Pt was evaluated by GI on 1/7/23 and recommended MICU eval for high risk patient with recent (<1 month) stent placement requiring continuing DAPT.    # Acute blood loss anemia from possible UGI bleed- Hb stable  - Having  [place] stool / Melena since 01/06/23, no episodes overnight (1/9)  - Pt on DAPT for recent PCI with KHUSHBU.  - Hb droped 11 > 10 > 8.4   - S/p one unit PRBC on 1/7  - GI following  - on PPI drip    #Thrombocytopenia  - HIT ordered, negative  - Pending serotonin release assay    #NSTEMI   #Hx of Chronic Diastolic CHF / HFpEF   - s/p PCI LAD without complication  - s/p CATH: 90% prix and mid LAD s/p rota atherectomy, balloon angioplasty with KHUSHBU X 1 to prox LAD 90% stenosis extending into mid LAD  - TTE 12/26: 1. LV Ejection Fraction by Baird's Method with a biplane EF of 78 %. 2. Normal left atrial size. 3. Mild mitral annular calcification. 4. Mild mitral valve regurgitation. 5. Mild tricuspid regurgitation. 6. Normal trileaflet aortic valvewith normal opening. 7. Mild pulmonic valve regurgitation.  8. Estimated pulmonary artery systolic pressure is 73.8 mmHg assuming a right atrial pressure of 3 mmHg, which is consistent with severe   pulmonary hypertension.   9. There is mild aortic root calcification.  - DAPT with ASA/Plavix.   - C/w lipitor  - beta blocker on hold    #Leukocytosis with bowel distension    - ruled out  C diff colitis  -  Surgery input noted >> possible Ileus > also with current possible UGI bleed > keep NPO  - C/w IVF   - Cultures NGTD  - 1/9: KUB showing stable nonspecific bowel gas pattern with moderate stool, started on cipro+ Flagyl for suspicion of intraabdominal infection,  procal ordered too  - 1/10: procal trending up, c/w cipro and flagyl, ID ocnsulted, given one time dose of elan and vanco for suspicion of sepsis, intubated with increasing pressor requirements and no urine output    #DM  -Blood sugars labile.  - continue to monitor fingersticks and ISS    # Acute Kidney Injury on CKD 3B- worsening  - In the setting of new intubation and pressor requirements, kidney function has been worsening with no urine output  - Started on bicarb drip (low pH with low bicarb)  - Started on hydrocortisone q8h

## 2023-01-10 NOTE — PROGRESS NOTE ADULT - ASSESSMENT
82 year old female with pmhx CAD/MI s/p 5 stents, CHFpEF, COPD (3L home O2), DM1, GERD, HLD, ?dementia presents for worsening SOB for one month with x2 days of crushing substernal chest pressure. Found to have NSTEMI and PNA and CHF exacerbation. Pt treated with IV abx and IV diuretics. On 12/29/22 Underwent catheterization with KHUSHBU X 1 to prox LAD 90% stenosis extending into mid LAD. Was managed in CCU and then downgraded to floors. On floors pt developed abdominal distension, epigastric pain, and melena with acute drop in Hb on 1/7/23.    # Melena and anemia R/O PUD, AVMs or other vascular lesions.   - Recent ACS with cardiac stent 12/29.   - on DAPTs  - hemoglobin dropped to 6.9. stable after transfusion   - hypotension/tachycardia. stable VS after resuscitation  - epigastric tenderness in AM     PLAN  PPI infusion   transfuse to keep hemoglobin > 8   2 large IVs"  MICU monitoring   Will continue with conservative management.  Discussed the case with Dr Ji: the risk of endoscopic intervention at this point may outweighs the benefit. as patient didn't have any further episodes of melena during the day. Will continue with the plan above and will consider EGD after optimization of the cardiopulmonary status or in urgently if indicated. No signs of active bleeding since last saturday  82 year old female with pmhx CAD/MI s/p 5 stents, CHFpEF, COPD (3L home O2), DM1, GERD, HLD, ?dementia presents for worsening SOB for one month with x2 days of crushing substernal chest pressure. Found to have NSTEMI and PNA and CHF exacerbation. Pt treated with IV abx and IV diuretics. On 12/29/22 Underwent catheterization with KHUSHBU X 1 to prox LAD 90% stenosis extending into mid LAD. Was managed in CCU and then downgraded to floors. On floors pt developed abdominal distension, epigastric pain, and melena with acute drop in Hb on 1/7/23.    # Melena and anemia R/O PUD, AVMs or other vascular lesions.   - Recent ACS with cardiac stent 12/29.   - on DAPTs  - hemoglobin dropped to 6.9. stable after transfusion   - hypotension/tachycardia. stable VS after resuscitation  - epigastric tenderness. Patient decompensated yesterday. Currently requiring pressors, and intubated.    PLAN  PPI infusion   transfuse to keep hemoglobin > 8   2 large IVs"  MICU monitoring   Will continue with conservative management.  Discussed the case with Dr Ji: the risk of endoscopic intervention at this point may outweighs the benefit. as patient didn't have any further episodes of melena during the day. Will continue with the plan above and will consider EGD after optimization of the cardiopulmonary status or in urgently if indicated. No signs of active bleeding since last saturday.   CTA to rule out bowel ischemia  82 year old female with pmhx CAD/MI s/p 5 stents, CHFpEF, COPD (3L home O2), DM1, GERD, HLD, ?dementia presents for worsening SOB for one month with x2 days of crushing substernal chest pressure. Found to have NSTEMI and PNA and CHF exacerbation. Pt treated with IV abx and IV diuretics. On 12/29/22 Underwent catheterization with KHUSHUB X 1 to prox LAD 90% stenosis extending into mid LAD. Was managed in CCU and then downgraded to floors. On floors pt developed abdominal distension, epigastric pain, and melena with acute drop in Hb on 1/7/23.    # Melena and anemia R/O PUD, AVMs or other vascular lesions.   - Recent ACS with cardiac stent 12/29.   - on DAPTs  - hemoglobin dropped to 6.9. stable after transfusion   - hypotension/tachycardia. stable VS after resuscitation  - epigastric tenderness. Patient decompensated yesterday. Currently requiring pressors, and intubated.    PLAN  PPI infusion   transfuse to keep hemoglobin > 8   2 large IVs"  MICU monitoring   Will continue with conservative management.  Discussed the case with Dr Ji: the risk of endoscopic intervention at this point may outweighs the benefit. as patient didn't have any further episodes of melena during the day. Will continue with the plan above and will consider EGD after optimization of the cardiopulmonary status or in urgently if indicated. No signs of active bleeding since last saturday.   CTA to rule out underlying bowel ischemia  will follow up

## 2023-01-10 NOTE — PROCEDURE NOTE - NSPROCDETAILS_GEN_ALL_CORE
guidewire recovered/lumen(s) aspirated and flushed/sterile dressing applied/sterile technique, catheter placed/ultrasound guidance with use of sterile gel and probe cove
location identified, draped/prepped, sterile technique used, needle inserted/introduced
patient pre-oxygenated, tube inserted, placement confirmed
sterile technique, indwelling urinary device inserted
Trendelenburg position/ultrasound guidance

## 2023-01-10 NOTE — CHART NOTE - NSCHARTNOTEFT_GEN_A_CORE
RN informed me that patient was tachypneic and having difficulty breathing. She was on nasal cannula saturation 95%, but looked tachypneic and was belly breathing. CXR will be done, lasix 60mg IVP will be given, and BIPAP started. RN informed me that patient was tachypneic and having difficulty breathing. She was on nasal cannula saturation 95%, but looked tachypneic and was belly breathing. CXR will be done, lasix 60mg IVP will be given, and BIPAP started.    Update: Patient only gave 25 cc of urine after the lasix. She continued to become more tachypneic, HR increase to 150s, RR 43. Pulm fellow was called, assessed patient, he recommended trying precedex to calm down her breathing & intubation. Daughter (Eric) and  (Leonard) were contacted, updated about situation, and they want her to be intubated if needed to.

## 2023-01-10 NOTE — PROGRESS NOTE ADULT - SUBJECTIVE AND OBJECTIVE BOX
VENKATESH RAMACHANDRAN 82y Female  MRN#: [id number]   Hospital Day: 16d    SUBJECTIVE  Patient is a 82y old Female who presents with a chief complaint of NSTEMI (10 Keven 2023 11:23)  Currently admitted to medicine with the primary diagnosis of Acute respiratory failure with hypoxia and hypercapnia      INTERVAL HPI AND OVERNIGHT EVENTS:  Patient was examined and seen at bedside. This morning she is resting comfortably in bed and reports no issues or overnight events.    OBJECTIVE  PAST MEDICAL & SURGICAL HISTORY  MI (myocardial infarction)  s/p 5 stents    HTN (hypertension)    High cholesterol    Heart failure    Non Hodgkin&#x27;s lymphoma  in remission. Follows with Dr Hernandez    PVD (peripheral vascular disease)    Thrombocytopenia    CKD (chronic kidney disease)    Osteoarthritis    Diabetes  on insulin    Coronary artery disease involving native heart without angina pectoris, unspecified vessel or lesion type  s/p 5 stents    History of cholecystectomy    H/O cardiac catheterization  5 STENTS    H/O carotid endarterectomy  RIGHT      ALLERGIES:  ACE inhibitors (Unknown)  BENZALKONIUM (Rash)  BETADINE (Rash)  Ceclor (Unknown)  codeine (Unknown)  contrast media (iodine-based) (Hives)  latex (Unknown)  penicillin (Unknown)  RUBBER GLOVES (Rash)  shellfish (Unknown)  sulfonamides (Unknown)    MEDICATIONS:  STANDING MEDICATIONS  albuterol/ipratropium for Nebulization 3 milliLiter(s) Nebulizer every 6 hours  aspirin  chewable 81 milliGRAM(s) Oral <User Schedule>  atorvastatin 40 milliGRAM(s) Oral at bedtime  chlorhexidine 0.12% Liquid 15 milliLiter(s) Oral Mucosa every 12 hours  chlorhexidine 2% Cloths 1 Application(s) Topical daily  clopidogrel Tablet 75 milliGRAM(s) Oral daily  dexMEDEtomidine Infusion 0.05 MICROgram(s)/kG/Hr IV Continuous <Continuous>  dextrose 5%. 1000 milliLiter(s) IV Continuous <Continuous>  dextrose 5%. 1000 milliLiter(s) IV Continuous <Continuous>  dextrose 50% Injectable 25 Gram(s) IV Push once  dextrose 50% Injectable 12.5 Gram(s) IV Push once  dextrose 50% Injectable 25 Gram(s) IV Push once  dextrose 50% Injectable 25 Gram(s) IV Push once  donepezil 10 milliGRAM(s) Oral at bedtime  gabapentin 100 milliGRAM(s) Oral at bedtime  glucagon  Injectable 1 milliGRAM(s) IntraMuscular once  hydrocortisone sodium succinate Injectable 100 milliGRAM(s) IV Push every 8 hours  insulin lispro (ADMELOG) corrective regimen sliding scale   SubCutaneous three times a day before meals  ketamine Infusion. 0.251 mG/kG/Hr IV Continuous <Continuous>  meropenem  IVPB 500 milliGRAM(s) IV Intermittent every 12 hours  metroNIDAZOLE  IVPB 500 milliGRAM(s) IV Intermittent every 8 hours  midazolam Infusion 0.02 mG/kG/Hr IV Continuous <Continuous>  montelukast 10 milliGRAM(s) Oral daily  multivitamin/minerals 1 Tablet(s) Oral daily  norepinephrine Infusion 0.05 MICROgram(s)/kG/Min IV Continuous <Continuous>  pantoprazole Infusion 8 mG/Hr IV Continuous <Continuous>  phenylephrine    Infusion 0.1 MICROgram(s)/kG/Min IV Continuous <Continuous>  polyethylene glycol 3350 17 Gram(s) Oral daily  sodium bicarbonate  Infusion 0.143 mEq/kG/Hr IV Continuous <Continuous>  sodium chloride 0.9% Bolus 250 milliLiter(s) IV Bolus once  sucralfate 1 Gram(s) Oral two times a day  vasopressin Infusion 0.04 Unit(s)/Min IV Continuous <Continuous>    PRN MEDICATIONS  aluminum hydroxide/magnesium hydroxide/simethicone Suspension 30 milliLiter(s) Oral every 4 hours PRN  dextrose Oral Gel 15 Gram(s) Oral once PRN  ondansetron Injectable 4 milliGRAM(s) IV Push every 8 hours PRN      VITAL SIGNS: Last 24 Hours  T(C): 36.9 (10 Keven 2023 12:00), Max: 37.2 (10 Keven 2023 04:00)  T(F): 98.5 (10 Keven 2023 12:00), Max: 98.9 (10 Keven 2023 04:00)  HR: 120 (10 Keven 2023 14:00) (98 - 156)  BP: 115/54 (10 Keven 2023 04:15) (77/26 - 155/53)  BP(mean): 59 (10 Keven 2023 05:15) (32 - 130)  RR: 30 (10 Keven 2023 14:00) (24 - 80)  SpO2: 96% (10 Keven 2023 14:00) (71% - 100%)    LABS:                        7.8    13.01 )-----------( 101      ( 10 Keven 2023 06:30 )             24.6     01-10    138  |  107  |  95<HH>  ----------------------------<  53<L>  4.7   |  15<L>  |  2.4<H>    Ca    7.3<L>      10 Keven 2023 06:30  Mg     2.1     01-10    TPro  3.3<L>  /  Alb  1.6<L>  /  TBili  0.6  /  DBili  x   /  AST  143<H>  /  ALT  40  /  AlkPhos  138<H>  01-10        ABG - ( 10 Keven 2023 11:29 )  pH, Arterial: 7.07  pH, Blood: x     /  pCO2: 35    /  pO2: 90    / HCO3: 10    / Base Excess: -19.2 /  SaO2: 96.1                      RADIOLOGY:      PHYSICAL EXAM:  CONSTITUTIONAL: No acute distress, well-developed, well-groomed, AAOx3  HEAD: Atraumatic, normocephalic  EYES: EOM intact, PERRLA, conjunctiva and sclera clear  ENT: Supple, no masses, no thyromegaly, no bruits, no JVD; moist mucous membranes  PULMONARY: Clear to auscultation bilaterally; no wheezes, rales, or rhonchi  CARDIOVASCULAR: Regular rate and rhythm; no murmurs, rubs, or gallops  GASTROINTESTINAL: Soft, non-tender, non-distended; bowel sounds present  MUSCULOSKELETAL: 2+ peripheral pulses; no clubbing, no cyanosis, no edema  NEUROLOGY: non-focal  SKIN: No rashes or lesions; warm and dry

## 2023-01-10 NOTE — PROGRESS NOTE ADULT - SUBJECTIVE AND OBJECTIVE BOX
Cardiology Follow up s/p PCI KHUSHBU    VENKATESH RAMACHANDRAN   82y Female  PAST MEDICAL & SURGICAL HISTORY:  MI (myocardial infarction)  s/p 5 stents      HTN (hypertension)      High cholesterol      Heart failure      Non Hodgkin&#x27;s lymphoma  in remission. Follows with Dr Hernandez      PVD (peripheral vascular disease)      Thrombocytopenia      CKD (chronic kidney disease)      Osteoarthritis      Diabetes  on insulin      Coronary artery disease involving native heart without angina pectoris, unspecified vessel or lesion type  s/p 5 stents      History of cholecystectomy      H/O cardiac catheterization  5 STENTS      H/O carotid endarterectomy  RIGHT           HPI:  82F w/ pmhx of CHF, HLD, HTN, COPD on 3L home O2, DM1, dementia, GERD, MI in 1992, CAD with 5 cardiac stents who present with 1 month of worsening SOB. For past 1 month she has been having dry cough and SOB. She saw her pulmonologist Dr. Feldman who did CXR and showed it was normal at the time, had her on levaquin and then increased her dose which she completed last week. Yesterday she started having crushing chest pressure substernally. Denies fever during this month, nausea vomiting back pain abd pain urinary sx or diarrhea. No recent travel or sick contact. Her cardiologist is Dr. Munoz.  (25 Dec 2022 18:25)    Allergies    ACE inhibitors (Unknown)  BENZALKONIUM (Rash)  BETADINE (Rash)  Ceclor (Unknown)  codeine (Unknown)  contrast media (iodine-based) (Hives)  latex (Unknown)  penicillin (Unknown)  RUBBER GLOVES (Rash)  shellfish (Unknown)  sulfonamides (Unknown)    Intolerances    Patient seen and examined at bedside.   Patient intubated and sedated overnight. Mechanically vented, pressors, A-Line, TLC, Pena  ST events on telemetry overnight    Vital Signs Last 24 Hrs  T(C): 36.3 (10 Keven 2023 08:00), Max: 37.2 (10 Keven 2023 04:00)  T(F): 97.3 (10 Keven 2023 08:00), Max: 98.9 (10 Keven 2023 04:00)  HR: 116 (10 Keven 2023 11:15) (98 - 156)  BP: 115/54 (10 Keven 2023 04:15) (77/26 - 168/51)  BP(mean): 59 (10 Keven 2023 05:15) (32 - 130)  RR: 32 (10 Keven 2023 11:15) (24 - 80)  SpO2: 91% (10 Keven 2023 11:14) (71% - 100%)    Parameters below as of 10 Keven 2023 11:00  Patient On (Oxygen Delivery Method): ventilator    O2 Concentration (%): 100    MEDICATIONS  (STANDING):  albuterol/ipratropium for Nebulization 3 milliLiter(s) Nebulizer every 6 hours  aspirin enteric coated 81 milliGRAM(s) Oral <User Schedule>  atorvastatin 40 milliGRAM(s) Oral at bedtime  chlorhexidine 0.12% Liquid 15 milliLiter(s) Oral Mucosa every 12 hours  chlorhexidine 2% Cloths 1 Application(s) Topical daily  clopidogrel Tablet 75 milliGRAM(s) Oral daily  dexMEDEtomidine Infusion 0.05 MICROgram(s)/kG/Hr (0.79 mL/Hr) IV Continuous <Continuous>  dextrose 5%. 1000 milliLiter(s) (50 mL/Hr) IV Continuous <Continuous>  dextrose 5%. 1000 milliLiter(s) (100 mL/Hr) IV Continuous <Continuous>  dextrose 50% Injectable 25 Gram(s) IV Push once  dextrose 50% Injectable 12.5 Gram(s) IV Push once  dextrose 50% Injectable 25 Gram(s) IV Push once  dextrose 50% Injectable 25 Gram(s) IV Push once  donepezil 10 milliGRAM(s) Oral at bedtime  gabapentin 100 milliGRAM(s) Oral at bedtime  glucagon  Injectable 1 milliGRAM(s) IntraMuscular once  hydrocortisone sodium succinate Injectable 100 milliGRAM(s) IV Push every 8 hours  insulin lispro (ADMELOG) corrective regimen sliding scale   SubCutaneous three times a day before meals  ketamine Infusion. 0.251 mG/kG/Hr (1.58 mL/Hr) IV Continuous <Continuous>  meropenem  IVPB 500 milliGRAM(s) IV Intermittent every 12 hours  metroNIDAZOLE  IVPB 500 milliGRAM(s) IV Intermittent every 8 hours  midazolam Infusion 0.02 mG/kG/Hr (1.26 mL/Hr) IV Continuous <Continuous>  montelukast 10 milliGRAM(s) Oral daily  multivitamin/minerals 1 Tablet(s) Oral daily  norepinephrine Infusion 0.05 MICROgram(s)/kG/Min (2.95 mL/Hr) IV Continuous <Continuous>  pantoprazole Infusion 8 mG/Hr (10 mL/Hr) IV Continuous <Continuous>  phenylephrine    Infusion 0.1 MICROgram(s)/kG/Min (1.18 mL/Hr) IV Continuous <Continuous>  polyethylene glycol 3350 17 Gram(s) Oral daily  sodium chloride 0.9% Bolus 250 milliLiter(s) IV Bolus once  sucralfate 1 Gram(s) Oral two times a day  vancomycin  IVPB 750 milliGRAM(s) IV Intermittent once  vasopressin Infusion 0.04 Unit(s)/Min (6 mL/Hr) IV Continuous <Continuous>  zinc oxide 40% Paste 1 Application(s) Topical daily    MEDICATIONS  (PRN):  aluminum hydroxide/magnesium hydroxide/simethicone Suspension 30 milliLiter(s) Oral every 4 hours PRN Dyspepsia  dextrose Oral Gel 15 Gram(s) Oral once PRN Blood Glucose LESS THAN 70 milliGRAM(s)/deciliter  ondansetron Injectable 4 milliGRAM(s) IV Push every 8 hours PRN Nausea and/or Vomiting      REVIEW OF SYSTEMS:          All negative except as mentioned in HPI    PHYSICAL EXAM:           CONSTITUTIONAL: Well-developed; well-nourished; intubated, sedated  	SKIN: warm, dry  	HEAD: Normocephalic; atraumatic  	EYES: PERRL.  	ENT: No nasal discharge, airway clear, mucous membranes dry, mechanically vented, intubated  	NECK: Supple; non tender. Right IJ MLC D/C/I  	CARD: +S1, +S2, no murmurs, gallops, or rubs. ST/Regular rate and rhythm    	RESP: No wheezes, rales. Rhonchi B/L  	ABD: soft ntnd, + BS x 4 quadrants              Uro: Pena to gravity   	EXT: moves all extremities, no clubbing, cyanosis or edema  	NEURO: Sedated, no focal deficits          PSYCH: Sedated, calm          VASCULAR:  + Rad / + PTs / +  DPs          EXTREMITY:              Left Radial: A-Line Dressing D/C/I, access site soft, no hematoma, + pulses, no sign of infection              Right Groin: access site soft, no hematoma, + pulses, no sign of infection            ECG:   < from: 12 Lead ECG (12.30.22 @ 08:05) >    Ventricular Rate 83 BPM    Atrial Rate 83 BPM    P-R Interval 130 ms    QRS Duration 90 ms    Q-T Interval 378 ms    QTC Calculation(Bazett) 444 ms    P Axis 49 degrees    R Axis 67 degrees    T Axis 181 degrees    Diagnosis Line Sinus rhythm with Premature atrial complexes  Nonspecific ST and T wave abnormality  Abnormal ECG    Confirmed by cami leiva ([id number]) on 12/30/2022 2:01:35 PM                                                                                    2D ECHO:  < from: TTE Echo Complete w/o Contrast w/ Doppler (12.26.22 @ 10:40) >  Summary:   1. LV Ejection Fraction by Baird's Method with a biplane EF of 78 %.   2. Normal left atrial size.   3. Mild mitral annular calcification.   4. Mild mitral valve regurgitation.   5. Mild tricuspid regurgitation.   6. Normal trileaflet aortic valvewith normal opening.   7. Mild pulmonic valve regurgitation.   8. Estimated pulmonary artery systolic pressure is 73.8 mmHg assuming a   right atrial pressure of 3 mmHg, which is consistent with severe   pulmonary hypertension.   9. There is mild aortic root calcification.    LABS:                        7.8    13.01 )-----------( 101      ( 10 Keven 2023 06:30 )             24.6     01-10    138  |  107  |  95<HH>  ----------------------------<  53<L>  4.7   |  15<L>  |  2.4<H>    Ca    7.3<L>      10 Keven 2023 06:30  Mg     2.1     01-10    TPro  3.3<L>  /  Alb  1.6<L>  /  TBili  0.6  /  DBili  x   /  AST  143<H>  /  ALT  40  /  AlkPhos  138<H>  01-10    Magnesium, Serum: 2.1 mg/dL [1.8 - 2.4] (01-10-23 @ 06:30)  Magnesium, Serum: 2.0 mg/dL [1.8 - 2.4] (01-10-23 @ 00:30)  LIVER FUNCTIONS - ( 10 Keven 2023 06:30 )  Alb: 1.6 g/dL / Pro: 3.3 g/dL / ALK PHOS: 138 U/L / ALT: 40 U/L / AST: 143 U/L / GGT: x             A/P:  I discussed the case with Cardiologist Dr. Leong and recommend the following:    S/P PCI pLAD KHUSHBU x 1   Intervention: s/p successful IVUS guided rotational atherectomy and balloon angioplasty with KHUSHBU X 1 to prox LAD 90% stenosis extending into mid LAD  Implants: Kvng Doran 3.5 X 30 to prox LAD     FINDINGS:     Coronary Dominance: Right    LM: No disease  LAD: Prox LAD calcified 90% stenosis, mid LAD 90% calcified stenosis  D1 mild disease  CX: dCx 70% stenosis  OM1 Mild disease small vessel  OM2 mild disease   RCA: Patent stents from prior cath, not injected    LVEDP: 25 mmHg   POST-OP DIAGNOSIS:    [x] 2 Vessel Coronary Artery Disease: LAD s/p intervention and Cx                      Care as per ICU team                   Vent management as per Pulmonary                    ACEi/ARB due to Allergy and elevated Cr                   No B-Blocker due to pressors use   	     Continue DAPT, Statin Therapy, PPI, Maalox                    Monitor Cr, Hg, Plt, LFTs                   Strict I/O, Daily weight                    Take ASA to every other day and continue Plavix daily; no recent bleeding, monitor                    DVT prophylaxis with SCDs                   Keep K = 4, Mg = 2                   HOB > 45 degrees                    Keep MAP  > 70                   Monitor in ICU

## 2023-01-10 NOTE — PROCEDURE NOTE - NSINFORMCONSENT_GEN_A_CORE
This was an emergent procedure.
This was an emergent procedure.
Benefits, risks, and possible complications of procedure explained to patient/caregiver who verbalized understanding and gave written consent.
Benefits, risks, and possible complications of procedure explained to patient/caregiver who verbalized understanding and gave verbal consent.

## 2023-01-10 NOTE — PROGRESS NOTE ADULT - REASON FOR ADMISSION
NSTEMI

## 2023-01-10 NOTE — PROGRESS NOTE ADULT - SUBJECTIVE AND OBJECTIVE BOX
07-Apr-2020 Gastroenterology progress note:     Patient is a 82y old  Female who presents with a chief complaint of NSTEMI (10 Keven 2023 14:02)    Admitted on: 12-25-22    We are following the patient for      intubated and started on pressors for shock, no melena since yesterday hemoglobin remains stable     PAST MEDICAL & SURGICAL HISTORY:  MI (myocardial infarction)  s/p 5 stents      HTN (hypertension)      High cholesterol      Heart failure      Non Hodgkin&#x27;s lymphoma  in remission. Follows with Dr Hernandez      PVD (peripheral vascular disease)      Thrombocytopenia      CKD (chronic kidney disease)      Osteoarthritis      Diabetes  on insulin      Coronary artery disease involving native heart without angina pectoris, unspecified vessel or lesion type  s/p 5 stents      History of cholecystectomy      H/O cardiac catheterization  5 STENTS      H/O carotid endarterectomy  RIGHT          MEDICATIONS  (STANDING):  albuterol/ipratropium for Nebulization 3 milliLiter(s) Nebulizer every 6 hours  aspirin  chewable 81 milliGRAM(s) Oral <User Schedule>  atorvastatin 40 milliGRAM(s) Oral at bedtime  chlorhexidine 0.12% Liquid 15 milliLiter(s) Oral Mucosa every 12 hours  chlorhexidine 2% Cloths 1 Application(s) Topical daily  clopidogrel Tablet 75 milliGRAM(s) Oral daily  dexMEDEtomidine Infusion 0.05 MICROgram(s)/kG/Hr (0.79 mL/Hr) IV Continuous <Continuous>  dextrose 5%. 1000 milliLiter(s) (100 mL/Hr) IV Continuous <Continuous>  dextrose 5%. 1000 milliLiter(s) (50 mL/Hr) IV Continuous <Continuous>  dextrose 50% Injectable 25 Gram(s) IV Push once  dextrose 50% Injectable 12.5 Gram(s) IV Push once  dextrose 50% Injectable 25 Gram(s) IV Push once  dextrose 50% Injectable 25 Gram(s) IV Push once  donepezil 10 milliGRAM(s) Oral at bedtime  gabapentin 100 milliGRAM(s) Oral at bedtime  glucagon  Injectable 1 milliGRAM(s) IntraMuscular once  hydrocortisone sodium succinate Injectable 100 milliGRAM(s) IV Push every 8 hours  insulin lispro (ADMELOG) corrective regimen sliding scale   SubCutaneous three times a day before meals  ketamine Infusion. 0.251 mG/kG/Hr (1.58 mL/Hr) IV Continuous <Continuous>  lactated ringers Bolus 500 milliLiter(s) IV Bolus once  meropenem  IVPB 500 milliGRAM(s) IV Intermittent every 12 hours  metroNIDAZOLE  IVPB 500 milliGRAM(s) IV Intermittent every 8 hours  midazolam Infusion 0.02 mG/kG/Hr (1.26 mL/Hr) IV Continuous <Continuous>  montelukast 10 milliGRAM(s) Oral daily  multivitamin/minerals 1 Tablet(s) Oral daily  norepinephrine Infusion 0.05 MICROgram(s)/kG/Min (2.95 mL/Hr) IV Continuous <Continuous>  pantoprazole Infusion 8 mG/Hr (10 mL/Hr) IV Continuous <Continuous>  phenylephrine    Infusion 0.1 MICROgram(s)/kG/Min (1.18 mL/Hr) IV Continuous <Continuous>  polyethylene glycol 3350 17 Gram(s) Oral daily  sodium bicarbonate  Infusion 0.143 mEq/kG/Hr (60 mL/Hr) IV Continuous <Continuous>  sodium chloride 0.9% Bolus 250 milliLiter(s) IV Bolus once  sucralfate 1 Gram(s) Oral two times a day  vasopressin Infusion 0.04 Unit(s)/Min (6 mL/Hr) IV Continuous <Continuous>    MEDICATIONS  (PRN):  aluminum hydroxide/magnesium hydroxide/simethicone Suspension 30 milliLiter(s) Oral every 4 hours PRN Dyspepsia  dextrose Oral Gel 15 Gram(s) Oral once PRN Blood Glucose LESS THAN 70 milliGRAM(s)/deciliter  ondansetron Injectable 4 milliGRAM(s) IV Push every 8 hours PRN Nausea and/or Vomiting      Allergies  ACE inhibitors (Unknown)  BENZALKONIUM (Rash)  BETADINE (Rash)  Ceclor (Unknown)  codeine (Unknown)  contrast media (iodine-based) (Hives)  latex (Unknown)  penicillin (Unknown)  RUBBER GLOVES (Rash)  shellfish (Unknown)  sulfonamides (Unknown)      Review of Systems:   unable to obtain     Physical Examination:  T(C): 36.4 (01-10-23 @ 16:00), Max: 37.2 (01-10-23 @ 04:00)  HR: 124 (01-10-23 @ 15:45) (98 - 156)  BP: 115/54 (01-10-23 @ 04:15) (77/26 - 155/53)  RR: 30 (01-10-23 @ 15:45) (24 - 80)  SpO2: 96% (01-10-23 @ 16:00) (71% - 100%)      01-09-23 @ 07:01  -  01-10-23 @ 07:00  --------------------------------------------------------  IN: 3596.5 mL / OUT: 1060 mL / NET: 2536.5 mL    01-10-23 @ 07:01  -  01-10-23 @ 16:21  --------------------------------------------------------  IN: 2486.4 mL / OUT: 70 mL / NET: 2416.4 mL    GENERAL: intubated   HEAD:  Atraumatic, Normocephalic  EYES: conjunctiva and sclera clear  NECK: Supple, no JVD or thyromegaly  CHEST/LUNG: Clear to auscultation bilaterally  HEART: Regular rate and rhythm  ABDOMEN: Soft, nontender, nondistended  NEUROLOGY: sedated   SKIN: Intact, no jaundice     Data:                        7.8    13.01 )-----------( 101      ( 10 Keven 2023 06:30 )             24.6     Hgb trend:  7.8  01-10-23 @ 06:30  7.9  01-10-23 @ 00:30  7.2  01-09-23 @ 17:42  7.5  01-09-23 @ 04:42  12.9  01-09-23 @ 01:20  8.1  01-08-23 @ 20:24  8.2  01-08-23 @ 04:45  8.2  01-07-23 @ 20:00        01-10    138  |  107  |  95<HH>  ----------------------------<  53<L>  4.7   |  15<L>  |  2.4<H>    Ca    7.3<L>      10 Keven 2023 06:30  Mg     2.1     01-10    TPro  3.3<L>  /  Alb  1.6<L>  /  TBili  0.6  /  DBili  x   /  AST  143<H>  /  ALT  40  /  AlkPhos  138<H>  01-10    Liver panel trend:  TBili 0.6   /      /   ALT 40   /   AlkP 138   /   Tptn 3.3   /   Alb 1.6    /   DBili --      01-10  TBili 0.5   /   AST 85   /   ALT 23   /   AlkP 135   /   Tptn 3.6   /   Alb 1.9    /   DBili --      01-10  TBili 0.5   /   AST 65   /   ALT 20   /   AlkP 107   /   Tptn 3.3   /   Alb 1.6    /   DBili -- 01-09  TBili 0.8   /   AST 44   /   ALT 17   /   AlkP 96   /   Tptn 3.2   /   Alb 1.7    /   DBili -- 01-08  TBili 0.6   /   AST 48   /   ALT 21   /   AlkP 112   /   Tptn 3.6   /   Alb 2.1    /   DBili -- 01-07  TBili 0.7   /   AST 43   /   ALT 20   /   AlkP 136   /   Tptn 4.2   /   Alb 2.6    /   DBili --      01-06  TBili 0.9   /   AST 40   /   ALT 18   /   AlkP 89   /   Tptn 4.4   /   Alb 2.8    /   DBili --      01-04  TBili 0.7   /   AST 50   /   ALT 21   /   AlkP 80   /   Tptn 4.7   /   Alb 2.9    /   DBili --      01-03  TBili 0.8   /   AST 38   /   ALT 21   /   AlkP 75   /   Tptn 4.8   /   Alb 3.4    /   DBili -- 01-02  TBili 0.9   /   AST 32   /   ALT 20   /   AlkP 67   /   Tptn 5.0   /   Alb 3.2    /   DBili --      01-01      Radiology:       08-Apr-2020

## 2023-01-10 NOTE — PROGRESS NOTE ADULT - TIME BILLING
I have personally seen and examined this patient.    I have reviewed all pertinent clinical information and reviewed all relevant imaging and diagnostic studies personally.   I counseled the patient about diagnostic testing and treatment plan. All questions were answered.   I discussed recommendations with the primary team.
Coordination of care
Coordination of care

## 2023-01-10 NOTE — CHART NOTE - NSCHARTNOTESELECT_GEN_ALL_CORE
Cardiology NP/Event Note
Cardiology NP/Event Note
Post Cath Note/Event Note
Pulmonary Chart Note
upgrd/Transfer Note
Event Note
Event Note
Post Cath Note/Event Note
Transfer Note
cardiology NP/Event Note

## 2023-01-10 NOTE — PROGRESS NOTE ADULT - SUBJECTIVE AND OBJECTIVE BOX
Over Night Events: events noted, remain critically ill, worsening status, sp intubation, on versed, ketamine, precedex, vaso/ rolando 6/ levop 0.04, ppi, no bloody BM    PHYSICAL EXAM    ICU Vital Signs Last 24 Hrs  T(C): 37.2 (10 Keven 2023 04:00), Max: 37.2 (10 Keven 2023 04:00)  T(F): 98.9 (10 Keven 2023 04:00), Max: 98.9 (10 Keven 2023 04:00)  HR: 118 (10 Keven 2023 07:00) (98 - 156)  BP: 115/54 (10 Keven 2023 04:15) (77/26 - 168/51)  BP(mean): 59 (10 Keven 2023 05:15) (32 - 130)  ABP: 204/36 (10 Keven 2023 07:00) (126/28 - 228/48)  ABP(mean): 72 (10 Keven 2023 07:00) (46 - 86)  RR: 42 (10 Keven 2023 07:00) (24 - 80)  SpO2: 100% (10 Keven 2023 07:00) (71% - 100%)    O2 Parameters below as of 10 Keven 2023 04:00  Patient On (Oxygen Delivery Method): ventilated  O2 Concentration (%): 100        General: Ill looking  HEENT: ETT            Lungs: dec bs both bases  Cardiovascular: CHRIS 2.6  Abdomen: Soft, Positive BS  Mottled extremities    01-09-23 @ 07:01  -  01-10-23 @ 07:00  --------------------------------------------------------  IN:    Dexmedetomidine: 70.8 mL    IV PiggyBack: 800 mL    Ketamine: 12.6 mL    Lactated Ringers Bolus: 1500 mL    Midazolam: 9.3 mL    Norepinephrine: 35.4 mL    Norepinephrine: 64.7 mL    Norepinephrine: 156.4 mL    Oral Fluid: 30 mL    Pantoprazole: 240 mL    Phenylephrine: 153.5 mL    Phenylephrine: 11.8 mL    Sodium Chloride 0.9% Bolus: 500 mL    Vasopressin: 12 mL  Total IN: 3596.5 mL    OUT:    Indwelling Catheter - Urethral (mL): 1060 mL  Total OUT: 1060 mL    Total NET: 2536.5 mL          LABS:                          7.9    24.25 )-----------( 102      ( 10 Keven 2023 00:30 )             23.7                                               01-10    138  |  107  |  x   ----------------------------<  53<L>  4.7   |  15<L>  |  2.4<H>    Ca    7.3<L>      10 Keven 2023 06:30  Mg     2.1     01-10    TPro  3.3<L>  /  Alb  1.6<L>  /  TBili  0.6  /  DBili  x   /  AST  143<H>  /  ALT  40  /  AlkPhos  138<H>  01-10                                                                                           LIVER FUNCTIONS - ( 10 Keven 2023 06:30 )  Alb: 1.6 g/dL / Pro: 3.3 g/dL / ALK PHOS: 138 U/L / ALT: 40 U/L / AST: 143 U/L / GGT: x                                                                                               Mode: AC/ CMV (Assist Control/ Continuous Mandatory Ventilation)  RR (machine): 24  TV (machine): 380  FiO2: 100  PEEP: 8  ITime: 1  MAP: 15  PIP: 32                                      ABG - ( 10 Keven 2023 06:03 )  pH, Arterial: 7.13  pH, Blood: x     /  pCO2: 41    /  pO2: 89    / HCO3: 14    / Base Excess: -15.0 /  SaO2: 96.6      LA 6.7          MEDICATIONS  (STANDING):  albuterol/ipratropium for Nebulization 3 milliLiter(s) Nebulizer every 6 hours  aspirin enteric coated 81 milliGRAM(s) Oral <User Schedule>  atorvastatin 40 milliGRAM(s) Oral at bedtime  chlorhexidine 0.12% Liquid 15 milliLiter(s) Oral Mucosa every 12 hours  chlorhexidine 2% Cloths 1 Application(s) Topical daily  ciprofloxacin   IVPB      ciprofloxacin   IVPB 200 milliGRAM(s) IV Intermittent every 12 hours  clopidogrel Tablet 75 milliGRAM(s) Oral daily  dexMEDEtomidine Infusion 0.05 MICROgram(s)/kG/Hr (0.79 mL/Hr) IV Continuous <Continuous>  dextrose 5%. 1000 milliLiter(s) (50 mL/Hr) IV Continuous <Continuous>  dextrose 5%. 1000 milliLiter(s) (100 mL/Hr) IV Continuous <Continuous>  dextrose 50% Injectable 25 Gram(s) IV Push once  dextrose 50% Injectable 12.5 Gram(s) IV Push once  dextrose 50% Injectable 25 Gram(s) IV Push once  dextrose 50% Injectable 25 Gram(s) IV Push once  donepezil 10 milliGRAM(s) Oral at bedtime  gabapentin 100 milliGRAM(s) Oral at bedtime  glucagon  Injectable 1 milliGRAM(s) IntraMuscular once  insulin lispro (ADMELOG) corrective regimen sliding scale   SubCutaneous three times a day before meals  ketamine Infusion. 0.251 mG/kG/Hr (1.58 mL/Hr) IV Continuous <Continuous>  lactated ringers Bolus 500 milliLiter(s) IV Bolus once  metroNIDAZOLE  IVPB 500 milliGRAM(s) IV Intermittent every 8 hours  midazolam Infusion 0.02 mG/kG/Hr (1.26 mL/Hr) IV Continuous <Continuous>  montelukast 10 milliGRAM(s) Oral daily  multivitamin/minerals 1 Tablet(s) Oral daily  norepinephrine Infusion 0.05 MICROgram(s)/kG/Min (2.95 mL/Hr) IV Continuous <Continuous>  pantoprazole Infusion 8 mG/Hr (10 mL/Hr) IV Continuous <Continuous>  phenylephrine    Infusion 0.1 MICROgram(s)/kG/Min (1.18 mL/Hr) IV Continuous <Continuous>  polyethylene glycol 3350 17 Gram(s) Oral daily  sodium chloride 0.9% Bolus 250 milliLiter(s) IV Bolus once  sucralfate 1 Gram(s) Oral two times a day  vasopressin Infusion 0.04 Unit(s)/Min (6 mL/Hr) IV Continuous <Continuous>  zinc oxide 40% Paste 1 Application(s) Topical daily    MEDICATIONS  (PRN):  aluminum hydroxide/magnesium hydroxide/simethicone Suspension 30 milliLiter(s) Oral every 4 hours PRN Dyspepsia  dextrose Oral Gel 15 Gram(s) Oral once PRN Blood Glucose LESS THAN 70 milliGRAM(s)/deciliter  ondansetron Injectable 4 milliGRAM(s) IV Push every 8 hours PRN Nausea and/or Vomiting      CXR reviewed

## 2023-01-10 NOTE — PROCEDURE NOTE - ADDITIONAL PROCEDURE DETAILS
pt in respiratory distress on bipap 100%  /75 RR 20 o2 sat 99 etomidate 14mg IV succinylcholine 80mg IV glidescope mac 3 view grade 1 quick easy atraumatic.  /70 o2 sat 100 on vent.

## 2023-01-10 NOTE — DISCHARGE NOTE FOR THE EXPIRED PATIENT - HOSPITAL COURSE
Patient is a 82y old Female who presented with a chief complaint of NSTEMI. On 12/29/22 Underwent catheterization with KHUSHBU X 1 to prox LAD 90% stenosis extending into mid LAD. Was managed in CCU and then downgraded to floors. On floors pt developed abdominal distension, epigastric pain, and melena with acute drop in Hb on 1/7/23. CT on 1/6/23 for abdominal distension revealed urinary retention and stockton was placed (1.4L u/o). Pt was evaluated by GI on 1/7/23 and recommended MICU eval for high risk patient with recent (<1 month) stent placement requiring continuing DAPT. In the CCU, patient's course was complicated by UGI bleed, sepsis, and WOO. Over the past 2 days, patient's condition has worsened, she became more obtunded and WOO was worsening. On 1/9, patient was intubated for tachypnea and increased work of breathing. Her family changed her code status to DNR according to patient's wishes. Lactate was increasing, pressor requirements increasing, and HR was ranging in 150s. Patient went into asystole and was pronounced dead at 9:00pm.

## 2023-01-14 DIAGNOSIS — E87.20 ACIDOSIS, UNSPECIFIED: ICD-10-CM

## 2023-01-14 DIAGNOSIS — E10.22 TYPE 1 DIABETES MELLITUS WITH DIABETIC CHRONIC KIDNEY DISEASE: ICD-10-CM

## 2023-01-14 DIAGNOSIS — I27.20 PULMONARY HYPERTENSION, UNSPECIFIED: ICD-10-CM

## 2023-01-14 DIAGNOSIS — N17.9 ACUTE KIDNEY FAILURE, UNSPECIFIED: ICD-10-CM

## 2023-01-14 DIAGNOSIS — K92.2 GASTROINTESTINAL HEMORRHAGE, UNSPECIFIED: ICD-10-CM

## 2023-01-14 DIAGNOSIS — R65.21 SEVERE SEPSIS WITH SEPTIC SHOCK: ICD-10-CM

## 2023-01-14 DIAGNOSIS — E10.65 TYPE 1 DIABETES MELLITUS WITH HYPERGLYCEMIA: ICD-10-CM

## 2023-01-14 DIAGNOSIS — I13.0 HYPERTENSIVE HEART AND CHRONIC KIDNEY DISEASE WITH HEART FAILURE AND STAGE 1 THROUGH STAGE 4 CHRONIC KIDNEY DISEASE, OR UNSPECIFIED CHRONIC KIDNEY DISEASE: ICD-10-CM

## 2023-01-14 DIAGNOSIS — A41.9 SEPSIS, UNSPECIFIED ORGANISM: ICD-10-CM

## 2023-01-14 DIAGNOSIS — N18.32 CHRONIC KIDNEY DISEASE, STAGE 3B: ICD-10-CM

## 2023-01-14 DIAGNOSIS — D69.6 THROMBOCYTOPENIA, UNSPECIFIED: ICD-10-CM

## 2023-01-14 DIAGNOSIS — I25.84 CORONARY ATHEROSCLEROSIS DUE TO CALCIFIED CORONARY LESION: ICD-10-CM

## 2023-01-14 DIAGNOSIS — C85.90 NON-HODGKIN LYMPHOMA, UNSPECIFIED, UNSPECIFIED SITE: ICD-10-CM

## 2023-01-14 DIAGNOSIS — I21.4 NON-ST ELEVATION (NSTEMI) MYOCARDIAL INFARCTION: ICD-10-CM

## 2023-01-14 DIAGNOSIS — J96.01 ACUTE RESPIRATORY FAILURE WITH HYPOXIA: ICD-10-CM

## 2023-01-14 DIAGNOSIS — J96.02 ACUTE RESPIRATORY FAILURE WITH HYPERCAPNIA: ICD-10-CM

## 2023-01-14 DIAGNOSIS — J18.9 PNEUMONIA, UNSPECIFIED ORGANISM: ICD-10-CM

## 2023-01-14 DIAGNOSIS — E10.51 TYPE 1 DIABETES MELLITUS WITH DIABETIC PERIPHERAL ANGIOPATHY WITHOUT GANGRENE: ICD-10-CM

## 2023-01-14 DIAGNOSIS — R57.8 OTHER SHOCK: ICD-10-CM

## 2023-01-14 DIAGNOSIS — I25.2 OLD MYOCARDIAL INFARCTION: ICD-10-CM

## 2023-01-14 DIAGNOSIS — Z79.4 LONG TERM (CURRENT) USE OF INSULIN: ICD-10-CM

## 2023-01-14 DIAGNOSIS — I25.10 ATHEROSCLEROTIC HEART DISEASE OF NATIVE CORONARY ARTERY WITHOUT ANGINA PECTORIS: ICD-10-CM

## 2023-01-14 DIAGNOSIS — F03.90 UNSPECIFIED DEMENTIA WITHOUT BEHAVIORAL DISTURBANCE: ICD-10-CM

## 2023-01-14 DIAGNOSIS — J44.9 CHRONIC OBSTRUCTIVE PULMONARY DISEASE, UNSPECIFIED: ICD-10-CM

## 2023-01-14 DIAGNOSIS — K56.7 ILEUS, UNSPECIFIED: ICD-10-CM

## 2023-01-14 DIAGNOSIS — I50.33 ACUTE ON CHRONIC DIASTOLIC (CONGESTIVE) HEART FAILURE: ICD-10-CM

## 2023-01-14 DIAGNOSIS — D62 ACUTE POSTHEMORRHAGIC ANEMIA: ICD-10-CM

## 2023-01-14 DIAGNOSIS — I47.20 VENTRICULAR TACHYCARDIA, UNSPECIFIED: ICD-10-CM

## 2023-01-14 DIAGNOSIS — Z66 DO NOT RESUSCITATE: ICD-10-CM

## 2023-01-14 DIAGNOSIS — T82.855A STENOSIS OF CORONARY ARTERY STENT, INITIAL ENCOUNTER: ICD-10-CM

## 2023-01-15 LAB
ACANTHOCYTES BLD QL SMEAR: SLIGHT — SIGNIFICANT CHANGE UP
ANISOCYTOSIS BLD QL: SIGNIFICANT CHANGE UP
BURR CELLS BLD QL SMEAR: PRESENT — SIGNIFICANT CHANGE UP
GIANT PLATELETS BLD QL SMEAR: PRESENT — SIGNIFICANT CHANGE UP
MANUAL SMEAR VERIFICATION: SIGNIFICANT CHANGE UP
METAMYELOCYTES # FLD: 6.3 % — HIGH (ref 0–0)
MICROCYTES BLD QL: SIGNIFICANT CHANGE UP
MYELOCYTES NFR BLD: 2.7 % — HIGH (ref 0–0)
NEUTS BAND # BLD: 4.5 % — SIGNIFICANT CHANGE UP (ref 0–6)
NRBC # BLD: 15 /100 — HIGH (ref 0–0)
PLAT MORPH BLD: NORMAL — SIGNIFICANT CHANGE UP
PLATELET COUNT - ESTIMATE: ABNORMAL
POIKILOCYTOSIS BLD QL AUTO: SIGNIFICANT CHANGE UP
POLYCHROMASIA BLD QL SMEAR: SIGNIFICANT CHANGE UP
RBC BLD AUTO: ABNORMAL
ROULEAUX BLD QL SMEAR: PRESENT — SIGNIFICANT CHANGE UP
TOXIC GRANULES BLD QL SMEAR: PRESENT — SIGNIFICANT CHANGE UP
VARIANT LYMPHS # BLD: 0.9 % — SIGNIFICANT CHANGE UP (ref 0–5)

## 2023-01-16 LAB — SRA INTERP SER-IMP: SIGNIFICANT CHANGE UP

## 2023-04-10 ENCOUNTER — APPOINTMENT (OUTPATIENT)
Dept: HEMATOLOGY ONCOLOGY | Facility: CLINIC | Age: 83
End: 2023-04-10

## 2023-06-16 NOTE — PATIENT PROFILE ADULT - LAST ORAL INTAKE
17-Feb-2020 17:00 Colchicine Counseling:  Patient counseled regarding adverse effects including but not limited to stomach upset (nausea, vomiting, stomach pain, or diarrhea).  Patient instructed to limit alcohol consumption while taking this medication.  Colchicine may reduce blood counts especially with prolonged use.  The patient understands that monitoring of kidney function and blood counts may be required, especially at baseline. The patient verbalized understanding of the proper use and possible adverse effects of colchicine.  All of the patient's questions and concerns were addressed.

## 2023-06-20 NOTE — REASON FOR VISIT
Head,  normocephalic,  atraumatic [Follow-Up Visit] : a follow-up visit for [Lymphoma] : lymphoma [FreeTextEntry2] : NGOZI and DELL

## 2023-07-11 NOTE — ED ADULT NURSE NOTE - NSIMPLEMENTINTERV_GEN_ALL_ED
Implemented All Fall with Harm Risk Interventions:  [place] to call system. Call bell, personal items and telephone within reach. Instruct patient to call for assistance. Room bathroom lighting operational. Non-slip footwear when patient is off stretcher. Physically safe environment: no spills, clutter or unnecessary equipment. Stretcher in lowest position, wheels locked, appropriate side rails in place. Provide visual cue, wrist band, yellow gown, etc. Monitor gait and stability. Monitor for mental status changes and reorient to person, place, and time. Review medications for side effects contributing to fall risk. Reinforce activity limits and safety measures with patient and family. Provide visual clues: red socks. no

## 2023-07-26 NOTE — PHYSICAL THERAPY INITIAL EVALUATION ADULT - STANDING BALANCE: DYNAMIC, REHAB EVAL
fair balance [employer] Counseling:  I discussed with the patient the risks of mycophenolate mofetil including but not limited to infection/immunosuppression, GI upset, hypokalemia, hypercholesterolemia, bone marrow suppression, lymphoproliferative disorders, malignancy, GI ulceration/bleed/perforation, colitis, interstitial lung disease, kidney failure, progressive multifocal leukoencephalopathy, and birth defects.  The patient understands that monitoring is required including a baseline creatinine and regular CBC testing. In addition, patient must alert us immediately if symptoms of infection or other concerning signs are noted.

## 2023-07-26 NOTE — PROGRESS NOTE ADULT - SUBJECTIVE AND OBJECTIVE BOX
"[facility]    Consult Note - EGS Service  Date of Admission:  7/26/2023  Consult Requested by: Dr. Killian  Reason for Consult: \"RUQ pain with distended gallbladder, gallstones, leukocytosis.\"    Assessment & Plan: Surgery   Teri Garcia is a 52 year old female admitted on 7/26/2023. She has a PMHx relevant for Roslyn-en-Y gastric bypass with reversal (2010) c/b with gastroparesis; chronic back pain and neuropathic pain treated chronically with opioids; left sided umbilical hernia, ventral hernia, both of them soft and reducible; BSO 10 years ago; chronic smoker and asthma.    Patient came to our ED for evaluation of a 28 hrs of abdominal pain, most intense epigastrium and left quadrants, with an intensity of 7-8/10 with nausea, fevers and chills. Patient also described that this pain has been going on for most than a decade; however, this pain usually subsides by itself, which this time did not.  On labs WBC 15.5, C. Diff positive on 6/22/2023. On admission, CT scan depicted a dilated gallbladder without sings of cholecystitis; US showed cholelithiasis without cholecystitis, and CBD of 6-8 mm. Patient complaining as well for odynophagia with history of GIB and UGI ulcers.     - Admit to Obs  - No need for HIDA scan   - CLD now. NPO at midnight   - Roceph and Flagyl  - Added on for EGD and Laparoscopic cholecystectomy       Clinically Significant Risk Factors Present on Admission         # Hypernatremia: Highest Na = 148 mmol/L in last 2 days, will monitor as appropriate   # Hypercalcemia: Highest Ca = 10.3 mg/dL in last 2 days, will monitor as appropriate                    The patient's care was discussed with the Chief Resident/Fellow Dr. Smith who will discuss with staff.     Giorgio Clinton MD  PGY2  Department of Surgery    ______________________________________________________________________    Chief Complaint   Abdominal pain     History is obtained from " 02-22-20 @ 14:43    VENKATESH RAMACHANDRAN  79y  Female  Seen with daughter Shae. PT. ready to leave, upright, ambulating slowly without assist.   Looks better, no c/o now. Says her SOB better.     Admitting c/o : SOB, wheeze    HPI on admission 2/17 :    78 y/o F with PMH of COPD not on home oxygen, NHL - in remission (follows with Dr Hernandez), HTN , DLD, DM on insulin, CKD, CAD s/p PCI with 5 stents, autoimmune hemolytic anemia, who presents to the ED for worsening SOB/wheezing and back pain. She says that she is very SOB at baseline and is unable to walk around her house without feeling SOB. However, over the past week her SOB has been worsening to the point where she is even SOB at rest. She endorses productive cough at baseline but over the last few days her sputum has turned from clear to dark green. She denies fever/chills. She also has experienced acute onset back pain that awoke her from sleep. She describes it as sharp and 10/10 in severity at it's worse. It was constant and non-radiating however, since arrival in the ED the pain has improve significantly. Pain is made worse by twisting to the side. Patient denies sick contacts or recent travel. She denies fever/chills, headaches, sore throat, chest pain, palpitations, abdominal pain, N/V/D, urinary symptoms, or lower extremity edema.     INTERVAL EVENTS: Tx-ed w/ diuretics with improvement of Sx. F/u by pulm, Tx-ed w/ inhaln Tx, steroid w/ stabilization of sx.     MEDICATIONS  (STANDING):  albuterol/ipratropium for Nebulization. 3 milliLiter(s) Nebulizer every 4 hours  atorvastatin 20 milliGRAM(s) Oral at bedtime  budesonide 160 MICROgram(s)/formoterol 4.5 MICROgram(s) Inhaler 2 Puff(s) Inhalation two times a day  chlorhexidine 4% Liquid 1 Application(s) Topical <User Schedule>  dextrose 5%. 1000 milliLiter(s) (50 mL/Hr) IV Continuous <Continuous>  dextrose 50% Injectable 12.5 Gram(s) IV Push once  dextrose 50% Injectable 25 Gram(s) IV Push once  dextrose 50% Injectable 25 Gram(s) IV Push once  donepezil 10 milliGRAM(s) Oral at bedtime  doxycycline hyclate Capsule 100 milliGRAM(s) Oral every 12 hours  enoxaparin Injectable 40 milliGRAM(s) SubCutaneous daily  fenofibrate Tablet 48 milliGRAM(s) Oral daily  furosemide    Tablet 20 milliGRAM(s) Oral daily  insulin glargine Injectable (LANTUS) 20 Unit(s) SubCutaneous at bedtime  insulin lispro (HumaLOG) corrective regimen sliding scale   SubCutaneous three times a day before meals  insulin lispro Injectable (HumaLOG) 5 Unit(s) SubCutaneous three times a day before meals  isosorbide   mononitrate ER Tablet (IMDUR) 30 milliGRAM(s) Oral daily  losartan 100 milliGRAM(s) Oral daily  montelukast 10 milliGRAM(s) Oral daily  NIFEdipine XL 30 milliGRAM(s) Oral daily  pantoprazole    Tablet 40 milliGRAM(s) Oral before breakfast  predniSONE   Tablet 40 milliGRAM(s) Oral daily    MEDICATIONS  (PRN):  dextrose 40% Gel 15 Gram(s) Oral once PRN Blood Glucose LESS THAN 70 milliGRAM(s)/deciliter  glucagon  Injectable 1 milliGRAM(s) IntraMuscular once PRN Glucose LESS THAN 70 milligrams/deciliter  ibuprofen  Tablet. 600 milliGRAM(s) Oral every 8 hours PRN Severe Pain (7 - 10)      T(C): 36 (02-22-20 @ 14:26), Max: 36.5 (02-21-20 @ 21:00)  HR: 94 (02-22-20 @ 14:26) (84 - 97)  BP: 155/58 (02-22-20 @ 14:26) (143/63 - 170/75)  RR: 18 (02-22-20 @ 14:26) (18 - 19)  SpO2: 97% (02-22-20 @ 14:26) (95% - 98%)  Wt(kg): --Vital Signs Last 24 Hrs  T(C): 36 (22 Feb 2020 14:26), Max: 36.5 (21 Feb 2020 21:00)  T(F): 96.8 (22 Feb 2020 14:26), Max: 97.7 (21 Feb 2020 21:00)  HR: 94 (22 Feb 2020 14:26) (84 - 97)  BP: 155/58 (22 Feb 2020 14:26) (143/63 - 170/75)  BP(mean): 91 (22 Feb 2020 14:26) (91 - 108)  RR: 18 (22 Feb 2020 14:26) (18 - 19)  SpO2: 97% (22 Feb 2020 14:26) (95% - 98%)    PHYSICAL EXAM:  GENERAL: Obese No acute distress at upright posture. Looks comfortable, no resp discomfort  Moist mucosa  NECK: no visible JVD.  CHEST/LUNG: BL AE, better excursion. No adv sounds.   HEART: S1 S2, reg. SM LSE  ABDOMEN: obese, benign  EXTREMITIES: No pitting edema noted.   Skin warm, pink.   No focal neuro deficit.                           9.7    5.18  )-----------( 139      ( 22 Feb 2020 06:59 )             30.1     02-22    142  |  104  |  48<H>  ----------------------------<  327<H>  4.0   |  23  |  1.4    Ca    9.3      22 Feb 2020 06:59              RADIOLOGY & ADDITIONAL TESTS:      ASSESSMENT / PLAN  :    COPD : Being f/u by Pulm. On IV steroid, doses being adjusted. Inhalers.   Hypoxemia : 2' to above, with added Pulm HTN evident in Echo. She, will be home with O2. Specially will use when exertion.   Pulm HTN : Clinically not in CHF, had cardio eval, to cont diuresis.   DM uncontrolled. : been labile glycemia 2' steroid. To cont home Rx, w/ monitoring & f/u. Diet & activities guided.   CKD : Biochemically was worse from diuresis, explained to pt & dtr. To cont f/u as OP   Hx NHL : stable. Routine OP f./u.     Currently she is stable for discharge. She & family understands proper f/u plans. the patient    History of Present Illness   Teri Garcia is a 52 year old female admitted on 7/26/2023. She has a PMHx relevant for Roslyn-en-Y gastric bypass (2001) with reversal (2010) c/b with gastroparesis; C. Diff  positive one month ago with chronic diarrhea; chronic back pain and neuropathic pain on lower extremities treated chronically with opioids, most recently with morphine which was suspended by her primary Dr. 10 days ago; left sided umbilical hernia, ventral hernia, both of them soft and reducible; BSO 10 years ago; chronic smoker and asthma.    Patient came to our ED for evaluation of a 28 hrs of abdominal pain, most intense epigastrium and left quadrants, with an intensity of 7-8/10, without any alleviating or exacerbating factor, along with nausea, fevers and chills, but no vomiting. Patient also described that this pain has been going on for most than a decade; however, this pain usually subsides by itself, which this time did not; reason why patient came.   On labs, procalcitonin of 0.05, WBC 15.5, Hb of 16.6, UA negative for infection, C. Diff positive on 6/22/2023.    CT imaging depicting postoperative changes of gastric bypass and subsequent reversal; distended gallbladder, hepatic hemangioma, stable fat-containing supraumbilical hernia measuring up to 8.4 cm    US depicting cholelithiasis with no sonographic evidence of acute cholecystitis; liver mildly enlarged with hemangioma the right hepatic lobe, CBD borderline dilated for patient's age (6-8 mm).    Past Medical History    Past Medical History:   Diagnosis Date    Abdominal pain 06/09/10    D/C 06/13/10-[facility]    Abdominal pain, unspecified site 06/20/2006    Admit.  Discharged 06/22    Anxiety state, unspecified     Back pain 10/5/2011    Bariatric surgery status     takedown 2010    Bipolar affective disorder (H)     Chronic fatigue     Chronic pain syndrome     Depressive disorder 07/29/08    Depressive disorder, not elsewhere classified      Depressive disorder, not elsewhere classified 07/29/08    [facility] admit    Encounter for IUD removal 3/5/2013    Patient removed IUD at home    Fibromyalgia     Myalgia and myositis, unspecified     chronic pain    Obesity, unspecified     s/p gastric bypass, resolved.     Other specified aftercare following surgery     Tobacco use disorder     Uncomplicated asthma 1993       Past Surgical History   Past Surgical History:   Procedure Laterality Date    COLONOSCOPY  2006    gastric bypass complications, family hx of colon cancer    ENDOSCOPY  06/08/2007    Upper GI    ESOPHAGOSCOPY, GASTROSCOPY, DUODENOSCOPY (EGD), COMBINED  4/4/2011    Procedure:COMBINED ESOPHAGOSCOPY, GASTROSCOPY, DUODENOSCOPY (EGD); Surgeon:RERE RITTER; Location: GI    ESOPHAGOSCOPY, GASTROSCOPY, DUODENOSCOPY (EGD), COMBINED  9/2/2011    Procedure:COMBINED ESOPHAGOSCOPY, GASTROSCOPY, DUODENOSCOPY (EGD); Surgeon:STONE    ESOPHAGOSCOPY, GASTROSCOPY, DUODENOSCOPY (EGD), COMBINED N/A 8/4/2016    Procedure: COMBINED ESOPHAGOSCOPY, GASTROSCOPY, DUODENOSCOPY (EGD);  Surgeon: Casa Caraballo MD;  Location:  GI    GASTRIC BYPASS  12/01    GASTRIC BYPASS  09/07/10    Open reversalRYGB Stone    GYN SURGERY  10/2013    bilateral salpingectomy, d/c and endometrial ablation    HC INJ EPIDURAL LUMBAR/SACRAL W/WO CONTRAST  2008    HYSTEROSCOPY      hysteroscopy D&C and thermachoice ablatio    SALPINGECTOMY      bilateral    ZZHC UGI ENDOSCOPY, SIMPLE EXAM  06/12/10    [facility]       Prior to Admission Medications   Current Facility-Administered Medications   Medication    cefTRIAXone (ROCEPHIN) 1 g vial to attach to  mL bag for ADULTS or NS 50 mL bag for PEDS    cefTRIAXone (ROCEPHIN) 1 g vial to attach to  mL bag for ADULTS or NS 50 mL bag for PEDS    melatonin tablet 1 mg    metroNIDAZOLE (FLAGYL) infusion 500 mg    metroNIDAZOLE (FLAGYL) infusion 500 mg    ondansetron (ZOFRAN ODT) ODT tab 4 mg    Or    ondansetron  (ZOFRAN) injection 4 mg     Current Outpatient Medications   Medication Sig    albuterol (PROAIR HFA/PROVENTIL HFA/VENTOLIN HFA) 108 (90 Base) MCG/ACT inhaler Inhale 2 puffs into the lungs every 4 hours as needed for shortness of breath or wheezing    butalbital-acetaminophen-caffeine (ESGIC) -40 MG tablet Take 2 tablets by mouth daily as needed for headaches    calcium carbonate antacid 1000 MG CHEW Take 1-2 tablets by mouth as needed May increase.    clonazePAM (KLONOPIN) 1 MG tablet Take 1 tablet (1 mg) by mouth 2 times daily as needed for anxiety    DM-Doxylamine-acetaminophen 15-6. MG CAPS Take 1 capsule by mouth every 6 hours as needed     hydrOXYzine (ATARAX) 25 MG tablet Take 1-2 tablets (25-50 mg) by mouth every 6 hours as needed for itching    morphine (MS CONTIN) 30 MG CR tablet Take 1 tablet (30 mg) by mouth every 12 hours    naloxone (NARCAN) 4 MG/0.1ML nasal spray Spray 1 spray (4 mg) into one nostril alternating nostrils as needed for opioid reversal every 2-3 minutes until assistance arrives    naloxone (NARCAN) 4 MG/0.1ML nasal spray Spray 1 spray (4 mg) into one nostril alternating nostrils once as needed for opioid reversal every 2-3 minutes until assistance arrives (Patient not taking: No sig reported)    SUMAtriptan (IMITREX) 100 MG tablet Take 1 tablet (100 mg) by mouth at onset of headache for migraine          Review of Systems    The 10 point Review of Systems is negative other than noted in the HPI or here.      Physical Exam   Vital Signs: Temp: 97.7  F (36.5  C) Temp src: Oral BP: (!) 173/107 Pulse: 74   Resp: 16   O2 Device: None (Room air)    Weight: 0 lbs 0 ozNo intake or output data in the 24 hours ending 07/26/23 1353    Constitutional: Awake, alert, cooperative, no apparent distress, and appears stated age.  Eyes: Pupils equal, round and reactive to light, extra ocular muscles intact, sclera clear, conjunctiva normal.  HENT: Normocephalic, oral pharynx with moist mucus  membranes, upper and lower full denture. No goiter appreciated.   Respiratory: Clear to auscultation bilaterally, no crackles or wheezing.  Cardiovascular: Regular rate and rhythm, normal S1 and S2, and no murmur noted.  Carotids +2, no bruits. No edema. Palpable pulses to radial  DP and PT arteries.  GI: palpable umbilical and inguinal hernia, both soft, reducible. Moderate tenderness on deep palpation on RUQ, mild pain on LLQ and epigastrium. Abdomen distended due to adipose tissue, soft, without peritoneal signs.   Lymph/Hematologic: No cervical lymphadenopathy and no supraclavicular lymphadenopathy.  Genitourinary:  deferred  Skin: Warm and dry.  No rashes at anticipated surgical site.   Musculoskeletal: Full ROM of neck. There is no redness, warmth, or swelling of the joints. Gross motor strength is normal.    Neurologic: Awake, alert, oriented to name, place and time. Cranial nerves II-XII are grossly intact. Gait is normal.   Neuropsychiatric: patient on general discomfort, cooperative but anxious.     Data   Imaging results reviewed over the past 24 hrs:   Recent Results (from the past 24 hour(s))   Abdomen US, limited (RUQ only)    Narrative    EXAMINATION: Limited Abdominal Ultrasound, 7/26/2023 10:59 AM     COMPARISON: CT abdomen and pelvis with contrast 5/13/2021. Ultrasound  abdomen complete 9/20/2011.    HISTORY: Abdominal pain.    FINDINGS:   Fluid: No evidence of ascites or pleural effusions.    Liver: The liver demonstrates normal echotexture, measuring 18.1 cm in  craniocaudal dimension. Echogenic lesion in the posterior right  hepatic lobe measuring 3.1 x 2.1 x 1.9 cm is consistent with a  hemangioma on CT.    Gallbladder: Multiple mobile echogenic shadowing foci demonstrated  consistent with stones. Gallbladder is moderately distended. There is  no wall thickening, pericholecystic fluid nor positive sonographic  Kumar's sign.    Bile Ducts: The intrahepatic biliary system is of normal caliber.   The  common bile duct measures 6-8 mm in diameter, slightly dilated for  patient's age.    Pancreas: Visualized portions of the head and body of the pancreas are  unremarkable.     Kidney: The right kidney measures 10.5 cm long. There is no  hydronephrosis or hydroureter, no shadowing renal calculi, cystic  lesion or mass.       Impression    IMPRESSION:   1.  Cholelithiasis with no sonographic evidence of acute  cholecystitis.  2.  Liver mildly enlarged with hemangioma again demonstrated in the  right hepatic lobe as seen on prior CT.  3.  The common bile duct is borderline dilated for patient's age  measuring 6-8 mm. Given patient has normal liver function tests on  laboratory testing today, biliary obstruction is unlikely.    CHATO ROB MD         SYSTEM ID:  [id number]   CT Abdomen Pelvis w Contrast    Narrative    EXAMINATION: CT ABDOMEN PELVIS W CONTRAST, 7/26/2023 12:08 PM    INDICATION: abdominal pain with history of gastric bypass s/p reversal    COMPARISON STUDY: CT AP 5/13/2021, ultrasound 7/26/2023    TECHNIQUE: CT scan of the abdomen and pelvis was performed on  multidetector CT scanner using volumetric acquisition technique, and  images were reconstructed in multiple planes with variable thickness  and reviewed on dedicated workstations. CT scan radiation dose is  optimized to minimum requisite dose using automated dose modulation  techniques.    CONTRAST: iopamidol (ISOVUE-370) solution 115 mL.     FINDINGS:  Lower Chest:   Bibasilar linear subsegmental atelectasis.    Left lower lobe posterolateral peripheral pulmonary nodule measuring  0.5 x 0.6 cm, stable since at least 5/13/2021 (series 4, image 6).    Abdomen and Pelvis:  Liver: Multilobulated segment 6 hypodensity previously described as a  hemangioma measuring 2.6 x 2.8 cm, previously 1.9 x 3.0 cm (series 4,  image 38).  Measures 19.7 cm at the midclavicular line.      Biliary System: Distended gallbladder better evaluated on same  day  gallbladder ultrasound.    Pancreas: No mass or pancreatic ductal dilation.    Adrenal glands: No mass or nodules    Spleen: Normal.    Kidneys/Ureters/Bladder: Symmetric renal enhancement. Small amount of  excreted contrast. Decompressed bladder.    Gastrointestinal tract: Normal caliber small and large bowel.  Unremarkable appendix. Postsurgical changes of gastric bypass with a  few sutures within the jejunum suggestive of gastric bypass reversal.       Mesentery/peritoneum/retroperitoneum: No mass. No free fluid or air.    Lymph nodes: No significant lymphadenopathy.    Vasculature: Patent major abdominal vasculature.    Pelvis: No pelvic mass. Pelvic phleboliths.    Osseous structures: No aggressive or acute osseous lesion.  Mild  thoracolumbar degenerative change.      Soft tissues: Fat-containing supraumbilical hernia measuring 8.4 x 4.1  x 7.7 cm, stable. Left fat-containing inguinal hernia.        Impression    IMPRESSION:   1.  Postoperative changes of gastric bypass and subsequent reversal.  No evidence for obstruction.  2.  No acute finding in the abdomen or pelvis.  3.  Distended gallbladder better evaluated on same-day gallbladder  ultrasound.  4.  Hepatic hemangioma.  5.  Stable fat-containing supraumbilical hernia measuring up to 8.4  cm, with stable mild fat stranding of the herniated omental fat.    I have personally reviewed the examination and initial interpretation  and I agree with the findings.    ANGELIA HERNANDEZ MD         SYSTEM ID:  [id number]     Recent Labs   Lab 07/26/23  0902   WBC 15.5*   HGB 16.6*   MCV 87      *   POTASSIUM 3.8   CHLORIDE 111*   CO2 21*   BUN 17.6   CR 0.89   ANIONGAP 16*   MODESTO 10.3*   *   ALBUMIN 4.9   PROTTOTAL 7.4   BILITOTAL 0.5   ALKPHOS 90   ALT <5   AST 18   LIPASE 30

## 2024-04-16 NOTE — PHYSICAL THERAPY INITIAL EVALUATION ADULT - SIT-TO-STAND BALANCE
Speech Language/Pathology    Speech/Language Pathology Progress Note    Patient Name: Monika Butler  Today's Date: 4/16/2024     Problem List  Principal Problem:    Severe sepsis (HCC)  Active Problems:    Essential hypertension    Dementia with behavioral disturbance (HCC)    Generalized weakness    Meningioma (HCC)    Anemia    QT prolongation    Acute respiratory failure with hypoxia (HCC)    Elevated serum creatinine    Abnormal CT scan       Past Medical History  Past Medical History:   Diagnosis Date    Acute bronchiolitis 12/12/2023    Allergic     Cancer (HCC) 2005    left breast mastectomy    Electrolyte abnormality 10/02/2022    Hypertension     Hypokalemia 01/02/2024    Memory change     Nodule of left lung     Pleural thickening         Past Surgical History  Past Surgical History:   Procedure Laterality Date    CATARACT EXTRACTION Left 05/2020    CATARACT EXTRACTION Left 06/2020    MASTECTOMY      FL XCAPSL CTRC RMVL INSJ IO LENS PROSTH W/O ECP Left 6/8/2020    Procedure: EXTRACTION EXTRACAPSULAR CATARACT PHACO INTRAOCULAR LENS (IOL);  Surgeon: Cortes Harrison MD;  Location: [facility] MAIN OR;  Service: Ophthalmology         Subjective:  Pt awake/alert in bed as SLP entered the room. Pt currently on room air and pleasantly confused. FLACC 0.    Objective:  Pt seen for ST follow up session with goal of advancing liquids back to baseline (thin). Pt with adequate bolus retrieval from straw; no anterior leakage. Bolus control and transfer appeared adequate. Suspect prompt swallow initiation. Pt with baseline wet cough that was present prior to oral trials. Pt did not demonstrate any throat clearing, coughing directly timed with swallows, change in vocal quality, or change in respiratory functioning s/p swallows.     Assessment:  No overt s/s of aspiration or dysphagia given thin liquids or nectar thick liquids.     Plan/Recommendations:  Puree  Thin Liquids  Medications Crushed Puree  Oral Care  Aspiration  Precautions  Swallow Strategies: upright with oral intake, slow rate, small bits/sips, liquids every 2-3 bites as needed, full feed, feed only when awake  ST to continue to follow while in acute care      good balance

## 2024-06-05 NOTE — PROGRESS NOTE ADULT - ASSESSMENT
IMPRESSION:    Melena / UGIB possibel on PPI drip  septic shock multiorgan failure  Recent NTEMI s/p PCI on DAPT (cannot hold)  Sepsis/ abd pain/ ro ischemic bowel  Ileus   WOO  HFpEF    PLAN:    CNS: SAT    HEENT: Oral care    PULMONARY:  HOB @ 45 degrees.  Aspiration precautions . increase RR, monitor P/P/DP, REPEAT ABG    CARDIOVASCULAR: Trend LA, taper pressors,   GI: GI prophylaxis.  PPI. NPO.  GI f/u  not stable for CT AP    RENAL:  Follow up lytes.  Correct as needed    INFECTIOUS DISEASE: Follow up cultures, procal, start ABX    HEMATOLOGICAL:  DVT prophylaxis.   serial HH,       ENDOCRINE:  Follow up FS.  Insulin protocol if needed.    MUSCULOSKELETAL:    MICU    R IJ TLC 1/8  POOR RPOGNOSIS 0

## 2024-09-04 NOTE — PHYSICAL THERAPY INITIAL EVALUATION ADULT - LONG TERM MEMORY, REHAB EVAL
ASSESSMENT & PLAN  Patient Instructions     1. Primary osteoarthritis of both knees      -Patient is following for chronic bilateral knee pain due to arthritis  -Patient tolerated bilateral knee intra-articular cortisone injections today without complications.  Patient was given postprocedure instructions  -Patient will start formal physical therapy and home exercise program  -Patient will follow-up when pain returns  -Call direct clinic number [[phone]] at any time with questions or concerns.    Albert Yeo MD [facility]  [facility]        -----    SUBJECTIVE:  Rocio Price is a 63 year old female who is seen for bilateral knees at the request of Indra Heck PA-C. Patient previously had corticosteroid injection (most recent: 02/23/24) that provided  4 month(s) of relief.          Large Joint Injection/Arthocentesis: bilateral knee    Date/Time: 9/4/2024 11:20 AM    Performed by: Yeo, Albert, MD  Authorized by: Yeo, Albert, MD    Indications:  Pain and osteoarthritis  Needle Size:  22 G  Guidance: ultrasound    Approach:  Anterolateral  Location:  Knee  Laterality:  Bilateral      Medications (Right):  40 mg methylPREDNISolone 40 MG/ML; 4 mL ROPivacaine 5 MG/ML  Aspirate amount (mL):  10  Aspirate:  Yellow  Medications (Left):  40 mg methylPREDNISolone 40 MG/ML; 4 mL ROPivacaine 5 MG/ML  Outcome:  Tolerated well, no immediate complications  Procedure discussed: discussed risks, benefits, and alternatives    Consent Given by:  Patient  Timeout: timeout called immediately prior to procedure    Prep: patient was prepped and draped in usual sterile fashion     Ultrasound was used to ensure safe and accurate needle placement and injection. Ultrasound images of the procedure were permanently stored.        Albert Yeo MD, [facility]     intact

## 2025-03-31 NOTE — DISCHARGE NOTE NURSING/CASE MANAGEMENT/SOCIAL WORK - PATIENT PORTAL LINK FT
Detail Level: Detailed
You can access the FollowMyHealth Patient Portal offered by [facility] by registering at the following website: [email or web address]. By joining [employer] [facility]’s FollowMyHealth portal, you will also be able to view your health information using other applications (apps) compatible with our system.